# Patient Record
Sex: MALE | Race: OTHER | HISPANIC OR LATINO
[De-identification: names, ages, dates, MRNs, and addresses within clinical notes are randomized per-mention and may not be internally consistent; named-entity substitution may affect disease eponyms.]

---

## 2017-01-17 ENCOUNTER — APPOINTMENT (OUTPATIENT)
Dept: CARDIOLOGY | Facility: HOSPITAL | Age: 72
End: 2017-01-17

## 2017-01-23 ENCOUNTER — APPOINTMENT (OUTPATIENT)
Dept: INTERNAL MEDICINE | Facility: HOSPITAL | Age: 72
End: 2017-01-23

## 2017-01-23 ENCOUNTER — OUTPATIENT (OUTPATIENT)
Dept: OUTPATIENT SERVICES | Facility: HOSPITAL | Age: 72
LOS: 1 days | End: 2017-01-23

## 2017-01-23 DIAGNOSIS — Z79.01 LONG TERM (CURRENT) USE OF ANTICOAGULANTS: ICD-10-CM

## 2017-01-23 DIAGNOSIS — Z98.89 OTHER SPECIFIED POSTPROCEDURAL STATES: Chronic | ICD-10-CM

## 2017-01-23 DIAGNOSIS — Z00.00 ENCOUNTER FOR GENERAL ADULT MEDICAL EXAMINATION WITHOUT ABNORMAL FINDINGS: ICD-10-CM

## 2017-01-23 DIAGNOSIS — Z95.2 PRESENCE OF PROSTHETIC HEART VALVE: ICD-10-CM

## 2017-01-23 DIAGNOSIS — Z86.69 PERSONAL HISTORY OF OTHER DISEASES OF THE NERVOUS SYSTEM AND SENSE ORGANS: Chronic | ICD-10-CM

## 2017-01-30 ENCOUNTER — LABORATORY RESULT (OUTPATIENT)
Age: 72
End: 2017-01-30

## 2017-01-30 ENCOUNTER — OUTPATIENT (OUTPATIENT)
Dept: OUTPATIENT SERVICES | Facility: HOSPITAL | Age: 72
LOS: 1 days | End: 2017-01-30

## 2017-01-30 ENCOUNTER — APPOINTMENT (OUTPATIENT)
Dept: INTERNAL MEDICINE | Facility: HOSPITAL | Age: 72
End: 2017-01-30

## 2017-01-30 VITALS — HEIGHT: 69 IN | BODY MASS INDEX: 27.1 KG/M2 | WEIGHT: 182.98 LBS

## 2017-01-30 VITALS — DIASTOLIC BLOOD PRESSURE: 90 MMHG | SYSTOLIC BLOOD PRESSURE: 140 MMHG

## 2017-01-30 DIAGNOSIS — T38.3X5A ADVERSE EFFECT OF INSULIN AND ORAL HYPOGLYCEMIC [ANTIDIABETIC] DRUGS, INITIAL ENCOUNTER: ICD-10-CM

## 2017-01-30 DIAGNOSIS — D49.2 NEOPLASM OF UNSPECIFIED BEHAVIOR OF BONE, SOFT TISSUE, AND SKIN: ICD-10-CM

## 2017-01-30 DIAGNOSIS — Z86.69 PERSONAL HISTORY OF OTHER DISEASES OF THE NERVOUS SYSTEM AND SENSE ORGANS: Chronic | ICD-10-CM

## 2017-01-30 DIAGNOSIS — Z98.89 OTHER SPECIFIED POSTPROCEDURAL STATES: Chronic | ICD-10-CM

## 2017-01-30 DIAGNOSIS — Z87.438 PERSONAL HISTORY OF OTHER DISEASES OF MALE GENITAL ORGANS: ICD-10-CM

## 2017-01-30 DIAGNOSIS — C44.91 BASAL CELL CARCINOMA OF SKIN, UNSPECIFIED: ICD-10-CM

## 2017-01-30 DIAGNOSIS — I78.1 NEVUS, NON-NEOPLASTIC: ICD-10-CM

## 2017-01-30 DIAGNOSIS — Z86.79 PERSONAL HISTORY OF OTHER DISEASES OF THE CIRCULATORY SYSTEM: ICD-10-CM

## 2017-01-30 DIAGNOSIS — Z87.898 PERSONAL HISTORY OF OTHER SPECIFIED CONDITIONS: ICD-10-CM

## 2017-01-30 LAB — HBA1C BLD-MCNC: 6.6 % — HIGH (ref 4–5.6)

## 2017-01-31 DIAGNOSIS — Z87.19 PERSONAL HISTORY OF OTHER DISEASES OF THE DIGESTIVE SYSTEM: ICD-10-CM

## 2017-01-31 DIAGNOSIS — Z95.2 PRESENCE OF PROSTHETIC HEART VALVE: ICD-10-CM

## 2017-01-31 DIAGNOSIS — G40.909 EPILEPSY, UNSPECIFIED, NOT INTRACTABLE, WITHOUT STATUS EPILEPTICUS: ICD-10-CM

## 2017-01-31 DIAGNOSIS — I48.91 UNSPECIFIED ATRIAL FIBRILLATION: ICD-10-CM

## 2017-01-31 DIAGNOSIS — E11.9 TYPE 2 DIABETES MELLITUS WITHOUT COMPLICATIONS: ICD-10-CM

## 2017-01-31 DIAGNOSIS — Z00.00 ENCOUNTER FOR GENERAL ADULT MEDICAL EXAMINATION WITHOUT ABNORMAL FINDINGS: ICD-10-CM

## 2017-01-31 DIAGNOSIS — K31.89 OTHER DISEASES OF STOMACH AND DUODENUM: ICD-10-CM

## 2017-01-31 DIAGNOSIS — C44.91 BASAL CELL CARCINOMA OF SKIN, UNSPECIFIED: ICD-10-CM

## 2017-01-31 DIAGNOSIS — Z79.01 LONG TERM (CURRENT) USE OF ANTICOAGULANTS: ICD-10-CM

## 2017-01-31 LAB — GLUCOSE BLDC GLUCOMTR-MCNC: 97

## 2017-02-06 ENCOUNTER — APPOINTMENT (OUTPATIENT)
Dept: INTERNAL MEDICINE | Facility: HOSPITAL | Age: 72
End: 2017-02-06

## 2017-02-13 ENCOUNTER — APPOINTMENT (OUTPATIENT)
Dept: INTERNAL MEDICINE | Facility: HOSPITAL | Age: 72
End: 2017-02-13

## 2017-03-06 ENCOUNTER — RX RENEWAL (OUTPATIENT)
Age: 72
End: 2017-03-06

## 2017-03-16 ENCOUNTER — OTHER (OUTPATIENT)
Age: 72
End: 2017-03-16

## 2017-03-16 ENCOUNTER — OUTPATIENT (OUTPATIENT)
Dept: OUTPATIENT SERVICES | Facility: HOSPITAL | Age: 72
LOS: 1 days | End: 2017-03-16

## 2017-03-16 ENCOUNTER — RESULT CHARGE (OUTPATIENT)
Age: 72
End: 2017-03-16

## 2017-03-16 ENCOUNTER — APPOINTMENT (OUTPATIENT)
Dept: GASTROENTEROLOGY | Facility: HOSPITAL | Age: 72
End: 2017-03-16

## 2017-03-16 VITALS
HEART RATE: 83 BPM | BODY MASS INDEX: 27.11 KG/M2 | WEIGHT: 183 LBS | SYSTOLIC BLOOD PRESSURE: 135 MMHG | HEIGHT: 69 IN | DIASTOLIC BLOOD PRESSURE: 89 MMHG

## 2017-03-16 DIAGNOSIS — Z98.89 OTHER SPECIFIED POSTPROCEDURAL STATES: Chronic | ICD-10-CM

## 2017-03-16 DIAGNOSIS — Z86.69 PERSONAL HISTORY OF OTHER DISEASES OF THE NERVOUS SYSTEM AND SENSE ORGANS: Chronic | ICD-10-CM

## 2017-03-16 DIAGNOSIS — K31.89 OTHER DISEASES OF STOMACH AND DUODENUM: ICD-10-CM

## 2017-03-16 DIAGNOSIS — Z79.01 LONG TERM (CURRENT) USE OF ANTICOAGULANTS: ICD-10-CM

## 2017-03-16 DIAGNOSIS — R10.13 EPIGASTRIC PAIN: ICD-10-CM

## 2017-03-16 DIAGNOSIS — K62.89 OTHER SPECIFIED DISEASES OF ANUS AND RECTUM: ICD-10-CM

## 2017-03-16 RX ORDER — HYDROCORTISONE ACETATE 25 MG/1
25 SUPPOSITORY RECTAL
Qty: 50 | Refills: 0 | Status: DISCONTINUED | COMMUNITY
Start: 2017-01-30 | End: 2017-03-16

## 2017-03-16 RX ORDER — HYDROCORTISONE ACETATE AND PRAMOXINE HYDROCHLORIDE 25; 10 MG/G; MG/G
2.5-1 CREAM TOPICAL
Qty: 30 | Refills: 3 | Status: DISCONTINUED | COMMUNITY
Start: 2017-03-16 | End: 2017-03-16

## 2017-03-17 LAB — INR PPP: 2.9 RATIO

## 2017-04-07 ENCOUNTER — OTHER (OUTPATIENT)
Age: 72
End: 2017-04-07

## 2017-04-10 ENCOUNTER — RX RENEWAL (OUTPATIENT)
Age: 72
End: 2017-04-10

## 2017-04-13 ENCOUNTER — APPOINTMENT (OUTPATIENT)
Dept: INTERNAL MEDICINE | Facility: HOSPITAL | Age: 72
End: 2017-04-13

## 2017-04-27 ENCOUNTER — OUTPATIENT (OUTPATIENT)
Dept: OUTPATIENT SERVICES | Facility: HOSPITAL | Age: 72
LOS: 1 days | End: 2017-04-27

## 2017-04-27 ENCOUNTER — APPOINTMENT (OUTPATIENT)
Dept: INTERNAL MEDICINE | Facility: HOSPITAL | Age: 72
End: 2017-04-27

## 2017-04-27 DIAGNOSIS — Z98.89 OTHER SPECIFIED POSTPROCEDURAL STATES: Chronic | ICD-10-CM

## 2017-04-27 DIAGNOSIS — Z95.2 PRESENCE OF PROSTHETIC HEART VALVE: ICD-10-CM

## 2017-04-27 DIAGNOSIS — Z79.01 LONG TERM (CURRENT) USE OF ANTICOAGULANTS: ICD-10-CM

## 2017-04-27 DIAGNOSIS — Z86.69 PERSONAL HISTORY OF OTHER DISEASES OF THE NERVOUS SYSTEM AND SENSE ORGANS: Chronic | ICD-10-CM

## 2017-04-27 DIAGNOSIS — I48.91 UNSPECIFIED ATRIAL FIBRILLATION: ICD-10-CM

## 2017-04-27 LAB — INR PPP: 2.9 RATIO

## 2017-05-25 ENCOUNTER — APPOINTMENT (OUTPATIENT)
Dept: INTERNAL MEDICINE | Facility: HOSPITAL | Age: 72
End: 2017-05-25

## 2017-06-02 ENCOUNTER — OUTPATIENT (OUTPATIENT)
Dept: OUTPATIENT SERVICES | Facility: HOSPITAL | Age: 72
LOS: 1 days | End: 2017-06-02

## 2017-06-02 ENCOUNTER — APPOINTMENT (OUTPATIENT)
Dept: INTERNAL MEDICINE | Facility: HOSPITAL | Age: 72
End: 2017-06-02

## 2017-06-02 ENCOUNTER — OTHER (OUTPATIENT)
Age: 72
End: 2017-06-02

## 2017-06-02 DIAGNOSIS — Z79.01 LONG TERM (CURRENT) USE OF ANTICOAGULANTS: ICD-10-CM

## 2017-06-02 DIAGNOSIS — Z86.69 PERSONAL HISTORY OF OTHER DISEASES OF THE NERVOUS SYSTEM AND SENSE ORGANS: Chronic | ICD-10-CM

## 2017-06-02 DIAGNOSIS — Z98.89 OTHER SPECIFIED POSTPROCEDURAL STATES: Chronic | ICD-10-CM

## 2017-06-02 DIAGNOSIS — Z95.2 PRESENCE OF PROSTHETIC HEART VALVE: ICD-10-CM

## 2017-06-02 LAB
INR PPP: 3.8 RATIO
QUALITY CONTROL: YES

## 2017-06-09 ENCOUNTER — APPOINTMENT (OUTPATIENT)
Dept: INTERNAL MEDICINE | Facility: HOSPITAL | Age: 72
End: 2017-06-09

## 2017-06-09 ENCOUNTER — RESULT REVIEW (OUTPATIENT)
Age: 72
End: 2017-06-09

## 2017-06-09 ENCOUNTER — OUTPATIENT (OUTPATIENT)
Dept: OUTPATIENT SERVICES | Facility: HOSPITAL | Age: 72
LOS: 1 days | End: 2017-06-09

## 2017-06-09 ENCOUNTER — LABORATORY RESULT (OUTPATIENT)
Age: 72
End: 2017-06-09

## 2017-06-09 DIAGNOSIS — Z98.89 OTHER SPECIFIED POSTPROCEDURAL STATES: Chronic | ICD-10-CM

## 2017-06-09 DIAGNOSIS — Z86.69 PERSONAL HISTORY OF OTHER DISEASES OF THE NERVOUS SYSTEM AND SENSE ORGANS: Chronic | ICD-10-CM

## 2017-06-09 LAB
INR BLD: 2.79 — HIGH (ref 0.88–1.17)
PROTHROM AB SERPL-ACNC: 31.9 SEC — HIGH (ref 9.8–13.1)

## 2017-06-12 DIAGNOSIS — Z79.01 LONG TERM (CURRENT) USE OF ANTICOAGULANTS: ICD-10-CM

## 2017-06-13 ENCOUNTER — APPOINTMENT (OUTPATIENT)
Dept: INTERNAL MEDICINE | Facility: HOSPITAL | Age: 72
End: 2017-06-13

## 2017-06-14 ENCOUNTER — RX RENEWAL (OUTPATIENT)
Age: 72
End: 2017-06-14

## 2017-06-15 ENCOUNTER — APPOINTMENT (OUTPATIENT)
Dept: INTERNAL MEDICINE | Facility: HOSPITAL | Age: 72
End: 2017-06-15

## 2017-06-15 ENCOUNTER — APPOINTMENT (OUTPATIENT)
Dept: GASTROENTEROLOGY | Facility: HOSPITAL | Age: 72
End: 2017-06-15

## 2017-06-16 ENCOUNTER — APPOINTMENT (OUTPATIENT)
Dept: INTERNAL MEDICINE | Facility: HOSPITAL | Age: 72
End: 2017-06-16

## 2017-06-20 ENCOUNTER — OUTPATIENT (OUTPATIENT)
Dept: OUTPATIENT SERVICES | Facility: HOSPITAL | Age: 72
LOS: 1 days | End: 2017-06-20

## 2017-06-20 ENCOUNTER — APPOINTMENT (OUTPATIENT)
Dept: INTERNAL MEDICINE | Facility: HOSPITAL | Age: 72
End: 2017-06-20

## 2017-06-20 DIAGNOSIS — Z95.2 PRESENCE OF PROSTHETIC HEART VALVE: ICD-10-CM

## 2017-06-20 DIAGNOSIS — Z98.89 OTHER SPECIFIED POSTPROCEDURAL STATES: Chronic | ICD-10-CM

## 2017-06-20 DIAGNOSIS — Z86.69 PERSONAL HISTORY OF OTHER DISEASES OF THE NERVOUS SYSTEM AND SENSE ORGANS: Chronic | ICD-10-CM

## 2017-06-20 LAB — INR PPP: 2.4 RATIO

## 2017-06-26 ENCOUNTER — OUTPATIENT (OUTPATIENT)
Dept: OUTPATIENT SERVICES | Facility: HOSPITAL | Age: 72
LOS: 1 days | End: 2017-06-26

## 2017-06-26 ENCOUNTER — APPOINTMENT (OUTPATIENT)
Dept: INTERNAL MEDICINE | Facility: HOSPITAL | Age: 72
End: 2017-06-26

## 2017-06-26 DIAGNOSIS — Z86.69 PERSONAL HISTORY OF OTHER DISEASES OF THE NERVOUS SYSTEM AND SENSE ORGANS: Chronic | ICD-10-CM

## 2017-06-26 DIAGNOSIS — Z98.89 OTHER SPECIFIED POSTPROCEDURAL STATES: Chronic | ICD-10-CM

## 2017-06-27 ENCOUNTER — APPOINTMENT (OUTPATIENT)
Dept: INTERNAL MEDICINE | Facility: HOSPITAL | Age: 72
End: 2017-06-27

## 2017-06-27 DIAGNOSIS — Z00.00 ENCOUNTER FOR GENERAL ADULT MEDICAL EXAMINATION WITHOUT ABNORMAL FINDINGS: ICD-10-CM

## 2017-06-27 DIAGNOSIS — Z79.01 LONG TERM (CURRENT) USE OF ANTICOAGULANTS: ICD-10-CM

## 2017-07-03 ENCOUNTER — OUTPATIENT (OUTPATIENT)
Dept: OUTPATIENT SERVICES | Facility: HOSPITAL | Age: 72
LOS: 1 days | End: 2017-07-03

## 2017-07-03 ENCOUNTER — APPOINTMENT (OUTPATIENT)
Dept: INTERNAL MEDICINE | Facility: HOSPITAL | Age: 72
End: 2017-07-03

## 2017-07-03 DIAGNOSIS — Z98.89 OTHER SPECIFIED POSTPROCEDURAL STATES: Chronic | ICD-10-CM

## 2017-07-03 DIAGNOSIS — Z86.69 PERSONAL HISTORY OF OTHER DISEASES OF THE NERVOUS SYSTEM AND SENSE ORGANS: Chronic | ICD-10-CM

## 2017-07-03 LAB — INR PPP: 2.5 RATIO

## 2017-07-05 DIAGNOSIS — Z00.00 ENCOUNTER FOR GENERAL ADULT MEDICAL EXAMINATION WITHOUT ABNORMAL FINDINGS: ICD-10-CM

## 2017-07-05 DIAGNOSIS — Z79.01 LONG TERM (CURRENT) USE OF ANTICOAGULANTS: ICD-10-CM

## 2017-07-05 DIAGNOSIS — Z95.2 PRESENCE OF PROSTHETIC HEART VALVE: ICD-10-CM

## 2017-07-05 DIAGNOSIS — I48.91 UNSPECIFIED ATRIAL FIBRILLATION: ICD-10-CM

## 2017-07-12 ENCOUNTER — LABORATORY RESULT (OUTPATIENT)
Age: 72
End: 2017-07-12

## 2017-07-12 ENCOUNTER — APPOINTMENT (OUTPATIENT)
Dept: INTERNAL MEDICINE | Facility: HOSPITAL | Age: 72
End: 2017-07-12

## 2017-07-12 ENCOUNTER — OUTPATIENT (OUTPATIENT)
Dept: OUTPATIENT SERVICES | Facility: HOSPITAL | Age: 72
LOS: 1 days | End: 2017-07-12

## 2017-07-12 VITALS
RESPIRATION RATE: 14 BRPM | HEART RATE: 74 BPM | OXYGEN SATURATION: 98 % | SYSTOLIC BLOOD PRESSURE: 110 MMHG | DIASTOLIC BLOOD PRESSURE: 70 MMHG

## 2017-07-12 VITALS — HEIGHT: 69 IN | BODY MASS INDEX: 26.96 KG/M2 | WEIGHT: 182 LBS

## 2017-07-12 DIAGNOSIS — Z86.69 PERSONAL HISTORY OF OTHER DISEASES OF THE NERVOUS SYSTEM AND SENSE ORGANS: Chronic | ICD-10-CM

## 2017-07-12 DIAGNOSIS — Z86.03 PERSONAL HISTORY OF NEOPLASM OF UNCERTAIN BEHAVIOR: ICD-10-CM

## 2017-07-12 DIAGNOSIS — Z00.00 ENCOUNTER FOR GENERAL ADULT MEDICAL EXAMINATION WITHOUT ABNORMAL FINDINGS: ICD-10-CM

## 2017-07-12 DIAGNOSIS — Z87.39 PERSONAL HISTORY OF OTHER DISEASES OF THE MUSCULOSKELETAL SYSTEM AND CONNECTIVE TISSUE: ICD-10-CM

## 2017-07-12 DIAGNOSIS — Z87.19 PERSONAL HISTORY OF OTHER DISEASES OF THE DIGESTIVE SYSTEM: ICD-10-CM

## 2017-07-12 DIAGNOSIS — B35.3 TINEA PEDIS: ICD-10-CM

## 2017-07-12 DIAGNOSIS — Z98.890 OTHER SPECIFIED POSTPROCEDURAL STATES: ICD-10-CM

## 2017-07-12 DIAGNOSIS — I48.91 UNSPECIFIED ATRIAL FIBRILLATION: ICD-10-CM

## 2017-07-12 DIAGNOSIS — Z86.718 PERSONAL HISTORY OF OTHER VENOUS THROMBOSIS AND EMBOLISM: ICD-10-CM

## 2017-07-12 DIAGNOSIS — I10 ESSENTIAL (PRIMARY) HYPERTENSION: ICD-10-CM

## 2017-07-12 DIAGNOSIS — Z86.19 PERSONAL HISTORY OF OTHER INFECTIOUS AND PARASITIC DISEASES: ICD-10-CM

## 2017-07-12 DIAGNOSIS — Z95.2 PRESENCE OF PROSTHETIC HEART VALVE: ICD-10-CM

## 2017-07-12 DIAGNOSIS — G40.909 EPILEPSY, UNSPECIFIED, NOT INTRACTABLE, WITHOUT STATUS EPILEPTICUS: ICD-10-CM

## 2017-07-12 DIAGNOSIS — Z85.828 OTHER SPECIFIED POSTPROCEDURAL STATES: ICD-10-CM

## 2017-07-12 DIAGNOSIS — L98.9 DISORDER OF THE SKIN AND SUBCUTANEOUS TISSUE, UNSPECIFIED: ICD-10-CM

## 2017-07-12 DIAGNOSIS — R10.13 EPIGASTRIC PAIN: ICD-10-CM

## 2017-07-12 DIAGNOSIS — K62.89 OTHER SPECIFIED DISEASES OF ANUS AND RECTUM: ICD-10-CM

## 2017-07-12 DIAGNOSIS — Z98.89 OTHER SPECIFIED POSTPROCEDURAL STATES: Chronic | ICD-10-CM

## 2017-07-12 DIAGNOSIS — E78.5 HYPERLIPIDEMIA, UNSPECIFIED: ICD-10-CM

## 2017-07-12 DIAGNOSIS — S80.12XD CONTUSION OF LEFT LOWER LEG, SUBSEQUENT ENCOUNTER: ICD-10-CM

## 2017-07-12 DIAGNOSIS — Z85.828 PERSONAL HISTORY OF OTHER MALIGNANT NEOPLASM OF SKIN: ICD-10-CM

## 2017-07-12 DIAGNOSIS — E11.9 TYPE 2 DIABETES MELLITUS WITHOUT COMPLICATIONS: ICD-10-CM

## 2017-07-12 LAB
BUN SERPL-MCNC: 20 MG/DL — SIGNIFICANT CHANGE UP (ref 7–23)
CALCIUM SERPL-MCNC: 9.7 MG/DL — SIGNIFICANT CHANGE UP (ref 8.4–10.5)
CHLORIDE SERPL-SCNC: 105 MMOL/L — SIGNIFICANT CHANGE UP (ref 98–107)
CO2 SERPL-SCNC: 25 MMOL/L — SIGNIFICANT CHANGE UP (ref 22–31)
CREAT SERPL-MCNC: 1.28 MG/DL — SIGNIFICANT CHANGE UP (ref 0.5–1.3)
GLUCOSE BLDC GLUCOMTR-MCNC: 97
GLUCOSE SERPL-MCNC: 91 MG/DL — SIGNIFICANT CHANGE UP (ref 70–99)
HBA1C BLD-MCNC: 6.9 % — HIGH (ref 4–5.6)
INR PPP: 3.3 RATIO
POTASSIUM SERPL-MCNC: 4.4 MMOL/L — SIGNIFICANT CHANGE UP (ref 3.5–5.3)
POTASSIUM SERPL-SCNC: 4.4 MMOL/L — SIGNIFICANT CHANGE UP (ref 3.5–5.3)
SODIUM SERPL-SCNC: 144 MMOL/L — SIGNIFICANT CHANGE UP (ref 135–145)

## 2017-07-12 RX ORDER — WITCH HAZEL 5 G/1
50 CLOTH TOPICAL
Qty: 30 | Refills: 0 | Status: DISCONTINUED | COMMUNITY
Start: 2017-01-30 | End: 2017-07-12

## 2017-07-19 ENCOUNTER — APPOINTMENT (OUTPATIENT)
Dept: INTERNAL MEDICINE | Facility: HOSPITAL | Age: 72
End: 2017-07-19

## 2017-07-26 ENCOUNTER — OUTPATIENT (OUTPATIENT)
Dept: OUTPATIENT SERVICES | Facility: HOSPITAL | Age: 72
LOS: 1 days | End: 2017-07-26

## 2017-07-26 ENCOUNTER — APPOINTMENT (OUTPATIENT)
Dept: INTERNAL MEDICINE | Facility: HOSPITAL | Age: 72
End: 2017-07-26

## 2017-07-26 ENCOUNTER — APPOINTMENT (OUTPATIENT)
Dept: CARDIOLOGY | Facility: HOSPITAL | Age: 72
End: 2017-07-26

## 2017-07-26 VITALS
RESPIRATION RATE: 15 BRPM | DIASTOLIC BLOOD PRESSURE: 79 MMHG | OXYGEN SATURATION: 96 % | HEART RATE: 70 BPM | BODY MASS INDEX: 27.47 KG/M2 | SYSTOLIC BLOOD PRESSURE: 126 MMHG | WEIGHT: 186 LBS

## 2017-07-26 DIAGNOSIS — Z98.89 OTHER SPECIFIED POSTPROCEDURAL STATES: Chronic | ICD-10-CM

## 2017-07-26 DIAGNOSIS — Z86.69 PERSONAL HISTORY OF OTHER DISEASES OF THE NERVOUS SYSTEM AND SENSE ORGANS: Chronic | ICD-10-CM

## 2017-07-26 LAB — INR PPP: 2.9 RATIO

## 2017-07-27 DIAGNOSIS — Z79.01 LONG TERM (CURRENT) USE OF ANTICOAGULANTS: ICD-10-CM

## 2017-07-28 ENCOUNTER — APPOINTMENT (OUTPATIENT)
Dept: INTERNAL MEDICINE | Facility: HOSPITAL | Age: 72
End: 2017-07-28

## 2017-07-28 DIAGNOSIS — I48.91 UNSPECIFIED ATRIAL FIBRILLATION: ICD-10-CM

## 2017-08-02 ENCOUNTER — MESSAGE (OUTPATIENT)
Age: 72
End: 2017-08-02

## 2017-08-08 ENCOUNTER — OTHER (OUTPATIENT)
Age: 72
End: 2017-08-08

## 2017-08-16 ENCOUNTER — OUTPATIENT (OUTPATIENT)
Dept: OUTPATIENT SERVICES | Facility: HOSPITAL | Age: 72
LOS: 1 days | End: 2017-08-16

## 2017-08-16 ENCOUNTER — APPOINTMENT (OUTPATIENT)
Dept: INTERNAL MEDICINE | Facility: HOSPITAL | Age: 72
End: 2017-08-16

## 2017-08-16 DIAGNOSIS — Z98.89 OTHER SPECIFIED POSTPROCEDURAL STATES: Chronic | ICD-10-CM

## 2017-08-16 DIAGNOSIS — Z86.69 PERSONAL HISTORY OF OTHER DISEASES OF THE NERVOUS SYSTEM AND SENSE ORGANS: Chronic | ICD-10-CM

## 2017-08-17 DIAGNOSIS — Z79.01 LONG TERM (CURRENT) USE OF ANTICOAGULANTS: ICD-10-CM

## 2017-08-17 DIAGNOSIS — Z95.2 PRESENCE OF PROSTHETIC HEART VALVE: ICD-10-CM

## 2017-08-17 LAB
INR PPP: 2 RATIO
QUALITY CONTROL: NO

## 2017-08-24 ENCOUNTER — APPOINTMENT (OUTPATIENT)
Dept: INTERNAL MEDICINE | Facility: HOSPITAL | Age: 72
End: 2017-08-24

## 2017-08-25 ENCOUNTER — OUTPATIENT (OUTPATIENT)
Dept: OUTPATIENT SERVICES | Facility: HOSPITAL | Age: 72
LOS: 1 days | End: 2017-08-25

## 2017-08-25 ENCOUNTER — APPOINTMENT (OUTPATIENT)
Dept: INTERNAL MEDICINE | Facility: HOSPITAL | Age: 72
End: 2017-08-25

## 2017-08-25 DIAGNOSIS — Z98.89 OTHER SPECIFIED POSTPROCEDURAL STATES: Chronic | ICD-10-CM

## 2017-08-25 DIAGNOSIS — Z86.69 PERSONAL HISTORY OF OTHER DISEASES OF THE NERVOUS SYSTEM AND SENSE ORGANS: Chronic | ICD-10-CM

## 2017-08-25 LAB — INR PPP: 2.3 RATIO

## 2017-08-28 DIAGNOSIS — Z79.01 LONG TERM (CURRENT) USE OF ANTICOAGULANTS: ICD-10-CM

## 2017-08-30 ENCOUNTER — OUTPATIENT (OUTPATIENT)
Dept: OUTPATIENT SERVICES | Facility: HOSPITAL | Age: 72
LOS: 1 days | End: 2017-08-30

## 2017-08-30 ENCOUNTER — APPOINTMENT (OUTPATIENT)
Dept: INTERNAL MEDICINE | Facility: HOSPITAL | Age: 72
End: 2017-08-30

## 2017-08-30 DIAGNOSIS — Z86.69 PERSONAL HISTORY OF OTHER DISEASES OF THE NERVOUS SYSTEM AND SENSE ORGANS: Chronic | ICD-10-CM

## 2017-08-30 DIAGNOSIS — Z98.89 OTHER SPECIFIED POSTPROCEDURAL STATES: Chronic | ICD-10-CM

## 2017-08-31 DIAGNOSIS — Z79.01 LONG TERM (CURRENT) USE OF ANTICOAGULANTS: ICD-10-CM

## 2017-09-05 ENCOUNTER — RX RENEWAL (OUTPATIENT)
Age: 72
End: 2017-09-05

## 2017-09-05 LAB — INR PPP: 2.4 RATIO

## 2017-09-06 ENCOUNTER — APPOINTMENT (OUTPATIENT)
Dept: INTERNAL MEDICINE | Facility: HOSPITAL | Age: 72
End: 2017-09-06

## 2017-09-12 ENCOUNTER — APPOINTMENT (OUTPATIENT)
Dept: INTERNAL MEDICINE | Facility: HOSPITAL | Age: 72
End: 2017-09-12

## 2017-09-12 ENCOUNTER — OUTPATIENT (OUTPATIENT)
Dept: OUTPATIENT SERVICES | Facility: HOSPITAL | Age: 72
LOS: 1 days | End: 2017-09-12

## 2017-09-12 DIAGNOSIS — Z98.89 OTHER SPECIFIED POSTPROCEDURAL STATES: Chronic | ICD-10-CM

## 2017-09-12 DIAGNOSIS — Z79.01 LONG TERM (CURRENT) USE OF ANTICOAGULANTS: ICD-10-CM

## 2017-09-12 DIAGNOSIS — Z86.69 PERSONAL HISTORY OF OTHER DISEASES OF THE NERVOUS SYSTEM AND SENSE ORGANS: Chronic | ICD-10-CM

## 2017-09-12 LAB — INR PPP: 3 RATIO

## 2017-09-29 ENCOUNTER — OUTPATIENT (OUTPATIENT)
Dept: OUTPATIENT SERVICES | Facility: HOSPITAL | Age: 72
LOS: 1 days | End: 2017-09-29

## 2017-09-29 ENCOUNTER — APPOINTMENT (OUTPATIENT)
Dept: INTERNAL MEDICINE | Facility: HOSPITAL | Age: 72
End: 2017-09-29

## 2017-09-29 DIAGNOSIS — Z86.69 PERSONAL HISTORY OF OTHER DISEASES OF THE NERVOUS SYSTEM AND SENSE ORGANS: Chronic | ICD-10-CM

## 2017-09-29 DIAGNOSIS — Z79.01 LONG TERM (CURRENT) USE OF ANTICOAGULANTS: ICD-10-CM

## 2017-09-29 DIAGNOSIS — Z98.89 OTHER SPECIFIED POSTPROCEDURAL STATES: Chronic | ICD-10-CM

## 2017-09-29 LAB — INR PPP: 2.6 RATIO

## 2017-10-06 ENCOUNTER — APPOINTMENT (OUTPATIENT)
Dept: INTERNAL MEDICINE | Facility: HOSPITAL | Age: 72
End: 2017-10-06

## 2017-10-06 ENCOUNTER — OUTPATIENT (OUTPATIENT)
Dept: OUTPATIENT SERVICES | Facility: HOSPITAL | Age: 72
LOS: 1 days | End: 2017-10-06

## 2017-10-06 ENCOUNTER — OTHER (OUTPATIENT)
Age: 72
End: 2017-10-06

## 2017-10-06 DIAGNOSIS — Z98.89 OTHER SPECIFIED POSTPROCEDURAL STATES: Chronic | ICD-10-CM

## 2017-10-06 DIAGNOSIS — Z86.69 PERSONAL HISTORY OF OTHER DISEASES OF THE NERVOUS SYSTEM AND SENSE ORGANS: Chronic | ICD-10-CM

## 2017-10-06 DIAGNOSIS — Z79.01 LONG TERM (CURRENT) USE OF ANTICOAGULANTS: ICD-10-CM

## 2017-10-10 LAB
INR PPP: 2 RATIO
QUALITY CONTROL: YES

## 2017-10-18 ENCOUNTER — APPOINTMENT (OUTPATIENT)
Dept: INTERNAL MEDICINE | Facility: HOSPITAL | Age: 72
End: 2017-10-18

## 2017-10-18 ENCOUNTER — OUTPATIENT (OUTPATIENT)
Dept: OUTPATIENT SERVICES | Facility: HOSPITAL | Age: 72
LOS: 1 days | End: 2017-10-18

## 2017-10-18 DIAGNOSIS — Z86.69 PERSONAL HISTORY OF OTHER DISEASES OF THE NERVOUS SYSTEM AND SENSE ORGANS: Chronic | ICD-10-CM

## 2017-10-18 DIAGNOSIS — Z98.89 OTHER SPECIFIED POSTPROCEDURAL STATES: Chronic | ICD-10-CM

## 2017-10-19 ENCOUNTER — OUTPATIENT (OUTPATIENT)
Dept: OUTPATIENT SERVICES | Facility: HOSPITAL | Age: 72
LOS: 1 days | End: 2017-10-19

## 2017-10-19 ENCOUNTER — APPOINTMENT (OUTPATIENT)
Dept: OPHTHALMOLOGY | Facility: CLINIC | Age: 72
End: 2017-10-19

## 2017-10-19 ENCOUNTER — APPOINTMENT (OUTPATIENT)
Dept: INTERNAL MEDICINE | Facility: HOSPITAL | Age: 72
End: 2017-10-19

## 2017-10-19 ENCOUNTER — OTHER (OUTPATIENT)
Age: 72
End: 2017-10-19

## 2017-10-19 ENCOUNTER — RESULT CHARGE (OUTPATIENT)
Age: 72
End: 2017-10-19

## 2017-10-19 DIAGNOSIS — Z79.01 LONG TERM (CURRENT) USE OF ANTICOAGULANTS: ICD-10-CM

## 2017-10-19 DIAGNOSIS — Z00.00 ENCOUNTER FOR GENERAL ADULT MEDICAL EXAMINATION WITHOUT ABNORMAL FINDINGS: ICD-10-CM

## 2017-10-19 DIAGNOSIS — Z98.89 OTHER SPECIFIED POSTPROCEDURAL STATES: Chronic | ICD-10-CM

## 2017-10-19 DIAGNOSIS — Z86.69 PERSONAL HISTORY OF OTHER DISEASES OF THE NERVOUS SYSTEM AND SENSE ORGANS: Chronic | ICD-10-CM

## 2017-10-19 DIAGNOSIS — Z95.2 PRESENCE OF PROSTHETIC HEART VALVE: ICD-10-CM

## 2017-10-20 DIAGNOSIS — Z79.01 LONG TERM (CURRENT) USE OF ANTICOAGULANTS: ICD-10-CM

## 2017-10-23 DIAGNOSIS — Z79.01 LONG TERM (CURRENT) USE OF ANTICOAGULANTS: ICD-10-CM

## 2017-10-23 DIAGNOSIS — Z95.2 PRESENCE OF PROSTHETIC HEART VALVE: ICD-10-CM

## 2017-10-24 ENCOUNTER — APPOINTMENT (OUTPATIENT)
Dept: INTERNAL MEDICINE | Facility: HOSPITAL | Age: 72
End: 2017-10-24
Payer: MEDICARE

## 2017-10-24 ENCOUNTER — LABORATORY RESULT (OUTPATIENT)
Age: 72
End: 2017-10-24

## 2017-10-24 ENCOUNTER — OUTPATIENT (OUTPATIENT)
Dept: OUTPATIENT SERVICES | Facility: HOSPITAL | Age: 72
LOS: 1 days | End: 2017-10-24

## 2017-10-24 VITALS — SYSTOLIC BLOOD PRESSURE: 122 MMHG | DIASTOLIC BLOOD PRESSURE: 72 MMHG

## 2017-10-24 VITALS — HEIGHT: 69 IN | WEIGHT: 183 LBS | BODY MASS INDEX: 27.11 KG/M2

## 2017-10-24 DIAGNOSIS — Z98.89 OTHER SPECIFIED POSTPROCEDURAL STATES: Chronic | ICD-10-CM

## 2017-10-24 DIAGNOSIS — Z86.69 PERSONAL HISTORY OF OTHER DISEASES OF THE NERVOUS SYSTEM AND SENSE ORGANS: Chronic | ICD-10-CM

## 2017-10-24 DIAGNOSIS — Z86.711 PERSONAL HISTORY OF PULMONARY EMBOLISM: ICD-10-CM

## 2017-10-24 LAB — TSH SERPL-MCNC: 0.17 UIU/ML — LOW (ref 0.27–4.2)

## 2017-10-24 PROCEDURE — 99214 OFFICE O/P EST MOD 30 MIN: CPT | Mod: GC

## 2017-10-25 LAB — GLUCOSE BLDC GLUCOMTR-MCNC: 154

## 2017-10-26 DIAGNOSIS — N40.0 BENIGN PROSTATIC HYPERPLASIA WITHOUT LOWER URINARY TRACT SYMPTOMS: ICD-10-CM

## 2017-10-26 DIAGNOSIS — Z79.01 LONG TERM (CURRENT) USE OF ANTICOAGULANTS: ICD-10-CM

## 2017-10-26 DIAGNOSIS — E11.9 TYPE 2 DIABETES MELLITUS WITHOUT COMPLICATIONS: ICD-10-CM

## 2017-10-26 DIAGNOSIS — I48.91 UNSPECIFIED ATRIAL FIBRILLATION: ICD-10-CM

## 2017-10-26 DIAGNOSIS — K62.89 OTHER SPECIFIED DISEASES OF ANUS AND RECTUM: ICD-10-CM

## 2017-10-26 DIAGNOSIS — Z95.2 PRESENCE OF PROSTHETIC HEART VALVE: ICD-10-CM

## 2017-10-26 DIAGNOSIS — I09.9 RHEUMATIC HEART DISEASE, UNSPECIFIED: ICD-10-CM

## 2017-10-26 DIAGNOSIS — I10 ESSENTIAL (PRIMARY) HYPERTENSION: ICD-10-CM

## 2017-10-26 DIAGNOSIS — Z00.00 ENCOUNTER FOR GENERAL ADULT MEDICAL EXAMINATION WITHOUT ABNORMAL FINDINGS: ICD-10-CM

## 2017-10-26 DIAGNOSIS — E78.5 HYPERLIPIDEMIA, UNSPECIFIED: ICD-10-CM

## 2017-10-27 ENCOUNTER — APPOINTMENT (OUTPATIENT)
Dept: INTERNAL MEDICINE | Facility: HOSPITAL | Age: 72
End: 2017-10-27

## 2017-10-27 ENCOUNTER — OUTPATIENT (OUTPATIENT)
Dept: OUTPATIENT SERVICES | Facility: HOSPITAL | Age: 72
LOS: 1 days | End: 2017-10-27

## 2017-10-27 DIAGNOSIS — Z86.69 PERSONAL HISTORY OF OTHER DISEASES OF THE NERVOUS SYSTEM AND SENSE ORGANS: Chronic | ICD-10-CM

## 2017-10-27 DIAGNOSIS — H11.31 CONJUNCTIVAL HEMORRHAGE, RIGHT EYE: ICD-10-CM

## 2017-10-27 DIAGNOSIS — Z79.01 LONG TERM (CURRENT) USE OF ANTICOAGULANTS: ICD-10-CM

## 2017-10-27 DIAGNOSIS — Z98.89 OTHER SPECIFIED POSTPROCEDURAL STATES: Chronic | ICD-10-CM

## 2017-10-27 RX ORDER — ALPROSTADIL 125 UG/1
125 SUPPOSITORY URETHRAL
Qty: 7 | Refills: 0 | Status: DISCONTINUED | COMMUNITY
Start: 2017-07-12 | End: 2017-10-27

## 2017-11-02 ENCOUNTER — APPOINTMENT (OUTPATIENT)
Dept: OPHTHALMOLOGY | Facility: CLINIC | Age: 72
End: 2017-11-02

## 2017-11-03 DIAGNOSIS — H04.123 DRY EYE SYNDROME OF BILATERAL LACRIMAL GLANDS: ICD-10-CM

## 2017-11-03 DIAGNOSIS — H52.11 MYOPIA, RIGHT EYE: ICD-10-CM

## 2017-11-08 ENCOUNTER — APPOINTMENT (OUTPATIENT)
Dept: INTERNAL MEDICINE | Facility: HOSPITAL | Age: 72
End: 2017-11-08

## 2017-11-08 ENCOUNTER — OUTPATIENT (OUTPATIENT)
Dept: OUTPATIENT SERVICES | Facility: HOSPITAL | Age: 72
LOS: 1 days | End: 2017-11-08

## 2017-11-08 DIAGNOSIS — Z86.69 PERSONAL HISTORY OF OTHER DISEASES OF THE NERVOUS SYSTEM AND SENSE ORGANS: Chronic | ICD-10-CM

## 2017-11-08 DIAGNOSIS — Z98.89 OTHER SPECIFIED POSTPROCEDURAL STATES: Chronic | ICD-10-CM

## 2017-11-09 DIAGNOSIS — Z79.01 LONG TERM (CURRENT) USE OF ANTICOAGULANTS: ICD-10-CM

## 2017-11-10 ENCOUNTER — APPOINTMENT (OUTPATIENT)
Dept: INTERNAL MEDICINE | Facility: HOSPITAL | Age: 72
End: 2017-11-10

## 2017-11-22 ENCOUNTER — APPOINTMENT (OUTPATIENT)
Dept: INTERNAL MEDICINE | Facility: HOSPITAL | Age: 72
End: 2017-11-22

## 2017-11-22 ENCOUNTER — OUTPATIENT (OUTPATIENT)
Dept: OUTPATIENT SERVICES | Facility: HOSPITAL | Age: 72
LOS: 1 days | End: 2017-11-22

## 2017-11-22 ENCOUNTER — RESULT CHARGE (OUTPATIENT)
Age: 72
End: 2017-11-22

## 2017-11-22 DIAGNOSIS — Z86.69 PERSONAL HISTORY OF OTHER DISEASES OF THE NERVOUS SYSTEM AND SENSE ORGANS: Chronic | ICD-10-CM

## 2017-11-22 DIAGNOSIS — Z98.89 OTHER SPECIFIED POSTPROCEDURAL STATES: Chronic | ICD-10-CM

## 2017-11-27 DIAGNOSIS — Z79.01 LONG TERM (CURRENT) USE OF ANTICOAGULANTS: ICD-10-CM

## 2017-11-29 ENCOUNTER — APPOINTMENT (OUTPATIENT)
Dept: INTERNAL MEDICINE | Facility: HOSPITAL | Age: 72
End: 2017-11-29

## 2017-12-12 ENCOUNTER — APPOINTMENT (OUTPATIENT)
Dept: INTERNAL MEDICINE | Facility: HOSPITAL | Age: 72
End: 2017-12-12

## 2017-12-12 ENCOUNTER — LABORATORY RESULT (OUTPATIENT)
Age: 72
End: 2017-12-12

## 2017-12-12 ENCOUNTER — OUTPATIENT (OUTPATIENT)
Dept: OUTPATIENT SERVICES | Facility: HOSPITAL | Age: 72
LOS: 1 days | End: 2017-12-12

## 2017-12-12 DIAGNOSIS — Z98.89 OTHER SPECIFIED POSTPROCEDURAL STATES: Chronic | ICD-10-CM

## 2017-12-12 DIAGNOSIS — Z86.69 PERSONAL HISTORY OF OTHER DISEASES OF THE NERVOUS SYSTEM AND SENSE ORGANS: Chronic | ICD-10-CM

## 2017-12-12 LAB
BASOPHILS # BLD AUTO: 0.06 K/UL — SIGNIFICANT CHANGE UP (ref 0–0.2)
BASOPHILS NFR BLD AUTO: 0.6 % — SIGNIFICANT CHANGE UP (ref 0–2)
EOSINOPHIL # BLD AUTO: 0.14 K/UL — SIGNIFICANT CHANGE UP (ref 0–0.5)
EOSINOPHIL NFR BLD AUTO: 1.4 % — SIGNIFICANT CHANGE UP (ref 0–6)
HCT VFR BLD CALC: 45.9 % — SIGNIFICANT CHANGE UP (ref 39–50)
HGB BLD-MCNC: 15.6 G/DL — SIGNIFICANT CHANGE UP (ref 13–17)
IMM GRANULOCYTES # BLD AUTO: 0.07 # — SIGNIFICANT CHANGE UP
IMM GRANULOCYTES NFR BLD AUTO: 0.7 % — SIGNIFICANT CHANGE UP (ref 0–1.5)
LYMPHOCYTES # BLD AUTO: 2.05 K/UL — SIGNIFICANT CHANGE UP (ref 1–3.3)
LYMPHOCYTES # BLD AUTO: 21 % — SIGNIFICANT CHANGE UP (ref 13–44)
MCHC RBC-ENTMCNC: 31.4 PG — SIGNIFICANT CHANGE UP (ref 27–34)
MCHC RBC-ENTMCNC: 34 % — SIGNIFICANT CHANGE UP (ref 32–36)
MCV RBC AUTO: 92.4 FL — SIGNIFICANT CHANGE UP (ref 80–100)
MONOCYTES # BLD AUTO: 0.9 K/UL — SIGNIFICANT CHANGE UP (ref 0–0.9)
MONOCYTES NFR BLD AUTO: 9.2 % — SIGNIFICANT CHANGE UP (ref 2–14)
NEUTROPHILS # BLD AUTO: 6.52 K/UL — SIGNIFICANT CHANGE UP (ref 1.8–7.4)
NEUTROPHILS NFR BLD AUTO: 67.1 % — SIGNIFICANT CHANGE UP (ref 43–77)
NRBC # FLD: 0 — SIGNIFICANT CHANGE UP
PLATELET # BLD AUTO: 226 K/UL — SIGNIFICANT CHANGE UP (ref 150–400)
PMV BLD: 10.7 FL — SIGNIFICANT CHANGE UP (ref 7–13)
RBC # BLD: 4.97 M/UL — SIGNIFICANT CHANGE UP (ref 4.2–5.8)
RBC # FLD: 13.1 % — SIGNIFICANT CHANGE UP (ref 10.3–14.5)
WBC # BLD: 9.74 K/UL — SIGNIFICANT CHANGE UP (ref 3.8–10.5)
WBC # FLD AUTO: 9.74 K/UL — SIGNIFICANT CHANGE UP (ref 3.8–10.5)

## 2017-12-13 ENCOUNTER — APPOINTMENT (OUTPATIENT)
Dept: INTERNAL MEDICINE | Facility: HOSPITAL | Age: 72
End: 2017-12-13
Payer: MEDICARE

## 2017-12-13 ENCOUNTER — OUTPATIENT (OUTPATIENT)
Dept: OUTPATIENT SERVICES | Facility: HOSPITAL | Age: 72
LOS: 1 days | End: 2017-12-13

## 2017-12-13 ENCOUNTER — RESULT CHARGE (OUTPATIENT)
Age: 72
End: 2017-12-13

## 2017-12-13 VITALS — DIASTOLIC BLOOD PRESSURE: 80 MMHG | HEART RATE: 65 BPM | SYSTOLIC BLOOD PRESSURE: 130 MMHG

## 2017-12-13 DIAGNOSIS — Z86.69 PERSONAL HISTORY OF OTHER DISEASES OF THE NERVOUS SYSTEM AND SENSE ORGANS: Chronic | ICD-10-CM

## 2017-12-13 DIAGNOSIS — Z98.89 OTHER SPECIFIED POSTPROCEDURAL STATES: Chronic | ICD-10-CM

## 2017-12-13 LAB — INR PPP: 2.1 RATIO

## 2017-12-13 PROCEDURE — 99213 OFFICE O/P EST LOW 20 MIN: CPT | Mod: GE

## 2017-12-13 RX ORDER — RANITIDINE 150 MG/1
150 TABLET ORAL
Qty: 120 | Refills: 1 | Status: COMPLETED | COMMUNITY
Start: 2017-03-16 | End: 2017-12-13

## 2017-12-15 DIAGNOSIS — I48.91 UNSPECIFIED ATRIAL FIBRILLATION: ICD-10-CM

## 2017-12-15 DIAGNOSIS — K92.1 MELENA: ICD-10-CM

## 2017-12-15 DIAGNOSIS — Z79.01 LONG TERM (CURRENT) USE OF ANTICOAGULANTS: ICD-10-CM

## 2017-12-15 DIAGNOSIS — I09.9 RHEUMATIC HEART DISEASE, UNSPECIFIED: ICD-10-CM

## 2017-12-15 LAB — GLUCOSE BLDC GLUCOMTR-MCNC: 150

## 2017-12-18 ENCOUNTER — RX RENEWAL (OUTPATIENT)
Age: 72
End: 2017-12-18

## 2017-12-18 DIAGNOSIS — E11.9 TYPE 2 DIABETES MELLITUS WITHOUT COMPLICATIONS: ICD-10-CM

## 2017-12-20 ENCOUNTER — OUTPATIENT (OUTPATIENT)
Dept: OUTPATIENT SERVICES | Facility: HOSPITAL | Age: 72
LOS: 1 days | End: 2017-12-20

## 2017-12-20 ENCOUNTER — APPOINTMENT (OUTPATIENT)
Dept: INTERNAL MEDICINE | Facility: HOSPITAL | Age: 72
End: 2017-12-20

## 2017-12-20 ENCOUNTER — RESULT CHARGE (OUTPATIENT)
Age: 72
End: 2017-12-20

## 2017-12-20 ENCOUNTER — RX RENEWAL (OUTPATIENT)
Age: 72
End: 2017-12-20

## 2017-12-20 DIAGNOSIS — Z79.01 LONG TERM (CURRENT) USE OF ANTICOAGULANTS: ICD-10-CM

## 2017-12-20 DIAGNOSIS — Z98.89 OTHER SPECIFIED POSTPROCEDURAL STATES: Chronic | ICD-10-CM

## 2017-12-20 DIAGNOSIS — Z86.69 PERSONAL HISTORY OF OTHER DISEASES OF THE NERVOUS SYSTEM AND SENSE ORGANS: Chronic | ICD-10-CM

## 2017-12-20 DIAGNOSIS — Z00.00 ENCOUNTER FOR GENERAL ADULT MEDICAL EXAMINATION WITHOUT ABNORMAL FINDINGS: ICD-10-CM

## 2017-12-20 DIAGNOSIS — K92.1 MELENA: ICD-10-CM

## 2017-12-20 LAB — INR PPP: 3 RATIO

## 2018-01-02 ENCOUNTER — OUTPATIENT (OUTPATIENT)
Dept: OUTPATIENT SERVICES | Facility: HOSPITAL | Age: 73
LOS: 1 days | End: 2018-01-02

## 2018-01-02 ENCOUNTER — RESULT CHARGE (OUTPATIENT)
Age: 73
End: 2018-01-02

## 2018-01-02 ENCOUNTER — APPOINTMENT (OUTPATIENT)
Dept: INTERNAL MEDICINE | Facility: HOSPITAL | Age: 73
End: 2018-01-02

## 2018-01-02 DIAGNOSIS — Z98.89 OTHER SPECIFIED POSTPROCEDURAL STATES: Chronic | ICD-10-CM

## 2018-01-02 DIAGNOSIS — Z86.69 PERSONAL HISTORY OF OTHER DISEASES OF THE NERVOUS SYSTEM AND SENSE ORGANS: Chronic | ICD-10-CM

## 2018-01-03 DIAGNOSIS — Z79.01 LONG TERM (CURRENT) USE OF ANTICOAGULANTS: ICD-10-CM

## 2018-01-04 ENCOUNTER — APPOINTMENT (OUTPATIENT)
Dept: INTERNAL MEDICINE | Facility: HOSPITAL | Age: 73
End: 2018-01-04

## 2018-01-04 DIAGNOSIS — M70.60 TROCHANTERIC BURSITIS, UNSPECIFIED HIP: ICD-10-CM

## 2018-01-04 DIAGNOSIS — K62.89 OTHER SPECIFIED DISEASES OF ANUS AND RECTUM: ICD-10-CM

## 2018-01-11 ENCOUNTER — APPOINTMENT (OUTPATIENT)
Dept: INTERNAL MEDICINE | Facility: HOSPITAL | Age: 73
End: 2018-01-11
Payer: MEDICARE

## 2018-01-11 ENCOUNTER — OUTPATIENT (OUTPATIENT)
Dept: OUTPATIENT SERVICES | Facility: HOSPITAL | Age: 73
LOS: 1 days | End: 2018-01-11

## 2018-01-11 VITALS
WEIGHT: 180 LBS | SYSTOLIC BLOOD PRESSURE: 124 MMHG | BODY MASS INDEX: 26.66 KG/M2 | HEIGHT: 69 IN | DIASTOLIC BLOOD PRESSURE: 82 MMHG | HEART RATE: 60 BPM

## 2018-01-11 DIAGNOSIS — Z86.69 PERSONAL HISTORY OF OTHER DISEASES OF THE NERVOUS SYSTEM AND SENSE ORGANS: Chronic | ICD-10-CM

## 2018-01-11 DIAGNOSIS — Z98.89 OTHER SPECIFIED POSTPROCEDURAL STATES: Chronic | ICD-10-CM

## 2018-01-11 PROCEDURE — 99214 OFFICE O/P EST MOD 30 MIN: CPT | Mod: GC

## 2018-01-11 RX ORDER — ATENOLOL 100 MG/1
100 TABLET ORAL DAILY
Qty: 45 | Refills: 1 | Status: DISCONTINUED | COMMUNITY
Start: 2017-09-13 | End: 2018-01-11

## 2018-01-11 RX ORDER — HYDROCORTISONE 25 MG/G
2.5 CREAM TOPICAL DAILY
Qty: 1 | Refills: 5 | Status: COMPLETED | COMMUNITY
Start: 2017-03-16 | End: 2018-01-11

## 2018-01-11 RX ORDER — HYDROCORTISONE ACETATE AND PRAMOXINE HYDROCHLORIDE 25; 10 MG/G; MG/G
2.5-1 CREAM TOPICAL
Qty: 4 | Refills: 2 | Status: COMPLETED | COMMUNITY
Start: 2017-10-24 | End: 2018-01-11

## 2018-01-12 DIAGNOSIS — E11.9 TYPE 2 DIABETES MELLITUS WITHOUT COMPLICATIONS: ICD-10-CM

## 2018-01-12 PROBLEM — K62.89 ANAL BURNING: Status: RESOLVED | Noted: 2017-01-30 | Resolved: 2018-01-12

## 2018-01-12 LAB — GLUCOSE BLDC GLUCOMTR-MCNC: 188

## 2018-01-16 ENCOUNTER — OTHER (OUTPATIENT)
Age: 73
End: 2018-01-16

## 2018-01-16 ENCOUNTER — APPOINTMENT (OUTPATIENT)
Dept: INTERNAL MEDICINE | Facility: HOSPITAL | Age: 73
End: 2018-01-16

## 2018-01-16 ENCOUNTER — OUTPATIENT (OUTPATIENT)
Dept: OUTPATIENT SERVICES | Facility: HOSPITAL | Age: 73
LOS: 1 days | End: 2018-01-16

## 2018-01-16 DIAGNOSIS — Z98.89 OTHER SPECIFIED POSTPROCEDURAL STATES: Chronic | ICD-10-CM

## 2018-01-16 DIAGNOSIS — Z79.01 LONG TERM (CURRENT) USE OF ANTICOAGULANTS: ICD-10-CM

## 2018-01-16 DIAGNOSIS — Z86.69 PERSONAL HISTORY OF OTHER DISEASES OF THE NERVOUS SYSTEM AND SENSE ORGANS: Chronic | ICD-10-CM

## 2018-01-24 ENCOUNTER — OUTPATIENT (OUTPATIENT)
Dept: OUTPATIENT SERVICES | Facility: HOSPITAL | Age: 73
LOS: 1 days | End: 2018-01-24

## 2018-01-24 ENCOUNTER — APPOINTMENT (OUTPATIENT)
Dept: CARDIOLOGY | Facility: HOSPITAL | Age: 73
End: 2018-01-24

## 2018-01-24 VITALS
WEIGHT: 183 LBS | DIASTOLIC BLOOD PRESSURE: 83 MMHG | OXYGEN SATURATION: 100 % | RESPIRATION RATE: 14 BRPM | BODY MASS INDEX: 27.02 KG/M2 | HEART RATE: 71 BPM | SYSTOLIC BLOOD PRESSURE: 147 MMHG

## 2018-01-24 VITALS — DIASTOLIC BLOOD PRESSURE: 79 MMHG | SYSTOLIC BLOOD PRESSURE: 117 MMHG

## 2018-01-24 DIAGNOSIS — Z86.69 PERSONAL HISTORY OF OTHER DISEASES OF THE NERVOUS SYSTEM AND SENSE ORGANS: Chronic | ICD-10-CM

## 2018-01-24 DIAGNOSIS — Z98.89 OTHER SPECIFIED POSTPROCEDURAL STATES: Chronic | ICD-10-CM

## 2018-01-25 ENCOUNTER — APPOINTMENT (OUTPATIENT)
Dept: GASTROENTEROLOGY | Facility: HOSPITAL | Age: 73
End: 2018-01-25
Payer: MEDICARE

## 2018-01-25 ENCOUNTER — LABORATORY RESULT (OUTPATIENT)
Age: 73
End: 2018-01-25

## 2018-01-25 ENCOUNTER — OUTPATIENT (OUTPATIENT)
Dept: OUTPATIENT SERVICES | Facility: HOSPITAL | Age: 73
LOS: 1 days | End: 2018-01-25

## 2018-01-25 VITALS
HEIGHT: 69 IN | WEIGHT: 180 LBS | HEART RATE: 92 BPM | BODY MASS INDEX: 26.66 KG/M2 | SYSTOLIC BLOOD PRESSURE: 130 MMHG | DIASTOLIC BLOOD PRESSURE: 81 MMHG

## 2018-01-25 DIAGNOSIS — Z86.69 PERSONAL HISTORY OF OTHER DISEASES OF THE NERVOUS SYSTEM AND SENSE ORGANS: Chronic | ICD-10-CM

## 2018-01-25 DIAGNOSIS — Z98.89 OTHER SPECIFIED POSTPROCEDURAL STATES: Chronic | ICD-10-CM

## 2018-01-25 DIAGNOSIS — K63.5 POLYP OF COLON: ICD-10-CM

## 2018-01-25 DIAGNOSIS — E66.3 OVERWEIGHT: ICD-10-CM

## 2018-01-25 DIAGNOSIS — Z80.0 FAMILY HISTORY OF MALIGNANT NEOPLASM OF DIGESTIVE ORGANS: ICD-10-CM

## 2018-01-25 DIAGNOSIS — K92.1 MELENA: ICD-10-CM

## 2018-01-25 DIAGNOSIS — K31.89 OTHER DISEASES OF STOMACH AND DUODENUM: ICD-10-CM

## 2018-01-25 LAB
BUN SERPL-MCNC: 18 MG/DL — SIGNIFICANT CHANGE UP (ref 7–23)
CALCIUM SERPL-MCNC: 9.4 MG/DL — SIGNIFICANT CHANGE UP (ref 8.4–10.5)
CHLORIDE SERPL-SCNC: 105 MMOL/L — SIGNIFICANT CHANGE UP (ref 98–107)
CO2 SERPL-SCNC: 27 MMOL/L — SIGNIFICANT CHANGE UP (ref 22–31)
CREAT SERPL-MCNC: 1.1 MG/DL — SIGNIFICANT CHANGE UP (ref 0.5–1.3)
GLUCOSE SERPL-MCNC: 147 MG/DL — HIGH (ref 70–99)
POTASSIUM SERPL-MCNC: 4.6 MMOL/L — SIGNIFICANT CHANGE UP (ref 3.5–5.3)
POTASSIUM SERPL-SCNC: 4.6 MMOL/L — SIGNIFICANT CHANGE UP (ref 3.5–5.3)
SODIUM SERPL-SCNC: 143 MMOL/L — SIGNIFICANT CHANGE UP (ref 135–145)

## 2018-01-25 PROCEDURE — 99213 OFFICE O/P EST LOW 20 MIN: CPT | Mod: GC

## 2018-01-26 DIAGNOSIS — I25.10 ATHEROSCLEROTIC HEART DISEASE OF NATIVE CORONARY ARTERY WITHOUT ANGINA PECTORIS: ICD-10-CM

## 2018-02-07 ENCOUNTER — OTHER (OUTPATIENT)
Age: 73
End: 2018-02-07

## 2018-02-08 ENCOUNTER — RX RENEWAL (OUTPATIENT)
Age: 73
End: 2018-02-08

## 2018-02-13 ENCOUNTER — OUTPATIENT (OUTPATIENT)
Dept: OUTPATIENT SERVICES | Facility: HOSPITAL | Age: 73
LOS: 1 days | End: 2018-02-13

## 2018-02-13 ENCOUNTER — OTHER (OUTPATIENT)
Age: 73
End: 2018-02-13

## 2018-02-13 ENCOUNTER — APPOINTMENT (OUTPATIENT)
Dept: INTERNAL MEDICINE | Facility: HOSPITAL | Age: 73
End: 2018-02-13

## 2018-02-13 ENCOUNTER — RESULT CHARGE (OUTPATIENT)
Age: 73
End: 2018-02-13

## 2018-02-13 DIAGNOSIS — Z98.89 OTHER SPECIFIED POSTPROCEDURAL STATES: Chronic | ICD-10-CM

## 2018-02-13 DIAGNOSIS — Z86.69 PERSONAL HISTORY OF OTHER DISEASES OF THE NERVOUS SYSTEM AND SENSE ORGANS: Chronic | ICD-10-CM

## 2018-02-14 DIAGNOSIS — Z00.00 ENCOUNTER FOR GENERAL ADULT MEDICAL EXAMINATION WITHOUT ABNORMAL FINDINGS: ICD-10-CM

## 2018-02-14 LAB — INR PPP: 2.4 RATIO

## 2018-02-21 ENCOUNTER — OUTPATIENT (OUTPATIENT)
Dept: OUTPATIENT SERVICES | Facility: HOSPITAL | Age: 73
LOS: 1 days | End: 2018-02-21

## 2018-02-21 ENCOUNTER — APPOINTMENT (OUTPATIENT)
Dept: INTERNAL MEDICINE | Facility: HOSPITAL | Age: 73
End: 2018-02-21

## 2018-02-21 ENCOUNTER — OTHER (OUTPATIENT)
Age: 73
End: 2018-02-21

## 2018-02-21 DIAGNOSIS — Z86.69 PERSONAL HISTORY OF OTHER DISEASES OF THE NERVOUS SYSTEM AND SENSE ORGANS: Chronic | ICD-10-CM

## 2018-02-21 DIAGNOSIS — Z98.89 OTHER SPECIFIED POSTPROCEDURAL STATES: Chronic | ICD-10-CM

## 2018-02-23 DIAGNOSIS — Z79.01 LONG TERM (CURRENT) USE OF ANTICOAGULANTS: ICD-10-CM

## 2018-02-23 LAB — INR PPP: 2.5 RATIO

## 2018-03-07 ENCOUNTER — APPOINTMENT (OUTPATIENT)
Dept: INTERNAL MEDICINE | Facility: HOSPITAL | Age: 73
End: 2018-03-07

## 2018-03-08 DIAGNOSIS — H04.123 DRY EYE SYNDROME OF BILATERAL LACRIMAL GLANDS: ICD-10-CM

## 2018-03-12 ENCOUNTER — APPOINTMENT (OUTPATIENT)
Dept: INTERNAL MEDICINE | Facility: HOSPITAL | Age: 73
End: 2018-03-12

## 2018-03-12 ENCOUNTER — OUTPATIENT (OUTPATIENT)
Dept: OUTPATIENT SERVICES | Facility: HOSPITAL | Age: 73
LOS: 1 days | End: 2018-03-12

## 2018-03-12 ENCOUNTER — OTHER (OUTPATIENT)
Age: 73
End: 2018-03-12

## 2018-03-12 DIAGNOSIS — Z86.69 PERSONAL HISTORY OF OTHER DISEASES OF THE NERVOUS SYSTEM AND SENSE ORGANS: Chronic | ICD-10-CM

## 2018-03-12 DIAGNOSIS — Z98.89 OTHER SPECIFIED POSTPROCEDURAL STATES: Chronic | ICD-10-CM

## 2018-03-14 ENCOUNTER — OUTPATIENT (OUTPATIENT)
Dept: OUTPATIENT SERVICES | Facility: HOSPITAL | Age: 73
LOS: 1 days | End: 2018-03-14

## 2018-03-14 ENCOUNTER — LABORATORY RESULT (OUTPATIENT)
Age: 73
End: 2018-03-14

## 2018-03-14 ENCOUNTER — APPOINTMENT (OUTPATIENT)
Dept: INTERNAL MEDICINE | Facility: HOSPITAL | Age: 73
End: 2018-03-14
Payer: MEDICARE

## 2018-03-14 VITALS
HEIGHT: 69 IN | DIASTOLIC BLOOD PRESSURE: 65 MMHG | HEART RATE: 82 BPM | SYSTOLIC BLOOD PRESSURE: 120 MMHG | WEIGHT: 180 LBS | BODY MASS INDEX: 26.66 KG/M2

## 2018-03-14 DIAGNOSIS — E11.9 TYPE 2 DIABETES MELLITUS W/OUT COMPLICATIONS: ICD-10-CM

## 2018-03-14 DIAGNOSIS — Z98.89 OTHER SPECIFIED POSTPROCEDURAL STATES: Chronic | ICD-10-CM

## 2018-03-14 DIAGNOSIS — Z86.69 PERSONAL HISTORY OF OTHER DISEASES OF THE NERVOUS SYSTEM AND SENSE ORGANS: Chronic | ICD-10-CM

## 2018-03-14 LAB
APPEARANCE UR: CLEAR — SIGNIFICANT CHANGE UP
BILIRUB UR-MCNC: NEGATIVE — SIGNIFICANT CHANGE UP
BLOOD UR QL VISUAL: NEGATIVE — SIGNIFICANT CHANGE UP
COLOR SPEC: YELLOW — SIGNIFICANT CHANGE UP
GLUCOSE UR-MCNC: 250 — SIGNIFICANT CHANGE UP
KETONES UR-MCNC: NEGATIVE — SIGNIFICANT CHANGE UP
LEUKOCYTE ESTERASE UR-ACNC: NEGATIVE — SIGNIFICANT CHANGE UP
MUCOUS THREADS # UR AUTO: SIGNIFICANT CHANGE UP
NITRITE UR-MCNC: NEGATIVE — SIGNIFICANT CHANGE UP
PH UR: 6 — SIGNIFICANT CHANGE UP (ref 4.6–8)
PROT UR-MCNC: 30 MG/DL — HIGH
RBC CASTS # UR COMP ASSIST: SIGNIFICANT CHANGE UP (ref 0–?)
SP GR SPEC: 1.02 — SIGNIFICANT CHANGE UP (ref 1–1.04)
SQUAMOUS # UR AUTO: SIGNIFICANT CHANGE UP
UROBILINOGEN FLD QL: 1 MG/DL — SIGNIFICANT CHANGE UP
WBC UR QL: SIGNIFICANT CHANGE UP (ref 0–?)

## 2018-03-14 PROCEDURE — 99213 OFFICE O/P EST LOW 20 MIN: CPT | Mod: GE

## 2018-03-15 ENCOUNTER — APPOINTMENT (OUTPATIENT)
Dept: OPHTHALMOLOGY | Facility: CLINIC | Age: 73
End: 2018-03-15

## 2018-03-15 ENCOUNTER — OUTPATIENT (OUTPATIENT)
Dept: OUTPATIENT SERVICES | Facility: HOSPITAL | Age: 73
LOS: 1 days | End: 2018-03-15

## 2018-03-15 DIAGNOSIS — Z86.69 PERSONAL HISTORY OF OTHER DISEASES OF THE NERVOUS SYSTEM AND SENSE ORGANS: Chronic | ICD-10-CM

## 2018-03-15 DIAGNOSIS — Z98.89 OTHER SPECIFIED POSTPROCEDURAL STATES: Chronic | ICD-10-CM

## 2018-03-15 LAB
C TRACH RRNA SPEC QL NAA+PROBE: SIGNIFICANT CHANGE UP
N GONORRHOEA RRNA SPEC QL NAA+PROBE: SIGNIFICANT CHANGE UP
SPECIMEN SOURCE: SIGNIFICANT CHANGE UP

## 2018-03-16 DIAGNOSIS — I09.9 RHEUMATIC HEART DISEASE, UNSPECIFIED: ICD-10-CM

## 2018-03-16 DIAGNOSIS — Z95.2 PRESENCE OF PROSTHETIC HEART VALVE: ICD-10-CM

## 2018-03-16 DIAGNOSIS — I10 ESSENTIAL (PRIMARY) HYPERTENSION: ICD-10-CM

## 2018-03-16 DIAGNOSIS — G40.909 EPILEPSY, UNSPECIFIED, NOT INTRACTABLE, WITHOUT STATUS EPILEPTICUS: ICD-10-CM

## 2018-03-16 DIAGNOSIS — Z79.01 LONG TERM (CURRENT) USE OF ANTICOAGULANTS: ICD-10-CM

## 2018-03-16 DIAGNOSIS — I48.91 UNSPECIFIED ATRIAL FIBRILLATION: ICD-10-CM

## 2018-03-16 DIAGNOSIS — Z00.00 ENCOUNTER FOR GENERAL ADULT MEDICAL EXAMINATION WITHOUT ABNORMAL FINDINGS: ICD-10-CM

## 2018-03-16 DIAGNOSIS — E66.3 OVERWEIGHT: ICD-10-CM

## 2018-03-16 DIAGNOSIS — R10.13 EPIGASTRIC PAIN: ICD-10-CM

## 2018-03-16 DIAGNOSIS — R30.0 DYSURIA: ICD-10-CM

## 2018-03-16 DIAGNOSIS — I25.10 ATHEROSCLEROTIC HEART DISEASE OF NATIVE CORONARY ARTERY WITHOUT ANGINA PECTORIS: ICD-10-CM

## 2018-03-16 DIAGNOSIS — E78.5 HYPERLIPIDEMIA, UNSPECIFIED: ICD-10-CM

## 2018-03-16 LAB — GLUCOSE BLDC GLUCOMTR-MCNC: 148

## 2018-03-19 ENCOUNTER — OTHER (OUTPATIENT)
Age: 73
End: 2018-03-19

## 2018-03-19 ENCOUNTER — APPOINTMENT (OUTPATIENT)
Dept: INTERNAL MEDICINE | Facility: HOSPITAL | Age: 73
End: 2018-03-19

## 2018-03-19 ENCOUNTER — RESULT CHARGE (OUTPATIENT)
Age: 73
End: 2018-03-19

## 2018-03-19 ENCOUNTER — OUTPATIENT (OUTPATIENT)
Dept: OUTPATIENT SERVICES | Facility: HOSPITAL | Age: 73
LOS: 1 days | End: 2018-03-19

## 2018-03-19 DIAGNOSIS — Z98.89 OTHER SPECIFIED POSTPROCEDURAL STATES: Chronic | ICD-10-CM

## 2018-03-19 DIAGNOSIS — Z86.69 PERSONAL HISTORY OF OTHER DISEASES OF THE NERVOUS SYSTEM AND SENSE ORGANS: Chronic | ICD-10-CM

## 2018-03-19 LAB — INR PPP: 3.7 RATIO

## 2018-03-20 DIAGNOSIS — Z79.01 LONG TERM (CURRENT) USE OF ANTICOAGULANTS: ICD-10-CM

## 2018-03-26 ENCOUNTER — OTHER (OUTPATIENT)
Age: 73
End: 2018-03-26

## 2018-03-26 ENCOUNTER — APPOINTMENT (OUTPATIENT)
Dept: INTERNAL MEDICINE | Facility: HOSPITAL | Age: 73
End: 2018-03-26

## 2018-03-26 ENCOUNTER — OUTPATIENT (OUTPATIENT)
Dept: OUTPATIENT SERVICES | Facility: HOSPITAL | Age: 73
LOS: 1 days | End: 2018-03-26

## 2018-03-26 DIAGNOSIS — Z98.89 OTHER SPECIFIED POSTPROCEDURAL STATES: Chronic | ICD-10-CM

## 2018-03-26 DIAGNOSIS — Z86.69 PERSONAL HISTORY OF OTHER DISEASES OF THE NERVOUS SYSTEM AND SENSE ORGANS: Chronic | ICD-10-CM

## 2018-03-28 DIAGNOSIS — Z00.00 ENCOUNTER FOR GENERAL ADULT MEDICAL EXAMINATION WITHOUT ABNORMAL FINDINGS: ICD-10-CM

## 2018-03-29 DIAGNOSIS — Z79.01 LONG TERM (CURRENT) USE OF ANTICOAGULANTS: ICD-10-CM

## 2018-04-02 DIAGNOSIS — I73.9 PERIPHERAL VASCULAR DISEASE, UNSPECIFIED: ICD-10-CM

## 2018-04-09 ENCOUNTER — APPOINTMENT (OUTPATIENT)
Dept: INTERNAL MEDICINE | Facility: HOSPITAL | Age: 73
End: 2018-04-09

## 2018-04-09 ENCOUNTER — OUTPATIENT (OUTPATIENT)
Dept: OUTPATIENT SERVICES | Facility: HOSPITAL | Age: 73
LOS: 1 days | End: 2018-04-09

## 2018-04-09 DIAGNOSIS — Z98.89 OTHER SPECIFIED POSTPROCEDURAL STATES: Chronic | ICD-10-CM

## 2018-04-09 DIAGNOSIS — Z86.69 PERSONAL HISTORY OF OTHER DISEASES OF THE NERVOUS SYSTEM AND SENSE ORGANS: Chronic | ICD-10-CM

## 2018-04-11 ENCOUNTER — APPOINTMENT (OUTPATIENT)
Dept: CT IMAGING | Facility: IMAGING CENTER | Age: 73
End: 2018-04-11
Payer: MEDICARE

## 2018-04-11 ENCOUNTER — OUTPATIENT (OUTPATIENT)
Dept: OUTPATIENT SERVICES | Facility: HOSPITAL | Age: 73
LOS: 1 days | End: 2018-04-11
Payer: MEDICARE

## 2018-04-11 DIAGNOSIS — K31.89 OTHER DISEASES OF STOMACH AND DUODENUM: ICD-10-CM

## 2018-04-11 DIAGNOSIS — Z98.89 OTHER SPECIFIED POSTPROCEDURAL STATES: Chronic | ICD-10-CM

## 2018-04-11 DIAGNOSIS — Z79.01 LONG TERM (CURRENT) USE OF ANTICOAGULANTS: ICD-10-CM

## 2018-04-11 DIAGNOSIS — Z86.69 PERSONAL HISTORY OF OTHER DISEASES OF THE NERVOUS SYSTEM AND SENSE ORGANS: Chronic | ICD-10-CM

## 2018-04-11 PROCEDURE — 74177 CT ABD & PELVIS W/CONTRAST: CPT

## 2018-04-11 PROCEDURE — 82565 ASSAY OF CREATININE: CPT

## 2018-04-11 PROCEDURE — 74177 CT ABD & PELVIS W/CONTRAST: CPT | Mod: 26

## 2018-04-13 ENCOUNTER — APPOINTMENT (OUTPATIENT)
Dept: INTERNAL MEDICINE | Facility: HOSPITAL | Age: 73
End: 2018-04-13

## 2018-04-16 ENCOUNTER — OUTPATIENT (OUTPATIENT)
Dept: OUTPATIENT SERVICES | Facility: HOSPITAL | Age: 73
LOS: 1 days | End: 2018-04-16

## 2018-04-16 ENCOUNTER — LABORATORY RESULT (OUTPATIENT)
Age: 73
End: 2018-04-16

## 2018-04-16 ENCOUNTER — APPOINTMENT (OUTPATIENT)
Dept: INTERNAL MEDICINE | Facility: HOSPITAL | Age: 73
End: 2018-04-16

## 2018-04-16 ENCOUNTER — OTHER (OUTPATIENT)
Age: 73
End: 2018-04-16

## 2018-04-16 DIAGNOSIS — Z98.89 OTHER SPECIFIED POSTPROCEDURAL STATES: Chronic | ICD-10-CM

## 2018-04-16 DIAGNOSIS — Z79.01 LONG TERM (CURRENT) USE OF ANTICOAGULANTS: ICD-10-CM

## 2018-04-16 DIAGNOSIS — Z86.69 PERSONAL HISTORY OF OTHER DISEASES OF THE NERVOUS SYSTEM AND SENSE ORGANS: Chronic | ICD-10-CM

## 2018-04-16 LAB
INR BLD: 3.23 — HIGH (ref 0.88–1.17)
PROTHROM AB SERPL-ACNC: 36.7 SEC — HIGH (ref 9.8–13.1)

## 2018-04-18 ENCOUNTER — APPOINTMENT (OUTPATIENT)
Dept: INTERNAL MEDICINE | Facility: HOSPITAL | Age: 73
End: 2018-04-18
Payer: MEDICARE

## 2018-04-18 ENCOUNTER — OUTPATIENT (OUTPATIENT)
Dept: OUTPATIENT SERVICES | Facility: HOSPITAL | Age: 73
LOS: 1 days | End: 2018-04-18

## 2018-04-18 ENCOUNTER — OTHER (OUTPATIENT)
Age: 73
End: 2018-04-18

## 2018-04-18 ENCOUNTER — MEDICATION RENEWAL (OUTPATIENT)
Age: 73
End: 2018-04-18

## 2018-04-18 VITALS — HEIGHT: 69 IN | BODY MASS INDEX: 26.66 KG/M2 | WEIGHT: 180 LBS

## 2018-04-18 VITALS — DIASTOLIC BLOOD PRESSURE: 72 MMHG | SYSTOLIC BLOOD PRESSURE: 120 MMHG

## 2018-04-18 DIAGNOSIS — Z86.69 PERSONAL HISTORY OF OTHER DISEASES OF THE NERVOUS SYSTEM AND SENSE ORGANS: Chronic | ICD-10-CM

## 2018-04-18 DIAGNOSIS — Z98.89 OTHER SPECIFIED POSTPROCEDURAL STATES: Chronic | ICD-10-CM

## 2018-04-18 PROCEDURE — 99213 OFFICE O/P EST LOW 20 MIN: CPT | Mod: GE

## 2018-04-19 DIAGNOSIS — I10 ESSENTIAL (PRIMARY) HYPERTENSION: ICD-10-CM

## 2018-04-19 DIAGNOSIS — Z95.2 PRESENCE OF PROSTHETIC HEART VALVE: ICD-10-CM

## 2018-04-19 DIAGNOSIS — N48.9 DISORDER OF PENIS, UNSPECIFIED: ICD-10-CM

## 2018-04-19 DIAGNOSIS — E11.40 TYPE 2 DIABETES MELLITUS WITH DIABETIC NEUROPATHY, UNSPECIFIED: ICD-10-CM

## 2018-04-20 ENCOUNTER — LABORATORY RESULT (OUTPATIENT)
Age: 73
End: 2018-04-20

## 2018-04-20 LAB
CHOLEST SERPL-MCNC: 179 MG/DL — SIGNIFICANT CHANGE UP (ref 120–199)
HBA1C BLD-MCNC: 7.6 % — HIGH (ref 4–5.6)
HDLC SERPL-MCNC: 33 MG/DL — LOW (ref 35–55)
LIPID PNL WITH DIRECT LDL SERPL: 120 MG/DL — SIGNIFICANT CHANGE UP
T PALLIDUM AB TITR SER: NEGATIVE — SIGNIFICANT CHANGE UP
TRIGL SERPL-MCNC: 222 MG/DL — HIGH (ref 10–149)

## 2018-04-27 ENCOUNTER — OTHER (OUTPATIENT)
Age: 73
End: 2018-04-27

## 2018-04-27 ENCOUNTER — OUTPATIENT (OUTPATIENT)
Dept: OUTPATIENT SERVICES | Facility: HOSPITAL | Age: 73
LOS: 1 days | End: 2018-04-27

## 2018-04-27 ENCOUNTER — APPOINTMENT (OUTPATIENT)
Dept: INTERNAL MEDICINE | Facility: HOSPITAL | Age: 73
End: 2018-04-27

## 2018-04-27 ENCOUNTER — RESULT CHARGE (OUTPATIENT)
Age: 73
End: 2018-04-27

## 2018-04-27 DIAGNOSIS — Z00.00 ENCOUNTER FOR GENERAL ADULT MEDICAL EXAMINATION WITHOUT ABNORMAL FINDINGS: ICD-10-CM

## 2018-04-27 DIAGNOSIS — Z98.89 OTHER SPECIFIED POSTPROCEDURAL STATES: Chronic | ICD-10-CM

## 2018-04-27 DIAGNOSIS — Z86.69 PERSONAL HISTORY OF OTHER DISEASES OF THE NERVOUS SYSTEM AND SENSE ORGANS: Chronic | ICD-10-CM

## 2018-04-27 DIAGNOSIS — Z79.01 LONG TERM (CURRENT) USE OF ANTICOAGULANTS: ICD-10-CM

## 2018-04-27 DIAGNOSIS — Z95.2 PRESENCE OF PROSTHETIC HEART VALVE: ICD-10-CM

## 2018-05-01 ENCOUNTER — APPOINTMENT (OUTPATIENT)
Dept: UROLOGY | Facility: CLINIC | Age: 73
End: 2018-05-01
Payer: MEDICARE

## 2018-05-01 ENCOUNTER — LABORATORY RESULT (OUTPATIENT)
Age: 73
End: 2018-05-01

## 2018-05-01 VITALS
RESPIRATION RATE: 16 BRPM | HEIGHT: 69 IN | DIASTOLIC BLOOD PRESSURE: 76 MMHG | HEART RATE: 83 BPM | BODY MASS INDEX: 26.96 KG/M2 | WEIGHT: 182 LBS | SYSTOLIC BLOOD PRESSURE: 150 MMHG

## 2018-05-01 LAB — INR PPP: 2.9 RATIO

## 2018-05-01 PROCEDURE — 99204 OFFICE O/P NEW MOD 45 MIN: CPT

## 2018-05-03 LAB
25(OH)D3 SERPL-MCNC: 18.5 NG/ML
ALBUMIN SERPL ELPH-MCNC: 4.3 G/DL
ALP BLD-CCNC: 83 U/L
ALT SERPL-CCNC: 21 U/L
ANION GAP SERPL CALC-SCNC: 13 MMOL/L
AST SERPL-CCNC: 24 U/L
BASOPHILS # BLD AUTO: 0.02 K/UL
BASOPHILS NFR BLD AUTO: 0.3 %
BILIRUB SERPL-MCNC: 1.7 MG/DL
BUN SERPL-MCNC: 21 MG/DL
CALCIUM SERPL-MCNC: 10.2 MG/DL
CHLORIDE SERPL-SCNC: 105 MMOL/L
CHOLEST SERPL-MCNC: 180 MG/DL
CHOLEST/HDLC SERPL: 5.3 RATIO
CO2 SERPL-SCNC: 25 MMOL/L
CREAT SERPL-MCNC: 1.16 MG/DL
EOSINOPHIL # BLD AUTO: 0.08 K/UL
EOSINOPHIL NFR BLD AUTO: 1.1 %
GLUCOSE SERPL-MCNC: 143 MG/DL
HBA1C MFR BLD HPLC: 7.7 %
HCT VFR BLD CALC: 47.4 %
HDLC SERPL-MCNC: 34 MG/DL
HGB BLD-MCNC: 16.1 G/DL
IMM GRANULOCYTES NFR BLD AUTO: 1.1 %
LDLC SERPL CALC-MCNC: 105 MG/DL
LH SERPL-ACNC: 5.2 IU/L
LYMPHOCYTES # BLD AUTO: 1.7 K/UL
LYMPHOCYTES NFR BLD AUTO: 23.6 %
MAN DIFF?: NORMAL
MCHC RBC-ENTMCNC: 32.3 PG
MCHC RBC-ENTMCNC: 34 GM/DL
MCV RBC AUTO: 95 FL
MONOCYTES # BLD AUTO: 0.51 K/UL
MONOCYTES NFR BLD AUTO: 7.1 %
NEUTROPHILS # BLD AUTO: 4.8 K/UL
NEUTROPHILS NFR BLD AUTO: 66.8 %
PLATELET # BLD AUTO: 213 K/UL
POTASSIUM SERPL-SCNC: 4.9 MMOL/L
PROLACTIN SERPL-MCNC: 18.7 NG/ML
PROT SERPL-MCNC: 7.7 G/DL
PSA SERPL-MCNC: 2.92 NG/ML
RBC # BLD: 4.99 M/UL
RBC # FLD: 13.4 %
SODIUM SERPL-SCNC: 143 MMOL/L
TRIGL SERPL-MCNC: 203 MG/DL
TSH SERPL-ACNC: 0.07 UIU/ML
WBC # FLD AUTO: 7.19 K/UL

## 2018-05-04 ENCOUNTER — APPOINTMENT (OUTPATIENT)
Dept: OPHTHALMOLOGY | Facility: CLINIC | Age: 73
End: 2018-05-04

## 2018-05-07 LAB
TESTOST BND SERPL-MCNC: 7.2 PG/ML
TESTOST SERPL-MCNC: 399.5 NG/DL

## 2018-05-08 ENCOUNTER — OUTPATIENT (OUTPATIENT)
Dept: OUTPATIENT SERVICES | Facility: HOSPITAL | Age: 73
LOS: 1 days | End: 2018-05-08
Payer: MEDICARE

## 2018-05-08 ENCOUNTER — APPOINTMENT (OUTPATIENT)
Dept: UROLOGY | Facility: CLINIC | Age: 73
End: 2018-05-08
Payer: MEDICARE

## 2018-05-08 DIAGNOSIS — R35.0 FREQUENCY OF MICTURITION: ICD-10-CM

## 2018-05-08 DIAGNOSIS — Z86.69 PERSONAL HISTORY OF OTHER DISEASES OF THE NERVOUS SYSTEM AND SENSE ORGANS: Chronic | ICD-10-CM

## 2018-05-08 DIAGNOSIS — Z98.89 OTHER SPECIFIED POSTPROCEDURAL STATES: Chronic | ICD-10-CM

## 2018-05-08 PROCEDURE — 54235 NJX CORPORA CAVERNOSA RX AGT: CPT

## 2018-05-08 PROCEDURE — 93980 PENILE VASCULAR STUDY: CPT | Mod: 26

## 2018-05-08 PROCEDURE — 99214 OFFICE O/P EST MOD 30 MIN: CPT | Mod: 25

## 2018-05-08 PROCEDURE — 93980 PENILE VASCULAR STUDY: CPT

## 2018-05-10 DIAGNOSIS — N52.9 MALE ERECTILE DYSFUNCTION, UNSPECIFIED: ICD-10-CM

## 2018-05-11 ENCOUNTER — OUTPATIENT (OUTPATIENT)
Dept: OUTPATIENT SERVICES | Facility: HOSPITAL | Age: 73
LOS: 1 days | End: 2018-05-11

## 2018-05-11 ENCOUNTER — APPOINTMENT (OUTPATIENT)
Dept: INTERNAL MEDICINE | Facility: HOSPITAL | Age: 73
End: 2018-05-11

## 2018-05-11 DIAGNOSIS — Z86.69 PERSONAL HISTORY OF OTHER DISEASES OF THE NERVOUS SYSTEM AND SENSE ORGANS: Chronic | ICD-10-CM

## 2018-05-11 DIAGNOSIS — Z00.00 ENCOUNTER FOR GENERAL ADULT MEDICAL EXAMINATION WITHOUT ABNORMAL FINDINGS: ICD-10-CM

## 2018-05-11 DIAGNOSIS — Z98.89 OTHER SPECIFIED POSTPROCEDURAL STATES: Chronic | ICD-10-CM

## 2018-05-11 DIAGNOSIS — Z79.01 LONG TERM (CURRENT) USE OF ANTICOAGULANTS: ICD-10-CM

## 2018-05-15 ENCOUNTER — APPOINTMENT (OUTPATIENT)
Dept: UROLOGY | Facility: CLINIC | Age: 73
End: 2018-05-15
Payer: MEDICARE

## 2018-05-15 VITALS — TEMPERATURE: 98.6 F | DIASTOLIC BLOOD PRESSURE: 70 MMHG | HEART RATE: 81 BPM | SYSTOLIC BLOOD PRESSURE: 104 MMHG

## 2018-05-15 PROCEDURE — 99214 OFFICE O/P EST MOD 30 MIN: CPT

## 2018-05-24 ENCOUNTER — APPOINTMENT (OUTPATIENT)
Dept: INTERNAL MEDICINE | Facility: HOSPITAL | Age: 73
End: 2018-05-24
Payer: MEDICARE

## 2018-05-24 ENCOUNTER — OUTPATIENT (OUTPATIENT)
Dept: OUTPATIENT SERVICES | Facility: HOSPITAL | Age: 73
LOS: 1 days | End: 2018-05-24

## 2018-05-24 VITALS
WEIGHT: 181 LBS | DIASTOLIC BLOOD PRESSURE: 78 MMHG | HEIGHT: 69 IN | BODY MASS INDEX: 26.81 KG/M2 | SYSTOLIC BLOOD PRESSURE: 122 MMHG

## 2018-05-24 DIAGNOSIS — Z98.89 OTHER SPECIFIED POSTPROCEDURAL STATES: Chronic | ICD-10-CM

## 2018-05-24 DIAGNOSIS — Z86.69 PERSONAL HISTORY OF OTHER DISEASES OF THE NERVOUS SYSTEM AND SENSE ORGANS: Chronic | ICD-10-CM

## 2018-05-24 PROCEDURE — 99214 OFFICE O/P EST MOD 30 MIN: CPT | Mod: GC

## 2018-05-24 RX ORDER — TADALAFIL 10 MG/1
10 TABLET, FILM COATED ORAL
Qty: 6 | Refills: 0 | Status: DISCONTINUED | COMMUNITY
Start: 2018-01-24 | End: 2018-05-24

## 2018-05-24 NOTE — COUNSELING
[Needs reinforcement, provided] : Patient needs reinforcement on understanding lifestyle changes and  the steps needed to achieve self management goals and reinforcement was provided

## 2018-05-25 LAB — GLUCOSE BLDC GLUCOMTR-MCNC: 119

## 2018-05-25 NOTE — HISTORY OF PRESENT ILLNESS
[FreeTextEntry1] : "My left elbow hurts" [de-identified] : Patient is a 72M with HTN, CAD (s/p CABG), RHD (s/p mitral valve replacement on Coumadin), a-fib, h/o seizures, DMII (A1C 6.9% 7/2017) preseting for HCM with complaint of L elbow pain.\par \par SInce last visit patient has seen urology for his ED, from which his current injection therapy provides moderate improvement. \par \par Patient today complains of L elbow pain. States that pain started about one week ago. He notices it while lifting heavy boxes. Pain does not awaken his from rest. Is described as throbbing. States that it is localized to medial right elbow. Has not tried anything for relief. No erythema, finger numbness, edema. No other joint pain. \par \par Otherwise patient reports feeling well, states that he continues to run out of his diltiazem early because he has been taking the 5mg pill BID (has purchased ?privately? when the prescribed pills run up. Does not specify where he purchases other pill from). Denies episodes of hypoglycemia. \par

## 2018-05-25 NOTE — PHYSICAL EXAM
[No Acute Distress] : no acute distress [Well Nourished] : well nourished [Well Developed] : well developed [PERRL] : pupils equal round and reactive to light [EOMI] : extraocular movements intact [Normal Outer Ear/Nose] : the outer ears and nose were normal in appearance [Normal Oropharynx] : the oropharynx was normal [Supple] : supple [No Lymphadenopathy] : no lymphadenopathy [Clear to Auscultation] : lungs were clear to auscultation bilaterally [No Accessory Muscle Use] : no accessory muscle use [Normal Rate] : normal rate  [Pedal Pulses Present] : the pedal pulses are present [No Edema] : there was no peripheral edema [Soft] : abdomen soft [Non Tender] : non-tender [No HSM] : no HSM [Normal Posterior Cervical Nodes] : no posterior cervical lymphadenopathy [Normal Anterior Cervical Nodes] : no anterior cervical lymphadenopathy [No CVA Tenderness] : no CVA  tenderness [No Spinal Tenderness] : no spinal tenderness [No Joint Swelling] : no joint swelling [Grossly Normal Strength/Tone] : grossly normal strength/tone [No Rash] : no rash [Normal Gait] : normal gait [Coordination Grossly Intact] : coordination grossly intact [Normal Affect] : the affect was normal [Normal Insight/Judgement] : insight and judgment were intact [Comprehensive Foot Exam Normal] : Right and left foot were examined and both feet are normal. No ulcers in either foot. Toes are normal and with full ROM.  Normal tactile sensation with monofilament testing throughout both feet [de-identified] : irregular rhythm, systolic murmur

## 2018-05-25 NOTE — ASSESSMENT
[FreeTextEntry1] : Patient is a 72M with HTN, CAD (s/p CABG), RHD (s/p mitral valve replacement on Coumadin), a-fib, h/o seizures, DMII (A1C 6.9% 7/2017) preseting for HCM with complaint of L elbow pain.\par \par \par #L elbow pain\par - No findings on exam, patient asymptomatic today\par - Recommended ice packs if symptoms return\par - ALso stated that can use Tylenol\par - If symptoms do not improve with these interventions, rec to call clinic to make acute visit appointment.\par \par # History of melena\par  - resolved since 12/14\par - history of esophageal ulcer on EGD 11/2012, recurrence possible\par - appreciate GI eval, as patient is high risk for EGD/EUS, plan is to perform CTAP w/ IV contrast however patient has not had, will continue to monitor for recurrence of symptoms\par - cont omeprazole 20 BID\par \par #CAD s/p CABG\par - not on DAPT due to full dose AC with Coumadin per Cardiology\par - appreciate cardiac eval, to continue to hold ASA\par - cont atorvastatin 40 \par \par # RHD s/p mechanical MVR (goal INR 2.5-3/5)\par -  INR in range on 5/11, due for repeat in 06/2018\par \par # Atrial fibrillation\par - rate controlled; cont atenolol 50 mg, digoxin 125 ug\par - INR as above\par \par # DM2: A1c 6.9% 06/2017\par - currently on glipizide 5 mg however has been taking this BID\par - As A1C borderline high for patient given goal of <8 in setting of multiple cormorbidities, will adjust order to reflect 10 mg daily, discussed initiation of metformin today, but patient opposed due to prior side effects, will observe at this time on glipizide monotherapy and re-address next visit when A1C is due\par - performed diabetes foot exam today, decreased sensation over base of feet, no ulcers present\par - Provided education on self-evaluation for foot ulcers at home\par - Patient to see opthamology tomorrow\par \par # HTN\par - BP controlled; cont atenolol 50 \par \par # Seizure DO\par - currently taking Keppra 500 mg QD, no recent history of seizure\par - patient may no longer require anti-epileptic therapy\par - Will consider referral to neurology for assessment and possible discontinuation of this therapy\par \par # Dyspepsia\par - cont omeprazole 20 BID\par \par #HCM\par - Colonoscopy done 2014; next due 2024\par - Pneumovax given 03/2014, Prevnar given 07/2017\par - Influenza given 2017\par - TdAP given 03/2015\par - Zostervax given 07/2017\par \par RTC in 3 months\par Case d/w Dr. Seay

## 2018-05-25 NOTE — REVIEW OF SYSTEMS
[Fever] : no fever [Chills] : no chills [Fatigue] : no fatigue [Pain] : no pain [Vision Problems] : no vision problems [Hearing Loss] : no hearing loss [Nosebleeds] : no nosebleeds [Chest Pain] : no chest pain [Palpitations] : no palpitations [Lower Ext Edema] : no lower extremity edema [Orthopena] : no orthopnea [Shortness Of Breath] : no shortness of breath [Wheezing] : no wheezing [Cough] : no cough [Abdominal Pain] : no abdominal pain [Vomiting] : no vomiting [Heartburn] : no heartburn [Melena] : no melena [Incontinence] : no incontinence [Hesitancy] : no hesitancy [Itching] : no itching [Skin Rash] : no skin rash [Headache] : no headache [Dizziness] : no dizziness [Anxiety] : no anxiety [Depression] : no depression [Easy Bleeding] : no easy bleeding [Easy Bruising] : no easy bruising

## 2018-05-29 DIAGNOSIS — N40.0 BENIGN PROSTATIC HYPERPLASIA WITHOUT LOWER URINARY TRACT SYMPTOMS: ICD-10-CM

## 2018-05-29 DIAGNOSIS — E66.3 OVERWEIGHT: ICD-10-CM

## 2018-05-29 DIAGNOSIS — Z79.01 LONG TERM (CURRENT) USE OF ANTICOAGULANTS: ICD-10-CM

## 2018-05-29 DIAGNOSIS — E11.40 TYPE 2 DIABETES MELLITUS WITH DIABETIC NEUROPATHY, UNSPECIFIED: ICD-10-CM

## 2018-05-29 DIAGNOSIS — E78.5 HYPERLIPIDEMIA, UNSPECIFIED: ICD-10-CM

## 2018-05-29 DIAGNOSIS — Z95.2 PRESENCE OF PROSTHETIC HEART VALVE: ICD-10-CM

## 2018-05-29 DIAGNOSIS — I09.9 RHEUMATIC HEART DISEASE, UNSPECIFIED: ICD-10-CM

## 2018-05-29 DIAGNOSIS — I10 ESSENTIAL (PRIMARY) HYPERTENSION: ICD-10-CM

## 2018-05-29 DIAGNOSIS — N52.9 MALE ERECTILE DYSFUNCTION, UNSPECIFIED: ICD-10-CM

## 2018-05-29 DIAGNOSIS — Z00.00 ENCOUNTER FOR GENERAL ADULT MEDICAL EXAMINATION WITHOUT ABNORMAL FINDINGS: ICD-10-CM

## 2018-05-29 DIAGNOSIS — I48.91 UNSPECIFIED ATRIAL FIBRILLATION: ICD-10-CM

## 2018-06-01 ENCOUNTER — OUTPATIENT (OUTPATIENT)
Dept: OUTPATIENT SERVICES | Facility: HOSPITAL | Age: 73
LOS: 1 days | End: 2018-06-01

## 2018-06-01 ENCOUNTER — APPOINTMENT (OUTPATIENT)
Dept: INTERNAL MEDICINE | Facility: HOSPITAL | Age: 73
End: 2018-06-01

## 2018-06-01 DIAGNOSIS — Z86.69 PERSONAL HISTORY OF OTHER DISEASES OF THE NERVOUS SYSTEM AND SENSE ORGANS: Chronic | ICD-10-CM

## 2018-06-01 DIAGNOSIS — Z98.89 OTHER SPECIFIED POSTPROCEDURAL STATES: Chronic | ICD-10-CM

## 2018-06-01 LAB — INR PPP: 3.1 RATIO

## 2018-06-04 DIAGNOSIS — Z79.01 LONG TERM (CURRENT) USE OF ANTICOAGULANTS: ICD-10-CM

## 2018-06-13 ENCOUNTER — OUTPATIENT (OUTPATIENT)
Dept: OUTPATIENT SERVICES | Facility: HOSPITAL | Age: 73
LOS: 1 days | End: 2018-06-13
Payer: MEDICARE

## 2018-06-13 ENCOUNTER — APPOINTMENT (OUTPATIENT)
Dept: CARDIOLOGY | Facility: HOSPITAL | Age: 73
End: 2018-06-13

## 2018-06-13 VITALS
RESPIRATION RATE: 16 BRPM | HEART RATE: 73 BPM | OXYGEN SATURATION: 100 % | DIASTOLIC BLOOD PRESSURE: 75 MMHG | SYSTOLIC BLOOD PRESSURE: 125 MMHG | BODY MASS INDEX: 26.66 KG/M2 | WEIGHT: 180 LBS | HEIGHT: 69 IN

## 2018-06-13 DIAGNOSIS — Z98.89 OTHER SPECIFIED POSTPROCEDURAL STATES: Chronic | ICD-10-CM

## 2018-06-13 DIAGNOSIS — Z86.69 PERSONAL HISTORY OF OTHER DISEASES OF THE NERVOUS SYSTEM AND SENSE ORGANS: Chronic | ICD-10-CM

## 2018-06-14 DIAGNOSIS — I48.91 UNSPECIFIED ATRIAL FIBRILLATION: ICD-10-CM

## 2018-07-16 ENCOUNTER — LABORATORY RESULT (OUTPATIENT)
Age: 73
End: 2018-07-16

## 2018-07-16 ENCOUNTER — OUTPATIENT (OUTPATIENT)
Dept: OUTPATIENT SERVICES | Facility: HOSPITAL | Age: 73
LOS: 1 days | End: 2018-07-16

## 2018-07-16 ENCOUNTER — APPOINTMENT (OUTPATIENT)
Dept: INTERNAL MEDICINE | Facility: HOSPITAL | Age: 73
End: 2018-07-16

## 2018-07-16 DIAGNOSIS — Z98.89 OTHER SPECIFIED POSTPROCEDURAL STATES: Chronic | ICD-10-CM

## 2018-07-16 DIAGNOSIS — Z86.69 PERSONAL HISTORY OF OTHER DISEASES OF THE NERVOUS SYSTEM AND SENSE ORGANS: Chronic | ICD-10-CM

## 2018-07-16 LAB
BASOPHILS # BLD AUTO: 0.07 K/UL — SIGNIFICANT CHANGE UP (ref 0–0.2)
BASOPHILS NFR BLD AUTO: 0.7 % — SIGNIFICANT CHANGE UP (ref 0–2)
EOSINOPHIL # BLD AUTO: 0.2 K/UL — SIGNIFICANT CHANGE UP (ref 0–0.5)
EOSINOPHIL NFR BLD AUTO: 2 % — SIGNIFICANT CHANGE UP (ref 0–6)
HCT VFR BLD CALC: 47.1 % — SIGNIFICANT CHANGE UP (ref 39–50)
HGB BLD-MCNC: 16.1 G/DL — SIGNIFICANT CHANGE UP (ref 13–17)
IMM GRANULOCYTES # BLD AUTO: 0.07 # — SIGNIFICANT CHANGE UP
IMM GRANULOCYTES NFR BLD AUTO: 0.7 % — SIGNIFICANT CHANGE UP (ref 0–1.5)
LYMPHOCYTES # BLD AUTO: 3.95 K/UL — HIGH (ref 1–3.3)
LYMPHOCYTES # BLD AUTO: 39.1 % — SIGNIFICANT CHANGE UP (ref 13–44)
MCHC RBC-ENTMCNC: 31.6 PG — SIGNIFICANT CHANGE UP (ref 27–34)
MCHC RBC-ENTMCNC: 34.2 % — SIGNIFICANT CHANGE UP (ref 32–36)
MCV RBC AUTO: 92.4 FL — SIGNIFICANT CHANGE UP (ref 80–100)
MONOCYTES # BLD AUTO: 0.94 K/UL — HIGH (ref 0–0.9)
MONOCYTES NFR BLD AUTO: 9.3 % — SIGNIFICANT CHANGE UP (ref 2–14)
NEUTROPHILS # BLD AUTO: 4.86 K/UL — SIGNIFICANT CHANGE UP (ref 1.8–7.4)
NEUTROPHILS NFR BLD AUTO: 48.2 % — SIGNIFICANT CHANGE UP (ref 43–77)
NRBC # FLD: 0 — SIGNIFICANT CHANGE UP
PLATELET # BLD AUTO: 242 K/UL — SIGNIFICANT CHANGE UP (ref 150–400)
PMV BLD: 10.8 FL — SIGNIFICANT CHANGE UP (ref 7–13)
RBC # BLD: 5.1 M/UL — SIGNIFICANT CHANGE UP (ref 4.2–5.8)
RBC # FLD: 12.6 % — SIGNIFICANT CHANGE UP (ref 10.3–14.5)
WBC # BLD: 10.09 K/UL — SIGNIFICANT CHANGE UP (ref 3.8–10.5)
WBC # FLD AUTO: 10.09 K/UL — SIGNIFICANT CHANGE UP (ref 3.8–10.5)

## 2018-07-17 ENCOUNTER — APPOINTMENT (OUTPATIENT)
Dept: UROLOGY | Facility: CLINIC | Age: 73
End: 2018-07-17

## 2018-07-17 DIAGNOSIS — Z79.01 LONG TERM (CURRENT) USE OF ANTICOAGULANTS: ICD-10-CM

## 2018-07-17 LAB — INR PPP: 1.4 RATIO

## 2018-07-22 ENCOUNTER — RESULT CHARGE (OUTPATIENT)
Age: 73
End: 2018-07-22

## 2018-07-22 PROBLEM — Z80.0 FAMILY HISTORY OF COLON CANCER: Status: INACTIVE | Noted: 2018-01-25

## 2018-07-23 ENCOUNTER — OTHER (OUTPATIENT)
Age: 73
End: 2018-07-23

## 2018-07-23 ENCOUNTER — APPOINTMENT (OUTPATIENT)
Dept: INTERNAL MEDICINE | Facility: HOSPITAL | Age: 73
End: 2018-07-23

## 2018-07-23 ENCOUNTER — LABORATORY RESULT (OUTPATIENT)
Age: 73
End: 2018-07-23

## 2018-07-23 ENCOUNTER — OUTPATIENT (OUTPATIENT)
Dept: OUTPATIENT SERVICES | Facility: HOSPITAL | Age: 73
LOS: 1 days | End: 2018-07-23

## 2018-07-23 DIAGNOSIS — Z86.69 PERSONAL HISTORY OF OTHER DISEASES OF THE NERVOUS SYSTEM AND SENSE ORGANS: Chronic | ICD-10-CM

## 2018-07-23 DIAGNOSIS — Z98.89 OTHER SPECIFIED POSTPROCEDURAL STATES: Chronic | ICD-10-CM

## 2018-07-23 LAB
APTT BLD: 48.7 SEC — HIGH (ref 27.5–37.4)
INR BLD: 2.08 — HIGH (ref 0.88–1.17)
PROTHROM AB SERPL-ACNC: 24.3 SEC — HIGH (ref 9.8–13.1)

## 2018-07-24 DIAGNOSIS — Z79.01 LONG TERM (CURRENT) USE OF ANTICOAGULANTS: ICD-10-CM

## 2018-07-31 ENCOUNTER — APPOINTMENT (OUTPATIENT)
Dept: INTERNAL MEDICINE | Facility: HOSPITAL | Age: 73
End: 2018-07-31

## 2018-07-31 ENCOUNTER — OUTPATIENT (OUTPATIENT)
Dept: OUTPATIENT SERVICES | Facility: HOSPITAL | Age: 73
LOS: 1 days | End: 2018-07-31

## 2018-07-31 DIAGNOSIS — Z98.89 OTHER SPECIFIED POSTPROCEDURAL STATES: Chronic | ICD-10-CM

## 2018-07-31 DIAGNOSIS — Z86.69 PERSONAL HISTORY OF OTHER DISEASES OF THE NERVOUS SYSTEM AND SENSE ORGANS: Chronic | ICD-10-CM

## 2018-07-31 LAB — INR PPP: 2.9 RATIO

## 2018-08-01 DIAGNOSIS — Z00.00 ENCOUNTER FOR GENERAL ADULT MEDICAL EXAMINATION WITHOUT ABNORMAL FINDINGS: ICD-10-CM

## 2018-08-08 ENCOUNTER — APPOINTMENT (OUTPATIENT)
Dept: INTERNAL MEDICINE | Facility: HOSPITAL | Age: 73
End: 2018-08-08

## 2018-08-08 ENCOUNTER — OUTPATIENT (OUTPATIENT)
Dept: OUTPATIENT SERVICES | Facility: HOSPITAL | Age: 73
LOS: 1 days | End: 2018-08-08

## 2018-08-08 DIAGNOSIS — Z98.89 OTHER SPECIFIED POSTPROCEDURAL STATES: Chronic | ICD-10-CM

## 2018-08-08 DIAGNOSIS — Z86.69 PERSONAL HISTORY OF OTHER DISEASES OF THE NERVOUS SYSTEM AND SENSE ORGANS: Chronic | ICD-10-CM

## 2018-08-14 ENCOUNTER — APPOINTMENT (OUTPATIENT)
Dept: OPHTHALMOLOGY | Facility: CLINIC | Age: 73
End: 2018-08-14

## 2018-08-15 ENCOUNTER — OUTPATIENT (OUTPATIENT)
Dept: OUTPATIENT SERVICES | Facility: HOSPITAL | Age: 73
LOS: 1 days | End: 2018-08-15

## 2018-08-15 ENCOUNTER — APPOINTMENT (OUTPATIENT)
Dept: INTERNAL MEDICINE | Facility: HOSPITAL | Age: 73
End: 2018-08-15

## 2018-08-15 DIAGNOSIS — Z98.89 OTHER SPECIFIED POSTPROCEDURAL STATES: Chronic | ICD-10-CM

## 2018-08-15 DIAGNOSIS — Z86.69 PERSONAL HISTORY OF OTHER DISEASES OF THE NERVOUS SYSTEM AND SENSE ORGANS: Chronic | ICD-10-CM

## 2018-08-16 DIAGNOSIS — Z00.00 ENCOUNTER FOR GENERAL ADULT MEDICAL EXAMINATION WITHOUT ABNORMAL FINDINGS: ICD-10-CM

## 2018-08-16 DIAGNOSIS — I25.10 ATHEROSCLEROTIC HEART DISEASE OF NATIVE CORONARY ARTERY WITHOUT ANGINA PECTORIS: ICD-10-CM

## 2018-08-16 LAB — INR PPP: 3.6 RATIO

## 2018-08-22 ENCOUNTER — OUTPATIENT (OUTPATIENT)
Dept: OUTPATIENT SERVICES | Facility: HOSPITAL | Age: 73
LOS: 1 days | End: 2018-08-22

## 2018-08-22 ENCOUNTER — OTHER (OUTPATIENT)
Age: 73
End: 2018-08-22

## 2018-08-22 ENCOUNTER — APPOINTMENT (OUTPATIENT)
Dept: INTERNAL MEDICINE | Facility: HOSPITAL | Age: 73
End: 2018-08-22

## 2018-08-22 DIAGNOSIS — Z86.69 PERSONAL HISTORY OF OTHER DISEASES OF THE NERVOUS SYSTEM AND SENSE ORGANS: Chronic | ICD-10-CM

## 2018-08-22 DIAGNOSIS — Z98.89 OTHER SPECIFIED POSTPROCEDURAL STATES: Chronic | ICD-10-CM

## 2018-08-23 ENCOUNTER — OUTPATIENT (OUTPATIENT)
Dept: OUTPATIENT SERVICES | Facility: HOSPITAL | Age: 73
LOS: 1 days | End: 2018-08-23

## 2018-08-23 ENCOUNTER — APPOINTMENT (OUTPATIENT)
Dept: GASTROENTEROLOGY | Facility: HOSPITAL | Age: 73
End: 2018-08-23

## 2018-08-23 VITALS
HEIGHT: 69 IN | BODY MASS INDEX: 26.36 KG/M2 | WEIGHT: 178 LBS | DIASTOLIC BLOOD PRESSURE: 53 MMHG | SYSTOLIC BLOOD PRESSURE: 112 MMHG | HEART RATE: 53 BPM

## 2018-08-23 DIAGNOSIS — E66.3 OVERWEIGHT: ICD-10-CM

## 2018-08-23 DIAGNOSIS — K31.89 OTHER DISEASES OF STOMACH AND DUODENUM: ICD-10-CM

## 2018-08-23 DIAGNOSIS — Z98.89 OTHER SPECIFIED POSTPROCEDURAL STATES: Chronic | ICD-10-CM

## 2018-08-23 DIAGNOSIS — Z79.01 LONG TERM (CURRENT) USE OF ANTICOAGULANTS: ICD-10-CM

## 2018-08-23 DIAGNOSIS — Z86.69 PERSONAL HISTORY OF OTHER DISEASES OF THE NERVOUS SYSTEM AND SENSE ORGANS: Chronic | ICD-10-CM

## 2018-08-23 DIAGNOSIS — K92.1 MELENA: ICD-10-CM

## 2018-09-06 ENCOUNTER — APPOINTMENT (OUTPATIENT)
Dept: OPHTHALMOLOGY | Facility: CLINIC | Age: 73
End: 2018-09-06

## 2018-09-06 ENCOUNTER — OUTPATIENT (OUTPATIENT)
Dept: OUTPATIENT SERVICES | Facility: HOSPITAL | Age: 73
LOS: 1 days | End: 2018-09-06

## 2018-09-06 ENCOUNTER — APPOINTMENT (OUTPATIENT)
Dept: INTERNAL MEDICINE | Facility: HOSPITAL | Age: 73
End: 2018-09-06

## 2018-09-06 DIAGNOSIS — Z86.69 PERSONAL HISTORY OF OTHER DISEASES OF THE NERVOUS SYSTEM AND SENSE ORGANS: Chronic | ICD-10-CM

## 2018-09-06 DIAGNOSIS — Z98.89 OTHER SPECIFIED POSTPROCEDURAL STATES: Chronic | ICD-10-CM

## 2018-09-07 ENCOUNTER — OTHER (OUTPATIENT)
Age: 73
End: 2018-09-07

## 2018-09-07 ENCOUNTER — RESULT REVIEW (OUTPATIENT)
Age: 73
End: 2018-09-07

## 2018-09-07 ENCOUNTER — LABORATORY RESULT (OUTPATIENT)
Age: 73
End: 2018-09-07

## 2018-09-07 ENCOUNTER — APPOINTMENT (OUTPATIENT)
Dept: CV DIAGNOSITCS | Facility: HOSPITAL | Age: 73
End: 2018-09-07

## 2018-09-07 DIAGNOSIS — Z79.01 LONG TERM (CURRENT) USE OF ANTICOAGULANTS: ICD-10-CM

## 2018-09-07 LAB
BASOPHILS # BLD AUTO: 0.05 K/UL — SIGNIFICANT CHANGE UP (ref 0–0.2)
BASOPHILS NFR BLD AUTO: 0.4 % — SIGNIFICANT CHANGE UP (ref 0–2)
CHOLEST SERPL-MCNC: 161 MG/DL — SIGNIFICANT CHANGE UP (ref 120–199)
CRP SERPL-MCNC: 8.6 MG/L — HIGH
EOSINOPHIL # BLD AUTO: 0.22 K/UL — SIGNIFICANT CHANGE UP (ref 0–0.5)
EOSINOPHIL NFR BLD AUTO: 1.9 % — SIGNIFICANT CHANGE UP (ref 0–6)
ERYTHROCYTE [SEDIMENTATION RATE] IN BLOOD: 3 MM/HR — SIGNIFICANT CHANGE UP (ref 1–15)
HCT VFR BLD CALC: 46.7 % — SIGNIFICANT CHANGE UP (ref 39–50)
HDLC SERPL-MCNC: 33 MG/DL — LOW (ref 35–55)
HGB BLD-MCNC: 15.9 G/DL — SIGNIFICANT CHANGE UP (ref 13–17)
IMM GRANULOCYTES # BLD AUTO: 0.06 # — SIGNIFICANT CHANGE UP
IMM GRANULOCYTES NFR BLD AUTO: 0.5 % — SIGNIFICANT CHANGE UP (ref 0–1.5)
INR PPP: 3.2 RATIO
LIPID PNL WITH DIRECT LDL SERPL: 110 MG/DL — SIGNIFICANT CHANGE UP
LYMPHOCYTES # BLD AUTO: 1.6 K/UL — SIGNIFICANT CHANGE UP (ref 1–3.3)
LYMPHOCYTES # BLD AUTO: 14.1 % — SIGNIFICANT CHANGE UP (ref 13–44)
MCHC RBC-ENTMCNC: 31.9 PG — SIGNIFICANT CHANGE UP (ref 27–34)
MCHC RBC-ENTMCNC: 34 % — SIGNIFICANT CHANGE UP (ref 32–36)
MCV RBC AUTO: 93.8 FL — SIGNIFICANT CHANGE UP (ref 80–100)
MONOCYTES # BLD AUTO: 0.93 K/UL — HIGH (ref 0–0.9)
MONOCYTES NFR BLD AUTO: 8.2 % — SIGNIFICANT CHANGE UP (ref 2–14)
NEUTROPHILS # BLD AUTO: 8.47 K/UL — HIGH (ref 1.8–7.4)
NEUTROPHILS NFR BLD AUTO: 74.9 % — SIGNIFICANT CHANGE UP (ref 43–77)
NRBC # FLD: 0 — SIGNIFICANT CHANGE UP
PLATELET # BLD AUTO: 193 K/UL — SIGNIFICANT CHANGE UP (ref 150–400)
PMV BLD: 10.7 FL — SIGNIFICANT CHANGE UP (ref 7–13)
RBC # BLD: 4.98 M/UL — SIGNIFICANT CHANGE UP (ref 4.2–5.8)
RBC # FLD: 12.9 % — SIGNIFICANT CHANGE UP (ref 10.3–14.5)
TRIGL SERPL-MCNC: 217 MG/DL — HIGH (ref 10–149)
WBC # BLD: 11.33 K/UL — HIGH (ref 3.8–10.5)
WBC # FLD AUTO: 11.33 K/UL — HIGH (ref 3.8–10.5)

## 2018-09-07 PROCEDURE — 93880 EXTRACRANIAL BILAT STUDY: CPT | Mod: 26

## 2018-09-07 PROCEDURE — 93306 TTE W/DOPPLER COMPLETE: CPT | Mod: 26

## 2018-09-10 ENCOUNTER — APPOINTMENT (OUTPATIENT)
Dept: OPHTHALMOLOGY | Facility: CLINIC | Age: 73
End: 2018-09-10

## 2018-09-20 ENCOUNTER — OUTPATIENT (OUTPATIENT)
Dept: OUTPATIENT SERVICES | Facility: HOSPITAL | Age: 73
LOS: 1 days | End: 2018-09-20

## 2018-09-20 ENCOUNTER — APPOINTMENT (OUTPATIENT)
Dept: INTERNAL MEDICINE | Facility: HOSPITAL | Age: 73
End: 2018-09-20

## 2018-09-20 DIAGNOSIS — Z98.89 OTHER SPECIFIED POSTPROCEDURAL STATES: Chronic | ICD-10-CM

## 2018-09-20 DIAGNOSIS — Z86.69 PERSONAL HISTORY OF OTHER DISEASES OF THE NERVOUS SYSTEM AND SENSE ORGANS: Chronic | ICD-10-CM

## 2018-09-21 DIAGNOSIS — Z79.01 LONG TERM (CURRENT) USE OF ANTICOAGULANTS: ICD-10-CM

## 2018-09-21 LAB — INR PPP: 4.1 RATIO

## 2018-09-27 ENCOUNTER — RESULT CHARGE (OUTPATIENT)
Age: 73
End: 2018-09-27

## 2018-09-27 ENCOUNTER — OUTPATIENT (OUTPATIENT)
Dept: OUTPATIENT SERVICES | Facility: HOSPITAL | Age: 73
LOS: 1 days | End: 2018-09-27

## 2018-09-27 ENCOUNTER — APPOINTMENT (OUTPATIENT)
Dept: INTERNAL MEDICINE | Facility: HOSPITAL | Age: 73
End: 2018-09-27
Payer: MEDICARE

## 2018-09-27 VITALS — BODY MASS INDEX: 26.66 KG/M2 | WEIGHT: 180 LBS | HEIGHT: 69 IN

## 2018-09-27 VITALS — HEART RATE: 62 BPM | DIASTOLIC BLOOD PRESSURE: 84 MMHG | SYSTOLIC BLOOD PRESSURE: 120 MMHG

## 2018-09-27 DIAGNOSIS — K63.5 POLYP OF COLON: ICD-10-CM

## 2018-09-27 DIAGNOSIS — H35.9 UNSPECIFIED RETINAL DISORDER: ICD-10-CM

## 2018-09-27 DIAGNOSIS — Z86.69 PERSONAL HISTORY OF OTHER DISEASES OF THE NERVOUS SYSTEM AND SENSE ORGANS: Chronic | ICD-10-CM

## 2018-09-27 DIAGNOSIS — Z87.19 PERSONAL HISTORY OF OTHER DISEASES OF THE DIGESTIVE SYSTEM: ICD-10-CM

## 2018-09-27 DIAGNOSIS — H57.8 OTHER SPECIFIED DISORDERS OF EYE AND ADNEXA: ICD-10-CM

## 2018-09-27 DIAGNOSIS — Z98.89 OTHER SPECIFIED POSTPROCEDURAL STATES: Chronic | ICD-10-CM

## 2018-09-27 PROCEDURE — 99214 OFFICE O/P EST MOD 30 MIN: CPT | Mod: GC

## 2018-09-27 RX ORDER — PANTOPRAZOLE 40 MG/1
40 TABLET, DELAYED RELEASE ORAL TWICE DAILY
Qty: 60 | Refills: 0 | Status: DISCONTINUED | COMMUNITY
Start: 2017-12-13 | End: 2018-09-27

## 2018-09-27 RX ORDER — PAPAVERINE HYDROCHLORIDE 30 MG/ML
30 INJECTION, SOLUTION INTRAVENOUS
Qty: 10 | Refills: 2 | Status: DISCONTINUED | COMMUNITY
Start: 2018-05-15 | End: 2018-09-27

## 2018-09-28 DIAGNOSIS — E11.40 TYPE 2 DIABETES MELLITUS WITH DIABETIC NEUROPATHY, UNSPECIFIED: ICD-10-CM

## 2018-09-28 DIAGNOSIS — I25.10 ATHEROSCLEROTIC HEART DISEASE OF NATIVE CORONARY ARTERY WITHOUT ANGINA PECTORIS: ICD-10-CM

## 2018-09-28 DIAGNOSIS — I48.91 UNSPECIFIED ATRIAL FIBRILLATION: ICD-10-CM

## 2018-09-28 LAB — GLUCOSE BLDC GLUCOMTR-MCNC: 203

## 2018-10-02 LAB — INR PPP: 2.7 RATIO

## 2018-10-03 ENCOUNTER — RESULT CHARGE (OUTPATIENT)
Age: 73
End: 2018-10-03

## 2018-10-04 ENCOUNTER — APPOINTMENT (OUTPATIENT)
Dept: INTERNAL MEDICINE | Facility: HOSPITAL | Age: 73
End: 2018-10-04

## 2018-10-04 ENCOUNTER — OUTPATIENT (OUTPATIENT)
Dept: OUTPATIENT SERVICES | Facility: HOSPITAL | Age: 73
LOS: 1 days | End: 2018-10-04

## 2018-10-04 ENCOUNTER — LABORATORY RESULT (OUTPATIENT)
Age: 73
End: 2018-10-04

## 2018-10-04 DIAGNOSIS — Z86.69 PERSONAL HISTORY OF OTHER DISEASES OF THE NERVOUS SYSTEM AND SENSE ORGANS: Chronic | ICD-10-CM

## 2018-10-04 DIAGNOSIS — Z79.01 LONG TERM (CURRENT) USE OF ANTICOAGULANTS: ICD-10-CM

## 2018-10-04 DIAGNOSIS — Z98.89 OTHER SPECIFIED POSTPROCEDURAL STATES: Chronic | ICD-10-CM

## 2018-10-04 LAB
BASOPHILS # BLD AUTO: 0.08 K/UL — SIGNIFICANT CHANGE UP (ref 0–0.2)
BASOPHILS NFR BLD AUTO: 0.8 % — SIGNIFICANT CHANGE UP (ref 0–2)
CHOLEST SERPL-MCNC: 140 MG/DL — SIGNIFICANT CHANGE UP (ref 120–199)
EOSINOPHIL # BLD AUTO: 0.13 K/UL — SIGNIFICANT CHANGE UP (ref 0–0.5)
EOSINOPHIL NFR BLD AUTO: 1.3 % — SIGNIFICANT CHANGE UP (ref 0–6)
HBA1C BLD-MCNC: 6.5 % — HIGH (ref 4–5.6)
HCT VFR BLD CALC: 44.6 % — SIGNIFICANT CHANGE UP (ref 39–50)
HDLC SERPL-MCNC: 31 MG/DL — LOW (ref 35–55)
HGB BLD-MCNC: 15.6 G/DL — SIGNIFICANT CHANGE UP (ref 13–17)
IMM GRANULOCYTES # BLD AUTO: 0.1 # — SIGNIFICANT CHANGE UP
IMM GRANULOCYTES NFR BLD AUTO: 1 % — SIGNIFICANT CHANGE UP (ref 0–1.5)
LIPID PNL WITH DIRECT LDL SERPL: 97 MG/DL — SIGNIFICANT CHANGE UP
LYMPHOCYTES # BLD AUTO: 2.36 K/UL — SIGNIFICANT CHANGE UP (ref 1–3.3)
LYMPHOCYTES # BLD AUTO: 24 % — SIGNIFICANT CHANGE UP (ref 13–44)
MCHC RBC-ENTMCNC: 31.8 PG — SIGNIFICANT CHANGE UP (ref 27–34)
MCHC RBC-ENTMCNC: 35 % — SIGNIFICANT CHANGE UP (ref 32–36)
MCV RBC AUTO: 90.8 FL — SIGNIFICANT CHANGE UP (ref 80–100)
MONOCYTES # BLD AUTO: 0.77 K/UL — SIGNIFICANT CHANGE UP (ref 0–0.9)
MONOCYTES NFR BLD AUTO: 7.8 % — SIGNIFICANT CHANGE UP (ref 2–14)
NEUTROPHILS # BLD AUTO: 6.39 K/UL — SIGNIFICANT CHANGE UP (ref 1.8–7.4)
NEUTROPHILS NFR BLD AUTO: 65.1 % — SIGNIFICANT CHANGE UP (ref 43–77)
NRBC # FLD: 0 — SIGNIFICANT CHANGE UP
PLATELET # BLD AUTO: 235 K/UL — SIGNIFICANT CHANGE UP (ref 150–400)
PMV BLD: 10.8 FL — SIGNIFICANT CHANGE UP (ref 7–13)
RBC # BLD: 4.91 M/UL — SIGNIFICANT CHANGE UP (ref 4.2–5.8)
RBC # FLD: 12.8 % — SIGNIFICANT CHANGE UP (ref 10.3–14.5)
TRIGL SERPL-MCNC: 135 MG/DL — SIGNIFICANT CHANGE UP (ref 10–149)
WBC # BLD: 9.83 K/UL — SIGNIFICANT CHANGE UP (ref 3.8–10.5)
WBC # FLD AUTO: 9.83 K/UL — SIGNIFICANT CHANGE UP (ref 3.8–10.5)

## 2018-10-05 LAB — INR PPP: 2.8 RATIO

## 2018-10-08 DIAGNOSIS — G40.909 EPILEPSY, UNSPECIFIED, NOT INTRACTABLE, WITHOUT STATUS EPILEPTICUS: ICD-10-CM

## 2018-10-08 DIAGNOSIS — E11.40 TYPE 2 DIABETES MELLITUS WITH DIABETIC NEUROPATHY, UNSPECIFIED: ICD-10-CM

## 2018-10-08 DIAGNOSIS — Z00.00 ENCOUNTER FOR GENERAL ADULT MEDICAL EXAMINATION WITHOUT ABNORMAL FINDINGS: ICD-10-CM

## 2018-10-08 DIAGNOSIS — E78.5 HYPERLIPIDEMIA, UNSPECIFIED: ICD-10-CM

## 2018-10-08 DIAGNOSIS — Z87.19 PERSONAL HISTORY OF OTHER DISEASES OF THE DIGESTIVE SYSTEM: ICD-10-CM

## 2018-10-08 DIAGNOSIS — I48.91 UNSPECIFIED ATRIAL FIBRILLATION: ICD-10-CM

## 2018-10-08 DIAGNOSIS — I10 ESSENTIAL (PRIMARY) HYPERTENSION: ICD-10-CM

## 2018-10-08 DIAGNOSIS — I25.10 ATHEROSCLEROTIC HEART DISEASE OF NATIVE CORONARY ARTERY WITHOUT ANGINA PECTORIS: ICD-10-CM

## 2018-10-08 DIAGNOSIS — Z95.2 PRESENCE OF PROSTHETIC HEART VALVE: ICD-10-CM

## 2018-10-08 DIAGNOSIS — H53.9 UNSPECIFIED VISUAL DISTURBANCE: ICD-10-CM

## 2018-10-08 NOTE — HISTORY OF PRESENT ILLNESS
[FreeTextEntry1] : blurry vision  [de-identified] : 72y man with HTN, HLD, CAD (s/p CABG), RHD (s/p mechanical mitral valve replacement on Coumadin), Afib, h/o seizures, DMII (A1C 7.7% 05/2018) presenting for HCM. Complains that he has recent vision changes for 1-2 months - blurry vision, difficult to see the letters, but denies seeing black spots or lightening in visual fields, denies seeing color changes. Pt saw [facility] ophthal[facility]  2 times  2 weeks ago. Per pt, there was negative opthalmology finding (was not able to see note on EMR). Per chart, echo and carotid doppler are all wnl.  Pt is also worried that INR is supratheraputic (INR 9/20/18 was 4.1, target range 2.5-3.5). \par \par DM2: FSG in office 203, most recent HgbA1c is 7.7%, not well controlled, on Glipizide ER 10mg. \par \par HTN: well controlled, SBP at home is 120s, denies headache \par \par CAD s/p CABG: hold aspirin per cardiology \par \par Afib: on digoxin 125mg qD and  atenolol 50mg qD \par \par RHD with mechanical mitral valve: on Coumadin , INR is supratheraputic (INR 9/20/18 was 4.1, target range 2.5-3.5).  \par \par Hx of seizure: last seizure was > 15 years ago, on Levetiracetam 500mg qD \par \par ROS is otherwise negative \par

## 2018-10-08 NOTE — REVIEW OF SYSTEMS
[Vision Problems] : vision problems [Negative] : Heme/Lymph [Discharge] : no discharge [Pain] : no pain [Redness] : no redness

## 2018-10-08 NOTE — END OF VISIT
[] : Resident [FreeTextEntry3] : Pt describes blurred vision -- has been to ophthalmology and relates negative findings though these are not apparent on chart.  will check digoxin level and he may well require ophtho follow-up.  Working on keeping metabolic issues optimized.  ?potential to wean keppra now without seizures for long period ot time.

## 2018-10-08 NOTE — ASSESSMENT
[FreeTextEntry1] : 72y man with HTN, HLD, CAD s/p CABG, RHD s/p mechanical mitral valve replacement on Coumadin, Afib, h/o seizures, DMII (A1C 7.7% 05/2018) complains for blurry vision. 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 cataract surgery from many years ago \par -  pt endorses blurry vision, but denies floaters or color changes\par - Echo and carotid doppler are wnl, less likely for TIA/Stroke \par - concern for DM retinopathy vs  digoxin toxicity. However, per pt, negative opthal tests \par - will order digoxin serum level \par \par #CAD s/p CABG\par - not on DAPT due to full dose AC with Coumadin per Cardiology\par - appreciate cardiac eval, to continue to hold ASA\par - cont atorvastatin 40 \par \par # RHD s/p mechanical MVR (goal INR 2.5-3/5)\par - INR 9/27 is 2.7, pt is therapeutic, will come back for INR check in 1 week \par \par # Atrial fibrillation\par - rate controlled; cont atenolol 50 mg, digoxin 125 mg \par - INR as above\par \par # DM2: A1c 7.7% on 5/2018 \par - currently on glipizide 10mg qD, discussed w/pt about starting on metformin 500mg qD, however pt declined, preferring lifestyle changes, will consider adding metformin on next visit \par - counseled pt on healthy diet and lifestyle changs - reduce carb intake and proportion size, add more lean proteins (bean, chicken and fish) into diet, walking 30min at least 2-3x per week \par - Provided education on self-evaluation for foot ulcers at home\par \par # HTN\par - BP controlled; cont atenolol 50 \par \par # Seizure \par - currently taking Keppra 500 mg QD, last seizure > 15 year ago \par - pt is referred to Neurology -  may not need seizure meds anymore \par \par # History of melena\par  - resolved since 12/14/2017\par - history of esophageal ulcer on EGD 11/2012, but has been asymptomatic, so pt stopped taking omeprazole on his own \par \par #HCM\par - Colonoscopy done 2014; next due 2024\par - Pneumovax given 03/2014, Prevnar given 07/2017\par - Influenza given 9/27/2018 \par - TdAP given 03/2015\par - Zostervax given 07/2017\par \par RTC in 2 month\par

## 2018-10-08 NOTE — PHYSICAL EXAM
[No Acute Distress] : no acute distress [Well Nourished] : well nourished [Well Developed] : well developed [No JVD] : no jugular venous distention [Supple] : supple [No Respiratory Distress] : no respiratory distress  [Clear to Auscultation] : lungs were clear to auscultation bilaterally [No Accessory Muscle Use] : no accessory muscle use [Soft] : abdomen soft [Non Tender] : non-tender [Non-distended] : non-distended [Normal Bowel Sounds] : normal bowel sounds [de-identified] : Pt is blind in L eye, pupils are not reactive to light in L eye  [de-identified] : irregular rate  [de-identified] : no peripheral edema

## 2018-10-09 DIAGNOSIS — H04.129 DRY EYE SYNDROME OF UNSPECIFIED LACRIMAL GLAND: ICD-10-CM

## 2018-10-09 DIAGNOSIS — G45.3 AMAUROSIS FUGAX: ICD-10-CM

## 2018-10-17 DIAGNOSIS — H43.819 VITREOUS DEGENERATION, UNSPECIFIED EYE: ICD-10-CM

## 2018-10-17 DIAGNOSIS — H33.002 UNSPECIFIED RETINAL DETACHMENT WITH RETINAL BREAK, LEFT EYE: ICD-10-CM

## 2018-10-18 ENCOUNTER — APPOINTMENT (OUTPATIENT)
Dept: INTERNAL MEDICINE | Facility: HOSPITAL | Age: 73
End: 2018-10-18

## 2018-10-18 ENCOUNTER — LABORATORY RESULT (OUTPATIENT)
Age: 73
End: 2018-10-18

## 2018-10-18 ENCOUNTER — OUTPATIENT (OUTPATIENT)
Dept: OUTPATIENT SERVICES | Facility: HOSPITAL | Age: 73
LOS: 1 days | End: 2018-10-18

## 2018-10-18 DIAGNOSIS — Z86.69 PERSONAL HISTORY OF OTHER DISEASES OF THE NERVOUS SYSTEM AND SENSE ORGANS: Chronic | ICD-10-CM

## 2018-10-18 DIAGNOSIS — Z98.89 OTHER SPECIFIED POSTPROCEDURAL STATES: Chronic | ICD-10-CM

## 2018-10-18 LAB
INR BLD: 2.22 — HIGH (ref 0.88–1.17)
PROTHROM AB SERPL-ACNC: 25 SEC — HIGH (ref 9.8–13.1)

## 2018-10-19 DIAGNOSIS — Z79.01 LONG TERM (CURRENT) USE OF ANTICOAGULANTS: ICD-10-CM

## 2018-10-25 ENCOUNTER — OUTPATIENT (OUTPATIENT)
Dept: OUTPATIENT SERVICES | Facility: HOSPITAL | Age: 73
LOS: 1 days | End: 2018-10-25

## 2018-10-25 ENCOUNTER — APPOINTMENT (OUTPATIENT)
Dept: INTERNAL MEDICINE | Facility: HOSPITAL | Age: 73
End: 2018-10-25

## 2018-10-25 ENCOUNTER — LABORATORY RESULT (OUTPATIENT)
Age: 73
End: 2018-10-25

## 2018-10-25 DIAGNOSIS — Z98.89 OTHER SPECIFIED POSTPROCEDURAL STATES: Chronic | ICD-10-CM

## 2018-10-25 DIAGNOSIS — Z86.69 PERSONAL HISTORY OF OTHER DISEASES OF THE NERVOUS SYSTEM AND SENSE ORGANS: Chronic | ICD-10-CM

## 2018-10-25 LAB
INR BLD: 2.57 — HIGH (ref 0.88–1.17)
PROTHROM AB SERPL-ACNC: 30.1 SEC — HIGH (ref 9.8–13.1)

## 2018-10-26 DIAGNOSIS — Z79.01 LONG TERM (CURRENT) USE OF ANTICOAGULANTS: ICD-10-CM

## 2018-11-01 ENCOUNTER — LABORATORY RESULT (OUTPATIENT)
Age: 73
End: 2018-11-01

## 2018-11-01 ENCOUNTER — APPOINTMENT (OUTPATIENT)
Dept: INTERNAL MEDICINE | Facility: HOSPITAL | Age: 73
End: 2018-11-01

## 2018-11-01 ENCOUNTER — OUTPATIENT (OUTPATIENT)
Dept: OUTPATIENT SERVICES | Facility: HOSPITAL | Age: 73
LOS: 1 days | End: 2018-11-01

## 2018-11-01 ENCOUNTER — OTHER (OUTPATIENT)
Age: 73
End: 2018-11-01

## 2018-11-01 DIAGNOSIS — Z86.69 PERSONAL HISTORY OF OTHER DISEASES OF THE NERVOUS SYSTEM AND SENSE ORGANS: Chronic | ICD-10-CM

## 2018-11-01 DIAGNOSIS — Z98.89 OTHER SPECIFIED POSTPROCEDURAL STATES: Chronic | ICD-10-CM

## 2018-11-01 LAB
INR BLD: 2.52 — HIGH (ref 0.88–1.17)
PROTHROM AB SERPL-ACNC: 28.8 SEC — HIGH (ref 9.8–13.1)

## 2018-11-01 NOTE — HISTORY OF PRESENT ILLNESS
[FreeTextEntry1] : follow up visit  [de-identified] : 72y man with HTN, HLD, CAD (s/p CABG), RHD (s/p mechanical mitral valve replacement on Coumadin), Afib, h/o seizures, DMII (A1C 7.7% 05/2018) presenting for HCM. Complains that he has recent vision changes for 1-2 months - blurry vision, difficult to see the letters, but denies seeing black spots or lightening in visual fields, denies seeing color changes. Pt saw [facility] 2 times 2 weeks ago. Per pt, there was negative opthalmology finding (was not able to see note on EMR). Per chart, echo and carotid doppler are all wnl. Pt is also worried that INR is supratheraputic (INR 9/20/18 was 4.1, target range 2.5-3.5). \par \par DM2: FSG in office 203, most recent HgbA1c is 7.7%, not well controlled, on Glipizide ER 10mg. \par \par HTN: well controlled, SBP at home is 120s, denies headache \par \par CAD s/p CABG: hold aspirin per cardiology \par \par Afib: on digoxin 125mg qD and atenolol 50mg qD \par \par RHD with mechanical mitral valve: on Coumadin , INR is supratheraputic (INR 9/20/18 was 4.1, target range 2.5-3.5). \par \par Hx of seizure: last seizure was > 15 years ago, on Levetiracetam 500mg qD \par \par ROS is otherwise negative

## 2018-11-01 NOTE — ASSESSMENT
[FreeTextEntry1] : 72y man with HTN, HLD, CAD s/p CABG, RHD s/p mechanical mitral valve replacement on Coumadin, Afib, h/o seizures, DMII (A1C 7.7% 05/2018) complains for blurry vision. 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 However, per pt, negative opthal tests \par - will order digoxin serum level \par \par #CAD s/p CABG\par - not on DAPT due to full dose AC with Coumadin per Cardiology\par - appreciate cardiac eval, to continue to hold ASA\par - cont atorvastatin 40 \par \par # RHD s/p mechanical MVR (goal INR 2.5-3/5)\par - INR 9/27 is 2.7, pt is therapeutic, will come back for INR check in 1 week \par \par # Atrial fibrillation\par - rate controlled; cont atenolol 50 mg, digoxin 125 mg \par - INR as above\par \par # DM2: A1c 7.7% on 5/2018 \par - currently on glipizide 10mg qD, discussed w/pt about starting on metformin 500mg qD, however pt declined, preferring lifestyle changes, will consider adding metformin on next visit \par - counseled pt on healthy diet and lifestyle changs - reduce carb intake and proportion size, add more lean proteins (bean, chicken and fish) into diet, walking 30min at least 2-3x per week \par - Provided education on self-evaluation for foot ulcers at home\par \par # HTN\par - BP controlled; cont atenolol 50 \par \par # Seizure \par - currently taking Keppra 500 mg QD, last seizure > 15 year ago \par - pt is referred to Neurology - may not need seizure meds anymore \par \par # History of melena\par  - resolved since 12/14/2017\par - history of esophageal ulcer on EGD 11/2012, but has been asymptomatic, so pt stopped taking omeprazole on his own \par \par #HCM\par - Colonoscopy done 2014; next due 2024\par - Pneumovax given 03/2014, Prevnar given 07/2017\par - Influenza given 9/27/2018 \par - TdAP given 03/2015\par - Zostervax given 07/2017\par \par

## 2018-11-02 DIAGNOSIS — Z79.01 LONG TERM (CURRENT) USE OF ANTICOAGULANTS: ICD-10-CM

## 2018-11-02 NOTE — HISTORY OF PRESENT ILLNESS
[FreeTextEntry1] : follow up  [de-identified] : 72y man with HTN, HLD, CAD (s/p CABG), RHD (s/p mechanical mitral valve replacement on Coumadin), Afib, h/o seizures, DMII (A1C 7.7% 05/2018) presenting for HCM. Complains that he has recent vision changes for 1-2 months - blurry vision, difficult to see the letters, but denies seeing black spots or lightening in visual fields, denies seeing color changes. Pt saw [facility] 2 times 2 weeks ago. Per pt, there was negative opthalmology finding (was not able to see note on EMR). Per chart, echo and carotid doppler are all wnl. Pt is also worried that INR is supratheraputic (INR 9/20/18 was 4.1, target range 2.5-3.5). \par \par DM2: FSG in office 203, most recent HgbA1c is 7.7%, not well controlled, on Glipizide ER 10mg. \par \par HTN: well controlled, SBP at home is 120s, denies headache \par \par CAD s/p CABG: hold aspirin per cardiology \par \par Afib: on digoxin 125mg qD and atenolol 50mg qD \par \par RHD with mechanical mitral valve: on Coumadin\par \par Hx of seizure: last seizure was > 15 years ago, on Levetiracetam 500mg qD \par \par ROS is otherwise negative \par

## 2018-11-08 ENCOUNTER — OUTPATIENT (OUTPATIENT)
Dept: OUTPATIENT SERVICES | Facility: HOSPITAL | Age: 73
LOS: 1 days | End: 2018-11-08

## 2018-11-08 ENCOUNTER — LABORATORY RESULT (OUTPATIENT)
Age: 73
End: 2018-11-08

## 2018-11-08 ENCOUNTER — APPOINTMENT (OUTPATIENT)
Dept: INTERNAL MEDICINE | Facility: HOSPITAL | Age: 73
End: 2018-11-08

## 2018-11-08 DIAGNOSIS — Z98.89 OTHER SPECIFIED POSTPROCEDURAL STATES: Chronic | ICD-10-CM

## 2018-11-08 DIAGNOSIS — Z86.69 PERSONAL HISTORY OF OTHER DISEASES OF THE NERVOUS SYSTEM AND SENSE ORGANS: Chronic | ICD-10-CM

## 2018-11-08 LAB
INR BLD: 3.1 — HIGH (ref 0.88–1.17)
PROTHROM AB SERPL-ACNC: 36.6 SEC — HIGH (ref 9.8–13.1)

## 2018-11-09 DIAGNOSIS — Z79.01 LONG TERM (CURRENT) USE OF ANTICOAGULANTS: ICD-10-CM

## 2018-11-16 ENCOUNTER — LABORATORY RESULT (OUTPATIENT)
Age: 73
End: 2018-11-16

## 2018-11-16 ENCOUNTER — OUTPATIENT (OUTPATIENT)
Dept: OUTPATIENT SERVICES | Facility: HOSPITAL | Age: 73
LOS: 1 days | End: 2018-11-16

## 2018-11-16 ENCOUNTER — APPOINTMENT (OUTPATIENT)
Dept: INTERNAL MEDICINE | Facility: HOSPITAL | Age: 73
End: 2018-11-16

## 2018-11-16 DIAGNOSIS — Z98.89 OTHER SPECIFIED POSTPROCEDURAL STATES: Chronic | ICD-10-CM

## 2018-11-16 DIAGNOSIS — Z79.01 LONG TERM (CURRENT) USE OF ANTICOAGULANTS: ICD-10-CM

## 2018-11-16 DIAGNOSIS — Z86.69 PERSONAL HISTORY OF OTHER DISEASES OF THE NERVOUS SYSTEM AND SENSE ORGANS: Chronic | ICD-10-CM

## 2018-11-16 LAB
INR BLD: 2.38 — HIGH (ref 0.88–1.17)
PROTHROM AB SERPL-ACNC: 27.9 SEC — HIGH (ref 9.8–13.1)

## 2018-11-19 ENCOUNTER — LABORATORY RESULT (OUTPATIENT)
Age: 73
End: 2018-11-19

## 2018-11-19 ENCOUNTER — APPOINTMENT (OUTPATIENT)
Dept: INTERNAL MEDICINE | Facility: HOSPITAL | Age: 73
End: 2018-11-19
Payer: MEDICARE

## 2018-11-19 ENCOUNTER — OUTPATIENT (OUTPATIENT)
Dept: OUTPATIENT SERVICES | Facility: HOSPITAL | Age: 73
LOS: 1 days | End: 2018-11-19

## 2018-11-19 VITALS — SYSTOLIC BLOOD PRESSURE: 118 MMHG | HEART RATE: 62 BPM | DIASTOLIC BLOOD PRESSURE: 84 MMHG

## 2018-11-19 VITALS — BODY MASS INDEX: 26.51 KG/M2 | WEIGHT: 179 LBS | HEIGHT: 69 IN

## 2018-11-19 DIAGNOSIS — Z98.89 OTHER SPECIFIED POSTPROCEDURAL STATES: Chronic | ICD-10-CM

## 2018-11-19 DIAGNOSIS — Z86.69 PERSONAL HISTORY OF OTHER DISEASES OF THE NERVOUS SYSTEM AND SENSE ORGANS: Chronic | ICD-10-CM

## 2018-11-19 LAB
BASOPHILS # BLD AUTO: 0.08 K/UL — SIGNIFICANT CHANGE UP (ref 0–0.2)
BASOPHILS NFR BLD AUTO: 0.9 % — SIGNIFICANT CHANGE UP (ref 0–2)
BUN SERPL-MCNC: 22 MG/DL — SIGNIFICANT CHANGE UP (ref 7–23)
CALCIUM SERPL-MCNC: 9.9 MG/DL — SIGNIFICANT CHANGE UP (ref 8.4–10.5)
CHLORIDE SERPL-SCNC: 105 MMOL/L — SIGNIFICANT CHANGE UP (ref 98–107)
CO2 SERPL-SCNC: 24 MMOL/L — SIGNIFICANT CHANGE UP (ref 22–31)
CREAT SERPL-MCNC: 1.11 MG/DL — SIGNIFICANT CHANGE UP (ref 0.5–1.3)
EOSINOPHIL # BLD AUTO: 0.16 K/UL — SIGNIFICANT CHANGE UP (ref 0–0.5)
EOSINOPHIL NFR BLD AUTO: 1.8 % — SIGNIFICANT CHANGE UP (ref 0–6)
GLUCOSE SERPL-MCNC: 106 MG/DL — HIGH (ref 70–99)
HCT VFR BLD CALC: 46.3 % — SIGNIFICANT CHANGE UP (ref 39–50)
HGB BLD-MCNC: 16 G/DL — SIGNIFICANT CHANGE UP (ref 13–17)
IMM GRANULOCYTES # BLD AUTO: 0.13 # — SIGNIFICANT CHANGE UP
IMM GRANULOCYTES NFR BLD AUTO: 1.5 % — SIGNIFICANT CHANGE UP (ref 0–1.5)
INR BLD: 2.5 — HIGH (ref 0.88–1.17)
LYMPHOCYTES # BLD AUTO: 2.52 K/UL — SIGNIFICANT CHANGE UP (ref 1–3.3)
LYMPHOCYTES # BLD AUTO: 28.2 % — SIGNIFICANT CHANGE UP (ref 13–44)
MCHC RBC-ENTMCNC: 31.8 PG — SIGNIFICANT CHANGE UP (ref 27–34)
MCHC RBC-ENTMCNC: 34.6 % — SIGNIFICANT CHANGE UP (ref 32–36)
MCV RBC AUTO: 92 FL — SIGNIFICANT CHANGE UP (ref 80–100)
MONOCYTES # BLD AUTO: 0.73 K/UL — SIGNIFICANT CHANGE UP (ref 0–0.9)
MONOCYTES NFR BLD AUTO: 8.2 % — SIGNIFICANT CHANGE UP (ref 2–14)
NEUTROPHILS # BLD AUTO: 5.31 K/UL — SIGNIFICANT CHANGE UP (ref 1.8–7.4)
NEUTROPHILS NFR BLD AUTO: 59.4 % — SIGNIFICANT CHANGE UP (ref 43–77)
NRBC # FLD: 0 — SIGNIFICANT CHANGE UP
PLATELET # BLD AUTO: 234 K/UL — SIGNIFICANT CHANGE UP (ref 150–400)
PMV BLD: 10.6 FL — SIGNIFICANT CHANGE UP (ref 7–13)
POTASSIUM SERPL-MCNC: 4.1 MMOL/L — SIGNIFICANT CHANGE UP (ref 3.5–5.3)
POTASSIUM SERPL-SCNC: 4.1 MMOL/L — SIGNIFICANT CHANGE UP (ref 3.5–5.3)
PROTHROM AB SERPL-ACNC: 28.5 SEC — HIGH (ref 9.8–13.1)
RBC # BLD: 5.03 M/UL — SIGNIFICANT CHANGE UP (ref 4.2–5.8)
RBC # FLD: 12.9 % — SIGNIFICANT CHANGE UP (ref 10.3–14.5)
SODIUM SERPL-SCNC: 144 MMOL/L — SIGNIFICANT CHANGE UP (ref 135–145)
WBC # BLD: 8.93 K/UL — SIGNIFICANT CHANGE UP (ref 3.8–10.5)
WBC # FLD AUTO: 8.93 K/UL — SIGNIFICANT CHANGE UP (ref 3.8–10.5)

## 2018-11-19 PROCEDURE — 99214 OFFICE O/P EST MOD 30 MIN: CPT | Mod: GC

## 2018-11-20 DIAGNOSIS — I25.10 ATHEROSCLEROTIC HEART DISEASE OF NATIVE CORONARY ARTERY WITHOUT ANGINA PECTORIS: ICD-10-CM

## 2018-11-20 DIAGNOSIS — I48.91 UNSPECIFIED ATRIAL FIBRILLATION: ICD-10-CM

## 2018-11-20 DIAGNOSIS — G40.909 EPILEPSY, UNSPECIFIED, NOT INTRACTABLE, WITHOUT STATUS EPILEPTICUS: ICD-10-CM

## 2018-11-20 DIAGNOSIS — K92.1 MELENA: ICD-10-CM

## 2018-11-20 DIAGNOSIS — E78.5 HYPERLIPIDEMIA, UNSPECIFIED: ICD-10-CM

## 2018-11-20 DIAGNOSIS — Z79.01 LONG TERM (CURRENT) USE OF ANTICOAGULANTS: ICD-10-CM

## 2018-11-20 DIAGNOSIS — E11.40 TYPE 2 DIABETES MELLITUS WITH DIABETIC NEUROPATHY, UNSPECIFIED: ICD-10-CM

## 2018-11-20 DIAGNOSIS — I10 ESSENTIAL (PRIMARY) HYPERTENSION: ICD-10-CM

## 2018-11-20 DIAGNOSIS — Z95.2 PRESENCE OF PROSTHETIC HEART VALVE: ICD-10-CM

## 2018-11-20 LAB
GLUCOSE BLDC GLUCOMTR-MCNC: 127
HAV IGM SER-ACNC: NONREACTIVE — SIGNIFICANT CHANGE UP
HBV CORE AB SER-ACNC: NONREACTIVE — SIGNIFICANT CHANGE UP
HBV CORE IGM SER-ACNC: NONREACTIVE — SIGNIFICANT CHANGE UP
HBV SURFACE AB SER-ACNC: NONREACTIVE — SIGNIFICANT CHANGE UP
HBV SURFACE AG SER-ACNC: NONREACTIVE — SIGNIFICANT CHANGE UP
HCV AB S/CO SERPL IA: 0.1 S/CO — SIGNIFICANT CHANGE UP
HCV AB SERPL-IMP: SIGNIFICANT CHANGE UP

## 2018-11-21 NOTE — PHYSICAL EXAM
[No Acute Distress] : no acute distress [Well Developed] : well developed [Well-Appearing] : well-appearing [Normal Sclera/Conjunctiva] : normal sclera/conjunctiva [PERRL] : pupils equal round and reactive to light [EOMI] : extraocular movements intact [Normal Outer Ear/Nose] : the outer ears and nose were normal in appearance [Normal Oropharynx] : the oropharynx was normal [No JVD] : no jugular venous distention [No Lymphadenopathy] : no lymphadenopathy [No Respiratory Distress] : no respiratory distress  [Clear to Auscultation] : lungs were clear to auscultation bilaterally [No Accessory Muscle Use] : no accessory muscle use [Pedal Pulses Present] : the pedal pulses are present [No Edema] : there was no peripheral edema [No Extremity Clubbing/Cyanosis] : no extremity clubbing/cyanosis [Soft] : abdomen soft [Non Tender] : non-tender [Non-distended] : non-distended [No Masses] : no abdominal mass palpated [No HSM] : no HSM [Normal Bowel Sounds] : normal bowel sounds [Normal Posterior Cervical Nodes] : no posterior cervical lymphadenopathy [Normal Anterior Cervical Nodes] : no anterior cervical lymphadenopathy [No Joint Swelling] : no joint swelling [Grossly Normal Strength/Tone] : grossly normal strength/tone [No Rash] : no rash [No Focal Deficits] : no focal deficits [Normal Affect] : the affect was normal [Normal Insight/Judgement] : insight and judgment were intact [de-identified] : Irregular rhyhtm, systolic click head LLSB

## 2018-11-21 NOTE — HISTORY OF PRESENT ILLNESS
[Abdominal Pain] : abdominal pain [de-identified] : black stool [FreeTextEntry8] : Pt is a 70 yo M with HTN, CAD s/p CABG, RHD s/p MVR on coumadin, T2DM (A1c 6.5 10/18), afib on AC, hx of [place] tear, and gastric submucosal lesion found on EGD (2013; however not biopsied given AC) presenting to clinic for black stools for about two weeks. Pt reported that he began having dark stools about two days prior to his Nov. 8th INR check. Pt originally taking Coumadin 4mg daily/ 6 mg on Thursday/Sunday. Pt continued to have complaints of melena and coumadin dosage was adjusted to 4mg daily/6mg Thursday only. On Nov. 16 the pt reported continued symptoms and new bilateral frontal/ occipital HA along with 5/10 nonradiating periumbilical abdominal pain described as a sensation of "soreness." Pt noted that he typically has dark stools when his "INR goes over 3.0." Pt reported no fevers, chills, N/V/D, SOB, CP, diarrhea, or constipation. \par \par Pt reported that today his headache as resolved and noted improvement in his abdominal symptoms. however stated that his belly is "noisy." Pt last BM was this AM and noted improvement in the "darkness of the stool." \par \par Additionally, Pt reported not taking his Lipitor because his last few lipid profiles "were normal." Pt was counseled about the importance of taking medication daily to reduce the possibility of stroke and MI. Pt stated he understood the benefits of taking the medication and the risk of stopping this medication. Pt reported that he will continue to take his medication.

## 2018-11-22 LAB — HBV E AG SER-ACNC: NEGATIVE — SIGNIFICANT CHANGE UP

## 2018-11-23 ENCOUNTER — APPOINTMENT (OUTPATIENT)
Dept: INTERNAL MEDICINE | Facility: HOSPITAL | Age: 73
End: 2018-11-23

## 2018-11-26 ENCOUNTER — OUTPATIENT (OUTPATIENT)
Dept: OUTPATIENT SERVICES | Facility: HOSPITAL | Age: 73
LOS: 1 days | End: 2018-11-26

## 2018-11-26 ENCOUNTER — APPOINTMENT (OUTPATIENT)
Dept: INTERNAL MEDICINE | Facility: HOSPITAL | Age: 73
End: 2018-11-26

## 2018-11-26 ENCOUNTER — LABORATORY RESULT (OUTPATIENT)
Age: 73
End: 2018-11-26

## 2018-11-26 DIAGNOSIS — Z86.69 PERSONAL HISTORY OF OTHER DISEASES OF THE NERVOUS SYSTEM AND SENSE ORGANS: Chronic | ICD-10-CM

## 2018-11-26 DIAGNOSIS — Z98.89 OTHER SPECIFIED POSTPROCEDURAL STATES: Chronic | ICD-10-CM

## 2018-11-26 LAB
INR BLD: 3.89 — HIGH (ref 0.88–1.17)
PROTHROM AB SERPL-ACNC: 46.2 SEC — HIGH (ref 9.8–13.1)

## 2018-11-27 DIAGNOSIS — Z79.01 LONG TERM (CURRENT) USE OF ANTICOAGULANTS: ICD-10-CM

## 2018-11-28 LAB — HBV E AB SER-ACNC: NEGATIVE — SIGNIFICANT CHANGE UP

## 2018-11-29 ENCOUNTER — OTHER (OUTPATIENT)
Age: 73
End: 2018-11-29

## 2018-12-03 ENCOUNTER — OUTPATIENT (OUTPATIENT)
Dept: OUTPATIENT SERVICES | Facility: HOSPITAL | Age: 73
LOS: 1 days | End: 2018-12-03

## 2018-12-03 ENCOUNTER — LABORATORY RESULT (OUTPATIENT)
Age: 73
End: 2018-12-03

## 2018-12-03 ENCOUNTER — APPOINTMENT (OUTPATIENT)
Dept: INTERNAL MEDICINE | Facility: HOSPITAL | Age: 73
End: 2018-12-03

## 2018-12-03 ENCOUNTER — RESULT REVIEW (OUTPATIENT)
Age: 73
End: 2018-12-03

## 2018-12-03 DIAGNOSIS — Z98.89 OTHER SPECIFIED POSTPROCEDURAL STATES: Chronic | ICD-10-CM

## 2018-12-03 DIAGNOSIS — Z86.69 PERSONAL HISTORY OF OTHER DISEASES OF THE NERVOUS SYSTEM AND SENSE ORGANS: Chronic | ICD-10-CM

## 2018-12-03 LAB
INR BLD: 1.82 — HIGH (ref 0.88–1.17)
PROTHROM AB SERPL-ACNC: 21.1 SEC — HIGH (ref 9.8–13.1)

## 2018-12-05 DIAGNOSIS — Z79.01 LONG TERM (CURRENT) USE OF ANTICOAGULANTS: ICD-10-CM

## 2018-12-07 ENCOUNTER — APPOINTMENT (OUTPATIENT)
Dept: INTERNAL MEDICINE | Facility: HOSPITAL | Age: 73
End: 2018-12-07

## 2018-12-07 ENCOUNTER — OUTPATIENT (OUTPATIENT)
Dept: OUTPATIENT SERVICES | Facility: HOSPITAL | Age: 73
LOS: 1 days | End: 2018-12-07

## 2018-12-07 ENCOUNTER — LABORATORY RESULT (OUTPATIENT)
Age: 73
End: 2018-12-07

## 2018-12-07 DIAGNOSIS — Z86.69 PERSONAL HISTORY OF OTHER DISEASES OF THE NERVOUS SYSTEM AND SENSE ORGANS: Chronic | ICD-10-CM

## 2018-12-07 DIAGNOSIS — Z98.89 OTHER SPECIFIED POSTPROCEDURAL STATES: Chronic | ICD-10-CM

## 2018-12-07 LAB
INR BLD: 2.4 — HIGH (ref 0.88–1.17)
PROTHROM AB SERPL-ACNC: 27.4 SEC — HIGH (ref 9.8–13.1)

## 2018-12-07 RX ORDER — ENOXAPARIN SODIUM 100 MG/ML
80 INJECTION SUBCUTANEOUS
Qty: 16 | Refills: 0 | Status: DISCONTINUED | COMMUNITY
End: 2018-12-07

## 2018-12-10 DIAGNOSIS — Z79.01 LONG TERM (CURRENT) USE OF ANTICOAGULANTS: ICD-10-CM

## 2018-12-13 ENCOUNTER — APPOINTMENT (OUTPATIENT)
Dept: INTERNAL MEDICINE | Facility: HOSPITAL | Age: 73
End: 2018-12-13

## 2018-12-13 ENCOUNTER — OTHER (OUTPATIENT)
Age: 73
End: 2018-12-13

## 2018-12-13 ENCOUNTER — LABORATORY RESULT (OUTPATIENT)
Age: 73
End: 2018-12-13

## 2018-12-13 ENCOUNTER — OUTPATIENT (OUTPATIENT)
Dept: OUTPATIENT SERVICES | Facility: HOSPITAL | Age: 73
LOS: 1 days | End: 2018-12-13

## 2018-12-13 DIAGNOSIS — Z98.89 OTHER SPECIFIED POSTPROCEDURAL STATES: Chronic | ICD-10-CM

## 2018-12-13 DIAGNOSIS — Z86.69 PERSONAL HISTORY OF OTHER DISEASES OF THE NERVOUS SYSTEM AND SENSE ORGANS: Chronic | ICD-10-CM

## 2018-12-13 LAB
INR BLD: 2.67 — HIGH (ref 0.88–1.17)
PROTHROM AB SERPL-ACNC: 30.5 SEC — HIGH (ref 9.8–13.1)

## 2018-12-14 DIAGNOSIS — Z79.01 LONG TERM (CURRENT) USE OF ANTICOAGULANTS: ICD-10-CM

## 2018-12-18 ENCOUNTER — LABORATORY RESULT (OUTPATIENT)
Age: 73
End: 2018-12-18

## 2018-12-18 ENCOUNTER — RX CHANGE (OUTPATIENT)
Age: 73
End: 2018-12-18

## 2018-12-18 ENCOUNTER — OUTPATIENT (OUTPATIENT)
Dept: OUTPATIENT SERVICES | Facility: HOSPITAL | Age: 73
LOS: 1 days | End: 2018-12-18

## 2018-12-18 ENCOUNTER — APPOINTMENT (OUTPATIENT)
Dept: INTERNAL MEDICINE | Facility: HOSPITAL | Age: 73
End: 2018-12-18
Payer: MEDICARE

## 2018-12-18 VITALS — DIASTOLIC BLOOD PRESSURE: 70 MMHG | SYSTOLIC BLOOD PRESSURE: 126 MMHG | HEART RATE: 75 BPM

## 2018-12-18 VITALS — WEIGHT: 183 LBS | BODY MASS INDEX: 27.11 KG/M2 | HEIGHT: 69 IN

## 2018-12-18 DIAGNOSIS — Z98.89 OTHER SPECIFIED POSTPROCEDURAL STATES: Chronic | ICD-10-CM

## 2018-12-18 DIAGNOSIS — Z86.69 PERSONAL HISTORY OF OTHER DISEASES OF THE NERVOUS SYSTEM AND SENSE ORGANS: Chronic | ICD-10-CM

## 2018-12-18 LAB
APPEARANCE UR: CLEAR — SIGNIFICANT CHANGE UP
BACTERIA # UR AUTO: NEGATIVE — SIGNIFICANT CHANGE UP
BILIRUB UR-MCNC: NEGATIVE — SIGNIFICANT CHANGE UP
BLOOD UR QL VISUAL: SIGNIFICANT CHANGE UP
COLOR SPEC: YELLOW — SIGNIFICANT CHANGE UP
GLUCOSE BLDC GLUCOMTR-MCNC: 157
GLUCOSE UR-MCNC: 50 — SIGNIFICANT CHANGE UP
HYALINE CASTS # UR AUTO: NEGATIVE — SIGNIFICANT CHANGE UP
KETONES UR-MCNC: NEGATIVE — SIGNIFICANT CHANGE UP
LEUKOCYTE ESTERASE UR-ACNC: NEGATIVE — SIGNIFICANT CHANGE UP
NITRITE UR-MCNC: NEGATIVE — SIGNIFICANT CHANGE UP
PH UR: 6.5 — SIGNIFICANT CHANGE UP (ref 5–8)
PROT UR-MCNC: 70 — SIGNIFICANT CHANGE UP
RBC CASTS # UR COMP ASSIST: HIGH (ref 0–?)
SP GR SPEC: 1.03 — SIGNIFICANT CHANGE UP (ref 1–1.04)
SQUAMOUS # UR AUTO: SIGNIFICANT CHANGE UP
UROBILINOGEN FLD QL: SIGNIFICANT CHANGE UP
WBC UR QL: SIGNIFICANT CHANGE UP (ref 0–?)

## 2018-12-18 PROCEDURE — 99214 OFFICE O/P EST MOD 30 MIN: CPT | Mod: GC

## 2018-12-18 RX ORDER — WARFARIN 1 MG/1
1 TABLET ORAL DAILY
Qty: 90 | Refills: 3 | Status: DISCONTINUED | COMMUNITY
Start: 2017-12-13 | End: 2018-12-18

## 2018-12-19 DIAGNOSIS — R31.0 GROSS HEMATURIA: ICD-10-CM

## 2018-12-19 DIAGNOSIS — Z79.01 LONG TERM (CURRENT) USE OF ANTICOAGULANTS: ICD-10-CM

## 2018-12-19 DIAGNOSIS — Z95.2 PRESENCE OF PROSTHETIC HEART VALVE: ICD-10-CM

## 2018-12-19 DIAGNOSIS — E11.40 TYPE 2 DIABETES MELLITUS WITH DIABETIC NEUROPATHY, UNSPECIFIED: ICD-10-CM

## 2018-12-19 DIAGNOSIS — R10.13 EPIGASTRIC PAIN: ICD-10-CM

## 2018-12-19 DIAGNOSIS — I48.91 UNSPECIFIED ATRIAL FIBRILLATION: ICD-10-CM

## 2018-12-19 DIAGNOSIS — G40.909 EPILEPSY, UNSPECIFIED, NOT INTRACTABLE, WITHOUT STATUS EPILEPTICUS: ICD-10-CM

## 2018-12-19 DIAGNOSIS — K31.89 OTHER DISEASES OF STOMACH AND DUODENUM: ICD-10-CM

## 2018-12-19 DIAGNOSIS — I10 ESSENTIAL (PRIMARY) HYPERTENSION: ICD-10-CM

## 2018-12-19 LAB
CREAT UR-MCNC: 121 MG/DL — SIGNIFICANT CHANGE UP
MICROALBUMIN UR-MCNC: 21.2 MG/DL — SIGNIFICANT CHANGE UP
MICROALBUMIN/CREAT UR-RTO: 175 MG/G — HIGH (ref 0–30)

## 2018-12-19 RX ORDER — OMEPRAZOLE 40 MG/1
40 CAPSULE, DELAYED RELEASE ORAL DAILY
Qty: 1 | Refills: 5 | Status: DISCONTINUED | COMMUNITY
Start: 2018-11-19 | End: 2018-12-19

## 2018-12-19 NOTE — ASSESSMENT
[FreeTextEntry1] : 72y man with HTN, HLD, CAD s/p CABG, RHD s/p mechanical mitral valve replacement on Coumadin, Afib, h/o seizures, DMII (A1C 6.5% 10/2018) who presents for recurrent epigastric pain.\par \par #Epigastric pain\par  · Pt w/ hx esophageal ulcer and gastric submucosal lesion p/w recurrent epigastric pain that improves with food. Possibly 2/2 new duodenal ulcer vs  PUD. No recent NSAID use. GI following, no intervention on last visit given improved sx's, inability to perform biospy 2/2 AC use.\par     -repeat GI referral for consideration of repeat EGD. alarm sx's include older age >55, hx submucosal lesion w/o previous biopsy\par     -stool antigen testing for H. pylori sent today\par     - tums Rx'd in meantime for sx control. will consider PPI once stool antigen testing performed\par \par #Hypertension (401.9) (I10)\par  - Well controlled <130/80\par   - C/w atenolol 50mg\par \par # Atrial fibrillation\par  · Currently on coumadin. Rate controlled.\par     - C/w atenolol 50mg, digoxin 125 mcg\par \par #CAD s/p CABG\par - Not on ASA due to AC w/ coumadin \par  - Will c/w lipitor 40mg qd\par - last TTE 2018, EF 66%. grossly nl LV fxn\par \par RHD s/p mech MVR\par - last INR 12/13 2.67 , at goal 2.5-3.5. next INR check on 12/20\par - melena resolved since dose change. will continue to monitor hematuria, repeat urology referral given\par -c/w coumadin 4mg every day\par #Type 2 diabetes mellitus \par  - Last A1c: 6.5% on 10/18, repeat at next visit. not on metformin 2/2 persistent diarrhea\par  - c/w glipizide for now. will consider d/c if pt's HbA1c remains <7.5-8 at next visit given inc risk for hypoglycemia in elderly\par - pt's vision abnl's raise concern for retinal disease; however, sx's have been stable over the past year. will consider referral to our optho clinic at next visit as pt wishes to address abd pain first\par -urine ma/cr ratio, UA today\par \par #Seizure disorder \par  -Currently asx. x >20yrs\par   - C/w Keppra 500mg qd\par   - referral previously given for neuro to determine need for continued AEDs. pt wishes to defer to next visit\par \par #HCM: \par - Hepatitis, HIV Screen - neg \par - Colonoscopy done 2014 (hemorrhoids); next due 2024\par - Pneumovax given 03/2014, Prevnar given 07/2017\par - Influenza given 9/27/2018 \par - TdAP given 03/2015\par - Zostervax given 07/2017\par \par RTC in 2d for INR check, fecal antigen testing, then in 2wks for f/u of abd pain\par \par Mauri Fields, PGY1\par [employer] [facility]\par case d/w Dr Seay

## 2018-12-19 NOTE — PHYSICAL EXAM
[No Acute Distress] : no acute distress [Well Nourished] : well nourished [Well Developed] : well developed [Well-Appearing] : well-appearing [Normal Sclera/Conjunctiva] : normal sclera/conjunctiva [Normal Outer Ear/Nose] : the outer ears and nose were normal in appearance [Supple] : supple [No Respiratory Distress] : no respiratory distress  [Clear to Auscultation] : lungs were clear to auscultation bilaterally [Normal Rate] : normal rate  [Normal S1, S2] : normal S1 and S2 [No Edema] : there was no peripheral edema [Soft] : abdomen soft [Non Tender] : non-tender [Non-distended] : non-distended [No Masses] : no abdominal mass palpated [No HSM] : no HSM [Normal Bowel Sounds] : normal bowel sounds [Normal Gait] : normal gait [Normal Insight/Judgement] : insight and judgment were intact [de-identified] : irregular rhythm

## 2018-12-19 NOTE — REVIEW OF SYSTEMS
[Vision Problems] : vision problems [Abdominal Pain] : abdominal pain [Hematuria] : hematuria [Frequency] : frequency [Fever] : no fever [Fatigue] : no fatigue [Night Sweats] : no night sweats [Recent Change In Weight] : ~T no recent weight change [Discharge] : no discharge [Chest Pain] : no chest pain [Palpitations] : no palpitations [Shortness Of Breath] : no shortness of breath [Wheezing] : no wheezing [Nausea] : no nausea [Constipation] : no constipation [Diarrhea] : no diarrhea [Vomiting] : no vomiting [Heartburn] : no heartburn [Melena] : no melena [Dysuria] : no dysuria [Joint Pain] : no joint pain [Headache] : no headache [Dizziness] : no dizziness [Easy Bleeding] : no easy bleeding

## 2018-12-19 NOTE — HISTORY OF PRESENT ILLNESS
[FreeTextEntry1] : epigastric pain [de-identified] : Pt is a 72 yo M with HTN, CAD s/p CABG, RHD s/p MVR  (around '07) on coumadin, T2DM (A1c 6.5% 10/18), Afib on AC (last INR 2.67 on 12/13), hx of sierra chadwick tear and esophageal ulcer (2012), and gastric submucosal lesion found on EGD (2013; however not biopsied given AC) presenting to clinic for recurrence of epigastric pain. Pt reports that yesterday he was unable to sleep 2/2 burning midepigastric pain w/ bloating and fullness. The pain is exacerbated by hunger and eating food helps relieve the pain. Pt has not tried any medications for the pain, including omeprazole which he was prescribed last mth for melena but has not been using. Given his risk factors (older age, hx submucosal lesion) pt understands possible need for EGD; however, would like to avoid 2/2 discomfort following previous procedures. Also understands he may have to be off coumadin temporarily if biopsy is necessary. \par Otherwise, pt denies recent weight loss, early satiety, fevers, chills, N/V/D, SOB, CP, diarrhea, or constipation, hematuria, BRBPR, or melena. \par Of note, since previous visit in 11/18, pt reports that his melena has resolved since lowering his coumadin dose to 4mg every day (from 4mg Fri-Wed/6mg Thurs). Does note 1 episode of hematuria 4 days ago which has not recurred since. Denies dysuria, dribbling, hesitance.\par \par HLD - pt reports that he is now taking lipitor as prescribed\par  \par HTN - on atenolol. BP today 126/70\par \par DM - HbA1c 10/18 6.5%, on glipezide. Last urine MA/Cr ratio 30 in 2016. Last optho visit was several months prior; pt endorses occasional vision loss, similar to "a curtain coming down," however, no abnormalities have been idenitfied by opthalmologist per pt. \par \par Hx seizures - pt takes keppra daily. would like to consider whether he can d/c given last seizure (becomes unconcious, unable to give more detail) was >20yrs prior. would like to discuss neuro referall next visit

## 2018-12-20 ENCOUNTER — LABORATORY RESULT (OUTPATIENT)
Age: 73
End: 2018-12-20

## 2018-12-20 ENCOUNTER — RESULT REVIEW (OUTPATIENT)
Age: 73
End: 2018-12-20

## 2018-12-21 ENCOUNTER — OUTPATIENT (OUTPATIENT)
Dept: OUTPATIENT SERVICES | Facility: HOSPITAL | Age: 73
LOS: 1 days | End: 2018-12-21

## 2018-12-21 ENCOUNTER — LABORATORY RESULT (OUTPATIENT)
Age: 73
End: 2018-12-21

## 2018-12-21 ENCOUNTER — APPOINTMENT (OUTPATIENT)
Dept: INTERNAL MEDICINE | Facility: HOSPITAL | Age: 73
End: 2018-12-21

## 2018-12-21 DIAGNOSIS — Z86.69 PERSONAL HISTORY OF OTHER DISEASES OF THE NERVOUS SYSTEM AND SENSE ORGANS: Chronic | ICD-10-CM

## 2018-12-21 DIAGNOSIS — Z98.89 OTHER SPECIFIED POSTPROCEDURAL STATES: Chronic | ICD-10-CM

## 2018-12-21 LAB
INR BLD: 2.72 — HIGH (ref 0.88–1.17)
PROTHROM AB SERPL-ACNC: 31.1 SEC — HIGH (ref 9.8–13.1)

## 2018-12-23 LAB — H PYLORI AG STL QL: SIGNIFICANT CHANGE UP

## 2018-12-26 ENCOUNTER — OTHER (OUTPATIENT)
Age: 73
End: 2018-12-26

## 2018-12-27 ENCOUNTER — APPOINTMENT (OUTPATIENT)
Dept: GASTROENTEROLOGY | Facility: HOSPITAL | Age: 73
End: 2018-12-27

## 2018-12-31 DIAGNOSIS — Z79.01 LONG TERM (CURRENT) USE OF ANTICOAGULANTS: ICD-10-CM

## 2019-01-04 ENCOUNTER — LABORATORY RESULT (OUTPATIENT)
Age: 74
End: 2019-01-04

## 2019-01-04 ENCOUNTER — OUTPATIENT (OUTPATIENT)
Dept: OUTPATIENT SERVICES | Facility: HOSPITAL | Age: 74
LOS: 1 days | End: 2019-01-04

## 2019-01-04 ENCOUNTER — APPOINTMENT (OUTPATIENT)
Dept: INTERNAL MEDICINE | Facility: HOSPITAL | Age: 74
End: 2019-01-04
Payer: MEDICARE

## 2019-01-04 VITALS — SYSTOLIC BLOOD PRESSURE: 133 MMHG | DIASTOLIC BLOOD PRESSURE: 91 MMHG | HEART RATE: 69 BPM

## 2019-01-04 VITALS — WEIGHT: 184.19 LBS | HEIGHT: 69 IN | BODY MASS INDEX: 27.28 KG/M2

## 2019-01-04 DIAGNOSIS — Z98.89 OTHER SPECIFIED POSTPROCEDURAL STATES: Chronic | ICD-10-CM

## 2019-01-04 DIAGNOSIS — Z86.69 PERSONAL HISTORY OF OTHER DISEASES OF THE NERVOUS SYSTEM AND SENSE ORGANS: Chronic | ICD-10-CM

## 2019-01-04 LAB
HBA1C BLD-MCNC: 7.5 % — HIGH (ref 4–5.6)
INR BLD: 2.97 — HIGH (ref 0.88–1.17)
PROTHROM AB SERPL-ACNC: 35 SEC — HIGH (ref 9.8–13.1)
TSH SERPL-MCNC: 0.39 UIU/ML — SIGNIFICANT CHANGE UP (ref 0.27–4.2)

## 2019-01-04 PROCEDURE — 99214 OFFICE O/P EST MOD 30 MIN: CPT | Mod: GC

## 2019-01-07 ENCOUNTER — RESULT REVIEW (OUTPATIENT)
Age: 74
End: 2019-01-07

## 2019-01-07 ENCOUNTER — APPOINTMENT (OUTPATIENT)
Dept: UROLOGY | Facility: CLINIC | Age: 74
End: 2019-01-07

## 2019-01-07 DIAGNOSIS — Z79.01 LONG TERM (CURRENT) USE OF ANTICOAGULANTS: ICD-10-CM

## 2019-01-08 ENCOUNTER — APPOINTMENT (OUTPATIENT)
Dept: CV DIAGNOSITCS | Facility: HOSPITAL | Age: 74
End: 2019-01-08

## 2019-01-08 LAB — GLUCOSE BLDC GLUCOMTR-MCNC: 146

## 2019-01-08 NOTE — PLAN
[FreeTextEntry1] : The patient is a 73 year old male with a PMH of HTN, CAD s/p CABG, RHD s/p MVR on Coumadin, T2DM, Afib, Rosie Thompson tears and esophageal ulcers.\par \par 1) Epigastric pain - the epigastric pain is improving. H. pylori stool antigen was negative. \par - the patient will eat dinner at 7pm instead of 6pm to help with hunger at bedtime\par - the patient will take Tums when he experiences epigastric pain at bedtime\par - the patient will follow up with GI on 1/10/18 for an EGD evaluation.\par \par 2) Poor vision - the patient's vision has not worsened, but he feels that there is something wrong with his eyes.\par - the patient will see another ophthalmologist for a second opinion.\par \par 3) Proteinuria - the patient's urine microalbumin is 175 mg/g. \par - he was counseled on the use of lisinopril due to its protective effects on the kidney, but he declined for now. He will think about it.\par \par 4) Right Thyroid Lobe - On exam the right thyroid lobe site was enlarged. The enlargement was freely moveable.\par - will check TSH\par - referred patient for thyroid ultrasound\par \par 5) Type II Diabetes Mellitus \par - continue to monitor\par - continue with glipizide\par - will check HbA1C\par \par Patient discussed with Dr. Seay.\par All risks and benefits were discussed with patient. \par Patient and attending agreed with the above assessment and plan.\par \par Fatuma Aiken M.D. PGY1\par Internal Medicine\par [facility]\par [phone]

## 2019-01-08 NOTE — PHYSICAL EXAM
[No Acute Distress] : no acute distress [Well Nourished] : well nourished [Well Developed] : well developed [Well-Appearing] : well-appearing [Normal Sclera/Conjunctiva] : normal sclera/conjunctiva [PERRL] : pupils equal round and reactive to light [EOMI] : extraocular movements intact [Normal Outer Ear/Nose] : the outer ears and nose were normal in appearance [Normal Oropharynx] : the oropharynx was normal [No JVD] : no jugular venous distention [Supple] : supple [No Lymphadenopathy] : no lymphadenopathy [No Respiratory Distress] : no respiratory distress  [Clear to Auscultation] : lungs were clear to auscultation bilaterally [No Accessory Muscle Use] : no accessory muscle use [Normal Rate] : normal rate  [Regular Rhythm] : with a regular rhythm [Normal S1, S2] : normal S1 and S2 [No Murmur] : no murmur heard [Pedal Pulses Present] : the pedal pulses are present [No Edema] : there was no peripheral edema [Soft] : abdomen soft [Non Tender] : non-tender [Non-distended] : non-distended [No Masses] : no abdominal mass palpated [No HSM] : no HSM [Normal Bowel Sounds] : normal bowel sounds [Normal Posterior Cervical Nodes] : no posterior cervical lymphadenopathy [Normal Anterior Cervical Nodes] : no anterior cervical lymphadenopathy [No Joint Swelling] : no joint swelling [Grossly Normal Strength/Tone] : grossly normal strength/tone [No Rash] : no rash [Normal Gait] : normal gait [Coordination Grossly Intact] : coordination grossly intact [No Focal Deficits] : no focal deficits [Normal Affect] : the affect was normal [Normal Insight/Judgement] : insight and judgment were intact [de-identified] : Enlargement in region of right thyroid lobe, freely moveable

## 2019-01-10 ENCOUNTER — OUTPATIENT (OUTPATIENT)
Dept: OUTPATIENT SERVICES | Facility: HOSPITAL | Age: 74
LOS: 1 days | End: 2019-01-10

## 2019-01-10 ENCOUNTER — OTHER (OUTPATIENT)
Age: 74
End: 2019-01-10

## 2019-01-10 ENCOUNTER — APPOINTMENT (OUTPATIENT)
Dept: INTERNAL MEDICINE | Facility: HOSPITAL | Age: 74
End: 2019-01-10

## 2019-01-10 ENCOUNTER — LABORATORY RESULT (OUTPATIENT)
Age: 74
End: 2019-01-10

## 2019-01-10 ENCOUNTER — APPOINTMENT (OUTPATIENT)
Dept: GASTROENTEROLOGY | Facility: HOSPITAL | Age: 74
End: 2019-01-10

## 2019-01-10 VITALS
HEIGHT: 69 IN | BODY MASS INDEX: 27.19 KG/M2 | HEART RATE: 86 BPM | SYSTOLIC BLOOD PRESSURE: 143 MMHG | DIASTOLIC BLOOD PRESSURE: 88 MMHG | WEIGHT: 183.56 LBS

## 2019-01-10 DIAGNOSIS — Z98.89 OTHER SPECIFIED POSTPROCEDURAL STATES: Chronic | ICD-10-CM

## 2019-01-10 DIAGNOSIS — R10.9 UNSPECIFIED ABDOMINAL PAIN: ICD-10-CM

## 2019-01-10 DIAGNOSIS — Z86.69 PERSONAL HISTORY OF OTHER DISEASES OF THE NERVOUS SYSTEM AND SENSE ORGANS: Chronic | ICD-10-CM

## 2019-01-10 LAB
INR BLD: 2.38 — HIGH (ref 0.88–1.17)
PROTHROM AB SERPL-ACNC: 27.9 SEC — HIGH (ref 9.8–13.1)

## 2019-01-10 NOTE — HISTORY OF PRESENT ILLNESS
[Heartburn] : denies heartburn [Nausea] : denies nausea [Vomiting] : denies vomiting [Diarrhea] : denies diarrhea [Constipation] : denies constipation [Yellow Skin Or Eyes (Jaundice)] : denies jaundice [Abdominal Pain] : denies abdominal pain [Abdominal Swelling] : denies abdominal swelling [de-identified] : 72 year old male with past medical history atrial fibrillation and mechanical mitral valve (on Coumadin), CAD s/p CABG (not on aspirin), diverticulosis presenting to GI clinic for follow-up. \par \par Patient was last seen in GI clinic on 8/2018.\par \par Prior history: He was found to have a 2 x 1.1cm subepithelial gastric lesion along the greater curvature in 2013 (not biopsied/FNA as pt was on coumadin). It appeared to arise from the 4th layer. He had a surveillance EUS in 2014 that showed the lesion was 2 x 1.5cm in size, originating in the 3rd layer. Again it was not FNA/biopsied due to patient being on coumadin. He had a colonoscopy in 1/2014 for screening that showed diverticulosis and few small hyperplastic rectal polyps.  Pt was seen in GI clinic in 8/2018. He had declined a repeat EGD/EUS at that time. \par \par  He reports a few days of intermittent black specks in his stool since 12?2017. He denies current melena, hematochezia, abdominal pain, weight loss, dysphagia or nausea/vomiting. Hb has been stable.\par \par Today he returns because he reports new onset abdominal pain and burning x 1 month. He says about 4 hours after he eats he experiences abdominal burning and sour taste in the mouth. He is currently only taking TUMS. He denies any weight loss, early satiety. He feels his appetitie is good. Denies any BRBpR, N/V.\par \par He presents today for follow-up. He reports a bowel movement about once per day which is formed brown stool. He denies melena, hematochezia, nausea, vomiting, change in bowel habits, or unintentional weight loss. \par \par Recent blood work: July 2018 Hgb 16.1\par Recent imaging:\par CT (4/11/18): There is a 2 cm lipoma of the anterior wall of the distal gastric \par body (3:29). While there is mild thickening of the posterior gastric \par fundal wall maximally measuring 1.4 cm (3:23) no discrete gastric mass is \par discernible at the level of the gastroesophageal junction. No bowel \par obstruction. Appendix normal.\par \par

## 2019-01-10 NOTE — ASSESSMENT
[FreeTextEntry1] : 72 year old male with past medical history atrial fibrillation and mechanical mitral valve (on Coumadin), CAD s/p CABG (not on aspirin), diverticulosis presenting to GI clinic for follow-up for subepithelial gastric lesions and new epigastric burning x 1 month.\par \par # Gastric subepithelial lesion - differential includes pancreatic rest, GIST, carcinoid, lipoma, sarcoma. No previous FNA given need to be on anticoagulation (with high INR) and therefore high bleeding risk. No significant increase in size between 2013 and 2014 on EUS. Recent (4/2018) CT showing 2cm "lipoma". \par # Abdominal Pain\par Patient presenting with new epigastric burning sensation and sour taste in the mouth x 1 month. Symptoms are worse after fasting. DDx: PUD vs. GERD vs. functional dyspepsia vs. IBS. H pylori Ag done 12/13/2018 negative. Patient denies any NSAID use.\par # External hemorrhoids - s/p hemorrhoidectomy by outside colorectal surgeon\par # Overweight - BMI 25-30 \par \par \par Plan:\par - Given new abdominal pain and history of subepithelial mass, would recommend repeat EGD and EUS. However, given his cardiac disease and need for AC for MVR, will need to speak to advanced attending prior scheduling procedure.\par - will sent patient for presurgical testing for now\par - will start PPI PO daily\par - will have patient return in 2-4 weeks for followup appointment\par

## 2019-01-10 NOTE — PHYSICAL EXAM
[General Appearance - Alert] : alert [General Appearance - In No Acute Distress] : in no acute distress [Sclera] : the sclera and conjunctiva were normal [PERRL With Normal Accommodation] : pupils were equal in size, round, and reactive to light [Extraocular Movements] : extraocular movements were intact [Auscultation Breath Sounds / Voice Sounds] : lungs were clear to auscultation bilaterally [Heart Rate And Rhythm] : heart rate was normal and rhythm regular [Heart Sounds] : normal S1 and S2 [Heart Sounds Gallop] : no gallops [Murmurs] : no murmurs [Heart Sounds Pericardial Friction Rub] : no pericardial rub [Bowel Sounds] : normal bowel sounds [Abdomen Soft] : soft [Abdomen Tenderness] : non-tender [Abdomen Mass (___ Cm)] : no abdominal mass palpated [Cervical Lymph Nodes Enlarged Posterior Bilaterally] : posterior cervical [Cervical Lymph Nodes Enlarged Anterior Bilaterally] : anterior cervical [Supraclavicular Lymph Nodes Enlarged Bilaterally] : supraclavicular [Axillary Lymph Nodes Enlarged Bilaterally] : axillary [Femoral Lymph Nodes Enlarged Bilaterally] : femoral [Inguinal Lymph Nodes Enlarged Bilaterally] : inguinal [Skin Color & Pigmentation] : normal skin color and pigmentation [Skin Turgor] : normal skin turgor [] : no rash [Deep Tendon Reflexes (DTR)] : deep tendon reflexes were 2+ and symmetric [Sensation] : the sensory exam was normal to light touch and pinprick [No Focal Deficits] : no focal deficits

## 2019-01-11 DIAGNOSIS — R10.9 UNSPECIFIED ABDOMINAL PAIN: ICD-10-CM

## 2019-01-17 ENCOUNTER — OUTPATIENT (OUTPATIENT)
Dept: OUTPATIENT SERVICES | Facility: HOSPITAL | Age: 74
LOS: 1 days | End: 2019-01-17

## 2019-01-17 ENCOUNTER — APPOINTMENT (OUTPATIENT)
Dept: INTERNAL MEDICINE | Facility: HOSPITAL | Age: 74
End: 2019-01-17

## 2019-01-17 ENCOUNTER — LABORATORY RESULT (OUTPATIENT)
Age: 74
End: 2019-01-17

## 2019-01-17 DIAGNOSIS — Z86.69 PERSONAL HISTORY OF OTHER DISEASES OF THE NERVOUS SYSTEM AND SENSE ORGANS: Chronic | ICD-10-CM

## 2019-01-17 DIAGNOSIS — Z98.89 OTHER SPECIFIED POSTPROCEDURAL STATES: Chronic | ICD-10-CM

## 2019-01-17 LAB
INR BLD: 2.64 — HIGH (ref 0.88–1.17)
PROTHROM AB SERPL-ACNC: 30.2 SEC — HIGH (ref 9.8–13.1)

## 2019-01-18 DIAGNOSIS — Z79.01 LONG TERM (CURRENT) USE OF ANTICOAGULANTS: ICD-10-CM

## 2019-01-24 ENCOUNTER — APPOINTMENT (OUTPATIENT)
Dept: INTERNAL MEDICINE | Facility: HOSPITAL | Age: 74
End: 2019-01-24

## 2019-01-24 ENCOUNTER — OUTPATIENT (OUTPATIENT)
Dept: OUTPATIENT SERVICES | Facility: HOSPITAL | Age: 74
LOS: 1 days | End: 2019-01-24

## 2019-01-24 ENCOUNTER — LABORATORY RESULT (OUTPATIENT)
Age: 74
End: 2019-01-24

## 2019-01-24 DIAGNOSIS — Z98.89 OTHER SPECIFIED POSTPROCEDURAL STATES: Chronic | ICD-10-CM

## 2019-01-24 DIAGNOSIS — Z86.69 PERSONAL HISTORY OF OTHER DISEASES OF THE NERVOUS SYSTEM AND SENSE ORGANS: Chronic | ICD-10-CM

## 2019-01-24 LAB
INR BLD: 2.65 — HIGH (ref 0.88–1.17)
PROTHROM AB SERPL-ACNC: 31.1 SEC — HIGH (ref 9.8–13.1)

## 2019-01-25 DIAGNOSIS — Z79.01 LONG TERM (CURRENT) USE OF ANTICOAGULANTS: ICD-10-CM

## 2019-01-28 ENCOUNTER — APPOINTMENT (OUTPATIENT)
Dept: INTERNAL MEDICINE | Facility: HOSPITAL | Age: 74
End: 2019-01-28

## 2019-01-28 ENCOUNTER — OUTPATIENT (OUTPATIENT)
Dept: OUTPATIENT SERVICES | Facility: HOSPITAL | Age: 74
LOS: 1 days | End: 2019-01-28

## 2019-01-28 VITALS — BODY MASS INDEX: 27.46 KG/M2 | HEIGHT: 69 IN | WEIGHT: 185.44 LBS

## 2019-01-28 VITALS — DIASTOLIC BLOOD PRESSURE: 80 MMHG | HEART RATE: 62 BPM | SYSTOLIC BLOOD PRESSURE: 122 MMHG

## 2019-01-28 DIAGNOSIS — Z98.89 OTHER SPECIFIED POSTPROCEDURAL STATES: Chronic | ICD-10-CM

## 2019-01-28 DIAGNOSIS — Z86.69 PERSONAL HISTORY OF OTHER DISEASES OF THE NERVOUS SYSTEM AND SENSE ORGANS: Chronic | ICD-10-CM

## 2019-01-29 DIAGNOSIS — E11.9 TYPE 2 DIABETES MELLITUS WITHOUT COMPLICATIONS: ICD-10-CM

## 2019-01-29 DIAGNOSIS — E78.5 HYPERLIPIDEMIA, UNSPECIFIED: ICD-10-CM

## 2019-01-29 DIAGNOSIS — R80.9 PROTEINURIA, UNSPECIFIED: ICD-10-CM

## 2019-01-29 DIAGNOSIS — I10 ESSENTIAL (PRIMARY) HYPERTENSION: ICD-10-CM

## 2019-01-29 DIAGNOSIS — K92.1 MELENA: ICD-10-CM

## 2019-01-29 DIAGNOSIS — E11.40 TYPE 2 DIABETES MELLITUS WITH DIABETIC NEUROPATHY, UNSPECIFIED: ICD-10-CM

## 2019-01-29 DIAGNOSIS — G40.909 EPILEPSY, UNSPECIFIED, NOT INTRACTABLE, WITHOUT STATUS EPILEPTICUS: ICD-10-CM

## 2019-01-29 DIAGNOSIS — Z00.00 ENCOUNTER FOR GENERAL ADULT MEDICAL EXAMINATION WITHOUT ABNORMAL FINDINGS: ICD-10-CM

## 2019-01-29 DIAGNOSIS — Z95.2 PRESENCE OF PROSTHETIC HEART VALVE: ICD-10-CM

## 2019-01-29 DIAGNOSIS — I48.91 UNSPECIFIED ATRIAL FIBRILLATION: ICD-10-CM

## 2019-01-30 LAB — GLUCOSE BLDC GLUCOMTR-MCNC: 138

## 2019-02-01 NOTE — COUNSELING
[Healthy eating counseling provided] : healthy eating [de-identified] : Pt instructed to modify his diet to once of consistent carbohydrates. Pt instructed to avoid sugary beverages and snacks.

## 2019-02-01 NOTE — HISTORY OF PRESENT ILLNESS
[Atrial Fibrillation] : atrial fibrillation [Coronary Artery Disease] : coronary artery disease [(Patient denies any chest pain, claudication, dyspnea on exertion, orthopnea, palpitations or syncope)] : Patient denies any chest pain, claudication, dyspnea on exertion, orthopnea, palpitations or syncope [Moderate (4-6 METs)] : Moderate (4-6 METs) [Recent Myocardial Infarction] : no recent myocardial infarction [Implantable Device/Pacemaker] : no implantable device/pacemaker [Asthma] : no asthma [COPD] : no COPD [Sleep Apnea] : no sleep apnea [Smoker] : not a smoker [Family Member] : no family member with adverse anesthesia reaction/sudden death [Self] : no previous adverse anesthesia reaction [FreeTextEntry4] : Pt is a 70 yo M with HTN, CAD s/p CABG, RHD s/p MVR on coumadin, T2DM (A1c 6.5 10/18), afib on AC, hx of sierra chadwick tear, and gastric submucosal lesion found on EGD (2013; however not biopsied given AC) present for pre-op evaluation prior to EGD/EUS. \par \par Dyspepsia: Pt reported that he experiencing continued epigastric pain with associated dark stools. He reported that the pain comes on right before eating and improved with meals. Pt reported that he believes his stools are dark and has not seen any bright red blood in the toilet after BM or after wiping. \par \par Back Pain: Pt additionally reported lower back pain that has been on going for 2 weeks. Pt reported no recent falls, trauma, LE/ saddle paresthesias. \par \par T2DM: Pt reporting that his fasting blood sugar has been elevated (170s -180s) however his diet has not been well controlled. Pt reported that he typically eats anything that he wants when his wife is not home. Pt instructed to modify his diet to once of consistent carbohydrates. Pt instructed to avoid sugary beverages and snacks. \par

## 2019-02-01 NOTE — PHYSICAL EXAM
[No Acute Distress] : no acute distress [Well Nourished] : well nourished [Well Developed] : well developed [Normal Sclera/Conjunctiva] : normal sclera/conjunctiva [PERRL] : pupils equal round and reactive to light [EOMI] : extraocular movements intact [Normal Outer Ear/Nose] : the outer ears and nose were normal in appearance [Normal Oropharynx] : the oropharynx was normal [No Lymphadenopathy] : no lymphadenopathy [No Respiratory Distress] : no respiratory distress  [Clear to Auscultation] : lungs were clear to auscultation bilaterally [No Accessory Muscle Use] : no accessory muscle use [Normal Rate] : normal rate  [Normal S1, S2] : normal S1 and S2 [No Edema] : there was no peripheral edema [No Extremity Clubbing/Cyanosis] : no extremity clubbing/cyanosis [Soft] : abdomen soft [Non Tender] : non-tender [Non-distended] : non-distended [No HSM] : no HSM [Normal Bowel Sounds] : normal bowel sounds [Normal Posterior Cervical Nodes] : no posterior cervical lymphadenopathy [Normal Anterior Cervical Nodes] : no anterior cervical lymphadenopathy [No CVA Tenderness] : no CVA  tenderness [No Spinal Tenderness] : no spinal tenderness [No Joint Swelling] : no joint swelling [Grossly Normal Strength/Tone] : grossly normal strength/tone [No Rash] : no rash [Normal Gait] : normal gait [Coordination Grossly Intact] : coordination grossly intact [No Focal Deficits] : no focal deficits [Normal Affect] : the affect was normal [Normal Insight/Judgement] : insight and judgment were intact [de-identified] : L sided enlargement of thyroid gland.  [de-identified] : Irregular rhythm

## 2019-02-01 NOTE — ASSESSMENT
[Patient Optimized for Surgery] : Patient optimized for surgery [FreeTextEntry4] : Pt is a 72 yo M with HTN, CAD s/p CABG, RHD s/p MVR on coumadin, T2DM (A1c 6.5 10/18), afib on AC, hx of sierra chadwick tear, and gastric submucosal lesion found on EGD (2013; however not biopsied given AC) present for pre-op evaluation prior to EGD/EUS. \par \par Pt is medically optimized for surgical procedure involving local and/or general anesthesia. \par \par Case discussed w/ Dr. Luis.\par \par Adriano Vasquez MD PGY1\par \par [facility]

## 2019-02-07 ENCOUNTER — APPOINTMENT (OUTPATIENT)
Dept: GASTROENTEROLOGY | Facility: HOSPITAL | Age: 74
End: 2019-02-07
Payer: MEDICARE

## 2019-02-07 ENCOUNTER — OUTPATIENT (OUTPATIENT)
Dept: OUTPATIENT SERVICES | Facility: HOSPITAL | Age: 74
LOS: 1 days | End: 2019-02-07

## 2019-02-07 ENCOUNTER — OTHER (OUTPATIENT)
Age: 74
End: 2019-02-07

## 2019-02-07 ENCOUNTER — APPOINTMENT (OUTPATIENT)
Dept: INTERNAL MEDICINE | Facility: HOSPITAL | Age: 74
End: 2019-02-07

## 2019-02-07 ENCOUNTER — LABORATORY RESULT (OUTPATIENT)
Age: 74
End: 2019-02-07

## 2019-02-07 VITALS
BODY MASS INDEX: 26.96 KG/M2 | DIASTOLIC BLOOD PRESSURE: 84 MMHG | HEART RATE: 86 BPM | HEIGHT: 69 IN | SYSTOLIC BLOOD PRESSURE: 125 MMHG | WEIGHT: 182 LBS

## 2019-02-07 DIAGNOSIS — Z86.69 PERSONAL HISTORY OF OTHER DISEASES OF THE NERVOUS SYSTEM AND SENSE ORGANS: Chronic | ICD-10-CM

## 2019-02-07 DIAGNOSIS — Z98.89 OTHER SPECIFIED POSTPROCEDURAL STATES: Chronic | ICD-10-CM

## 2019-02-07 LAB
INR BLD: 3.54 — HIGH (ref 0.88–1.17)
PROTHROM AB SERPL-ACNC: 40.9 SEC — HIGH (ref 9.8–13.1)

## 2019-02-07 PROCEDURE — 99213 OFFICE O/P EST LOW 20 MIN: CPT | Mod: GC

## 2019-02-08 ENCOUNTER — RX RENEWAL (OUTPATIENT)
Age: 74
End: 2019-02-08

## 2019-02-08 DIAGNOSIS — Z79.01 LONG TERM (CURRENT) USE OF ANTICOAGULANTS: ICD-10-CM

## 2019-02-08 DIAGNOSIS — K31.89 OTHER DISEASES OF STOMACH AND DUODENUM: ICD-10-CM

## 2019-02-08 DIAGNOSIS — R10.13 EPIGASTRIC PAIN: ICD-10-CM

## 2019-02-11 NOTE — HISTORY OF PRESENT ILLNESS
[Heartburn] : denies heartburn [Nausea] : denies nausea [Vomiting] : denies vomiting [Diarrhea] : denies diarrhea [Constipation] : denies constipation [Yellow Skin Or Eyes (Jaundice)] : denies jaundice [Abdominal Pain] : denies abdominal pain [Abdominal Swelling] : denies abdominal swelling [de-identified] : 72 year old male with past medical history atrial fibrillation and mechanical mitral valve (on Coumadin), CAD s/p CABG (not on aspirin), diverticulosis presenting to GI clinic for follow-up. \par \par Patient was sen 1/2019 for dyspepsia and followup of gastric nodule.\par \par Prior history: He was found to have a 2 x 1.1 cm subepithelial gastric lesion along the greater curvature in 2013 (not biopsied/FNA as pt was on Coumadin). It appeared to arise from the 4th layer. He had a surveillance EUS in 2014 that showed the lesion was 2 x 1.5 cm in size, originating in the 3rd layer. Again it was not FNA/biopsied due to patient being on coumadin. He had a colonoscopy in 1/2014 for screening that showed diverticulosis and few small hyperplastic rectal polyps.  Pt was seen in GI clinic in 8/2018. He had declined a repeat EGD/EUS at that time. \par \par  He reports a few days of intermittent black specks in his stool since 12?2017. He denies current melena, hematochezia, abdominal pain, weight loss, dysphagia or nausea/vomiting. Hb has been stable.\par \par During last visit, he reports new onset abdominal pain and burning x 1 month. He says about 4 hours after he eats he experiences abdominal burning and sour taste in the mouth. He is currently only taking TUMS. He denies any weight loss, early satiety. He feels his appetite is good. Denies any BRBpR, N/V. During last visit, I prescribed him PPI. Today patient reports his abdominal pain is now resolved with PPI.\par \par \par Recent blood work: July 2018 Hgb 16.1\par Recent imaging:\par CT (4/11/18): There is a 2 cm lipoma of the anterior wall of the distal gastric \par body (3:29). While there is mild thickening of the posterior gastric \par fundal wall maximally measuring 1.4 cm (3:23) no discrete gastric mass is \par discernible at the level of the gastroesophageal junction. No bowel \par obstruction. Appendix normal.\par \par

## 2019-02-11 NOTE — END OF VISIT
[] : Fellow [FreeTextEntry3] : As modified and discussed with patient\par MD IVÁN De Paz [facility]\par Associate Professor of Medicine\par [employer]\par

## 2019-02-11 NOTE — ASSESSMENT
[FreeTextEntry1] : 72 year old male with past medical history atrial fibrillation and mechanical mitral valve (on Coumadin), CAD s/p CABG (not on aspirin), diverticulosis presenting to GI clinic for follow-up for subepithelial gastric lesions and new epigastric burning x 1 month.\par \par # Gastric subepithelial lesion - differential includes pancreatic rest, GIST, carcinoid, lipoma, sarcoma. No previous FNA given need to be on anticoagulation (with high INR) and therefore high bleeding risk. No significant increase in size between 2013 and 2014 on EUS. Recent (4/2018) CT showing 2cm "lipoma". \par # Abdominal Pain\par Patient presenting with new epigastric burning sensation and sour taste in the mouth x 1 month. Symptoms are worse after fasting. Patient now improved with PPI. Most likely PUD vs functional dyspepsia. H pylori Ag done 12/13/2018 negative. Patient denies any NSAID use. \par # External hemorrhoids - s/p hemorrhoidectomy by outside colorectal surgeon\par # Overweight - BMI 25-30 \par \par \par Plan:\par - Plan for EUS/FNA. Seen by presurgical testing and optimized for procedure. Discussed risk and benefits of procedure\par - will start lovenox 80mg BID to start 5 days prior to schedule procedure. Will discontinue warfarin 5 days prior to procedure. Will have INR checked day prior to procedure. Advised patient to hold his lovenox dose 12 hours prior to his scheduled procedure.\par - continue PPI PO daily\par - RTC following procedure\par

## 2019-02-12 DIAGNOSIS — E66.3 OVERWEIGHT: ICD-10-CM

## 2019-02-15 ENCOUNTER — APPOINTMENT (OUTPATIENT)
Dept: INTERNAL MEDICINE | Facility: HOSPITAL | Age: 74
End: 2019-02-15

## 2019-02-15 ENCOUNTER — LABORATORY RESULT (OUTPATIENT)
Age: 74
End: 2019-02-15

## 2019-02-15 ENCOUNTER — OUTPATIENT (OUTPATIENT)
Dept: OUTPATIENT SERVICES | Facility: HOSPITAL | Age: 74
LOS: 1 days | End: 2019-02-15

## 2019-02-15 ENCOUNTER — RESULT REVIEW (OUTPATIENT)
Age: 74
End: 2019-02-15

## 2019-02-15 DIAGNOSIS — Z86.69 PERSONAL HISTORY OF OTHER DISEASES OF THE NERVOUS SYSTEM AND SENSE ORGANS: Chronic | ICD-10-CM

## 2019-02-15 DIAGNOSIS — Z98.89 OTHER SPECIFIED POSTPROCEDURAL STATES: Chronic | ICD-10-CM

## 2019-02-15 LAB
INR BLD: 3.57 — HIGH (ref 0.88–1.17)
PROTHROM AB SERPL-ACNC: 42.3 SEC — HIGH (ref 9.8–13.1)

## 2019-02-18 DIAGNOSIS — Z79.01 LONG TERM (CURRENT) USE OF ANTICOAGULANTS: ICD-10-CM

## 2019-02-19 ENCOUNTER — FORM ENCOUNTER (OUTPATIENT)
Age: 74
End: 2019-02-19

## 2019-02-20 ENCOUNTER — APPOINTMENT (OUTPATIENT)
Dept: ULTRASOUND IMAGING | Facility: IMAGING CENTER | Age: 74
End: 2019-02-20
Payer: MEDICARE

## 2019-02-20 ENCOUNTER — OUTPATIENT (OUTPATIENT)
Dept: OUTPATIENT SERVICES | Facility: HOSPITAL | Age: 74
LOS: 1 days | End: 2019-02-20
Payer: COMMERCIAL

## 2019-02-20 DIAGNOSIS — Z98.89 OTHER SPECIFIED POSTPROCEDURAL STATES: Chronic | ICD-10-CM

## 2019-02-20 DIAGNOSIS — Z00.00 ENCOUNTER FOR GENERAL ADULT MEDICAL EXAMINATION WITHOUT ABNORMAL FINDINGS: ICD-10-CM

## 2019-02-20 DIAGNOSIS — Z86.69 PERSONAL HISTORY OF OTHER DISEASES OF THE NERVOUS SYSTEM AND SENSE ORGANS: Chronic | ICD-10-CM

## 2019-02-20 PROCEDURE — 76536 US EXAM OF HEAD AND NECK: CPT | Mod: 26

## 2019-02-20 PROCEDURE — 76536 US EXAM OF HEAD AND NECK: CPT

## 2019-02-22 ENCOUNTER — LABORATORY RESULT (OUTPATIENT)
Age: 74
End: 2019-02-22

## 2019-02-22 ENCOUNTER — OUTPATIENT (OUTPATIENT)
Dept: OUTPATIENT SERVICES | Facility: HOSPITAL | Age: 74
LOS: 1 days | End: 2019-02-22

## 2019-02-22 ENCOUNTER — APPOINTMENT (OUTPATIENT)
Dept: INTERNAL MEDICINE | Facility: HOSPITAL | Age: 74
End: 2019-02-22

## 2019-02-22 DIAGNOSIS — Z98.89 OTHER SPECIFIED POSTPROCEDURAL STATES: Chronic | ICD-10-CM

## 2019-02-22 DIAGNOSIS — Z86.69 PERSONAL HISTORY OF OTHER DISEASES OF THE NERVOUS SYSTEM AND SENSE ORGANS: Chronic | ICD-10-CM

## 2019-02-22 LAB
INR BLD: 2.57 — HIGH (ref 0.88–1.17)
PROTHROM AB SERPL-ACNC: 29.4 SEC — HIGH (ref 9.8–13.1)

## 2019-02-27 ENCOUNTER — OUTPATIENT (OUTPATIENT)
Dept: OUTPATIENT SERVICES | Facility: HOSPITAL | Age: 74
LOS: 1 days | End: 2019-02-27
Payer: COMMERCIAL

## 2019-02-27 ENCOUNTER — APPOINTMENT (OUTPATIENT)
Dept: CV DIAGNOSITCS | Facility: HOSPITAL | Age: 74
End: 2019-02-27

## 2019-02-27 DIAGNOSIS — Z86.69 PERSONAL HISTORY OF OTHER DISEASES OF THE NERVOUS SYSTEM AND SENSE ORGANS: Chronic | ICD-10-CM

## 2019-02-27 DIAGNOSIS — R31.0 GROSS HEMATURIA: ICD-10-CM

## 2019-02-27 DIAGNOSIS — Z98.89 OTHER SPECIFIED POSTPROCEDURAL STATES: Chronic | ICD-10-CM

## 2019-02-27 PROCEDURE — 93306 TTE W/DOPPLER COMPLETE: CPT | Mod: 26

## 2019-02-27 PROCEDURE — 93306 TTE W/DOPPLER COMPLETE: CPT

## 2019-03-01 ENCOUNTER — OUTPATIENT (OUTPATIENT)
Dept: OUTPATIENT SERVICES | Facility: HOSPITAL | Age: 74
LOS: 1 days | End: 2019-03-01

## 2019-03-01 ENCOUNTER — LABORATORY RESULT (OUTPATIENT)
Age: 74
End: 2019-03-01

## 2019-03-01 ENCOUNTER — OTHER (OUTPATIENT)
Age: 74
End: 2019-03-01

## 2019-03-01 ENCOUNTER — APPOINTMENT (OUTPATIENT)
Dept: INTERNAL MEDICINE | Facility: HOSPITAL | Age: 74
End: 2019-03-01

## 2019-03-01 DIAGNOSIS — Z86.69 PERSONAL HISTORY OF OTHER DISEASES OF THE NERVOUS SYSTEM AND SENSE ORGANS: Chronic | ICD-10-CM

## 2019-03-01 DIAGNOSIS — Z79.01 LONG TERM (CURRENT) USE OF ANTICOAGULANTS: ICD-10-CM

## 2019-03-01 DIAGNOSIS — Z98.89 OTHER SPECIFIED POSTPROCEDURAL STATES: Chronic | ICD-10-CM

## 2019-03-01 LAB
INR BLD: 3.16 — HIGH (ref 0.88–1.17)
PROTHROM AB SERPL-ACNC: 37.3 SEC — HIGH (ref 9.8–13.1)

## 2019-03-08 ENCOUNTER — APPOINTMENT (OUTPATIENT)
Dept: INTERNAL MEDICINE | Facility: HOSPITAL | Age: 74
End: 2019-03-08

## 2019-03-08 ENCOUNTER — OTHER (OUTPATIENT)
Age: 74
End: 2019-03-08

## 2019-03-08 ENCOUNTER — LABORATORY RESULT (OUTPATIENT)
Age: 74
End: 2019-03-08

## 2019-03-08 ENCOUNTER — OUTPATIENT (OUTPATIENT)
Dept: OUTPATIENT SERVICES | Facility: HOSPITAL | Age: 74
LOS: 1 days | End: 2019-03-08

## 2019-03-08 DIAGNOSIS — Z86.69 PERSONAL HISTORY OF OTHER DISEASES OF THE NERVOUS SYSTEM AND SENSE ORGANS: Chronic | ICD-10-CM

## 2019-03-08 DIAGNOSIS — Z98.89 OTHER SPECIFIED POSTPROCEDURAL STATES: Chronic | ICD-10-CM

## 2019-03-08 LAB
INR BLD: 2.96 — HIGH (ref 0.88–1.17)
PROTHROM AB SERPL-ACNC: 34 SEC — HIGH (ref 9.8–13.1)

## 2019-03-11 ENCOUNTER — APPOINTMENT (OUTPATIENT)
Dept: OPHTHALMOLOGY | Facility: CLINIC | Age: 74
End: 2019-03-11
Payer: MEDICAID

## 2019-03-11 DIAGNOSIS — Z79.01 LONG TERM (CURRENT) USE OF ANTICOAGULANTS: ICD-10-CM

## 2019-03-11 PROCEDURE — 92134 CPTRZ OPH DX IMG PST SGM RTA: CPT

## 2019-03-11 PROCEDURE — 92004 COMPRE OPH EXAM NEW PT 1/>: CPT

## 2019-03-13 ENCOUNTER — OUTPATIENT (OUTPATIENT)
Dept: OUTPATIENT SERVICES | Facility: HOSPITAL | Age: 74
LOS: 1 days | End: 2019-03-13

## 2019-03-13 ENCOUNTER — APPOINTMENT (OUTPATIENT)
Dept: INTERNAL MEDICINE | Facility: HOSPITAL | Age: 74
End: 2019-03-13

## 2019-03-13 VITALS — SYSTOLIC BLOOD PRESSURE: 120 MMHG | HEART RATE: 72 BPM | DIASTOLIC BLOOD PRESSURE: 77 MMHG

## 2019-03-13 VITALS — WEIGHT: 183.44 LBS | HEIGHT: 69 IN | BODY MASS INDEX: 27.17 KG/M2

## 2019-03-13 DIAGNOSIS — Z86.69 PERSONAL HISTORY OF OTHER DISEASES OF THE NERVOUS SYSTEM AND SENSE ORGANS: Chronic | ICD-10-CM

## 2019-03-13 DIAGNOSIS — Z98.89 OTHER SPECIFIED POSTPROCEDURAL STATES: Chronic | ICD-10-CM

## 2019-03-13 RX ORDER — LISINOPRIL 10 MG/1
10 TABLET ORAL
Qty: 1 | Refills: 1 | Status: DISCONTINUED | COMMUNITY
Start: 2019-01-28 | End: 2019-03-13

## 2019-03-13 NOTE — REVIEW OF SYSTEMS
[Vision Problems] : vision problems [Heartburn] : heartburn [Melena] : melsteve [Negative] : Heme/Lymph

## 2019-03-14 LAB — GLUCOSE BLDC GLUCOMTR-MCNC: 216

## 2019-03-14 NOTE — HISTORY OF PRESENT ILLNESS
[FreeTextEntry1] : The patient is here for follow-up. [de-identified] : The patient is a 73 year old male with HTN, CAD s/p CABG, RHD s/p MVR on Coumadin, T2DM, Afib, Rosie Thompson tears and esophageal ulcers here for follow up of the following issues:\par \par 1) Epigastric pain - The epigastric pain is still not relieved with Protonix use. He continues to have melena. He is scheduled for an EGD and EUS/FNA on 3/21/19. \par \par 2) Type II Diabetes Mellitus - The patient continues to check his fingerstick glucose daily. His readings range at 147 mm Hg. He denies numbness, tingling, or nocturia. He continues to eat a low-sugar diet and is compliant with his medication. The patient stopped taking his Lipitor and he does not remember when it was.\par \par 3) Seizure Disorder - the patient stopped taking his Keppra two years ago without the permission of Neurology. He has not had a seizure in twelve years. 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 He will require a laser surgery operation. He wants to go through with the surgery.

## 2019-03-14 NOTE — PLAN
[FreeTextEntry1] : The patient is a 73 year old male with HTN, CAD s/p CABG, RHD s/p MVR on Coumadin, T2DM, Afib, Rosie Thompson tears and esophageal ulcers here for follow up of the following issues:\par \par 1) Epigastric pain - The patient continues to be symptomatic despite PPI use. He is scheduled for an EGD and EUS/FNA on 3/21/19. \par - will continue to monitor.\par \par 2) Type II Diabetes Mellitus - Last HbA1C in January 2019 was 7.5%.\par - will continue with glipizide and assess for improvement at next visit.\par - if no HbA1C improvement, will consider starting Januvia.\par - counseled patient on the need for Lipitor to prevent coronary artery disease; will re-prescribe.\par - referred to Podiatry in light of positive monofilament test; educated patient on keeping feet covered at all times\par \par 3) Seizure Disorder - the patient stopped taking his Keppra two years ago without the permission of Neurology.\par - although he claims to be seizure free, will refer to Neurology for evaluation.\par \par 4) Proteinuria - the patient experienced cough with an ACEI.\par - will discontinue lisinopril and prescribe losartan 50 mg daily.\par - will assess for any new symptoms at next visit.\par \par 6) Blurry Vision - The patient will undergo a laser surgery to remedy his vision.\par - will continue to monitor\par \par 7) HCM - The patient is up-to-date on all vaccinations.\par \par RTC in 5 weeks.\par \par Patient discussed with Dr. Seay.\par All risks and benefits were discussed with patient. \par Patient and attending agreed with the above assessment and plan.\par \par Fatuma Aiken M.D. PGY1\par Internal Medicine\par [facility]\par [phone]

## 2019-03-14 NOTE — PHYSICAL EXAM
[No Acute Distress] : no acute distress [Well Nourished] : well nourished [Well Developed] : well developed [Well-Appearing] : well-appearing [Normal Sclera/Conjunctiva] : normal sclera/conjunctiva [PERRL] : pupils equal round and reactive to light [EOMI] : extraocular movements intact [Normal Outer Ear/Nose] : the outer ears and nose were normal in appearance [Normal Oropharynx] : the oropharynx was normal [No JVD] : no jugular venous distention [Supple] : supple [No Lymphadenopathy] : no lymphadenopathy [No Respiratory Distress] : no respiratory distress  [Clear to Auscultation] : lungs were clear to auscultation bilaterally [No Accessory Muscle Use] : no accessory muscle use [Normal Rate] : normal rate  [Regular Rhythm] : with a regular rhythm [Normal S1, S2] : normal S1 and S2 [No Murmur] : no murmur heard [Pedal Pulses Present] : the pedal pulses are present [No Edema] : there was no peripheral edema [Soft] : abdomen soft [Non Tender] : non-tender [Non-distended] : non-distended [No Masses] : no abdominal mass palpated [No HSM] : no HSM [Normal Bowel Sounds] : normal bowel sounds [Normal Posterior Cervical Nodes] : no posterior cervical lymphadenopathy [Normal Anterior Cervical Nodes] : no anterior cervical lymphadenopathy [Normal Gait] : normal gait [Coordination Grossly Intact] : coordination grossly intact [No Focal Deficits] : no focal deficits [Normal Affect] : the affect was normal [Normal Insight/Judgement] : insight and judgment were intact [de-identified] : Right goiter [de-identified] : Positive Monofilament Test with absent light touch sensation

## 2019-03-20 ENCOUNTER — LABORATORY RESULT (OUTPATIENT)
Age: 74
End: 2019-03-20

## 2019-03-20 ENCOUNTER — OUTPATIENT (OUTPATIENT)
Dept: OUTPATIENT SERVICES | Facility: HOSPITAL | Age: 74
LOS: 1 days | End: 2019-03-20

## 2019-03-20 ENCOUNTER — APPOINTMENT (OUTPATIENT)
Dept: INTERNAL MEDICINE | Facility: HOSPITAL | Age: 74
End: 2019-03-20

## 2019-03-20 DIAGNOSIS — Z86.69 PERSONAL HISTORY OF OTHER DISEASES OF THE NERVOUS SYSTEM AND SENSE ORGANS: Chronic | ICD-10-CM

## 2019-03-20 DIAGNOSIS — Z98.89 OTHER SPECIFIED POSTPROCEDURAL STATES: Chronic | ICD-10-CM

## 2019-03-20 LAB
INR BLD: 1.13 — SIGNIFICANT CHANGE UP (ref 0.88–1.17)
PROTHROM AB SERPL-ACNC: 12.9 SEC — SIGNIFICANT CHANGE UP (ref 9.8–13.1)

## 2019-03-21 ENCOUNTER — OUTPATIENT (OUTPATIENT)
Dept: OUTPATIENT SERVICES | Facility: HOSPITAL | Age: 74
LOS: 1 days | Discharge: HOME | End: 2019-03-21
Payer: MEDICARE

## 2019-03-21 ENCOUNTER — RESULT REVIEW (OUTPATIENT)
Age: 74
End: 2019-03-21

## 2019-03-21 DIAGNOSIS — Z98.89 OTHER SPECIFIED POSTPROCEDURAL STATES: Chronic | ICD-10-CM

## 2019-03-21 DIAGNOSIS — Z79.01 LONG TERM (CURRENT) USE OF ANTICOAGULANTS: ICD-10-CM

## 2019-03-21 DIAGNOSIS — Z86.69 PERSONAL HISTORY OF OTHER DISEASES OF THE NERVOUS SYSTEM AND SENSE ORGANS: Chronic | ICD-10-CM

## 2019-03-21 DIAGNOSIS — K31.89 OTHER DISEASES OF STOMACH AND DUODENUM: ICD-10-CM

## 2019-03-21 PROCEDURE — 43242 EGD US FINE NEEDLE BX/ASPIR: CPT | Mod: GC

## 2019-03-21 PROCEDURE — 88305 TISSUE EXAM BY PATHOLOGIST: CPT | Mod: 26

## 2019-03-22 DIAGNOSIS — R10.13 EPIGASTRIC PAIN: ICD-10-CM

## 2019-03-22 DIAGNOSIS — E11.40 TYPE 2 DIABETES MELLITUS WITH DIABETIC NEUROPATHY, UNSPECIFIED: ICD-10-CM

## 2019-03-22 DIAGNOSIS — G40.909 EPILEPSY, UNSPECIFIED, NOT INTRACTABLE, WITHOUT STATUS EPILEPTICUS: ICD-10-CM

## 2019-03-22 DIAGNOSIS — R80.9 PROTEINURIA, UNSPECIFIED: ICD-10-CM

## 2019-03-22 DIAGNOSIS — H54.7 UNSPECIFIED VISUAL LOSS: ICD-10-CM

## 2019-03-25 ENCOUNTER — APPOINTMENT (OUTPATIENT)
Dept: INTERNAL MEDICINE | Facility: HOSPITAL | Age: 74
End: 2019-03-25

## 2019-03-25 ENCOUNTER — LABORATORY RESULT (OUTPATIENT)
Age: 74
End: 2019-03-25

## 2019-03-25 ENCOUNTER — OUTPATIENT (OUTPATIENT)
Dept: OUTPATIENT SERVICES | Facility: HOSPITAL | Age: 74
LOS: 1 days | End: 2019-03-25

## 2019-03-25 DIAGNOSIS — Z98.89 OTHER SPECIFIED POSTPROCEDURAL STATES: Chronic | ICD-10-CM

## 2019-03-25 DIAGNOSIS — Z86.69 PERSONAL HISTORY OF OTHER DISEASES OF THE NERVOUS SYSTEM AND SENSE ORGANS: Chronic | ICD-10-CM

## 2019-03-25 DIAGNOSIS — Z79.01 LONG TERM (CURRENT) USE OF ANTICOAGULANTS: ICD-10-CM

## 2019-03-25 LAB
INR BLD: 1.37 — HIGH (ref 0.88–1.17)
PROTHROM AB SERPL-ACNC: 15.8 SEC — HIGH (ref 9.8–13.1)

## 2019-03-29 ENCOUNTER — CHART COPY (OUTPATIENT)
Age: 74
End: 2019-03-29

## 2019-04-01 ENCOUNTER — OUTPATIENT (OUTPATIENT)
Dept: OUTPATIENT SERVICES | Facility: HOSPITAL | Age: 74
LOS: 1 days | End: 2019-04-01

## 2019-04-01 ENCOUNTER — LABORATORY RESULT (OUTPATIENT)
Age: 74
End: 2019-04-01

## 2019-04-01 ENCOUNTER — APPOINTMENT (OUTPATIENT)
Dept: INTERNAL MEDICINE | Facility: HOSPITAL | Age: 74
End: 2019-04-01

## 2019-04-01 DIAGNOSIS — Z98.89 OTHER SPECIFIED POSTPROCEDURAL STATES: Chronic | ICD-10-CM

## 2019-04-01 DIAGNOSIS — Z86.69 PERSONAL HISTORY OF OTHER DISEASES OF THE NERVOUS SYSTEM AND SENSE ORGANS: Chronic | ICD-10-CM

## 2019-04-01 LAB
INR BLD: 1.65 — HIGH (ref 0.88–1.17)
PROTHROM AB SERPL-ACNC: 19.1 SEC — HIGH (ref 9.8–13.1)

## 2019-04-02 DIAGNOSIS — Z79.01 LONG TERM (CURRENT) USE OF ANTICOAGULANTS: ICD-10-CM

## 2019-04-08 ENCOUNTER — OTHER (OUTPATIENT)
Age: 74
End: 2019-04-08

## 2019-04-08 ENCOUNTER — APPOINTMENT (OUTPATIENT)
Dept: INTERNAL MEDICINE | Facility: HOSPITAL | Age: 74
End: 2019-04-08

## 2019-04-08 ENCOUNTER — OUTPATIENT (OUTPATIENT)
Dept: OUTPATIENT SERVICES | Facility: HOSPITAL | Age: 74
LOS: 1 days | End: 2019-04-08

## 2019-04-08 ENCOUNTER — LABORATORY RESULT (OUTPATIENT)
Age: 74
End: 2019-04-08

## 2019-04-08 DIAGNOSIS — Z98.89 OTHER SPECIFIED POSTPROCEDURAL STATES: Chronic | ICD-10-CM

## 2019-04-08 DIAGNOSIS — Z86.69 PERSONAL HISTORY OF OTHER DISEASES OF THE NERVOUS SYSTEM AND SENSE ORGANS: Chronic | ICD-10-CM

## 2019-04-08 LAB
INR BLD: 2.16 — HIGH (ref 0.88–1.17)
PROTHROM AB SERPL-ACNC: 25.2 SEC — HIGH (ref 9.8–13.1)

## 2019-04-09 DIAGNOSIS — Z79.01 LONG TERM (CURRENT) USE OF ANTICOAGULANTS: ICD-10-CM

## 2019-04-11 ENCOUNTER — EMERGENCY (EMERGENCY)
Facility: HOSPITAL | Age: 74
LOS: 1 days | Discharge: HOME | End: 2019-04-11
Attending: EMERGENCY MEDICINE | Admitting: EMERGENCY MEDICINE
Payer: MEDICARE

## 2019-04-11 VITALS
OXYGEN SATURATION: 100 % | DIASTOLIC BLOOD PRESSURE: 99 MMHG | TEMPERATURE: 98 F | SYSTOLIC BLOOD PRESSURE: 169 MMHG | HEART RATE: 62 BPM | RESPIRATION RATE: 17 BRPM

## 2019-04-11 DIAGNOSIS — Z86.69 PERSONAL HISTORY OF OTHER DISEASES OF THE NERVOUS SYSTEM AND SENSE ORGANS: Chronic | ICD-10-CM

## 2019-04-11 DIAGNOSIS — Z98.89 OTHER SPECIFIED POSTPROCEDURAL STATES: Chronic | ICD-10-CM

## 2019-04-11 PROCEDURE — 99284 EMERGENCY DEPT VISIT MOD MDM: CPT | Mod: GC,25

## 2019-04-11 NOTE — ED ADULT TRIAGE NOTE - CHIEF COMPLAINT QUOTE
Patient c/o headache "like my head is about to explode" and numbness to lips when he woke up around 1 hour ago. Patient denies any dizziness. Hx. "mechanical mitral valve" on coumadin, CVA, blind in left eye.  in triage. GEORGES called from stroke evaluation. No code stroke called.

## 2019-04-12 VITALS
DIASTOLIC BLOOD PRESSURE: 89 MMHG | HEART RATE: 63 BPM | RESPIRATION RATE: 18 BRPM | OXYGEN SATURATION: 100 % | SYSTOLIC BLOOD PRESSURE: 147 MMHG

## 2019-04-12 LAB
ALBUMIN SERPL ELPH-MCNC: 3.9 G/DL — SIGNIFICANT CHANGE UP (ref 3.3–5)
ALP SERPL-CCNC: 78 U/L — SIGNIFICANT CHANGE UP (ref 40–120)
ALT FLD-CCNC: 35 U/L — SIGNIFICANT CHANGE UP (ref 4–41)
ANION GAP SERPL CALC-SCNC: 10 MMO/L — SIGNIFICANT CHANGE UP (ref 7–14)
APTT BLD: 35.5 SEC — SIGNIFICANT CHANGE UP (ref 27.5–36.3)
AST SERPL-CCNC: 22 U/L — SIGNIFICANT CHANGE UP (ref 4–40)
BASOPHILS # BLD AUTO: 0.06 K/UL — SIGNIFICANT CHANGE UP (ref 0–0.2)
BASOPHILS NFR BLD AUTO: 0.7 % — SIGNIFICANT CHANGE UP (ref 0–2)
BILIRUB SERPL-MCNC: 1.7 MG/DL — HIGH (ref 0.2–1.2)
BUN SERPL-MCNC: 24 MG/DL — HIGH (ref 7–23)
CALCIUM SERPL-MCNC: 8.8 MG/DL — SIGNIFICANT CHANGE UP (ref 8.4–10.5)
CHLORIDE SERPL-SCNC: 109 MMOL/L — HIGH (ref 98–107)
CO2 SERPL-SCNC: 22 MMOL/L — SIGNIFICANT CHANGE UP (ref 22–31)
CREAT SERPL-MCNC: 1.09 MG/DL — SIGNIFICANT CHANGE UP (ref 0.5–1.3)
EOSINOPHIL # BLD AUTO: 0.2 K/UL — SIGNIFICANT CHANGE UP (ref 0–0.5)
EOSINOPHIL NFR BLD AUTO: 2.3 % — SIGNIFICANT CHANGE UP (ref 0–6)
GLUCOSE SERPL-MCNC: 233 MG/DL — HIGH (ref 70–99)
HCT VFR BLD CALC: 41.4 % — SIGNIFICANT CHANGE UP (ref 39–50)
HGB BLD-MCNC: 14.4 G/DL — SIGNIFICANT CHANGE UP (ref 13–17)
IMM GRANULOCYTES NFR BLD AUTO: 1 % — SIGNIFICANT CHANGE UP (ref 0–1.5)
INR BLD: 2.84 — HIGH (ref 0.88–1.17)
LYMPHOCYTES # BLD AUTO: 2.25 K/UL — SIGNIFICANT CHANGE UP (ref 1–3.3)
LYMPHOCYTES # BLD AUTO: 26.1 % — SIGNIFICANT CHANGE UP (ref 13–44)
MCHC RBC-ENTMCNC: 31.3 PG — SIGNIFICANT CHANGE UP (ref 27–34)
MCHC RBC-ENTMCNC: 34.8 % — SIGNIFICANT CHANGE UP (ref 32–36)
MCV RBC AUTO: 90 FL — SIGNIFICANT CHANGE UP (ref 80–100)
MONOCYTES # BLD AUTO: 0.79 K/UL — SIGNIFICANT CHANGE UP (ref 0–0.9)
MONOCYTES NFR BLD AUTO: 9.2 % — SIGNIFICANT CHANGE UP (ref 2–14)
NEUTROPHILS # BLD AUTO: 5.23 K/UL — SIGNIFICANT CHANGE UP (ref 1.8–7.4)
NEUTROPHILS NFR BLD AUTO: 60.7 % — SIGNIFICANT CHANGE UP (ref 43–77)
NRBC # FLD: 0 K/UL — SIGNIFICANT CHANGE UP (ref 0–0)
PLATELET # BLD AUTO: 197 K/UL — SIGNIFICANT CHANGE UP (ref 150–400)
PMV BLD: 10.3 FL — SIGNIFICANT CHANGE UP (ref 7–13)
POTASSIUM SERPL-MCNC: 3.8 MMOL/L — SIGNIFICANT CHANGE UP (ref 3.5–5.3)
POTASSIUM SERPL-SCNC: 3.8 MMOL/L — SIGNIFICANT CHANGE UP (ref 3.5–5.3)
PROT SERPL-MCNC: 6.6 G/DL — SIGNIFICANT CHANGE UP (ref 6–8.3)
PROTHROM AB SERPL-ACNC: 33.4 SEC — HIGH (ref 9.8–13.1)
RBC # BLD: 4.6 M/UL — SIGNIFICANT CHANGE UP (ref 4.2–5.8)
RBC # FLD: 12.6 % — SIGNIFICANT CHANGE UP (ref 10.3–14.5)
SODIUM SERPL-SCNC: 141 MMOL/L — SIGNIFICANT CHANGE UP (ref 135–145)
WBC # BLD: 8.62 K/UL — SIGNIFICANT CHANGE UP (ref 3.8–10.5)
WBC # FLD AUTO: 8.62 K/UL — SIGNIFICANT CHANGE UP (ref 3.8–10.5)

## 2019-04-12 PROCEDURE — 70450 CT HEAD/BRAIN W/O DYE: CPT | Mod: 26

## 2019-04-12 RX ORDER — METOCLOPRAMIDE HCL 10 MG
10 TABLET ORAL ONCE
Qty: 0 | Refills: 0 | Status: DISCONTINUED | OUTPATIENT
Start: 2019-04-12 | End: 2019-04-12

## 2019-04-12 RX ORDER — ACETAMINOPHEN 500 MG
1000 TABLET ORAL ONCE
Qty: 0 | Refills: 0 | Status: DISCONTINUED | OUTPATIENT
Start: 2019-04-12 | End: 2019-04-12

## 2019-04-12 RX ORDER — ACETAMINOPHEN 500 MG
975 TABLET ORAL ONCE
Qty: 0 | Refills: 0 | Status: COMPLETED | OUTPATIENT
Start: 2019-04-12 | End: 2019-04-12

## 2019-04-12 NOTE — ED PROVIDER NOTE - PROGRESS NOTE DETAILS
Joao ([facility]):  Called to triage for stroke eval.  73 y.o. male on warfarin for mechanical valve went to sleep around 9:30 pm and awoke around 22:30 with a headache and upper lip central numbness. Joao ([facility]):  Called to triage for stroke eval.  73 y.o. male on warfarin for mechanical valve went to sleep around 9:30 pm and awoke around 22:30 with a headache and upper lip central numbness.  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 Joao ([facility]):  Called to triage for stroke marcella.  73 y.o. male on warfarin for mechanical valve went to sleep around 9:30 pm and awoke around 22:30 with a headache and upper lip central numbness.  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  No difficulty walking, no change in speech, no other numbness and no weakness.  Visual fields intact on right eye.  EOMI and PRRL on right eye.  , shoulder abduction strength 5/5, leg strength grossly normal with normal ambulation.  Finger to nose intact.  Light touch sensation intact.  No code stroke called, however CT head urgently ordered. patient declining IV. states symptoms resolved.

## 2019-04-12 NOTE — ED PROVIDER NOTE - ATTENDING CONTRIBUTION TO CARE
72 y/o M with h/o MVR on coumadin, HTN, GERD, left retinal detachment here with headache.  Pt reports he went to bed around 9:30pm 72 y/o M with h/o MVR on coumadin, HTN, GERD, left retinal detachment here with headache.  Pt reports he went to bed around 9:30pm in his usual state of health.  Pt awoke around 10:30pm with a headache, which he describes as a sensation of "hot and cold flowing around the left side of my head."  At the time pt also described a numbness to his upper lip, which has since resolved.  No associated neck stiffness, fever, vision changes, weakness, numbness, tingling, confusion, difficulty walking.  Well appearing, sitting comfortably in stretcher, awake and alert, nontoxic.  AF/VSS.  NCAT neck supple.  Lungs cta bl.  Cards nl S1/S2, RRR, no MRG.  Abd soft ntnd.  No pedal edema or calf tenderness.  No focal neuro deficits.  CN II-XII intact aside from L eye blindness, no droop, tongue is midline.  No pronator drift.  Strength 5/5 in bilateral upper and lower extremities, sensation grossly intact, gait normal.  Plan for CT head given coumadin use and sudden onset of sxs - pt is within 6 hours of symptom onset.  Will check inr, pain control, reassess.

## 2019-04-12 NOTE — ED PROVIDER NOTE - NSFOLLOWUPINSTRUCTIONS_ED_ALL_ED_FT
Follow up with your primary care doctor  Repeat labwork with your doctor for elevated bilirubin  Return to ED for any new or worsening symptoms

## 2019-04-12 NOTE — ED PROVIDER NOTE - CLINICAL SUMMARY MEDICAL DECISION MAKING FREE TEXT BOX
73M presenting acute headache with upper lip numbness. Acute onset within CT window to r/o SAH however, low suspicion given exam. Possible complex migraine. Willl treat symptomatically, obtain CT and reassess

## 2019-04-12 NOTE — ED ADULT NURSE REASSESSMENT NOTE - NS ED NURSE REASSESS COMMENT FT1
pt A&Ox3, ambulatory, breathing even & unlabored, states "shooting coldness" in head & lip numbness has resolved. pt refused all meds & IV, pt denies n/v/d, cp, abd pain, headache, dizziness, blurry vision, numbness, tingling. pt discharged, wife at bedside, awaiting discharge papers.

## 2019-04-12 NOTE — DISCUSSION/SUMMARY
[FreeTextEntry1] : Pt presented to ED with headache, improved with tylenol and reglan. CTH with no abnormalities. Has medicine appointment scheduled on 4/19.\par \par WW\par firm 2

## 2019-04-12 NOTE — ED PROVIDER NOTE - PMH
Atrial fibrillation    BPH (benign prostatic hypertrophy)    CAD (coronary artery disease)    Cataract  left  DVT (deep venous thrombosis)  2005  GERD (gastroesophageal reflux disease)    Hypertension    Mitral valve disease    Pulmonary embolism  2005  Retinal detachment  left  Rheumatic heart disease    Seizures  Last seizure 15 yrs ago  Stomach ulcer

## 2019-04-12 NOTE — ED ADULT NURSE NOTE - OBJECTIVE STATEMENT
pt received in room 21, A&Ox3, states he feels "a shooting coldness, not a headache" up the left side of the back of his head that woke him up from his sleep & felt his top lip was numb, top lip is normal in size as per pt, pt breathing even & unlabored, denies cp, abd pain, n/v/d, dizziness, headache, blurry vision, lightheadedness. pt has equal strength in all four extremeties, + sensation in all four extremeties, negative slurred speech, negative facial droop, PERRLA. pt states he took a "chemical supplement" at 9pm he ordered online (doesn't recall name) for sleep,  blood work drawn, VSS, will continue to monitor. pt received in room 21, A&Ox3, states he feels "a shooting coldness, not a headache" that is intermittent up the left side of the back of his head that woke him up from his sleep & felt his top lip was numb, last known normal was at 2130 prior to bed, top lip is normal in size as per pt, denies any falls/trauma/LOC, no obtusions or abnormalities to head, +sensation to L&R side of head, no abnormal fluids leaking from eyes, ears, or nose. pt breathing even & unlabored, denies cp, abd pain, n/v/d, dizziness, headache, blurry vision, lightheadedness. pt has equal strength in all four extremities, + sensation in all four extremities, negative slurred speech, negative facial droop, PERRLA, cap refill <3 seconds, pt ambulatory with steady gait, skin intact. pt states he took a "chemical supplement" at 9pm he ordered online (doesn't recall name) for sleep,  blood work drawn, VSS, awaiting CT results, wife at bedside, will continue to monitor. pt received in room 21, A&Ox3, states he feels "a shooting coldness, not a headache" that is intermittent up the left side of the back of his head that woke him up from his sleep & felt his top lip was numb, last known normal was at 2130 prior to bed, top lip is normal in size as per pt, denies any falls/trauma/LOC, no obtusions or abnormalities to head, +sensation to L&R side of head, no abnormal fluids leaking from eyes, ears, or nose. pt breathing even & unlabored, denies cp, abd pain, n/v/d, dizziness, headache, blurry vision, lightheadedness. pt has equal strength in all four extremities, + sensation in all four extremities, negative slurred speech, negative facial droop, PERRLA, cap refill <3 seconds, pt ambulatory with steady gait, skin intact. pt has hx of mitral valve replacement, HTN, on coumadin. pt states he took a "chemical supplement" at 9pm he ordered online (doesn't recall name) for sleep,  blood work drawn, VSS, awaiting CT results, wife at bedside, will continue to monitor.

## 2019-04-12 NOTE — ED PROVIDER NOTE - OBJECTIVE STATEMENT
73M with pmh mitral valve replacement, HTN, gerd, left eye blindness 2/2 retinal detachment presenting with headache that woke patient up from sleep at 2230. Patient states he went to sleep at 2130 with no symptoms. States pain is a cold like sensation on left side of head with associated numbness/tingling to upper lip. No fever, chills, cp, sob, n/v/d, trauma, dizziness, dysuria

## 2019-04-15 ENCOUNTER — APPOINTMENT (OUTPATIENT)
Dept: INTERNAL MEDICINE | Facility: HOSPITAL | Age: 74
End: 2019-04-15

## 2019-04-15 ENCOUNTER — LABORATORY RESULT (OUTPATIENT)
Age: 74
End: 2019-04-15

## 2019-04-15 ENCOUNTER — OTHER (OUTPATIENT)
Age: 74
End: 2019-04-15

## 2019-04-15 ENCOUNTER — OUTPATIENT (OUTPATIENT)
Dept: OUTPATIENT SERVICES | Facility: HOSPITAL | Age: 74
LOS: 1 days | End: 2019-04-15

## 2019-04-15 DIAGNOSIS — Z86.69 PERSONAL HISTORY OF OTHER DISEASES OF THE NERVOUS SYSTEM AND SENSE ORGANS: Chronic | ICD-10-CM

## 2019-04-15 DIAGNOSIS — Z98.89 OTHER SPECIFIED POSTPROCEDURAL STATES: Chronic | ICD-10-CM

## 2019-04-15 LAB
INR BLD: 3.73 — HIGH (ref 0.88–1.17)
PROTHROM AB SERPL-ACNC: 43.1 SEC — HIGH (ref 9.8–13.1)

## 2019-04-16 DIAGNOSIS — Z79.01 LONG TERM (CURRENT) USE OF ANTICOAGULANTS: ICD-10-CM

## 2019-04-19 ENCOUNTER — APPOINTMENT (OUTPATIENT)
Dept: INTERNAL MEDICINE | Facility: HOSPITAL | Age: 74
End: 2019-04-19
Payer: MEDICARE

## 2019-04-19 ENCOUNTER — OUTPATIENT (OUTPATIENT)
Dept: OUTPATIENT SERVICES | Facility: HOSPITAL | Age: 74
LOS: 1 days | End: 2019-04-19

## 2019-04-19 VITALS — HEIGHT: 69 IN | WEIGHT: 184.31 LBS | BODY MASS INDEX: 27.3 KG/M2

## 2019-04-19 VITALS — DIASTOLIC BLOOD PRESSURE: 84 MMHG | SYSTOLIC BLOOD PRESSURE: 141 MMHG | HEART RATE: 67 BPM

## 2019-04-19 DIAGNOSIS — I10 ESSENTIAL (PRIMARY) HYPERTENSION: ICD-10-CM

## 2019-04-19 DIAGNOSIS — Z86.69 PERSONAL HISTORY OF OTHER DISEASES OF THE NERVOUS SYSTEM AND SENSE ORGANS: Chronic | ICD-10-CM

## 2019-04-19 DIAGNOSIS — R10.13 EPIGASTRIC PAIN: ICD-10-CM

## 2019-04-19 DIAGNOSIS — E11.40 TYPE 2 DIABETES MELLITUS WITH DIABETIC NEUROPATHY, UNSPECIFIED: ICD-10-CM

## 2019-04-19 DIAGNOSIS — G40.909 EPILEPSY, UNSPECIFIED, NOT INTRACTABLE, WITHOUT STATUS EPILEPTICUS: ICD-10-CM

## 2019-04-19 DIAGNOSIS — H53.8 OTHER VISUAL DISTURBANCES: ICD-10-CM

## 2019-04-19 DIAGNOSIS — R80.9 PROTEINURIA, UNSPECIFIED: ICD-10-CM

## 2019-04-19 DIAGNOSIS — Z98.89 OTHER SPECIFIED POSTPROCEDURAL STATES: Chronic | ICD-10-CM

## 2019-04-19 PROCEDURE — 99214 OFFICE O/P EST MOD 30 MIN: CPT | Mod: GC

## 2019-04-19 NOTE — REVIEW OF SYSTEMS
[Abdominal Pain] : abdominal pain [Vision Problems] : vision problems [Heartburn] : heartburn [Negative] : Heme/Lymph

## 2019-04-22 ENCOUNTER — LABORATORY RESULT (OUTPATIENT)
Age: 74
End: 2019-04-22

## 2019-04-22 ENCOUNTER — RESULT REVIEW (OUTPATIENT)
Age: 74
End: 2019-04-22

## 2019-04-22 ENCOUNTER — OTHER (OUTPATIENT)
Age: 74
End: 2019-04-22

## 2019-04-22 ENCOUNTER — APPOINTMENT (OUTPATIENT)
Dept: INTERNAL MEDICINE | Facility: HOSPITAL | Age: 74
End: 2019-04-22

## 2019-04-22 ENCOUNTER — OUTPATIENT (OUTPATIENT)
Dept: OUTPATIENT SERVICES | Facility: HOSPITAL | Age: 74
LOS: 1 days | End: 2019-04-22

## 2019-04-22 DIAGNOSIS — Z98.89 OTHER SPECIFIED POSTPROCEDURAL STATES: Chronic | ICD-10-CM

## 2019-04-22 DIAGNOSIS — Z86.69 PERSONAL HISTORY OF OTHER DISEASES OF THE NERVOUS SYSTEM AND SENSE ORGANS: Chronic | ICD-10-CM

## 2019-04-22 DIAGNOSIS — Z79.01 LONG TERM (CURRENT) USE OF ANTICOAGULANTS: ICD-10-CM

## 2019-04-22 LAB
APTT BLD: 46.7 SEC — HIGH (ref 27.5–36.3)
HBA1C BLD-MCNC: 8.6 % — HIGH (ref 4–5.6)
INR BLD: 1.93 — HIGH (ref 0.88–1.17)
PROTHROM AB SERPL-ACNC: 22.4 SEC — HIGH (ref 9.8–13.1)
PSA SERPL-MCNC: 5.53 NG/ML — HIGH (ref 0–4)

## 2019-04-23 NOTE — HISTORY OF PRESENT ILLNESS
[FreeTextEntry1] : The patient is here for follow up. [de-identified] : The patient is a 73 year old male with HTN, CAD s/p CABG, RHD s/p MVR on Coumadin, T2DM, Afib, Rosie Thompson tears and esophageal ulcers here for follow up of the following issues:\par \par 1) Epigastric pain - He is s/p EGD and EUS/FNA. The epigastric pain is still not relieved with Protonix use. He continues to experience burning epigastric pain in the evenings only. The pain wakes him up from sleep and is only resolved after eating. He has not seen GI since the procedure. \par \par 2) Type II Diabetes Mellitus - The patient continues to check his fingerstick glucose daily. He takes glipizide regularly. He denies numbness, tingling, or nocturia. He continues to eat a low-sugar diet. \par \par 3) Seizure Disorder - the patient stopped taking his Keppra two years ago without the permission of Neurology. He still has not seen Neurology despite the referral from his last visit.\par \par 4) Proteinuria - the ACEI was discontinued due to cough and he started Lipitor. He is tolerating it well without any new symptoms. The cough has resolved. \par \par 6) Blurry Vision - The patient plans to see an Ophthalmologist to move forward with the laser surgery.

## 2019-04-23 NOTE — PHYSICAL EXAM
[No Acute Distress] : no acute distress [Well Nourished] : well nourished [Well Developed] : well developed [Well-Appearing] : well-appearing [Normal Sclera/Conjunctiva] : normal sclera/conjunctiva [PERRL] : pupils equal round and reactive to light [EOMI] : extraocular movements intact [Normal Outer Ear/Nose] : the outer ears and nose were normal in appearance [Normal Oropharynx] : the oropharynx was normal [No JVD] : no jugular venous distention [Supple] : supple [No Respiratory Distress] : no respiratory distress  [Clear to Auscultation] : lungs were clear to auscultation bilaterally [No Accessory Muscle Use] : no accessory muscle use [Normal Rate] : normal rate  [Normal S1, S2] : normal S1 and S2 [Regular Rhythm] : with a regular rhythm [No Murmur] : no murmur heard [Pedal Pulses Present] : the pedal pulses are present [No Edema] : there was no peripheral edema [Soft] : abdomen soft [Non-distended] : non-distended [No Masses] : no abdominal mass palpated [No HSM] : no HSM [Normal Bowel Sounds] : normal bowel sounds [Normal Posterior Cervical Nodes] : no posterior cervical lymphadenopathy [Normal Anterior Cervical Nodes] : no anterior cervical lymphadenopathy [No Rash] : no rash [Normal Gait] : normal gait [Coordination Grossly Intact] : coordination grossly intact [No Focal Deficits] : no focal deficits [Normal Affect] : the affect was normal [Normal Insight/Judgement] : insight and judgment were intact [de-identified] : epigastric tenderness to palpation

## 2019-04-23 NOTE — PLAN
[FreeTextEntry1] : The patient is a 73 year old male with HTN, CAD s/p CABG, RHD s/p MVR on Coumadin, T2DM, Afib, Rosie Thompson tears and esophageal ulcers here for follow up of the following issues:\par \par 1) Epigastric pain - Instructed the patient to see GI for evaluation and discussion of next steps in management.\par \par 2) Type II Diabetes Mellitus - FSG today was 231 mg/dL. Last HbA1C was 7.5% in January 2019. Will start metformin 500 mg BID. Continue with glipizide 10 mg daily. \par - will check HbA1C.\par \par 3) Seizure Disorder -The patient will see Neurology.\par \par 4) Hypertension - Will start HCTZ 12.5 mg daily. Continue with losartan 50 mg daily. Goal is <130/90 mm Hg.\par \par 6) Blurry Vision - the patient will see Ophthalmologist to move forward with the laser surgery. Continue to monitor.\par \par Patient discussed with Dr. Seay.\par All risks and benefits were discussed with patient. \par Patient and attending agreed with the above assessment and plan.\par \par Fatuma Aiken M.D. PGY1\par Internal Medicine\par [facility]\par [phone]\par \par \par

## 2019-04-24 ENCOUNTER — LABORATORY RESULT (OUTPATIENT)
Age: 74
End: 2019-04-24

## 2019-04-24 ENCOUNTER — OTHER (OUTPATIENT)
Age: 74
End: 2019-04-24

## 2019-04-24 ENCOUNTER — APPOINTMENT (OUTPATIENT)
Dept: INTERNAL MEDICINE | Facility: HOSPITAL | Age: 74
End: 2019-04-24

## 2019-04-24 ENCOUNTER — OUTPATIENT (OUTPATIENT)
Dept: OUTPATIENT SERVICES | Facility: HOSPITAL | Age: 74
LOS: 1 days | End: 2019-04-24

## 2019-04-24 DIAGNOSIS — Z98.89 OTHER SPECIFIED POSTPROCEDURAL STATES: Chronic | ICD-10-CM

## 2019-04-24 DIAGNOSIS — Z86.69 PERSONAL HISTORY OF OTHER DISEASES OF THE NERVOUS SYSTEM AND SENSE ORGANS: Chronic | ICD-10-CM

## 2019-04-24 LAB
BASOPHILS # BLD AUTO: 0.05 K/UL — SIGNIFICANT CHANGE UP (ref 0–0.2)
BASOPHILS NFR BLD AUTO: 0.6 % — SIGNIFICANT CHANGE UP (ref 0–2)
EOSINOPHIL # BLD AUTO: 0.14 K/UL — SIGNIFICANT CHANGE UP (ref 0–0.5)
EOSINOPHIL NFR BLD AUTO: 1.8 % — SIGNIFICANT CHANGE UP (ref 0–6)
HCT VFR BLD CALC: 47.4 % — SIGNIFICANT CHANGE UP (ref 39–50)
HGB BLD-MCNC: 15.9 G/DL — SIGNIFICANT CHANGE UP (ref 13–17)
IMM GRANULOCYTES NFR BLD AUTO: 1.2 % — SIGNIFICANT CHANGE UP (ref 0–1.5)
INR BLD: 2.4 — HIGH (ref 0.88–1.17)
LYMPHOCYTES # BLD AUTO: 2.35 K/UL — SIGNIFICANT CHANGE UP (ref 1–3.3)
LYMPHOCYTES # BLD AUTO: 30.3 % — SIGNIFICANT CHANGE UP (ref 13–44)
MCHC RBC-ENTMCNC: 31.1 PG — SIGNIFICANT CHANGE UP (ref 27–34)
MCHC RBC-ENTMCNC: 33.5 % — SIGNIFICANT CHANGE UP (ref 32–36)
MCV RBC AUTO: 92.8 FL — SIGNIFICANT CHANGE UP (ref 80–100)
MONOCYTES # BLD AUTO: 0.67 K/UL — SIGNIFICANT CHANGE UP (ref 0–0.9)
MONOCYTES NFR BLD AUTO: 8.6 % — SIGNIFICANT CHANGE UP (ref 2–14)
NEUTROPHILS # BLD AUTO: 4.45 K/UL — SIGNIFICANT CHANGE UP (ref 1.8–7.4)
NEUTROPHILS NFR BLD AUTO: 57.5 % — SIGNIFICANT CHANGE UP (ref 43–77)
NRBC # FLD: 0 K/UL — SIGNIFICANT CHANGE UP (ref 0–0)
PLATELET # BLD AUTO: 206 K/UL — SIGNIFICANT CHANGE UP (ref 150–400)
PMV BLD: 10.5 FL — SIGNIFICANT CHANGE UP (ref 7–13)
PROTHROM AB SERPL-ACNC: 28.1 SEC — HIGH (ref 9.8–13.1)
RBC # BLD: 5.11 M/UL — SIGNIFICANT CHANGE UP (ref 4.2–5.8)
RBC # FLD: 12.3 % — SIGNIFICANT CHANGE UP (ref 10.3–14.5)
WBC # BLD: 7.75 K/UL — SIGNIFICANT CHANGE UP (ref 3.8–10.5)
WBC # FLD AUTO: 7.75 K/UL — SIGNIFICANT CHANGE UP (ref 3.8–10.5)

## 2019-04-25 DIAGNOSIS — Z79.01 LONG TERM (CURRENT) USE OF ANTICOAGULANTS: ICD-10-CM

## 2019-04-25 LAB — GLUCOSE BLDC GLUCOMTR-MCNC: 231

## 2019-04-29 ENCOUNTER — APPOINTMENT (OUTPATIENT)
Dept: INTERNAL MEDICINE | Facility: CLINIC | Age: 74
End: 2019-04-29

## 2019-04-29 ENCOUNTER — OUTPATIENT (OUTPATIENT)
Dept: OUTPATIENT SERVICES | Facility: HOSPITAL | Age: 74
LOS: 1 days | End: 2019-04-29

## 2019-04-29 ENCOUNTER — APPOINTMENT (OUTPATIENT)
Dept: NEUROLOGY | Facility: CLINIC | Age: 74
End: 2019-04-29
Payer: MEDICAID

## 2019-04-29 ENCOUNTER — RESULT CHARGE (OUTPATIENT)
Age: 74
End: 2019-04-29

## 2019-04-29 VITALS
DIASTOLIC BLOOD PRESSURE: 83 MMHG | WEIGHT: 184 LBS | SYSTOLIC BLOOD PRESSURE: 132 MMHG | HEART RATE: 81 BPM | HEIGHT: 69 IN | BODY MASS INDEX: 27.25 KG/M2

## 2019-04-29 DIAGNOSIS — Z98.89 OTHER SPECIFIED POSTPROCEDURAL STATES: Chronic | ICD-10-CM

## 2019-04-29 DIAGNOSIS — Z86.69 PERSONAL HISTORY OF OTHER DISEASES OF THE NERVOUS SYSTEM AND SENSE ORGANS: Chronic | ICD-10-CM

## 2019-04-29 DIAGNOSIS — Z95.2 PRESENCE OF PROSTHETIC HEART VALVE: ICD-10-CM

## 2019-04-29 DIAGNOSIS — I48.91 UNSPECIFIED ATRIAL FIBRILLATION: ICD-10-CM

## 2019-04-29 LAB — INR PPP: 3 RATIO

## 2019-04-29 PROCEDURE — 99204 OFFICE O/P NEW MOD 45 MIN: CPT

## 2019-04-29 RX ORDER — CALCIUM CARBONATE 500 MG/1
500 TABLET, CHEWABLE ORAL
Qty: 30 | Refills: 2 | Status: DISCONTINUED | COMMUNITY
Start: 2018-12-18 | End: 2019-04-29

## 2019-04-29 RX ORDER — ENOXAPARIN SODIUM 100 MG/ML
80 INJECTION SUBCUTANEOUS
Qty: 24 | Refills: 0 | Status: DISCONTINUED | COMMUNITY
Start: 2019-02-07 | End: 2019-04-29

## 2019-04-29 RX ORDER — PANTOPRAZOLE 40 MG/1
40 TABLET, DELAYED RELEASE ORAL TWICE DAILY
Qty: 28 | Refills: 3 | Status: DISCONTINUED | COMMUNITY
Start: 2019-01-10 | End: 2019-04-29

## 2019-04-29 NOTE — REVIEW OF SYSTEMS
[Seizures] : convulsions [Eyesight Problems] : eyesight problems [As Noted in HPI] : as noted in HPI [Loss Of Hearing] : hearing loss [As noted in HPI] : as noted in HPI [Negative] : Heme/Lymph [Diarrhea] : no diarrhea [FreeTextEntry5] : Afib
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
## 2019-04-29 NOTE — PHYSICAL EXAM
[FreeTextEntry1] : MS: alert & oriented to person, place time, good fund of knowledge and recall for recent and remote events. \par CN: VFF to confrontation, EOM full without nystagmus, PERRL, V1-V3 intact to light touch, face symmetrical, hears finger rub bilaterally, palate elevates symmetrically, shoulders elevate symmetrically, tongue midline\par MOTOR: normal tone x 4 extremities, Power 5/5 proximally and distally bilaterally, no drift, normal rapid alternating movements. \par SENSORY: intact LT x 4 extremities\par REFLEXES: biceps 2+ bilaterally, triceps 2+ bilaterally, brachioradialis 2+ bilaterally, patella 2+ bilaterally, ankle 2+ bilaterally, plantar flexor bilaterally\par COORD: normal FNF, no tremor or dysmetria\par GAIT: normal base, Romberg negative, normal gait

## 2019-04-30 DIAGNOSIS — Z79.01 LONG TERM (CURRENT) USE OF ANTICOAGULANTS: ICD-10-CM

## 2019-05-02 ENCOUNTER — APPOINTMENT (OUTPATIENT)
Dept: GASTROENTEROLOGY | Facility: CLINIC | Age: 74
End: 2019-05-02

## 2019-05-02 ENCOUNTER — OUTPATIENT (OUTPATIENT)
Dept: OUTPATIENT SERVICES | Facility: HOSPITAL | Age: 74
LOS: 1 days | End: 2019-05-02

## 2019-05-02 VITALS
HEIGHT: 69 IN | BODY MASS INDEX: 27.25 KG/M2 | DIASTOLIC BLOOD PRESSURE: 70 MMHG | WEIGHT: 184 LBS | OXYGEN SATURATION: 98 % | SYSTOLIC BLOOD PRESSURE: 122 MMHG | HEART RATE: 91 BPM

## 2019-05-02 DIAGNOSIS — Z98.89 OTHER SPECIFIED POSTPROCEDURAL STATES: Chronic | ICD-10-CM

## 2019-05-02 DIAGNOSIS — Z86.69 PERSONAL HISTORY OF OTHER DISEASES OF THE NERVOUS SYSTEM AND SENSE ORGANS: Chronic | ICD-10-CM

## 2019-05-02 DIAGNOSIS — E66.3 OVERWEIGHT: ICD-10-CM

## 2019-05-02 DIAGNOSIS — K31.9 DISEASE OF STOMACH AND DUODENUM, UNSPECIFIED: ICD-10-CM

## 2019-05-02 DIAGNOSIS — R10.13 EPIGASTRIC PAIN: ICD-10-CM

## 2019-05-02 NOTE — ASSESSMENT
[FreeTextEntry1] : Impression:\par \par # Gastric subepithelial lesion - Pt status post EUS with FNB revealing mature adipose tissue, likely lipoma.\par # Dyspepsia- Recent EGD with EUS was negative for peptic ulcer disease, esophagitis, however with small hiatal hernia. Other differential diagnosis includes IBS, functional dyspepsia, gastroparesis, atypical chest pain\par # External hemorrhoids - s/p hemorrhoidectomy by outside colorectal surgeon\par # Overweight - BMI 25-30 \par \par \par Plan:\par -Famotidine 20mg BID 30 minutes before meals\par -Pt advised to avoid food triggers\par -Recommend cardiology follow up to work up atypical chest pain (pt endorses dyspepsia intermittently not associated with food consumption)\par -Dietary strategies discussed with the patient including use of psyllium husk before breakfast and supper and Glucerna in place of lunch; mild exercise also discussed; weight once weekly; think of calory spending\par -Return to clinic in 1 month.\par \par Discussed with Dr Jauregui/Tequila. \par

## 2019-05-02 NOTE — PHYSICAL EXAM
[General Appearance - Well Nourished] : well nourished [General Appearance - Well Developed] : well developed [Sclera] : the sclera and conjunctiva were normal [Extraocular Movements] : extraocular movements were intact [Outer Ear] : the ears and nose were normal in appearance [Hearing Threshold Finger Rub Not [place]] : hearing was normal [Both Tympanic Membranes Were Examined] : both tympanic membranes were normal [Neck Appearance] : the appearance of the neck was normal [Neck Cervical Mass (___cm)] : no neck mass was observed [] : no respiratory distress [Respiration, Rhythm And Depth] : normal respiratory rhythm and effort [Exaggerated Use Of Accessory Muscles For Inspiration] : no accessory muscle use [Murmurs] : no murmurs [Heart Sounds] : normal S1 and S2 [Heart Sounds Pericardial Friction Rub] : no pericardial rub [Bowel Sounds] : normal bowel sounds [Abdomen Soft] : soft [Abdomen Tenderness] : non-tender [Cervical Lymph Nodes Enlarged Posterior Bilaterally] : posterior cervical [Cervical Lymph Nodes Enlarged Anterior Bilaterally] : anterior cervical [Abnormal Walk] : normal gait [Musculoskeletal - Swelling] : no joint swelling seen [Nail Clubbing] : no clubbing  or cyanosis of the fingernails [Skin Color & Pigmentation] : normal skin color and pigmentation [Sensation] : the sensory exam was normal to light touch and pinprick [Skin Turgor] : normal skin turgor [Motor Exam] : the motor exam was normal [Affect] : the affect was normal [Mood] : the mood was normal

## 2019-05-02 NOTE — HISTORY OF PRESENT ILLNESS
[de-identified] : 73 year old male with past medical history atrial fibrillation and mechanical mitral valve (on Coumadin) 9 years ago, CAD s/p CABG (not on aspirin), diverticulosis presenting to GI clinic for follow-up of his recent EUS for subepithelial gastric lesion seen on prior EUS. \par \par Pt underwent an EUS 03/2019 for subepithelial gastric lesion along the greater curvature seen initially on prior EUS. This was first seen in 2013 (not biopsied/FNA as pt was on Coumadin). It appeared to arise from the 4th layer. He had a surveillance EUS in 2014 that showed the lesion was 2 x 1.5 cm in size, originating in the 3rd layer. Again it was not FNA/biopsied due to patient being on Coumadin. He had a colonoscopy in 1/2014 for screening that showed diverticulosis and few small hyperplastic rectal polyps.\par \par EUS 03/2019 as below:\par Findings:\par      EGD:\par      -The esophagus was normal.\par      -The previously seen 2 cm subepithelial lesion was seen again.\par      -The duodenal bulb and D2 were normal.\par      EUS: A linear exam was performed.\par      -The subepithelial gastric lesion was 2.1 cm. It was hypoechoic and \par      originated from the 4th vs 3rd layer.\par      -FNB was performed using a 22-gauge Sharkcore needle. Three passes were \par      taken.\par                                                                                \par Impression:          EGD:\par                      -The esophagus was normal.\par                      -The previously seen 2 cm subepithelial lesion was seen \par                      again.\par                      -The duodenal bulb and D2 were normal.\par                      EUS: A linear exam was performed.\par                      -The subepithelial gastric lesion was 2.1 cm. It was \par                      hypoechoic and originated from the 4th vs 3rd layer.\par                      -FNB was performed using a 22-gauge Sharkcore needle. \par                      Three passes were taken.\par Recommendation:      - Discharge patient to home (ambulatory).\par                      - Follow up pathology.\par \par \par Pathology:\par Final Diagnosis\par 1-Gastric mass biopsy:\par - Mainly blood.\par - Mature adipose tissue, see note.\par - Benign gastric epithelium.\par \par Note:: Endoscopic correlation is suggested to rule out lipoma.\par \par Pt also endorses dyspepsia after eating, and sometimes before eating. Symptoms would last for a few hours, and would occur 30 mins after eating. This all started after his valve surgery 9-10 years ago.  Pt denies use of PPI or H2 blocker. The patient denies dysphagia, change in bowel habit, melena, weight loss, anemia or hematochezia, hematemesis. Pt denies family history of gallstones, colorectal cancer, polyps, breast, ovarian cancer. \par \par Recent imaging:\par CT (4/11/18): There is a 2 cm lipoma of the anterior wall of the distal gastric \par body (3:29). While there is mild thickening of the posterior gastric \par fundal wall maximally measuring 1.4 cm (3:23) no discrete gastric mass is \par discernible at the level of the gastroesophageal junction. No bowel \par obstruction. Appendix normal.\par

## 2019-05-06 ENCOUNTER — APPOINTMENT (OUTPATIENT)
Dept: INTERNAL MEDICINE | Facility: CLINIC | Age: 74
End: 2019-05-06

## 2019-05-06 ENCOUNTER — OUTPATIENT (OUTPATIENT)
Dept: OUTPATIENT SERVICES | Facility: HOSPITAL | Age: 74
LOS: 1 days | End: 2019-05-06

## 2019-05-06 ENCOUNTER — RESULT REVIEW (OUTPATIENT)
Age: 74
End: 2019-05-06

## 2019-05-06 ENCOUNTER — RESULT CHARGE (OUTPATIENT)
Age: 74
End: 2019-05-06

## 2019-05-06 DIAGNOSIS — Z86.69 PERSONAL HISTORY OF OTHER DISEASES OF THE NERVOUS SYSTEM AND SENSE ORGANS: Chronic | ICD-10-CM

## 2019-05-06 DIAGNOSIS — Z98.89 OTHER SPECIFIED POSTPROCEDURAL STATES: Chronic | ICD-10-CM

## 2019-05-06 LAB
INR PPP: 2.6 RATIO
POCT-PROTHROMBIN TIME: 31.7 SECS

## 2019-05-07 ENCOUNTER — APPOINTMENT (OUTPATIENT)
Dept: OPHTHALMOLOGY | Facility: CLINIC | Age: 74
End: 2019-05-07
Payer: MEDICARE

## 2019-05-07 DIAGNOSIS — Z79.01 LONG TERM (CURRENT) USE OF ANTICOAGULANTS: ICD-10-CM

## 2019-05-07 DIAGNOSIS — Z96.1 PRESENCE OF INTRAOCULAR LENS: ICD-10-CM

## 2019-05-07 PROCEDURE — 92014 COMPRE OPH EXAM EST PT 1/>: CPT

## 2019-05-13 ENCOUNTER — OTHER (OUTPATIENT)
Age: 74
End: 2019-05-13

## 2019-05-13 ENCOUNTER — OUTPATIENT (OUTPATIENT)
Dept: OUTPATIENT SERVICES | Facility: HOSPITAL | Age: 74
LOS: 1 days | End: 2019-05-13

## 2019-05-13 ENCOUNTER — APPOINTMENT (OUTPATIENT)
Dept: INTERNAL MEDICINE | Facility: CLINIC | Age: 74
End: 2019-05-13

## 2019-05-13 DIAGNOSIS — Z86.69 PERSONAL HISTORY OF OTHER DISEASES OF THE NERVOUS SYSTEM AND SENSE ORGANS: Chronic | ICD-10-CM

## 2019-05-13 DIAGNOSIS — Z98.89 OTHER SPECIFIED POSTPROCEDURAL STATES: Chronic | ICD-10-CM

## 2019-05-13 LAB
INR PPP: 2.4 RATIO
POCT-PROTHROMBIN TIME: 28.4 SECS

## 2019-05-15 DIAGNOSIS — Z79.01 LONG TERM (CURRENT) USE OF ANTICOAGULANTS: ICD-10-CM

## 2019-05-20 ENCOUNTER — APPOINTMENT (OUTPATIENT)
Dept: INTERNAL MEDICINE | Facility: CLINIC | Age: 74
End: 2019-05-20

## 2019-05-22 ENCOUNTER — RESULT CHARGE (OUTPATIENT)
Age: 74
End: 2019-05-22

## 2019-05-22 ENCOUNTER — OUTPATIENT (OUTPATIENT)
Dept: OUTPATIENT SERVICES | Facility: HOSPITAL | Age: 74
LOS: 1 days | End: 2019-05-22

## 2019-05-22 ENCOUNTER — LABORATORY RESULT (OUTPATIENT)
Age: 74
End: 2019-05-22

## 2019-05-22 ENCOUNTER — APPOINTMENT (OUTPATIENT)
Dept: INTERNAL MEDICINE | Facility: CLINIC | Age: 74
End: 2019-05-22

## 2019-05-22 ENCOUNTER — APPOINTMENT (OUTPATIENT)
Dept: INTERNAL MEDICINE | Facility: CLINIC | Age: 74
End: 2019-05-22
Payer: MEDICAID

## 2019-05-22 VITALS — DIASTOLIC BLOOD PRESSURE: 72 MMHG | HEART RATE: 68 BPM | SYSTOLIC BLOOD PRESSURE: 128 MMHG

## 2019-05-22 VITALS — WEIGHT: 170 LBS | BODY MASS INDEX: 25.18 KG/M2 | HEART RATE: 74 BPM | HEIGHT: 69 IN | OXYGEN SATURATION: 97 %

## 2019-05-22 DIAGNOSIS — Z98.89 OTHER SPECIFIED POSTPROCEDURAL STATES: Chronic | ICD-10-CM

## 2019-05-22 DIAGNOSIS — Z86.69 PERSONAL HISTORY OF OTHER DISEASES OF THE NERVOUS SYSTEM AND SENSE ORGANS: Chronic | ICD-10-CM

## 2019-05-22 LAB
ANION GAP SERPL CALC-SCNC: 13 MMO/L — SIGNIFICANT CHANGE UP (ref 7–14)
BUN SERPL-MCNC: 26 MG/DL — HIGH (ref 7–23)
CALCIUM SERPL-MCNC: 10.4 MG/DL — SIGNIFICANT CHANGE UP (ref 8.4–10.5)
CHLORIDE SERPL-SCNC: 101 MMOL/L — SIGNIFICANT CHANGE UP (ref 98–107)
CO2 SERPL-SCNC: 26 MMOL/L — SIGNIFICANT CHANGE UP (ref 22–31)
CREAT SERPL-MCNC: 1.27 MG/DL — SIGNIFICANT CHANGE UP (ref 0.5–1.3)
GLUCOSE SERPL-MCNC: 244 MG/DL — HIGH (ref 70–99)
POTASSIUM SERPL-MCNC: 4 MMOL/L — SIGNIFICANT CHANGE UP (ref 3.5–5.3)
POTASSIUM SERPL-SCNC: 4 MMOL/L — SIGNIFICANT CHANGE UP (ref 3.5–5.3)
SODIUM SERPL-SCNC: 140 MMOL/L — SIGNIFICANT CHANGE UP (ref 135–145)

## 2019-05-22 PROCEDURE — 99214 OFFICE O/P EST MOD 30 MIN: CPT | Mod: GC

## 2019-05-22 NOTE — PHYSICAL EXAM
[No Acute Distress] : no acute distress [Well Nourished] : well nourished [Well Developed] : well developed [Well-Appearing] : well-appearing [Normal Sclera/Conjunctiva] : normal sclera/conjunctiva [PERRL] : pupils equal round and reactive to light [EOMI] : extraocular movements intact [Normal Outer Ear/Nose] : the outer ears and nose were normal in appearance [Normal Oropharynx] : the oropharynx was normal [No JVD] : no jugular venous distention [Supple] : supple [No Lymphadenopathy] : no lymphadenopathy [No Respiratory Distress] : no respiratory distress  [Clear to Auscultation] : lungs were clear to auscultation bilaterally [No Accessory Muscle Use] : no accessory muscle use [Normal Rate] : normal rate  [Regular Rhythm] : with a regular rhythm [Normal S1, S2] : normal S1 and S2 [No Murmur] : no murmur heard [Pedal Pulses Present] : the pedal pulses are present [No Edema] : there was no peripheral edema [Soft] : abdomen soft [Non Tender] : non-tender [Non-distended] : non-distended [No Masses] : no abdominal mass palpated [No HSM] : no HSM [Normal Bowel Sounds] : normal bowel sounds [Normal Posterior Cervical Nodes] : no posterior cervical lymphadenopathy [Normal Anterior Cervical Nodes] : no anterior cervical lymphadenopathy [No Rash] : no rash [Normal Gait] : normal gait [Coordination Grossly Intact] : coordination grossly intact [No Focal Deficits] : no focal deficits [Normal Affect] : the affect was normal [Normal Insight/Judgement] : insight and judgment were intact

## 2019-05-23 ENCOUNTER — RX RENEWAL (OUTPATIENT)
Age: 74
End: 2019-05-23

## 2019-05-24 ENCOUNTER — RX RENEWAL (OUTPATIENT)
Age: 74
End: 2019-05-24

## 2019-05-24 DIAGNOSIS — Z79.01 LONG TERM (CURRENT) USE OF ANTICOAGULANTS: ICD-10-CM

## 2019-05-24 LAB — GLUCOSE BLDC GLUCOMTR-MCNC: 252

## 2019-05-29 ENCOUNTER — RX CHANGE (OUTPATIENT)
Age: 74
End: 2019-05-29

## 2019-05-29 DIAGNOSIS — G40.909 EPILEPSY, UNSPECIFIED, NOT INTRACTABLE, WITHOUT STATUS EPILEPTICUS: ICD-10-CM

## 2019-05-29 DIAGNOSIS — E11.40 TYPE 2 DIABETES MELLITUS WITH DIABETIC NEUROPATHY, UNSPECIFIED: ICD-10-CM

## 2019-05-29 DIAGNOSIS — R42 DIZZINESS AND GIDDINESS: ICD-10-CM

## 2019-05-29 DIAGNOSIS — R97.20 ELEVATED PROSTATE SPECIFIC ANTIGEN [PSA]: ICD-10-CM

## 2019-05-29 DIAGNOSIS — R10.13 EPIGASTRIC PAIN: ICD-10-CM

## 2019-05-29 DIAGNOSIS — H53.8 OTHER VISUAL DISTURBANCES: ICD-10-CM

## 2019-05-30 ENCOUNTER — APPOINTMENT (OUTPATIENT)
Dept: CARDIOLOGY | Facility: HOSPITAL | Age: 74
End: 2019-05-30

## 2019-05-30 ENCOUNTER — APPOINTMENT (OUTPATIENT)
Dept: NEUROLOGY | Facility: CLINIC | Age: 74
End: 2019-05-30
Payer: MEDICAID

## 2019-05-30 VITALS
BODY MASS INDEX: 26.07 KG/M2 | SYSTOLIC BLOOD PRESSURE: 114 MMHG | HEART RATE: 62 BPM | HEIGHT: 69 IN | DIASTOLIC BLOOD PRESSURE: 78 MMHG | RESPIRATION RATE: 16 BRPM | WEIGHT: 176 LBS | TEMPERATURE: 97.8 F | OXYGEN SATURATION: 97 %

## 2019-05-30 PROCEDURE — 95816 EEG AWAKE AND DROWSY: CPT

## 2019-05-31 ENCOUNTER — RX RENEWAL (OUTPATIENT)
Age: 74
End: 2019-05-31

## 2019-06-03 ENCOUNTER — APPOINTMENT (OUTPATIENT)
Dept: UROLOGY | Facility: CLINIC | Age: 74
End: 2019-06-03
Payer: MEDICARE

## 2019-06-03 ENCOUNTER — OTHER (OUTPATIENT)
Age: 74
End: 2019-06-03

## 2019-06-03 VITALS
WEIGHT: 173 LBS | BODY MASS INDEX: 25.62 KG/M2 | TEMPERATURE: 98.5 F | DIASTOLIC BLOOD PRESSURE: 70 MMHG | HEIGHT: 69 IN | SYSTOLIC BLOOD PRESSURE: 105 MMHG | RESPIRATION RATE: 16 BRPM | HEART RATE: 59 BPM

## 2019-06-03 PROCEDURE — 99214 OFFICE O/P EST MOD 30 MIN: CPT

## 2019-06-03 NOTE — HISTORY OF PRESENT ILLNESS
[FreeTextEntry1] : 72 yo gentleman referred for elevated PSA of 5.53 in April 2019 (2.92 in May 2018). He has mild nocturia x 2, he does drink before bedtime. He does also note issues with poor erection. He denies any other irr/obs voiding symptoms. \par \par

## 2019-06-03 NOTE — ASSESSMENT
[FreeTextEntry1] : \par \par Impression/plan: 74 yo gentleman with elevated PSA, ED. \par \par 1. Referral to Dr. Barajas for discussion of bx vs MRI. \par 2. Referral to Dr. Fagan for ED. \par

## 2019-06-03 NOTE — ASSESSMENT
[FreeTextEntry1] : \par \par Impression/plan: 72 yo gentleman with elevated PSA, ED. \par \par 1. Referral to Dr. Barajas for discussion of bx vs MRI. \par 2. Referral to Dr. Fagan for ED. \par

## 2019-06-03 NOTE — PHYSICAL EXAM
[General Appearance - Well Developed] : well developed [General Appearance - Well Nourished] : well nourished [Normal Appearance] : normal appearance [Well Groomed] : well groomed [General Appearance - In No Acute Distress] : no acute distress [Edema] : no peripheral edema [Respiration, Rhythm And Depth] : normal respiratory rhythm and effort [Exaggerated Use Of Accessory Muscles For Inspiration] : no accessory muscle use [Abdomen Soft] : soft [Abdomen Tenderness] : non-tender [Costovertebral Angle Tenderness] : no ~M costovertebral angle tenderness [Normal Station and Gait] : the gait and station were normal for the patient's age [Skin Color & Pigmentation] : normal skin color and pigmentation [] : no rash [No Focal Deficits] : no focal deficits [Oriented To Time, Place, And Person] : oriented to person, place, and time [Prostate Tenderness] : the prostate was not tender [No Prostate Nodules] : no prostate nodules [Prostate Size ___ gm] : prostate size [unfilled] gm

## 2019-06-04 ENCOUNTER — RX RENEWAL (OUTPATIENT)
Age: 74
End: 2019-06-04

## 2019-06-05 ENCOUNTER — OTHER (OUTPATIENT)
Age: 74
End: 2019-06-05

## 2019-06-05 ENCOUNTER — APPOINTMENT (OUTPATIENT)
Dept: UROLOGY | Facility: CLINIC | Age: 74
End: 2019-06-05
Payer: MEDICARE

## 2019-06-05 ENCOUNTER — OUTPATIENT (OUTPATIENT)
Dept: OUTPATIENT SERVICES | Facility: HOSPITAL | Age: 74
LOS: 1 days | End: 2019-06-05

## 2019-06-05 ENCOUNTER — APPOINTMENT (OUTPATIENT)
Dept: INTERNAL MEDICINE | Facility: CLINIC | Age: 74
End: 2019-06-05

## 2019-06-05 DIAGNOSIS — Z86.69 PERSONAL HISTORY OF OTHER DISEASES OF THE NERVOUS SYSTEM AND SENSE ORGANS: Chronic | ICD-10-CM

## 2019-06-05 DIAGNOSIS — Z98.89 OTHER SPECIFIED POSTPROCEDURAL STATES: Chronic | ICD-10-CM

## 2019-06-05 PROCEDURE — 99214 OFFICE O/P EST MOD 30 MIN: CPT

## 2019-06-05 NOTE — ASSESSMENT
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 infection, 2. Mechanical failure, 3. Erosion, 4. Urethral injury, 5. Inability to implant prosthesis.\par Patient wants to pursue IC before proceeding

## 2019-06-05 NOTE — HISTORY OF PRESENT ILLNESS
[FreeTextEntry1] : 73 year old retired \par \par has not been able to have sexual intercourse in one year\par Prior to that had decreasing erectile function\par Treated by [facility] with IC and that was successful\par Then changed pharmacy and then stopped functioning\par Seen by DINO Christian arteriogenic dysfunction

## 2019-06-05 NOTE — PHYSICAL EXAM
[Abdomen Soft] : soft [Abdomen Tenderness] : non-tender [] : no hepato-splenomegaly [Abdomen Mass (___ Cm)] : no abdominal mass palpated [Abdomen Hernia] : no hernia was discovered [Urethral Meatus] : meatus normal [Epididymis] : the epididymides were normal [Testes Tenderness] : no tenderness of the testes [FreeTextEntry1] : 11 cm no induration

## 2019-06-06 DIAGNOSIS — Z79.01 LONG TERM (CURRENT) USE OF ANTICOAGULANTS: ICD-10-CM

## 2019-06-06 LAB
INR PPP: 3.5 RATIO
POCT-PROTHROMBIN TIME: 41.9 SECS
QUALITY CONTROL: YES

## 2019-06-10 ENCOUNTER — APPOINTMENT (OUTPATIENT)
Dept: MRI IMAGING | Facility: CLINIC | Age: 74
End: 2019-06-10
Payer: MEDICARE

## 2019-06-10 ENCOUNTER — OUTPATIENT (OUTPATIENT)
Dept: OUTPATIENT SERVICES | Facility: HOSPITAL | Age: 74
LOS: 1 days | End: 2019-06-10
Payer: COMMERCIAL

## 2019-06-10 ENCOUNTER — APPOINTMENT (OUTPATIENT)
Dept: MRI IMAGING | Facility: CLINIC | Age: 74
End: 2019-06-10

## 2019-06-10 DIAGNOSIS — Z86.69 PERSONAL HISTORY OF OTHER DISEASES OF THE NERVOUS SYSTEM AND SENSE ORGANS: Chronic | ICD-10-CM

## 2019-06-10 DIAGNOSIS — Z98.89 OTHER SPECIFIED POSTPROCEDURAL STATES: Chronic | ICD-10-CM

## 2019-06-10 DIAGNOSIS — G40.909 EPILEPSY, UNSPECIFIED, NOT INTRACTABLE, WITHOUT STATUS EPILEPTICUS: ICD-10-CM

## 2019-06-10 PROCEDURE — 70551 MRI BRAIN STEM W/O DYE: CPT

## 2019-06-10 PROCEDURE — 70551 MRI BRAIN STEM W/O DYE: CPT | Mod: 26

## 2019-06-13 NOTE — DISCUSSION/SUMMARY
[FreeTextEntry1] : 73M with CAD s/p CABG, RHD s/p mechanical MVR on A/C with Coumadin, chronic persistent atrial fibrillation who comes in for routine follow-up:\par \par 1. Chronic atrial fibrillation, valvular, in the setting of mechanical MVR\par - continue Lipitor, Atenolol, Digoxin\par - continue Coumadin to an INR goal 2.5-3.5\par - no evidence of CHF on examination\par - presently asymptomatic\par - last TTE in 2/2019 as above\par \par 2. CAD, s/p 1V CABG\par - ASA on hold due to bleeding risk in the setting of full dose A/C with Coumadin\par - continue Atenolol and Lipitor\par \par 3. 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

## 2019-06-13 NOTE — REVIEW OF SYSTEMS
[Fever] : no fever [Chills] : no chills [Blurry Vision] : no blurred vision [Earache] : no earache [Dyspnea on exertion] : not dyspnea during exertion [Chest Pain] : no chest pain [Shortness Of Breath] : no shortness of breath [Abdominal Pain] : no abdominal pain [Cough] : no cough [Vomiting] : no vomiting [Skin: A Rash] : no rash: [Urinary Frequency] : no change in urinary frequency [Dizziness] : no dizziness [Confusion] : no confusion was observed [Easy Bleeding] : no tendency for easy bleeding

## 2019-06-13 NOTE — HISTORY OF PRESENT ILLNESS
[FreeTextEntry1] : 73M with CAD s/p CABG, s/p mechanical MVR on A/C with Coumadin, chronic persistent atrial fibrillation who comes in for routine follow-up.\par \par Denies CP, syncope, palpitations. No leg edema. Denies any SOB, PND or orthopnea. Compliant with medications and INR checks. \par \par Patient is scheduled for brain MRI per neuro. Patient has not been taking Keppra and MRI needed to reassess if patient should be on antiepileptic. Unsure if it is safe in setting of mechanical valve.

## 2019-06-13 NOTE — PHYSICAL EXAM
[General Appearance - Well Developed] : well developed [General Appearance - Well Nourished] : well nourished [Normal Conjunctiva] : the conjunctiva exhibited no abnormalities [Normal Oral Mucosa] : normal oral mucosa [Normal Oropharynx] : normal oropharynx [] : no respiratory distress [Respiration, Rhythm And Depth] : normal respiratory rhythm and effort [Auscultation Breath Sounds / Voice Sounds] : lungs were clear to auscultation bilaterally [Heart Sounds] : normal S1 and S2 [Murmurs] : no murmurs present [Edema] : no peripheral edema present [Bowel Sounds] : normal bowel sounds [Abdomen Soft] : soft [Abnormal Walk] : normal gait [Nail Clubbing] : no clubbing of the fingernails [Skin Color & Pigmentation] : normal skin color and pigmentation [Skin Turgor] : normal skin turgor [Oriented To Time, Place, And Person] : oriented to person, place, and time [Impaired Insight] : insight and judgment were intact [FreeTextEntry1] : irregular pulse, mechanical S1 consistent with MVR

## 2019-06-19 ENCOUNTER — RX CHANGE (OUTPATIENT)
Age: 74
End: 2019-06-19

## 2019-06-21 ENCOUNTER — OUTPATIENT (OUTPATIENT)
Dept: OUTPATIENT SERVICES | Facility: HOSPITAL | Age: 74
LOS: 1 days | End: 2019-06-21

## 2019-06-21 ENCOUNTER — OTHER (OUTPATIENT)
Age: 74
End: 2019-06-21

## 2019-06-21 ENCOUNTER — APPOINTMENT (OUTPATIENT)
Dept: INTERNAL MEDICINE | Facility: CLINIC | Age: 74
End: 2019-06-21

## 2019-06-21 DIAGNOSIS — Z86.69 PERSONAL HISTORY OF OTHER DISEASES OF THE NERVOUS SYSTEM AND SENSE ORGANS: Chronic | ICD-10-CM

## 2019-06-21 DIAGNOSIS — Z79.01 LONG TERM (CURRENT) USE OF ANTICOAGULANTS: ICD-10-CM

## 2019-06-21 DIAGNOSIS — Z98.89 OTHER SPECIFIED POSTPROCEDURAL STATES: Chronic | ICD-10-CM

## 2019-06-21 LAB
INR PPP: 3 RATIO
QUALITY CONTROL: YES

## 2019-06-25 ENCOUNTER — APPOINTMENT (OUTPATIENT)
Dept: NEUROLOGY | Facility: CLINIC | Age: 74
End: 2019-06-25
Payer: MEDICAID

## 2019-06-25 VITALS
HEIGHT: 69 IN | SYSTOLIC BLOOD PRESSURE: 127 MMHG | HEART RATE: 87 BPM | DIASTOLIC BLOOD PRESSURE: 80 MMHG | BODY MASS INDEX: 25.62 KG/M2 | WEIGHT: 173 LBS

## 2019-06-25 PROCEDURE — 99214 OFFICE O/P EST MOD 30 MIN: CPT

## 2019-06-25 NOTE — PLAN
[FreeTextEntry1] : The patient is a 73 year old male with HTN, CAD s/p CABG, RHD s/p MVR on Coumadin, T2DM, Afib, Rosie Thompson tears and esophageal ulcers here for follow up of the following issues:\par \par 1) Epigastric pain - Asymptomatic. Will monitor off famotidine 20 mg BID for now. \par - will assess symptoms at next visit.\par \par 2) Type II Diabetes Mellitus - Poorly controlled. Fingerstick glucose measurements range from 252 mg/dL to 300 mg/dL. Patient was unaware he was to take metformin in combination with glipizide.\par - Continue with metformin 500 mg BID and glipizide 10 mg daily.\par - will assess at next visit. \par - if remains poorly controlled, will need to start insulin.\par \par 3) Dizziness - patient reports one episode.\par - patient will measure fingerstick the next time this occurs.\par - continue to monitor.\par \par 4) Seizure Disorder - the patient will follow up with Neurology after his MRI. \par \par 5) Blurry Vision - No intervention at this time. Continue to monitor.\par \par 6) Elevated PSA - PSA was elevated at 5.53 ng/mL. Referred patient for formal evaluation with Neurology.\par \par 7) HCM - Up-to-date.\par \par Patient discussed with Dr. Bailey.\par All risks and benefits were discussed with patient. \par Patient and attending agreed with the above assessment and plan.\par \par Fatuma Aiken M.D. PGY1\par Internal Medicine\par [facility]\par [phone]\par \par \par

## 2019-06-25 NOTE — REVIEW OF SYSTEMS
[Seizures] : convulsions [Eyesight Problems] : eyesight problems [Loss Of Hearing] : hearing loss [As Noted in HPI] : as noted in HPI [As noted in HPI] : as noted in HPI [Negative] : Endocrine [Diarrhea] : no diarrhea [FreeTextEntry5] : Afib

## 2019-06-25 NOTE — HISTORY OF PRESENT ILLNESS
[FreeTextEntry1] : PCP - Arianna Aiken MD, Edis Seay MD - Phone: [phone] -- [facility]-Dept of Medicine [address] [place] [address] \par \par *** 06/25/2019 ***\par \par Mr. NOLAN had no seizures for more then 12 years. Medication free for 3 years. routine EEG normal. Brain MRI w/ cerebellar infarct.\par \par \par *** 04/29/2019  ***\par Mr. NOLAN has long hisotry of seizure disorder. Has not had a convulsion in last 16 years, and has been off AED for last three years. First had seizure after [facility] 30[facility] - and had first seizure that day.  Lifetime seizures are 2-4 seizures. Seizures have occurred with interval of 10years. Last seizure at approximately age 41.  After mitral valve replacement has been taking coumadin for about 10 years. 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 s/p mitral valve replacement at age 64. \par \par PMH - HTN, DM\par \par All - NKDA\par \par Social - no tobacco, rare ETOH, no drugs, retired - ex . \par FH - m-DM, no h/o seizures.\par ROS - tinnitus bilaterally, hearing loss, no dizziness, OD vision obscurred by debris, occ palpitations from afib, no bruises, foot cramps, no other problems.

## 2019-06-25 NOTE — REASON FOR VISIT
[Follow-Up: _____] : a [unfilled] follow-up visit [Initial Eval - Existing Diagnosis] : an initial evaluation of an existing diagnosis

## 2019-06-25 NOTE — HISTORY OF PRESENT ILLNESS
[FreeTextEntry1] : The patient is here for follow-up.  [de-identified] : The patient is a 73 year old male with HTN, CAD s/p CABG, RHD s/p MVR on Coumadin, T2DM, Afib, Rosie Thompson tears and esophageal ulcers here for follow up of the following issues:\par \par 1) Epigastric pain - His symptoms have resolved. He saw GI who recommended famotidine 20 mg BID, but he has not been using it since he has no symptoms.\par \par 2) Type II Diabetes Mellitus - His fingerstick glucose has ranged from 252 mg/dL to 300 mg/dL. Since his last visit he has been taking the metformin 500 mg BID without the glipizide 10 mg daily. \par \par 3) Dizziness - the patient had one episode of dizziness two weeks ago. The dizziness occurred when he was pushing a shopping cart at the [employer]. He felt like he was being pulled backwards. He sat down and his stomach became upset. The episode lasted twenty minutes. It did not happen again. \par \par 4) Seizure Disorder - the patient stopped taking his Keppra two years ago without the permission of Neurology. He saw Neurology who ordered an MRI for him to complete. He will follow up with them. 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 The ophthalmologist decided not to perform a laser surgery operation because if there is a complication; he will only have one eye to see through.

## 2019-06-27 ENCOUNTER — NON-APPOINTMENT (OUTPATIENT)
Age: 74
End: 2019-06-27

## 2019-06-27 ENCOUNTER — APPOINTMENT (OUTPATIENT)
Dept: OPHTHALMOLOGY | Facility: CLINIC | Age: 74
End: 2019-06-27
Payer: MEDICARE

## 2019-06-27 PROCEDURE — 92014 COMPRE OPH EXAM EST PT 1/>: CPT

## 2019-06-28 ENCOUNTER — APPOINTMENT (OUTPATIENT)
Dept: INTERNAL MEDICINE | Facility: CLINIC | Age: 74
End: 2019-06-28

## 2019-06-28 ENCOUNTER — OUTPATIENT (OUTPATIENT)
Dept: OUTPATIENT SERVICES | Facility: HOSPITAL | Age: 74
LOS: 1 days | End: 2019-06-28

## 2019-06-28 VITALS — DIASTOLIC BLOOD PRESSURE: 80 MMHG | SYSTOLIC BLOOD PRESSURE: 130 MMHG

## 2019-06-28 VITALS — HEART RATE: 66 BPM | WEIGHT: 174 LBS | OXYGEN SATURATION: 99 % | HEIGHT: 69 IN | BODY MASS INDEX: 25.77 KG/M2

## 2019-06-28 DIAGNOSIS — Z98.89 OTHER SPECIFIED POSTPROCEDURAL STATES: Chronic | ICD-10-CM

## 2019-06-28 DIAGNOSIS — Z86.69 PERSONAL HISTORY OF OTHER DISEASES OF THE NERVOUS SYSTEM AND SENSE ORGANS: Chronic | ICD-10-CM

## 2019-06-28 LAB — GLUCOSE BLDC GLUCOMTR-MCNC: 215

## 2019-06-28 RX ORDER — METFORMIN HYDROCHLORIDE 500 MG/1
500 TABLET, COATED ORAL TWICE DAILY
Qty: 60 | Refills: 2 | Status: DISCONTINUED | COMMUNITY
Start: 2019-04-19 | End: 2019-06-28

## 2019-06-28 NOTE — PHYSICAL EXAM
[No Acute Distress] : no acute distress [Well Nourished] : well nourished [Well Developed] : well developed [Well-Appearing] : well-appearing [No Lymphadenopathy] : no lymphadenopathy [No Respiratory Distress] : no respiratory distress  [Clear to Auscultation] : lungs were clear to auscultation bilaterally [No Accessory Muscle Use] : no accessory muscle use [Normal Percussion] : the chest was normal to percussion [Normal Rate] : normal rate  [Regular Rhythm] : with a regular rhythm [No Murmur] : no murmur heard [Normal S1, S2] : normal S1 and S2

## 2019-06-28 NOTE — REVIEW OF SYSTEMS
[Negative] : Cardiovascular [Chest Pain] : no chest pain [Palpitations] : no palpitations [Lower Ext Edema] : no lower extremity edema [Claudication] : no  leg claudication [Orthopena] : no orthopnea [Paroysmal Nocturnal Dyspnea] : no paroysmal nocturnal dyspnea [Shortness Of Breath] : no shortness of breath [Wheezing] : no wheezing [Cough] : no cough [Dyspnea on Exertion] : not dyspnea on exertion [Abdominal Pain] : no abdominal pain [Constipation] : no constipation [Nausea] : no nausea [Diarrhea] : no diarrhea [Vomiting] : no vomiting [Heartburn] : no heartburn [Melena] : no melena

## 2019-06-28 NOTE — HISTORY OF PRESENT ILLNESS
[FreeTextEntry1] : Blood glucose control [de-identified] : Pt states his blood glucose has been elevated. He checks it at home in the morning before breakfast. It is usually >210-220. He states he stopped the metformin that was prescribed due to diarrhea and a 10 lb weight loss. He only took it for 3 weeks. He still takes the glipizide.

## 2019-07-01 ENCOUNTER — APPOINTMENT (OUTPATIENT)
Dept: UROLOGY | Facility: CLINIC | Age: 74
End: 2019-07-01
Payer: MEDICARE

## 2019-07-01 VITALS
HEART RATE: 85 BPM | SYSTOLIC BLOOD PRESSURE: 115 MMHG | HEIGHT: 69 IN | TEMPERATURE: 98.3 F | BODY MASS INDEX: 25.77 KG/M2 | WEIGHT: 174 LBS | DIASTOLIC BLOOD PRESSURE: 72 MMHG

## 2019-07-01 PROCEDURE — 99213 OFFICE O/P EST LOW 20 MIN: CPT

## 2019-07-03 LAB
PSA FREE FLD-MCNC: 20 %
PSA FREE SERPL-MCNC: 0.55 NG/ML
PSA SERPL-MCNC: 2.77 NG/ML

## 2019-07-08 ENCOUNTER — APPOINTMENT (OUTPATIENT)
Dept: INTERNAL MEDICINE | Facility: CLINIC | Age: 74
End: 2019-07-08

## 2019-07-08 ENCOUNTER — OUTPATIENT (OUTPATIENT)
Dept: OUTPATIENT SERVICES | Facility: HOSPITAL | Age: 74
LOS: 1 days | End: 2019-07-08

## 2019-07-08 DIAGNOSIS — Z86.69 PERSONAL HISTORY OF OTHER DISEASES OF THE NERVOUS SYSTEM AND SENSE ORGANS: Chronic | ICD-10-CM

## 2019-07-08 DIAGNOSIS — Z98.89 OTHER SPECIFIED POSTPROCEDURAL STATES: Chronic | ICD-10-CM

## 2019-07-08 DIAGNOSIS — Z79.01 LONG TERM (CURRENT) USE OF ANTICOAGULANTS: ICD-10-CM

## 2019-07-08 LAB
INR PPP: 2.6 RATIO
POCT-PROTHROMBIN TIME: 31.4 SECS
QUALITY CONTROL: YES

## 2019-07-16 ENCOUNTER — APPOINTMENT (OUTPATIENT)
Dept: NEUROLOGY | Facility: HOSPITAL | Age: 74
End: 2019-07-16

## 2019-07-17 NOTE — END OF VISIT
[] : Fellow [FreeTextEntry3] : As modified and discussed with patient\par MD [facility]\par Associate Professor of Medicine\par [facility] [employer]\par   Epidermal Closure Graft Donor Site (Optional): simple interrupted

## 2019-07-22 ENCOUNTER — RESULT CHARGE (OUTPATIENT)
Age: 74
End: 2019-07-22

## 2019-07-22 ENCOUNTER — OTHER (OUTPATIENT)
Age: 74
End: 2019-07-22

## 2019-07-22 ENCOUNTER — APPOINTMENT (OUTPATIENT)
Dept: INTERNAL MEDICINE | Facility: CLINIC | Age: 74
End: 2019-07-22

## 2019-07-22 ENCOUNTER — OUTPATIENT (OUTPATIENT)
Dept: OUTPATIENT SERVICES | Facility: HOSPITAL | Age: 74
LOS: 1 days | End: 2019-07-22

## 2019-07-22 DIAGNOSIS — Z98.89 OTHER SPECIFIED POSTPROCEDURAL STATES: Chronic | ICD-10-CM

## 2019-07-22 DIAGNOSIS — Z86.69 PERSONAL HISTORY OF OTHER DISEASES OF THE NERVOUS SYSTEM AND SENSE ORGANS: Chronic | ICD-10-CM

## 2019-07-29 ENCOUNTER — OUTPATIENT (OUTPATIENT)
Dept: OUTPATIENT SERVICES | Facility: HOSPITAL | Age: 74
LOS: 1 days | End: 2019-07-29

## 2019-07-29 ENCOUNTER — APPOINTMENT (OUTPATIENT)
Dept: INTERNAL MEDICINE | Facility: CLINIC | Age: 74
End: 2019-07-29

## 2019-07-29 ENCOUNTER — RX RENEWAL (OUTPATIENT)
Age: 74
End: 2019-07-29

## 2019-07-29 DIAGNOSIS — Z86.69 PERSONAL HISTORY OF OTHER DISEASES OF THE NERVOUS SYSTEM AND SENSE ORGANS: Chronic | ICD-10-CM

## 2019-07-29 DIAGNOSIS — Z98.89 OTHER SPECIFIED POSTPROCEDURAL STATES: Chronic | ICD-10-CM

## 2019-07-29 LAB
INR PPP: 3.4 RATIO
POCT-PROTHROMBIN TIME: 40.2 SECS
QUALITY CONTROL: YES

## 2019-08-01 ENCOUNTER — LABORATORY RESULT (OUTPATIENT)
Age: 74
End: 2019-08-01

## 2019-08-01 ENCOUNTER — OUTPATIENT (OUTPATIENT)
Dept: OUTPATIENT SERVICES | Facility: HOSPITAL | Age: 74
LOS: 1 days | End: 2019-08-01

## 2019-08-01 ENCOUNTER — APPOINTMENT (OUTPATIENT)
Dept: INTERNAL MEDICINE | Facility: CLINIC | Age: 74
End: 2019-08-01
Payer: MEDICARE

## 2019-08-01 VITALS — OXYGEN SATURATION: 64 % | HEART RATE: 96 BPM | HEIGHT: 69 IN | BODY MASS INDEX: 25.77 KG/M2 | WEIGHT: 174 LBS

## 2019-08-01 DIAGNOSIS — Z98.89 OTHER SPECIFIED POSTPROCEDURAL STATES: Chronic | ICD-10-CM

## 2019-08-01 DIAGNOSIS — Z86.69 PERSONAL HISTORY OF OTHER DISEASES OF THE NERVOUS SYSTEM AND SENSE ORGANS: Chronic | ICD-10-CM

## 2019-08-01 DIAGNOSIS — H91.90 UNSPECIFIED HEARING LOSS, UNSPECIFIED EAR: ICD-10-CM

## 2019-08-01 LAB — GLUCOSE BLDC GLUCOMTR-MCNC: 103

## 2019-08-01 PROCEDURE — 99214 OFFICE O/P EST MOD 30 MIN: CPT | Mod: GC

## 2019-08-02 DIAGNOSIS — R27.0 ATAXIA, UNSPECIFIED: ICD-10-CM

## 2019-08-02 DIAGNOSIS — G40.909 EPILEPSY, UNSPECIFIED, NOT INTRACTABLE, WITHOUT STATUS EPILEPTICUS: ICD-10-CM

## 2019-08-02 DIAGNOSIS — H91.90 UNSPECIFIED HEARING LOSS, UNSPECIFIED EAR: ICD-10-CM

## 2019-08-02 DIAGNOSIS — E11.40 TYPE 2 DIABETES MELLITUS WITH DIABETIC NEUROPATHY, UNSPECIFIED: ICD-10-CM

## 2019-08-02 LAB
CREAT UR-MCNC: 178 MG/DL — SIGNIFICANT CHANGE UP
MICROALBUMIN UR-MCNC: 6.1 MG/DL — SIGNIFICANT CHANGE UP
MICROALBUMIN/CREAT UR-RTO: 34 MG/G — HIGH (ref 0–30)

## 2019-08-02 NOTE — END OF VISIT
[FreeTextEntry3] : 73 year old man here for follow-up of diabetes-has been on glipizide ER 10mg daily for a long time; in April 2019 A1c found to be 8.6% up from 7.5% in January 2019 and metformin 500mg was added to the regimen. Patient reported that metformin caused significant intolerable bloating, abdominal pain, and diarrhea so he self-discontinued it prior to his follow-up visit in June, at which time januvia 100mg po daily was added. He has been taking the januvia without issues for the past month and reports that his glucose at home is not as high as previously but still in 190-200 range. Also reports ongoing chronic back pain without changes, but also with some imbalance when he walks/stands up. Denies vertigo, dizziness, lightheadedness, or nausea. Reports some hearing loss and tinnitus; has vision only in right eye (h/o left eye surgery 20y ago and since then no vision in that eye); no changes to vision since he last saw his ophthalmologist (in [place], a few months ago). Denies falls. On exam, his blood pressure today is 110/80; no nystagmus; normal hearing to finger rub but some difficulty with hearing speech in room during interview. Mild dysmetria with FTN testing, normal diadochokinesia. Romberg test negative. Monofilament examination with mild sensory loss on plantar surfaces; also noted to have onychomycosis of first toes. Significant difficulty walking toe to heel. MRI Brain from 6/2019 reviewed and with chronic cerebellar infarcts. \par -he is likely experiencing mild ataxia from his chronic cerebellar infarcts and also possible role of diabetic neuropathy with decreased sensation in soles of feet; he is on secondary prevention for CVD (significant h/o CAD/CABG). We discussed referral to PMR for vestibular conditioning and evaluation for an assistive device to prevent falls. \par -no signs of vertigo/nystagmus but he does report some hearing loss. Will send for audiogram. \par -given he has only been on januvia for 1 month, will hold off on rechecking A1c at this time. If remains above goal (A1c ~7.5%), would consider adding SGLT-2i for CV benefit.

## 2019-08-02 NOTE — PLAN
[FreeTextEntry1] : 73 year old male with HTN, CAD s/p CABG, RHD s/p MVR on Coumadin, T2DM, Afib, Rosie Thompson tears and esophageal ulcers here for follow up.\par \par 1. DM2\par -poc glucose 103\par -c/w glipizide and januvia\par -check a1c next visit\par -encourages to see optho\par \par 2. Seizure Disorder\par -sees neuro\par -not currently on AEDs\par -MRI 6/10/2019:  chronic microvascular disease multiple chronic cerebellar lacunar type infarcts are noted. A \par larger infarct is noted along the posterior right cerebellar hemisphere\par \par 3. Imbalance\par -MRI findings likely explanation of issues\par -referral sent to PM&R\par \par 4. Hearing Loss\par -referral sent for audiology testing\par \par D/w Dr. Melendez\par RTC 10 weeks

## 2019-08-02 NOTE — REVIEW OF SYSTEMS
[Back Pain] : back pain [Unsteady Walk] : ataxia [Negative] : Neurological [Joint Pain] : no joint pain [Joint Stiffness] : no joint stiffness [Muscle Pain] : no muscle pain [Muscle Weakness] : no muscle weakness [Joint Swelling] : no joint swelling [Dizziness] : no dizziness [Headache] : no headache [Fainting] : no fainting [Confusion] : no confusion [Memory Loss] : no memory loss [FreeTextEntry4] : tinnitus

## 2019-08-02 NOTE — PHYSICAL EXAM
[No Acute Distress] : no acute distress [Well Developed] : well developed [Well Nourished] : well nourished [Well-Appearing] : well-appearing [Normal Sclera/Conjunctiva] : normal sclera/conjunctiva [PERRL] : pupils equal round and reactive to light [EOMI] : extraocular movements intact [Normal Outer Ear/Nose] : the outer ears and nose were normal in appearance [Normal Oropharynx] : the oropharynx was normal [No JVD] : no jugular venous distention [No Lymphadenopathy] : no lymphadenopathy [Supple] : supple [Thyroid Normal, No Nodules] : the thyroid was normal and there were no nodules present [No Respiratory Distress] : no respiratory distress  [Clear to Auscultation] : lungs were clear to auscultation bilaterally [No Accessory Muscle Use] : no accessory muscle use [Regular Rhythm] : with a regular rhythm [Normal Rate] : normal rate  [Normal S1, S2] : normal S1 and S2 [No Murmur] : no murmur heard [No Carotid Bruits] : no carotid bruits [No Varicosities] : no varicosities [No Abdominal Bruit] : a ~M bruit was not heard ~T in the abdomen [Pedal Pulses Present] : the pedal pulses are present [No Edema] : there was no peripheral edema [No Palpable Aorta] : no palpable aorta [No Extremity Clubbing/Cyanosis] : no extremity clubbing/cyanosis [Non Tender] : non-tender [Soft] : abdomen soft [Non-distended] : non-distended [No HSM] : no HSM [No Masses] : no abdominal mass palpated [Normal Bowel Sounds] : normal bowel sounds [Normal Posterior Cervical Nodes] : no posterior cervical lymphadenopathy [Normal Anterior Cervical Nodes] : no anterior cervical lymphadenopathy [No CVA Tenderness] : no CVA  tenderness [No Spinal Tenderness] : no spinal tenderness [Grossly Normal Strength/Tone] : grossly normal strength/tone [No Joint Swelling] : no joint swelling [No Rash] : no rash [No Focal Deficits] : no focal deficits [Coordination Grossly Intact] : coordination grossly intact [Deep Tendon Reflexes (DTR)] : deep tendon reflexes were 2+ and symmetric [Normal] : the sensory exam was normal [Cranial Nerves Oculomotor (III)] : the extraocular motions were intact [Normal Affect] : the affect was normal [Normal Insight/Judgement] : insight and judgment were intact [Right Foot Was Examined] : Right foot ~C was examined [Left Foot Was Examined] : left foot ~C was examined [] : right foot [Romberg's Sign] : Romberg's sign was negtive [Dysdiadochokinesia Bilaterally] : not present [Dysdiadochokinesia On The Right] : not present on the ride side [Dysdiadochokinesia On The Left] : not present on the left side [Coordination - Dysmetria Impaired Finger-to-Nose Bilateral] : not present [Coordination - Dysmetria Impaired Finger-to-Nose Right Only] : not present on the ride side [Coordination - Dysmetria Impaired Finger-to-Nose Left Only] : not present on the left side [Coordination - Dysmetria Impaired Heel-to-Shin Bilateral] : not present [Coordination Dysmetria Impaired Heel-Shin Using Right Heel] : not present on the ride side [Coordination Dysmetria Impaired Heel-to-Shin Using Left Heel] : not present on the left side [de-identified] : Blind L eye [de-identified] : Heel to toe walking abnormal

## 2019-08-02 NOTE — HISTORY OF PRESENT ILLNESS
[FreeTextEntry1] : Here for f/u [de-identified] : 73 year old male with HTN, CAD s/p CABG, RHD s/p MVR on Coumadin, T2DM, Afib, Rosie Thompson tears and esophageal ulcers here for follow up for diabetes. \par \par He takes his glucose measurements at home, usually 190-200s in mornings before breakfast. He is also complaining of back pain that radiates to his stomach on right. He also complains of issues with balance. Does not endorse dizziness, lightheadedness, or weakness in LEs. He had an episode this morning where he stepped out of bed and felt like he was going to fall, but steadied himself before doing so.

## 2019-08-05 ENCOUNTER — OUTPATIENT (OUTPATIENT)
Dept: OUTPATIENT SERVICES | Facility: HOSPITAL | Age: 74
LOS: 1 days | End: 2019-08-05

## 2019-08-05 ENCOUNTER — APPOINTMENT (OUTPATIENT)
Dept: INTERNAL MEDICINE | Facility: CLINIC | Age: 74
End: 2019-08-05

## 2019-08-05 DIAGNOSIS — Z98.89 OTHER SPECIFIED POSTPROCEDURAL STATES: Chronic | ICD-10-CM

## 2019-08-05 DIAGNOSIS — Z86.69 PERSONAL HISTORY OF OTHER DISEASES OF THE NERVOUS SYSTEM AND SENSE ORGANS: Chronic | ICD-10-CM

## 2019-08-07 DIAGNOSIS — Z79.01 LONG TERM (CURRENT) USE OF ANTICOAGULANTS: ICD-10-CM

## 2019-08-07 LAB
INR PPP: 3.3 RATIO
POCT-PROTHROMBIN TIME: 39.9 SECS
QUALITY CONTROL: YES

## 2019-08-19 ENCOUNTER — RX RENEWAL (OUTPATIENT)
Age: 74
End: 2019-08-19

## 2019-08-19 ENCOUNTER — APPOINTMENT (OUTPATIENT)
Dept: INTERNAL MEDICINE | Facility: CLINIC | Age: 74
End: 2019-08-19

## 2019-08-19 ENCOUNTER — OUTPATIENT (OUTPATIENT)
Dept: OUTPATIENT SERVICES | Facility: HOSPITAL | Age: 74
LOS: 1 days | End: 2019-08-19

## 2019-08-19 DIAGNOSIS — Z79.01 LONG TERM (CURRENT) USE OF ANTICOAGULANTS: ICD-10-CM

## 2019-08-19 DIAGNOSIS — Z98.89 OTHER SPECIFIED POSTPROCEDURAL STATES: Chronic | ICD-10-CM

## 2019-08-19 DIAGNOSIS — Z86.69 PERSONAL HISTORY OF OTHER DISEASES OF THE NERVOUS SYSTEM AND SENSE ORGANS: Chronic | ICD-10-CM

## 2019-08-19 LAB
INR PPP: 3.3 RATIO
POCT-PROTHROMBIN TIME: 39.7 SECS
QUALITY CONTROL: YES

## 2019-09-03 ENCOUNTER — OUTPATIENT (OUTPATIENT)
Dept: OUTPATIENT SERVICES | Facility: HOSPITAL | Age: 74
LOS: 1 days | End: 2019-09-03

## 2019-09-03 ENCOUNTER — APPOINTMENT (OUTPATIENT)
Dept: INTERNAL MEDICINE | Facility: CLINIC | Age: 74
End: 2019-09-03

## 2019-09-03 DIAGNOSIS — Z98.89 OTHER SPECIFIED POSTPROCEDURAL STATES: Chronic | ICD-10-CM

## 2019-09-03 DIAGNOSIS — Z79.01 LONG TERM (CURRENT) USE OF ANTICOAGULANTS: ICD-10-CM

## 2019-09-03 DIAGNOSIS — Z86.69 PERSONAL HISTORY OF OTHER DISEASES OF THE NERVOUS SYSTEM AND SENSE ORGANS: Chronic | ICD-10-CM

## 2019-09-06 LAB
INR PPP: 1.6 RATIO
POCT-PROTHROMBIN TIME: 19.2 SECS

## 2019-09-09 ENCOUNTER — NON-APPOINTMENT (OUTPATIENT)
Age: 74
End: 2019-09-09

## 2019-09-09 ENCOUNTER — APPOINTMENT (OUTPATIENT)
Dept: OPHTHALMOLOGY | Facility: CLINIC | Age: 74
End: 2019-09-09
Payer: MEDICARE

## 2019-09-09 PROCEDURE — 92012 INTRM OPH EXAM EST PATIENT: CPT

## 2019-09-10 ENCOUNTER — OUTPATIENT (OUTPATIENT)
Dept: OUTPATIENT SERVICES | Facility: HOSPITAL | Age: 74
LOS: 1 days | End: 2019-09-10

## 2019-09-10 ENCOUNTER — APPOINTMENT (OUTPATIENT)
Dept: INTERNAL MEDICINE | Facility: CLINIC | Age: 74
End: 2019-09-10

## 2019-09-10 DIAGNOSIS — Z86.69 PERSONAL HISTORY OF OTHER DISEASES OF THE NERVOUS SYSTEM AND SENSE ORGANS: Chronic | ICD-10-CM

## 2019-09-10 DIAGNOSIS — Z79.01 LONG TERM (CURRENT) USE OF ANTICOAGULANTS: ICD-10-CM

## 2019-09-10 DIAGNOSIS — Z95.2 PRESENCE OF PROSTHETIC HEART VALVE: ICD-10-CM

## 2019-09-10 DIAGNOSIS — Z98.89 OTHER SPECIFIED POSTPROCEDURAL STATES: Chronic | ICD-10-CM

## 2019-09-11 ENCOUNTER — OUTPATIENT (OUTPATIENT)
Dept: OUTPATIENT SERVICES | Facility: HOSPITAL | Age: 74
LOS: 1 days | End: 2019-09-11

## 2019-09-11 ENCOUNTER — APPOINTMENT (OUTPATIENT)
Dept: INTERNAL MEDICINE | Facility: CLINIC | Age: 74
End: 2019-09-11
Payer: MEDICARE

## 2019-09-11 ENCOUNTER — LABORATORY RESULT (OUTPATIENT)
Age: 74
End: 2019-09-11

## 2019-09-11 ENCOUNTER — RESULT CHARGE (OUTPATIENT)
Age: 74
End: 2019-09-11

## 2019-09-11 ENCOUNTER — OTHER (OUTPATIENT)
Age: 74
End: 2019-09-11

## 2019-09-11 VITALS — HEART RATE: 64 BPM | SYSTOLIC BLOOD PRESSURE: 120 MMHG | DIASTOLIC BLOOD PRESSURE: 64 MMHG

## 2019-09-11 VITALS — WEIGHT: 170.13 LBS | BODY MASS INDEX: 25.2 KG/M2 | OXYGEN SATURATION: 98 % | HEART RATE: 74 BPM | HEIGHT: 69 IN

## 2019-09-11 VITALS — SYSTOLIC BLOOD PRESSURE: 112 MMHG | DIASTOLIC BLOOD PRESSURE: 70 MMHG

## 2019-09-11 DIAGNOSIS — Z86.69 PERSONAL HISTORY OF OTHER DISEASES OF THE NERVOUS SYSTEM AND SENSE ORGANS: Chronic | ICD-10-CM

## 2019-09-11 DIAGNOSIS — Z98.89 OTHER SPECIFIED POSTPROCEDURAL STATES: Chronic | ICD-10-CM

## 2019-09-11 LAB
ANION GAP SERPL CALC-SCNC: 11 MMO/L — SIGNIFICANT CHANGE UP (ref 7–14)
BASOPHILS # BLD AUTO: 0.06 K/UL — SIGNIFICANT CHANGE UP (ref 0–0.2)
BASOPHILS NFR BLD AUTO: 0.7 % — SIGNIFICANT CHANGE UP (ref 0–2)
BUN SERPL-MCNC: 14 MG/DL — SIGNIFICANT CHANGE UP (ref 7–23)
CALCIUM SERPL-MCNC: 9.6 MG/DL — SIGNIFICANT CHANGE UP (ref 8.4–10.5)
CHLORIDE SERPL-SCNC: 105 MMOL/L — SIGNIFICANT CHANGE UP (ref 98–107)
CO2 SERPL-SCNC: 26 MMOL/L — SIGNIFICANT CHANGE UP (ref 22–31)
CREAT SERPL-MCNC: 0.95 MG/DL — SIGNIFICANT CHANGE UP (ref 0.5–1.3)
EOSINOPHIL # BLD AUTO: 0.15 K/UL — SIGNIFICANT CHANGE UP (ref 0–0.5)
EOSINOPHIL NFR BLD AUTO: 1.8 % — SIGNIFICANT CHANGE UP (ref 0–6)
GLUCOSE BLDC GLUCOMTR-MCNC: 157
GLUCOSE SERPL-MCNC: 100 MG/DL — HIGH (ref 70–99)
HCT VFR BLD CALC: 47.9 % — SIGNIFICANT CHANGE UP (ref 39–50)
HGB BLD-MCNC: 15.4 G/DL — SIGNIFICANT CHANGE UP (ref 13–17)
IMM GRANULOCYTES NFR BLD AUTO: 1 % — SIGNIFICANT CHANGE UP (ref 0–1.5)
INR PPP: 2.9 RATIO
LYMPHOCYTES # BLD AUTO: 1.97 K/UL — SIGNIFICANT CHANGE UP (ref 1–3.3)
LYMPHOCYTES # BLD AUTO: 23.8 % — SIGNIFICANT CHANGE UP (ref 13–44)
MCHC RBC-ENTMCNC: 30.4 PG — SIGNIFICANT CHANGE UP (ref 27–34)
MCHC RBC-ENTMCNC: 32.2 % — SIGNIFICANT CHANGE UP (ref 32–36)
MCV RBC AUTO: 94.7 FL — SIGNIFICANT CHANGE UP (ref 80–100)
MONOCYTES # BLD AUTO: 0.79 K/UL — SIGNIFICANT CHANGE UP (ref 0–0.9)
MONOCYTES NFR BLD AUTO: 9.6 % — SIGNIFICANT CHANGE UP (ref 2–14)
NEUTROPHILS # BLD AUTO: 5.22 K/UL — SIGNIFICANT CHANGE UP (ref 1.8–7.4)
NEUTROPHILS NFR BLD AUTO: 63.1 % — SIGNIFICANT CHANGE UP (ref 43–77)
NRBC # FLD: 0 K/UL — SIGNIFICANT CHANGE UP (ref 0–0)
PLATELET # BLD AUTO: 207 K/UL — SIGNIFICANT CHANGE UP (ref 150–400)
PMV BLD: 10.8 FL — SIGNIFICANT CHANGE UP (ref 7–13)
POCT-PROTHROMBIN TIME: 34.6 SECS
POTASSIUM SERPL-MCNC: 4.5 MMOL/L — SIGNIFICANT CHANGE UP (ref 3.5–5.3)
POTASSIUM SERPL-SCNC: 4.5 MMOL/L — SIGNIFICANT CHANGE UP (ref 3.5–5.3)
RBC # BLD: 5.06 M/UL — SIGNIFICANT CHANGE UP (ref 4.2–5.8)
RBC # FLD: 13 % — SIGNIFICANT CHANGE UP (ref 10.3–14.5)
SODIUM SERPL-SCNC: 142 MMOL/L — SIGNIFICANT CHANGE UP (ref 135–145)
WBC # BLD: 8.27 K/UL — SIGNIFICANT CHANGE UP (ref 3.8–10.5)
WBC # FLD AUTO: 8.27 K/UL — SIGNIFICANT CHANGE UP (ref 3.8–10.5)

## 2019-09-11 PROCEDURE — 99214 OFFICE O/P EST MOD 30 MIN: CPT | Mod: GC

## 2019-09-12 DIAGNOSIS — R31.0 GROSS HEMATURIA: ICD-10-CM

## 2019-09-12 DIAGNOSIS — I10 ESSENTIAL (PRIMARY) HYPERTENSION: ICD-10-CM

## 2019-09-12 DIAGNOSIS — R10.13 EPIGASTRIC PAIN: ICD-10-CM

## 2019-09-12 DIAGNOSIS — R42 DIZZINESS AND GIDDINESS: ICD-10-CM

## 2019-09-12 DIAGNOSIS — K92.1 MELENA: ICD-10-CM

## 2019-09-12 DIAGNOSIS — E11.40 TYPE 2 DIABETES MELLITUS WITH DIABETIC NEUROPATHY, UNSPECIFIED: ICD-10-CM

## 2019-09-12 DIAGNOSIS — I25.10 ATHEROSCLEROTIC HEART DISEASE OF NATIVE CORONARY ARTERY WITHOUT ANGINA PECTORIS: ICD-10-CM

## 2019-09-12 DIAGNOSIS — I48.91 UNSPECIFIED ATRIAL FIBRILLATION: ICD-10-CM

## 2019-09-12 DIAGNOSIS — R80.9 PROTEINURIA, UNSPECIFIED: ICD-10-CM

## 2019-09-12 DIAGNOSIS — Z79.01 LONG TERM (CURRENT) USE OF ANTICOAGULANTS: ICD-10-CM

## 2019-09-12 DIAGNOSIS — R10.9 UNSPECIFIED ABDOMINAL PAIN: ICD-10-CM

## 2019-09-12 DIAGNOSIS — Z95.2 PRESENCE OF PROSTHETIC HEART VALVE: ICD-10-CM

## 2019-09-12 NOTE — HISTORY OF PRESENT ILLNESS
[Mild] : mild [___ Weeks ago] : [unfilled] weeks ago [Episodic] : episodic [Abdominal Pain] : abdominal pain [Stress] : with stress [Improving] : improving [Nausea] : no nausea [Vomiting] : no vomiting [Diarrhea] : no diarrhea [Constipation] : no constipation [Anorexia] : no anorexia [Sore Throat] : no sore throat [de-identified] : melena x 1 week  [FreeTextEntry5] : eating  [FreeTextEntry8] : 73 yo male with a PMHx of HTN, CAD s/p CABG, RHD with mechanical MVR, afib on coumadin, T2DM on oral agents, Rosie-Thompson tear, external hemorrhoids s/p hemorrhoidectomy, diverticulosis (last colonoscopy 2014) and seizure disorder presenting for melena x 1 week. Patient presented yesterday for INR check and left without being seen by provider. Patient was phoned and c/o light headedness, melena x 1 week, one episode of slight hematuria, and light headedness. Patient has been taking coumadin daily 5 mg on Sunday and Monday and 4 mg every other day. Asked to go to ED and refused. Presenting today for urgent f/u. INR 2.9 yesterday. INR today 2.8 today. \par \par Episodes of melena coincide with diffuse stomach discomfort. Denies n/v/diarrhea. Denies syncope, blurred vision or falls. Denies recent travel, sick contacts, or eating unusual foods. Patient has persistent black stools and light headedness today. Of note patient has been prescribed pantoprazole 40 mg BID which he has not been taking x 6-8 mos. Also of note, patient has a prior hx of GIB requiring transfusion. \par \par Of note, patient with hx of microalbuminuria. Has been holding losartan x 6 mos as he thinks it will exacerbate his bleeding risk on coumadin. \par

## 2019-09-12 NOTE — PLAN
[FreeTextEntry1] : 75 yo male with a PMHx of HTN, CAD s/p CABG, RHD with mechanical MVR, afib on coumadin, T2DM on oral agents, Rosie-Thompson tear, external hemorrhoids s/p hemorrhoidectomy, diverticulosis (last colonoscopy 2014) and seizure disorder presenting for melena x1 week with associated light headedness and hematuria in the setting of coumadin use suggestive of UGIB\par \par #melena:\par - melenic stools and light headedness x 1 week suggestive of UGIB\par - hx of prior GIB requiring transfusion; non-compliant on pantoprazole for dyspepsia \par - patient not on iron supplementation that may explain melena\par - orthostatics negative from supine to sitting, though HR not good indicator as patient currently beta blocked \par Plan:\par - patient counseled on risk of continuing coumadin in setting of GIB vs risk of holding coumadin in setting of mechanical mitral valve; patient verbalized understanding and is agreeable with plan to hold coumadin for one day \par - restart coumadin in AM if CBC stable \par - restart pantoprazole 40 mg BID  \par - referred patient to GI for GI w/u\par - patient advised on need for hospitalization for GI w/u if CBC unstable; patient amenable to plan\par \par #microalbuminuria in setting of T2DM:\par - patient not taking losartan \par - will continue to hold in setting of GIB\par \par Patient seen and discussed with Dr. Melendez.\par \par RTC in 1 week for INR check if CBC stable; otherwise refer to ED for GI w/u.\par \par \par \par \par \par

## 2019-09-12 NOTE — END OF VISIT
[] : Resident [FreeTextEntry3] : 74M h/o HTN, CAD s/p CABG, mechanical MVR and Afib on coumadin, type 2 diabetes, and h/o hemorrhoids, diverticulosis, MW tear who presents for acute visit with 1 week of melena with intermittent abd pain and hematuria; INR yesterday was therapeutic at 2.9, no black stools for the past couple of days; noted postural dizziness today. On exam he is well appearing and hemodynamically stable without orthostatic hypotension. He would prefer to not go to ED; we will check INR to see if rising, check CBC and call to send to ED only if significant drop/requiring transfusion or if ongoing bleeding. Will need to see GI to evaluate source of bleed. \par 9/12 addendum: Hb stable/normal, INR 2.8-will refer for outpatient GI evaluation.

## 2019-09-12 NOTE — PHYSICAL EXAM
[Normal S1, S2] : normal S1 and S2 [Soft] : abdomen soft [Non-distended] : non-distended [No HSM] : no HSM [Normal Bowel Sounds] : normal bowel sounds [No Hernias] : no hernias [Normal] : no CVA or spinal tenderness [de-identified] : Irregularly irregular rhythm, holocystolic murmur with murmur of mechanical valve  [de-identified] : TTP epigastric region, no guarding, rebound or rigidity

## 2019-09-19 ENCOUNTER — APPOINTMENT (OUTPATIENT)
Dept: INTERNAL MEDICINE | Facility: CLINIC | Age: 74
End: 2019-09-19

## 2019-09-19 ENCOUNTER — OUTPATIENT (OUTPATIENT)
Dept: OUTPATIENT SERVICES | Facility: HOSPITAL | Age: 74
LOS: 1 days | End: 2019-09-19

## 2019-09-19 DIAGNOSIS — Z86.69 PERSONAL HISTORY OF OTHER DISEASES OF THE NERVOUS SYSTEM AND SENSE ORGANS: Chronic | ICD-10-CM

## 2019-09-19 DIAGNOSIS — Z79.01 LONG TERM (CURRENT) USE OF ANTICOAGULANTS: ICD-10-CM

## 2019-09-19 DIAGNOSIS — Z98.89 OTHER SPECIFIED POSTPROCEDURAL STATES: Chronic | ICD-10-CM

## 2019-09-19 LAB
INR PPP: 2.8 RATIO
POCT-PROTHROMBIN TIME: 33.3 SECS

## 2019-09-20 ENCOUNTER — APPOINTMENT (OUTPATIENT)
Dept: INTERNAL MEDICINE | Facility: CLINIC | Age: 74
End: 2019-09-20

## 2019-09-27 ENCOUNTER — APPOINTMENT (OUTPATIENT)
Dept: INTERNAL MEDICINE | Facility: CLINIC | Age: 74
End: 2019-09-27

## 2019-09-27 ENCOUNTER — OUTPATIENT (OUTPATIENT)
Dept: OUTPATIENT SERVICES | Facility: HOSPITAL | Age: 74
LOS: 1 days | End: 2019-09-27

## 2019-09-27 DIAGNOSIS — Z98.89 OTHER SPECIFIED POSTPROCEDURAL STATES: Chronic | ICD-10-CM

## 2019-09-27 DIAGNOSIS — Z86.69 PERSONAL HISTORY OF OTHER DISEASES OF THE NERVOUS SYSTEM AND SENSE ORGANS: Chronic | ICD-10-CM

## 2019-09-27 DIAGNOSIS — I48.91 UNSPECIFIED ATRIAL FIBRILLATION: ICD-10-CM

## 2019-09-27 DIAGNOSIS — Z79.01 LONG TERM (CURRENT) USE OF ANTICOAGULANTS: ICD-10-CM

## 2019-09-27 DIAGNOSIS — Z95.2 PRESENCE OF PROSTHETIC HEART VALVE: ICD-10-CM

## 2019-09-27 LAB
INR PPP: 2.7 RATIO
POCT-PROTHROMBIN TIME: 32 SECS
QUALITY CONTROL: YES

## 2019-10-11 ENCOUNTER — APPOINTMENT (OUTPATIENT)
Dept: INTERNAL MEDICINE | Facility: CLINIC | Age: 74
End: 2019-10-11
Payer: MEDICARE

## 2019-10-11 ENCOUNTER — OUTPATIENT (OUTPATIENT)
Dept: OUTPATIENT SERVICES | Facility: HOSPITAL | Age: 74
LOS: 1 days | End: 2019-10-11

## 2019-10-11 VITALS — OXYGEN SATURATION: 99 % | HEART RATE: 100 BPM | HEIGHT: 69 IN | BODY MASS INDEX: 25.05 KG/M2 | WEIGHT: 169.13 LBS

## 2019-10-11 DIAGNOSIS — Z86.69 PERSONAL HISTORY OF OTHER DISEASES OF THE NERVOUS SYSTEM AND SENSE ORGANS: Chronic | ICD-10-CM

## 2019-10-11 DIAGNOSIS — Z98.89 OTHER SPECIFIED POSTPROCEDURAL STATES: Chronic | ICD-10-CM

## 2019-10-11 LAB
GLUCOSE BLDC GLUCOMTR-MCNC: 175
HBA1C MFR BLD HPLC: 6.7

## 2019-10-11 PROCEDURE — 99214 OFFICE O/P EST MOD 30 MIN: CPT | Mod: GC

## 2019-10-14 NOTE — ASSESSMENT
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 If Hb drops, will call and ask to move up GI appt\par -orthostatics done, BP stable at 120/80 -> 118/80 from supine to standing. Not symptomatic, no complaints of lightheadedness \par -pt counseled to go to ER if maria victoria blood in stool\par \par #DM2\par -poct glucose 175\par -c/w glipizide and januvia\par -POCT A1C\par -follows with optho at [facility]\par \par #Seizure Disorder\par -sees neuro\par -not currently on AEDs\par -MRI 6/10/2019: chronic microvascular disease multiple chronic cerebellar lacunar type infarcts are noted. A \par larger infarct is noted along the posterior right cerebellar hemisphere\par \par #Hearing Loss\par -2nd referral sent for audiology testing, ENT\par \par Bradford Brooks MD\par D/w Dr. Lubin\par RTC 10 weeks

## 2019-10-14 NOTE — HISTORY OF PRESENT ILLNESS
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 \par \par Of note, he presented for acute visit 9/11 with 1 week of melena with intermittent abd pain and hematuria. INR therapeutic. Hb stable, referred to GI for f/u. \par \par He continues to endorse melena x1 month. No hematuria. BMs 1x/day. No abdominal pain, N/V/D. Also c/o lightheadedness 1-2x/week, b/l ear tinnitus, and R eye pain x 6 months. He follows with [facility].

## 2019-10-14 NOTE — REVIEW OF SYSTEMS
[Recent Change In Weight] : ~T recent weight change [Pain] : pain [Melena] : melsteve [Dizziness] : dizziness [Negative] : Heme/Lymph [FreeTextEntry2] : 186 to 169 lbs over past year

## 2019-10-14 NOTE — END OF VISIT
[] : Resident [FreeTextEntry3] : Unclear etiology of melena, agree with plan for GI appointment in November, check CBC today to establish stability. Toby

## 2019-10-15 DIAGNOSIS — I10 ESSENTIAL (PRIMARY) HYPERTENSION: ICD-10-CM

## 2019-10-15 DIAGNOSIS — Z23 ENCOUNTER FOR IMMUNIZATION: ICD-10-CM

## 2019-10-15 DIAGNOSIS — R42 DIZZINESS AND GIDDINESS: ICD-10-CM

## 2019-10-15 DIAGNOSIS — E11.40 TYPE 2 DIABETES MELLITUS WITH DIABETIC NEUROPATHY, UNSPECIFIED: ICD-10-CM

## 2019-10-15 DIAGNOSIS — K92.1 MELENA: ICD-10-CM

## 2019-10-28 ENCOUNTER — APPOINTMENT (OUTPATIENT)
Dept: INTERNAL MEDICINE | Facility: CLINIC | Age: 74
End: 2019-10-28

## 2019-10-28 ENCOUNTER — LABORATORY RESULT (OUTPATIENT)
Age: 74
End: 2019-10-28

## 2019-10-28 ENCOUNTER — OUTPATIENT (OUTPATIENT)
Dept: OUTPATIENT SERVICES | Facility: HOSPITAL | Age: 74
LOS: 1 days | End: 2019-10-28

## 2019-10-28 DIAGNOSIS — Z98.89 OTHER SPECIFIED POSTPROCEDURAL STATES: Chronic | ICD-10-CM

## 2019-10-28 DIAGNOSIS — Z86.69 PERSONAL HISTORY OF OTHER DISEASES OF THE NERVOUS SYSTEM AND SENSE ORGANS: Chronic | ICD-10-CM

## 2019-10-28 LAB
BASOPHILS # BLD AUTO: 0.05 K/UL — SIGNIFICANT CHANGE UP (ref 0–0.2)
BASOPHILS NFR BLD AUTO: 0.6 % — SIGNIFICANT CHANGE UP (ref 0–2)
EOSINOPHIL # BLD AUTO: 0.12 K/UL — SIGNIFICANT CHANGE UP (ref 0–0.5)
EOSINOPHIL NFR BLD AUTO: 1.4 % — SIGNIFICANT CHANGE UP (ref 0–6)
HCT VFR BLD CALC: 46.7 % — SIGNIFICANT CHANGE UP (ref 39–50)
HGB BLD-MCNC: 16 G/DL — SIGNIFICANT CHANGE UP (ref 13–17)
IMM GRANULOCYTES NFR BLD AUTO: 1.4 % — SIGNIFICANT CHANGE UP (ref 0–1.5)
INR PPP: 3.5 RATIO
LYMPHOCYTES # BLD AUTO: 1.68 K/UL — SIGNIFICANT CHANGE UP (ref 1–3.3)
LYMPHOCYTES # BLD AUTO: 19.7 % — SIGNIFICANT CHANGE UP (ref 13–44)
MCHC RBC-ENTMCNC: 31.7 PG — SIGNIFICANT CHANGE UP (ref 27–34)
MCHC RBC-ENTMCNC: 34.3 % — SIGNIFICANT CHANGE UP (ref 32–36)
MCV RBC AUTO: 92.7 FL — SIGNIFICANT CHANGE UP (ref 80–100)
MONOCYTES # BLD AUTO: 0.61 K/UL — SIGNIFICANT CHANGE UP (ref 0–0.9)
MONOCYTES NFR BLD AUTO: 7.2 % — SIGNIFICANT CHANGE UP (ref 2–14)
NEUTROPHILS # BLD AUTO: 5.94 K/UL — SIGNIFICANT CHANGE UP (ref 1.8–7.4)
NEUTROPHILS NFR BLD AUTO: 69.7 % — SIGNIFICANT CHANGE UP (ref 43–77)
NRBC # FLD: 0 K/UL — SIGNIFICANT CHANGE UP (ref 0–0)
PLATELET # BLD AUTO: 216 K/UL — SIGNIFICANT CHANGE UP (ref 150–400)
PMV BLD: 10.9 FL — SIGNIFICANT CHANGE UP (ref 7–13)
POCT-PROTHROMBIN TIME: 41.5 SECS
QUALITY CONTROL: YES
RBC # BLD: 5.04 M/UL — SIGNIFICANT CHANGE UP (ref 4.2–5.8)
RBC # FLD: 13.1 % — SIGNIFICANT CHANGE UP (ref 10.3–14.5)
WBC # BLD: 8.52 K/UL — SIGNIFICANT CHANGE UP (ref 3.8–10.5)
WBC # FLD AUTO: 8.52 K/UL — SIGNIFICANT CHANGE UP (ref 3.8–10.5)

## 2019-10-29 DIAGNOSIS — I48.91 UNSPECIFIED ATRIAL FIBRILLATION: ICD-10-CM

## 2019-10-29 DIAGNOSIS — Z79.01 LONG TERM (CURRENT) USE OF ANTICOAGULANTS: ICD-10-CM

## 2019-11-07 ENCOUNTER — OUTPATIENT (OUTPATIENT)
Dept: OUTPATIENT SERVICES | Facility: HOSPITAL | Age: 74
LOS: 1 days | End: 2019-11-07

## 2019-11-07 ENCOUNTER — APPOINTMENT (OUTPATIENT)
Dept: INTERNAL MEDICINE | Facility: CLINIC | Age: 74
End: 2019-11-07
Payer: MEDICARE

## 2019-11-07 ENCOUNTER — APPOINTMENT (OUTPATIENT)
Dept: GASTROENTEROLOGY | Facility: CLINIC | Age: 74
End: 2019-11-07
Payer: MEDICARE

## 2019-11-07 ENCOUNTER — RESULT CHARGE (OUTPATIENT)
Age: 74
End: 2019-11-07

## 2019-11-07 ENCOUNTER — APPOINTMENT (OUTPATIENT)
Age: 74
End: 2019-11-07

## 2019-11-07 VITALS
DIASTOLIC BLOOD PRESSURE: 80 MMHG | WEIGHT: 175 LBS | HEIGHT: 69 IN | SYSTOLIC BLOOD PRESSURE: 115 MMHG | OXYGEN SATURATION: 98 % | BODY MASS INDEX: 25.92 KG/M2 | HEART RATE: 98 BPM

## 2019-11-07 DIAGNOSIS — Z86.69 PERSONAL HISTORY OF OTHER DISEASES OF THE NERVOUS SYSTEM AND SENSE ORGANS: Chronic | ICD-10-CM

## 2019-11-07 DIAGNOSIS — K31.89 OTHER DISEASES OF STOMACH AND DUODENUM: ICD-10-CM

## 2019-11-07 DIAGNOSIS — Z98.89 OTHER SPECIFIED POSTPROCEDURAL STATES: Chronic | ICD-10-CM

## 2019-11-07 DIAGNOSIS — R10.13 EPIGASTRIC PAIN: ICD-10-CM

## 2019-11-07 DIAGNOSIS — R10.9 UNSPECIFIED ABDOMINAL PAIN: ICD-10-CM

## 2019-11-07 LAB
INR PPP: 3 RATIO
POCT-PROTHROMBIN TIME: 36.6 SECS
QUALITY CONTROL: YES

## 2019-11-07 PROCEDURE — 99213 OFFICE O/P EST LOW 20 MIN: CPT | Mod: GC

## 2019-11-07 NOTE — HISTORY OF PRESENT ILLNESS
[Nausea] : denies nausea [Vomiting] : denies vomiting [Diarrhea] : denies diarrhea [Heartburn] : denies heartburn [Yellow Skin Or Eyes (Jaundice)] : denies jaundice [Constipation] : denies constipation [Abdominal Swelling] : denies abdominal swelling [Abdominal Pain] : improved abdominal pain [Rectal Pain] : denies rectal pain [Wt Gain ___ Lbs] : no recent weight gain [Wt Loss ___ Lbs] : no recent weight loss [Hiatus Hernia] : no hiatus hernia [GERD] : no gastroesophageal reflux disease [Peptic Ulcer Disease] : no peptic ulcer disease [Pancreatitis] : no pancreatitis [Cholelithiasis] : no cholelithiasis [Inflammatory Bowel Disease] : no inflammatory bowel disease [Kidney Stone] : no kidney stone [Diverticulitis] : no diverticulitis [Irritable Bowel Syndrome] : no irritable bowel syndrome [Alcohol Abuse] : no alcohol abuse [Malignancy] : no malignancy [Appendectomy] : no appendectomy [Abdominal Surgery] : no abdominal surgery [Cholecystectomy] : no cholecystectomy [de-identified] : 73M w/ AFib and mechanical mitral valve on coumadin, CAD s/p CABG (not on ASA), here for f/u for subepithelial gastric lesion seen on prior EUS. \par \par Pt underwent an EUS 03/2019 for subepithelial gastric lesion along the greater curvature seen initially on prior EUS. This was first seen in 2013 (not biopsied/FNA as pt was on Coumadin). It appeared to arise from the 4th layer. He had a surveillance EUS in 2014 that showed the lesion was 2 x 1.5 cm in size, originating in the 3rd layer. Again it was not FNA/biopsied due to patient being on Coumadin. He had a colonoscopy in 1/2014 for screening that showed diverticulosis and few small hyperplastic rectal polyps.\par \par EUS 03/2019 as below:\par Findings:\par  EGD:\par  -The esophagus was normal.\par  -The previously seen 2 cm subepithelial lesion was seen again.\par  -The duodenal bulb and D2 were normal.\par  EUS: A linear exam was performed.\par  -The subepithelial gastric lesion was 2.1 cm. It was hypoechoic and \par  originated from the 4th vs 3rd layer.\par  -FNB was performed using a 22-gauge Sharkcore needle. Three passes were \par  taken.\par  \par Impression: EGD:\par  -The esophagus was normal.\par  -The previously seen 2 cm subepithelial lesion was seen \par  again.\par  -The duodenal bulb and D2 were normal.\par  EUS: A linear exam was performed.\par  -The subepithelial gastric lesion was 2.1 cm. It was \par  hypoechoic and originated from the 4th vs 3rd layer.\par  -FNB was performed using a 22-gauge Sharkcore needle. \par  Three passes were taken.\par Recommendation: - Discharge patient to home (ambulatory).\par  - Follow up pathology.\par \par \par Pathology:\par Final Diagnosis\par 1-Gastric mass biopsy:\par - Mainly blood.\par - Mature adipose tissue, see note.\par - Benign gastric epithelium.\par \par Note:: Endoscopic correlation is suggested to rule out lipoma.\par \par At last visit, pt reported dyspepsia after eating, starting 30m after eating and lasting for a few hours - chronic for 9-10 years. The patient denies dysphagia, change in bowel habit, melena, weight loss, anemia or hematochezia, hematemesis. Pt denies family history of gallstones, colorectal cancer, polyps, breast, ovarian cancer. \par \par He was started on H2RA/psyllium husk and recommended to see cards for atypical chest pain.\par \par He was reporting dark (not black) stools to medicine team. Hb was 16. Started on PO PPI BID, 30 mins before meals. This resolved. Today he is reporting acid reflux sx - bitter taste in his mouth, burning, mostly when he wakes up. He also has gas and bloating after eating. His reflux sx have slightly improved since starting PO PPI BID. He denies rectal bleeding, weight loss, early satiety, dysphagia. Overall feeling well.\par

## 2019-11-07 NOTE — ASSESSMENT
[FreeTextEntry1] : Impression:\par # Gastric subepithelial lesion - Pt status post EUS with FNB revealing mature adipose tissue, likely lipoma.\par # Dyspepsia- Recent EGD with EUS was negative for peptic ulcer disease, esophagitis, however with small hiatal hernia. Other differential diagnosis includes IBS, functional dyspepsia, gastroparesis, atypical chest pain less likely\par # External hemorrhoids - s/p hemorrhoidectomy by outside colorectal surgeon\par # Overweight - BMI 25-30 \par \par Plan:\par -has cards f/u in 1 month to r/o cardiac etiology of pain\par -c/w PO PPI 40mg BID until next visit\par -start carafate 1g q12h \par -Pt advised to avoid food triggers\par -Dietary strategies discussed with the patient including use of psyllium husk before breakfast and supper and Glucerna in place of lunch; mild exercise also discussed; weight once weekly; think of calory spending\par -Return to clinic in 6 weeks - will consider breath testing if sx persist\par

## 2019-11-07 NOTE — PHYSICAL EXAM
[General Appearance - In No Acute Distress] : in no acute distress [General Appearance - Alert] : alert [General Appearance - Well Nourished] : well nourished [General Appearance - Well Developed] : well developed [Sclera] : the sclera and conjunctiva were normal [Neck Appearance] : the appearance of the neck was normal [Outer Ear] : the ears and nose were normal in appearance [Auscultation Breath Sounds / Voice Sounds] : lungs were clear to auscultation bilaterally [Heart Rate And Rhythm] : heart rate was normal and rhythm regular [Apical Impulse] : the apical impulse was normal [Abdomen Soft] : soft [Bowel Sounds] : normal bowel sounds [Abdomen Tenderness] : non-tender [] : no hepato-splenomegaly

## 2019-11-07 NOTE — END OF VISIT
[FreeTextEntry3] : bloating, dyspepsia\par r/o SIBO, start carafate, low FODMAPS\par if no improvement, then consider lactulose breath testing

## 2019-11-08 ENCOUNTER — LABORATORY RESULT (OUTPATIENT)
Age: 74
End: 2019-11-08

## 2019-11-08 ENCOUNTER — APPOINTMENT (OUTPATIENT)
Age: 74
End: 2019-11-08

## 2019-11-08 ENCOUNTER — OUTPATIENT (OUTPATIENT)
Dept: OUTPATIENT SERVICES | Facility: HOSPITAL | Age: 74
LOS: 1 days | End: 2019-11-08

## 2019-11-08 DIAGNOSIS — Z86.69 PERSONAL HISTORY OF OTHER DISEASES OF THE NERVOUS SYSTEM AND SENSE ORGANS: Chronic | ICD-10-CM

## 2019-11-08 DIAGNOSIS — Z95.2 PRESENCE OF PROSTHETIC HEART VALVE: ICD-10-CM

## 2019-11-08 DIAGNOSIS — Z79.01 LONG TERM (CURRENT) USE OF ANTICOAGULANTS: ICD-10-CM

## 2019-11-08 DIAGNOSIS — I48.91 UNSPECIFIED ATRIAL FIBRILLATION: ICD-10-CM

## 2019-11-08 DIAGNOSIS — Z23 ENCOUNTER FOR IMMUNIZATION: ICD-10-CM

## 2019-11-08 DIAGNOSIS — Z98.89 OTHER SPECIFIED POSTPROCEDURAL STATES: Chronic | ICD-10-CM

## 2019-11-08 LAB
INR BLD: 3.16 — HIGH (ref 0.88–1.17)
PROTHROM AB SERPL-ACNC: 36.3 SEC — HIGH (ref 9.8–13.1)

## 2019-11-22 ENCOUNTER — APPOINTMENT (OUTPATIENT)
Dept: INTERNAL MEDICINE | Facility: CLINIC | Age: 74
End: 2019-11-22

## 2019-11-22 ENCOUNTER — OUTPATIENT (OUTPATIENT)
Dept: OUTPATIENT SERVICES | Facility: HOSPITAL | Age: 74
LOS: 1 days | End: 2019-11-22

## 2019-11-22 DIAGNOSIS — Z98.89 OTHER SPECIFIED POSTPROCEDURAL STATES: Chronic | ICD-10-CM

## 2019-11-22 DIAGNOSIS — Z86.69 PERSONAL HISTORY OF OTHER DISEASES OF THE NERVOUS SYSTEM AND SENSE ORGANS: Chronic | ICD-10-CM

## 2019-11-26 DIAGNOSIS — Z95.2 PRESENCE OF PROSTHETIC HEART VALVE: ICD-10-CM

## 2019-11-26 DIAGNOSIS — Z79.01 LONG TERM (CURRENT) USE OF ANTICOAGULANTS: ICD-10-CM

## 2019-12-03 LAB
INR PPP: 2.2 RATIO
POCT-PROTHROMBIN TIME: 27 SECS
QUALITY CONTROL: YES

## 2019-12-04 ENCOUNTER — RESULT CHARGE (OUTPATIENT)
Age: 74
End: 2019-12-04

## 2019-12-04 ENCOUNTER — LABORATORY RESULT (OUTPATIENT)
Age: 74
End: 2019-12-04

## 2019-12-04 ENCOUNTER — OUTPATIENT (OUTPATIENT)
Dept: OUTPATIENT SERVICES | Facility: HOSPITAL | Age: 74
LOS: 1 days | End: 2019-12-04

## 2019-12-04 ENCOUNTER — APPOINTMENT (OUTPATIENT)
Dept: INTERNAL MEDICINE | Facility: CLINIC | Age: 74
End: 2019-12-04

## 2019-12-04 DIAGNOSIS — Z79.01 LONG TERM (CURRENT) USE OF ANTICOAGULANTS: ICD-10-CM

## 2019-12-04 DIAGNOSIS — R31.29 OTHER MICROSCOPIC HEMATURIA: ICD-10-CM

## 2019-12-04 DIAGNOSIS — Z98.89 OTHER SPECIFIED POSTPROCEDURAL STATES: Chronic | ICD-10-CM

## 2019-12-04 DIAGNOSIS — Z86.69 PERSONAL HISTORY OF OTHER DISEASES OF THE NERVOUS SYSTEM AND SENSE ORGANS: Chronic | ICD-10-CM

## 2019-12-04 LAB
ANION GAP SERPL CALC-SCNC: 12 MMO/L — SIGNIFICANT CHANGE UP (ref 7–14)
BASOPHILS # BLD AUTO: 0.06 K/UL — SIGNIFICANT CHANGE UP (ref 0–0.2)
BASOPHILS NFR BLD AUTO: 0.7 % — SIGNIFICANT CHANGE UP (ref 0–2)
BUN SERPL-MCNC: 21 MG/DL — SIGNIFICANT CHANGE UP (ref 7–23)
CALCIUM SERPL-MCNC: 9.5 MG/DL — SIGNIFICANT CHANGE UP (ref 8.4–10.5)
CHLORIDE SERPL-SCNC: 107 MMOL/L — SIGNIFICANT CHANGE UP (ref 98–107)
CO2 SERPL-SCNC: 22 MMOL/L — SIGNIFICANT CHANGE UP (ref 22–31)
CREAT SERPL-MCNC: 0.97 MG/DL — SIGNIFICANT CHANGE UP (ref 0.5–1.3)
EOSINOPHIL # BLD AUTO: 0.16 K/UL — SIGNIFICANT CHANGE UP (ref 0–0.5)
EOSINOPHIL NFR BLD AUTO: 1.8 % — SIGNIFICANT CHANGE UP (ref 0–6)
GLUCOSE SERPL-MCNC: 237 MG/DL — HIGH (ref 70–99)
HCT VFR BLD CALC: 46.9 % — SIGNIFICANT CHANGE UP (ref 39–50)
HGB BLD-MCNC: 15.5 G/DL — SIGNIFICANT CHANGE UP (ref 13–17)
IMM GRANULOCYTES NFR BLD AUTO: 1.1 % — SIGNIFICANT CHANGE UP (ref 0–1.5)
LYMPHOCYTES # BLD AUTO: 2.49 K/UL — SIGNIFICANT CHANGE UP (ref 1–3.3)
LYMPHOCYTES # BLD AUTO: 28.6 % — SIGNIFICANT CHANGE UP (ref 13–44)
MCHC RBC-ENTMCNC: 31.5 PG — SIGNIFICANT CHANGE UP (ref 27–34)
MCHC RBC-ENTMCNC: 33 % — SIGNIFICANT CHANGE UP (ref 32–36)
MCV RBC AUTO: 95.3 FL — SIGNIFICANT CHANGE UP (ref 80–100)
MONOCYTES # BLD AUTO: 0.71 K/UL — SIGNIFICANT CHANGE UP (ref 0–0.9)
MONOCYTES NFR BLD AUTO: 8.2 % — SIGNIFICANT CHANGE UP (ref 2–14)
NEUTROPHILS # BLD AUTO: 5.19 K/UL — SIGNIFICANT CHANGE UP (ref 1.8–7.4)
NEUTROPHILS NFR BLD AUTO: 59.6 % — SIGNIFICANT CHANGE UP (ref 43–77)
NRBC # FLD: 0 K/UL — SIGNIFICANT CHANGE UP (ref 0–0)
PLATELET # BLD AUTO: 204 K/UL — SIGNIFICANT CHANGE UP (ref 150–400)
PMV BLD: 10.8 FL — SIGNIFICANT CHANGE UP (ref 7–13)
POTASSIUM SERPL-MCNC: 4 MMOL/L — SIGNIFICANT CHANGE UP (ref 3.5–5.3)
POTASSIUM SERPL-SCNC: 4 MMOL/L — SIGNIFICANT CHANGE UP (ref 3.5–5.3)
RBC # BLD: 4.92 M/UL — SIGNIFICANT CHANGE UP (ref 4.2–5.8)
RBC # FLD: 13.1 % — SIGNIFICANT CHANGE UP (ref 10.3–14.5)
SODIUM SERPL-SCNC: 141 MMOL/L — SIGNIFICANT CHANGE UP (ref 135–145)
WBC # BLD: 8.71 K/UL — SIGNIFICANT CHANGE UP (ref 3.8–10.5)
WBC # FLD AUTO: 8.71 K/UL — SIGNIFICANT CHANGE UP (ref 3.8–10.5)

## 2019-12-05 ENCOUNTER — APPOINTMENT (OUTPATIENT)
Dept: CARDIOLOGY | Facility: HOSPITAL | Age: 74
End: 2019-12-05

## 2019-12-05 VITALS
DIASTOLIC BLOOD PRESSURE: 80 MMHG | SYSTOLIC BLOOD PRESSURE: 118 MMHG | BODY MASS INDEX: 26.07 KG/M2 | HEART RATE: 72 BPM | WEIGHT: 176 LBS | OXYGEN SATURATION: 99 % | RESPIRATION RATE: 16 BRPM | HEIGHT: 69 IN

## 2019-12-05 LAB
INR PPP: 2.6 RATIO
POCT-PROTHROMBIN TIME: 31 SECS

## 2019-12-05 NOTE — DISCUSSION/SUMMARY
[FreeTextEntry1] : 73M with CAD s/p CABG, RHD s/p mechanical MVR on A/C with Coumadin, chronic persistent atrial fibrillation who comes in for routine follow-up:\par \par 1. Chronic atrial fibrillation, valvular, in the setting of mechanical MVR\par - continue Lipitor, Atenolol, Digoxin\par - continue Coumadin to an INR goal 2.5-3.5, Hgb stable in setting of hematuria, would continue\par - no evidence of CHF on examination\par - last TTE in 2/2019 as above, will repeat given new symptoms\par \par 2. CAD, s/p 1V CABG\par - ASA on hold due to bleeding risk in the setting of full dose A/C with Coumadin\par - continue Atenolol and Lipitor\par -will obtain nuclear stress test to rule out any ischemia as cause of recent symptoms \par \par 3. Mechanical MVR in setting of RHD\par -replaced ~10-11 years ago per patient\par -exact make and model of MVR unknown\par -INR goal as above\par \par \par Follow-up with Cardiology clinic in 3 months.

## 2019-12-05 NOTE — PHYSICAL EXAM
[General Appearance - Well Developed] : well developed [General Appearance - Well Nourished] : well nourished [Normal Oral Mucosa] : normal oral mucosa [Normal Conjunctiva] : the conjunctiva exhibited no abnormalities [Respiration, Rhythm And Depth] : normal respiratory rhythm and effort [] : no respiratory distress [Normal Oropharynx] : normal oropharynx [Auscultation Breath Sounds / Voice Sounds] : lungs were clear to auscultation bilaterally [Heart Sounds] : normal S1 and S2 [Murmurs] : no murmurs present [Bowel Sounds] : normal bowel sounds [Abdomen Soft] : soft [FreeTextEntry1] : irregular pulse, mechanical S1 consistent with MVR [Edema] : no peripheral edema present [Nail Clubbing] : no clubbing of the fingernails [Skin Color & Pigmentation] : normal skin color and pigmentation [Abnormal Walk] : normal gait [Oriented To Time, Place, And Person] : oriented to person, place, and time [Skin Turgor] : normal skin turgor [Impaired Insight] : insight and judgment were intact

## 2019-12-05 NOTE — REVIEW OF SYSTEMS
[Fever] : no fever [Blurry Vision] : no blurred vision [Chills] : no chills [Earache] : no earache [Shortness Of Breath] : no shortness of breath [Chest Pain] : no chest pain [Cough] : no cough [Dyspnea on exertion] : not dyspnea during exertion [Vomiting] : no vomiting [Urinary Frequency] : no change in urinary frequency [Abdominal Pain] : no abdominal pain [Skin: A Rash] : no rash: [Dizziness] : no dizziness [Confusion] : no confusion was observed [Easy Bleeding] : no tendency for easy bleeding

## 2019-12-09 ENCOUNTER — RESULT CHARGE (OUTPATIENT)
Age: 74
End: 2019-12-09

## 2019-12-09 ENCOUNTER — APPOINTMENT (OUTPATIENT)
Dept: INTERNAL MEDICINE | Facility: CLINIC | Age: 74
End: 2019-12-09

## 2019-12-09 ENCOUNTER — OUTPATIENT (OUTPATIENT)
Dept: OUTPATIENT SERVICES | Facility: HOSPITAL | Age: 74
LOS: 1 days | End: 2019-12-09

## 2019-12-09 DIAGNOSIS — I48.91 UNSPECIFIED ATRIAL FIBRILLATION: ICD-10-CM

## 2019-12-09 DIAGNOSIS — Z95.2 PRESENCE OF PROSTHETIC HEART VALVE: ICD-10-CM

## 2019-12-09 DIAGNOSIS — Z98.89 OTHER SPECIFIED POSTPROCEDURAL STATES: Chronic | ICD-10-CM

## 2019-12-09 DIAGNOSIS — Z86.69 PERSONAL HISTORY OF OTHER DISEASES OF THE NERVOUS SYSTEM AND SENSE ORGANS: Chronic | ICD-10-CM

## 2019-12-09 LAB
INR PPP: 2.9 RATIO
POCT-PROTHROMBIN TIME: 34.4 SECS
QUALITY CONTROL: YES

## 2019-12-16 ENCOUNTER — OTHER (OUTPATIENT)
Age: 74
End: 2019-12-16

## 2019-12-16 ENCOUNTER — LABORATORY RESULT (OUTPATIENT)
Age: 74
End: 2019-12-16

## 2019-12-16 ENCOUNTER — OUTPATIENT (OUTPATIENT)
Dept: OUTPATIENT SERVICES | Facility: HOSPITAL | Age: 74
LOS: 1 days | End: 2019-12-16

## 2019-12-16 ENCOUNTER — APPOINTMENT (OUTPATIENT)
Dept: INTERNAL MEDICINE | Facility: CLINIC | Age: 74
End: 2019-12-16
Payer: MEDICARE

## 2019-12-16 VITALS — SYSTOLIC BLOOD PRESSURE: 120 MMHG | DIASTOLIC BLOOD PRESSURE: 84 MMHG

## 2019-12-16 DIAGNOSIS — Z79.01 LONG TERM (CURRENT) USE OF ANTICOAGULANTS: ICD-10-CM

## 2019-12-16 DIAGNOSIS — Z86.69 PERSONAL HISTORY OF OTHER DISEASES OF THE NERVOUS SYSTEM AND SENSE ORGANS: Chronic | ICD-10-CM

## 2019-12-16 DIAGNOSIS — Z98.89 OTHER SPECIFIED POSTPROCEDURAL STATES: Chronic | ICD-10-CM

## 2019-12-16 LAB
ALBUMIN SERPL ELPH-MCNC: 4.4 G/DL — SIGNIFICANT CHANGE UP (ref 3.3–5)
ALP SERPL-CCNC: 70 U/L — SIGNIFICANT CHANGE UP (ref 40–120)
ALT FLD-CCNC: 28 U/L — SIGNIFICANT CHANGE UP (ref 4–41)
ANION GAP SERPL CALC-SCNC: 14 MMO/L — SIGNIFICANT CHANGE UP (ref 7–14)
AST SERPL-CCNC: 25 U/L — SIGNIFICANT CHANGE UP (ref 4–40)
BASOPHILS # BLD AUTO: 0.07 K/UL — SIGNIFICANT CHANGE UP (ref 0–0.2)
BASOPHILS NFR BLD AUTO: 0.9 % — SIGNIFICANT CHANGE UP (ref 0–2)
BILIRUB SERPL-MCNC: 2.4 MG/DL — HIGH (ref 0.2–1.2)
BUN SERPL-MCNC: 19 MG/DL — SIGNIFICANT CHANGE UP (ref 7–23)
CALCIUM SERPL-MCNC: 9.9 MG/DL — SIGNIFICANT CHANGE UP (ref 8.4–10.5)
CHLORIDE SERPL-SCNC: 103 MMOL/L — SIGNIFICANT CHANGE UP (ref 98–107)
CO2 SERPL-SCNC: 23 MMOL/L — SIGNIFICANT CHANGE UP (ref 22–31)
CREAT SERPL-MCNC: 1.01 MG/DL — SIGNIFICANT CHANGE UP (ref 0.5–1.3)
EOSINOPHIL # BLD AUTO: 0.19 K/UL — SIGNIFICANT CHANGE UP (ref 0–0.5)
EOSINOPHIL NFR BLD AUTO: 2.3 % — SIGNIFICANT CHANGE UP (ref 0–6)
GLUCOSE SERPL-MCNC: 134 MG/DL — HIGH (ref 70–99)
HCT VFR BLD CALC: 47.4 % — SIGNIFICANT CHANGE UP (ref 39–50)
HGB BLD-MCNC: 16.1 G/DL — SIGNIFICANT CHANGE UP (ref 13–17)
IMM GRANULOCYTES NFR BLD AUTO: 2.1 % — HIGH (ref 0–1.5)
LYMPHOCYTES # BLD AUTO: 2.3 K/UL — SIGNIFICANT CHANGE UP (ref 1–3.3)
LYMPHOCYTES # BLD AUTO: 28.2 % — SIGNIFICANT CHANGE UP (ref 13–44)
MCHC RBC-ENTMCNC: 31.9 PG — SIGNIFICANT CHANGE UP (ref 27–34)
MCHC RBC-ENTMCNC: 34 % — SIGNIFICANT CHANGE UP (ref 32–36)
MCV RBC AUTO: 94 FL — SIGNIFICANT CHANGE UP (ref 80–100)
MONOCYTES # BLD AUTO: 0.81 K/UL — SIGNIFICANT CHANGE UP (ref 0–0.9)
MONOCYTES NFR BLD AUTO: 9.9 % — SIGNIFICANT CHANGE UP (ref 2–14)
NEUTROPHILS # BLD AUTO: 4.61 K/UL — SIGNIFICANT CHANGE UP (ref 1.8–7.4)
NEUTROPHILS NFR BLD AUTO: 56.6 % — SIGNIFICANT CHANGE UP (ref 43–77)
NRBC # FLD: 0 K/UL — SIGNIFICANT CHANGE UP (ref 0–0)
PLATELET # BLD AUTO: 201 K/UL — SIGNIFICANT CHANGE UP (ref 150–400)
PMV BLD: 10.3 FL — SIGNIFICANT CHANGE UP (ref 7–13)
POTASSIUM SERPL-MCNC: 4.3 MMOL/L — SIGNIFICANT CHANGE UP (ref 3.5–5.3)
POTASSIUM SERPL-SCNC: 4.3 MMOL/L — SIGNIFICANT CHANGE UP (ref 3.5–5.3)
PROT SERPL-MCNC: 7.6 G/DL — SIGNIFICANT CHANGE UP (ref 6–8.3)
RBC # BLD: 5.04 M/UL — SIGNIFICANT CHANGE UP (ref 4.2–5.8)
RBC # FLD: 12.8 % — SIGNIFICANT CHANGE UP (ref 10.3–14.5)
SODIUM SERPL-SCNC: 140 MMOL/L — SIGNIFICANT CHANGE UP (ref 135–145)
WBC # BLD: 8.15 K/UL — SIGNIFICANT CHANGE UP (ref 3.8–10.5)
WBC # FLD AUTO: 8.15 K/UL — SIGNIFICANT CHANGE UP (ref 3.8–10.5)

## 2019-12-16 PROCEDURE — 99214 OFFICE O/P EST MOD 30 MIN: CPT | Mod: GC

## 2019-12-17 ENCOUNTER — RESULT REVIEW (OUTPATIENT)
Age: 74
End: 2019-12-17

## 2019-12-19 ENCOUNTER — RX RENEWAL (OUTPATIENT)
Age: 74
End: 2019-12-19

## 2019-12-19 DIAGNOSIS — I48.91 UNSPECIFIED ATRIAL FIBRILLATION: ICD-10-CM

## 2019-12-19 DIAGNOSIS — R31.29 OTHER MICROSCOPIC HEMATURIA: ICD-10-CM

## 2019-12-19 DIAGNOSIS — Z95.2 PRESENCE OF PROSTHETIC HEART VALVE: ICD-10-CM

## 2019-12-19 DIAGNOSIS — Z79.01 LONG TERM (CURRENT) USE OF ANTICOAGULANTS: ICD-10-CM

## 2019-12-19 DIAGNOSIS — R10.9 UNSPECIFIED ABDOMINAL PAIN: ICD-10-CM

## 2019-12-19 NOTE — END OF VISIT
[] : Resident [FreeTextEntry3] : Clinically stable at office visit, no concerns for acute pathology, advised patient to go to the ER immediately if symptoms persists or worsens.  Some concern for mesenteric ischemia however, seems unlikely given therapeutic INR and history not consistent with diagnosis.  Hence, possibly secondary to medication side effect, see plan as discussed with Intern where changed sucralfate to once daily and avoiding night time dose..

## 2019-12-19 NOTE — ASSESSMENT
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

## 2019-12-19 NOTE — HISTORY OF PRESENT ILLNESS
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 Started 3-4 days ago. Pain only occurs at night, when pt is supine and sleeping. It forces him to wake up q2h. He describes it as uncomfortable and causing a lot of rumbling noises. It localizes to the b/l lower quadrants and is a 8/10. It is accompanied by bad dreams, an example of which is him being beaten/robbed or about to be murdered. In order to allevaite his pain, he either sits up in bed or gets up and walk around. He continues to have BMs q12h. They are solid and brown. GI ROS is negative, pt denies diarrhea, nausea, emesis, hematochezia, melena, constipation, fevers, chills. Of note, pt saw GI 11/7 for dyspepsia, note reviewed. He was started on carafate 1g q12h. Otherwise, he is scheduled for f/u in January 2020. \par \par Unrelated, pt endorses SOB with physical exertion. No chest pain noted. He follows with cardiology and will schedule a nuclear stress test. He also c/o hematuria, which he states occurs when his INR is elevated.

## 2019-12-19 NOTE — REVIEW OF SYSTEMS
[Nausea] : no nausea [Constipation] : no constipation [Heartburn] : no heartburn [Diarrhea] : no diarrhea [Vomiting] : no vomiting [Melena] : no melena

## 2019-12-23 ENCOUNTER — APPOINTMENT (OUTPATIENT)
Dept: INTERNAL MEDICINE | Facility: CLINIC | Age: 74
End: 2019-12-23

## 2019-12-23 ENCOUNTER — OUTPATIENT (OUTPATIENT)
Dept: OUTPATIENT SERVICES | Facility: HOSPITAL | Age: 74
LOS: 1 days | End: 2019-12-23

## 2019-12-23 ENCOUNTER — RESULT REVIEW (OUTPATIENT)
Age: 74
End: 2019-12-23

## 2019-12-23 DIAGNOSIS — Z79.01 LONG TERM (CURRENT) USE OF ANTICOAGULANTS: ICD-10-CM

## 2019-12-23 DIAGNOSIS — I48.91 UNSPECIFIED ATRIAL FIBRILLATION: ICD-10-CM

## 2019-12-23 DIAGNOSIS — Z86.69 PERSONAL HISTORY OF OTHER DISEASES OF THE NERVOUS SYSTEM AND SENSE ORGANS: Chronic | ICD-10-CM

## 2019-12-23 DIAGNOSIS — Z98.89 OTHER SPECIFIED POSTPROCEDURAL STATES: Chronic | ICD-10-CM

## 2019-12-23 LAB
INR PPP: 3 RATIO
POCT-PROTHROMBIN TIME: 35.9 SECS

## 2019-12-25 ENCOUNTER — FORM ENCOUNTER (OUTPATIENT)
Age: 74
End: 2019-12-25

## 2019-12-26 ENCOUNTER — APPOINTMENT (OUTPATIENT)
Dept: CT IMAGING | Facility: IMAGING CENTER | Age: 74
End: 2019-12-26
Payer: MEDICARE

## 2019-12-26 ENCOUNTER — OUTPATIENT (OUTPATIENT)
Dept: OUTPATIENT SERVICES | Facility: HOSPITAL | Age: 74
LOS: 1 days | End: 2019-12-26
Payer: COMMERCIAL

## 2019-12-26 ENCOUNTER — APPOINTMENT (OUTPATIENT)
Dept: UROLOGY | Facility: CLINIC | Age: 74
End: 2019-12-26
Payer: MEDICARE

## 2019-12-26 ENCOUNTER — OTHER (OUTPATIENT)
Age: 74
End: 2019-12-26

## 2019-12-26 DIAGNOSIS — R31.0 GROSS HEMATURIA: ICD-10-CM

## 2019-12-26 DIAGNOSIS — Z98.89 OTHER SPECIFIED POSTPROCEDURAL STATES: Chronic | ICD-10-CM

## 2019-12-26 DIAGNOSIS — R97.20 ELEVATED PROSTATE SPECIFIC ANTIGEN [PSA]: ICD-10-CM

## 2019-12-26 DIAGNOSIS — Z86.69 PERSONAL HISTORY OF OTHER DISEASES OF THE NERVOUS SYSTEM AND SENSE ORGANS: Chronic | ICD-10-CM

## 2019-12-26 PROCEDURE — 74178 CT ABD&PLV WO CNTR FLWD CNTR: CPT | Mod: 26

## 2019-12-26 PROCEDURE — 82565 ASSAY OF CREATININE: CPT

## 2019-12-26 PROCEDURE — 99214 OFFICE O/P EST MOD 30 MIN: CPT

## 2019-12-26 PROCEDURE — 74178 CT ABD&PLV WO CNTR FLWD CNTR: CPT

## 2019-12-29 LAB
ANION GAP SERPL CALC-SCNC: 16 MMOL/L
BASOPHILS # BLD AUTO: 0.08 K/UL
BASOPHILS NFR BLD AUTO: 0.9 %
BUN SERPL-MCNC: 23 MG/DL
CALCIUM SERPL-MCNC: 9.9 MG/DL
CHLORIDE SERPL-SCNC: 104 MMOL/L
CO2 SERPL-SCNC: 22 MMOL/L
CREAT SERPL-MCNC: 1.18 MG/DL
EOSINOPHIL # BLD AUTO: 0.16 K/UL
EOSINOPHIL NFR BLD AUTO: 1.9 %
GLUCOSE SERPL-MCNC: 146 MG/DL
HCT VFR BLD CALC: 47.4 %
HGB BLD-MCNC: 16.2 G/DL
IMM GRANULOCYTES NFR BLD AUTO: 1.7 %
LYMPHOCYTES # BLD AUTO: 2.26 K/UL
LYMPHOCYTES NFR BLD AUTO: 26.7 %
MAN DIFF?: NORMAL
MCHC RBC-ENTMCNC: 31.7 PG
MCHC RBC-ENTMCNC: 34.2 GM/DL
MCV RBC AUTO: 92.8 FL
MONOCYTES # BLD AUTO: 0.83 K/UL
MONOCYTES NFR BLD AUTO: 9.8 %
NEUTROPHILS # BLD AUTO: 5 K/UL
NEUTROPHILS NFR BLD AUTO: 59 %
PLATELET # BLD AUTO: 225 K/UL
POTASSIUM SERPL-SCNC: 4.8 MMOL/L
PSA SERPL-MCNC: 3 NG/ML
RBC # BLD: 5.11 M/UL
RBC # FLD: 12.9 %
SODIUM SERPL-SCNC: 142 MMOL/L
TESTOST SERPL-MCNC: 109 NG/DL
WBC # FLD AUTO: 8.47 K/UL

## 2019-12-29 NOTE — PHYSICAL EXAM
[General Appearance - Well Developed] : well developed [Abdomen Soft] : soft [General Appearance - Well Nourished] : well nourished [Abdomen Tenderness] : non-tender [Costovertebral Angle Tenderness] : no ~M costovertebral angle tenderness [Skin Color & Pigmentation] : normal skin color and pigmentation [] : no respiratory distress [Oriented To Time, Place, And Person] : oriented to person, place, and time [Affect] : the affect was normal [Normal Station and Gait] : the gait and station were normal for the patient's age [No Focal Deficits] : no focal deficits [Sensation] : the sensory exam was normal to light touch and pinprick [FreeTextEntry1] : no right upper quadrant tenderness

## 2019-12-29 NOTE — PHYSICAL EXAM
[General Appearance - Well Nourished] : well nourished [Abdomen Soft] : soft [General Appearance - Well Developed] : well developed [Abdomen Tenderness] : non-tender [Costovertebral Angle Tenderness] : no ~M costovertebral angle tenderness [Skin Color & Pigmentation] : normal skin color and pigmentation [] : no respiratory distress [Affect] : the affect was normal [Oriented To Time, Place, And Person] : oriented to person, place, and time [Normal Station and Gait] : the gait and station were normal for the patient's age [Sensation] : the sensory exam was normal to light touch and pinprick [No Focal Deficits] : no focal deficits [FreeTextEntry1] : no right upper quadrant tenderness

## 2019-12-29 NOTE — ADDENDUM
[FreeTextEntry1] : This note was authored by Verenice Lemon, working as scribe for Dr. Robson Thompson.\par \par I, Dr. Robson Thompson, have have reviewed the content of this note and confirm it is true and accurate. I personally performed the history and physical examination and made all the decisions. \par \par 12/26/2019

## 2019-12-29 NOTE — HISTORY OF PRESENT ILLNESS
[FreeTextEntry1] : ALINA NOLAN is a 74 year year old male being seen for a follow up visit regarding gross hematuria. He has been experiencing gross hematuria with blood clots found in urine for the past two weeks. He experiences hematuria previously when he takes Coumadin which he was prescribed following his mitral valve replacement. The patient reports having no trouble with urination and wakes up 2 times during the night to urinate. Today he reports having discomfort in his lower back and right lower quadrant but no pain. The patient denies a history of kidney stones. In the past, he has had an endoscopy. 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 No discrete mass is identified at the level of the gastroesophageal junction. His Creatinine as of 12/16/19 was 1.01.

## 2019-12-29 NOTE — ASSESSMENT
[FreeTextEntry1] : ALINA NOLAN is a 74 year year old female being seen for a follow up visit regarding gross hematuria. He has been experiencing gross hematuria with blood clots found in urine for the past two weeks. He experiences hematuria previously when he takes Coumadin which he was prescribed following his mitral valve replacement. The patient reports having no trouble with urination and wakes up 2 times during the night to urinate. Today he reports having discomfort in his lower back and right lower quadrant but no pain. The patient denies a history of kidney stones. In the past, he has had an endoscopy. 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 No discrete mass is identified at the level of the gastroesophageal junction. His Creatinine as of 12/16/19 was 1.01.\par \par Blood work today included comprehensive metabolic panel, CBC, PSA, and testosterone.\par \par Due to his gross hematuria, a CT ABDOMEN PELVIS/ UROGRAM has been ordered today and will be obtained by the office. Findings: the patient has a large right renal cyst in posterior medial aspect of the right kidney. It is stable in appearance compared to 2018 CT scan though it has a thin wall which enhances a slight amount, Dr Weathers says that it is benign in appearance. The patient has a large prostate there are no renal calculi. \par \par Patient may follow up in 1 week for a cystoscopy or sooner if they experience acute symptoms. \par Amoxicillin 500mg was prescribed to take 4 tablets 1hr before the procedure. \par In the interim, he may continue taking Coumadin.

## 2019-12-30 ENCOUNTER — APPOINTMENT (OUTPATIENT)
Dept: INTERNAL MEDICINE | Facility: CLINIC | Age: 74
End: 2019-12-30

## 2020-01-02 ENCOUNTER — APPOINTMENT (OUTPATIENT)
Dept: UROLOGY | Facility: CLINIC | Age: 75
End: 2020-01-02
Payer: MEDICARE

## 2020-01-02 ENCOUNTER — OUTPATIENT (OUTPATIENT)
Dept: OUTPATIENT SERVICES | Facility: HOSPITAL | Age: 75
LOS: 1 days | End: 2020-01-02
Payer: COMMERCIAL

## 2020-01-02 ENCOUNTER — LABORATORY RESULT (OUTPATIENT)
Age: 75
End: 2020-01-02

## 2020-01-02 ENCOUNTER — OUTPATIENT (OUTPATIENT)
Dept: OUTPATIENT SERVICES | Facility: HOSPITAL | Age: 75
LOS: 1 days | End: 2020-01-02

## 2020-01-02 ENCOUNTER — APPOINTMENT (OUTPATIENT)
Dept: INTERNAL MEDICINE | Facility: CLINIC | Age: 75
End: 2020-01-02

## 2020-01-02 VITALS — SYSTOLIC BLOOD PRESSURE: 134 MMHG | TEMPERATURE: 98.1 F | HEART RATE: 87 BPM | DIASTOLIC BLOOD PRESSURE: 83 MMHG

## 2020-01-02 DIAGNOSIS — Z98.89 OTHER SPECIFIED POSTPROCEDURAL STATES: Chronic | ICD-10-CM

## 2020-01-02 DIAGNOSIS — R35.0 FREQUENCY OF MICTURITION: ICD-10-CM

## 2020-01-02 DIAGNOSIS — Z86.69 PERSONAL HISTORY OF OTHER DISEASES OF THE NERVOUS SYSTEM AND SENSE ORGANS: Chronic | ICD-10-CM

## 2020-01-02 LAB
INR PPP: 1.8 RATIO
POCT-PROTHROMBIN TIME: 21.3 SECS

## 2020-01-02 PROCEDURE — 52000 CYSTOURETHROSCOPY: CPT

## 2020-01-02 PROCEDURE — 99214 OFFICE O/P EST MOD 30 MIN: CPT | Mod: 25

## 2020-01-02 NOTE — HISTORY OF PRESENT ILLNESS
[FreeTextEntry1] : ALINA NOLAN is a 74 year year old male being seen for a follow up visit regarding gross hematuria. He has been experiencing gross hematuria with blood clots found in urine for the past two weeks. He experiences hematuria previously when he takes Coumadin which he was prescribed following his mitral valve replacement. The patient reports having no trouble with urination and wakes up 2 times during the night to urinate. Today he reports having discomfort in his lower back and right lower quadrant but no pain. The patient denies a history of kidney stones. In the past, he has had an endoscopy. 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 No discrete mass is identified at the level of the gastroesophageal junction. His Creatinine as of 12/16/19 was 1.01.\par \par \par 01/02/2020: Patient presents today for a cystoscopy. The patient denies hematuria but complains of some back pain. A CT ABDOMEN AND PELVIS from 12/26/2019 revealed: Interval stability without CT evidence of suspicious renal or ureter lesions. Enlarged prostate gland with associated transurethral resection of the prostate defect.

## 2020-01-02 NOTE — ADDENDUM
[FreeTextEntry1] : This note was authored by Verenice Lemon, working as scribe for Dr. Robson Thompson.\par \par I, Dr. Robson Thompson, have have reviewed the content of this note and confirm it is true and accurate. I personally performed the history and physical examination and made all the decisions. \par \par 01/02/2020

## 2020-01-02 NOTE — ASSESSMENT
[FreeTextEntry1] : ALINA NOLAN is a 74 year year old male being seen for a follow up visit regarding gross hematuria. He has been experiencing gross hematuria with blood clots found in urine for the past two weeks. He experiences hematuria previously when he takes Coumadin which he was prescribed following his mitral valve replacement. The patient reports having no trouble with urination and wakes up 2 times during the night to urinate. Today he reports having discomfort in his lower back and right lower quadrant but no pain. The patient denies a history of kidney stones. In the past, he has had an endoscopy. 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 No discrete mass is identified at the level of the gastroesophageal junction. His Creatinine as of 12/16/19 was 1.01.\par \par 01/02/2020: Patient presents today for a cystoscopy. The patient denies hematuria but complains of some back pain. A CT ABDOMEN AND PELVIS from 12/26/2019 revealed: Interval stability without CT evidence of suspicious renal or ureter lesions. Enlarged prostate gland with associated transurethral resection of the prostate defect.\par \par An aspirated urine sample was obtained during cystoscopy today which will be sent for urinalysis, urine cytology, and urine culture. \par \par Cystoscopy finding: Entering bladder, there is some bleeding from the prostate so vision is obscured. The bladder mucosa is a pale pink and normal in appearance. Prostate is inflamed. A large prostate and lateral hypertrophy is observed. The patient took one Bactrim tablet at the time of the procedure and will take one before bed. \par \par Finasteride 5 mg was prescribed today to take, 1 tablet daily, to shrink the prostate and treat bleeding.\par \par I explained the back pain is likely unrelated to prostate issues but we will see if there are any changes following prostate treatment with Finasteride. \par \par Patient may follow up in 2 months in February for a cystoscopy or sooner if they experience acute symptoms.

## 2020-01-02 NOTE — PHYSICAL EXAM
[General Appearance - Well Nourished] : well nourished [General Appearance - Well Developed] : well developed [Abdomen Soft] : soft [Abdomen Tenderness] : non-tender [Costovertebral Angle Tenderness] : no ~M costovertebral angle tenderness [Skin Color & Pigmentation] : normal skin color and pigmentation [] : no respiratory distress [Oriented To Time, Place, And Person] : oriented to person, place, and time [Affect] : the affect was normal [Normal Station and Gait] : the gait and station were normal for the patient's age [No Focal Deficits] : no focal deficits [Sensation] : the sensory exam was normal to light touch and pinprick [Scrotum] : the scrotum was normal [Urethral Meatus] : meatus normal [FreeTextEntry1] : no pallor

## 2020-01-04 LAB
APPEARANCE: CLEAR
BILIRUBIN URINE: NEGATIVE
BLOOD URINE: NEGATIVE
COLOR: YELLOW
GLUCOSE QUALITATIVE U: NEGATIVE
KETONES URINE: NEGATIVE
LEUKOCYTE ESTERASE URINE: NEGATIVE
NITRITE URINE: NEGATIVE
PH URINE: 6
PROTEIN URINE: ABNORMAL
SPECIFIC GRAVITY URINE: 1.03
UROBILINOGEN URINE: NORMAL

## 2020-01-06 ENCOUNTER — OUTPATIENT (OUTPATIENT)
Dept: OUTPATIENT SERVICES | Facility: HOSPITAL | Age: 75
LOS: 1 days | End: 2020-01-06

## 2020-01-06 ENCOUNTER — APPOINTMENT (OUTPATIENT)
Dept: INTERNAL MEDICINE | Facility: CLINIC | Age: 75
End: 2020-01-06

## 2020-01-06 DIAGNOSIS — Z98.89 OTHER SPECIFIED POSTPROCEDURAL STATES: Chronic | ICD-10-CM

## 2020-01-06 DIAGNOSIS — Z86.69 PERSONAL HISTORY OF OTHER DISEASES OF THE NERVOUS SYSTEM AND SENSE ORGANS: Chronic | ICD-10-CM

## 2020-01-06 LAB
INR PPP: 2.4 RATIO
POCT-PROTHROMBIN TIME: 28.4 SECS
QUALITY CONTROL: YES

## 2020-01-07 ENCOUNTER — APPOINTMENT (OUTPATIENT)
Dept: UROLOGY | Facility: CLINIC | Age: 75
End: 2020-01-07

## 2020-01-07 DIAGNOSIS — R30.0 DYSURIA: ICD-10-CM

## 2020-01-07 DIAGNOSIS — R31.0 GROSS HEMATURIA: ICD-10-CM

## 2020-01-07 DIAGNOSIS — N40.1 BENIGN PROSTATIC HYPERPLASIA WITH LOWER URINARY TRACT SYMPTOMS: ICD-10-CM

## 2020-01-07 LAB — URINE CYTOLOGY: NORMAL

## 2020-01-09 DIAGNOSIS — Z79.01 LONG TERM (CURRENT) USE OF ANTICOAGULANTS: ICD-10-CM

## 2020-01-09 DIAGNOSIS — I48.91 UNSPECIFIED ATRIAL FIBRILLATION: ICD-10-CM

## 2020-01-09 DIAGNOSIS — Z95.2 PRESENCE OF PROSTHETIC HEART VALVE: ICD-10-CM

## 2020-01-10 DIAGNOSIS — I25.10 ATHEROSCLEROTIC HEART DISEASE OF NATIVE CORONARY ARTERY WITHOUT ANGINA PECTORIS: ICD-10-CM

## 2020-01-10 DIAGNOSIS — Z79.01 LONG TERM (CURRENT) USE OF ANTICOAGULANTS: ICD-10-CM

## 2020-01-13 ENCOUNTER — OUTPATIENT (OUTPATIENT)
Dept: OUTPATIENT SERVICES | Facility: HOSPITAL | Age: 75
LOS: 1 days | End: 2020-01-13

## 2020-01-13 ENCOUNTER — APPOINTMENT (OUTPATIENT)
Dept: INTERNAL MEDICINE | Facility: CLINIC | Age: 75
End: 2020-01-13

## 2020-01-13 DIAGNOSIS — Z98.89 OTHER SPECIFIED POSTPROCEDURAL STATES: Chronic | ICD-10-CM

## 2020-01-13 DIAGNOSIS — Z86.69 PERSONAL HISTORY OF OTHER DISEASES OF THE NERVOUS SYSTEM AND SENSE ORGANS: Chronic | ICD-10-CM

## 2020-01-14 LAB
INR PPP: 3.2 RATIO
POCT-PROTHROMBIN TIME: 37.9 SECS
QUALITY CONTROL: YES

## 2020-01-16 ENCOUNTER — APPOINTMENT (OUTPATIENT)
Dept: GASTROENTEROLOGY | Facility: CLINIC | Age: 75
End: 2020-01-16

## 2020-01-16 DIAGNOSIS — I48.91 UNSPECIFIED ATRIAL FIBRILLATION: ICD-10-CM

## 2020-01-16 DIAGNOSIS — Z95.2 PRESENCE OF PROSTHETIC HEART VALVE: ICD-10-CM

## 2020-01-16 DIAGNOSIS — Z79.01 LONG TERM (CURRENT) USE OF ANTICOAGULANTS: ICD-10-CM

## 2020-01-22 ENCOUNTER — LABORATORY RESULT (OUTPATIENT)
Age: 75
End: 2020-01-22

## 2020-01-22 ENCOUNTER — APPOINTMENT (OUTPATIENT)
Dept: INTERNAL MEDICINE | Facility: CLINIC | Age: 75
End: 2020-01-22

## 2020-01-22 ENCOUNTER — OUTPATIENT (OUTPATIENT)
Dept: OUTPATIENT SERVICES | Facility: HOSPITAL | Age: 75
LOS: 1 days | End: 2020-01-22

## 2020-01-22 DIAGNOSIS — Z98.89 OTHER SPECIFIED POSTPROCEDURAL STATES: Chronic | ICD-10-CM

## 2020-01-22 DIAGNOSIS — Z86.69 PERSONAL HISTORY OF OTHER DISEASES OF THE NERVOUS SYSTEM AND SENSE ORGANS: Chronic | ICD-10-CM

## 2020-01-22 LAB
APPEARANCE UR: SIGNIFICANT CHANGE UP
BACTERIA # UR AUTO: HIGH
BILIRUB UR-MCNC: NEGATIVE — SIGNIFICANT CHANGE UP
BLOOD UR QL VISUAL: SIGNIFICANT CHANGE UP
COLOR SPEC: SIGNIFICANT CHANGE UP
GLUCOSE UR-MCNC: 500 — HIGH
KETONES UR-MCNC: SIGNIFICANT CHANGE UP
LEUKOCYTE ESTERASE UR-ACNC: SIGNIFICANT CHANGE UP
NITRITE UR-MCNC: NEGATIVE — SIGNIFICANT CHANGE UP
PH UR: 5.5 — SIGNIFICANT CHANGE UP (ref 5–8)
PROT UR-MCNC: 70 — SIGNIFICANT CHANGE UP
RBC CASTS # UR COMP ASSIST: SIGNIFICANT CHANGE UP (ref 0–?)
SP GR SPEC: 1.02 — SIGNIFICANT CHANGE UP (ref 1–1.04)
UROBILINOGEN FLD QL: NORMAL — SIGNIFICANT CHANGE UP
WBC UR QL: SIGNIFICANT CHANGE UP (ref 0–?)

## 2020-01-23 ENCOUNTER — APPOINTMENT (OUTPATIENT)
Dept: UROLOGY | Facility: CLINIC | Age: 75
End: 2020-01-23
Payer: MEDICARE

## 2020-01-23 VITALS
TEMPERATURE: 98 F | RESPIRATION RATE: 16 BRPM | HEART RATE: 83 BPM | SYSTOLIC BLOOD PRESSURE: 126 MMHG | DIASTOLIC BLOOD PRESSURE: 85 MMHG

## 2020-01-23 DIAGNOSIS — Z95.2 PRESENCE OF PROSTHETIC HEART VALVE: ICD-10-CM

## 2020-01-23 DIAGNOSIS — I48.91 UNSPECIFIED ATRIAL FIBRILLATION: ICD-10-CM

## 2020-01-23 DIAGNOSIS — Z79.01 LONG TERM (CURRENT) USE OF ANTICOAGULANTS: ICD-10-CM

## 2020-01-23 PROCEDURE — 99214 OFFICE O/P EST MOD 30 MIN: CPT

## 2020-01-24 ENCOUNTER — APPOINTMENT (OUTPATIENT)
Dept: INTERNAL MEDICINE | Facility: CLINIC | Age: 75
End: 2020-01-24

## 2020-01-24 LAB
BACTERIA UR CULT: SIGNIFICANT CHANGE UP
SPECIMEN SOURCE: SIGNIFICANT CHANGE UP

## 2020-01-26 LAB
APPEARANCE: CLEAR
BACTERIA UR CULT: NORMAL
BACTERIA: NEGATIVE
BILIRUBIN URINE: NEGATIVE
BLOOD URINE: ABNORMAL
COLOR: YELLOW
GLUCOSE QUALITATIVE U: NEGATIVE
HYALINE CASTS: 0 /LPF
KETONES URINE: NEGATIVE
LEUKOCYTE ESTERASE URINE: NEGATIVE
MICROSCOPIC-UA: NORMAL
NITRITE URINE: NEGATIVE
PH URINE: 6
PROTEIN URINE: NORMAL
RED BLOOD CELLS URINE: 4 /HPF
SPECIFIC GRAVITY URINE: 1.02
SQUAMOUS EPITHELIAL CELLS: 0 /HPF
UROBILINOGEN URINE: NORMAL
WHITE BLOOD CELLS URINE: 2 /HPF

## 2020-01-29 ENCOUNTER — APPOINTMENT (OUTPATIENT)
Dept: INTERNAL MEDICINE | Facility: CLINIC | Age: 75
End: 2020-01-29

## 2020-01-29 ENCOUNTER — OUTPATIENT (OUTPATIENT)
Dept: OUTPATIENT SERVICES | Facility: HOSPITAL | Age: 75
LOS: 1 days | End: 2020-01-29

## 2020-01-29 DIAGNOSIS — Z86.69 PERSONAL HISTORY OF OTHER DISEASES OF THE NERVOUS SYSTEM AND SENSE ORGANS: Chronic | ICD-10-CM

## 2020-01-29 DIAGNOSIS — Z98.89 OTHER SPECIFIED POSTPROCEDURAL STATES: Chronic | ICD-10-CM

## 2020-01-29 LAB
INR PPP: 2.4 RATIO
POCT-PROTHROMBIN TIME: 28.9 SECS
QUALITY CONTROL: YES

## 2020-01-31 LAB — URINE CYTOLOGY: NORMAL

## 2020-02-03 DIAGNOSIS — Z79.01 LONG TERM (CURRENT) USE OF ANTICOAGULANTS: ICD-10-CM

## 2020-02-03 DIAGNOSIS — Z95.2 PRESENCE OF PROSTHETIC HEART VALVE: ICD-10-CM

## 2020-02-04 ENCOUNTER — RX RENEWAL (OUTPATIENT)
Age: 75
End: 2020-02-04

## 2020-02-05 ENCOUNTER — APPOINTMENT (OUTPATIENT)
Dept: INTERNAL MEDICINE | Facility: CLINIC | Age: 75
End: 2020-02-05

## 2020-02-05 ENCOUNTER — OUTPATIENT (OUTPATIENT)
Dept: OUTPATIENT SERVICES | Facility: HOSPITAL | Age: 75
LOS: 1 days | End: 2020-02-05

## 2020-02-05 DIAGNOSIS — Z98.89 OTHER SPECIFIED POSTPROCEDURAL STATES: Chronic | ICD-10-CM

## 2020-02-05 DIAGNOSIS — Z86.69 PERSONAL HISTORY OF OTHER DISEASES OF THE NERVOUS SYSTEM AND SENSE ORGANS: Chronic | ICD-10-CM

## 2020-02-05 LAB
INR PPP: 2.8 RATIO
POCT-PROTHROMBIN TIME: 33.1 SECS
QUALITY CONTROL: YES

## 2020-02-06 ENCOUNTER — OUTPATIENT (OUTPATIENT)
Dept: OUTPATIENT SERVICES | Facility: HOSPITAL | Age: 75
LOS: 1 days | End: 2020-02-06
Payer: MEDICARE

## 2020-02-06 ENCOUNTER — APPOINTMENT (OUTPATIENT)
Dept: CV DIAGNOSITCS | Facility: HOSPITAL | Age: 75
End: 2020-02-06

## 2020-02-06 DIAGNOSIS — Z98.89 OTHER SPECIFIED POSTPROCEDURAL STATES: Chronic | ICD-10-CM

## 2020-02-06 DIAGNOSIS — Z86.69 PERSONAL HISTORY OF OTHER DISEASES OF THE NERVOUS SYSTEM AND SENSE ORGANS: Chronic | ICD-10-CM

## 2020-02-06 DIAGNOSIS — Z95.2 PRESENCE OF PROSTHETIC HEART VALVE: ICD-10-CM

## 2020-02-06 DIAGNOSIS — I25.10 ATHEROSCLEROTIC HEART DISEASE OF NATIVE CORONARY ARTERY WITHOUT ANGINA PECTORIS: ICD-10-CM

## 2020-02-06 PROCEDURE — 93306 TTE W/DOPPLER COMPLETE: CPT | Mod: 26

## 2020-02-10 ENCOUNTER — APPOINTMENT (OUTPATIENT)
Dept: CV DIAGNOSTICS | Facility: HOSPITAL | Age: 75
End: 2020-02-10

## 2020-02-10 DIAGNOSIS — Z95.2 PRESENCE OF PROSTHETIC HEART VALVE: ICD-10-CM

## 2020-02-10 DIAGNOSIS — Z79.01 LONG TERM (CURRENT) USE OF ANTICOAGULANTS: ICD-10-CM

## 2020-02-10 DIAGNOSIS — Z51.81 ENCOUNTER FOR THERAPEUTIC DRUG LEVEL MONITORING: ICD-10-CM

## 2020-02-18 ENCOUNTER — RX RENEWAL (OUTPATIENT)
Age: 75
End: 2020-02-18

## 2020-02-19 ENCOUNTER — APPOINTMENT (OUTPATIENT)
Dept: INTERNAL MEDICINE | Facility: CLINIC | Age: 75
End: 2020-02-19

## 2020-02-19 ENCOUNTER — OUTPATIENT (OUTPATIENT)
Dept: OUTPATIENT SERVICES | Facility: HOSPITAL | Age: 75
LOS: 1 days | End: 2020-02-19

## 2020-02-19 ENCOUNTER — LABORATORY RESULT (OUTPATIENT)
Age: 75
End: 2020-02-19

## 2020-02-19 DIAGNOSIS — Z86.69 PERSONAL HISTORY OF OTHER DISEASES OF THE NERVOUS SYSTEM AND SENSE ORGANS: Chronic | ICD-10-CM

## 2020-02-19 DIAGNOSIS — Z98.89 OTHER SPECIFIED POSTPROCEDURAL STATES: Chronic | ICD-10-CM

## 2020-02-19 LAB
BASOPHILS # BLD AUTO: 0.06 K/UL — SIGNIFICANT CHANGE UP (ref 0–0.2)
BASOPHILS NFR BLD AUTO: 0.7 % — SIGNIFICANT CHANGE UP (ref 0–2)
EOSINOPHIL # BLD AUTO: 0.15 K/UL — SIGNIFICANT CHANGE UP (ref 0–0.5)
EOSINOPHIL NFR BLD AUTO: 1.8 % — SIGNIFICANT CHANGE UP (ref 0–6)
HCT VFR BLD CALC: 44.5 % — SIGNIFICANT CHANGE UP (ref 39–50)
HGB BLD-MCNC: 15.5 G/DL — SIGNIFICANT CHANGE UP (ref 13–17)
IMM GRANULOCYTES NFR BLD AUTO: 1.3 % — SIGNIFICANT CHANGE UP (ref 0–1.5)
INR PPP: 2.6 RATIO
LYMPHOCYTES # BLD AUTO: 2.03 K/UL — SIGNIFICANT CHANGE UP (ref 1–3.3)
LYMPHOCYTES # BLD AUTO: 23.7 % — SIGNIFICANT CHANGE UP (ref 13–44)
MCHC RBC-ENTMCNC: 31.8 PG — SIGNIFICANT CHANGE UP (ref 27–34)
MCHC RBC-ENTMCNC: 34.8 % — SIGNIFICANT CHANGE UP (ref 32–36)
MCV RBC AUTO: 91.4 FL — SIGNIFICANT CHANGE UP (ref 80–100)
MONOCYTES # BLD AUTO: 0.8 K/UL — SIGNIFICANT CHANGE UP (ref 0–0.9)
MONOCYTES NFR BLD AUTO: 9.4 % — SIGNIFICANT CHANGE UP (ref 2–14)
NEUTROPHILS # BLD AUTO: 5.4 K/UL — SIGNIFICANT CHANGE UP (ref 1.8–7.4)
NEUTROPHILS NFR BLD AUTO: 63.1 % — SIGNIFICANT CHANGE UP (ref 43–77)
NRBC # FLD: 0 K/UL — SIGNIFICANT CHANGE UP (ref 0–0)
PLATELET # BLD AUTO: 204 K/UL — SIGNIFICANT CHANGE UP (ref 150–400)
PMV BLD: 10.7 FL — SIGNIFICANT CHANGE UP (ref 7–13)
POCT-PROTHROMBIN TIME: 31.3 SECS
RBC # BLD: 4.87 M/UL — SIGNIFICANT CHANGE UP (ref 4.2–5.8)
RBC # FLD: 12.5 % — SIGNIFICANT CHANGE UP (ref 10.3–14.5)
WBC # BLD: 8.55 K/UL — SIGNIFICANT CHANGE UP (ref 3.8–10.5)
WBC # FLD AUTO: 8.55 K/UL — SIGNIFICANT CHANGE UP (ref 3.8–10.5)

## 2020-02-21 DIAGNOSIS — Z95.2 PRESENCE OF PROSTHETIC HEART VALVE: ICD-10-CM

## 2020-02-21 DIAGNOSIS — Z51.81 ENCOUNTER FOR THERAPEUTIC DRUG LEVEL MONITORING: ICD-10-CM

## 2020-02-21 DIAGNOSIS — I48.91 UNSPECIFIED ATRIAL FIBRILLATION: ICD-10-CM

## 2020-02-21 DIAGNOSIS — Z79.01 LONG TERM (CURRENT) USE OF ANTICOAGULANTS: ICD-10-CM

## 2020-02-26 ENCOUNTER — OUTPATIENT (OUTPATIENT)
Dept: OUTPATIENT SERVICES | Facility: HOSPITAL | Age: 75
LOS: 1 days | End: 2020-02-26

## 2020-02-26 ENCOUNTER — LABORATORY RESULT (OUTPATIENT)
Age: 75
End: 2020-02-26

## 2020-02-26 ENCOUNTER — APPOINTMENT (OUTPATIENT)
Dept: INTERNAL MEDICINE | Facility: CLINIC | Age: 75
End: 2020-02-26
Payer: MEDICARE

## 2020-02-26 VITALS
HEIGHT: 69 IN | SYSTOLIC BLOOD PRESSURE: 140 MMHG | DIASTOLIC BLOOD PRESSURE: 80 MMHG | HEART RATE: 80 BPM | BODY MASS INDEX: 26.22 KG/M2 | WEIGHT: 177 LBS

## 2020-02-26 DIAGNOSIS — Z98.89 OTHER SPECIFIED POSTPROCEDURAL STATES: Chronic | ICD-10-CM

## 2020-02-26 DIAGNOSIS — Z86.69 PERSONAL HISTORY OF OTHER DISEASES OF THE NERVOUS SYSTEM AND SENSE ORGANS: Chronic | ICD-10-CM

## 2020-02-26 LAB
CHOLEST SERPL-MCNC: 151 MG/DL — SIGNIFICANT CHANGE UP (ref 120–199)
HBA1C MFR BLD HPLC: 7
HDLC SERPL-MCNC: 34 MG/DL — LOW (ref 35–55)
INR PPP: 3 RATIO
LIPID PNL WITH DIRECT LDL SERPL: 96 MG/DL — SIGNIFICANT CHANGE UP
POCT-PROTHROMBIN TIME: 36.2 SECS
TRIGL SERPL-MCNC: 154 MG/DL — HIGH (ref 10–149)

## 2020-02-26 PROCEDURE — 99214 OFFICE O/P EST MOD 30 MIN: CPT | Mod: GC

## 2020-02-26 RX ORDER — WARFARIN 5 MG/1
5 TABLET ORAL
Qty: 30 | Refills: 2 | Status: DISCONTINUED | COMMUNITY
Start: 2019-09-03 | End: 2020-02-26

## 2020-03-04 NOTE — ASSESSMENT
[FreeTextEntry1] : #Hematuria\par -recent c/o hematuria, discomfort in his lower back and right lower quadrant\par -CT Abdomen/Pelvis from 12/26/2019 revealed: Interval stability without CT evidence of suspicious renal or ureter lesions. Enlarged prostate gland with associated transurethral resection of the prostate defect.\par -Cystoscopy 1/2/2020 revealed: Entering bladder, there is some bleeding from the prostate so vision is obscured. The bladder mucosa is a pale pink and normal in appearance. Prostate is inflamed. A large prostate and lateral hypertrophy is observed.\par -Urology following closely: Finasteride 5 mg was prescribed, 1 tablet daily, to shrink the prostate and treat bleeding\par \par #BPH\par -c/w tamsulosin\par \par #ED\par -c/w tadalafil\par \par #Afib\par - continue Lipitor, Atenolol, Digoxin\par - continue Coumadin to an INR goal 2.5-3.5\par - TTE 2/6/2019 EF 57%\par \par #CAD, s/p 1V CABG\par - continue Atenolol and Lipitor\par - NST 2/10/20 normal\par \par #Mechanical MVR\par -replaced ~10-11 years ago per patient\par -make/model unknown\par -INR goal 2.5-3.5\par \par #DM2 \par -c/w glipizide and januvia (pt refused metformin in past due to GI side effects)\par -POCT A1C 7.0 today\par -follows with optho at [facility]\par \par #HTN\par -c/w hctz, losartan \par -bp 1408/80 today\par \par #Seizure Disorder\par -MRI 6/10/2019: chronic microvascular disease multiple chronic cerebellar lacunar type infarcts are noted. A larger infarct is noted along the posterior right cerebellar hemisphere\par -per neuro, low risk for seizure recurrence due low frequency and negative testing\par -monitor off of AEDs for now\par \par Erik Brooks MD \par Plan discussed with Dr. Landeros\par RTC in 3 months\par

## 2020-03-04 NOTE — HISTORY OF PRESENT ILLNESS
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 \par \par Pt reports episodes of hematuria, most recent last week. Closely being followed by urology. Most recent CBC normal. Denies any other bleeding. No others symptoms or concerns. Requesting warfarin refill. \par \par States compliant with diabetic diet and tries to walk for physical activity.

## 2020-03-05 DIAGNOSIS — Z95.2 PRESENCE OF PROSTHETIC HEART VALVE: ICD-10-CM

## 2020-03-05 DIAGNOSIS — E11.40 TYPE 2 DIABETES MELLITUS WITH DIABETIC NEUROPATHY, UNSPECIFIED: ICD-10-CM

## 2020-03-05 DIAGNOSIS — Z86.69 PERSONAL HISTORY OF OTHER DISEASES OF THE NERVOUS SYSTEM AND SENSE ORGANS: ICD-10-CM

## 2020-03-05 DIAGNOSIS — N40.0 BENIGN PROSTATIC HYPERPLASIA WITHOUT LOWER URINARY TRACT SYMPTOMS: ICD-10-CM

## 2020-03-05 DIAGNOSIS — Z79.01 LONG TERM (CURRENT) USE OF ANTICOAGULANTS: ICD-10-CM

## 2020-03-05 DIAGNOSIS — E78.5 HYPERLIPIDEMIA, UNSPECIFIED: ICD-10-CM

## 2020-03-05 DIAGNOSIS — Z91.89 OTHER SPECIFIED PERSONAL RISK FACTORS, NOT ELSEWHERE CLASSIFIED: ICD-10-CM

## 2020-03-05 DIAGNOSIS — I48.91 UNSPECIFIED ATRIAL FIBRILLATION: ICD-10-CM

## 2020-03-05 DIAGNOSIS — N40.1 BENIGN PROSTATIC HYPERPLASIA WITH LOWER URINARY TRACT SYMPTOMS: ICD-10-CM

## 2020-03-05 DIAGNOSIS — I10 ESSENTIAL (PRIMARY) HYPERTENSION: ICD-10-CM

## 2020-03-05 DIAGNOSIS — N52.9 MALE ERECTILE DYSFUNCTION, UNSPECIFIED: ICD-10-CM

## 2020-03-12 ENCOUNTER — OUTPATIENT (OUTPATIENT)
Dept: OUTPATIENT SERVICES | Facility: HOSPITAL | Age: 75
LOS: 1 days | End: 2020-03-12

## 2020-03-12 ENCOUNTER — APPOINTMENT (OUTPATIENT)
Dept: INTERNAL MEDICINE | Facility: CLINIC | Age: 75
End: 2020-03-12

## 2020-03-12 ENCOUNTER — APPOINTMENT (OUTPATIENT)
Dept: CARDIOLOGY | Facility: HOSPITAL | Age: 75
End: 2020-03-12

## 2020-03-12 VITALS
DIASTOLIC BLOOD PRESSURE: 72 MMHG | RESPIRATION RATE: 16 BRPM | SYSTOLIC BLOOD PRESSURE: 131 MMHG | OXYGEN SATURATION: 98 % | WEIGHT: 178 LBS | HEART RATE: 75 BPM | HEIGHT: 69 IN | BODY MASS INDEX: 26.36 KG/M2

## 2020-03-12 DIAGNOSIS — Z98.89 OTHER SPECIFIED POSTPROCEDURAL STATES: Chronic | ICD-10-CM

## 2020-03-12 DIAGNOSIS — Z86.69 PERSONAL HISTORY OF OTHER DISEASES OF THE NERVOUS SYSTEM AND SENSE ORGANS: Chronic | ICD-10-CM

## 2020-03-12 LAB
INR PPP: 3.1 RATIO
POCT-PROTHROMBIN TIME: 37.2 SECS
QUALITY CONTROL: YES

## 2020-03-12 NOTE — DISCUSSION/SUMMARY
[FreeTextEntry1] : 73M with CAD s/p CABG, RHD s/p mechanical MVR on A/C with Coumadin, chronic persistent atrial fibrillation who comes in for routine follow-up:\par \par 1. Chronic atrial fibrillation, valvular, in the setting of mechanical MVR\par - continue Lipitor, Atenolol, Digoxin\par - continue Coumadin to an INR goal 2.5-3.5, no longer having hematuria\par - no evidence of CHF on examination\par - last TTE in 2/2020 with normal LVEF, no signficant mitral valve abnormality \par \par 2. CAD, s/p 1V CABG\par - will restart ASA as pt without any more signs of bleeding for last 2 months\par - continue Atenolol and Lipitor\par - pharm nuclear stress test was normal in 2/2020\par \par 3. Mechanical MVR in setting of RHD\par -replaced ~10-11 years ago per patient\par -exact make and model of MVR unknown\par -INR goal as above\par \par \par Follow-up with Cardiology clinic in 3-6 months.

## 2020-03-12 NOTE — HISTORY OF PRESENT ILLNESS
[FreeTextEntry1] : 74M with CAD s/p CABG, s/p mechanical MVR on A/C with Coumadin, chronic persistent atrial fibrillation who comes in for routine follow-up.\par Patient had stress test and TTE in 2/2020 that were normal. Denies any CP or SOB. Denies abd pain. No longer having hematuria after was prescribed finasteride by urology. No complaints today.\par

## 2020-03-12 NOTE — REVIEW OF SYSTEMS
[Fever] : no fever [Chills] : no chills [Blurry Vision] : no blurred vision [Earache] : no earache [Shortness Of Breath] : no shortness of breath [Dyspnea on exertion] : not dyspnea during exertion [Chest Pain] : no chest pain [Cough] : no cough [Abdominal Pain] : no abdominal pain [Vomiting] : no vomiting [Urinary Frequency] : no change in urinary frequency [Skin: A Rash] : no rash: [Dizziness] : no dizziness [Confusion] : no confusion was observed [Easy Bleeding] : no tendency for easy bleeding

## 2020-03-17 ENCOUNTER — APPOINTMENT (OUTPATIENT)
Dept: UROLOGY | Facility: CLINIC | Age: 75
End: 2020-03-17

## 2020-03-18 DIAGNOSIS — Z79.01 LONG TERM (CURRENT) USE OF ANTICOAGULANTS: ICD-10-CM

## 2020-03-18 DIAGNOSIS — Z51.81 ENCOUNTER FOR THERAPEUTIC DRUG LEVEL MONITORING: ICD-10-CM

## 2020-03-18 DIAGNOSIS — I48.91 UNSPECIFIED ATRIAL FIBRILLATION: ICD-10-CM

## 2020-03-18 DIAGNOSIS — Z95.2 PRESENCE OF PROSTHETIC HEART VALVE: ICD-10-CM

## 2020-03-26 ENCOUNTER — NON-APPOINTMENT (OUTPATIENT)
Age: 75
End: 2020-03-26

## 2020-03-26 ENCOUNTER — APPOINTMENT (OUTPATIENT)
Dept: OPHTHALMOLOGY | Facility: CLINIC | Age: 75
End: 2020-03-26
Payer: MEDICARE

## 2020-03-26 ENCOUNTER — APPOINTMENT (OUTPATIENT)
Dept: INTERNAL MEDICINE | Facility: CLINIC | Age: 75
End: 2020-03-26

## 2020-03-26 PROCEDURE — 92012 INTRM OPH EXAM EST PATIENT: CPT

## 2020-04-13 ENCOUNTER — RX RENEWAL (OUTPATIENT)
Age: 75
End: 2020-04-13

## 2020-04-29 ENCOUNTER — RX RENEWAL (OUTPATIENT)
Age: 75
End: 2020-04-29

## 2020-05-01 ENCOUNTER — OUTPATIENT (OUTPATIENT)
Dept: OUTPATIENT SERVICES | Facility: HOSPITAL | Age: 75
LOS: 1 days | End: 2020-05-01

## 2020-05-01 ENCOUNTER — APPOINTMENT (OUTPATIENT)
Dept: INTERNAL MEDICINE | Facility: CLINIC | Age: 75
End: 2020-05-01

## 2020-05-01 DIAGNOSIS — Z98.89 OTHER SPECIFIED POSTPROCEDURAL STATES: Chronic | ICD-10-CM

## 2020-05-01 DIAGNOSIS — Z86.69 PERSONAL HISTORY OF OTHER DISEASES OF THE NERVOUS SYSTEM AND SENSE ORGANS: Chronic | ICD-10-CM

## 2020-05-01 LAB
INR PPP: 3 RATIO
POCT-PROTHROMBIN TIME: 35.6 SECS

## 2020-05-04 DIAGNOSIS — Z79.01 LONG TERM (CURRENT) USE OF ANTICOAGULANTS: ICD-10-CM

## 2020-05-04 DIAGNOSIS — Z95.2 PRESENCE OF PROSTHETIC HEART VALVE: ICD-10-CM

## 2020-05-04 DIAGNOSIS — I48.91 UNSPECIFIED ATRIAL FIBRILLATION: ICD-10-CM

## 2020-05-04 DIAGNOSIS — Z51.81 ENCOUNTER FOR THERAPEUTIC DRUG LEVEL MONITORING: ICD-10-CM

## 2020-05-08 ENCOUNTER — APPOINTMENT (OUTPATIENT)
Dept: INTERNAL MEDICINE | Facility: CLINIC | Age: 75
End: 2020-05-08

## 2020-06-08 ENCOUNTER — RX RENEWAL (OUTPATIENT)
Age: 75
End: 2020-06-08

## 2020-06-12 ENCOUNTER — APPOINTMENT (OUTPATIENT)
Dept: INTERNAL MEDICINE | Facility: CLINIC | Age: 75
End: 2020-06-12

## 2020-06-12 ENCOUNTER — OUTPATIENT (OUTPATIENT)
Dept: OUTPATIENT SERVICES | Facility: HOSPITAL | Age: 75
LOS: 1 days | End: 2020-06-12

## 2020-06-12 VITALS — OXYGEN SATURATION: 96 % | RESPIRATION RATE: 15 BRPM | HEART RATE: 54 BPM

## 2020-06-12 VITALS — TEMPERATURE: 97.7 F

## 2020-06-12 DIAGNOSIS — Z95.2 PRESENCE OF PROSTHETIC HEART VALVE: ICD-10-CM

## 2020-06-12 DIAGNOSIS — Z98.89 OTHER SPECIFIED POSTPROCEDURAL STATES: Chronic | ICD-10-CM

## 2020-06-12 DIAGNOSIS — I48.91 UNSPECIFIED ATRIAL FIBRILLATION: ICD-10-CM

## 2020-06-12 DIAGNOSIS — Z86.69 PERSONAL HISTORY OF OTHER DISEASES OF THE NERVOUS SYSTEM AND SENSE ORGANS: Chronic | ICD-10-CM

## 2020-06-12 DIAGNOSIS — Z51.81 ENCOUNTER FOR THERAPEUTIC DRUG LEVEL MONITORING: ICD-10-CM

## 2020-06-12 DIAGNOSIS — Z79.01 LONG TERM (CURRENT) USE OF ANTICOAGULANTS: ICD-10-CM

## 2020-06-12 LAB
INR PPP: 2.6 RATIO
POCT-PROTHROMBIN TIME: 31 SECS
QUALITY CONTROL: YES

## 2020-06-24 ENCOUNTER — APPOINTMENT (OUTPATIENT)
Dept: INTERNAL MEDICINE | Facility: CLINIC | Age: 75
End: 2020-06-24

## 2020-07-09 ENCOUNTER — APPOINTMENT (OUTPATIENT)
Dept: INTERNAL MEDICINE | Facility: CLINIC | Age: 75
End: 2020-07-09

## 2020-07-17 ENCOUNTER — APPOINTMENT (OUTPATIENT)
Dept: INTERNAL MEDICINE | Facility: CLINIC | Age: 75
End: 2020-07-17

## 2020-07-17 ENCOUNTER — OUTPATIENT (OUTPATIENT)
Dept: OUTPATIENT SERVICES | Facility: HOSPITAL | Age: 75
LOS: 1 days | End: 2020-07-17

## 2020-07-17 VITALS — TEMPERATURE: 97.7 F

## 2020-07-17 VITALS — HEART RATE: 62 BPM | RESPIRATION RATE: 16 BRPM | OXYGEN SATURATION: 97 %

## 2020-07-17 DIAGNOSIS — Z86.69 PERSONAL HISTORY OF OTHER DISEASES OF THE NERVOUS SYSTEM AND SENSE ORGANS: Chronic | ICD-10-CM

## 2020-07-17 DIAGNOSIS — Z79.01 LONG TERM (CURRENT) USE OF ANTICOAGULANTS: ICD-10-CM

## 2020-07-17 DIAGNOSIS — Z51.81 ENCOUNTER FOR THERAPEUTIC DRUG LEVEL MONITORING: ICD-10-CM

## 2020-07-17 DIAGNOSIS — Z98.89 OTHER SPECIFIED POSTPROCEDURAL STATES: Chronic | ICD-10-CM

## 2020-07-17 DIAGNOSIS — Z95.2 PRESENCE OF PROSTHETIC HEART VALVE: ICD-10-CM

## 2020-07-17 DIAGNOSIS — I48.91 UNSPECIFIED ATRIAL FIBRILLATION: ICD-10-CM

## 2020-07-17 LAB
INR PPP: 3.3 RATIO
POCT-PROTHROMBIN TIME: 40.1 SECS
QUALITY CONTROL: YES

## 2020-07-21 NOTE — CURRENT MEDS
[Lack of understanding] : lack of understanding [Takes medication as prescribed] : does not take Single

## 2020-08-06 ENCOUNTER — APPOINTMENT (OUTPATIENT)
Dept: UROLOGY | Facility: CLINIC | Age: 75
End: 2020-08-06
Payer: MEDICARE

## 2020-08-06 ENCOUNTER — LABORATORY RESULT (OUTPATIENT)
Age: 75
End: 2020-08-06

## 2020-08-06 VITALS — HEART RATE: 83 BPM | DIASTOLIC BLOOD PRESSURE: 76 MMHG | SYSTOLIC BLOOD PRESSURE: 147 MMHG | RESPIRATION RATE: 16 BRPM

## 2020-08-06 VITALS — TEMPERATURE: 97.3 F

## 2020-08-06 PROCEDURE — 99214 OFFICE O/P EST MOD 30 MIN: CPT

## 2020-08-06 PROCEDURE — 88112 CYTOPATH CELL ENHANCE TECH: CPT | Mod: 26

## 2020-08-06 NOTE — HISTORY OF PRESENT ILLNESS
[FreeTextEntry1] : ALINA NOLAN is a 74 year year old male being seen for a follow up visit regarding gross hematuria. He has been experiencing gross hematuria with blood clots found in urine for the past two weeks. He experiences hematuria previously when he takes Coumadin which he was prescribed following his mitral valve replacement. The patient reports having no trouble with urination and wakes up 2 times during the night to urinate. Today he reports having discomfort in his lower back and right lower quadrant but no pain. The patient denies a history of kidney stones. In the past, he has had an endoscopy. 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 No discrete mass is identified at the level of the gastroesophageal junction. His Creatinine as of 12/16/19 was 1.01.\par 01/02/2020: Patient presents today for a cystoscopy. The patient denies hematuria but complains of some back pain. A CT ABDOMEN AND PELVIS from 12/26/2019 revealed: Interval stability without CT evidence of suspicious renal or ureter lesions. Enlarged prostate gland with associated transurethral resection of the prostate defect.\par \par 01/23/2020: Patient presents today for a follow up. At the conclusion of the last visit, Finasteride 5 mg was prescribed. Yesterday, he reports gross and opaque hematuria. Prior to yesterdays episode and following his cystoscopy, he did not experience hematuria. He notes that his Coumadin level was high at 2.6 recently. He has atrial fibrillation and a metal mitral valve. He was recently prescribed  Flomax, to take 1 at bedtime, which helped decrease nocturia from 2-3x/night to 1x/night. Today, he also complains of erectile dysfunction.

## 2020-08-06 NOTE — ASSESSMENT
[FreeTextEntry1] : ALINA NOLAN is a 74 year year old male being seen for a follow up visit regarding gross hematuria. He has been experiencing gross hematuria with blood clots found in urine for the past two weeks. He experiences hematuria previously when he takes Coumadin which he was prescribed following his mitral valve replacement. The patient reports having no trouble with urination and wakes up 2 times during the night to urinate. Today he reports having discomfort in his lower back and right lower quadrant but no pain. The patient denies a history of kidney stones. In the past, he has had an endoscopy. 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 No discrete mass is identified at the level of the gastroesophageal junction. His Creatinine as of 12/16/19 was 1.01.\par 01/02/2020: Patient presents today for a cystoscopy. The patient denies hematuria but complains of some back pain. A CT ABDOMEN AND PELVIS from 12/26/2019 revealed: Interval stability without CT evidence of suspicious renal or ureter lesions. Enlarged prostate gland with associated transurethral resection of the prostate defect.\par \par 01/23/2020: Patient presents today for a follow up. At the conclusion of the last visit, Finasteride 5 mg was prescribed. Yesterday, he reports gross and opaque hematuria. Prior to yesterdays episode and following his cystoscopy, he did not experience hematuria. He notes that his Coumadin level was high at 2.6 recently. He has atrial fibrillation and a metal mitral valve. He was recently prescribed  Flomax, to take 1 at bedtime, which helped decrease nocturia from 2-3x/night to 1x/night. Today, he also complains of erectile dysfunction. \par \par The patient produced a urine sample which will be sent for urinalysis, urine cytology, and urine culture. \par \par Finasteride 5 mg was renewed and is to be continued as previously prescribed.\par Tamsulosin 0.4 mg to be continued as previously prescribed by internist. \par Tadalafil 20mg, 1 tablet a day,  2-3hrs before sexual activity. I explained that there may be side effects including: warm flush feeling, nasal congestion, back pain and tiny chance of vision or hearing impairment.\par \par Patient should follow up in 2 months or sooner if he experiences urinary difficulties.

## 2020-08-06 NOTE — PHYSICAL EXAM
[General Appearance - Well Developed] : well developed [General Appearance - Well Nourished] : well nourished [Abdomen Soft] : soft [Abdomen Tenderness] : non-tender [Costovertebral Angle Tenderness] : no ~M costovertebral angle tenderness [Urethral Meatus] : meatus normal [Scrotum] : the scrotum was normal [Skin Color & Pigmentation] : normal skin color and pigmentation [] : no respiratory distress [Oriented To Time, Place, And Person] : oriented to person, place, and time [No Focal Deficits] : no focal deficits [Affect] : the affect was normal [Normal Station and Gait] : the gait and station were normal for the patient's age [Sensation] : the sensory exam was normal to light touch and pinprick [FreeTextEntry1] : no right upper quadrant tenderness

## 2020-08-06 NOTE — ADDENDUM
[FreeTextEntry1] : This note was authored by Verenice Lemon, working as scribe for Dr. Robson Thompson.\par \par I, Dr. Robson Thompson, have have reviewed the content of this note and confirm it is true and accurate. I personally performed the history and physical examination and made all the decisions. \par \par 01/23/2020

## 2020-08-08 LAB
APPEARANCE: CLEAR
BILIRUBIN URINE: NEGATIVE
BLOOD URINE: ABNORMAL
COLOR: YELLOW
ESTIMATED AVERAGE GLUCOSE: 177 MG/DL
GLUCOSE QUALITATIVE U: ABNORMAL
HBA1C MFR BLD HPLC: 7.8 %
KETONES URINE: NEGATIVE
LEUKOCYTE ESTERASE URINE: NEGATIVE
NITRITE URINE: NEGATIVE
PH URINE: 6
PROTEIN URINE: NORMAL
PSA FREE FLD-MCNC: 24 %
PSA FREE SERPL-MCNC: 0.69 NG/ML
PSA SERPL-MCNC: 2.9 NG/ML
SPECIFIC GRAVITY URINE: 1.02
T PALLIDUM AB SER QL IA: NEGATIVE
UROBILINOGEN URINE: NORMAL

## 2020-08-14 ENCOUNTER — APPOINTMENT (OUTPATIENT)
Dept: INTERNAL MEDICINE | Facility: CLINIC | Age: 75
End: 2020-08-14

## 2020-08-14 ENCOUNTER — OUTPATIENT (OUTPATIENT)
Dept: OUTPATIENT SERVICES | Facility: HOSPITAL | Age: 75
LOS: 1 days | End: 2020-08-14

## 2020-08-14 VITALS — RESPIRATION RATE: 16 BRPM | HEART RATE: 85 BPM | OXYGEN SATURATION: 97 % | TEMPERATURE: 98 F

## 2020-08-14 DIAGNOSIS — Z86.69 PERSONAL HISTORY OF OTHER DISEASES OF THE NERVOUS SYSTEM AND SENSE ORGANS: Chronic | ICD-10-CM

## 2020-08-14 DIAGNOSIS — Z98.89 OTHER SPECIFIED POSTPROCEDURAL STATES: Chronic | ICD-10-CM

## 2020-08-14 LAB
INR PPP: 2.9 RATIO
POCT-PROTHROMBIN TIME: 34.5 SECS

## 2020-08-18 DIAGNOSIS — Z79.01 LONG TERM (CURRENT) USE OF ANTICOAGULANTS: ICD-10-CM

## 2020-08-18 DIAGNOSIS — Z51.81 ENCOUNTER FOR THERAPEUTIC DRUG LEVEL MONITORING: ICD-10-CM

## 2020-08-18 DIAGNOSIS — Z95.2 PRESENCE OF PROSTHETIC HEART VALVE: ICD-10-CM

## 2020-08-18 DIAGNOSIS — I48.91 UNSPECIFIED ATRIAL FIBRILLATION: ICD-10-CM

## 2020-08-30 LAB
TESTOST BND SERPL-MCNC: 3.9 PG/ML
TESTOST SERPL-MCNC: 234.7 NG/DL
URINE CYTOLOGY: NORMAL

## 2020-09-01 ENCOUNTER — APPOINTMENT (OUTPATIENT)
Dept: UROLOGY | Facility: CLINIC | Age: 75
End: 2020-09-01
Payer: MEDICARE

## 2020-09-01 VITALS — TEMPERATURE: 98.2 F

## 2020-09-01 PROCEDURE — 99214 OFFICE O/P EST MOD 30 MIN: CPT

## 2020-09-01 NOTE — ASSESSMENT
[FreeTextEntry1] : ALINA NOLAN is a 75 year year old male being seen for a follow up visit regarding gross hematuria. He has been experiencing gross hematuria with blood clots found in urine for the past two weeks. He experiences hematuria previously when he takes Coumadin which he was prescribed following his mitral valve replacement. The patient reports having no trouble with urination and wakes up 2 times during the night to urinate. Today he reports having discomfort in his lower back and right lower quadrant but no pain. The patient denies a history of kidney stones. In the past, he has had an endoscopy. 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 No discrete mass is identified at the level of the gastroesophageal junction. His Creatinine as of 12/16/19 was 1.01.\par 01/02/2020: Patient presents today for a cystoscopy. The patient denies hematuria but complains of some back pain. A CT ABDOMEN AND PELVIS from 12/26/2019 revealed: Interval stability without CT evidence of suspicious renal or ureter lesions. Enlarged prostate gland with associated transurethral resection of the prostate defect.\par \par 01/23/2020: Patient presents today for a follow up. At the conclusion of the last visit, Finasteride 5 mg was prescribed. Yesterday, he reports gross and opaque hematuria. Prior to yesterdays episode and following his cystoscopy, he did not experience hematuria. He notes that his Coumadin level was high at 2.6 recently. He has atrial fibrillation and a metal mitral valve. He was recently prescribed  Flomax, to take 1 at bedtime, which helped decrease nocturia from 2-3x/night to 1x/night. Today, he also complains of erectile dysfunction. The patient produced a urine sample which will be sent for urinalysis, urine cytology, and urine culture. Finasteride 5 mg was renewed and is to be continued as previously prescribed. Tamsulosin 0.4 mg to be continued as previously prescribed by internist. Tadalafil 20mg, 1 tablet a day,  2-3hrs before sexual activity. I explained that there may be side effects including: warm flush feeling, nasal congestion, back pain and tiny chance of vision or hearing impairment. Patient should follow up in 2 months or sooner if he experiences urinary difficulties. \par \par 09/01/2020: Patient presents today for a follow up. Patient reports using Clotrimazole cream for penile lesion, however has not been helping and is instead causing itching. Also taking Tadalafil, but still unable to penetrate. Denies any side effects. \par \par I instructed the patient to increase Tadalafil to 30mg.\par \par I instructed the patient to discontinue Clotrimazole cream and started the patient on Nystatin-Triamcinolone cream. \par \par I prescribed the patient Cefadroxil 500mg BID and Fluconazole 100mg BID.\par \par Patient will RTO in 2 weeks for reassessment.

## 2020-09-01 NOTE — PHYSICAL EXAM
[General Appearance - Well Developed] : well developed [Normal Appearance] : normal appearance [General Appearance - In No Acute Distress] : no acute distress [Skin Color & Pigmentation] : normal skin color and pigmentation [] : no respiratory distress [Respiration, Rhythm And Depth] : normal respiratory rhythm and effort [Oriented To Time, Place, And Person] : oriented to person, place, and time [Affect] : the affect was normal [Mood] : the mood was normal [No Focal Deficits] : no focal deficits [Abdomen Soft] : soft [Abdomen Tenderness] : non-tender

## 2020-09-01 NOTE — HISTORY OF PRESENT ILLNESS
[FreeTextEntry1] : ALINA NOLAN is a 75 year year old male being seen for a follow up visit regarding gross hematuria. He has been experiencing gross hematuria with blood clots found in urine for the past two weeks. He experiences hematuria previously when he takes Coumadin which he was prescribed following his mitral valve replacement. The patient reports having no trouble with urination and wakes up 2 times during the night to urinate. Today he reports having discomfort in his lower back and right lower quadrant but no pain. The patient denies a history of kidney stones. In the past, he has had an endoscopy. 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 No discrete mass is identified at the level of the gastroesophageal junction. His Creatinine as of 12/16/19 was 1.01.\par 01/02/2020: Patient presents today for a cystoscopy. The patient denies hematuria but complains of some back pain. A CT ABDOMEN AND PELVIS from 12/26/2019 revealed: Interval stability without CT evidence of suspicious renal or ureter lesions. Enlarged prostate gland with associated transurethral resection of the prostate defect.\par \par 01/23/2020: Patient presents today for a follow up. At the conclusion of the last visit, Finasteride 5 mg was prescribed. Yesterday, he reports gross and opaque hematuria. Prior to yesterdays episode and following his cystoscopy, he did not experience hematuria. He notes that his Coumadin level was high at 2.6 recently. He has atrial fibrillation and a metal mitral valve. He was recently prescribed  Flomax, to take 1 at bedtime, which helped decrease nocturia from 2-3x/night to 1x/night. Today, he also complains of erectile dysfunction. The patient produced a urine sample which will be sent for urinalysis, urine cytology, and urine culture. Finasteride 5 mg was renewed and is to be continued as previously prescribed. Tamsulosin 0.4 mg to be continued as previously prescribed by internist. Tadalafil 20mg, 1 tablet a day,  2-3hrs before sexual activity. I explained that there may be side effects including: warm flush feeling, nasal congestion, back pain and tiny chance of vision or hearing impairment. Patient should follow up in 2 months or sooner if he experiences urinary difficulties. \par \par 09/01/2020: Patient presents today for a follow up. Patient reports using Clotrimazole cream for penile lesion, however has not been helping and is instead causing itching. Also taking Tadalafil, but still unable to penetrate. Denies any side effects.

## 2020-09-04 NOTE — ASSESSMENT
[FreeTextEntry1] : ALINA NOLAN is a 75 year year old male being seen for a follow up visit regarding gross hematuria. He has been experiencing gross hematuria with blood clots found in urine for the past two weeks. He experiences hematuria previously when he takes Coumadin which he was prescribed following his mitral valve replacement. The patient reports having no trouble with urination and wakes up 2 times during the night to urinate. Today he reports having discomfort in his lower back and right lower quadrant but no pain. The patient denies a history of kidney stones. In the past, he has had an endoscopy. 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 No discrete mass is identified at the level of the gastroesophageal junction. His Creatinine as of 12/16/19 was 1.01.\par 01/02/2020: Patient presents today for a cystoscopy. The patient denies hematuria but complains of some back pain. A CT ABDOMEN AND PELVIS from 12/26/2019 revealed: Interval stability without CT evidence of suspicious renal or ureter lesions. Enlarged prostate gland with associated transurethral resection of the prostate defect.\par \par 01/23/2020: Patient presents today for a follow up. At the conclusion of the last visit, Finasteride 5 mg was prescribed. Yesterday, he reports gross and opaque hematuria. Prior to yesterdays episode and following his cystoscopy, he did not experience hematuria. He notes that his Coumadin level was high at 2.6 recently. He has atrial fibrillation and a metal mitral valve. He was recently prescribed  Flomax, to take 1 at bedtime, which helped decrease nocturia from 2-3x/night to 1x/night. Today, he also complains of erectile dysfunction. The patient produced a urine sample which will be sent for urinalysis, urine cytology, and urine culture. Finasteride 5 mg was renewed and is to be continued as previously prescribed. Tamsulosin 0.4 mg to be continued as previously prescribed by internist. Tadalafil 20mg, 1 tablet a day,  2-3hrs before sexual activity. I explained that there may be side effects including: warm flush feeling, nasal congestion, back pain and tiny chance of vision or hearing impairment. Patient should follow up in 2 months or sooner if he experiences urinary difficulties. \par \par \par 8/6/2020: Pt presents c/o of penile itching, and erythema, and denies any recent gross hematuria, and dysuria. \par \par Will prescribe clotrimazole - betamethasone external clean 2x daily on penile skin. \par Pt to return 9/1/2020 for follow up.

## 2020-09-04 NOTE — PHYSICAL EXAM
[General Appearance - Well Developed] : well developed [General Appearance - Well Nourished] : well nourished [Normal Appearance] : normal appearance [General Appearance - In No Acute Distress] : no acute distress [Well Groomed] : well groomed [FreeTextEntry1] : Penile skin is slightly erythematous, but non tender.

## 2020-09-04 NOTE — HISTORY OF PRESENT ILLNESS
[FreeTextEntry1] : ALINA NOLAN is a 75 year year old male being seen for a follow up visit regarding gross hematuria. He has been experiencing gross hematuria with blood clots found in urine for the past two weeks. He experiences hematuria previously when he takes Coumadin which he was prescribed following his mitral valve replacement. The patient reports having no trouble with urination and wakes up 2 times during the night to urinate. Today he reports having discomfort in his lower back and right lower quadrant but no pain. The patient denies a history of kidney stones. In the past, he has had an endoscopy. 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 No discrete mass is identified at the level of the gastroesophageal junction. His Creatinine as of 12/16/19 was 1.01.\par 01/02/2020: Patient presents today for a cystoscopy. The patient denies hematuria but complains of some back pain. A CT ABDOMEN AND PELVIS from 12/26/2019 revealed: Interval stability without CT evidence of suspicious renal or ureter lesions. Enlarged prostate gland with associated transurethral resection of the prostate defect.\par \par 01/23/2020: Patient presents today for a follow up. At the conclusion of the last visit, Finasteride 5 mg was prescribed. Yesterday, he reports gross and opaque hematuria. Prior to yesterdays episode and following his cystoscopy, he did not experience hematuria. He notes that his Coumadin level was high at 2.6 recently. He has atrial fibrillation and a metal mitral valve. He was recently prescribed  Flomax, to take 1 at bedtime, which helped decrease nocturia from 2-3x/night to 1x/night. Today, he also complains of erectile dysfunction. The patient produced a urine sample which will be sent for urinalysis, urine cytology, and urine culture. Finasteride 5 mg was renewed and is to be continued as previously prescribed. Tamsulosin 0.4 mg to be continued as previously prescribed by internist. Tadalafil 20mg, 1 tablet a day,  2-3hrs before sexual activity. I explained that there may be side effects including: warm flush feeling, nasal congestion, back pain and tiny chance of vision or hearing impairment. Patient should follow up in 2 months or sooner if he experiences urinary difficulties. \par \par \par 8/6/2020: Pt presents c/o of penile itching, and erythema, and denies any recent gross hematuria, and dysuria. \par

## 2020-09-14 ENCOUNTER — LABORATORY RESULT (OUTPATIENT)
Age: 75
End: 2020-09-14

## 2020-09-14 ENCOUNTER — OUTPATIENT (OUTPATIENT)
Dept: OUTPATIENT SERVICES | Facility: HOSPITAL | Age: 75
LOS: 1 days | End: 2020-09-14

## 2020-09-14 ENCOUNTER — APPOINTMENT (OUTPATIENT)
Dept: INTERNAL MEDICINE | Facility: CLINIC | Age: 75
End: 2020-09-14

## 2020-09-14 VITALS
HEART RATE: 83 BPM | RESPIRATION RATE: 16 BRPM | SYSTOLIC BLOOD PRESSURE: 120 MMHG | DIASTOLIC BLOOD PRESSURE: 70 MMHG | OXYGEN SATURATION: 99 %

## 2020-09-14 DIAGNOSIS — Z98.89 OTHER SPECIFIED POSTPROCEDURAL STATES: Chronic | ICD-10-CM

## 2020-09-14 DIAGNOSIS — Z86.69 PERSONAL HISTORY OF OTHER DISEASES OF THE NERVOUS SYSTEM AND SENSE ORGANS: Chronic | ICD-10-CM

## 2020-09-14 LAB
BASOPHILS # BLD AUTO: 0.04 K/UL — SIGNIFICANT CHANGE UP (ref 0–0.2)
BASOPHILS NFR BLD AUTO: 0.6 % — SIGNIFICANT CHANGE UP (ref 0–2)
EOSINOPHIL # BLD AUTO: 0.15 K/UL — SIGNIFICANT CHANGE UP (ref 0–0.5)
EOSINOPHIL NFR BLD AUTO: 2.1 % — SIGNIFICANT CHANGE UP (ref 0–6)
HCT VFR BLD CALC: 43.9 % — SIGNIFICANT CHANGE UP (ref 39–50)
HGB BLD-MCNC: 14.5 G/DL — SIGNIFICANT CHANGE UP (ref 13–17)
IMM GRANULOCYTES NFR BLD AUTO: 1 % — SIGNIFICANT CHANGE UP (ref 0–1.5)
INR PPP: 2.5 RATIO
LYMPHOCYTES # BLD AUTO: 1.85 K/UL — SIGNIFICANT CHANGE UP (ref 1–3.3)
LYMPHOCYTES # BLD AUTO: 26 % — SIGNIFICANT CHANGE UP (ref 13–44)
MCHC RBC-ENTMCNC: 30.8 PG — SIGNIFICANT CHANGE UP (ref 27–34)
MCHC RBC-ENTMCNC: 33 % — SIGNIFICANT CHANGE UP (ref 32–36)
MCV RBC AUTO: 93.2 FL — SIGNIFICANT CHANGE UP (ref 80–100)
MONOCYTES # BLD AUTO: 0.62 K/UL — SIGNIFICANT CHANGE UP (ref 0–0.9)
MONOCYTES NFR BLD AUTO: 8.7 % — SIGNIFICANT CHANGE UP (ref 2–14)
NEUTROPHILS # BLD AUTO: 4.39 K/UL — SIGNIFICANT CHANGE UP (ref 1.8–7.4)
NEUTROPHILS NFR BLD AUTO: 61.6 % — SIGNIFICANT CHANGE UP (ref 43–77)
NRBC # FLD: 0 K/UL — SIGNIFICANT CHANGE UP (ref 0–0)
PLATELET # BLD AUTO: 191 K/UL — SIGNIFICANT CHANGE UP (ref 150–400)
PMV BLD: 10.6 FL — SIGNIFICANT CHANGE UP (ref 7–13)
POCT-PROTHROMBIN TIME: 29.5 SECS
RBC # BLD: 4.71 M/UL — SIGNIFICANT CHANGE UP (ref 4.2–5.8)
RBC # FLD: 13.1 % — SIGNIFICANT CHANGE UP (ref 10.3–14.5)
WBC # BLD: 7.12 K/UL — SIGNIFICANT CHANGE UP (ref 3.8–10.5)
WBC # FLD AUTO: 7.12 K/UL — SIGNIFICANT CHANGE UP (ref 3.8–10.5)

## 2020-09-17 ENCOUNTER — APPOINTMENT (OUTPATIENT)
Dept: CARDIOLOGY | Facility: HOSPITAL | Age: 75
End: 2020-09-17

## 2020-09-17 ENCOUNTER — NON-APPOINTMENT (OUTPATIENT)
Age: 75
End: 2020-09-17

## 2020-09-17 VITALS
SYSTOLIC BLOOD PRESSURE: 145 MMHG | TEMPERATURE: 98.5 F | DIASTOLIC BLOOD PRESSURE: 77 MMHG | RESPIRATION RATE: 16 BRPM | HEART RATE: 66 BPM | OXYGEN SATURATION: 97 % | WEIGHT: 176 LBS | BODY MASS INDEX: 25.99 KG/M2

## 2020-09-17 NOTE — PHYSICAL EXAM
[General Appearance - Well Developed] : well developed [General Appearance - Well Nourished] : well nourished [Normal Conjunctiva] : the conjunctiva exhibited no abnormalities [Normal Oral Mucosa] : normal oral mucosa [Normal Oropharynx] : normal oropharynx [] : no respiratory distress [Respiration, Rhythm And Depth] : normal respiratory rhythm and effort [Auscultation Breath Sounds / Voice Sounds] : lungs were clear to auscultation bilaterally [Heart Sounds] : normal S1 and S2 [Murmurs] : no murmurs present [Edema] : no peripheral edema present [FreeTextEntry1] : irregular pulse, mechanical S1 consistent with MVR [Bowel Sounds] : normal bowel sounds [Abdomen Soft] : soft [Abnormal Walk] : normal gait [Nail Clubbing] : no clubbing of the fingernails [Skin Color & Pigmentation] : normal skin color and pigmentation [Skin Turgor] : normal skin turgor [Oriented To Time, Place, And Person] : oriented to person, place, and time [Impaired Insight] : insight and judgment were intact

## 2020-09-17 NOTE — DISCUSSION/SUMMARY
[FreeTextEntry1] : 74 yo male PMHx of CAD s/p single vessel CABG 12y ago, mechanical MVR and permanent Afib in setting of rheumatic heart disease on coumadin, NIDDM type 2 A1c 8.6 who presents as f/u.\par \par #Permanent Afib, valvular, in setting of mechanical MVR\par -- INR goal 2.5-3.5, last INR 2.05, repeat INR pending for next Monday, dose to be adjusted by Dr. Seay PCP, no bleeding episodes\par -- C/w atenolol and dig\par -- EKG reviewed c/w Afib, no significant changes\par \par #CAD s/p 1v CABG\par -- C/w BB, ARB, high intensity lipitor, and ASA \par -- Last pharm NST Feb/2020 normal, last TTE Feb/2020 normal EF\par -- No chest pain, dyspnea, or other cardiac ROS findings\par \par F/u in 6 mo\par \par Discussed with Dr. Fisher\par \par Signed by\par Byloren Gustafson MD\par PGY4 Cardiology

## 2020-09-21 ENCOUNTER — APPOINTMENT (OUTPATIENT)
Dept: INTERNAL MEDICINE | Facility: CLINIC | Age: 75
End: 2020-09-21

## 2020-09-21 ENCOUNTER — OUTPATIENT (OUTPATIENT)
Dept: OUTPATIENT SERVICES | Facility: HOSPITAL | Age: 75
LOS: 1 days | End: 2020-09-21

## 2020-09-21 VITALS — HEART RATE: 69 BPM | OXYGEN SATURATION: 98 %

## 2020-09-21 VITALS — TEMPERATURE: 98 F

## 2020-09-21 DIAGNOSIS — Z98.89 OTHER SPECIFIED POSTPROCEDURAL STATES: Chronic | ICD-10-CM

## 2020-09-21 DIAGNOSIS — Z86.69 PERSONAL HISTORY OF OTHER DISEASES OF THE NERVOUS SYSTEM AND SENSE ORGANS: Chronic | ICD-10-CM

## 2020-09-22 ENCOUNTER — APPOINTMENT (OUTPATIENT)
Dept: UROLOGY | Facility: CLINIC | Age: 75
End: 2020-09-22
Payer: MEDICARE

## 2020-09-22 VITALS — TEMPERATURE: 97.7 F

## 2020-09-22 LAB
INR PPP: 2.5 RATIO
POCT-PROTHROMBIN TIME: 30.2 SECS
QUALITY CONTROL: YES

## 2020-09-22 PROCEDURE — 99214 OFFICE O/P EST MOD 30 MIN: CPT

## 2020-09-22 NOTE — ADDENDUM
[FreeTextEntry1] : I, Ainsley Tamezin, acted solely as a scribe for Dr. Robson Thompson on this date 09/22/2020.\par \par All medical record entries made by the Scribe were at my, Dr. Robson Thompson, direction and personally dictated by me on 09/22/2020. I have reviewed the chart and agree that the record accurately reflects my personal performance of the history, physical exam, assessment and plan.  I have also personally directed, reviewed and agreed with the chart.

## 2020-09-22 NOTE — HISTORY OF PRESENT ILLNESS
[FreeTextEntry1] : ALINA NOLAN is a 75 year year old male being seen for a follow up visit regarding gross hematuria. He has been experiencing gross hematuria with blood clots found in urine for the past two weeks. He experiences hematuria previously when he takes Coumadin which he was prescribed following his mitral valve replacement. The patient reports having no trouble with urination and wakes up 2 times during the night to urinate. Today he reports having discomfort in his lower back and right lower quadrant but no pain. The patient denies a history of kidney stones. In the past, he has had an endoscopy. 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 No discrete mass is identified at the level of the gastroesophageal junction. His Creatinine as of 12/16/19 was 1.01.\par 01/02/2020: Patient presents today for a cystoscopy. The patient denies hematuria but complains of some back pain. A CT ABDOMEN AND PELVIS from 12/26/2019 revealed: Interval stability without CT evidence of suspicious renal or ureter lesions. Enlarged prostate gland with associated transurethral resection of the prostate defect.\par \par 01/23/2020: Patient presents today for a follow up. At the conclusion of the last visit, Finasteride 5 mg was prescribed. Yesterday, he reports gross and opaque hematuria. Prior to yesterdays episode and following his cystoscopy, he did not experience hematuria. He notes that his Coumadin level was high at 2.6 recently. He has atrial fibrillation and a metal mitral valve. He was recently prescribed  Flomax, to take 1 at bedtime, which helped decrease nocturia from 2-3x/night to 1x/night. Today, he also complains of erectile dysfunction. The patient produced a urine sample which will be sent for urinalysis, urine cytology, and urine culture. Finasteride 5 mg was renewed and is to be continued as previously prescribed. Tamsulosin 0.4 mg to be continued as previously prescribed by internist. Tadalafil 20mg, 1 tablet a day,  2-3hrs before sexual activity. I explained that there may be side effects including: warm flush feeling, nasal congestion, back pain and tiny chance of vision or hearing impairment. Patient should follow up in 2 months or sooner if he experiences urinary difficulties. \par \par 09/01/2020: Patient presents today for a follow up. Patient reports using Clotrimazole cream for penile lesion, however has not been helping and is instead causing itching. Also taking Tadalafil, but still unable to penetrate. Denies any side effects. I instructed the patient to increase Tadalafil to 30mg. I instructed the patient to discontinue Clotrimazole cream and started the patient on Nystatin-Triamcinolone cream. I prescribed the patient Cefadroxil 500mg BID and Fluconazole 100mg BID.\par \par 09/22/2020: Patient presents today for follow up for penile lesion. Used Nystatin-Triamcinolone cream which helped. No pain or itching. Today complains of urinary urgency and urge incontinence. Takes Tamsulosin 0.4mg once daily half an hour after dinner. Notes he does get heartburn on it. Last PSA on 8/6/20 was 2.90. No constipation.

## 2020-09-22 NOTE — REVIEW OF SYSTEMS
[Incontinence] : incontinence [Poor quality erections] : Poor quality erections [Negative] : Heme/Lymph

## 2020-09-22 NOTE — PHYSICAL EXAM
[General Appearance - Well Developed] : well developed [Normal Appearance] : normal appearance [General Appearance - In No Acute Distress] : no acute distress [Abdomen Soft] : soft [Abdomen Tenderness] : non-tender [Skin Color & Pigmentation] : normal skin color and pigmentation [Edema] : no peripheral edema [] : no respiratory distress [Respiration, Rhythm And Depth] : normal respiratory rhythm and effort [Exaggerated Use Of Accessory Muscles For Inspiration] : no accessory muscle use [Oriented To Time, Place, And Person] : oriented to person, place, and time [Affect] : the affect was normal [Mood] : the mood was normal [Normal Station and Gait] : the gait and station were normal for the patient's age [No Focal Deficits] : no focal deficits

## 2020-09-22 NOTE — ASSESSMENT
[FreeTextEntry1] : ALINA NOLAN is a 75 year year old male being seen for a follow up visit regarding gross hematuria. He has been experiencing gross hematuria with blood clots found in urine for the past two weeks. He experiences hematuria previously when he takes Coumadin which he was prescribed following his mitral valve replacement. The patient reports having no trouble with urination and wakes up 2 times during the night to urinate. Today he reports having discomfort in his lower back and right lower quadrant but no pain. The patient denies a history of kidney stones. In the past, he has had an endoscopy. 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 No discrete mass is identified at the level of the gastroesophageal junction. His Creatinine as of 12/16/19 was 1.01.\par 01/02/2020: Patient presents today for a cystoscopy. The patient denies hematuria but complains of some back pain. A CT ABDOMEN AND PELVIS from 12/26/2019 revealed: Interval stability without CT evidence of suspicious renal or ureter lesions. Enlarged prostate gland with associated transurethral resection of the prostate defect.\par \par 01/23/2020: Patient presents today for a follow up. At the conclusion of the last visit, Finasteride 5 mg was prescribed. Yesterday, he reports gross and opaque hematuria. Prior to yesterdays episode and following his cystoscopy, he did not experience hematuria. He notes that his Coumadin level was high at 2.6 recently. He has atrial fibrillation and a metal mitral valve. He was recently prescribed  Flomax, to take 1 at bedtime, which helped decrease nocturia from 2-3x/night to 1x/night. Today, he also complains of erectile dysfunction. The patient produced a urine sample which will be sent for urinalysis, urine cytology, and urine culture. Finasteride 5 mg was renewed and is to be continued as previously prescribed. Tamsulosin 0.4 mg to be continued as previously prescribed by internist. Tadalafil 20mg, 1 tablet a day,  2-3hrs before sexual activity. I explained that there may be side effects including: warm flush feeling, nasal congestion, back pain and tiny chance of vision or hearing impairment. Patient should follow up in 2 months or sooner if he experiences urinary difficulties. \par \par 09/01/2020: Patient presents today for a follow up. Patient reports using Clotrimazole cream for penile lesion, however has not been helping and is instead causing itching. Also taking Tadalafil, but still unable to penetrate. Denies any side effects. I instructed the patient to increase Tadalafil to 30mg. I instructed the patient to discontinue Clotrimazole cream and started the patient on Nystatin-Triamcinolone cream. I prescribed the patient Cefadroxil 500mg BID and Fluconazole 100mg BID.\par \par 09/22/2020: Patient presents today for follow up for penile lesion. Used Nystatin-Triamcinolone cream which helped. No pain or itching. Today complains of urinary urgency and urge incontinence. Takes Tamsulosin 0.4mg once daily half an hour after dinner. Notes he does get heartburn on it. Last PSA on 8/6/20 was 2.90. No constipation. \par \par Patient will discontinue Tamsulosin as it causes heartburn, and he will start Alfuzosin, once daily with food. I informed the patient of lightheadedness as a side effect. He will also start Trospium 20 mg, one tablet once daily in the morning. I informed the patient there may be constipation and dry mouth as a side effect. \par \par The patient produced a urine sample which will be sent for urinalysis, urine cytology, and urine culture. \par \par Patient will RTO in 2 weeks for reassessment. \par \par

## 2020-09-23 DIAGNOSIS — Z51.81 ENCOUNTER FOR THERAPEUTIC DRUG LEVEL MONITORING: ICD-10-CM

## 2020-09-23 DIAGNOSIS — Z95.2 PRESENCE OF PROSTHETIC HEART VALVE: ICD-10-CM

## 2020-09-23 DIAGNOSIS — Z79.01 LONG TERM (CURRENT) USE OF ANTICOAGULANTS: ICD-10-CM

## 2020-09-23 DIAGNOSIS — I48.91 UNSPECIFIED ATRIAL FIBRILLATION: ICD-10-CM

## 2020-10-08 ENCOUNTER — APPOINTMENT (OUTPATIENT)
Dept: GASTROENTEROLOGY | Facility: CLINIC | Age: 75
End: 2020-10-08
Payer: MEDICARE

## 2020-10-08 ENCOUNTER — OUTPATIENT (OUTPATIENT)
Dept: OUTPATIENT SERVICES | Facility: HOSPITAL | Age: 75
LOS: 1 days | End: 2020-10-08

## 2020-10-08 ENCOUNTER — RESULT CHARGE (OUTPATIENT)
Age: 75
End: 2020-10-08

## 2020-10-08 VITALS
WEIGHT: 176 LBS | HEIGHT: 69 IN | HEART RATE: 83 BPM | OXYGEN SATURATION: 98 % | BODY MASS INDEX: 26.07 KG/M2 | DIASTOLIC BLOOD PRESSURE: 80 MMHG | SYSTOLIC BLOOD PRESSURE: 119 MMHG

## 2020-10-08 VITALS — TEMPERATURE: 97 F

## 2020-10-08 DIAGNOSIS — Z86.69 PERSONAL HISTORY OF OTHER DISEASES OF THE NERVOUS SYSTEM AND SENSE ORGANS: Chronic | ICD-10-CM

## 2020-10-08 DIAGNOSIS — Z98.89 OTHER SPECIFIED POSTPROCEDURAL STATES: Chronic | ICD-10-CM

## 2020-10-08 LAB
INR PPP: 2.8 RATIO
POCT-PROTHROMBIN TIME: 33.5 SECS
QUALITY CONTROL: YES

## 2020-10-08 PROCEDURE — 99214 OFFICE O/P EST MOD 30 MIN: CPT | Mod: GC

## 2020-10-08 RX ORDER — PANTOPRAZOLE 40 MG/1
40 TABLET, DELAYED RELEASE ORAL DAILY
Qty: 90 | Refills: 1 | Status: DISCONTINUED | COMMUNITY
Start: 2018-12-26 | End: 2020-10-08

## 2020-10-08 RX ORDER — PANTOPRAZOLE 40 MG/1
40 TABLET, DELAYED RELEASE ORAL DAILY
Qty: 30 | Refills: 3 | Status: DISCONTINUED | COMMUNITY
Start: 2019-09-11 | End: 2020-10-08

## 2020-10-12 DIAGNOSIS — Z79.01 LONG TERM (CURRENT) USE OF ANTICOAGULANTS: ICD-10-CM

## 2020-10-12 DIAGNOSIS — Z95.2 PRESENCE OF PROSTHETIC HEART VALVE: ICD-10-CM

## 2020-10-12 DIAGNOSIS — I48.91 UNSPECIFIED ATRIAL FIBRILLATION: ICD-10-CM

## 2020-10-12 DIAGNOSIS — Z51.81 ENCOUNTER FOR THERAPEUTIC DRUG LEVEL MONITORING: ICD-10-CM

## 2020-10-14 ENCOUNTER — APPOINTMENT (OUTPATIENT)
Dept: UROLOGY | Facility: CLINIC | Age: 75
End: 2020-10-14
Payer: MEDICARE

## 2020-10-14 VITALS — RESPIRATION RATE: 16 BRPM | HEART RATE: 86 BPM | SYSTOLIC BLOOD PRESSURE: 100 MMHG | DIASTOLIC BLOOD PRESSURE: 62 MMHG

## 2020-10-14 VITALS — TEMPERATURE: 97.6 F

## 2020-10-14 PROCEDURE — 99214 OFFICE O/P EST MOD 30 MIN: CPT

## 2020-10-14 NOTE — HISTORY OF PRESENT ILLNESS
[Urinary Incontinence] : urinary incontinence [Urinary Retention] : urinary retention [Urinary Urgency] : urinary urgency [Urinary Frequency] : urinary frequency [Nocturia] : nocturia [Straining] : straining [Weak Stream] : weak stream [Intermittency] : intermittency [Erectile Dysfunction] : Erectile Dysfunction [Dysuria] : dysuria [FreeTextEntry1] : 75 male BPH severe LUTS in form of frequency up to 10X nocte with supra pubic pressure, urge , intermittency. No hematuria. \par Recent switch of medication from flomax to Alfuzosin ,patient said to no avail. No improvement in symptoms. \par Taking coumadin for mechanical heart valve. Recent episode of hematuria is resolved. \par ED little improvement with Tadalafil. Said not strong enough to penetrate\par PSA 2.9 august 2020. \par Testosterone free L 3.9, Total L 234.7 [Hematuria - Gross] : no gross hematuria

## 2020-10-14 NOTE — END OF VISIT
[] : Fellow [FreeTextEntry3] : 76 yo M with mechanical valve on a/c, CAD s/p CABG, presenting for follow up of Dyspepsia.  Last seen 11/2019 -> no improvement with PPI initially including BID dosing.  Now coming back with similar symptoms though he is more describing heartburn today than he is describing dyspepsia.  Given this appears to be functional d/o with gerd sx and patient desiring precise diagnosis - reasonable to pursue egd/bravo at this time; however patient declines and wants to try PPI BID x 1 more month at this time.  If no response - needs official pH testing as next step  UTD CRC Screening - due 2024 for avg risk.

## 2020-10-14 NOTE — PHYSICAL EXAM
[General Appearance - Alert] : alert [General Appearance - Well Developed] : well developed [General Appearance - Well-Appearing] : healthy appearing [Sclera] : the sclera and conjunctiva were normal [Oropharynx] : the oropharynx was normal [Neck Appearance] : the appearance of the neck was normal [Respiration, Rhythm And Depth] : normal respiratory rhythm and effort [Exaggerated Use Of Accessory Muscles For Inspiration] : no accessory muscle use [Heart Rate And Rhythm] : heart rate was normal and rhythm regular [Edema] : there was no peripheral edema [Breast Appearance] : normal in appearance [Abdomen Soft] : soft [Cervical Lymph Nodes Enlarged Posterior Bilaterally] : posterior cervical [Cervical Lymph Nodes Enlarged Anterior Bilaterally] : anterior cervical [Abnormal Walk] : normal gait [Skin Color & Pigmentation] : normal skin color and pigmentation [No Focal Deficits] : no focal deficits [Oriented To Time, Place, And Person] : oriented to person, place, and time [Impaired Insight] : insight and judgment were intact

## 2020-10-14 NOTE — HISTORY OF PRESENT ILLNESS
[Heartburn] : stable heartburn [Nausea] : denies nausea [Vomiting] : denies vomiting [Diarrhea] : denies diarrhea [Constipation] : denies constipation [Yellow Skin Or Eyes (Jaundice)] : denies jaundice [Abdominal Pain] : denies abdominal pain [Abdominal Swelling] : denies abdominal swelling [Rectal Pain] : denies rectal pain [GERD] : gastroesophageal reflux disease [de-identified] : 73M w/ AFib and mechanical mitral valve on Coumadin, CAD s/p CABG (not on ASA) presents for evaluation of heartburn.  \par \par Heartburn as of 1 week ago, worse at night and after meals. He eats dinner at 5:30 pm and sleeps at 10 pm. No associated regurgitation or vomiting. He previously experienced reflux symptoms one year ago, although documented as dyspepsia elsewhere in chart, and was prescribed once daily omeprazole which he reports did not work. Patient took medication appropriately 30 mins prior to first meal. \par \par In interval period, he denies weight loss, dysphagia, odynophagia, black stools or other changes in bowel habits. \par \par EGD in 03/2019 for evaluation of subepithelial gastric lesion demonstrated grossly normal esophagus. No hiatal hernia was seen. EUS with FNB of subepithelial gastric lesion revealed normal adipose tissue.  [Wt Loss ___ Lbs] : no recent weight loss

## 2020-10-14 NOTE — ASSESSMENT
[Urinary Symptom or Sign (788.99\R39.89)] : implantation [FreeTextEntry1] : 75 yr male with BPH , LUTS presenting with worsening urinary symptoms of frequency and nocturia, suprapubic pressure \par \par  .  Significant retention\par \par Problem of compliance.  Not able to afford some of the medications because not covered by insurance\par Not sure exactly of what meds he is taking and not taking \par Attempted to talk to wife and daughter to clear up his med list.  \par Patient advised to take a picture of med containers or come with all med containers at next visit in 2 weeks . \par  \par UA micro\par \par Oxybutinin\par Alfuzosin \par If medications do not help, will consider Desmopressin. \par \par RTO in 2 weeks for reassessment.

## 2020-10-14 NOTE — REVIEW OF SYSTEMS
[Dysuria] : dysuria [Hesitancy] : urinary hesitancy [Fever] : no fever [Chills] : no chills [Eye Pain] : no eye pain [Earache] : no earache [Shortness Of Breath] : no shortness of breath [Abdominal Pain] : no abdominal pain [Chest Pain] : no chest pain [Arthralgias] : no arthralgias [Skin Lesions] : no skin lesions [Confused] : no confusion [Suicidal] : not suicidal [Muscle Weakness] : no muscle weakness [Easy Bleeding] : no tendency for easy bleeding

## 2020-10-14 NOTE — ASSESSMENT
[FreeTextEntry1] : Impression:\par \par # GERD: Likely due to LES insufficiency vs. small hiatal hernia (seen on EGD from 03/2019) vs. esophageal hypersensitivity. No esophagitis or PUD seen at that time. 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 esophageal hypersensitivity however patient is currently declining pH testing\par - will start trial of oral PPI BID for 30 days to which patient is agreeable\par - counselled on antireflux precautions \par - RTC in 1 month\par \par D/w Dr. Hill\par \par P Zeke PGY5\par

## 2020-10-14 NOTE — PHYSICAL EXAM
[Normal Appearance] : normal appearance [Abdomen Tenderness] : non-tender [Well Groomed] : well groomed [Skin Color & Pigmentation] : normal skin color and pigmentation [] : no respiratory distress [Edema] : no peripheral edema [Normal Station and Gait] : the gait and station were normal for the patient's age [Oriented To Time, Place, And Person] : oriented to person, place, and time [No Focal Deficits] : no focal deficits [No Palpable Adenopathy] : no palpable adenopathy

## 2020-10-20 LAB
APPEARANCE: CLEAR
BACTERIA: NEGATIVE
BILIRUBIN URINE: NEGATIVE
BLOOD URINE: NEGATIVE
COLOR: NORMAL
GLUCOSE QUALITATIVE U: ABNORMAL
HYALINE CASTS: 0 /LPF
KETONES URINE: NEGATIVE
LEUKOCYTE ESTERASE URINE: NEGATIVE
MICROSCOPIC-UA: NORMAL
NITRITE URINE: NEGATIVE
PH URINE: 6
PROTEIN URINE: NEGATIVE
RED BLOOD CELLS URINE: 0 /HPF
SPECIFIC GRAVITY URINE: 1.01
SQUAMOUS EPITHELIAL CELLS: 0 /HPF
UROBILINOGEN URINE: NORMAL
WHITE BLOOD CELLS URINE: 0 /HPF

## 2020-10-28 ENCOUNTER — APPOINTMENT (OUTPATIENT)
Dept: INTERNAL MEDICINE | Facility: CLINIC | Age: 75
End: 2020-10-28
Payer: MEDICARE

## 2020-10-28 ENCOUNTER — OUTPATIENT (OUTPATIENT)
Dept: OUTPATIENT SERVICES | Facility: HOSPITAL | Age: 75
LOS: 1 days | End: 2020-10-28

## 2020-10-28 VITALS
OXYGEN SATURATION: 98 % | DIASTOLIC BLOOD PRESSURE: 80 MMHG | BODY MASS INDEX: 26.07 KG/M2 | WEIGHT: 176 LBS | HEIGHT: 69 IN | SYSTOLIC BLOOD PRESSURE: 126 MMHG | HEART RATE: 83 BPM

## 2020-10-28 VITALS — TEMPERATURE: 97.8 F

## 2020-10-28 DIAGNOSIS — Z98.89 OTHER SPECIFIED POSTPROCEDURAL STATES: Chronic | ICD-10-CM

## 2020-10-28 DIAGNOSIS — I48.91 UNSPECIFIED ATRIAL FIBRILLATION: ICD-10-CM

## 2020-10-28 DIAGNOSIS — E11.40 TYPE 2 DIABETES MELLITUS WITH DIABETIC NEUROPATHY, UNSPECIFIED: ICD-10-CM

## 2020-10-28 DIAGNOSIS — E78.5 HYPERLIPIDEMIA, UNSPECIFIED: ICD-10-CM

## 2020-10-28 DIAGNOSIS — Z86.69 PERSONAL HISTORY OF OTHER DISEASES OF THE NERVOUS SYSTEM AND SENSE ORGANS: ICD-10-CM

## 2020-10-28 DIAGNOSIS — Z86.69 PERSONAL HISTORY OF OTHER DISEASES OF THE NERVOUS SYSTEM AND SENSE ORGANS: Chronic | ICD-10-CM

## 2020-10-28 DIAGNOSIS — N52.1 ERECTILE DYSFUNCTION DUE TO DISEASES CLASSIFIED ELSEWHERE: ICD-10-CM

## 2020-10-28 DIAGNOSIS — Z23 ENCOUNTER FOR IMMUNIZATION: ICD-10-CM

## 2020-10-28 DIAGNOSIS — Z51.81 ENCOUNTER FOR THERAPEUTIC DRUG LEVEL MONITORING: ICD-10-CM

## 2020-10-28 DIAGNOSIS — N39.41 URGE INCONTINENCE: ICD-10-CM

## 2020-10-28 DIAGNOSIS — I10 ESSENTIAL (PRIMARY) HYPERTENSION: ICD-10-CM

## 2020-10-28 DIAGNOSIS — Z95.2 PRESENCE OF PROSTHETIC HEART VALVE: ICD-10-CM

## 2020-10-28 DIAGNOSIS — E11.9 TYPE 2 DIABETES MELLITUS WITHOUT COMPLICATIONS: ICD-10-CM

## 2020-10-28 LAB
GLUCOSE BLDC GLUCOMTR-MCNC: 136
HBA1C MFR BLD HPLC: 6.7
INR PPP: 2.7 RATIO
POCT-PROTHROMBIN TIME: 32.5 SECS

## 2020-10-28 PROCEDURE — 99214 OFFICE O/P EST MOD 30 MIN: CPT | Mod: GC

## 2020-10-28 NOTE — REVIEW OF SYSTEMS
[Heartburn] : heartburn [Hesitancy] : hesitancy [Nocturia] : nocturia [Frequency] : frequency [Impotence] : impotence [Negative] : Neurological

## 2020-10-29 ENCOUNTER — LABORATORY RESULT (OUTPATIENT)
Age: 75
End: 2020-10-29

## 2020-10-29 ENCOUNTER — RESULT REVIEW (OUTPATIENT)
Age: 75
End: 2020-10-29

## 2020-10-29 ENCOUNTER — APPOINTMENT (OUTPATIENT)
Dept: UROLOGY | Facility: CLINIC | Age: 75
End: 2020-10-29
Payer: MEDICARE

## 2020-10-29 VITALS — HEART RATE: 63 BPM | RESPIRATION RATE: 15 BRPM | DIASTOLIC BLOOD PRESSURE: 80 MMHG | SYSTOLIC BLOOD PRESSURE: 149 MMHG

## 2020-10-29 VITALS — RESPIRATION RATE: 15 BRPM | HEART RATE: 77 BPM | DIASTOLIC BLOOD PRESSURE: 109 MMHG | SYSTOLIC BLOOD PRESSURE: 160 MMHG

## 2020-10-29 VITALS — TEMPERATURE: 97 F

## 2020-10-29 LAB
APPEARANCE UR: CLEAR — SIGNIFICANT CHANGE UP
BILIRUB UR-MCNC: NEGATIVE — SIGNIFICANT CHANGE UP
BLOOD UR QL VISUAL: NEGATIVE — SIGNIFICANT CHANGE UP
COLOR SPEC: YELLOW — SIGNIFICANT CHANGE UP
GLUCOSE UR-MCNC: 100 — SIGNIFICANT CHANGE UP
KETONES UR-MCNC: NEGATIVE — SIGNIFICANT CHANGE UP
LEUKOCYTE ESTERASE UR-ACNC: NEGATIVE — SIGNIFICANT CHANGE UP
NITRITE UR-MCNC: NEGATIVE — SIGNIFICANT CHANGE UP
PH UR: 5.5 — SIGNIFICANT CHANGE UP (ref 5–8)
PROT UR-MCNC: 10 — SIGNIFICANT CHANGE UP
SP GR SPEC: 1.03 — SIGNIFICANT CHANGE UP (ref 1–1.04)
UROBILINOGEN FLD QL: NORMAL — SIGNIFICANT CHANGE UP

## 2020-10-29 PROCEDURE — 88112 CYTOPATH CELL ENHANCE TECH: CPT | Mod: 26

## 2020-10-29 PROCEDURE — 99072 ADDL SUPL MATRL&STAF TM PHE: CPT

## 2020-10-29 PROCEDURE — 99214 OFFICE O/P EST MOD 30 MIN: CPT

## 2020-10-29 NOTE — PHYSICAL EXAM
[General Appearance - Well Developed] : well developed [Normal Appearance] : normal appearance [General Appearance - In No Acute Distress] : no acute distress [Abdomen Soft] : soft [Abdomen Tenderness] : non-tender [Costovertebral Angle Tenderness] : no ~M costovertebral angle tenderness [Skin Color & Pigmentation] : normal skin color and pigmentation [Edema] : no peripheral edema [] : no respiratory distress [Respiration, Rhythm And Depth] : normal respiratory rhythm and effort [Exaggerated Use Of Accessory Muscles For Inspiration] : no accessory muscle use [Oriented To Time, Place, And Person] : oriented to person, place, and time [Affect] : the affect was normal [Mood] : the mood was normal [Not Anxious] : not anxious [Normal Station and Gait] : the gait and station were normal for the patient's age [No Focal Deficits] : no focal deficits

## 2020-10-30 ENCOUNTER — LABORATORY RESULT (OUTPATIENT)
Age: 75
End: 2020-10-30

## 2020-10-30 NOTE — HISTORY OF PRESENT ILLNESS
[FreeTextEntry1] : ALINA NOLAN is a 75 year year old male being seen for a follow up visit regarding gross hematuria. He has been experiencing gross hematuria with blood clots found in urine for the past two weeks. He experiences hematuria previously when he takes Coumadin which he was prescribed following his mitral valve replacement. The patient reports having no trouble with urination and wakes up 2 times during the night to urinate. Today he reports having discomfort in his lower back and right lower quadrant but no pain. The patient denies a history of kidney stones. In the past, he has had an endoscopy. 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 No discrete mass is identified at the level of the gastroesophageal junction. His Creatinine as of 12/16/19 was 1.01.\par 01/02/2020: Patient presents today for a cystoscopy. The patient denies hematuria but complains of some back pain. A CT ABDOMEN AND PELVIS from 12/26/2019 revealed: Interval stability without CT evidence of suspicious renal or ureter lesions. Enlarged prostate gland with associated transurethral resection of the prostate defect.\par \par 01/23/2020: Patient presents today for a follow up. At the conclusion of the last visit, Finasteride 5 mg was prescribed. Yesterday, he reports gross and opaque hematuria. Prior to yesterdays episode and following his cystoscopy, he did not experience hematuria. He notes that his Coumadin level was high at 2.6 recently. He has atrial fibrillation and a metal mitral valve. He was recently prescribed Flomax, to take 1 at bedtime, which helped decrease nocturia from 2-3x/night to 1x/night. Today, he also complains of erectile dysfunction. The patient produced a urine sample which will be sent for urinalysis, urine cytology, and urine culture. Finasteride 5 mg was renewed and is to be continued as previously prescribed. Tamsulosin 0.4 mg to be continued as previously prescribed by internist. Tadalafil 20mg, 1 tablet a day, 2-3hrs before sexual activity. I explained that there may be side effects including: warm flush feeling, nasal congestion, back pain and tiny chance of vision or hearing impairment. Patient should follow up in 2 months or sooner if he experiences urinary difficulties. \par \par 09/01/2020: Patient presents today for a follow up. Patient reports using Clotrimazole cream for penile lesion, however has not been helping and is instead causing itching. Also taking Tadalafil, but still unable to penetrate. Denies any side effects. I instructed the patient to increase Tadalafil to 30mg. I instructed the patient to discontinue Clotrimazole cream and started the patient on Nystatin-Triamcinolone cream. I prescribed the patient Cefadroxil 500mg BID and Fluconazole 100mg BID.\par \par 09/22/2020: Patient presents today for follow up for penile lesion. Used Nystatin-Triamcinolone cream which helped. No pain or itching. Today complains of urinary urgency and urge incontinence. Takes Tamsulosin 0.4mg once daily half an hour after dinner. Notes he does get heartburn on it. Last PSA on 8/6/20 was 2.90. No constipation. \par \par 10/14/2020: 75 male BPH severe LUTS in form of frequency up to 10X nocte with supra pubic pressure, urge , intermittency. No hematuria. \par Recent switch of medication from flomax to Alfuzosin ,patient said to no avail. No improvement in symptoms. \par Taking coumadin for mechanical heart valve. Recent episode of hematuria is resolved. \par ED little improvement with Tadalafil. Said not strong enough to penetrate\par PSA 2.9 august 2020. \par Testosterone free L 3.9, Total L 234.7\par \par 10/29/2020: Patient presents today for follow up. Last visit, pt was started on Oxybutynin but stopped as it was not working.  Currently takes Alfuzosin ER 10mg once daily. Urinary frequency is mostly during the day. Complains of difficulty starting stream. Took Finasteride for 6 weeks in the past which did help urination. Recently saw GI for heartburn and was given Pantoprazole, but does not help. H/o Afib and mechanical heart valve and takes Coumadin. Patient presents today for follow up. Last visit, pt was started on Oxybutynin but stopped as it was not working.  Currently takes Alfuzosin ER 10mg once daily. Urinary frequency is mostly during the day. Complains of difficulty starting stream. Took Finasteride for 6 weeks in the past which did help urination. Recently saw GI for heartburn and was given Pantoprazole, but does not help. H/o Afib and mechanical heart valve and takes Coumadin. \par

## 2020-10-30 NOTE — REVIEW OF SYSTEMS
[Dysuria] : dysuria [Hesitancy] : urinary hesitancy [Fever] : no fever [Chills] : no chills [Eye Pain] : no eye pain [Earache] : no earache [Chest Pain] : no chest pain [Shortness Of Breath] : no shortness of breath [Abdominal Pain] : no abdominal pain [Arthralgias] : no arthralgias [Skin Lesions] : no skin lesions [Confused] : no confusion [Suicidal] : not suicidal [Muscle Weakness] : no muscle weakness [Easy Bleeding] : no tendency for easy bleeding

## 2020-10-30 NOTE — ASSESSMENT
[FreeTextEntry1] : ALINA NOLAN is a 75 year year old male being seen for a follow up visit regarding gross hematuria. He has been experiencing gross hematuria with blood clots found in urine for the past two weeks. He experiences hematuria previously when he takes Coumadin which he was prescribed following his mitral valve replacement. The patient reports having no trouble with urination and wakes up 2 times during the night to urinate. Today he reports having discomfort in his lower back and right lower quadrant but no pain. The patient denies a history of kidney stones. In the past, he has had an endoscopy. 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 No discrete mass is identified at the level of the gastroesophageal junction. His Creatinine as of 12/16/19 was 1.01.\par 01/02/2020: Patient presents today for a cystoscopy. The patient denies hematuria but complains of some back pain. A CT ABDOMEN AND PELVIS from 12/26/2019 revealed: Interval stability without CT evidence of suspicious renal or ureter lesions. Enlarged prostate gland with associated transurethral resection of the prostate defect.\par \par 01/23/2020: Patient presents today for a follow up. At the conclusion of the last visit, Finasteride 5 mg was prescribed. Yesterday, he reports gross and opaque hematuria. Prior to yesterdays episode and following his cystoscopy, he did not experience hematuria. He notes that his Coumadin level was high at 2.6 recently. He has atrial fibrillation and a metal mitral valve. He was recently prescribed Flomax, to take 1 at bedtime, which helped decrease nocturia from 2-3x/night to 1x/night. Today, he also complains of erectile dysfunction. The patient produced a urine sample which will be sent for urinalysis, urine cytology, and urine culture. Finasteride 5 mg was renewed and is to be continued as previously prescribed. Tamsulosin 0.4 mg to be continued as previously prescribed by internist. Tadalafil 20mg, 1 tablet a day, 2-3hrs before sexual activity. I explained that there may be side effects including: warm flush feeling, nasal congestion, back pain and tiny chance of vision or hearing impairment. Patient should follow up in 2 months or sooner if he experiences urinary difficulties. \par \par 09/01/2020: Patient presents today for a follow up. Patient reports using Clotrimazole cream for penile lesion, however has not been helping and is instead causing itching. Also taking Tadalafil, but still unable to penetrate. Denies any side effects. I instructed the patient to increase Tadalafil to 30mg. I instructed the patient to discontinue Clotrimazole cream and started the patient on Nystatin-Triamcinolone cream. I prescribed the patient Cefadroxil 500mg BID and Fluconazole 100mg BID.\par \par 09/22/2020: Patient presents today for follow up for penile lesion. Used Nystatin-Triamcinolone cream which helped. No pain or itching. Today complains of urinary urgency and urge incontinence. Takes Tamsulosin 0.4mg once daily half an hour after dinner. Notes he does get heartburn on it. Last PSA on 8/6/20 was 2.90. No constipation. \par \par 10/14/2020: 75 yr male with BPH , LUTS presenting with worsening urinary symptoms of frequency and nocturia, suprapubic pressure.  .  Significant retention. Problem of compliance.  Not able to afford some of the medications because not covered by insurance. Not sure exactly of what meds he is taking and not taking. \par Attempted to talk to wife and daughter to clear up his med list.  Patient advised to take a picture of med containers or come with all med containers at next visit in 2 weeks. Oxybutinin. Alfuzosin. If medications do not help, will consider Desmopressin. RTO in 2 weeks for reassessment. \par \par 10/29/2020: Patient presents today for follow up. Last visit, pt was started on Oxybutynin but stopped as it was not working.  Currently takes Alfuzosin ER 10mg once daily. Urinary frequency is mostly during the day. Complains of difficulty starting stream. Took Finasteride for 6 weeks in the past which did help urination. Continues to have ED and requests renewal of Tadalafil. Recently saw GI for heartburn and was given Pantoprazole, but does not help. H/o Afib and mechanical heart valve and takes Coumadin. \par \par I prescribed the patient Myrbetriq 25mg one tablet daily. I informed the patient risk of increasing blood pressure and urinary retention. Pt will restart Finasteride 5mg once daily as it has helped his urination in the past. Will consider UDS if symptoms persist. \par \par Renewed Tadalafil 20mg. Discussed penile prosthesis which pt will agree to do at a later time. \par \par On 10/14/20, UA showed 500 mg/dL glucose in urine, but as HbA1c on 10/28/20 was stable, will monitor for now.\par \par The patient produced a urine sample which will be sent for urinalysis, urine cytology, and urine culture. \par \par RTO in 6 weeks for reassessment.

## 2020-10-30 NOTE — ADDENDUM
[FreeTextEntry1] : I, Ainsley Tamezin, acted solely as a scribe for Dr. Robson Thompson on this date 10/29/2020.\par \par All medical record entries made by the Scribe were at my, Dr. Robson Thompson, direction and personally dictated by me on 10/29/2020. I have reviewed the chart and agree that the record accurately reflects my personal performance of the history, physical exam, assessment and plan.  I have also personally directed, reviewed and agreed with the chart.

## 2020-10-31 LAB
APPEARANCE: CLEAR
BACTERIA UR CULT: NORMAL
BACTERIA: NEGATIVE
BILIRUBIN URINE: NEGATIVE
BLOOD URINE: NEGATIVE
COLOR: YELLOW
CREAT UR-MCNC: 235 MG/DL — SIGNIFICANT CHANGE UP
GLUCOSE QUALITATIVE U: ABNORMAL
HYALINE CASTS: 1 /LPF
KETONES URINE: NEGATIVE
LEUKOCYTE ESTERASE URINE: NEGATIVE
MICROALBUMIN UR-MCNC: 8.8 MG/DL — SIGNIFICANT CHANGE UP
MICROALBUMIN/CREAT UR-RTO: 37 MG/G — HIGH (ref 0–30)
MICROSCOPIC-UA: NORMAL
NITRITE URINE: NEGATIVE
PH URINE: 6
PROTEIN URINE: NORMAL
RED BLOOD CELLS URINE: 1 /HPF
SPECIFIC GRAVITY URINE: 1.02
SQUAMOUS EPITHELIAL CELLS: 0 /HPF
URINE CYTOLOGY: NORMAL
UROBILINOGEN URINE: NORMAL
WHITE BLOOD CELLS URINE: 1 /HPF

## 2020-11-11 ENCOUNTER — APPOINTMENT (OUTPATIENT)
Dept: INTERNAL MEDICINE | Facility: CLINIC | Age: 75
End: 2020-11-11

## 2020-11-11 ENCOUNTER — OUTPATIENT (OUTPATIENT)
Dept: OUTPATIENT SERVICES | Facility: HOSPITAL | Age: 75
LOS: 1 days | End: 2020-11-11

## 2020-11-11 ENCOUNTER — RESULT CHARGE (OUTPATIENT)
Age: 75
End: 2020-11-11

## 2020-11-11 VITALS — TEMPERATURE: 97.3 F

## 2020-11-11 DIAGNOSIS — Z98.89 OTHER SPECIFIED POSTPROCEDURAL STATES: Chronic | ICD-10-CM

## 2020-11-11 DIAGNOSIS — Z86.69 PERSONAL HISTORY OF OTHER DISEASES OF THE NERVOUS SYSTEM AND SENSE ORGANS: Chronic | ICD-10-CM

## 2020-11-11 LAB
INR PPP: 2.9 RATIO
POCT-PROTHROMBIN TIME: 34.9 SECS
QUALITY CONTROL: YES

## 2020-11-13 DIAGNOSIS — Z95.2 PRESENCE OF PROSTHETIC HEART VALVE: ICD-10-CM

## 2020-11-13 DIAGNOSIS — Z51.81 ENCOUNTER FOR THERAPEUTIC DRUG LEVEL MONITORING: ICD-10-CM

## 2020-11-13 DIAGNOSIS — Z79.01 LONG TERM (CURRENT) USE OF ANTICOAGULANTS: ICD-10-CM

## 2020-11-13 DIAGNOSIS — I48.91 UNSPECIFIED ATRIAL FIBRILLATION: ICD-10-CM

## 2020-12-02 ENCOUNTER — APPOINTMENT (OUTPATIENT)
Dept: INTERNAL MEDICINE | Facility: CLINIC | Age: 75
End: 2020-12-02

## 2020-12-02 ENCOUNTER — NON-APPOINTMENT (OUTPATIENT)
Age: 75
End: 2020-12-02

## 2020-12-03 ENCOUNTER — NON-APPOINTMENT (OUTPATIENT)
Age: 75
End: 2020-12-03

## 2020-12-08 ENCOUNTER — NON-APPOINTMENT (OUTPATIENT)
Age: 75
End: 2020-12-08

## 2020-12-09 ENCOUNTER — APPOINTMENT (OUTPATIENT)
Dept: UROLOGY | Facility: CLINIC | Age: 75
End: 2020-12-09

## 2020-12-18 ENCOUNTER — OUTPATIENT (OUTPATIENT)
Dept: OUTPATIENT SERVICES | Facility: HOSPITAL | Age: 75
LOS: 1 days | End: 2020-12-18

## 2020-12-18 ENCOUNTER — APPOINTMENT (OUTPATIENT)
Dept: INTERNAL MEDICINE | Facility: CLINIC | Age: 75
End: 2020-12-18

## 2020-12-18 ENCOUNTER — NON-APPOINTMENT (OUTPATIENT)
Age: 75
End: 2020-12-18

## 2020-12-18 DIAGNOSIS — Z98.89 OTHER SPECIFIED POSTPROCEDURAL STATES: Chronic | ICD-10-CM

## 2020-12-18 DIAGNOSIS — Z86.69 PERSONAL HISTORY OF OTHER DISEASES OF THE NERVOUS SYSTEM AND SENSE ORGANS: Chronic | ICD-10-CM

## 2020-12-20 NOTE — HISTORY OF PRESENT ILLNESS
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 \par \par Pt has 2 main complaints today: LUTS and GERD. He states he's been having urinary urgency w/ decreased frequency. Is being evaluated by urology closely for this. Not happy so far with progress made on medical mgmt alone. Has appt tomorrow. Regarding GERD, c/o heartburn exacerbated after meals and lying down. Was worked up by GI in past, had scope done. Is following with GI for this, currently undergoing a PPI trial.

## 2020-12-20 NOTE — ASSESSMENT
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 \par \par #Hematuria\par -recent c/o LUTS\par -CT Abdomen/Pelvis from 12/26/2019 revealed: Interval stability without CT evidence of suspicious renal or ureter lesions. Enlarged prostate gland with associated transurethral resection of the prostate defect.\par -Cystoscopy 1/2/2020 revealed: Entering bladder, there is some bleeding from the prostate so vision is obscured. The bladder mucosa is a pale pink and normal in appearance. Prostate is inflamed. A large prostate and lateral hypertrophy is observed.\par -Urology following closely: was on tamsulosin, alfuzosin, trospium\par -Now on oxybutynin\par -f/u tomorrow\par \par #GERD\par -small hiatal hernia seen on EGD from 03/2019 No esophagitis or PUD seen at that time\par - GI discussed 48-hour pH Bravo to detect reflux, but patient is currently declining pH testing\par - started trial of oral PPI BID for 30 days to which patient is agreeable\par - f/u GI next mos\par \par #BPH\par -c/w tamsulosin\par \par #ED\par -c/w tadalafil\par \par #Afib\par - continue Lipitor, Atenolol, Digoxin\par - continue Coumadin to an INR goal 2.5-3.5 (2.7 today)\par - TTE 2/6/2019 EF 57%\par \par #CAD, s/p 1V CABG\par - continue Atenolol 50, Lipitor 40, ASA 81, Losartan 50\par - NST 2/10/20 normal\par - TTE 02/20 normal EF\par \par #HTN\par -c/w hctz 12.5, GDMT as above \par \par #Mechanical MVR\par -replaced over decade ago per patient\par -make/model unknown\par -INR goal 2.5-3.5\par \par #DM2 \par -c/w glipizide and januvia (pt refused metformin in past due to GI side effects)\par -POCT A1C 6.9 today\par -follows with optho at [facility]\par -urine microalbumin today\par \par #Seizure Disorder\par -MRI 6/10/2019: chronic microvascular disease multiple chronic cerebellar lacunar type infarcts are noted. A larger infarct is noted along the posterior right cerebellar hemisphere\par -per neuro, low risk for seizure recurrence due low frequency and negative testing\par -monitor off of AEDs for now\par \par #HCM\par -Flu today\par -Colon UTD\par -Pneumo, Tdap, Zoster UTD\par \par Erik Brooks MD \par Case discussed with Dr. Shaffer\par RTC in 3 mos

## 2020-12-22 ENCOUNTER — NON-APPOINTMENT (OUTPATIENT)
Age: 75
End: 2020-12-22

## 2020-12-22 ENCOUNTER — RESULT CHARGE (OUTPATIENT)
Age: 75
End: 2020-12-22

## 2020-12-23 ENCOUNTER — NON-APPOINTMENT (OUTPATIENT)
Age: 75
End: 2020-12-23

## 2020-12-28 ENCOUNTER — APPOINTMENT (OUTPATIENT)
Dept: CARDIOLOGY | Facility: CLINIC | Age: 75
End: 2020-12-28
Payer: MEDICARE

## 2020-12-28 ENCOUNTER — NON-APPOINTMENT (OUTPATIENT)
Age: 75
End: 2020-12-28

## 2020-12-28 VITALS
HEIGHT: 69 IN | SYSTOLIC BLOOD PRESSURE: 146 MMHG | HEART RATE: 64 BPM | OXYGEN SATURATION: 100 % | DIASTOLIC BLOOD PRESSURE: 91 MMHG | TEMPERATURE: 97 F | WEIGHT: 171 LBS | BODY MASS INDEX: 25.33 KG/M2

## 2020-12-28 PROCEDURE — 99215 OFFICE O/P EST HI 40 MIN: CPT

## 2020-12-28 PROCEDURE — 93000 ELECTROCARDIOGRAM COMPLETE: CPT

## 2020-12-28 PROCEDURE — 99072 ADDL SUPL MATRL&STAF TM PHE: CPT

## 2020-12-28 RX ORDER — LOSARTAN POTASSIUM 50 MG/1
50 TABLET, FILM COATED ORAL DAILY
Qty: 1 | Refills: 3 | Status: DISCONTINUED | COMMUNITY
Start: 2019-03-13 | End: 2020-12-28

## 2020-12-28 RX ORDER — HYDROCHLOROTHIAZIDE 12.5 MG/1
12.5 CAPSULE ORAL DAILY
Qty: 60 | Refills: 1 | Status: DISCONTINUED | COMMUNITY
Start: 2019-04-19 | End: 2020-12-28

## 2020-12-28 NOTE — HISTORY OF PRESENT ILLNESS
[FreeTextEntry1] : 76 yo man presents for CV followup, last seen by Dr. Bolanos in Dec 2019. \par \par H/o CAD s/p CABG, s/p mechanical MVR (RHD; approx 12 years ago) on A/C with Coumadin, chronic persistent atrial fibrillation, diabetes (last A1c 7%), HLD (last lipids, 2/20: tchol 151, trig 154, HDL 34, LDL 96 mg/dL). States he is not taking HCTZ/losartan for BP.  \par \par He developed COVID early in Dec 2020. At that time, he was relatively asymptomatic. \par \par Stress test 2/2020: LVEF 70%; normal myocardial perfusion\par \par Echo 2/2020: \par 1. Mechanical mitral valve prosthesis. Mild mitral\par regurgitation.  Mean transmitral valve gradient equals 4 mm\par Hg, which is probably normal in the setting of a prosthetic\par valve.\par 2. Normal left ventricular internal dimensions and wall\par thicknesses.\par 3. Endocardium not well visualized; grossly normal left\par ventricular systolic function.\par 4. Normal right ventricular size and function.\par 5. Normal tricuspid valve.  Mild-moderate tricuspid\par regurgitation.\par \par Overall, he feels well from a CV standpoint. No angina, heart failure, edema, shortness of breath, palpitations. He takes his medications daily (once/day). He typically takes antibiotics prior to the dentist. His Coumadin is followed by PCP, with a typical INR between 2.5 and 3.5. No stroke symptoms. No bleeding. He is due to see his PCP shortly. He exercises a few times per week, and feels well during this.

## 2020-12-28 NOTE — PHYSICAL EXAM
[General Appearance - Well Developed] : well developed [Normal Appearance] : normal appearance [General Appearance - Well Nourished] : well nourished [No Deformities] : no deformities [Arterial Pulses Normal] : the arterial pulses were normal [Edema] : no peripheral edema present [FreeTextEntry1] : Irreg, mechanical S1, normal S2 [Abdomen Soft] : soft [Abdomen Tenderness] : non-tender [Abdomen Mass (___ Cm)] : no abdominal mass palpated [Abnormal Walk] : normal gait [Nail Clubbing] : no clubbing of the fingernails [Cyanosis, Localized] : no localized cyanosis [] : no ischemic changes

## 2020-12-30 LAB
INR PPP: 3.2 RATIO
QUALITY CONTROL: YES

## 2021-01-06 ENCOUNTER — RESULT REVIEW (OUTPATIENT)
Age: 76
End: 2021-01-06

## 2021-01-06 ENCOUNTER — APPOINTMENT (OUTPATIENT)
Dept: INTERNAL MEDICINE | Facility: CLINIC | Age: 76
End: 2021-01-06
Payer: MEDICARE

## 2021-01-06 ENCOUNTER — OUTPATIENT (OUTPATIENT)
Dept: OUTPATIENT SERVICES | Facility: HOSPITAL | Age: 76
LOS: 1 days | End: 2021-01-06

## 2021-01-06 VITALS
SYSTOLIC BLOOD PRESSURE: 110 MMHG | DIASTOLIC BLOOD PRESSURE: 80 MMHG | OXYGEN SATURATION: 99 % | HEART RATE: 85 BPM | BODY MASS INDEX: 24.88 KG/M2 | HEIGHT: 69 IN | WEIGHT: 168 LBS

## 2021-01-06 VITALS — TEMPERATURE: 97.3 F

## 2021-01-06 DIAGNOSIS — Z98.89 OTHER SPECIFIED POSTPROCEDURAL STATES: Chronic | ICD-10-CM

## 2021-01-06 DIAGNOSIS — Z86.69 PERSONAL HISTORY OF OTHER DISEASES OF THE NERVOUS SYSTEM AND SENSE ORGANS: Chronic | ICD-10-CM

## 2021-01-06 LAB
ALBUMIN SERPL ELPH-MCNC: 4.1 G/DL — SIGNIFICANT CHANGE UP (ref 3.3–5)
ALP SERPL-CCNC: 92 U/L — SIGNIFICANT CHANGE UP (ref 40–120)
ALT FLD-CCNC: 24 U/L — SIGNIFICANT CHANGE UP (ref 4–41)
ANION GAP SERPL CALC-SCNC: 8 MMOL/L — SIGNIFICANT CHANGE UP (ref 7–14)
AST SERPL-CCNC: 20 U/L — SIGNIFICANT CHANGE UP (ref 4–40)
BILIRUB SERPL-MCNC: 3.1 MG/DL — HIGH (ref 0.2–1.2)
BUN SERPL-MCNC: 26 MG/DL — HIGH (ref 7–23)
CALCIUM SERPL-MCNC: 9.3 MG/DL — SIGNIFICANT CHANGE UP (ref 8.4–10.5)
CHLORIDE SERPL-SCNC: 106 MMOL/L — SIGNIFICANT CHANGE UP (ref 98–107)
CHOLEST SERPL-MCNC: 102 MG/DL — SIGNIFICANT CHANGE UP
CO2 SERPL-SCNC: 26 MMOL/L — SIGNIFICANT CHANGE UP (ref 22–31)
CREAT SERPL-MCNC: 1.16 MG/DL — SIGNIFICANT CHANGE UP (ref 0.5–1.3)
GLUCOSE SERPL-MCNC: 260 MG/DL — HIGH (ref 70–99)
HBA1C MFR BLD HPLC: 8.2
HDLC SERPL-MCNC: 32 MG/DL — LOW
INR PPP: 2.4 RATIO
LIPID PNL WITH DIRECT LDL SERPL: 40 MG/DL — SIGNIFICANT CHANGE UP
NON HDL CHOLESTEROL: 70 MG/DL — SIGNIFICANT CHANGE UP
POCT-PROTHROMBIN TIME: 28.9 SECS
POTASSIUM SERPL-MCNC: 4.6 MMOL/L — SIGNIFICANT CHANGE UP (ref 3.5–5.3)
POTASSIUM SERPL-SCNC: 4.6 MMOL/L — SIGNIFICANT CHANGE UP (ref 3.5–5.3)
PROT SERPL-MCNC: 7.6 G/DL — SIGNIFICANT CHANGE UP (ref 6–8.3)
SODIUM SERPL-SCNC: 140 MMOL/L — SIGNIFICANT CHANGE UP (ref 135–145)
TRIGL SERPL-MCNC: 150 MG/DL — HIGH

## 2021-01-06 PROCEDURE — 99213 OFFICE O/P EST LOW 20 MIN: CPT | Mod: GE

## 2021-01-13 ENCOUNTER — RESULT CHARGE (OUTPATIENT)
Age: 76
End: 2021-01-13

## 2021-01-13 ENCOUNTER — RESULT REVIEW (OUTPATIENT)
Age: 76
End: 2021-01-13

## 2021-01-13 ENCOUNTER — OUTPATIENT (OUTPATIENT)
Dept: OUTPATIENT SERVICES | Facility: HOSPITAL | Age: 76
LOS: 1 days | End: 2021-01-13

## 2021-01-13 ENCOUNTER — APPOINTMENT (OUTPATIENT)
Dept: INTERNAL MEDICINE | Facility: CLINIC | Age: 76
End: 2021-01-13

## 2021-01-13 VITALS — RESPIRATION RATE: 16 BRPM | OXYGEN SATURATION: 98 % | HEART RATE: 77 BPM

## 2021-01-13 VITALS — OXYGEN SATURATION: 17 % | HEART RATE: 63 BPM | RESPIRATION RATE: 17 BRPM

## 2021-01-13 VITALS — TEMPERATURE: 97 F

## 2021-01-13 DIAGNOSIS — Z86.69 PERSONAL HISTORY OF OTHER DISEASES OF THE NERVOUS SYSTEM AND SENSE ORGANS: Chronic | ICD-10-CM

## 2021-01-13 DIAGNOSIS — I25.10 ATHEROSCLEROTIC HEART DISEASE OF NATIVE CORONARY ARTERY WITHOUT ANGINA PECTORIS: ICD-10-CM

## 2021-01-13 DIAGNOSIS — Z79.01 LONG TERM (CURRENT) USE OF ANTICOAGULANTS: ICD-10-CM

## 2021-01-13 DIAGNOSIS — Z98.89 OTHER SPECIFIED POSTPROCEDURAL STATES: Chronic | ICD-10-CM

## 2021-01-13 DIAGNOSIS — Z95.2 PRESENCE OF PROSTHETIC HEART VALVE: ICD-10-CM

## 2021-01-13 DIAGNOSIS — I48.91 UNSPECIFIED ATRIAL FIBRILLATION: ICD-10-CM

## 2021-01-13 DIAGNOSIS — E11.9 TYPE 2 DIABETES MELLITUS WITHOUT COMPLICATIONS: ICD-10-CM

## 2021-01-13 LAB
BILIRUB DIRECT SERPL-MCNC: 0.4 MG/DL — HIGH (ref 0–0.2)
BILIRUB SERPL-MCNC: 2.6 MG/DL — HIGH (ref 0.2–1.2)
HAPTOGLOB SERPL-MCNC: <20 MG/DL — LOW (ref 34–200)
INR PPP: 2.5 RATIO
LDH SERPL L TO P-CCNC: 284 U/L — HIGH (ref 135–225)
POCT-PROTHROMBIN TIME: 29.7 SECS
RBC # BLD: 4.52 M/UL — SIGNIFICANT CHANGE UP (ref 4.2–5.8)
RETICS #: 97.6 K/UL — SIGNIFICANT CHANGE UP (ref 25–125)
RETICS/RBC NFR: 2.2 % — SIGNIFICANT CHANGE UP (ref 0.5–2.5)

## 2021-01-19 DIAGNOSIS — Z95.2 PRESENCE OF PROSTHETIC HEART VALVE: ICD-10-CM

## 2021-01-19 DIAGNOSIS — E11.9 TYPE 2 DIABETES MELLITUS WITHOUT COMPLICATIONS: ICD-10-CM

## 2021-01-19 DIAGNOSIS — N40.0 BENIGN PROSTATIC HYPERPLASIA WITHOUT LOWER URINARY TRACT SYMPTOMS: ICD-10-CM

## 2021-01-19 DIAGNOSIS — Z79.01 LONG TERM (CURRENT) USE OF ANTICOAGULANTS: ICD-10-CM

## 2021-01-19 DIAGNOSIS — N52.9 MALE ERECTILE DYSFUNCTION, UNSPECIFIED: ICD-10-CM

## 2021-01-19 DIAGNOSIS — Z91.89 OTHER SPECIFIED PERSONAL RISK FACTORS, NOT ELSEWHERE CLASSIFIED: ICD-10-CM

## 2021-01-19 DIAGNOSIS — Z51.81 ENCOUNTER FOR THERAPEUTIC DRUG LEVEL MONITORING: ICD-10-CM

## 2021-01-19 DIAGNOSIS — I48.91 UNSPECIFIED ATRIAL FIBRILLATION: ICD-10-CM

## 2021-01-19 DIAGNOSIS — I25.10 ATHEROSCLEROTIC HEART DISEASE OF NATIVE CORONARY ARTERY WITHOUT ANGINA PECTORIS: ICD-10-CM

## 2021-01-19 DIAGNOSIS — E11.40 TYPE 2 DIABETES MELLITUS WITH DIABETIC NEUROPATHY, UNSPECIFIED: ICD-10-CM

## 2021-01-19 DIAGNOSIS — N39.41 URGE INCONTINENCE: ICD-10-CM

## 2021-01-19 NOTE — PHYSICAL EXAM
[Normal Rate] : normal rate  [No Murmur] : no murmur heard [Normal] : no carotid or abdominal bruits heard, no varicosities, pedal pulses are present, no peripheral edema, no extremity clubbing or cyanosis and no palpable aorta [de-identified] : Irregularly irregular

## 2021-01-22 NOTE — HISTORY OF PRESENT ILLNESS
83.9
[FreeTextEntry1] : 73M with CAD s/p CABG, s/p mechanical MVR on A/C with Coumadin, chronic persistent atrial fibrillation who comes in for routine follow-up.\par \par Patient reports had one episode of SOB while climbing 5 flights of stairs, which he reports he can usually do. No CP. No palpitations during that time, but reports he does get palpitations briefly but does not bother him. No LE edema. Has also been complaining of occasional abd pain that radiates up chest and he has a bad taste in his mouth, usually happens at night. \par \par Reports hematuria over the past few days, saw medicine and Hgb is stable. Had workup from GI including endoscopy which showed multiple lesions, took biopsies.

## 2021-02-03 ENCOUNTER — APPOINTMENT (OUTPATIENT)
Dept: INTERNAL MEDICINE | Facility: CLINIC | Age: 76
End: 2021-02-03

## 2021-02-03 ENCOUNTER — NON-APPOINTMENT (OUTPATIENT)
Age: 76
End: 2021-02-03

## 2021-02-10 ENCOUNTER — RESULT REVIEW (OUTPATIENT)
Age: 76
End: 2021-02-10

## 2021-02-10 ENCOUNTER — OUTPATIENT (OUTPATIENT)
Dept: OUTPATIENT SERVICES | Facility: HOSPITAL | Age: 76
LOS: 1 days | End: 2021-02-10

## 2021-02-10 ENCOUNTER — APPOINTMENT (OUTPATIENT)
Dept: INTERNAL MEDICINE | Facility: CLINIC | Age: 76
End: 2021-02-10

## 2021-02-10 VITALS — RESPIRATION RATE: 15 BRPM | HEART RATE: 74 BPM | OXYGEN SATURATION: 97 %

## 2021-02-10 VITALS — TEMPERATURE: 95.9 F

## 2021-02-10 DIAGNOSIS — Z86.69 PERSONAL HISTORY OF OTHER DISEASES OF THE NERVOUS SYSTEM AND SENSE ORGANS: Chronic | ICD-10-CM

## 2021-02-10 DIAGNOSIS — Z98.89 OTHER SPECIFIED POSTPROCEDURAL STATES: Chronic | ICD-10-CM

## 2021-02-10 LAB
APPEARANCE UR: CLEAR — SIGNIFICANT CHANGE UP
BACTERIA # UR AUTO: NEGATIVE — SIGNIFICANT CHANGE UP
BASOPHILS # BLD AUTO: 0.05 K/UL — SIGNIFICANT CHANGE UP (ref 0–0.2)
BASOPHILS NFR BLD AUTO: 0.7 % — SIGNIFICANT CHANGE UP (ref 0–2)
BILIRUB UR-MCNC: NEGATIVE — SIGNIFICANT CHANGE UP
COLOR SPEC: YELLOW — SIGNIFICANT CHANGE UP
DIFF PNL FLD: NEGATIVE — SIGNIFICANT CHANGE UP
EOSINOPHIL # BLD AUTO: 0.15 K/UL — SIGNIFICANT CHANGE UP (ref 0–0.5)
EOSINOPHIL NFR BLD AUTO: 2 % — SIGNIFICANT CHANGE UP (ref 0–6)
EPI CELLS # UR: 0 /HPF — SIGNIFICANT CHANGE UP (ref 0–5)
GLUCOSE UR QL: ABNORMAL
HCT VFR BLD CALC: 43.8 % — SIGNIFICANT CHANGE UP (ref 39–50)
HGB BLD-MCNC: 14.6 G/DL — SIGNIFICANT CHANGE UP (ref 13–17)
HYALINE CASTS # UR AUTO: 1 /LPF — SIGNIFICANT CHANGE UP (ref 0–7)
IANC: 5.01 K/UL — SIGNIFICANT CHANGE UP (ref 1.5–8.5)
IMM GRANULOCYTES NFR BLD AUTO: 0.9 % — SIGNIFICANT CHANGE UP (ref 0–1.5)
INR PPP: 2.1 RATIO
KETONES UR-MCNC: NEGATIVE — SIGNIFICANT CHANGE UP
LDH SERPL L TO P-CCNC: 269 U/L — HIGH (ref 135–225)
LEUKOCYTE ESTERASE UR-ACNC: NEGATIVE — SIGNIFICANT CHANGE UP
LYMPHOCYTES # BLD AUTO: 1.82 K/UL — SIGNIFICANT CHANGE UP (ref 1–3.3)
LYMPHOCYTES # BLD AUTO: 23.9 % — SIGNIFICANT CHANGE UP (ref 13–44)
MCHC RBC-ENTMCNC: 30.9 PG — SIGNIFICANT CHANGE UP (ref 27–34)
MCHC RBC-ENTMCNC: 33.3 GM/DL — SIGNIFICANT CHANGE UP (ref 32–36)
MCV RBC AUTO: 92.6 FL — SIGNIFICANT CHANGE UP (ref 80–100)
MONOCYTES # BLD AUTO: 0.5 K/UL — SIGNIFICANT CHANGE UP (ref 0–0.9)
MONOCYTES NFR BLD AUTO: 6.6 % — SIGNIFICANT CHANGE UP (ref 2–14)
NEUTROPHILS # BLD AUTO: 5.01 K/UL — SIGNIFICANT CHANGE UP (ref 1.8–7.4)
NEUTROPHILS NFR BLD AUTO: 65.9 % — SIGNIFICANT CHANGE UP (ref 43–77)
NITRITE UR-MCNC: NEGATIVE — SIGNIFICANT CHANGE UP
NRBC # BLD: 0 /100 WBCS — SIGNIFICANT CHANGE UP
NRBC # FLD: 0 K/UL — SIGNIFICANT CHANGE UP
PH UR: 6 — SIGNIFICANT CHANGE UP (ref 5–8)
PLATELET # BLD AUTO: 213 K/UL — SIGNIFICANT CHANGE UP (ref 150–400)
POCT-PROTHROMBIN TIME: 25.3 SECS
PROT UR-MCNC: ABNORMAL
QUALITY CONTROL: YES
RBC # BLD: 4.73 M/UL — SIGNIFICANT CHANGE UP (ref 4.2–5.8)
RBC # FLD: 13.6 % — SIGNIFICANT CHANGE UP (ref 10.3–14.5)
RBC CASTS # UR COMP ASSIST: 1 /HPF — SIGNIFICANT CHANGE UP (ref 0–4)
SP GR SPEC: 1.02 — SIGNIFICANT CHANGE UP (ref 1.01–1.02)
UROBILINOGEN FLD QL: SIGNIFICANT CHANGE UP
WBC # BLD: 7.6 K/UL — SIGNIFICANT CHANGE UP (ref 3.8–10.5)
WBC # FLD AUTO: 7.6 K/UL — SIGNIFICANT CHANGE UP (ref 3.8–10.5)
WBC UR QL: 1 /HPF — SIGNIFICANT CHANGE UP (ref 0–5)

## 2021-02-11 DIAGNOSIS — Z79.01 LONG TERM (CURRENT) USE OF ANTICOAGULANTS: ICD-10-CM

## 2021-02-12 DIAGNOSIS — E11.9 TYPE 2 DIABETES MELLITUS WITHOUT COMPLICATIONS: ICD-10-CM

## 2021-02-16 ENCOUNTER — RX RENEWAL (OUTPATIENT)
Age: 76
End: 2021-02-16

## 2021-02-17 ENCOUNTER — OUTPATIENT (OUTPATIENT)
Dept: OUTPATIENT SERVICES | Facility: HOSPITAL | Age: 76
LOS: 1 days | End: 2021-02-17

## 2021-02-17 ENCOUNTER — APPOINTMENT (OUTPATIENT)
Dept: INTERNAL MEDICINE | Facility: CLINIC | Age: 76
End: 2021-02-17

## 2021-02-17 VITALS — RESPIRATION RATE: 16 BRPM | OXYGEN SATURATION: 99 % | HEART RATE: 86 BPM

## 2021-02-17 VITALS — TEMPERATURE: 98 F

## 2021-02-17 DIAGNOSIS — Z51.81 ENCOUNTER FOR THERAPEUTIC DRUG LEVEL MONITORING: ICD-10-CM

## 2021-02-17 DIAGNOSIS — Z86.69 PERSONAL HISTORY OF OTHER DISEASES OF THE NERVOUS SYSTEM AND SENSE ORGANS: Chronic | ICD-10-CM

## 2021-02-17 DIAGNOSIS — Z79.01 LONG TERM (CURRENT) USE OF ANTICOAGULANTS: ICD-10-CM

## 2021-02-17 DIAGNOSIS — Z95.2 PRESENCE OF PROSTHETIC HEART VALVE: ICD-10-CM

## 2021-02-17 DIAGNOSIS — Z98.89 OTHER SPECIFIED POSTPROCEDURAL STATES: Chronic | ICD-10-CM

## 2021-02-17 DIAGNOSIS — I48.91 UNSPECIFIED ATRIAL FIBRILLATION: ICD-10-CM

## 2021-02-17 LAB
INR PPP: 2.2 RATIO
POCT-PROTHROMBIN TIME: 26.2 SECS

## 2021-02-22 ENCOUNTER — OUTPATIENT (OUTPATIENT)
Dept: OUTPATIENT SERVICES | Facility: HOSPITAL | Age: 76
LOS: 1 days | End: 2021-02-22

## 2021-02-22 ENCOUNTER — APPOINTMENT (OUTPATIENT)
Dept: INTERNAL MEDICINE | Facility: CLINIC | Age: 76
End: 2021-02-22

## 2021-02-22 VITALS — HEART RATE: 68 BPM | OXYGEN SATURATION: 99 % | RESPIRATION RATE: 16 BRPM

## 2021-02-22 VITALS — TEMPERATURE: 97.3 F

## 2021-02-22 DIAGNOSIS — Z98.89 OTHER SPECIFIED POSTPROCEDURAL STATES: Chronic | ICD-10-CM

## 2021-02-22 DIAGNOSIS — Z86.69 PERSONAL HISTORY OF OTHER DISEASES OF THE NERVOUS SYSTEM AND SENSE ORGANS: Chronic | ICD-10-CM

## 2021-02-22 LAB
INR PPP: 2.7 RATIO
POCT-PROTHROMBIN TIME: 32.8 SECS

## 2021-02-24 DIAGNOSIS — Z79.01 LONG TERM (CURRENT) USE OF ANTICOAGULANTS: ICD-10-CM

## 2021-02-24 DIAGNOSIS — Z95.2 PRESENCE OF PROSTHETIC HEART VALVE: ICD-10-CM

## 2021-02-24 DIAGNOSIS — Z51.81 ENCOUNTER FOR THERAPEUTIC DRUG LEVEL MONITORING: ICD-10-CM

## 2021-02-24 DIAGNOSIS — I48.91 UNSPECIFIED ATRIAL FIBRILLATION: ICD-10-CM

## 2021-03-01 ENCOUNTER — APPOINTMENT (OUTPATIENT)
Dept: INTERNAL MEDICINE | Facility: CLINIC | Age: 76
End: 2021-03-01

## 2021-03-01 ENCOUNTER — NON-APPOINTMENT (OUTPATIENT)
Age: 76
End: 2021-03-01

## 2021-03-01 ENCOUNTER — OUTPATIENT (OUTPATIENT)
Dept: OUTPATIENT SERVICES | Facility: HOSPITAL | Age: 76
LOS: 1 days | End: 2021-03-01

## 2021-03-01 VITALS — OXYGEN SATURATION: 99 % | HEART RATE: 83 BPM

## 2021-03-01 VITALS — TEMPERATURE: 96.7 F

## 2021-03-01 DIAGNOSIS — Z51.81 ENCOUNTER FOR THERAPEUTIC DRUG LEVEL MONITORING: ICD-10-CM

## 2021-03-01 DIAGNOSIS — Z98.89 OTHER SPECIFIED POSTPROCEDURAL STATES: Chronic | ICD-10-CM

## 2021-03-01 DIAGNOSIS — Z79.01 LONG TERM (CURRENT) USE OF ANTICOAGULANTS: ICD-10-CM

## 2021-03-01 DIAGNOSIS — Z95.2 PRESENCE OF PROSTHETIC HEART VALVE: ICD-10-CM

## 2021-03-01 DIAGNOSIS — Z86.69 PERSONAL HISTORY OF OTHER DISEASES OF THE NERVOUS SYSTEM AND SENSE ORGANS: Chronic | ICD-10-CM

## 2021-03-01 DIAGNOSIS — I48.91 UNSPECIFIED ATRIAL FIBRILLATION: ICD-10-CM

## 2021-03-01 LAB
INR PPP: 2.5 RATIO
POCT-PROTHROMBIN TIME: 29.8 SECS

## 2021-03-04 ENCOUNTER — OUTPATIENT (OUTPATIENT)
Dept: OUTPATIENT SERVICES | Facility: HOSPITAL | Age: 76
LOS: 1 days | End: 2021-03-04

## 2021-03-04 ENCOUNTER — APPOINTMENT (OUTPATIENT)
Dept: GASTROENTEROLOGY | Facility: CLINIC | Age: 76
End: 2021-03-04
Payer: MEDICARE

## 2021-03-04 VITALS
RESPIRATION RATE: 16 BRPM | HEIGHT: 69 IN | OXYGEN SATURATION: 97 % | BODY MASS INDEX: 25.92 KG/M2 | WEIGHT: 175 LBS | SYSTOLIC BLOOD PRESSURE: 110 MMHG | DIASTOLIC BLOOD PRESSURE: 82 MMHG | HEART RATE: 79 BPM

## 2021-03-04 VITALS — TEMPERATURE: 98 F

## 2021-03-04 DIAGNOSIS — E11.9 TYPE 2 DIABETES MELLITUS WITHOUT COMPLICATIONS: ICD-10-CM

## 2021-03-04 DIAGNOSIS — R14.3 FLATULENCE: ICD-10-CM

## 2021-03-04 DIAGNOSIS — K31.89 OTHER DISEASES OF STOMACH AND DUODENUM: ICD-10-CM

## 2021-03-04 DIAGNOSIS — Z98.89 OTHER SPECIFIED POSTPROCEDURAL STATES: Chronic | ICD-10-CM

## 2021-03-04 DIAGNOSIS — R10.13 EPIGASTRIC PAIN: ICD-10-CM

## 2021-03-04 DIAGNOSIS — Z86.69 PERSONAL HISTORY OF OTHER DISEASES OF THE NERVOUS SYSTEM AND SENSE ORGANS: Chronic | ICD-10-CM

## 2021-03-04 PROCEDURE — 99213 OFFICE O/P EST LOW 20 MIN: CPT | Mod: GC

## 2021-03-04 RX ORDER — SUCRALFATE 1 G/1
1 TABLET ORAL
Qty: 60 | Refills: 0 | Status: DISCONTINUED | COMMUNITY
Start: 2019-11-07 | End: 2021-03-04

## 2021-03-04 RX ORDER — PANTOPRAZOLE 40 MG/1
40 TABLET, DELAYED RELEASE ORAL
Qty: 28 | Refills: 0 | Status: DISCONTINUED | COMMUNITY
Start: 2020-10-08 | End: 2021-03-04

## 2021-03-04 RX ORDER — AMOXICILLIN 500 MG/1
500 TABLET, FILM COATED ORAL
Qty: 4 | Refills: 3 | Status: DISCONTINUED | COMMUNITY
Start: 2020-12-28 | End: 2021-03-04

## 2021-03-04 NOTE — ASSESSMENT
[FreeTextEntry1] : Impression:\par #Diarrhea/dyspepsia - related to Metformin, now resolved\par # GERD: Resolved\par # Gastric subepithelial nodule s/p EUS with FNB in 2019 revealing mature adipose tissue, likely lipoma\par # H/o external hemorrhoids s/p hemorrhoidectomy \par # Overweight - BMI 25-30 \par \par Plan:\par - Simethicone PRN\par - Continue to take metformin with meals\par - RTC as needed if sx do not improve\par \par D/w Dr. Mandel

## 2021-03-04 NOTE — HISTORY OF PRESENT ILLNESS
[Heartburn] : resolved heartburn [Nausea] : denies nausea [Vomiting] : denies vomiting [Diarrhea] : denies diarrhea [Constipation] : denies constipation [Yellow Skin Or Eyes (Jaundice)] : denies jaundice [Abdominal Pain] : denies abdominal pain [Abdominal Swelling] : denies abdominal swelling [Rectal Pain] : denies rectal pain [Wt Gain ___ Lbs] : no recent weight gain [Wt Loss ___ Lbs] : no recent weight loss [GERD] : no gastroesophageal reflux disease [Hiatus Hernia] : no hiatus hernia [Peptic Ulcer Disease] : no peptic ulcer disease [Pancreatitis] : no pancreatitis [Cholelithiasis] : no cholelithiasis [Kidney Stone] : no kidney stone [Inflammatory Bowel Disease] : no inflammatory bowel disease [Irritable Bowel Syndrome] : no irritable bowel syndrome [Diverticulitis] : no diverticulitis [Alcohol Abuse] : no alcohol abuse [Malignancy] : no malignancy [Abdominal Surgery] : no abdominal surgery [Appendectomy] : no appendectomy [Cholecystectomy] : no cholecystectomy [de-identified] : 73M w/ AFib and mechanical mitral valve on Coumadin, CAD s/p CABG (not on ASA) presents for evaluation of heartburn. Pt had EGD 3/2019 showed normal esophagus, no hiatal hernia. He was trailed on BID PPI x 2 months and heartburn has resolved.\par \par Today, he reports that after increasing metformin, he had some diarrhea and dyspepsia - described as gas and upset stomach at night. This has since resolved after 1 week of metformin and taking it with large meals. \par \par ROS neg for weight loss, dysphagia, odynophagia, nausea, vomiting, melena, hematochezia. Last colonoscopy was 5 years ago at [facility] and was normal.

## 2021-03-04 NOTE — END OF VISIT
[] : Fellow [FreeTextEntry3] : As modified and discussed with patient\par MD IVÁN De Paz [facility]\par Associate Professor of Medicine\par [facility]\par

## 2021-03-08 ENCOUNTER — APPOINTMENT (OUTPATIENT)
Dept: INTERNAL MEDICINE | Facility: CLINIC | Age: 76
End: 2021-03-08

## 2021-03-08 ENCOUNTER — OUTPATIENT (OUTPATIENT)
Dept: OUTPATIENT SERVICES | Facility: HOSPITAL | Age: 76
LOS: 1 days | End: 2021-03-08

## 2021-03-08 VITALS — OXYGEN SATURATION: 97 % | RESPIRATION RATE: 16 BRPM | HEART RATE: 83 BPM

## 2021-03-08 VITALS — TEMPERATURE: 97.7 F

## 2021-03-08 DIAGNOSIS — Z98.89 OTHER SPECIFIED POSTPROCEDURAL STATES: Chronic | ICD-10-CM

## 2021-03-08 DIAGNOSIS — Z95.2 PRESENCE OF PROSTHETIC HEART VALVE: ICD-10-CM

## 2021-03-08 DIAGNOSIS — I48.91 UNSPECIFIED ATRIAL FIBRILLATION: ICD-10-CM

## 2021-03-08 DIAGNOSIS — Z51.81 ENCOUNTER FOR THERAPEUTIC DRUG LEVEL MONITORING: ICD-10-CM

## 2021-03-08 DIAGNOSIS — Z86.69 PERSONAL HISTORY OF OTHER DISEASES OF THE NERVOUS SYSTEM AND SENSE ORGANS: Chronic | ICD-10-CM

## 2021-03-08 DIAGNOSIS — Z79.01 LONG TERM (CURRENT) USE OF ANTICOAGULANTS: ICD-10-CM

## 2021-03-08 LAB
INR PPP: 3.1 RATIO
POCT-PROTHROMBIN TIME: 37.1 SECS

## 2021-03-22 ENCOUNTER — OUTPATIENT (OUTPATIENT)
Dept: OUTPATIENT SERVICES | Facility: HOSPITAL | Age: 76
LOS: 1 days | End: 2021-03-22

## 2021-03-22 ENCOUNTER — APPOINTMENT (OUTPATIENT)
Dept: INTERNAL MEDICINE | Facility: CLINIC | Age: 76
End: 2021-03-22

## 2021-03-22 VITALS — TEMPERATURE: 97.2 F

## 2021-03-22 VITALS — HEART RATE: 73 BPM | OXYGEN SATURATION: 99 % | RESPIRATION RATE: 16 BRPM

## 2021-03-22 DIAGNOSIS — I48.91 UNSPECIFIED ATRIAL FIBRILLATION: ICD-10-CM

## 2021-03-22 DIAGNOSIS — Z86.69 PERSONAL HISTORY OF OTHER DISEASES OF THE NERVOUS SYSTEM AND SENSE ORGANS: Chronic | ICD-10-CM

## 2021-03-22 DIAGNOSIS — Z98.89 OTHER SPECIFIED POSTPROCEDURAL STATES: Chronic | ICD-10-CM

## 2021-03-22 DIAGNOSIS — Z51.81 ENCOUNTER FOR THERAPEUTIC DRUG LEVEL MONITORING: ICD-10-CM

## 2021-03-22 DIAGNOSIS — Z95.2 PRESENCE OF PROSTHETIC HEART VALVE: ICD-10-CM

## 2021-03-22 DIAGNOSIS — Z79.01 LONG TERM (CURRENT) USE OF ANTICOAGULANTS: ICD-10-CM

## 2021-03-22 LAB
INR PPP: 3.3 RATIO
POCT-PROTHROMBIN TIME: 39.3 SECS

## 2021-04-05 ENCOUNTER — APPOINTMENT (OUTPATIENT)
Dept: INTERNAL MEDICINE | Facility: CLINIC | Age: 76
End: 2021-04-05

## 2021-04-05 ENCOUNTER — OUTPATIENT (OUTPATIENT)
Dept: OUTPATIENT SERVICES | Facility: HOSPITAL | Age: 76
LOS: 1 days | End: 2021-04-05

## 2021-04-05 VITALS — HEART RATE: 79 BPM | RESPIRATION RATE: 16 BRPM | OXYGEN SATURATION: 98 %

## 2021-04-05 VITALS — TEMPERATURE: 97.3 F

## 2021-04-05 DIAGNOSIS — Z86.69 PERSONAL HISTORY OF OTHER DISEASES OF THE NERVOUS SYSTEM AND SENSE ORGANS: Chronic | ICD-10-CM

## 2021-04-05 DIAGNOSIS — Z98.89 OTHER SPECIFIED POSTPROCEDURAL STATES: Chronic | ICD-10-CM

## 2021-04-05 LAB
INR PPP: 3 RATIO
POCT-PROTHROMBIN TIME: 36.2 SECS

## 2021-04-06 DIAGNOSIS — Z95.2 PRESENCE OF PROSTHETIC HEART VALVE: ICD-10-CM

## 2021-04-06 DIAGNOSIS — I48.91 UNSPECIFIED ATRIAL FIBRILLATION: ICD-10-CM

## 2021-04-06 DIAGNOSIS — Z79.01 LONG TERM (CURRENT) USE OF ANTICOAGULANTS: ICD-10-CM

## 2021-04-06 DIAGNOSIS — Z51.81 ENCOUNTER FOR THERAPEUTIC DRUG LEVEL MONITORING: ICD-10-CM

## 2021-04-19 ENCOUNTER — OUTPATIENT (OUTPATIENT)
Dept: OUTPATIENT SERVICES | Facility: HOSPITAL | Age: 76
LOS: 1 days | End: 2021-04-19

## 2021-04-19 ENCOUNTER — APPOINTMENT (OUTPATIENT)
Dept: INTERNAL MEDICINE | Facility: CLINIC | Age: 76
End: 2021-04-19
Payer: MEDICARE

## 2021-04-19 VITALS
HEART RATE: 83 BPM | OXYGEN SATURATION: 98 % | WEIGHT: 176 LBS | BODY MASS INDEX: 26.07 KG/M2 | SYSTOLIC BLOOD PRESSURE: 135 MMHG | HEIGHT: 69 IN | DIASTOLIC BLOOD PRESSURE: 70 MMHG

## 2021-04-19 VITALS — TEMPERATURE: 97.1 F

## 2021-04-19 DIAGNOSIS — Z98.89 OTHER SPECIFIED POSTPROCEDURAL STATES: Chronic | ICD-10-CM

## 2021-04-19 DIAGNOSIS — Z86.69 PERSONAL HISTORY OF OTHER DISEASES OF THE NERVOUS SYSTEM AND SENSE ORGANS: Chronic | ICD-10-CM

## 2021-04-19 LAB
GLUCOSE BLDC GLUCOMTR-MCNC: 260
HBA1C MFR BLD HPLC: 8.3

## 2021-04-19 PROCEDURE — 99214 OFFICE O/P EST MOD 30 MIN: CPT | Mod: GC

## 2021-04-19 RX ORDER — PAPAVERINE HYDROCHLORIDE 30 MG/ML
30 INJECTION, SOLUTION INTRAVENOUS
Qty: 5 | Refills: 0 | Status: DISCONTINUED | COMMUNITY
Start: 2019-06-19 | End: 2021-04-19

## 2021-04-19 RX ORDER — CEFADROXIL 500 MG/1
500 CAPSULE ORAL TWICE DAILY
Qty: 20 | Refills: 0 | Status: DISCONTINUED | COMMUNITY
Start: 2020-09-01 | End: 2021-04-19

## 2021-04-19 RX ORDER — FLUCONAZOLE 100 MG/1
100 TABLET ORAL
Qty: 11 | Refills: 0 | Status: DISCONTINUED | COMMUNITY
Start: 2020-09-01 | End: 2021-04-19

## 2021-04-19 RX ORDER — CLOTRIMAZOLE AND BETAMETHASONE DIPROPIONATE 10; .5 MG/G; MG/G
1-0.05 CREAM TOPICAL TWICE DAILY
Qty: 1 | Refills: 2 | Status: DISCONTINUED | COMMUNITY
Start: 2020-08-06 | End: 2021-04-19

## 2021-04-19 RX ORDER — NYSTATIN AND TRIAMCINOLONE ACETONIDE 100000; 1 MG/G; MG/G
100000-0.1 CREAM TOPICAL
Qty: 1 | Refills: 1 | Status: DISCONTINUED | COMMUNITY
Start: 2020-09-01 | End: 2021-04-19

## 2021-04-23 NOTE — REVIEW OF SYSTEMS
[Negative] : Integumentary [Abdominal Pain] : no abdominal pain [Nausea] : no nausea [Constipation] : no constipation [Diarrhea] : no diarrhea [Vomiting] : no vomiting [Heartburn] : no heartburn [Melena] : no melena

## 2021-04-23 NOTE — ASSESSMENT
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 \par \par #DM2 \par -POCT A1c 8.3\par -c/w glipizide and Januvia\par -c/w metformin 500 qd\par -follows with optho at [facility]\par -encouraged to follow diabetic diet and exercise\par \par #Afib\par - c/w Atenolol, Digoxin\par - continue Coumadin to an INR goal 2.5-3.5\par - TTE 2/6/2019 EF 57%\par \par #CAD, s/p 1V CABG\par - continue Atenolol 50, Lipitor 80, Losartan 50\par - off ASA due to bleeding (hx hematuria)\par - NST 2/10/20 normal\par - TTE 02/20 normal EF\par \par #HTN\par -c/w losartan 25 \par \par #Mechanical MVR\par -replaced over decade ago per patient\par -make/model unknown\par -INR goal 2.5-3.5\par -follows with warfarin clinic, next check due next month\par -Amoxicillin for IE ppx for upcoming dental work\par \par #Hematuria\par -resolved\par \par #GERD\par #Dyspepsia\par -resolved, not on PPI\par -simethicone PRN, seltzer water\par \par #BPH\par -c/w tamsulosin\par -off finasteride, trospium\par \par #ED\par -c/w tadalafil\par \par #Seizure Disorder\par -MRI 6/10/2019: chronic microvascular disease multiple chronic cerebellar lacunar type infarcts are noted. A larger infarct is noted along the posterior right cerebellar hemisphere\par -per neuro, low risk for seizure recurrence due low frequency and negative testing\par -monitor off of AEDs for now\par \par #HCM\par -Flu [id number] UTD\par -Colonoscopy: ages out Aug 2021\par -Pneumo, Tdap, Zoster UTD \par -COVID-19 UTD\par \par Erik Brooks MD \par Case discussed with Dr. Sterling\par RTC in 3 months

## 2021-04-23 NOTE — HISTORY OF PRESENT ILLNESS
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 \par \par Regarding his diabetes, he takes his glu levels in the am. They are usually 180-210s. No hypoglycemic episodes. He watches his diet, eats rice or bread 2-3x/week. Also c/o stomach growling at night, not due to hunger. States it is very loud and causes him to wake up. No nausea, emesis, diarrhea, abd pain. Requesting abx prior to dental procedure. Received 2 doses of COVID vaccine.

## 2021-04-23 NOTE — PHYSICAL EXAM
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

## 2021-04-26 DIAGNOSIS — N40.1 BENIGN PROSTATIC HYPERPLASIA WITH LOWER URINARY TRACT SYMPTOMS: ICD-10-CM

## 2021-04-26 DIAGNOSIS — E11.40 TYPE 2 DIABETES MELLITUS WITH DIABETIC NEUROPATHY, UNSPECIFIED: ICD-10-CM

## 2021-04-26 DIAGNOSIS — I48.91 UNSPECIFIED ATRIAL FIBRILLATION: ICD-10-CM

## 2021-04-26 DIAGNOSIS — Z95.2 PRESENCE OF PROSTHETIC HEART VALVE: ICD-10-CM

## 2021-04-26 DIAGNOSIS — Z79.01 LONG TERM (CURRENT) USE OF ANTICOAGULANTS: ICD-10-CM

## 2021-04-26 DIAGNOSIS — E11.9 TYPE 2 DIABETES MELLITUS WITHOUT COMPLICATIONS: ICD-10-CM

## 2021-05-03 ENCOUNTER — APPOINTMENT (OUTPATIENT)
Dept: INTERNAL MEDICINE | Facility: CLINIC | Age: 76
End: 2021-05-03

## 2021-05-06 ENCOUNTER — NON-APPOINTMENT (OUTPATIENT)
Age: 76
End: 2021-05-06

## 2021-05-06 ENCOUNTER — APPOINTMENT (OUTPATIENT)
Dept: INTERNAL MEDICINE | Facility: CLINIC | Age: 76
End: 2021-05-06

## 2021-05-06 ENCOUNTER — OUTPATIENT (OUTPATIENT)
Dept: OUTPATIENT SERVICES | Facility: HOSPITAL | Age: 76
LOS: 1 days | End: 2021-05-06

## 2021-05-06 VITALS — HEART RATE: 106 BPM | OXYGEN SATURATION: 99 % | RESPIRATION RATE: 16 BRPM

## 2021-05-06 VITALS — TEMPERATURE: 97.7 F

## 2021-05-06 DIAGNOSIS — Z98.89 OTHER SPECIFIED POSTPROCEDURAL STATES: Chronic | ICD-10-CM

## 2021-05-06 DIAGNOSIS — Z86.69 PERSONAL HISTORY OF OTHER DISEASES OF THE NERVOUS SYSTEM AND SENSE ORGANS: Chronic | ICD-10-CM

## 2021-05-06 DIAGNOSIS — Z79.01 LONG TERM (CURRENT) USE OF ANTICOAGULANTS: ICD-10-CM

## 2021-05-06 DIAGNOSIS — I48.91 UNSPECIFIED ATRIAL FIBRILLATION: ICD-10-CM

## 2021-05-06 DIAGNOSIS — Z95.2 PRESENCE OF PROSTHETIC HEART VALVE: ICD-10-CM

## 2021-05-06 LAB
INR PPP: 2.1 RATIO
POCT-PROTHROMBIN TIME: 25.3 SECS

## 2021-05-12 ENCOUNTER — NON-APPOINTMENT (OUTPATIENT)
Age: 76
End: 2021-05-12

## 2021-05-12 ENCOUNTER — APPOINTMENT (OUTPATIENT)
Dept: INTERNAL MEDICINE | Facility: CLINIC | Age: 76
End: 2021-05-12

## 2021-05-12 ENCOUNTER — OUTPATIENT (OUTPATIENT)
Dept: OUTPATIENT SERVICES | Facility: HOSPITAL | Age: 76
LOS: 1 days | End: 2021-05-12

## 2021-05-12 VITALS — RESPIRATION RATE: 16 BRPM | HEART RATE: 86 BPM | OXYGEN SATURATION: 98 %

## 2021-05-12 VITALS — TEMPERATURE: 97.9 F

## 2021-05-12 DIAGNOSIS — Z98.89 OTHER SPECIFIED POSTPROCEDURAL STATES: Chronic | ICD-10-CM

## 2021-05-12 DIAGNOSIS — Z86.69 PERSONAL HISTORY OF OTHER DISEASES OF THE NERVOUS SYSTEM AND SENSE ORGANS: Chronic | ICD-10-CM

## 2021-05-12 LAB
INR PPP: 2.3 RATIO
POCT-PROTHROMBIN TIME: 27.9 SECS
QUALITY CONTROL: YES

## 2021-05-13 DIAGNOSIS — I48.91 UNSPECIFIED ATRIAL FIBRILLATION: ICD-10-CM

## 2021-05-13 DIAGNOSIS — Z95.2 PRESENCE OF PROSTHETIC HEART VALVE: ICD-10-CM

## 2021-05-13 DIAGNOSIS — Z79.01 LONG TERM (CURRENT) USE OF ANTICOAGULANTS: ICD-10-CM

## 2021-05-13 DIAGNOSIS — Z51.81 ENCOUNTER FOR THERAPEUTIC DRUG LEVEL MONITORING: ICD-10-CM

## 2021-05-14 ENCOUNTER — RX RENEWAL (OUTPATIENT)
Age: 76
End: 2021-05-14

## 2021-05-19 ENCOUNTER — RESULT CHARGE (OUTPATIENT)
Age: 76
End: 2021-05-19

## 2021-05-19 ENCOUNTER — OUTPATIENT (OUTPATIENT)
Dept: OUTPATIENT SERVICES | Facility: HOSPITAL | Age: 76
LOS: 1 days | End: 2021-05-19

## 2021-05-19 ENCOUNTER — APPOINTMENT (OUTPATIENT)
Dept: INTERNAL MEDICINE | Facility: CLINIC | Age: 76
End: 2021-05-19

## 2021-05-19 VITALS — RESPIRATION RATE: 16 BRPM | HEART RATE: 78 BPM | OXYGEN SATURATION: 98 %

## 2021-05-19 VITALS — TEMPERATURE: 98.3 F

## 2021-05-19 DIAGNOSIS — Z95.2 PRESENCE OF PROSTHETIC HEART VALVE: ICD-10-CM

## 2021-05-19 DIAGNOSIS — Z51.81 ENCOUNTER FOR THERAPEUTIC DRUG LEVEL MONITORING: ICD-10-CM

## 2021-05-19 DIAGNOSIS — Z98.89 OTHER SPECIFIED POSTPROCEDURAL STATES: Chronic | ICD-10-CM

## 2021-05-19 DIAGNOSIS — Z79.01 LONG TERM (CURRENT) USE OF ANTICOAGULANTS: ICD-10-CM

## 2021-05-19 DIAGNOSIS — Z86.69 PERSONAL HISTORY OF OTHER DISEASES OF THE NERVOUS SYSTEM AND SENSE ORGANS: Chronic | ICD-10-CM

## 2021-05-19 DIAGNOSIS — I48.91 UNSPECIFIED ATRIAL FIBRILLATION: ICD-10-CM

## 2021-05-20 LAB
INR PPP: 2.7 RATIO
POCT-PROTHROMBIN TIME: 32.8 SECS
QUALITY CONTROL: YES

## 2021-06-09 ENCOUNTER — APPOINTMENT (OUTPATIENT)
Dept: INTERNAL MEDICINE | Facility: CLINIC | Age: 76
End: 2021-06-09

## 2021-06-09 ENCOUNTER — OUTPATIENT (OUTPATIENT)
Dept: OUTPATIENT SERVICES | Facility: HOSPITAL | Age: 76
LOS: 1 days | End: 2021-06-09

## 2021-06-09 VITALS — TEMPERATURE: 97.2 F

## 2021-06-09 VITALS — RESPIRATION RATE: 16 BRPM | OXYGEN SATURATION: 99 % | HEART RATE: 77 BPM

## 2021-06-09 DIAGNOSIS — Z98.89 OTHER SPECIFIED POSTPROCEDURAL STATES: Chronic | ICD-10-CM

## 2021-06-09 DIAGNOSIS — I48.91 UNSPECIFIED ATRIAL FIBRILLATION: ICD-10-CM

## 2021-06-09 DIAGNOSIS — Z86.69 PERSONAL HISTORY OF OTHER DISEASES OF THE NERVOUS SYSTEM AND SENSE ORGANS: Chronic | ICD-10-CM

## 2021-06-09 DIAGNOSIS — Z95.2 PRESENCE OF PROSTHETIC HEART VALVE: ICD-10-CM

## 2021-06-09 DIAGNOSIS — Z79.01 LONG TERM (CURRENT) USE OF ANTICOAGULANTS: ICD-10-CM

## 2021-06-09 LAB
INR PPP: 3.3 RATIO
POCT-PROTHROMBIN TIME: 39.6 SECS
QUALITY CONTROL: YES

## 2021-06-28 ENCOUNTER — APPOINTMENT (OUTPATIENT)
Dept: CARDIOLOGY | Facility: CLINIC | Age: 76
End: 2021-06-28
Payer: MEDICARE

## 2021-06-28 ENCOUNTER — NON-APPOINTMENT (OUTPATIENT)
Age: 76
End: 2021-06-28

## 2021-06-28 VITALS
WEIGHT: 177 LBS | HEIGHT: 69 IN | SYSTOLIC BLOOD PRESSURE: 123 MMHG | OXYGEN SATURATION: 98 % | DIASTOLIC BLOOD PRESSURE: 79 MMHG | BODY MASS INDEX: 26.22 KG/M2 | HEART RATE: 68 BPM

## 2021-06-28 PROCEDURE — 99214 OFFICE O/P EST MOD 30 MIN: CPT

## 2021-06-28 PROCEDURE — 93000 ELECTROCARDIOGRAM COMPLETE: CPT

## 2021-06-28 NOTE — PHYSICAL EXAM
[Well Developed] : well developed [Well Nourished] : well nourished [No Acute Distress] : no acute distress [Normal Conjunctiva] : normal conjunctiva [Normal Venous Pressure] : normal venous pressure [No Carotid Bruit] : no carotid bruit [No Rub] : no rub [No Gallop] : no gallop [Clear Lung Fields] : clear lung fields [Good Air Entry] : good air entry [No Respiratory Distress] : no respiratory distress  [Soft] : abdomen soft [Non Tender] : non-tender [No Masses/organomegaly] : no masses/organomegaly [Normal Gait] : normal gait [Normal Bowel Sounds] : normal bowel sounds [No Edema] : no edema [No Cyanosis] : no cyanosis [No Clubbing] : no clubbing [No Varicosities] : no varicosities [No Rash] : no rash [No Skin Lesions] : no skin lesions [Moves all extremities] : moves all extremities [No Focal Deficits] : no focal deficits [Normal Speech] : normal speech [Alert and Oriented] : alert and oriented [Normal memory] : normal memory [de-identified] : normal carotid upstroke [de-identified] : mechanical S1; normal S2; soft systolic murmur LSB; midline chest scar, well-healed

## 2021-06-28 NOTE — HISTORY OF PRESENT ILLNESS
[FreeTextEntry1] : 74 yo man presents for CV followup.\par \par H/o CAD s/p CABG, s/p mechanical MVR (RHD; approx 12 years ago) on A/C with Coumadin, chronic persistent atrial fibrillation, diabetes (last A1c 7%), HLD, esophageal ulcers. He developed COVID early in Sept 2020. At that time, he was relatively asymptomatic. \par \par Stress test 2/2020: LVEF 70%; normal myocardial perfusion\par \par Echo 2/2020: \par 1. Mechanical mitral valve prosthesis. Mild mitral\par regurgitation.  Mean transmitral valve gradient equals 4 mm\par Hg, which is probably normal in the setting of a prosthetic\par valve.\par 2. Normal left ventricular internal dimensions and wall\par thicknesses.\par 3. Endocardium not well visualized; grossly normal left\par ventricular systolic function.\par 4. Normal right ventricular size and function.\par 5. Normal tricuspid valve.  Mild-moderate tricuspid\par regurgitation.\par \par At his last visit with me in Dec 2020, the following issues were discussed:\par Atherosclerotic heart disease of native coronary artery without angina pectoris (414.01)\par (I25.10)\par  · Doing well, no angina. Continue with secondary prevention with DM control, medications,\par     BP control, diet, exercise. No indication for provocative testing at this time. He\par     will see his PCP w/in next month or so, and will have labs drawn there.\par Atrial fibrillation (427.31) (I48.91)\par  · Rate controlled AFib, on Coumadin, in setting of MVR. Continue with digoxin and\par     atenolol. No indication for cardioversion.\par Diabetes mellitus (250.00) (E11.9)\par  · He checks his BS frequently. DM medications followed by PCP.\par Hyperlipidemia (272.4) (E78.5)\par  · Known CAD. Last LDL was 96 mg/dL. Will increase dose to 80 mg atorvastatin. Goal\par     LDL <70 mg/dL. Diet is relatively heart healthy.\par Hypertension (401.9) (I10)\par  · BP a bit elevated today. Given CAD, DM, suggest taking ARB. Will rx losartan 25 mg daily.\par     F/u with PCP shortly for repeat BP and labs.\par Mechanical heart valve present (V43.3) (Z95.2)\par  · Long-standing MVR, on Coumadin. Echo this past winter appeared fine. He understands\par     the need for close INR monitoring and antibiotics prior to the dentist (will give\par     rx for upcoming dental work). No bleeding or onging infections.\par \par last labs: A1c 8.3%; lipids - trig 150, tchol 102, HDL 32, LDL 40 mg/dL; BUN 26, creat 1.16, K 4.6, LFT's normal\par \par EKG today: AFib\par \par Overall, today, reports that he is feeling fairly well overall, but notes some generalized weakness. Has some orthostatic dizziness. He notes some chronic VELAZQUEZ. No edema. No chest pain. No orthopnea, PND. No bleeding. No CVA, Compliant with all medications. INR checked q 3wks for vavle/AF. Goes to dentist regularly; takes antibiotics prophylactically. Notes some leg fatigue with exertion (since increasing the statin dose). \par

## 2021-07-01 ENCOUNTER — RESULT CHARGE (OUTPATIENT)
Age: 76
End: 2021-07-01

## 2021-07-02 ENCOUNTER — APPOINTMENT (OUTPATIENT)
Dept: INTERNAL MEDICINE | Facility: CLINIC | Age: 76
End: 2021-07-02

## 2021-07-02 ENCOUNTER — RESULT REVIEW (OUTPATIENT)
Age: 76
End: 2021-07-02

## 2021-07-02 ENCOUNTER — APPOINTMENT (OUTPATIENT)
Dept: INTERNAL MEDICINE | Facility: CLINIC | Age: 76
End: 2021-07-02
Payer: MEDICARE

## 2021-07-02 ENCOUNTER — OUTPATIENT (OUTPATIENT)
Dept: OUTPATIENT SERVICES | Facility: HOSPITAL | Age: 76
LOS: 1 days | End: 2021-07-02

## 2021-07-02 VITALS
HEIGHT: 69 IN | DIASTOLIC BLOOD PRESSURE: 60 MMHG | BODY MASS INDEX: 25.53 KG/M2 | OXYGEN SATURATION: 99 % | SYSTOLIC BLOOD PRESSURE: 110 MMHG | HEART RATE: 65 BPM | WEIGHT: 172.38 LBS

## 2021-07-02 VITALS — RESPIRATION RATE: 16 BRPM | HEART RATE: 82 BPM | OXYGEN SATURATION: 99 %

## 2021-07-02 VITALS — TEMPERATURE: 96.1 F

## 2021-07-02 DIAGNOSIS — Z98.89 OTHER SPECIFIED POSTPROCEDURAL STATES: Chronic | ICD-10-CM

## 2021-07-02 DIAGNOSIS — Z86.69 PERSONAL HISTORY OF OTHER DISEASES OF THE NERVOUS SYSTEM AND SENSE ORGANS: Chronic | ICD-10-CM

## 2021-07-02 LAB
BASOPHILS # BLD AUTO: 0.05 K/UL — SIGNIFICANT CHANGE UP (ref 0–0.2)
BASOPHILS NFR BLD AUTO: 0.6 % — SIGNIFICANT CHANGE UP (ref 0–2)
EOSINOPHIL # BLD AUTO: 0.15 K/UL — SIGNIFICANT CHANGE UP (ref 0–0.5)
EOSINOPHIL NFR BLD AUTO: 1.9 % — SIGNIFICANT CHANGE UP (ref 0–6)
HCT VFR BLD CALC: 42.5 % — SIGNIFICANT CHANGE UP (ref 39–50)
HGB BLD-MCNC: 14.3 G/DL — SIGNIFICANT CHANGE UP (ref 13–17)
IANC: 4.67 K/UL — SIGNIFICANT CHANGE UP (ref 1.5–8.5)
IMM GRANULOCYTES NFR BLD AUTO: 1.6 % — HIGH (ref 0–1.5)
LYMPHOCYTES # BLD AUTO: 2.33 K/UL — SIGNIFICANT CHANGE UP (ref 1–3.3)
LYMPHOCYTES # BLD AUTO: 29.1 % — SIGNIFICANT CHANGE UP (ref 13–44)
MCHC RBC-ENTMCNC: 31.4 PG — SIGNIFICANT CHANGE UP (ref 27–34)
MCHC RBC-ENTMCNC: 33.6 GM/DL — SIGNIFICANT CHANGE UP (ref 32–36)
MCV RBC AUTO: 93.2 FL — SIGNIFICANT CHANGE UP (ref 80–100)
MONOCYTES # BLD AUTO: 0.68 K/UL — SIGNIFICANT CHANGE UP (ref 0–0.9)
MONOCYTES NFR BLD AUTO: 8.5 % — SIGNIFICANT CHANGE UP (ref 2–14)
NEUTROPHILS # BLD AUTO: 4.67 K/UL — SIGNIFICANT CHANGE UP (ref 1.8–7.4)
NEUTROPHILS NFR BLD AUTO: 58.3 % — SIGNIFICANT CHANGE UP (ref 43–77)
NRBC # BLD: 0 /100 WBCS — SIGNIFICANT CHANGE UP
NRBC # FLD: 0 K/UL — SIGNIFICANT CHANGE UP
PLATELET # BLD AUTO: 207 K/UL — SIGNIFICANT CHANGE UP (ref 150–400)
RBC # BLD: 4.56 M/UL — SIGNIFICANT CHANGE UP (ref 4.2–5.8)
RBC # FLD: 13.1 % — SIGNIFICANT CHANGE UP (ref 10.3–14.5)
WBC # BLD: 8.01 K/UL — SIGNIFICANT CHANGE UP (ref 3.8–10.5)
WBC # FLD AUTO: 8.01 K/UL — SIGNIFICANT CHANGE UP (ref 3.8–10.5)

## 2021-07-02 PROCEDURE — 99214 OFFICE O/P EST MOD 30 MIN: CPT | Mod: GC

## 2021-07-02 RX ORDER — ALFUZOSIN HYDROCHLORIDE 10 MG/1
10 TABLET, EXTENDED RELEASE ORAL
Qty: 30 | Refills: 11 | Status: DISCONTINUED | COMMUNITY
Start: 2020-09-22 | End: 2021-07-02

## 2021-07-02 RX ORDER — FINASTERIDE 5 MG/1
5 TABLET, FILM COATED ORAL
Qty: 90 | Refills: 3 | Status: DISCONTINUED | COMMUNITY
Start: 2020-01-02 | End: 2021-07-02

## 2021-07-02 NOTE — ASSESSMENT
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, T2DM, Afib, Rosie Thompson tears and esophageal ulcers presenting for Coumadin visit, which was converted to an acute visit due to gross hematuria. \par \par #Gross Hematuria - x 1d associated with dysuria, weakness, lightheadedness/dizziness when he stands up, VELAZQUEZ, and flank pain. On exam patient does not have signs of severe anemia, no conjunctival pallor and palms with good coloration. Orthostatic vitals negative. \par -INR 3.4 (goal 2.5-3.5)\par -Current Coumadin dosin.5mg Sun, Wed, Fri + 5mg M, T, Thurs, Sat\par -Advised patient to go to the ED (would call ambulance) due to risks of bleeding while on AC. However, patient refusing to go. \par -Will obtain CBC\par -Adjust coumadin dosing to 5mg qdaily (~10-15% reduction)\par -Urology f/u ASAP \par \par Case discussed with Dr. Seay. \par \par RTC for repeat INR check on . \par \par \par Virginia Moy\par [employer] \par \par \par

## 2021-07-02 NOTE — REVIEW OF SYSTEMS
[Fatigue] : fatigue [Dyspnea on Exertion] : dyspnea on exertion [Dysuria] : dysuria [Hematuria] : hematuria [Back Pain] : back pain [Dizziness] : dizziness [Fever] : no fever [Chills] : no chills [Night Sweats] : no night sweats [Recent Change In Weight] : ~T no recent weight change [Discharge] : no discharge [Earache] : no earache [Sore Throat] : no sore throat [Chest Pain] : no chest pain [Palpitations] : no palpitations [Lower Ext Edema] : no lower extremity edema [Shortness Of Breath] : no shortness of breath [Wheezing] : no wheezing [Cough] : no cough [Abdominal Pain] : no abdominal pain [Nausea] : no nausea [Constipation] : no constipation [Diarrhea] : no diarrhea [Vomiting] : no vomiting [Heartburn] : no heartburn [Melena] : no melena [Incontinence] : no incontinence [Hesitancy] : no hesitancy [Joint Pain] : no joint pain [Muscle Pain] : no muscle pain [Joint Stiffness] : no joint stiffness [Itching] : no itching [Skin Rash] : no skin rash [Headache] : no headache [Fainting] : no fainting

## 2021-07-02 NOTE — PHYSICAL EXAM
[No Acute Distress] : no acute distress [Well-Appearing] : well-appearing [Normal Sclera/Conjunctiva] : normal sclera/conjunctiva [EOMI] : extraocular movements intact [No Respiratory Distress] : no respiratory distress  [Clear to Auscultation] : lungs were clear to auscultation bilaterally [Normal Rate] : normal rate  [Regular Rhythm] : with a regular rhythm [Normal S1, S2] : normal S1 and S2 [Pedal Pulses Present] : the pedal pulses are present [No Edema] : there was no peripheral edema [Soft] : abdomen soft [Non Tender] : non-tender [Non-distended] : non-distended [Normal Bowel Sounds] : normal bowel sounds [No CVA Tenderness] : no CVA  tenderness [No Rash] : no rash [No Focal Deficits] : no focal deficits [Normal Affect] : the affect was normal [Normal Insight/Judgement] : insight and judgment were intact [de-identified] : No conjunctival pallor  [de-identified] : No suprapubic ttp.  [de-identified] : No palmar pallor

## 2021-07-02 NOTE — HISTORY OF PRESENT ILLNESS
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 \par \par Patient's INR today is 3.4 (goal 2.5-3.5). Patient states that he's been compliant with his coumadin all week (7.5mg Sun, Wed, Fri + 5mg M, T, Thurs, Sat). However, yesterday he woke up at 5AM and started having gross hematuria, passing large clots. He states he had multiple episodes all day yesterday. Today, he's only urinated once and it had less blood than yesterday. Associated symptoms include dysuria, weakness, lightheadedness/dizziness when he stands up, VELAZQUEZ, and flank pain. Denies fever/chills, syncope, CP, palpitations, SOB, n/v/d, melena/hematochezia, abdominal pain.

## 2021-07-06 ENCOUNTER — OUTPATIENT (OUTPATIENT)
Dept: OUTPATIENT SERVICES | Facility: HOSPITAL | Age: 76
LOS: 1 days | End: 2021-07-06

## 2021-07-06 ENCOUNTER — APPOINTMENT (OUTPATIENT)
Dept: INTERNAL MEDICINE | Facility: CLINIC | Age: 76
End: 2021-07-06

## 2021-07-06 VITALS — TEMPERATURE: 98.4 F

## 2021-07-06 DIAGNOSIS — R31.0 GROSS HEMATURIA: ICD-10-CM

## 2021-07-06 DIAGNOSIS — R30.0 DYSURIA: ICD-10-CM

## 2021-07-06 DIAGNOSIS — Z86.69 PERSONAL HISTORY OF OTHER DISEASES OF THE NERVOUS SYSTEM AND SENSE ORGANS: Chronic | ICD-10-CM

## 2021-07-06 DIAGNOSIS — Z79.01 LONG TERM (CURRENT) USE OF ANTICOAGULANTS: ICD-10-CM

## 2021-07-06 DIAGNOSIS — I25.10 ATHEROSCLEROTIC HEART DISEASE OF NATIVE CORONARY ARTERY WITHOUT ANGINA PECTORIS: ICD-10-CM

## 2021-07-06 DIAGNOSIS — I48.91 UNSPECIFIED ATRIAL FIBRILLATION: ICD-10-CM

## 2021-07-06 DIAGNOSIS — R42 DIZZINESS AND GIDDINESS: ICD-10-CM

## 2021-07-06 DIAGNOSIS — Z98.89 OTHER SPECIFIED POSTPROCEDURAL STATES: Chronic | ICD-10-CM

## 2021-07-06 LAB
INR PPP: 3.4 RATIO
POCT-PROTHROMBIN TIME: 41.1 SECS

## 2021-07-07 ENCOUNTER — EMERGENCY (EMERGENCY)
Facility: HOSPITAL | Age: 76
LOS: 1 days | Discharge: HOME | End: 2021-07-07
Attending: STUDENT IN AN ORGANIZED HEALTH CARE EDUCATION/TRAINING PROGRAM
Payer: MEDICARE

## 2021-07-07 VITALS
HEART RATE: 66 BPM | RESPIRATION RATE: 16 BRPM | DIASTOLIC BLOOD PRESSURE: 86 MMHG | SYSTOLIC BLOOD PRESSURE: 128 MMHG | OXYGEN SATURATION: 100 %

## 2021-07-07 VITALS
OXYGEN SATURATION: 100 % | HEART RATE: 66 BPM | HEIGHT: 69 IN | SYSTOLIC BLOOD PRESSURE: 122 MMHG | RESPIRATION RATE: 16 BRPM | DIASTOLIC BLOOD PRESSURE: 69 MMHG | TEMPERATURE: 98 F

## 2021-07-07 DIAGNOSIS — Z86.69 PERSONAL HISTORY OF OTHER DISEASES OF THE NERVOUS SYSTEM AND SENSE ORGANS: Chronic | ICD-10-CM

## 2021-07-07 DIAGNOSIS — Z98.89 OTHER SPECIFIED POSTPROCEDURAL STATES: Chronic | ICD-10-CM

## 2021-07-07 LAB
ALBUMIN SERPL ELPH-MCNC: 4.2 G/DL — SIGNIFICANT CHANGE UP (ref 3.3–5)
ALP SERPL-CCNC: 65 U/L — SIGNIFICANT CHANGE UP (ref 40–120)
ALT FLD-CCNC: 17 U/L — SIGNIFICANT CHANGE UP (ref 4–41)
ANION GAP SERPL CALC-SCNC: 12 MMOL/L — SIGNIFICANT CHANGE UP (ref 7–14)
APPEARANCE UR: CLEAR — SIGNIFICANT CHANGE UP
APTT BLD: 47.4 SEC — HIGH (ref 27–36.3)
AST SERPL-CCNC: 16 U/L — SIGNIFICANT CHANGE UP (ref 4–40)
BACTERIA # UR AUTO: ABNORMAL
BASOPHILS # BLD AUTO: 0.03 K/UL — SIGNIFICANT CHANGE UP (ref 0–0.2)
BASOPHILS NFR BLD AUTO: 0.5 % — SIGNIFICANT CHANGE UP (ref 0–2)
BILIRUB SERPL-MCNC: 2.2 MG/DL — HIGH (ref 0.2–1.2)
BILIRUB UR-MCNC: NEGATIVE — SIGNIFICANT CHANGE UP
BUN SERPL-MCNC: 21 MG/DL — SIGNIFICANT CHANGE UP (ref 7–23)
CALCIUM SERPL-MCNC: 9.2 MG/DL — SIGNIFICANT CHANGE UP (ref 8.4–10.5)
CHLORIDE SERPL-SCNC: 106 MMOL/L — SIGNIFICANT CHANGE UP (ref 98–107)
CO2 SERPL-SCNC: 21 MMOL/L — LOW (ref 22–31)
COLOR SPEC: YELLOW — SIGNIFICANT CHANGE UP
CREAT SERPL-MCNC: 1.13 MG/DL — SIGNIFICANT CHANGE UP (ref 0.5–1.3)
DIFF PNL FLD: ABNORMAL
EOSINOPHIL # BLD AUTO: 0.12 K/UL — SIGNIFICANT CHANGE UP (ref 0–0.5)
EOSINOPHIL NFR BLD AUTO: 1.8 % — SIGNIFICANT CHANGE UP (ref 0–6)
EPI CELLS # UR: 0 /HPF — SIGNIFICANT CHANGE UP (ref 0–5)
GLUCOSE SERPL-MCNC: 259 MG/DL — HIGH (ref 70–99)
GLUCOSE UR QL: ABNORMAL
HCT VFR BLD CALC: 42.2 % — SIGNIFICANT CHANGE UP (ref 39–50)
HGB BLD-MCNC: 14 G/DL — SIGNIFICANT CHANGE UP (ref 13–17)
HYALINE CASTS # UR AUTO: 0 /LPF — SIGNIFICANT CHANGE UP (ref 0–7)
IANC: 4.17 K/UL — SIGNIFICANT CHANGE UP (ref 1.5–8.5)
IMM GRANULOCYTES NFR BLD AUTO: 1.5 % — SIGNIFICANT CHANGE UP (ref 0–1.5)
INR BLD: 3.08 RATIO — HIGH (ref 0.88–1.16)
KETONES UR-MCNC: NEGATIVE — SIGNIFICANT CHANGE UP
LEUKOCYTE ESTERASE UR-ACNC: NEGATIVE — SIGNIFICANT CHANGE UP
LYMPHOCYTES # BLD AUTO: 1.7 K/UL — SIGNIFICANT CHANGE UP (ref 1–3.3)
LYMPHOCYTES # BLD AUTO: 25.6 % — SIGNIFICANT CHANGE UP (ref 13–44)
MCHC RBC-ENTMCNC: 31.3 PG — SIGNIFICANT CHANGE UP (ref 27–34)
MCHC RBC-ENTMCNC: 33.2 GM/DL — SIGNIFICANT CHANGE UP (ref 32–36)
MCV RBC AUTO: 94.4 FL — SIGNIFICANT CHANGE UP (ref 80–100)
MONOCYTES # BLD AUTO: 0.51 K/UL — SIGNIFICANT CHANGE UP (ref 0–0.9)
MONOCYTES NFR BLD AUTO: 7.7 % — SIGNIFICANT CHANGE UP (ref 2–14)
NEUTROPHILS # BLD AUTO: 4.17 K/UL — SIGNIFICANT CHANGE UP (ref 1.8–7.4)
NEUTROPHILS NFR BLD AUTO: 62.9 % — SIGNIFICANT CHANGE UP (ref 43–77)
NITRITE UR-MCNC: NEGATIVE — SIGNIFICANT CHANGE UP
NRBC # BLD: 0 /100 WBCS — SIGNIFICANT CHANGE UP
NRBC # FLD: 0 K/UL — SIGNIFICANT CHANGE UP
PH UR: 6 — SIGNIFICANT CHANGE UP (ref 5–8)
PLATELET # BLD AUTO: 185 K/UL — SIGNIFICANT CHANGE UP (ref 150–400)
POTASSIUM SERPL-MCNC: 4.5 MMOL/L — SIGNIFICANT CHANGE UP (ref 3.5–5.3)
POTASSIUM SERPL-SCNC: 4.5 MMOL/L — SIGNIFICANT CHANGE UP (ref 3.5–5.3)
PROT SERPL-MCNC: 7.2 G/DL — SIGNIFICANT CHANGE UP (ref 6–8.3)
PROT UR-MCNC: ABNORMAL
PROTHROM AB SERPL-ACNC: 33.6 SEC — HIGH (ref 10.6–13.6)
RBC # BLD: 4.47 M/UL — SIGNIFICANT CHANGE UP (ref 4.2–5.8)
RBC # FLD: 12.9 % — SIGNIFICANT CHANGE UP (ref 10.3–14.5)
RBC CASTS # UR COMP ASSIST: SIGNIFICANT CHANGE UP /HPF (ref 0–4)
SODIUM SERPL-SCNC: 139 MMOL/L — SIGNIFICANT CHANGE UP (ref 135–145)
SP GR SPEC: 1.03 — HIGH (ref 1.01–1.02)
UROBILINOGEN FLD QL: ABNORMAL
WBC # BLD: 6.63 K/UL — SIGNIFICANT CHANGE UP (ref 3.8–10.5)
WBC # FLD AUTO: 6.63 K/UL — SIGNIFICANT CHANGE UP (ref 3.8–10.5)
WBC UR QL: 1 /HPF — SIGNIFICANT CHANGE UP (ref 0–5)

## 2021-07-07 PROCEDURE — 99284 EMERGENCY DEPT VISIT MOD MDM: CPT

## 2021-07-07 RX ORDER — LIDOCAINE HCL 20 MG/ML
10 VIAL (ML) INJECTION ONCE
Refills: 0 | Status: DISCONTINUED | OUTPATIENT
Start: 2021-07-07 | End: 2021-07-07

## 2021-07-07 RX ORDER — HYDROMORPHONE HYDROCHLORIDE 2 MG/ML
0.5 INJECTION INTRAMUSCULAR; INTRAVENOUS; SUBCUTANEOUS ONCE
Refills: 0 | Status: DISCONTINUED | OUTPATIENT
Start: 2021-07-07 | End: 2021-07-07

## 2021-07-07 NOTE — ED PROVIDER NOTE - PROGRESS NOTE DETAILS
FRANCISCA Aldana: Labs reviewed. H/H stable. INR therapeutic. UA shows no UTI. Urinating without any difficulties and yellow non-bloody urine. Pt is clinically stable for discharge. PMD follow up. Urology follow up as scheduled. Strict return precautions.

## 2021-07-07 NOTE — ED ADULT NURSE NOTE - INTERVENTIONS DEFINITIONS
Call bell, personal items and telephone within reach/Non-slip footwear when patient is off stretcher/Physically safe environment: no spills, clutter or unnecessary equipment/Provide visual cue, wrist band, yellow gown, etc.

## 2021-07-07 NOTE — ED ADULT TRIAGE NOTE - CHIEF COMPLAINT QUOTE
pt c/o several days of hematuria with clots, lower back pain. PMH Afib- on coumadin, mitral valve replacement, DM. Denies fever, cough, CP.

## 2021-07-07 NOTE — ED PROVIDER NOTE - ATTENDING CONTRIBUTION TO CARE
Agree with above, no current evidence of clots being passed, therefore can follow up urology as an outpatient. Patient advised to return for dysuria, signs of infection, and recurrence of clots. Patient in satisfactory condition at time of discharge.

## 2021-07-07 NOTE — ED PROVIDER NOTE - NSFOLLOWUPINSTRUCTIONS_ED_ALL_ED_FT
Follow up with your PMD within 48-72 hrs. Show copies of your reports given to you. Take all of your medications as previously prescribed.    Follow up with Urology as scheduled.    If you have any new, worsened or concerning symptoms, please return to the emergency department immediately.

## 2021-07-07 NOTE — ED PROVIDER NOTE - PSH
H/O retinal detachment  left  Hx of CABG    Mitral valve replaced  2006 at [facility]  S/P TURP  2014

## 2021-07-07 NOTE — ED PROVIDER NOTE - OBJECTIVE STATEMENT
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 Pt states last Thursday he noticed gross hematuria with blood clots which lasted 2 days, saw his PMD on Friday, had INR checked and advised to come to the ED. Hematuria stopped then until this morning he again noted gross blood in the urine this morning. Denies any pain, nausea, vomiting, dizziness, chest pain, dyspnea, fevers and chills. Denies any other complaints. Last dose of coumadin last night.

## 2021-07-07 NOTE — ED PROVIDER NOTE - PATIENT PORTAL LINK FT
You can access the FollowMyHealth Patient Portal offered by [facility] by registering at the following website: [email or web address]. By joining [employer]’s FollowMyHealth portal, you will also be able to view your health information using other applications (apps) compatible with our system.

## 2021-07-07 NOTE — ED PROVIDER NOTE - CLINICAL SUMMARY MEDICAL DECISION MAKING FREE TEXT BOX
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 HD stable. Abdomen soft nontender. Urine sample noted at bedside, clear yellow without any blood. Imp: Hematuria, resolving. Plan to check INR, H/H and UA. Reassess.

## 2021-07-07 NOTE — ED ADULT NURSE NOTE - OBJECTIVE STATEMENT
Lab Maday Bishop Pt presents to rm 21, A&Ox4, ambulatory w/o assistance at baseline, pmhx of Afib on coumadin, DM, here for evaluation of hematuria x4-5days ago, stopped after two days and restarted this morning. Pt states 4-5days ago he observed more hematuria and this morning "it was only very little." Pt also endorsing lower back pain, denies dysuria. Denies chest pain, shortness of breath, palpitations, diaphoresis, headaches, fevers, dizziness, nausea, vomiting, diarrhea at this time. Pt presents to rm 21, A&Ox4, ambulatory w/o assistance at baseline, pmhx of Afib on coumadin, DM, here for evaluation of hematuria x4-5days ago, stopped after two days and restarted this morning. Pt states 4-5days ago he observed more hematuria and this morning "it was only very little and no clots." Pt also endorsing lower back pain, denies dysuria. Denies chest pain, shortness of breath, palpitations, diaphoresis, headaches, fevers, dizziness, nausea, vomiting, diarrhea at this time. IV established in left arm with a 18G, labs drawn and sent, call bell in reach, warm blanket provided, bed in lowest position, side rails up x2, MD evaluation in progress. Will continue to monitor.

## 2021-07-07 NOTE — ED PROVIDER NOTE - FAMILY HISTORY
Mother  Still living? Unknown  Family history of diabetes mellitus, Age at diagnosis: Age Unknown   Statement Selected

## 2021-07-08 LAB
CULTURE RESULTS: NO GROWTH — SIGNIFICANT CHANGE UP
SPECIMEN SOURCE: SIGNIFICANT CHANGE UP

## 2021-07-09 ENCOUNTER — NON-APPOINTMENT (OUTPATIENT)
Age: 76
End: 2021-07-09

## 2021-07-16 ENCOUNTER — OUTPATIENT (OUTPATIENT)
Dept: OUTPATIENT SERVICES | Facility: HOSPITAL | Age: 76
LOS: 1 days | End: 2021-07-16

## 2021-07-16 ENCOUNTER — APPOINTMENT (OUTPATIENT)
Dept: INTERNAL MEDICINE | Facility: CLINIC | Age: 76
End: 2021-07-16

## 2021-07-16 DIAGNOSIS — Z86.69 PERSONAL HISTORY OF OTHER DISEASES OF THE NERVOUS SYSTEM AND SENSE ORGANS: Chronic | ICD-10-CM

## 2021-07-16 DIAGNOSIS — I48.91 UNSPECIFIED ATRIAL FIBRILLATION: ICD-10-CM

## 2021-07-16 DIAGNOSIS — Z98.89 OTHER SPECIFIED POSTPROCEDURAL STATES: Chronic | ICD-10-CM

## 2021-07-16 DIAGNOSIS — Z79.01 LONG TERM (CURRENT) USE OF ANTICOAGULANTS: ICD-10-CM

## 2021-07-16 DIAGNOSIS — I25.10 ATHEROSCLEROTIC HEART DISEASE OF NATIVE CORONARY ARTERY WITHOUT ANGINA PECTORIS: ICD-10-CM

## 2021-07-26 ENCOUNTER — APPOINTMENT (OUTPATIENT)
Dept: INTERNAL MEDICINE | Facility: CLINIC | Age: 76
End: 2021-07-26

## 2021-07-26 ENCOUNTER — OUTPATIENT (OUTPATIENT)
Dept: OUTPATIENT SERVICES | Facility: HOSPITAL | Age: 76
LOS: 1 days | End: 2021-07-26

## 2021-07-26 VITALS — HEART RATE: 92 BPM | OXYGEN SATURATION: 96 %

## 2021-07-26 VITALS — TEMPERATURE: 97.3 F

## 2021-07-26 DIAGNOSIS — R31.0 GROSS HEMATURIA: ICD-10-CM

## 2021-07-26 DIAGNOSIS — Z86.69 PERSONAL HISTORY OF OTHER DISEASES OF THE NERVOUS SYSTEM AND SENSE ORGANS: Chronic | ICD-10-CM

## 2021-07-26 DIAGNOSIS — I48.91 UNSPECIFIED ATRIAL FIBRILLATION: ICD-10-CM

## 2021-07-26 DIAGNOSIS — Z79.01 LONG TERM (CURRENT) USE OF ANTICOAGULANTS: ICD-10-CM

## 2021-07-26 DIAGNOSIS — Z98.89 OTHER SPECIFIED POSTPROCEDURAL STATES: Chronic | ICD-10-CM

## 2021-07-26 DIAGNOSIS — Z51.81 ENCOUNTER FOR THERAPEUTIC DRUG LEVEL MONITORING: ICD-10-CM

## 2021-08-01 LAB
INR PPP: 3 RATIO
POCT-PROTHROMBIN TIME: 36.6 SECS

## 2021-08-02 ENCOUNTER — APPOINTMENT (OUTPATIENT)
Dept: INTERNAL MEDICINE | Facility: CLINIC | Age: 76
End: 2021-08-02
Payer: MEDICARE

## 2021-08-02 ENCOUNTER — OUTPATIENT (OUTPATIENT)
Dept: OUTPATIENT SERVICES | Facility: HOSPITAL | Age: 76
LOS: 1 days | End: 2021-08-02

## 2021-08-02 VITALS
HEART RATE: 70 BPM | OXYGEN SATURATION: 97 % | DIASTOLIC BLOOD PRESSURE: 70 MMHG | HEIGHT: 69 IN | BODY MASS INDEX: 25.62 KG/M2 | WEIGHT: 173 LBS | SYSTOLIC BLOOD PRESSURE: 114 MMHG

## 2021-08-02 VITALS — TEMPERATURE: 97.1 F

## 2021-08-02 DIAGNOSIS — Z86.69 PERSONAL HISTORY OF OTHER DISEASES OF THE NERVOUS SYSTEM AND SENSE ORGANS: Chronic | ICD-10-CM

## 2021-08-02 DIAGNOSIS — Z98.89 OTHER SPECIFIED POSTPROCEDURAL STATES: Chronic | ICD-10-CM

## 2021-08-02 LAB
GLUCOSE BLDC GLUCOMTR-MCNC: 237
HBA1C MFR BLD HPLC: 7.6

## 2021-08-02 PROCEDURE — 99214 OFFICE O/P EST MOD 30 MIN: CPT | Mod: GC

## 2021-08-02 RX ORDER — LOSARTAN POTASSIUM 25 MG/1
25 TABLET, FILM COATED ORAL DAILY
Qty: 90 | Refills: 3 | Status: DISCONTINUED | COMMUNITY
Start: 2020-12-28 | End: 2021-08-02

## 2021-08-02 RX ORDER — METFORMIN HYDROCHLORIDE 500 MG/1
500 TABLET, COATED ORAL DAILY
Qty: 90 | Refills: 2 | Status: DISCONTINUED | COMMUNITY
Start: 2021-01-28 | End: 2021-08-02

## 2021-08-09 DIAGNOSIS — N40.0 BENIGN PROSTATIC HYPERPLASIA WITHOUT LOWER URINARY TRACT SYMPTOMS: ICD-10-CM

## 2021-08-09 DIAGNOSIS — I48.91 UNSPECIFIED ATRIAL FIBRILLATION: ICD-10-CM

## 2021-08-09 DIAGNOSIS — R31.29 OTHER MICROSCOPIC HEMATURIA: ICD-10-CM

## 2021-08-09 DIAGNOSIS — I10 ESSENTIAL (PRIMARY) HYPERTENSION: ICD-10-CM

## 2021-08-09 DIAGNOSIS — N52.9 MALE ERECTILE DYSFUNCTION, UNSPECIFIED: ICD-10-CM

## 2021-08-09 DIAGNOSIS — E11.40 TYPE 2 DIABETES MELLITUS WITH DIABETIC NEUROPATHY, UNSPECIFIED: ICD-10-CM

## 2021-08-09 DIAGNOSIS — R26.9 UNSPECIFIED ABNORMALITIES OF GAIT AND MOBILITY: ICD-10-CM

## 2021-08-09 DIAGNOSIS — R26.89 OTHER ABNORMALITIES OF GAIT AND MOBILITY: ICD-10-CM

## 2021-08-09 DIAGNOSIS — E11.9 TYPE 2 DIABETES MELLITUS WITHOUT COMPLICATIONS: ICD-10-CM

## 2021-08-09 DIAGNOSIS — I25.10 ATHEROSCLEROTIC HEART DISEASE OF NATIVE CORONARY ARTERY WITHOUT ANGINA PECTORIS: ICD-10-CM

## 2021-08-09 NOTE — HISTORY OF PRESENT ILLNESS
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 \par \par Pt had recent episode of hematuria, was told to go to ED. Labs were stable and he was discharged. No transfusions were necessary. INR stable in range. Hematuria has since resolved, pt has f/u with urology in 2 weeks. Pt has concerns about his sugars, constantly being above 200. He monitors his diet. He also c/o episodes where he feels unsteady on his feet, especially after getting up from a seated position. He also has concerns about his ability to get an erection and not being able to satisfy his wife. He does not wake up with erections. Stated he took Viagra in the past, which hasn’t helped. Has stopped taking metformin due to abd pain and diarrhea it was causign him. Otherwise, no current chest pain, sob, n/v/d, abd pain, fevers, chills.

## 2021-08-09 NOTE — REVIEW OF SYSTEMS
[Fatigue] : fatigue [Hematuria] : hematuria [Dizziness] : dizziness [Unsteady Walk] : ataxia [Negative] : Integumentary [Dysuria] : no dysuria [Incontinence] : no incontinence [Hesitancy] : no hesitancy [Nocturia] : no nocturia [Frequency] : no frequency [Headache] : no headache [Fainting] : no fainting [Confusion] : no confusion [Memory Loss] : no memory loss

## 2021-08-09 NOTE — ASSESSMENT
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 \par \par #DM2 \par -POCT A1c 7.6\par -c/w glipizide and Januvia\par -pt self-d/c metformin 500 qd due to abd sx\par -will consult w/ pharamcy about addition of \par -follows with optho at [facility]\par -encouraged to follow diabetic diet and exercise\par \par #Afib\par - c/w Atenolol, Digoxin\par - continue Coumadin to an INR goal 2.5-3.5\par - next INR check next week\par - TTE 2/6/2019 EF 57%\par \par #CAD, s/p 1V CABG\par - continue Atenolol 50, Lipitor 40, Lisinopril 10\par - off ASA due to bleeding (hx hematuria)\par - NST 2/10/20 normal\par - TTE 02/20 normal EF\par \par #HTN\par - pt self-d/c losartan 50, will start lisinopril 10 for renal protection\par \par #Orthostatic Hypotension\par - Monitor, reduced dosage of BP med\par - Encouraged fluid intake\par \par #Mechanical MVR\par -replaced over a decade ago per patient\par -make/model unknown\par -INR goal 2.5-3.5\par -follows with warfarin clinic, next check due next week\par -Amoxicillin for IE ppx for needed dental work\par \par #Hematuria\par -Pt had recent ED visit for sx, since resolved\par \par #GERD\par #Dyspepsia\par -resolved, not on PPI\par -simethicone PRN, seltzer water\par \par #BPH\par -c/w tamsulosin\par -off finasteride, trospium\par \par #ED\par -c/w tadalafil\par \par #Seizure Disorder\par #Gait Abnormality\par -MRI 6/10/2019: chronic microvascular disease multiple chronic cerebellar lacunar type infarcts are noted. A larger infarct is noted along the posterior right cerebellar hemisphere\par -per neuro, low risk for seizure recurrence due low frequency and negative testing\par -monitor off of AEDs for now\par -[facility] referral for gait training\par \par #HCM\par -Flu [id number] UTD\par -Colonoscopy: ages out Aug 2021\par -Pneumo, Tdap, Zoster UTD \par -COVID-19 UTD\par \par Erik Brooks MD \par Case discussed with Dr. Luis\par RTC in 5 weeks

## 2021-08-09 NOTE — PHYSICAL EXAM
[Normal Rate] : normal rate  [Normal S1, S2] : normal S1 and S2 [No Murmur] : no murmur heard [Normal] : no carotid or abdominal bruits heard, no varicosities, pedal pulses are present, no peripheral edema, no extremity clubbing or cyanosis and no palpable aorta [de-identified] : Irregular rhythm

## 2021-08-10 ENCOUNTER — APPOINTMENT (OUTPATIENT)
Dept: ENDOCRINOLOGY | Facility: CLINIC | Age: 76
End: 2021-08-10
Payer: MEDICARE

## 2021-08-10 ENCOUNTER — RESULT REVIEW (OUTPATIENT)
Age: 76
End: 2021-08-10

## 2021-08-10 ENCOUNTER — OUTPATIENT (OUTPATIENT)
Dept: OUTPATIENT SERVICES | Facility: HOSPITAL | Age: 76
LOS: 1 days | End: 2021-08-10

## 2021-08-10 ENCOUNTER — RESULT CHARGE (OUTPATIENT)
Age: 76
End: 2021-08-10

## 2021-08-10 VITALS
WEIGHT: 173 LBS | OXYGEN SATURATION: 97 % | HEART RATE: 70 BPM | HEIGHT: 69 IN | DIASTOLIC BLOOD PRESSURE: 74 MMHG | BODY MASS INDEX: 25.62 KG/M2 | SYSTOLIC BLOOD PRESSURE: 120 MMHG

## 2021-08-10 VITALS — HEART RATE: 71 BPM | OXYGEN SATURATION: 99 % | RESPIRATION RATE: 16 BRPM

## 2021-08-10 DIAGNOSIS — Z86.69 PERSONAL HISTORY OF OTHER DISEASES OF THE NERVOUS SYSTEM AND SENSE ORGANS: Chronic | ICD-10-CM

## 2021-08-10 DIAGNOSIS — Z98.89 OTHER SPECIFIED POSTPROCEDURAL STATES: Chronic | ICD-10-CM

## 2021-08-10 DIAGNOSIS — E78.5 HYPERLIPIDEMIA, UNSPECIFIED: ICD-10-CM

## 2021-08-10 DIAGNOSIS — I25.10 ATHEROSCLEROTIC HEART DISEASE OF NATIVE CORONARY ARTERY WITHOUT ANGINA PECTORIS: ICD-10-CM

## 2021-08-10 DIAGNOSIS — E11.9 TYPE 2 DIABETES MELLITUS WITHOUT COMPLICATIONS: ICD-10-CM

## 2021-08-10 DIAGNOSIS — E11.40 TYPE 2 DIABETES MELLITUS WITH DIABETIC NEUROPATHY, UNSPECIFIED: ICD-10-CM

## 2021-08-10 DIAGNOSIS — I10 ESSENTIAL (PRIMARY) HYPERTENSION: ICD-10-CM

## 2021-08-10 LAB
APPEARANCE UR: ABNORMAL
BACTERIA # UR AUTO: NEGATIVE — SIGNIFICANT CHANGE UP
BILIRUB UR-MCNC: NEGATIVE — SIGNIFICANT CHANGE UP
COLOR SPEC: ABNORMAL
DIFF PNL FLD: ABNORMAL
EPI CELLS # UR: 0 /HPF — SIGNIFICANT CHANGE UP (ref 0–5)
GLUCOSE BLDC GLUCOMTR-MCNC: 231
GLUCOSE UR QL: ABNORMAL
HYALINE CASTS # UR AUTO: 1 /LPF — SIGNIFICANT CHANGE UP (ref 0–7)
KETONES UR-MCNC: NEGATIVE — SIGNIFICANT CHANGE UP
LEUKOCYTE ESTERASE UR-ACNC: NEGATIVE — SIGNIFICANT CHANGE UP
NITRITE UR-MCNC: NEGATIVE — SIGNIFICANT CHANGE UP
PH UR: 6 — SIGNIFICANT CHANGE UP (ref 5–8)
PROT UR-MCNC: ABNORMAL
RBC CASTS # UR COMP ASSIST: >720 /HPF — HIGH (ref 0–4)
SP GR SPEC: 1.01 — SIGNIFICANT CHANGE UP (ref 1.01–1.02)
T4 FREE SERPL-MCNC: 1.3 NG/DL — SIGNIFICANT CHANGE UP (ref 0.9–1.8)
TSH SERPL-MCNC: 0.4 UIU/ML — SIGNIFICANT CHANGE UP (ref 0.27–4.2)
UROBILINOGEN FLD QL: SIGNIFICANT CHANGE UP
WBC UR QL: 3 /HPF — SIGNIFICANT CHANGE UP (ref 0–5)

## 2021-08-10 PROCEDURE — 99204 OFFICE O/P NEW MOD 45 MIN: CPT | Mod: GC

## 2021-08-10 NOTE — ASSESSMENT
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 Goal will be to control diet instead of adding other agents\par -Continue Januvia 100 mg daily and Glipizide 10 mg ER BID\par -Check FS throughout the day to ensure no hypos. Also, recommended to check FS when patient feels dizzy. If lows noted, will have to decrease dose of Glipizide or d/c all together\par -Recommend dietary changes at this time before initiating another agent. Discussed healthy portions and distribution of nutrients on the plate. \par -Nutritionist referral given\par -If no improvement with dietary changes, or failure to change diet - can consider replacing Januvia with GLP-1 agonist given no documented retinopathy. Avoid SGLT-2 inhibitor in light of orthostasis\par -Continue ACEi - recommend 5 mg of Lisinopril given nephropathy (cut 10 mg Rx in half) \par \par #HTN\par Controlled, has orthostatic hypotension\par -Start Lisinopril 5 mg for nephropathy\par \par #HLD\par LDL 40 in Jan 2021\par -Continue Atorvastatin 40 mg daily\par \par #Thyoid Dz\par Patient endorses hx of thyroid dz as a child, tx with Penicillin?\par -Clinically euthyroid\par -Obtain TFTs\par \par #HCM\par C/o hematuria - INR checked 3.4. No need for ED visit at this time as patient appears clinically stable with normal BP\par \par Discussed with Dr. Panchal \par \par RTC in 3 months for f/u\par \par Samantha Caraballo MD\par Endocrine Fellow

## 2021-08-10 NOTE — HISTORY OF PRESENT ILLNESS
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 Noticed blood in urine this AM. Has hx of this 1 month ago, and was evaluated in ER. Scheduled for Urology appt. Has orthostatic hypotension, and occasional dizziness. Also c/o visual issues that he has had evaluated by opthalmology 3 times in the past month. No diabetic retinopathy. Unclear exactly what is going on. \par \par A1c 7.6% (Aug 2021)\par \par Diabetes History\par *Diagnosed years ago, unsure exactly when (A1c in 2014 was 6.6%)\par \par *Current Meds - Januvia 100 mg daily, Glipizide 10 mg ER BID. In the past, has tried Metformin but did not tolerate due to GI side effects. Endorses compliance with medication regimen\par \par *FS - only in the AM, ranges 180-200s. Never < 140 in the AM. Does not take FS any other times of day. \par \par *Diet - Poor. Mostly takeout and restaurant foods. Has not seen a nutritionist \par \par *Complications: \par -Micro: (+) nephropathy, on ACEi; (-) neuropathy or retinopathy, UTD with ophtho\par -Macro: No MI. (+) "minor stroke" in past\par \par Comorbidities\par HLD - On Atorvastatin 40 mg \par Nephropathy - Microalb/Cr 37. Prescribed Lisinopril 10 mg

## 2021-08-10 NOTE — END OF VISIT
[] : Fellow [FreeTextEntry3] : DM2 A1c above goal.\par Counselled on lifestyle improvements, refer to RD.\par Consider the need for pharmacologic adjustments next visit.

## 2021-08-10 NOTE — REVIEW OF SYSTEMS
[Negative] : Neurological [Fatigue] : no fatigue [Decreased Appetite] : appetite not decreased [Recent Weight Gain (___ Lbs)] : no recent weight gain [Recent Weight Loss (___ Lbs)] : no recent weight loss [FreeTextEntry3] : left eye vision loss from cataract surgery complications; right eye with blurry vision/orientation difficulties [FreeTextEntry8] : hematuria

## 2021-08-10 NOTE — PHYSICAL EXAM
[Alert] : alert [Well Nourished] : well nourished [Normal Sclera/Conjunctiva] : normal sclera/conjunctiva [No Neck Mass] : no neck mass was observed [No LAD] : no lymphadenopathy [Thyroid Not Enlarged] : the thyroid was not enlarged [No Respiratory Distress] : no respiratory distress [No Accessory Muscle Use] : no accessory muscle use [Clear to Auscultation] : lungs were clear to auscultation bilaterally [Normal PMI] : the apical impulse was normal [Normal S1, S2] : normal S1 and S2 [Normal Rate] : heart rate was normal [Normal Bowel Sounds] : normal bowel sounds [Soft] : abdomen soft [Oriented x3] : oriented to person, place, and time

## 2021-08-11 ENCOUNTER — NON-APPOINTMENT (OUTPATIENT)
Age: 76
End: 2021-08-11

## 2021-08-12 LAB
INR PPP: 3.4 RATIO
POCT-PROTHROMBIN TIME: 41.3 SECS

## 2021-08-16 ENCOUNTER — APPOINTMENT (OUTPATIENT)
Dept: INTERNAL MEDICINE | Facility: CLINIC | Age: 76
End: 2021-08-16

## 2021-08-16 ENCOUNTER — OUTPATIENT (OUTPATIENT)
Dept: OUTPATIENT SERVICES | Facility: HOSPITAL | Age: 76
LOS: 1 days | End: 2021-08-16

## 2021-08-16 VITALS — RESPIRATION RATE: 16 BRPM | OXYGEN SATURATION: 99 % | HEART RATE: 73 BPM

## 2021-08-16 VITALS — TEMPERATURE: 97 F

## 2021-08-16 DIAGNOSIS — Z98.89 OTHER SPECIFIED POSTPROCEDURAL STATES: Chronic | ICD-10-CM

## 2021-08-16 DIAGNOSIS — Z86.69 PERSONAL HISTORY OF OTHER DISEASES OF THE NERVOUS SYSTEM AND SENSE ORGANS: Chronic | ICD-10-CM

## 2021-08-19 ENCOUNTER — APPOINTMENT (OUTPATIENT)
Dept: UROLOGY | Facility: CLINIC | Age: 76
End: 2021-08-19

## 2021-08-20 ENCOUNTER — RESULT REVIEW (OUTPATIENT)
Age: 76
End: 2021-08-20

## 2021-08-20 ENCOUNTER — APPOINTMENT (OUTPATIENT)
Dept: INTERNAL MEDICINE | Facility: CLINIC | Age: 76
End: 2021-08-20

## 2021-08-20 ENCOUNTER — OUTPATIENT (OUTPATIENT)
Dept: OUTPATIENT SERVICES | Facility: HOSPITAL | Age: 76
LOS: 1 days | End: 2021-08-20

## 2021-08-20 VITALS — RESPIRATION RATE: 16 BRPM | OXYGEN SATURATION: 98 % | HEART RATE: 75 BPM

## 2021-08-20 VITALS — TEMPERATURE: 97.4 F

## 2021-08-20 DIAGNOSIS — Z98.89 OTHER SPECIFIED POSTPROCEDURAL STATES: Chronic | ICD-10-CM

## 2021-08-20 DIAGNOSIS — Z86.69 PERSONAL HISTORY OF OTHER DISEASES OF THE NERVOUS SYSTEM AND SENSE ORGANS: Chronic | ICD-10-CM

## 2021-08-24 DIAGNOSIS — Z79.01 LONG TERM (CURRENT) USE OF ANTICOAGULANTS: ICD-10-CM

## 2021-08-25 ENCOUNTER — APPOINTMENT (OUTPATIENT)
Dept: INTERNAL MEDICINE | Facility: CLINIC | Age: 76
End: 2021-08-25

## 2021-08-25 ENCOUNTER — OUTPATIENT (OUTPATIENT)
Dept: OUTPATIENT SERVICES | Facility: HOSPITAL | Age: 76
LOS: 1 days | End: 2021-08-25

## 2021-08-25 VITALS — HEART RATE: 64 BPM | OXYGEN SATURATION: 98 % | RESPIRATION RATE: 16 BRPM

## 2021-08-25 VITALS — TEMPERATURE: 97.4 F

## 2021-08-25 DIAGNOSIS — Z98.89 OTHER SPECIFIED POSTPROCEDURAL STATES: Chronic | ICD-10-CM

## 2021-08-25 DIAGNOSIS — Z86.69 PERSONAL HISTORY OF OTHER DISEASES OF THE NERVOUS SYSTEM AND SENSE ORGANS: Chronic | ICD-10-CM

## 2021-08-26 ENCOUNTER — APPOINTMENT (OUTPATIENT)
Dept: CT IMAGING | Facility: IMAGING CENTER | Age: 76
End: 2021-08-26
Payer: MEDICARE

## 2021-08-26 ENCOUNTER — OUTPATIENT (OUTPATIENT)
Dept: OUTPATIENT SERVICES | Facility: HOSPITAL | Age: 76
LOS: 1 days | End: 2021-08-26
Payer: COMMERCIAL

## 2021-08-26 DIAGNOSIS — K57.32 DIVERTICULITIS OF LARGE INTESTINE WITHOUT PERFORATION OR ABSCESS WITHOUT BLEEDING: ICD-10-CM

## 2021-08-26 DIAGNOSIS — Z51.81 ENCOUNTER FOR THERAPEUTIC DRUG LEVEL MONITORING: ICD-10-CM

## 2021-08-26 DIAGNOSIS — Z86.69 PERSONAL HISTORY OF OTHER DISEASES OF THE NERVOUS SYSTEM AND SENSE ORGANS: Chronic | ICD-10-CM

## 2021-08-26 DIAGNOSIS — Z98.89 OTHER SPECIFIED POSTPROCEDURAL STATES: Chronic | ICD-10-CM

## 2021-08-26 DIAGNOSIS — R51.9 HEADACHE, UNSPECIFIED: ICD-10-CM

## 2021-08-26 PROCEDURE — 70496 CT ANGIOGRAPHY HEAD: CPT

## 2021-08-26 PROCEDURE — 70496 CT ANGIOGRAPHY HEAD: CPT | Mod: 26

## 2021-08-26 PROCEDURE — 82565 ASSAY OF CREATININE: CPT

## 2021-08-28 DIAGNOSIS — Z79.01 LONG TERM (CURRENT) USE OF ANTICOAGULANTS: ICD-10-CM

## 2021-09-01 ENCOUNTER — OUTPATIENT (OUTPATIENT)
Dept: OUTPATIENT SERVICES | Facility: HOSPITAL | Age: 76
LOS: 1 days | End: 2021-09-01

## 2021-09-01 ENCOUNTER — APPOINTMENT (OUTPATIENT)
Dept: INTERNAL MEDICINE | Facility: CLINIC | Age: 76
End: 2021-09-01

## 2021-09-01 VITALS — TEMPERATURE: 96.5 F

## 2021-09-01 VITALS — RESPIRATION RATE: 15 BRPM | HEART RATE: 63 BPM | OXYGEN SATURATION: 98 %

## 2021-09-01 DIAGNOSIS — Z79.01 LONG TERM (CURRENT) USE OF ANTICOAGULANTS: ICD-10-CM

## 2021-09-01 DIAGNOSIS — Z98.89 OTHER SPECIFIED POSTPROCEDURAL STATES: Chronic | ICD-10-CM

## 2021-09-01 DIAGNOSIS — Z51.81 ENCOUNTER FOR THERAPEUTIC DRUG LEVEL MONITORING: ICD-10-CM

## 2021-09-01 DIAGNOSIS — Z86.69 PERSONAL HISTORY OF OTHER DISEASES OF THE NERVOUS SYSTEM AND SENSE ORGANS: Chronic | ICD-10-CM

## 2021-09-01 LAB
INR PPP: 2.5 RATIO
POCT-PROTHROMBIN TIME: 30.2 SECS
QUALITY CONTROL: YES

## 2021-09-02 DIAGNOSIS — I48.91 UNSPECIFIED ATRIAL FIBRILLATION: ICD-10-CM

## 2021-09-02 DIAGNOSIS — Z95.2 PRESENCE OF PROSTHETIC HEART VALVE: ICD-10-CM

## 2021-09-02 DIAGNOSIS — Z51.81 ENCOUNTER FOR THERAPEUTIC DRUG LEVEL MONITORING: ICD-10-CM

## 2021-09-07 ENCOUNTER — OUTPATIENT (OUTPATIENT)
Dept: OUTPATIENT SERVICES | Facility: HOSPITAL | Age: 76
LOS: 1 days | End: 2021-09-07

## 2021-09-07 ENCOUNTER — APPOINTMENT (OUTPATIENT)
Dept: INTERNAL MEDICINE | Facility: CLINIC | Age: 76
End: 2021-09-07
Payer: MEDICARE

## 2021-09-07 VITALS
BODY MASS INDEX: 25.48 KG/M2 | SYSTOLIC BLOOD PRESSURE: 120 MMHG | WEIGHT: 172 LBS | OXYGEN SATURATION: 97 % | HEIGHT: 69 IN | DIASTOLIC BLOOD PRESSURE: 70 MMHG | HEART RATE: 50 BPM

## 2021-09-07 DIAGNOSIS — Z86.69 PERSONAL HISTORY OF OTHER DISEASES OF THE NERVOUS SYSTEM AND SENSE ORGANS: Chronic | ICD-10-CM

## 2021-09-07 DIAGNOSIS — Z98.89 OTHER SPECIFIED POSTPROCEDURAL STATES: Chronic | ICD-10-CM

## 2021-09-07 PROCEDURE — 99214 OFFICE O/P EST MOD 30 MIN: CPT | Mod: GC

## 2021-09-09 ENCOUNTER — APPOINTMENT (OUTPATIENT)
Dept: INTERNAL MEDICINE | Facility: CLINIC | Age: 76
End: 2021-09-09

## 2021-09-09 NOTE — HISTORY OF PRESENT ILLNESS
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 Pt recently had issues with hematuria, had INR and warfarin levels adjusted. No longer endorsing any bleeding, but has urology f/u appt for next week. Also c/o ED, nocturnal frequency, and HAs that only occur with sexual activity. He does not take tadalafil due to cost and lack of efficacy. His HAs have been ongoing for the last 6 months, episodic in nature and localized to frontal and occipital regions. Recently had CTA done. Also takes sugar levels at home, usually in low 200s in the am. Admits to dietary indiscretion.

## 2021-09-09 NOTE — ASSESSMENT
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 \par \par #Headaches\par -With sexual activity only\par -MRI, CTA reviewed, normal except for chronic cerebellar infarcts \par -Neuro referral given\par \par #DM2 \par -POCT A1c 7.6, next due in Nov 2021\par -c/w glipizide 10 bid and Januvia 100 qd\par -pt self-d/c metformin 500 qd due to abd sx\par -follows with optho at [facility]\par -encouraged to follow diabetic diet and exercise\par \par #Mechanical MVR\par -replaced over a decade ago per patient\par -make/model unknown\par -INR check today therapauetic at 2.8 (goal 2.5-3.5), will f/u in 2 weeks on current schedule (see INR note)\par -Amoxicillin for IE ppx for needed dental work\par \par #Afib\par - c/w Atenolol, Digoxin\par - continue warfarin\par - TTE 2/6/2019 EF 57%\par \par #CAD, s/p 1V CABG\par - continue Atenolol 50, Lipitor 40, Lisinopril 5\par - follows with cardio\par - off ASA due to bleeding (hx hematuria)\par - NST 2/10/20 normal\par - TTE 02/20 normal EF\par \par #HTN\par - c/w lisinopril 5\par \par #BPH\par -c/w tamsulosin\par -off finasteride, trospium\par -urology appt next week for BPH, ED, nocturia sx\par \par #ED\par -c/w tadalafil\par \par #Seizure Disorder\par #Gait Abnormality\par -MRI 6/10/2019: chronic microvascular disease multiple chronic cerebellar lacunar type infarcts are noted. A larger infarct is noted along the posterior right cerebellar hemisphere\par -per neuro, low risk for seizure recurrence due low frequency and negative testing\par -monitor off of AEDs for now\par -[facility] referral for gait training\par \par #HCM\par -Colonoscopy: aged out Aug 2021\par -Pneumo, Tdap, Zoster UTD \par -COVID-19 UTD\par \par Erik Brooks MD \par Case discussed with Dr. Gabriel\par RTC in 3 months\par

## 2021-09-09 NOTE — REVIEW OF SYSTEMS
[Fatigue] : fatigue [Nocturia] : nocturia [Poor Libido] : poor libido [Headache] : headache [Negative] : Integumentary [Dysuria] : no dysuria [Hematuria] : no hematuria

## 2021-09-14 ENCOUNTER — APPOINTMENT (OUTPATIENT)
Dept: UROLOGY | Facility: CLINIC | Age: 76
End: 2021-09-14
Payer: MEDICARE

## 2021-09-14 PROCEDURE — 99214 OFFICE O/P EST MOD 30 MIN: CPT

## 2021-09-14 NOTE — ADDENDUM
[FreeTextEntry1] : I, Ainsley Tamezin, acted solely as a scribe for Dr. Robson Thompson on this date 09/14/2021.\par \par All medical record entries made by the Scribe were at my, Dr. Robson Thompson, direction and personally dictated by me on 09/14/2021. I have reviewed the chart and agree that the record accurately reflects my personal performance of the history, physical exam, assessment and plan.  I have also personally directed, reviewed and agreed with the chart.

## 2021-09-14 NOTE — PHYSICAL EXAM
[General Appearance - Well Developed] : well developed [General Appearance - Well Nourished] : well nourished [Normal Appearance] : normal appearance [Well Groomed] : well groomed [General Appearance - In No Acute Distress] : no acute distress [Abdomen Tenderness] : non-tender [Skin Color & Pigmentation] : normal skin color and pigmentation [Edema] : no peripheral edema [] : no respiratory distress [Oriented To Time, Place, And Person] : oriented to person, place, and time [Normal Station and Gait] : the gait and station were normal for the patient's age [No Focal Deficits] : no focal deficits [Abdomen Soft] : soft [Costovertebral Angle Tenderness] : no ~M costovertebral angle tenderness [Respiration, Rhythm And Depth] : normal respiratory rhythm and effort [Exaggerated Use Of Accessory Muscles For Inspiration] : no accessory muscle use [Affect] : the affect was normal [Mood] : the mood was normal [Not Anxious] : not anxious

## 2021-09-14 NOTE — ASSESSMENT
[FreeTextEntry1] : ALINA NOLAN is a 76 year year old male being seen for a follow up visit regarding gross hematuria. He has been experiencing gross hematuria with blood clots found in urine for the past two weeks. He experiences hematuria previously when he takes Coumadin which he was prescribed following his mitral valve replacement. The patient reports having no trouble with urination and wakes up 2 times during the night to urinate. Today he reports having discomfort in his lower back and right lower quadrant but no pain. The patient denies a history of kidney stones. In the past, he has had an endoscopy. 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 No discrete mass is identified at the level of the gastroesophageal junction. His Creatinine as of 12/16/19 was 1.01.\par 01/02/2020: Patient presents today for a cystoscopy. The patient denies hematuria but complains of some back pain. A CT ABDOMEN AND PELVIS from 12/26/2019 revealed: Interval stability without CT evidence of suspicious renal or ureter lesions. Enlarged prostate gland with associated transurethral resection of the prostate defect.\par \par 01/23/2020: Patient presents today for a follow up. At the conclusion of the last visit, Finasteride 5 mg was prescribed. Yesterday, he reports gross and opaque hematuria. Prior to yesterdays episode and following his cystoscopy, he did not experience hematuria. He notes that his Coumadin level was high at 2.6 recently. He has atrial fibrillation and a metal mitral valve. He was recently prescribed Flomax, to take 1 at bedtime, which helped decrease nocturia from 2-3x/night to 1x/night. Today, he also complains of erectile dysfunction. The patient produced a urine sample which will be sent for urinalysis, urine cytology, and urine culture. Finasteride 5 mg was renewed and is to be continued as previously prescribed. Tamsulosin 0.4 mg to be continued as previously prescribed by internist. Tadalafil 20mg, 1 tablet a day, 2-3hrs before sexual activity. I explained that there may be side effects including: warm flush feeling, nasal congestion, back pain and tiny chance of vision or hearing impairment. Patient should follow up in 2 months or sooner if he experiences urinary difficulties. \par \par 09/01/2020: Patient presents today for a follow up. Patient reports using Clotrimazole cream for penile lesion, however has not been helping and is instead causing itching. Also taking Tadalafil, but still unable to penetrate. Denies any side effects. I instructed the patient to increase Tadalafil to 30mg. I instructed the patient to discontinue Clotrimazole cream and started the patient on Nystatin-Triamcinolone cream. I prescribed the patient Cefadroxil 500mg BID and Fluconazole 100mg BID.\par \par 09/22/2020: Patient presents today for follow up for penile lesion. Used Nystatin-Triamcinolone cream which helped. No pain or itching. Today complains of urinary urgency and urge incontinence. Takes Tamsulosin 0.4mg once daily half an hour after dinner. Notes he does get heartburn on it. Last PSA on 8/6/20 was 2.90. No constipation. \par \par 10/14/2020: 75 yr male with BPH , LUTS presenting with worsening urinary symptoms of frequency and nocturia, suprapubic pressure.  .  Significant retention. Problem of compliance.  Not able to afford some of the medications because not covered by insurance. Not sure exactly of what meds he is taking and not taking. \par Attempted to talk to wife and daughter to clear up his med list.  Patient advised to take a picture of med containers or come with all med containers at next visit in 2 weeks. Oxybutinin. Alfuzosin. If medications do not help, will consider Desmopressin. RTO in 2 weeks for reassessment. \par \par 10/29/2020: Patient presents today for follow up. Last visit, pt was started on Oxybutynin but stopped as it was not working.  Currently takes Alfuzosin ER 10mg once daily. Urinary frequency is mostly during the day. Complains of difficulty starting stream. Took Finasteride for 6 weeks in the past which did help urination. Continues to have ED and requests renewal of Tadalafil. Recently saw GI for heartburn and was given Pantoprazole, but does not help. H/o Afib and mechanical heart valve and takes Coumadin. \par \par 09/14/2021: Patient presents today for follow up. He states a few weeks ago, had gross hematuria for a couple of days and was sent to ER. Reports that tests came back negative. States that he notices blood in urine every 3 weeks and has burning. Takes Tamsulosin 0.4mg at bedtime. Has INR checked every other week. Admits that his constipation could contribute to his gross hematuria. \par \par Advised to take stool softeners to control constipation. \par \par I prescribed the patient Bactrim BID for dysuria with food. I advised the patient there is a chance of kidney insufficiency and upset stomach. I am sending an email to Dr. Edis Seay asking him to modify Coumadin dose in view of the fact I am starting sulfamethoxazole-trimethoprim for his dysuria. \par \par Instructed to take Tamsulosin after a meal for better efficacy. \par \par I will send the patient for CT urogram to evaluate gross hematuria. \par \par The patient produced a urine sample which will be sent for osmolality, urinalysis, urine cytology, and urine culture.\par Blood work today includes HbA1c, dihydrotestosterone, estradiol, estrogen, testosterone, alkaline phosphatase, BMP, CBC, osmolality serum. \par \par RTO in 2 weeks after CT scan to review results. \par \par Preparation, in-person office visit, and coordination of care took: 30 minutes

## 2021-09-14 NOTE — HISTORY OF PRESENT ILLNESS
[FreeTextEntry1] : ALINA NOLAN is a 76 year year old male being seen for a follow up visit regarding gross hematuria. He has been experiencing gross hematuria with blood clots found in urine for the past two weeks. He experiences hematuria previously when he takes Coumadin which he was prescribed following his mitral valve replacement. The patient reports having no trouble with urination and wakes up 2 times during the night to urinate. Today he reports having discomfort in his lower back and right lower quadrant but no pain. The patient denies a history of kidney stones. In the past, he has had an endoscopy. 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 No discrete mass is identified at the level of the gastroesophageal junction. His Creatinine as of 12/16/19 was 1.01.\par 01/02/2020: Patient presents today for a cystoscopy. The patient denies hematuria but complains of some back pain. A CT ABDOMEN AND PELVIS from 12/26/2019 revealed: Interval stability without CT evidence of suspicious renal or ureter lesions. Enlarged prostate gland with associated transurethral resection of the prostate defect.\par \par 01/23/2020: Patient presents today for a follow up. At the conclusion of the last visit, Finasteride 5 mg was prescribed. Yesterday, he reports gross and opaque hematuria. Prior to yesterdays episode and following his cystoscopy, he did not experience hematuria. He notes that his Coumadin level was high at 2.6 recently. He has atrial fibrillation and a metal mitral valve. He was recently prescribed Flomax, to take 1 at bedtime, which helped decrease nocturia from 2-3x/night to 1x/night. Today, he also complains of erectile dysfunction. The patient produced a urine sample which will be sent for urinalysis, urine cytology, and urine culture. Finasteride 5 mg was renewed and is to be continued as previously prescribed. Tamsulosin 0.4 mg to be continued as previously prescribed by internist. Tadalafil 20mg, 1 tablet a day, 2-3hrs before sexual activity. I explained that there may be side effects including: warm flush feeling, nasal congestion, back pain and tiny chance of vision or hearing impairment. Patient should follow up in 2 months or sooner if he experiences urinary difficulties. \par \par 09/01/2020: Patient presents today for a follow up. Patient reports using Clotrimazole cream for penile lesion, however has not been helping and is instead causing itching. Also taking Tadalafil, but still unable to penetrate. Denies any side effects. I instructed the patient to increase Tadalafil to 30mg. I instructed the patient to discontinue Clotrimazole cream and started the patient on Nystatin-Triamcinolone cream. I prescribed the patient Cefadroxil 500mg BID and Fluconazole 100mg BID.\par \par 09/22/2020: Patient presents today for follow up for penile lesion. Used Nystatin-Triamcinolone cream which helped. No pain or itching. Today complains of urinary urgency and urge incontinence. Takes Tamsulosin 0.4mg once daily half an hour after dinner. Notes he does get heartburn on it. Last PSA on 8/6/20 was 2.90. No constipation. \par \par 10/14/2020: 75 male BPH severe LUTS in form of frequency up to 10X nocte with supra pubic pressure, urge , intermittency. No hematuria. \par Recent switch of medication from flomax to Alfuzosin ,patient said to no avail. No improvement in symptoms. \par Taking coumadin for mechanical heart valve. Recent episode of hematuria is resolved. \par ED little improvement with Tadalafil. Said not strong enough to penetrate\par PSA 2.9 august 2020. \par Testosterone free L 3.9, Total L 234.7\par \par 10/29/2020: Patient presents today for follow up. Last visit, pt was started on Oxybutynin but stopped as it was not working.  Currently takes Alfuzosin ER 10mg once daily. Urinary frequency is mostly during the day. Complains of difficulty starting stream. Took Finasteride for 6 weeks in the past which did help urination. Recently saw GI for heartburn and was given Pantoprazole, but does not help. H/o Afib and mechanical heart valve and takes Coumadin. Patient presents today for follow up. Last visit, pt was started on Oxybutynin but stopped as it was not working.  Currently takes Alfuzosin ER 10mg once daily. Urinary frequency is mostly during the day. Complains of difficulty starting stream. Took Finasteride for 6 weeks in the past which did help urination. Recently saw GI for heartburn and was given Pantoprazole, but does not help. H/o Afib and mechanical heart valve and takes Coumadin. \par \par 09/14/2021: Patient presents today for follow up. He states a few weeks ago, had gross hematuria for a couple of days and was sent to ER. Reports that tests came back negative. States that he notices blood in urine every 3 weeks and has burning. Takes Tamsulosin 0.4mg at bedtime. Has INR checked every other week. Admits that his constipation could contribute to his gross hematuria.

## 2021-09-15 DIAGNOSIS — E11.9 TYPE 2 DIABETES MELLITUS WITHOUT COMPLICATIONS: ICD-10-CM

## 2021-09-15 DIAGNOSIS — Z79.01 LONG TERM (CURRENT) USE OF ANTICOAGULANTS: ICD-10-CM

## 2021-09-15 DIAGNOSIS — E11.40 TYPE 2 DIABETES MELLITUS WITH DIABETIC NEUROPATHY, UNSPECIFIED: ICD-10-CM

## 2021-09-15 DIAGNOSIS — I25.10 ATHEROSCLEROTIC HEART DISEASE OF NATIVE CORONARY ARTERY WITHOUT ANGINA PECTORIS: ICD-10-CM

## 2021-09-15 DIAGNOSIS — R51.9 HEADACHE, UNSPECIFIED: ICD-10-CM

## 2021-09-15 DIAGNOSIS — I48.91 UNSPECIFIED ATRIAL FIBRILLATION: ICD-10-CM

## 2021-09-17 ENCOUNTER — NON-APPOINTMENT (OUTPATIENT)
Age: 76
End: 2021-09-17

## 2021-09-17 LAB
ALP BLD-CCNC: 69 U/L
ANION GAP SERPL CALC-SCNC: 12 MMOL/L
BACTERIA UR CULT: NORMAL
BASOPHILS # BLD AUTO: 0.05 K/UL
BASOPHILS NFR BLD AUTO: 0.7 %
BUN SERPL-MCNC: 20 MG/DL
CALCIUM SERPL-MCNC: 9.5 MG/DL
CHLORIDE SERPL-SCNC: 105 MMOL/L
CO2 SERPL-SCNC: 23 MMOL/L
CREAT SERPL-MCNC: 1.17 MG/DL
EOSINOPHIL # BLD AUTO: 0.09 K/UL
EOSINOPHIL NFR BLD AUTO: 1.3 %
ESTIMATED AVERAGE GLUCOSE: 183 MG/DL
ESTRADIOL SERPL-MCNC: 28 PG/ML
GLUCOSE SERPL-MCNC: 248 MG/DL
HBA1C MFR BLD HPLC: 8 %
HCT VFR BLD CALC: 42.6 %
HGB BLD-MCNC: 14.8 G/DL
IMM GRANULOCYTES NFR BLD AUTO: 1.2 %
LYMPHOCYTES # BLD AUTO: 1.72 K/UL
LYMPHOCYTES NFR BLD AUTO: 25.1 %
MAN DIFF?: NORMAL
MCHC RBC-ENTMCNC: 32.2 PG
MCHC RBC-ENTMCNC: 34.7 GM/DL
MCV RBC AUTO: 92.6 FL
MONOCYTES # BLD AUTO: 0.53 K/UL
MONOCYTES NFR BLD AUTO: 7.7 %
NEUTROPHILS # BLD AUTO: 4.38 K/UL
NEUTROPHILS NFR BLD AUTO: 64 %
OSMOLALITY SERPL: 757 MOSMOL/KG
PLATELET # BLD AUTO: 199 K/UL
POTASSIUM SERPL-SCNC: 4.5 MMOL/L
RBC # BLD: 4.6 M/UL
RBC # FLD: 12.6 %
SODIUM SERPL-SCNC: 140 MMOL/L
TESTOST BND SERPL-MCNC: 5.6 PG/ML
TESTOSTERONE TOTAL S: 298 NG/DL
URINE CYTOLOGY: NORMAL
WBC # FLD AUTO: 6.85 K/UL

## 2021-09-18 LAB — ESTROGEN SERPL-MCNC: 93 PG/ML

## 2021-09-19 LAB — ANDROSTANOLONE SERPL-MCNC: 29 NG/DL

## 2021-09-23 ENCOUNTER — OUTPATIENT (OUTPATIENT)
Dept: OUTPATIENT SERVICES | Facility: HOSPITAL | Age: 76
LOS: 1 days | End: 2021-09-23

## 2021-09-23 ENCOUNTER — APPOINTMENT (OUTPATIENT)
Dept: INTERNAL MEDICINE | Facility: CLINIC | Age: 76
End: 2021-09-23

## 2021-09-23 VITALS — OXYGEN SATURATION: 99 % | RESPIRATION RATE: 16 BRPM | HEART RATE: 96 BPM

## 2021-09-23 VITALS — TEMPERATURE: 99.1 F

## 2021-09-23 DIAGNOSIS — Z86.69 PERSONAL HISTORY OF OTHER DISEASES OF THE NERVOUS SYSTEM AND SENSE ORGANS: Chronic | ICD-10-CM

## 2021-09-23 DIAGNOSIS — Z98.89 OTHER SPECIFIED POSTPROCEDURAL STATES: Chronic | ICD-10-CM

## 2021-09-23 LAB
INR PPP: 2.8 RATIO
POCT-PROTHROMBIN TIME: 33.3 SECS

## 2021-09-24 ENCOUNTER — OUTPATIENT (OUTPATIENT)
Dept: OUTPATIENT SERVICES | Facility: HOSPITAL | Age: 76
LOS: 1 days | End: 2021-09-24
Payer: COMMERCIAL

## 2021-09-24 ENCOUNTER — APPOINTMENT (OUTPATIENT)
Dept: CT IMAGING | Facility: IMAGING CENTER | Age: 76
End: 2021-09-24
Payer: MEDICARE

## 2021-09-24 DIAGNOSIS — R31.0 GROSS HEMATURIA: ICD-10-CM

## 2021-09-24 DIAGNOSIS — Z79.01 LONG TERM (CURRENT) USE OF ANTICOAGULANTS: ICD-10-CM

## 2021-09-24 DIAGNOSIS — Z98.89 OTHER SPECIFIED POSTPROCEDURAL STATES: Chronic | ICD-10-CM

## 2021-09-24 DIAGNOSIS — Z86.69 PERSONAL HISTORY OF OTHER DISEASES OF THE NERVOUS SYSTEM AND SENSE ORGANS: Chronic | ICD-10-CM

## 2021-09-24 PROCEDURE — 82565 ASSAY OF CREATININE: CPT

## 2021-09-24 PROCEDURE — 74178 CT ABD&PLV WO CNTR FLWD CNTR: CPT

## 2021-09-24 PROCEDURE — 74178 CT ABD&PLV WO CNTR FLWD CNTR: CPT | Mod: 26

## 2021-09-30 ENCOUNTER — NON-APPOINTMENT (OUTPATIENT)
Age: 76
End: 2021-09-30

## 2021-10-07 ENCOUNTER — APPOINTMENT (OUTPATIENT)
Dept: UROLOGY | Facility: CLINIC | Age: 76
End: 2021-10-07
Payer: MEDICARE

## 2021-10-07 PROCEDURE — 99214 OFFICE O/P EST MOD 30 MIN: CPT

## 2021-10-08 ENCOUNTER — APPOINTMENT (OUTPATIENT)
Dept: UROLOGY | Facility: CLINIC | Age: 76
End: 2021-10-08

## 2021-10-10 LAB
APPEARANCE: CLEAR
BACTERIA UR CULT: NORMAL
BACTERIA: NEGATIVE
BILIRUBIN URINE: NEGATIVE
BLOOD URINE: NEGATIVE
COLOR: YELLOW
GLUCOSE QUALITATIVE U: ABNORMAL
HYALINE CASTS: 0 /LPF
KETONES URINE: NEGATIVE
LEUKOCYTE ESTERASE URINE: NEGATIVE
MICROSCOPIC-UA: NORMAL
NITRITE URINE: NEGATIVE
PH URINE: 6
PROTEIN URINE: NORMAL
RED BLOOD CELLS URINE: 1 /HPF
SPECIFIC GRAVITY URINE: 1.02
SQUAMOUS EPITHELIAL CELLS: 0 /HPF
URIC ACID CRYSTALS: ABNORMAL
URINE CYTOLOGY: NORMAL
UROBILINOGEN URINE: NORMAL
WHITE BLOOD CELLS URINE: 1 /HPF

## 2021-10-10 NOTE — ADDENDUM
[FreeTextEntry1] : I, Kalpana Machuca, acted as the sole scribe for Dr. Robson Thompson on 10/08/2021.

## 2021-10-10 NOTE — ASSESSMENT
[FreeTextEntry1] : ALINA FORTE is a 76 year year old male being seen for a follow up visit regarding gross hematuria. He has been experiencing gross hematuria with blood clots found in urine for the past two weeks. He experiences hematuria previously when he takes Coumadin which he was prescribed following his mitral valve replacement. The patient reports having no trouble with urination and wakes up 2 times during the night to urinate. Today he reports having discomfort in his lower back and right lower quadrant but no pain. The patient denies a history of kidney stones. In the past, he has had an endoscopy. 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 No discrete mass is identified at the level of the gastroesophageal junction. His Creatinine as of 12/16/19 was 1.01.\par 01/02/2020: Patient presents today for a cystoscopy. The patient denies hematuria but complains of some back pain. A CT ABDOMEN AND PELVIS from 12/26/2019 revealed: Interval stability without CT evidence of suspicious renal or ureter lesions. Enlarged prostate gland with associated transurethral resection of the prostate defect.\par \par 01/23/2020: Patient presents today for a follow up. At the conclusion of the last visit, Finasteride 5 mg was prescribed. Yesterday, he reports gross and opaque hematuria. Prior to yesterdays episode and following his cystoscopy, he did not experience hematuria. He notes that his Coumadin level was high at 2.6 recently. He has atrial fibrillation and a metal mitral valve. He was recently prescribed Flomax, to take 1 at bedtime, which helped decrease nocturia from 2-3x/night to 1x/night. Today, he also complains of erectile dysfunction. The patient produced a urine sample which will be sent for urinalysis, urine cytology, and urine culture. Finasteride 5 mg was renewed and is to be continued as previously prescribed. Tamsulosin 0.4 mg to be continued as previously prescribed by internist. Tadalafil 20mg, 1 tablet a day, 2-3hrs before sexual activity. I explained that there may be side effects including: warm flush feeling, nasal congestion, back pain and tiny chance of vision or hearing impairment. Patient should follow up in 2 months or sooner if he experiences urinary difficulties. \par \par 09/01/2020: Patient presents today for a follow up. Patient reports using Clotrimazole cream for penile lesion, however has not been helping and is instead causing itching. Also taking Tadalafil, but still unable to penetrate. Denies any side effects. I instructed the patient to increase Tadalafil to 30mg. I instructed the patient to discontinue Clotrimazole cream and started the patient on Nystatin-Triamcinolone cream. I prescribed the patient Cefadroxil 500mg BID and Fluconazole 100mg BID.\par \par 09/22/2020: Patient presents today for follow up for penile lesion. Used Nystatin-Triamcinolone cream which helped. No pain or itching. Today complains of urinary urgency and urge incontinence. Takes Tamsulosin 0.4mg once daily half an hour after dinner. Notes he does get heartburn on it. Last PSA on 8/6/20 was 2.90. No constipation. \par \par 10/14/2020: 75 yr male with BPH , LUTS presenting with worsening urinary symptoms of frequency and nocturia, suprapubic pressure.  .  Significant retention. Problem of compliance.  Not able to afford some of the medications because not covered by insurance. Not sure exactly of what meds he is taking and not taking. \par Attempted to talk to wife and daughter to clear up his med list.  Patient advised to take a picture of med containers or come with all med containers at next visit in 2 weeks. Oxybutinin. Alfuzosin. If medications do not help, will consider Desmopressin. RTO in 2 weeks for reassessment. \par \par 10/29/2020: Patient presents today for follow up. Last visit, pt was started on Oxybutynin but stopped as it was not working.  Currently takes Alfuzosin ER 10mg once daily. Urinary frequency is mostly during the day. Complains of difficulty starting stream. Took Finasteride for 6 weeks in the past which did help urination. Continues to have ED and requests renewal of Tadalafil. Recently saw GI for heartburn and was given Pantoprazole, but does not help. H/o Afib and mechanical heart valve and takes Coumadin. \par \par 09/14/2021: Patient presents today for follow up. He states a few weeks ago, had gross hematuria for a couple of days and was sent to ER. Reports that tests came back negative. States that he notices blood in urine every 3 weeks and has burning. Takes Tamsulosin 0.4mg at bedtime. Has INR checked every other week. Admits that his constipation could contribute to his gross hematuria. \par \par Advised to take stool softeners to control constipation. \par \par I prescribed the patient Bactrim BID for dysuria with food. I advised the patient there is a chance of kidney insufficiency and upset stomach. I am sending an email to Dr. Edis Seay asking him to modify Coumadin dose in view of the fact I am starting sulfamethoxazole-trimethoprim for his dysuria. \par \par Instructed to take Tamsulosin after a meal for better efficacy. \par I will send the patient for CT urogram to evaluate gross hematuria. \par The patient produced a urine sample which will be sent for osmolality, urinalysis, urine cytology, and urine culture.\par Blood work today includes HbA1c, dihydrotestosterone, estradiol, estrogen, testosterone, alkaline phosphatase, BMP, CBC, osmolality serum. \par RTO in 2 weeks after CT scan to review results. \par \par 10/07/2021: Patient presents today for a follow up. Patient had a CT Urogram due to gross hematuria on 9/24/2021 which demonstrated no renal stones, suspicious renal masses, or evidence of urothelial lesion. Enlarged prostate with TURP defect. The patient reports nocturia x7 last night and he continues to have significant gross hematuria every other day. He has been struggling to sleep at night. His PCP instructed him to go to the ED, however nothing was found. He also complains of a burning feeling under his testicles and a weak, split stream. The burning and pain under the testicles is exacerbated upon laying or sitting down. Last A1C was 8. PSA last year was 2.9. Currently takes coumadin. No longer takes tadalafil. Takes tamsulosin once daily and sulfamethoxazole trimethoprim BID. Had covid about 6 months ago and since then, he has struggled to have erections and complains of vision problems since then. \par Clinical findings and CT results were reviewed at length with the patient. \par The patient produced a urine sample which will be sent for urinalysis, urine cytology, and urine culture. \par Strongly advised the patient to manage his diabetes better. I suggested exercise, low carb diet, and less sugar. \par I increased the dosage of Tamsulosin 0.4 mg from once daily to twice daily. He will continue with Bactrim until finished. \par  I prescribed the patient Finasteride 5 mg to take 1 tablet daily. I informed the patient there may be erectile dysfunction, hot flashes, breast tenderness, and increased hair growth as a side effect. I informed him it could take a while for it to start working. I also informed him that the pill is hormonally active if crushed and to not let any woman that is child bearing or will be to clean it up as it could hurt the fetus if absorbed in the skin. \par At the end of the visit, the patient wanted me to speak to his daughter, Coni Forte, who is a registered nurse at [employer]. She did not answer, however I took down her phone number and will try calling later in the day. \par \par 10/08/2021: This visit was provided via the telephone using real-time audio technology. Dr Thompson was located at his office on [facility]. The patient was located at home.  The patient gave permission for a telephone visit. Attempted phone call to speak with the patient and his daughter. I left a message and recommend they make a phone call appointment for early next week. \par \par Preparation, in person office visit, and coordination of care: 22 minutes.

## 2021-10-10 NOTE — ASSESSMENT
[FreeTextEntry1] : ALINA FORTE is a 76 year year old male being seen for a follow up visit regarding gross hematuria. He has been experiencing gross hematuria with blood clots found in urine for the past two weeks. He experiences hematuria previously when he takes Coumadin which he was prescribed following his mitral valve replacement. The patient reports having no trouble with urination and wakes up 2 times during the night to urinate. Today he reports having discomfort in his lower back and right lower quadrant but no pain. The patient denies a history of kidney stones. In the past, he has had an endoscopy. 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 No discrete mass is identified at the level of the gastroesophageal junction. His Creatinine as of 12/16/19 was 1.01.\par 01/02/2020: Patient presents today for a cystoscopy. The patient denies hematuria but complains of some back pain. A CT ABDOMEN AND PELVIS from 12/26/2019 revealed: Interval stability without CT evidence of suspicious renal or ureter lesions. Enlarged prostate gland with associated transurethral resection of the prostate defect.\par \par 01/23/2020: Patient presents today for a follow up. At the conclusion of the last visit, Finasteride 5 mg was prescribed. Yesterday, he reports gross and opaque hematuria. Prior to yesterdays episode and following his cystoscopy, he did not experience hematuria. He notes that his Coumadin level was high at 2.6 recently. He has atrial fibrillation and a metal mitral valve. He was recently prescribed Flomax, to take 1 at bedtime, which helped decrease nocturia from 2-3x/night to 1x/night. Today, he also complains of erectile dysfunction. The patient produced a urine sample which will be sent for urinalysis, urine cytology, and urine culture. Finasteride 5 mg was renewed and is to be continued as previously prescribed. Tamsulosin 0.4 mg to be continued as previously prescribed by internist. Tadalafil 20mg, 1 tablet a day, 2-3hrs before sexual activity. I explained that there may be side effects including: warm flush feeling, nasal congestion, back pain and tiny chance of vision or hearing impairment. Patient should follow up in 2 months or sooner if he experiences urinary difficulties. \par \par 09/01/2020: Patient presents today for a follow up. Patient reports using Clotrimazole cream for penile lesion, however has not been helping and is instead causing itching. Also taking Tadalafil, but still unable to penetrate. Denies any side effects. I instructed the patient to increase Tadalafil to 30mg. I instructed the patient to discontinue Clotrimazole cream and started the patient on Nystatin-Triamcinolone cream. I prescribed the patient Cefadroxil 500mg BID and Fluconazole 100mg BID.\par \par 09/22/2020: Patient presents today for follow up for penile lesion. Used Nystatin-Triamcinolone cream which helped. No pain or itching. Today complains of urinary urgency and urge incontinence. Takes Tamsulosin 0.4mg once daily half an hour after dinner. Notes he does get heartburn on it. Last PSA on 8/6/20 was 2.90. No constipation. \par \par 10/14/2020: 75 yr male with BPH , LUTS presenting with worsening urinary symptoms of frequency and nocturia, suprapubic pressure.  .  Significant retention. Problem of compliance.  Not able to afford some of the medications because not covered by insurance. Not sure exactly of what meds he is taking and not taking. \par Attempted to talk to wife and daughter to clear up his med list.  Patient advised to take a picture of med containers or come with all med containers at next visit in 2 weeks. Oxybutinin. Alfuzosin. If medications do not help, will consider Desmopressin. RTO in 2 weeks for reassessment. \par \par 10/29/2020: Patient presents today for follow up. Last visit, pt was started on Oxybutynin but stopped as it was not working.  Currently takes Alfuzosin ER 10mg once daily. Urinary frequency is mostly during the day. Complains of difficulty starting stream. Took Finasteride for 6 weeks in the past which did help urination. Continues to have ED and requests renewal of Tadalafil. Recently saw GI for heartburn and was given Pantoprazole, but does not help. H/o Afib and mechanical heart valve and takes Coumadin. \par \par 09/14/2021: Patient presents today for follow up. He states a few weeks ago, had gross hematuria for a couple of days and was sent to ER. Reports that tests came back negative. States that he notices blood in urine every 3 weeks and has burning. Takes Tamsulosin 0.4mg at bedtime. Has INR checked every other week. Admits that his constipation could contribute to his gross hematuria. \par \par Advised to take stool softeners to control constipation. \par \par I prescribed the patient Bactrim BID for dysuria with food. I advised the patient there is a chance of kidney insufficiency and upset stomach. I am sending an email to Dr. Edis Seay asking him to modify Coumadin dose in view of the fact I am starting sulfamethoxazole-trimethoprim for his dysuria. \par \par Instructed to take Tamsulosin after a meal for better efficacy. \par I will send the patient for CT urogram to evaluate gross hematuria. \par The patient produced a urine sample which will be sent for osmolality, urinalysis, urine cytology, and urine culture.\par Blood work today includes HbA1c, dihydrotestosterone, estradiol, estrogen, testosterone, alkaline phosphatase, BMP, CBC, osmolality serum. \par RTO in 2 weeks after CT scan to review results. \par \par 10/07/2021: Patient presents today for a follow up. Patient had a CT Urogram due to gross hematuria on 9/24/2021 which demonstrated no renal stones, suspicious renal masses, or evidence of urothelial lesion. Enlarged prostate with TURP defect. The patient reports nocturia x7 last night and he continues to have significant gross hematuria every other day. He has been struggling to sleep at night. His PCP instructed him to go to the ED, however nothing was found. He also complains of a burning feeling under his testicles and a weak, split stream. The burning and pain under the testicles is exacerbated upon laying or sitting down. Last A1C was 8. PSA last year was 2.9. Currently takes coumadin. No longer takes tadalafil. Takes tamsulosin once daily and sulfamethoxazole trimethoprim BID. Had covid about 6 months ago and since then, he has struggled to have erections and complains of vision problems since then. \par \par Clinical findings and CT results were reviewed at length with the patient. \par \par The patient produced a urine sample which will be sent for urinalysis, urine cytology, and urine culture. \par \par Strongly advised the patient to manage his diabetes better. I suggested exercise, low carb diet, and less sugar. \par \par I increased the dosage of Tamsulosin 0.4 mg from once daily to twice daily. He will continue with Bactrim until finished. \par \par  I prescribed the patient Finasteride 5 mg to take 1 tablet daily. I informed the patient there may be erectile dysfunction, hot flashes, breast tenderness, and increased hair growth as a side effect. I informed him it could take a while for it to start working. I also informed him that the pill is hormonally active if crushed and to not let any woman that is child bearing or will be to clean it up as it could hurt the fetus if absorbed in the skin. \par \par At the end of the visit, the patient wanted me to speak to his daughter, Coni Forte, who is a registered nurse at [employer]. She did not answer, however I took down her phone number and will try calling later in the day. \par \par Patient will RTO in one week for reassessment. \par \par Preparation, in person office visit, and coordination of care: 30 minutes.

## 2021-10-10 NOTE — HISTORY OF PRESENT ILLNESS
[FreeTextEntry1] : ALINA NOLAN is a 76 year year old male being seen for a follow up visit regarding gross hematuria. He has been experiencing gross hematuria with blood clots found in urine for the past two weeks. He experiences hematuria previously when he takes Coumadin which he was prescribed following his mitral valve replacement. The patient reports having no trouble with urination and wakes up 2 times during the night to urinate. Today he reports having discomfort in his lower back and right lower quadrant but no pain. The patient denies a history of kidney stones. In the past, he has had an endoscopy. 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 No discrete mass is identified at the level of the gastroesophageal junction. His Creatinine as of 12/16/19 was 1.01.\par 01/02/2020: Patient presents today for a cystoscopy. The patient denies hematuria but complains of some back pain. A CT ABDOMEN AND PELVIS from 12/26/2019 revealed: Interval stability without CT evidence of suspicious renal or ureter lesions. Enlarged prostate gland with associated transurethral resection of the prostate defect.\par \par 01/23/2020: Patient presents today for a follow up. At the conclusion of the last visit, Finasteride 5 mg was prescribed. Yesterday, he reports gross and opaque hematuria. Prior to yesterdays episode and following his cystoscopy, he did not experience hematuria. He notes that his Coumadin level was high at 2.6 recently. He has atrial fibrillation and a metal mitral valve. He was recently prescribed Flomax, to take 1 at bedtime, which helped decrease nocturia from 2-3x/night to 1x/night. Today, he also complains of erectile dysfunction. The patient produced a urine sample which will be sent for urinalysis, urine cytology, and urine culture. Finasteride 5 mg was renewed and is to be continued as previously prescribed. Tamsulosin 0.4 mg to be continued as previously prescribed by internist. Tadalafil 20mg, 1 tablet a day, 2-3hrs before sexual activity. I explained that there may be side effects including: warm flush feeling, nasal congestion, back pain and tiny chance of vision or hearing impairment. Patient should follow up in 2 months or sooner if he experiences urinary difficulties. \par \par 09/01/2020: Patient presents today for a follow up. Patient reports using Clotrimazole cream for penile lesion, however has not been helping and is instead causing itching. Also taking Tadalafil, but still unable to penetrate. Denies any side effects. I instructed the patient to increase Tadalafil to 30mg. I instructed the patient to discontinue Clotrimazole cream and started the patient on Nystatin-Triamcinolone cream. I prescribed the patient Cefadroxil 500mg BID and Fluconazole 100mg BID.\par \par 09/22/2020: Patient presents today for follow up for penile lesion. Used Nystatin-Triamcinolone cream which helped. No pain or itching. Today complains of urinary urgency and urge incontinence. Takes Tamsulosin 0.4mg once daily half an hour after dinner. Notes he does get heartburn on it. Last PSA on 8/6/20 was 2.90. No constipation. \par \par 10/14/2020: 75 male BPH severe LUTS in form of frequency up to 10X nocte with supra pubic pressure, urge , intermittency. No hematuria. \par Recent switch of medication from flomax to Alfuzosin ,patient said to no avail. No improvement in symptoms. \par Taking coumadin for mechanical heart valve. Recent episode of hematuria is resolved. \par ED little improvement with Tadalafil. Said not strong enough to penetrate\par PSA 2.9 august 2020. \par Testosterone free L 3.9, Total L 234.7\par \par 10/29/2020: Patient presents today for follow up. Last visit, pt was started on Oxybutynin but stopped as it was not working.  Currently takes Alfuzosin ER 10mg once daily. Urinary frequency is mostly during the day. Complains of difficulty starting stream. Took Finasteride for 6 weeks in the past which did help urination. Recently saw GI for heartburn and was given Pantoprazole, but does not help. H/o Afib and mechanical heart valve and takes Coumadin. Patient presents today for follow up. Last visit, pt was started on Oxybutynin but stopped as it was not working.  Currently takes Alfuzosin ER 10mg once daily. Urinary frequency is mostly during the day. Complains of difficulty starting stream. Took Finasteride for 6 weeks in the past which did help urination. Recently saw GI for heartburn and was given Pantoprazole, but does not help. H/o Afib and mechanical heart valve and takes Coumadin. \par \par 09/14/2021: Patient presents today for follow up. He states a few weeks ago, had gross hematuria for a couple of days and was sent to ER. Reports that tests came back negative. States that he notices blood in urine every 3 weeks and has burning. Takes Tamsulosin 0.4mg at bedtime. Has INR checked every other week. Admits that his constipation could contribute to his gross hematuria. \par \par 10/07/2021: Patient presents today for a follow up. Patient had a CT Urogram due to gross hematuria on 9/24/2021 which demonstrated no renal stones, suspicious renal masses, or evidence of urothelial lesion. Enlarged prostate with TURP defect. The patient reports nocturia x7 last night and he continues to have significant gross hematuria every other day. He has been struggling to sleep at night. His PCP instructed him to go to the ED, however nothing was found. He also complains of a burning feeling under his testicles and a weak, split stream. The burning and pain under the testicles is exacerbated upon laying or sitting down. Last A1C was 8. PSA last year was 2.9. Currently takes coumadin. No longer takes tadalafil. Takes tamsulosin once daily and sulfamethoxazole trimethoprim BID. Had covid about 6 months ago and since then, he has struggled to have erections and complains of vision problems since then. \par \par 10/08/2021: This visit was provided via the telephone using real-time audio technology. Dr Thompson was located at his office on [facility]. The patient was located at home.  The patient gave permission for a telephone visit. Attempted phone call to speak with the patient and his daughter. I left a message and recommend they make a phone call appointment for early next week. \par

## 2021-10-10 NOTE — REVIEW OF SYSTEMS
[Dysuria] : dysuria [Hesitancy] : urinary hesitancy [Feeling Poorly] : feeling poorly [Feeling Tired] : feeling tired [Eyesight Problems] : eyesight problems [Nocturia] : nocturia [Erectile Dysfunction] : erectile dysfunction [Fever] : no fever [Chills] : no chills [Eye Pain] : no eye pain [Earache] : no earache [Chest Pain] : no chest pain [Shortness Of Breath] : no shortness of breath [Abdominal Pain] : no abdominal pain [Arthralgias] : no arthralgias [Skin Lesions] : no skin lesions [Confused] : no confusion [Suicidal] : not suicidal [Muscle Weakness] : no muscle weakness [Easy Bleeding] : no tendency for easy bleeding

## 2021-10-10 NOTE — ADDENDUM
[FreeTextEntry1] : This note was authored by Jo Ann Paulino working as a scribe for Dr.Gary Thompson. I, Dr. Robson Thompson have reviewed the content of this note and confirm it is true and accurate. I personally performed the history and physical examination and made all the decisions 10/07/2021.

## 2021-10-10 NOTE — HISTORY OF PRESENT ILLNESS
[FreeTextEntry1] : ALINA NOLAN is a 76 year year old male being seen for a follow up visit regarding gross hematuria. He has been experiencing gross hematuria with blood clots found in urine for the past two weeks. He experiences hematuria previously when he takes Coumadin which he was prescribed following his mitral valve replacement. The patient reports having no trouble with urination and wakes up 2 times during the night to urinate. Today he reports having discomfort in his lower back and right lower quadrant but no pain. The patient denies a history of kidney stones. In the past, he has had an endoscopy. 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 No discrete mass is identified at the level of the gastroesophageal junction. His Creatinine as of 12/16/19 was 1.01.\par 01/02/2020: Patient presents today for a cystoscopy. The patient denies hematuria but complains of some back pain. A CT ABDOMEN AND PELVIS from 12/26/2019 revealed: Interval stability without CT evidence of suspicious renal or ureter lesions. Enlarged prostate gland with associated transurethral resection of the prostate defect.\par \par 01/23/2020: Patient presents today for a follow up. At the conclusion of the last visit, Finasteride 5 mg was prescribed. Yesterday, he reports gross and opaque hematuria. Prior to yesterdays episode and following his cystoscopy, he did not experience hematuria. He notes that his Coumadin level was high at 2.6 recently. He has atrial fibrillation and a metal mitral valve. He was recently prescribed Flomax, to take 1 at bedtime, which helped decrease nocturia from 2-3x/night to 1x/night. Today, he also complains of erectile dysfunction. The patient produced a urine sample which will be sent for urinalysis, urine cytology, and urine culture. Finasteride 5 mg was renewed and is to be continued as previously prescribed. Tamsulosin 0.4 mg to be continued as previously prescribed by internist. Tadalafil 20mg, 1 tablet a day, 2-3hrs before sexual activity. I explained that there may be side effects including: warm flush feeling, nasal congestion, back pain and tiny chance of vision or hearing impairment. Patient should follow up in 2 months or sooner if he experiences urinary difficulties. \par \par 09/01/2020: Patient presents today for a follow up. Patient reports using Clotrimazole cream for penile lesion, however has not been helping and is instead causing itching. Also taking Tadalafil, but still unable to penetrate. Denies any side effects. I instructed the patient to increase Tadalafil to 30mg. I instructed the patient to discontinue Clotrimazole cream and started the patient on Nystatin-Triamcinolone cream. I prescribed the patient Cefadroxil 500mg BID and Fluconazole 100mg BID.\par \par 09/22/2020: Patient presents today for follow up for penile lesion. Used Nystatin-Triamcinolone cream which helped. No pain or itching. Today complains of urinary urgency and urge incontinence. Takes Tamsulosin 0.4mg once daily half an hour after dinner. Notes he does get heartburn on it. Last PSA on 8/6/20 was 2.90. No constipation. \par \par 10/14/2020: 75 male BPH severe LUTS in form of frequency up to 10X nocte with supra pubic pressure, urge , intermittency. No hematuria. \par Recent switch of medication from flomax to Alfuzosin ,patient said to no avail. No improvement in symptoms. \par Taking coumadin for mechanical heart valve. Recent episode of hematuria is resolved. \par ED little improvement with Tadalafil. Said not strong enough to penetrate\par PSA 2.9 august 2020. \par Testosterone free L 3.9, Total L 234.7\par \par 10/29/2020: Patient presents today for follow up. Last visit, pt was started on Oxybutynin but stopped as it was not working.  Currently takes Alfuzosin ER 10mg once daily. Urinary frequency is mostly during the day. Complains of difficulty starting stream. Took Finasteride for 6 weeks in the past which did help urination. Recently saw GI for heartburn and was given Pantoprazole, but does not help. H/o Afib and mechanical heart valve and takes Coumadin. Patient presents today for follow up. Last visit, pt was started on Oxybutynin but stopped as it was not working.  Currently takes Alfuzosin ER 10mg once daily. Urinary frequency is mostly during the day. Complains of difficulty starting stream. Took Finasteride for 6 weeks in the past which did help urination. Recently saw GI for heartburn and was given Pantoprazole, but does not help. H/o Afib and mechanical heart valve and takes Coumadin. \par \par 09/14/2021: Patient presents today for follow up. He states a few weeks ago, had gross hematuria for a couple of days and was sent to ER. Reports that tests came back negative. States that he notices blood in urine every 3 weeks and has burning. Takes Tamsulosin 0.4mg at bedtime. Has INR checked every other week. Admits that his constipation could contribute to his gross hematuria. \par \par 10/07/2021: Patient presents today for a follow up. Patient had a CT Urogram due to gross hematuria on 9/24/2021 which demonstrated no renal stones, suspicious renal masses, or evidence of urothelial lesion. Enlarged prostate with TURP defect. The patient reports nocturia x7 last night and he continues to have significant gross hematuria every other day. He has been struggling to sleep at night. His PCP instructed him to go to the ED, however nothing was found. He also complains of a burning feeling under his testicles and a weak, split stream. The burning and pain under the testicles is exacerbated upon laying or sitting down. Last A1C was 8. PSA last year was 2.9. Currently takes coumadin. No longer takes tadalafil. Takes tamsulosin once daily and sulfamethoxazole trimethoprim BID. Had covid about 6 months ago and since then, he has struggled to have erections and complains of vision problems since then.

## 2021-10-12 ENCOUNTER — NON-APPOINTMENT (OUTPATIENT)
Age: 76
End: 2021-10-12

## 2021-10-13 LAB
INR PPP: 1.7 RATIO
INR PPP: 2.1 RATIO
POCT-PROTHROMBIN TIME: 19.8 SECS
POCT-PROTHROMBIN TIME: 25.2 SECS
QUALITY CONTROL: YES

## 2021-10-14 ENCOUNTER — APPOINTMENT (OUTPATIENT)
Dept: INTERNAL MEDICINE | Facility: CLINIC | Age: 76
End: 2021-10-14

## 2021-10-15 ENCOUNTER — APPOINTMENT (OUTPATIENT)
Dept: UROLOGY | Facility: CLINIC | Age: 76
End: 2021-10-15
Payer: MEDICARE

## 2021-10-15 ENCOUNTER — NON-APPOINTMENT (OUTPATIENT)
Age: 76
End: 2021-10-15

## 2021-10-15 PROCEDURE — 99214 OFFICE O/P EST MOD 30 MIN: CPT

## 2021-10-16 LAB
INR PPP: 2.9 RATIO
POCT-PROTHROMBIN TIME: 34.8 SECS
QUALITY CONTROL: YES

## 2021-10-17 NOTE — HISTORY OF PRESENT ILLNESS
[FreeTextEntry1] : ALINA NOLAN is a 76 year year old male being seen for a follow up visit regarding gross hematuria. He has been experiencing gross hematuria with blood clots found in urine for the past two weeks. He experiences hematuria previously when he takes Coumadin which he was prescribed following his mitral valve replacement. The patient reports having no trouble with urination and wakes up 2 times during the night to urinate. Today he reports having discomfort in his lower back and right lower quadrant but no pain. The patient denies a history of kidney stones. In the past, he has had an endoscopy. 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 No discrete mass is identified at the level of the gastroesophageal junction. His Creatinine as of 12/16/19 was 1.01.\par 01/02/2020: Patient presents today for a cystoscopy. The patient denies hematuria but complains of some back pain. A CT ABDOMEN AND PELVIS from 12/26/2019 revealed: Interval stability without CT evidence of suspicious renal or ureter lesions. Enlarged prostate gland with associated transurethral resection of the prostate defect.\par \par 01/23/2020: Patient presents today for a follow up. At the conclusion of the last visit, Finasteride 5 mg was prescribed. Yesterday, he reports gross and opaque hematuria. Prior to yesterdays episode and following his cystoscopy, he did not experience hematuria. He notes that his Coumadin level was high at 2.6 recently. He has atrial fibrillation and a metal mitral valve. He was recently prescribed Flomax, to take 1 at bedtime, which helped decrease nocturia from 2-3x/night to 1x/night. Today, he also complains of erectile dysfunction. The patient produced a urine sample which will be sent for urinalysis, urine cytology, and urine culture. Finasteride 5 mg was renewed and is to be continued as previously prescribed. Tamsulosin 0.4 mg to be continued as previously prescribed by internist. Tadalafil 20mg, 1 tablet a day, 2-3hrs before sexual activity. I explained that there may be side effects including: warm flush feeling, nasal congestion, back pain and tiny chance of vision or hearing impairment. Patient should follow up in 2 months or sooner if he experiences urinary difficulties. \par \par 09/01/2020: Patient presents today for a follow up. Patient reports using Clotrimazole cream for penile lesion, however has not been helping and is instead causing itching. Also taking Tadalafil, but still unable to penetrate. Denies any side effects. I instructed the patient to increase Tadalafil to 30mg. I instructed the patient to discontinue Clotrimazole cream and started the patient on Nystatin-Triamcinolone cream. I prescribed the patient Cefadroxil 500mg BID and Fluconazole 100mg BID.\par \par 09/22/2020: Patient presents today for follow up for penile lesion. Used Nystatin-Triamcinolone cream which helped. No pain or itching. Today complains of urinary urgency and urge incontinence. Takes Tamsulosin 0.4mg once daily half an hour after dinner. Notes he does get heartburn on it. Last PSA on 8/6/20 was 2.90. No constipation. \par \par 10/14/2020: 75 male BPH severe LUTS in form of frequency up to 10X nocte with supra pubic pressure, urge , intermittency. No hematuria. \par Recent switch of medication from flomax to Alfuzosin ,patient said to no avail. No improvement in symptoms. \par Taking coumadin for mechanical heart valve. Recent episode of hematuria is resolved. \par ED little improvement with Tadalafil. Said not strong enough to penetrate\par PSA 2.9 august 2020. \par Testosterone free L 3.9, Total L 234.7\par \par 10/29/2020: Patient presents today for follow up. Last visit, pt was started on Oxybutynin but stopped as it was not working.  Currently takes Alfuzosin ER 10mg once daily. Urinary frequency is mostly during the day. Complains of difficulty starting stream. Took Finasteride for 6 weeks in the past which did help urination. Recently saw GI for heartburn and was given Pantoprazole, but does not help. H/o Afib and mechanical heart valve and takes Coumadin. Patient presents today for follow up. Last visit, pt was started on Oxybutynin but stopped as it was not working.  Currently takes Alfuzosin ER 10mg once daily. Urinary frequency is mostly during the day. Complains of difficulty starting stream. Took Finasteride for 6 weeks in the past which did help urination. Recently saw GI for heartburn and was given Pantoprazole, but does not help. H/o Afib and mechanical heart valve and takes Coumadin. \par \par 09/14/2021: Patient presents today for follow up. He states a few weeks ago, had gross hematuria for a couple of days and was sent to ER. Reports that tests came back negative. States that he notices blood in urine every 3 weeks and has burning. Takes Tamsulosin 0.4mg at bedtime. Has INR checked every other week. Admits that his constipation could contribute to his gross hematuria. \par \par 10/07/2021: Patient presents today for a follow up. Patient had a CT Urogram due to gross hematuria on 9/24/2021 which demonstrated no renal stones, suspicious renal masses, or evidence of urothelial lesion. Enlarged prostate with TURP defect. The patient reports nocturia x7 last night and he continues to have significant gross hematuria every other day. He has been struggling to sleep at night. His PCP instructed him to go to the ED, however nothing was found. He also complains of a burning feeling under his testicles and a weak, split stream. The burning and pain under the testicles is exacerbated upon laying or sitting down. Last A1C was 8. PSA last year was 2.9. Currently takes coumadin. No longer takes tadalafil. Takes tamsulosin once daily and sulfamethoxazole trimethoprim BID. Had covid about 6 months ago and since then, he has struggled to have erections and complains of vision problems since then. \par \par 10/08/2021: This visit was provided via the telephone using real-time audio technology. Dr Thompson was located at his office on [facility]. The patient was located at home.  The patient gave permission for a telephone visit. Attempted phone call to speak with the patient and his daughter. I left a message and recommend they make a phone call appointment for early next week. \par \par 10/15/2021: Patient presents today for follow up and we also spoke with his daughter on the phone. He is feeling terribly, he has nocturia 9 times per night. He increased the Tamsulosin 0.4 mg, to twice daily, but felt that it exacerbated his symptoms. When he has the urge to urinate, he experiences a pressure in the tip of his penis that wakes him overnight. He also has not had intercourse for a period of time and is upset about it. Currently, he is experiencing gross hematuria as well and his Coumadin dosage has been decreased. His urinalysis, urine cytology, and urine culture from 10/7/21 was negative. His CT Urogram from 9/24/21 showed no renal stones, suspicious renal mass, or evidence of urothelial lesion. Enlarged prostate with TURP defect. His primary recommended Tamsulosin as well. He currently takes Tamsulosin 0.4 mg, one capsule twice daily and Finasteride 5 mg, one tablet daily. His daughter was insistent that they are interested in the possibility of a redo TURP.

## 2021-10-17 NOTE — ADDENDUM
[FreeTextEntry1] : I, Kalpana Machuca, acted as the sole scribe for Dr. Robson Thompson on 10/15/2021.

## 2021-10-17 NOTE — ASSESSMENT
[FreeTextEntry1] : ALINA FORTE is a 76 year year old male being seen for a follow up visit regarding gross hematuria. He has been experiencing gross hematuria with blood clots found in urine for the past two weeks. He experiences hematuria previously when he takes Coumadin which he was prescribed following his mitral valve replacement. The patient reports having no trouble with urination and wakes up 2 times during the night to urinate. Today he reports having discomfort in his lower back and right lower quadrant but no pain. The patient denies a history of kidney stones. In the past, he has had an endoscopy. 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 No discrete mass is identified at the level of the gastroesophageal junction. His Creatinine as of 12/16/19 was 1.01.\par 01/02/2020: Patient presents today for a cystoscopy. The patient denies hematuria but complains of some back pain. A CT ABDOMEN AND PELVIS from 12/26/2019 revealed: Interval stability without CT evidence of suspicious renal or ureter lesions. Enlarged prostate gland with associated transurethral resection of the prostate defect.\par \par 01/23/2020: Patient presents today for a follow up. At the conclusion of the last visit, Finasteride 5 mg was prescribed. Yesterday, he reports gross and opaque hematuria. Prior to yesterdays episode and following his cystoscopy, he did not experience hematuria. He notes that his Coumadin level was high at 2.6 recently. He has atrial fibrillation and a metal mitral valve. He was recently prescribed Flomax, to take 1 at bedtime, which helped decrease nocturia from 2-3x/night to 1x/night. Today, he also complains of erectile dysfunction. The patient produced a urine sample which will be sent for urinalysis, urine cytology, and urine culture. Finasteride 5 mg was renewed and is to be continued as previously prescribed. Tamsulosin 0.4 mg to be continued as previously prescribed by internist. Tadalafil 20mg, 1 tablet a day, 2-3hrs before sexual activity. I explained that there may be side effects including: warm flush feeling, nasal congestion, back pain and tiny chance of vision or hearing impairment. Patient should follow up in 2 months or sooner if he experiences urinary difficulties. \par \par 09/01/2020: Patient presents today for a follow up. Patient reports using Clotrimazole cream for penile lesion, however has not been helping and is instead causing itching. Also taking Tadalafil, but still unable to penetrate. Denies any side effects. I instructed the patient to increase Tadalafil to 30mg. I instructed the patient to discontinue Clotrimazole cream and started the patient on Nystatin-Triamcinolone cream. I prescribed the patient Cefadroxil 500mg BID and Fluconazole 100mg BID.\par \par 09/22/2020: Patient presents today for follow up for penile lesion. Used Nystatin-Triamcinolone cream which helped. No pain or itching. Today complains of urinary urgency and urge incontinence. Takes Tamsulosin 0.4mg once daily half an hour after dinner. Notes he does get heartburn on it. Last PSA on 8/6/20 was 2.90. No constipation. \par \par 10/14/2020: 75 yr male with BPH , LUTS presenting with worsening urinary symptoms of frequency and nocturia, suprapubic pressure.  .  Significant retention. Problem of compliance.  Not able to afford some of the medications because not covered by insurance. Not sure exactly of what meds he is taking and not taking. \par Attempted to talk to wife and daughter to clear up his med list.  Patient advised to take a picture of med containers or come with all med containers at next visit in 2 weeks. Oxybutinin. Alfuzosin. If medications do not help, will consider Desmopressin. RTO in 2 weeks for reassessment. \par \par 10/29/2020: Patient presents today for follow up. Last visit, pt was started on Oxybutynin but stopped as it was not working.  Currently takes Alfuzosin ER 10mg once daily. Urinary frequency is mostly during the day. Complains of difficulty starting stream. Took Finasteride for 6 weeks in the past which did help urination. Continues to have ED and requests renewal of Tadalafil. Recently saw GI for heartburn and was given Pantoprazole, but does not help. H/o Afib and mechanical heart valve and takes Coumadin. \par \par 09/14/2021: Patient presents today for follow up. He states a few weeks ago, had gross hematuria for a couple of days and was sent to ER. Reports that tests came back negative. States that he notices blood in urine every 3 weeks and has burning. Takes Tamsulosin 0.4mg at bedtime. Has INR checked every other week. Admits that his constipation could contribute to his gross hematuria. \par \par Advised to take stool softeners to control constipation. \par \par I prescribed the patient Bactrim BID for dysuria with food. I advised the patient there is a chance of kidney insufficiency and upset stomach. I am sending an email to Dr. Edis Seay asking him to modify Coumadin dose in view of the fact I am starting sulfamethoxazole-trimethoprim for his dysuria. \par \par Instructed to take Tamsulosin after a meal for better efficacy. \par I will send the patient for CT urogram to evaluate gross hematuria. \par The patient produced a urine sample which will be sent for osmolality, urinalysis, urine cytology, and urine culture.\par Blood work today includes HbA1c, dihydrotestosterone, estradiol, estrogen, testosterone, alkaline phosphatase, BMP, CBC, osmolality serum. \par RTO in 2 weeks after CT scan to review results. \par \par 10/07/2021: Patient presents today for a follow up. Patient had a CT Urogram due to gross hematuria on 9/24/2021 which demonstrated no renal stones, suspicious renal masses, or evidence of urothelial lesion. Enlarged prostate with TURP defect. The patient reports nocturia x7 last night and he continues to have significant gross hematuria every other day. He has been struggling to sleep at night. His PCP instructed him to go to the ED, however nothing was found. He also complains of a burning feeling under his testicles and a weak, split stream. The burning and pain under the testicles is exacerbated upon laying or sitting down. Last A1C was 8. PSA last year was 2.9. Currently takes coumadin. No longer takes tadalafil. Takes tamsulosin once daily and sulfamethoxazole trimethoprim BID. Had covid about 6 months ago and since then, he has struggled to have erections and complains of vision problems since then. \par Clinical findings and CT results were reviewed at length with the patient. \par The patient produced a urine sample which will be sent for urinalysis, urine cytology, and urine culture. \par Strongly advised the patient to manage his diabetes better. I suggested exercise, low carb diet, and less sugar. \par I increased the dosage of Tamsulosin 0.4 mg from once daily to twice daily. He will continue with Bactrim until finished. \par  I prescribed the patient Finasteride 5 mg to take 1 tablet daily. I informed the patient there may be erectile dysfunction, hot flashes, breast tenderness, and increased hair growth as a side effect. I informed him it could take a while for it to start working. I also informed him that the pill is hormonally active if crushed and to not let any woman that is child bearing or will be to clean it up as it could hurt the fetus if absorbed in the skin. \par At the end of the visit, the patient wanted me to speak to his daughter, Coni Forte, who is a registered nurse at [employer]. She did not answer, however I took down her phone number and will try calling later in the day. \par \par 10/08/2021: This visit was provided via the telephone using real-time audio technology. Dr Thompson was located at his office on [facility]. The patient was located at home.  The patient gave permission for a telephone visit. Attempted phone call to speak with the patient and his daughter. I left a message and recommend they make a phone call appointment for early next week. \par \par 10/15/2021: Patient presents today for follow up and we also spoke with his daughter on the phone. He is feeling terribly, he has nocturia 9 times per night. He increased the Tamsulosin 0.4 mg, to twice daily, but felt that it exacerbated his symptoms. When he has the urge to urinate, he experiences a pressure in the tip of his penis that wakes him overnight. He also has not had intercourse for a period of time and is upset about it. Currently, he is experiencing gross hematuria as well and his Coumadin dosage has been decreased. His urinalysis, urine cytology, and urine culture from 10/7/21 was negative. His CT Urogram from 9/24/21 showed no renal stones, suspicious renal mass, or evidence of urothelial lesion. Enlarged prostate with TURP defect. His primary recommended Tamsulosin as well. He currently takes Tamsulosin 0.4 mg, one capsule twice daily and Finasteride 5 mg, one tablet daily. His daughter was insistent that they are interested in the possibility of a redo TURP. \par \par The patient produced a urine sample which will be sent for urinalysis, urine cytology, and urine culture. \par \par I advised his daughter that the hematuria could be caused by the blood vessels near the large prostate. I am prescribing bicalutamide 50 mg, one tablet daily for 2 weeks. I advised them of the potential side effects of breast growth if on the medication for an extended period of time. If the medication is not effective, we will schedule the patient for UDS for the possibility of a TURP. \par \par Preparation, in person office visit, and coordination of care: 32 minutes.

## 2021-10-18 ENCOUNTER — NON-APPOINTMENT (OUTPATIENT)
Age: 76
End: 2021-10-18

## 2021-10-20 LAB
INR PPP: 2.8 RATIO
POCT-PROTHROMBIN TIME: 34.1 SECS

## 2021-10-21 ENCOUNTER — APPOINTMENT (OUTPATIENT)
Dept: INTERNAL MEDICINE | Facility: CLINIC | Age: 76
End: 2021-10-21

## 2021-10-21 ENCOUNTER — NON-APPOINTMENT (OUTPATIENT)
Age: 76
End: 2021-10-21

## 2021-10-21 ENCOUNTER — OUTPATIENT (OUTPATIENT)
Dept: OUTPATIENT SERVICES | Facility: HOSPITAL | Age: 76
LOS: 1 days | End: 2021-10-21

## 2021-10-21 VITALS — HEART RATE: 87 BPM | OXYGEN SATURATION: 98 % | TEMPERATURE: 97.1 F | RESPIRATION RATE: 15 BRPM

## 2021-10-21 VITALS — RESPIRATION RATE: 16 BRPM | OXYGEN SATURATION: 98 % | HEART RATE: 83 BPM

## 2021-10-21 DIAGNOSIS — Z86.69 PERSONAL HISTORY OF OTHER DISEASES OF THE NERVOUS SYSTEM AND SENSE ORGANS: Chronic | ICD-10-CM

## 2021-10-21 DIAGNOSIS — Z98.89 OTHER SPECIFIED POSTPROCEDURAL STATES: Chronic | ICD-10-CM

## 2021-10-23 LAB
INR PPP: 2.9 RATIO
POCT-PROTHROMBIN TIME: 34.7 SECS

## 2021-10-27 ENCOUNTER — OUTPATIENT (OUTPATIENT)
Dept: OUTPATIENT SERVICES | Facility: HOSPITAL | Age: 76
LOS: 1 days | End: 2021-10-27

## 2021-10-27 ENCOUNTER — APPOINTMENT (OUTPATIENT)
Dept: INTERNAL MEDICINE | Facility: CLINIC | Age: 76
End: 2021-10-27
Payer: MEDICARE

## 2021-10-27 VITALS
OXYGEN SATURATION: 98 % | HEIGHT: 69 IN | SYSTOLIC BLOOD PRESSURE: 130 MMHG | DIASTOLIC BLOOD PRESSURE: 82 MMHG | HEART RATE: 88 BPM | TEMPERATURE: 97.1 F

## 2021-10-27 DIAGNOSIS — E11.9 TYPE 2 DIABETES MELLITUS WITHOUT COMPLICATIONS: ICD-10-CM

## 2021-10-27 DIAGNOSIS — R35.1 NOCTURIA: ICD-10-CM

## 2021-10-27 DIAGNOSIS — Z98.89 OTHER SPECIFIED POSTPROCEDURAL STATES: Chronic | ICD-10-CM

## 2021-10-27 DIAGNOSIS — Z86.69 PERSONAL HISTORY OF OTHER DISEASES OF THE NERVOUS SYSTEM AND SENSE ORGANS: Chronic | ICD-10-CM

## 2021-10-27 PROCEDURE — 99214 OFFICE O/P EST MOD 30 MIN: CPT | Mod: GC

## 2021-10-28 NOTE — HISTORY OF PRESENT ILLNESS
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 Pt c/o frequent urination, particularly at night where he can go 7-8x per night. He has been trialed on multiple medications, currently on bicaluatamide 50mg QD x 2 weeks (since 10/17 as per urology note). Pt last seen by urologist Dr. Thompson on 10/17, as per urology note pt and family discussed redo of TURP procedure. Pt currently stated he would like a consultation for a second opinion. \par \par Pt complaining of pain originating at bottom of testicles that wakes him up at night. He also describes pressure at the tip of his penis that resolves when he urinates. Recently was d/c'd from Tamsulosin by urologist, no difference in urination since stopping medication. Denies hematuria, that has since resolved since last visit. Pt had "scraping procedure" done at [facility] 2-3 years ago and felt that his symptoms were adequately controlled until a year ago. \par \par Pt c/o lack of sleep due to nocturia. FS measurements have been in 240s in AM, which he attributes to poor sleep. Feels vision has gotten worse over the last year, seen by ophthalmologist; does not remember diagnosis but does not want procedure done because he is worried about affecting his good eye. \par \par Pt received COVID vax x 2. Has not received influenza vaccine, would like to wait until next appointment.

## 2021-10-28 NOTE — ASSESSMENT
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 Pt c/o frequent urination, particularly at night where he can go 7-8x per night. He has been trialed on multiple medications with minimal relief and would like a second opinion re: TURP procedure. \par \par #HCM\par - Would like to receive influenza vaccine at next clinic visit\par - RTC for flu vaccine

## 2021-10-28 NOTE — END OF VISIT
[] : Resident [FreeTextEntry3] : Patient has chronic nocturia, had recent CT urogram with possible TURP defect? Patient unhappy with his care and unsure if medications are helping. He as told if the bicalutamide doesn't help with his nocturia then he will need surgery, which patient feels anxious about. Looking for second opinion. Had a long discussion with patient that he should not cancel his appointment with Dr. Thompson as he knows your case very well and in the meantime we gave him another Urology referal in case he still wants a second opinion. UA benign, no concern for active infection. Had INR check on 10/21 2.9 for his hx of Afib. A1C is around goal range given his age, denies hypoglycemic symptoms. Deferred vaccinations for next time.

## 2021-10-28 NOTE — PHYSICAL EXAM
[No Acute Distress] : no acute distress [Well Nourished] : well nourished [Well Developed] : well developed [Well-Appearing] : well-appearing [Normal Sclera/Conjunctiva] : normal sclera/conjunctiva [PERRL] : pupils equal round and reactive to light [EOMI] : extraocular movements intact [Normal Outer Ear/Nose] : the outer ears and nose were normal in appearance [Normal Oropharynx] : the oropharynx was normal [No JVD] : no jugular venous distention [No Lymphadenopathy] : no lymphadenopathy [No Respiratory Distress] : no respiratory distress  [No Accessory Muscle Use] : no accessory muscle use [Clear to Auscultation] : lungs were clear to auscultation bilaterally [Normal Rate] : normal rate  [Regular Rhythm] : with a regular rhythm [Normal S1, S2] : normal S1 and S2 [No Murmur] : no murmur heard [Soft] : abdomen soft [Non Tender] : non-tender [Non-distended] : non-distended [No Masses] : no abdominal mass palpated [No HSM] : no HSM [Normal Bowel Sounds] : normal bowel sounds [Normal] : no posterior cervical lymphadenopathy and no anterior cervical lymphadenopathy [No CVA Tenderness] : no CVA  tenderness [Grossly Normal Strength/Tone] : grossly normal strength/tone [No Rash] : no rash [Coordination Grossly Intact] : coordination grossly intact [No Focal Deficits] : no focal deficits [Normal Affect] : the affect was normal [Alert and Oriented x3] : oriented to person, place, and time [Normal Insight/Judgement] : insight and judgment were intact [de-identified] : No suprapubic TTP

## 2021-10-29 ENCOUNTER — EMERGENCY (EMERGENCY)
Facility: HOSPITAL | Age: 76
LOS: 1 days | Discharge: HOME | End: 2021-10-29
Attending: HOSPITALIST | Admitting: HOSPITALIST
Payer: MEDICARE

## 2021-10-29 ENCOUNTER — APPOINTMENT (OUTPATIENT)
Dept: UROLOGY | Facility: CLINIC | Age: 76
End: 2021-10-29
Payer: MEDICARE

## 2021-10-29 VITALS
HEART RATE: 75 BPM | SYSTOLIC BLOOD PRESSURE: 172 MMHG | DIASTOLIC BLOOD PRESSURE: 99 MMHG | OXYGEN SATURATION: 100 % | RESPIRATION RATE: 19 BRPM | TEMPERATURE: 98 F

## 2021-10-29 VITALS
OXYGEN SATURATION: 100 % | HEIGHT: 69 IN | DIASTOLIC BLOOD PRESSURE: 79 MMHG | HEART RATE: 79 BPM | SYSTOLIC BLOOD PRESSURE: 154 MMHG | RESPIRATION RATE: 16 BRPM | TEMPERATURE: 98 F

## 2021-10-29 DIAGNOSIS — R97.20 ELEVATED PROSTATE, SPECIFIC ANTIGEN [PSA]: ICD-10-CM

## 2021-10-29 DIAGNOSIS — Z86.69 PERSONAL HISTORY OF OTHER DISEASES OF THE NERVOUS SYSTEM AND SENSE ORGANS: Chronic | ICD-10-CM

## 2021-10-29 DIAGNOSIS — N40.1 BENIGN PROSTATIC HYPERPLASIA WITH LOWER URINARY TRACT SYMPMS: ICD-10-CM

## 2021-10-29 DIAGNOSIS — Z98.89 OTHER SPECIFIED POSTPROCEDURAL STATES: Chronic | ICD-10-CM

## 2021-10-29 LAB
ALBUMIN SERPL ELPH-MCNC: 4.3 G/DL — SIGNIFICANT CHANGE UP (ref 3.3–5)
ALP SERPL-CCNC: 59 U/L — SIGNIFICANT CHANGE UP (ref 40–120)
ALT FLD-CCNC: 22 U/L — SIGNIFICANT CHANGE UP (ref 4–41)
ANION GAP SERPL CALC-SCNC: 13 MMOL/L — SIGNIFICANT CHANGE UP (ref 7–14)
APTT BLD: 45 SEC — HIGH (ref 27–36.3)
AST SERPL-CCNC: 24 U/L — SIGNIFICANT CHANGE UP (ref 4–40)
BILIRUB SERPL-MCNC: 2.5 MG/DL — HIGH (ref 0.2–1.2)
BLD GP AB SCN SERPL QL: NEGATIVE — SIGNIFICANT CHANGE UP
BUN SERPL-MCNC: 24 MG/DL — HIGH (ref 7–23)
CALCIUM SERPL-MCNC: 9.4 MG/DL — SIGNIFICANT CHANGE UP (ref 8.4–10.5)
CHLORIDE SERPL-SCNC: 102 MMOL/L — SIGNIFICANT CHANGE UP (ref 98–107)
CO2 SERPL-SCNC: 23 MMOL/L — SIGNIFICANT CHANGE UP (ref 22–31)
CREAT SERPL-MCNC: 0.94 MG/DL — SIGNIFICANT CHANGE UP (ref 0.5–1.3)
GLUCOSE SERPL-MCNC: 296 MG/DL — HIGH (ref 70–99)
HCT VFR BLD CALC: 43.3 % — SIGNIFICANT CHANGE UP (ref 39–50)
HGB BLD-MCNC: 14.7 G/DL — SIGNIFICANT CHANGE UP (ref 13–17)
INR BLD: 2.4 RATIO — HIGH (ref 0.88–1.16)
MCHC RBC-ENTMCNC: 31.6 PG — SIGNIFICANT CHANGE UP (ref 27–34)
MCHC RBC-ENTMCNC: 33.9 GM/DL — SIGNIFICANT CHANGE UP (ref 32–36)
MCV RBC AUTO: 93.1 FL — SIGNIFICANT CHANGE UP (ref 80–100)
NRBC # BLD: 0 /100 WBCS — SIGNIFICANT CHANGE UP
NRBC # FLD: 0 K/UL — SIGNIFICANT CHANGE UP
PLATELET # BLD AUTO: 204 K/UL — SIGNIFICANT CHANGE UP (ref 150–400)
POTASSIUM SERPL-MCNC: 4.8 MMOL/L — SIGNIFICANT CHANGE UP (ref 3.5–5.3)
POTASSIUM SERPL-SCNC: 4.8 MMOL/L — SIGNIFICANT CHANGE UP (ref 3.5–5.3)
PROT SERPL-MCNC: 7.5 G/DL — SIGNIFICANT CHANGE UP (ref 6–8.3)
PROTHROM AB SERPL-ACNC: 26.2 SEC — HIGH (ref 10.6–13.6)
RBC # BLD: 4.65 M/UL — SIGNIFICANT CHANGE UP (ref 4.2–5.8)
RBC # FLD: 12.5 % — SIGNIFICANT CHANGE UP (ref 10.3–14.5)
RH IG SCN BLD-IMP: POSITIVE — SIGNIFICANT CHANGE UP
SODIUM SERPL-SCNC: 138 MMOL/L — SIGNIFICANT CHANGE UP (ref 135–145)
WBC # BLD: 7.7 K/UL — SIGNIFICANT CHANGE UP (ref 3.8–10.5)
WBC # FLD AUTO: 7.7 K/UL — SIGNIFICANT CHANGE UP (ref 3.8–10.5)

## 2021-10-29 PROCEDURE — 99284 EMERGENCY DEPT VISIT MOD MDM: CPT

## 2021-10-29 PROCEDURE — 99214 OFFICE O/P EST MOD 30 MIN: CPT

## 2021-10-29 NOTE — ED ADULT NURSE REASSESSMENT NOTE - NS ED NURSE REASSESS COMMENT FT1
Received report from day RN. Pt Aox4, ambulatory. Appears comfortable, offers no complaints. VS as charted, IV removed. Pending discharge papers at this time.

## 2021-10-29 NOTE — ED PROVIDER NOTE - NSFOLLOWUPCLINICS_GEN_ALL_ED_FT
[facility]  Gastroenterology  [address] [place] [address] [address]  [place] [address]  Phone: [phone]  Fax:

## 2021-10-29 NOTE — ED PROVIDER NOTE - NSFOLLOWUPINSTRUCTIONS_ED_ALL_ED_FT
You were seen for black stools.    No signs of emergency medical condition on today's workup.  Your results are attached with your discharge instructions, please review them with your primary care physician. If there is a result pending, you will receive a call if test is positive.    A presumptive diagnosis is made today, but further evaluation may be required by your primary care doctor and/or specialist for a definitive diagnosis. Therefore, follow up as directed and if symptoms change/worsen or any emergency conditions, please return to the ER - including chest pain, shortness of breath, rectal bleeding, fever, chills.    For pain or fever you can tylenol as needed, as directed on packaging.    If needed, call patient access services at [phone] to find a primary care doctor, or call at [phone] to make an appointment at the clinic.

## 2021-10-29 NOTE — ED PROVIDER NOTE - PROGRESS NOTE DETAILS
Agata Edwards DO PGY-2: Patient received at resident/PA sign out. Pending tests/tentative plan: fecal occult test - which resulted negative. Pt is asxs at this time. No new complains. Had endoscopy and colonscopy many years ago, was told he has ulcers. Will give GI follow up for continue care. Results are discussed. return precautions are discussed. All ques are answered.

## 2021-10-29 NOTE — ED ADULT TRIAGE NOTE - CHIEF COMPLAINT QUOTE
Pt presents to ED ambulatory from home with c/o dark stools since this AM. Pt reports he was seen at urologist today for follow up appointment and was advised to come to ED for further eval. Pt reports hematuria x 3 weeks which has resolved. Pt denies abdominal pain. Pt has a metal valve in his heart and is currently on coumadin.

## 2021-10-29 NOTE — ED PROVIDER NOTE - NSICDXPASTMEDICALHX_GEN_ALL_CORE_FT
PAST MEDICAL HISTORY:  Atrial fibrillation     BPH (benign prostatic hypertrophy)     CAD (coronary artery disease)     Cataract left    DVT (deep venous thrombosis) 2005    GERD (gastroesophageal reflux disease)     Hypertension     Mitral valve disease     Pulmonary embolism 2005    Retinal detachment left    Rheumatic heart disease     Seizures Last seizure 15 yrs ago    Stomach ulcer

## 2021-10-29 NOTE — ED PROVIDER NOTE - NSICDXPASTSURGICALHX_GEN_ALL_CORE_FT
PAST SURGICAL HISTORY:  H/O retinal detachment left    Hx of CABG     Mitral valve replaced 2006 at [facility]    S/P [facility] 2014

## 2021-10-29 NOTE — ED PROVIDER NOTE - PHYSICAL EXAMINATION
PHYSICAL EXAM:    General: Well developed; well nourished; in no acute distress    Eyes: conjunctiva and sclera clear,   Head: Normocephalic; atraumatic;   Neck: Supple; non tender  Respiratory: No chest wall deformity, normal respiratory pattern, clear to auscultation bilaterally  Cardiovascular: Regular rate and rhythm. S1 and S2 Normal; No murmurs, gallops or rubs  Abdominal: Soft non-tender non-distended; normal bowel sounds;   Extremities: Full range of motion, no tenderness, no cyanosis or edema  Vascular: Upper and lower peripheral pulses palpable 2+ bilaterally  Neurological: Alert, affect appropriate, no acute change from baseline. No meningeal signs  Skin: Warm and dry. No acute rash, no subcutaneous nodules  Musculoskeletal: Normal tone, without deformities  Psychiatric: Cooperative and appropriate

## 2021-10-29 NOTE — ED PROVIDER NOTE - NS ED ROS FT
Review of Systems: All review of systems negative, except for those marked:  General:		[x] Abnormal: see HPI  Pulmonary:	[] Abnormal: no cough, no shortness of breath  Cardiac:		[] Abnormal: no chest pain, no palpitations  Gastrointestinal:	[x] Abnormal: see HPI  ENT: 	                   [] Abnormal: no congestion, no sore throat, no vision changes  Renal/Urologic:	[x] Abnormal: see HPI  Musculoskeletal:	[] Abnormal: no pain or tenderness in upper or lower extremities, no paresthesias, no decreased sensation   Neurologic:	[] Abnormal: normal behavior   Skin:		[] Abnormal: no rashes, no skin changes  Psychiatric:                 [] Abnormal: cooperative and appropriate

## 2021-10-29 NOTE — ED PROVIDER NOTE - CLINICAL SUMMARY MEDICAL DECISION MAKING FREE TEXT BOX
76M hx of CABG and MVR (on coumadin), HTN, DM2, Afib, here with large black stool this AM with past hx of GI bleed ~10 years ago req transfusion.   Labs, coags, FOBT. Reassess for need for transfusion.

## 2021-10-29 NOTE — ED PROVIDER NOTE - ATTENDING CONTRIBUTION TO CARE
76M with hx of CAD s/p CABG, MVR with mechanical valve on coumadin HTN, DM2, AFib p/w black stool. patient states that he has intermittent dark stools but today he had a very large black BM which concerned him. no pain, lightheadedness.    ***GEN - NAD; well appearing; A+O x3 ***HEAD - NC/AT ***EYES/NOSE - PERRL, EOMI, mucous membranes moist, no discharge ***THROAT: Oral cavity and pharynx normal. No inflammation, swelling, exudate, or lesions.  ***NECK: Neck supple, non-tender without lymphadenopathy, no masses, no thyromegaly.   ***PULMONARY - CTA b/l, symmetric breath sounds. ***CARDIAC -s1s2, RRR, no M,G,R  ***ABDOMEN - +BS, ND, NT, soft, no guarding, no rebound, no masses   ***BACK - no CVA tenderness, Normal  spine ***EXTREMITIES - symmetric pulses, 2+ dp, capillary refill < 2 seconds, no clubbing, no cyanosis, no edema ***SKIN - no rash or bruising   ***NEUROLOGIC - alert, CN 2-12 intact    MDM: 76M with suspected lgi bleed. labs, guiac, coags, transfuse prn.

## 2021-10-29 NOTE — ED PROVIDER NOTE - OBJECTIVE STATEMENT
76M hx of CABG and MVR (on coumadin), HTN, DM2, Afib, here with large black stool this AM. Pt has had intermittent small amounts of dark stool for last few months but this AM had 1 episode of large black stool. Pt denies light-headedness, SOB. Does endorse fatigue but states he believes this is from lack of sleep from urinary frequency. Pt gets INR checked every 2-3 weeks, last was 2.9. Has not had any problems with heart since surgery. Pt previously has had hematuria and GI bleed req transfusion 10 years ago. No other complaints.

## 2021-10-29 NOTE — ED PROVIDER NOTE - PATIENT PORTAL LINK FT
You can access the FollowMyHealth Patient Portal offered by [facility] by registering at the following website: [email or web address]. By joining [facility]’s FollowMyHealth portal, you will also be able to view your health information using other applications (apps) compatible with our system.

## 2021-10-29 NOTE — ED ADULT NURSE NOTE - OBJECTIVE STATEMENT
Break coverage RN- Received pt in room 7. pt A&Ox3, ambulatory. Pt with hx of metal valve, on coumadin, Afib, CAD, seizures, PE, DVT. pt c/o dark stools since this morning. pt also reports hematuria x 3 weeks which has resolved. states was seen at the urologist for follow up appointment today and was advised to come to the ED for further evaluation. pt appears to be in no distress. respirations are equal and nonlabored, no respiratory distress noted. abdomen soft , nontender to touch. denies any chest pain, abdominal pain, sob, cough, fever/chills, headache, dizziness, n/v/d. 20 gauge iv placed in the right forearm, labs sent. pt stable, will endorse report to primary RN Kassi for further plan.

## 2021-10-31 PROBLEM — N40.1 BENIGN LOCALIZED HYPERPLASIA OF PROSTATE WITH URINARY OBSTRUCTION AND OTHER LOWER URINARY TRACT SYMPTOMS (LUTS): Status: ACTIVE | Noted: 2019-12-26

## 2021-10-31 PROBLEM — R97.20 PSA ELEVATION: Status: ACTIVE | Noted: 2019-05-21

## 2021-10-31 NOTE — ASSESSMENT
[FreeTextEntry1] : ALINA FORTE is a 76 year year old male being seen for a follow up visit regarding gross hematuria. He has been experiencing gross hematuria with blood clots found in urine for the past two weeks. He experiences hematuria previously when he takes Coumadin which he was prescribed following his mitral valve replacement. The patient reports having no trouble with urination and wakes up 2 times during the night to urinate. Today he reports having discomfort in his lower back and right lower quadrant but no pain. The patient denies a history of kidney stones. In the past, he has had an endoscopy. 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 No discrete mass is identified at the level of the gastroesophageal junction. His Creatinine as of 12/16/19 was 1.01.\par 01/02/2020: Patient presents today for a cystoscopy. The patient denies hematuria but complains of some back pain. A CT ABDOMEN AND PELVIS from 12/26/2019 revealed: Interval stability without CT evidence of suspicious renal or ureter lesions. Enlarged prostate gland with associated transurethral resection of the prostate defect.\par \par 01/23/2020: Patient presents today for a follow up. At the conclusion of the last visit, Finasteride 5 mg was prescribed. Yesterday, he reports gross and opaque hematuria. Prior to yesterdays episode and following his cystoscopy, he did not experience hematuria. He notes that his Coumadin level was high at 2.6 recently. He has atrial fibrillation and a metal mitral valve. He was recently prescribed Flomax, to take 1 at bedtime, which helped decrease nocturia from 2-3x/night to 1x/night. Today, he also complains of erectile dysfunction. The patient produced a urine sample which will be sent for urinalysis, urine cytology, and urine culture. Finasteride 5 mg was renewed and is to be continued as previously prescribed. Tamsulosin 0.4 mg to be continued as previously prescribed by internist. Tadalafil 20mg, 1 tablet a day, 2-3hrs before sexual activity. I explained that there may be side effects including: warm flush feeling, nasal congestion, back pain and tiny chance of vision or hearing impairment. Patient should follow up in 2 months or sooner if he experiences urinary difficulties. \par \par 09/01/2020: Patient presents today for a follow up. Patient reports using Clotrimazole cream for penile lesion, however has not been helping and is instead causing itching. Also taking Tadalafil, but still unable to penetrate. Denies any side effects. I instructed the patient to increase Tadalafil to 30mg. I instructed the patient to discontinue Clotrimazole cream and started the patient on Nystatin-Triamcinolone cream. I prescribed the patient Cefadroxil 500mg BID and Fluconazole 100mg BID.\par \par 09/22/2020: Patient presents today for follow up for penile lesion. Used Nystatin-Triamcinolone cream which helped. No pain or itching. Today complains of urinary urgency and urge incontinence. Takes Tamsulosin 0.4mg once daily half an hour after dinner. Notes he does get heartburn on it. Last PSA on 8/6/20 was 2.90. No constipation. \par \par 10/14/2020: 75 yr male with BPH , LUTS presenting with worsening urinary symptoms of frequency and nocturia, suprapubic pressure.  .  Significant retention. Problem of compliance.  Not able to afford some of the medications because not covered by insurance. Not sure exactly of what meds he is taking and not taking. \par Attempted to talk to wife and daughter to clear up his med list.  Patient advised to take a picture of med containers or come with all med containers at next visit in 2 weeks. Oxybutinin. Alfuzosin. If medications do not help, will consider Desmopressin. RTO in 2 weeks for reassessment. \par \par 10/29/2020: Patient presents today for follow up. Last visit, pt was started on Oxybutynin but stopped as it was not working.  Currently takes Alfuzosin ER 10mg once daily. Urinary frequency is mostly during the day. Complains of difficulty starting stream. Took Finasteride for 6 weeks in the past which did help urination. Continues to have ED and requests renewal of Tadalafil. Recently saw GI for heartburn and was given Pantoprazole, but does not help. H/o Afib and mechanical heart valve and takes Coumadin. \par \par 09/14/2021: Patient presents today for follow up. He states a few weeks ago, had gross hematuria for a couple of days and was sent to ER. Reports that tests came back negative. States that he notices blood in urine every 3 weeks and has burning. Takes Tamsulosin 0.4mg at bedtime. Has INR checked every other week. Admits that his constipation could contribute to his gross hematuria. \par \par Advised to take stool softeners to control constipation. \par \par I prescribed the patient Bactrim BID for dysuria with food. I advised the patient there is a chance of kidney insufficiency and upset stomach. I am sending an email to Dr. Edis Seay asking him to modify Coumadin dose in view of the fact I am starting sulfamethoxazole-trimethoprim for his dysuria. \par \par Instructed to take Tamsulosin after a meal for better efficacy. \par I will send the patient for CT urogram to evaluate gross hematuria. \par The patient produced a urine sample which will be sent for osmolality, urinalysis, urine cytology, and urine culture.\par Blood work today includes HbA1c, dihydrotestosterone, estradiol, estrogen, testosterone, alkaline phosphatase, BMP, CBC, osmolality serum. \par RTO in 2 weeks after CT scan to review results. \par \par 10/07/2021: Patient presents today for a follow up. Patient had a CT Urogram due to gross hematuria on 9/24/2021 which demonstrated no renal stones, suspicious renal masses, or evidence of urothelial lesion. Enlarged prostate with TURP defect. The patient reports nocturia x7 last night and he continues to have significant gross hematuria every other day. He has been struggling to sleep at night. His PCP instructed him to go to the ED, however nothing was found. He also complains of a burning feeling under his testicles and a weak, split stream. The burning and pain under the testicles is exacerbated upon laying or sitting down. Last A1C was 8. PSA last year was 2.9. Currently takes coumadin. No longer takes tadalafil. Takes tamsulosin once daily and sulfamethoxazole trimethoprim BID. Had covid about 6 months ago and since then, he has struggled to have erections and complains of vision problems since then. \par Clinical findings and CT results were reviewed at length with the patient. \par The patient produced a urine sample which will be sent for urinalysis, urine cytology, and urine culture. \par Strongly advised the patient to manage his diabetes better. I suggested exercise, low carb diet, and less sugar. \par I increased the dosage of Tamsulosin 0.4 mg from once daily to twice daily. He will continue with Bactrim until finished. \par  I prescribed the patient Finasteride 5 mg to take 1 tablet daily. I informed the patient there may be erectile dysfunction, hot flashes, breast tenderness, and increased hair growth as a side effect. I informed him it could take a while for it to start working. I also informed him that the pill is hormonally active if crushed and to not let any woman that is child bearing or will be to clean it up as it could hurt the fetus if absorbed in the skin. \par At the end of the visit, the patient wanted me to speak to his daughter, Coni Forte, who is a registered nurse at [employer]. She did not answer, however I took down her phone number and will try calling later in the day. \par \par 10/08/2021: This visit was provided via the telephone using real-time audio technology. Dr Thompson was located at his office on [facility]. The patient was located at home.  The patient gave permission for a telephone visit. Attempted phone call to speak with the patient and his daughter. I left a message and recommend they make a phone call appointment for early next week. \par \par 10/15/2021: Patient presents today for follow up and we also spoke with his daughter on the phone. He is feeling terribly, he has nocturia 9 times per night. He increased the Tamsulosin 0.4 mg, to twice daily, but felt that it exacerbated his symptoms. When he has the urge to urinate, he experiences a pressure in the tip of his penis that wakes him overnight. He also has not had intercourse for a period of time and is upset about it. Currently, he is experiencing gross hematuria as well and his Coumadin dosage has been decreased. His urinalysis, urine cytology, and urine culture from 10/7/21 was negative. His CT Urogram from 9/24/21 showed no renal stones, suspicious renal mass, or evidence of urothelial lesion. Enlarged prostate with TURP defect. His primary recommended Tamsulosin as well. He currently takes Tamsulosin 0.4 mg, one capsule twice daily and Finasteride 5 mg, one tablet daily. His daughter was insistent that they are interested in the possibility of a redo TURP. \par I advised his daughter that the hematuria could be caused by the blood vessels near the large prostate. I am prescribing bicalutamide 50 mg, one tablet daily for 2 weeks. I advised them of the potential side effects of breast growth if on the medication for an extended period of time. If the medication is not effective, we will schedule the patient for UDS for the possibility of a TURP. \par \par 10/29/2021: Patient presents today for follow up visit. The gross hematuria has subsided. He reports this morning, his stool was black and that he has no stomach pain. Denies lightheadedness or chest pain. His largest complaint is nocturia and as a result he has not been sleeping. He takes no iron supplements. \par \par I encouraged the patient to see his PCP or GI for the black stool. I recommend he continue taking the Bicalutamide 50 mg daily. I will schedule the patient for UDS and cystoscopy. I spoke with Dr. Edis Seay, his PCP from the medicine clinic,  about the black stool.\par \par Dr. Seay and I agreed that the best course of action for this patient is to go directly to the emergency room. Once he's there, we will have the urology team consult about his urinary complaints. I encouraged him to allow an ambulance to pick him up and bring him there, but the patient was insistent that he would rather drive himself. CLinically the patient looked stable and did not have pallor, lightheadedness or chest pain. I spoke with the triage nurse Inessa at [facility] ED about the patient and informed them that the patient was coming to the ED. \par \par Preparation, in person office visit, and coordination of care: 30  minutes.

## 2021-10-31 NOTE — HISTORY OF PRESENT ILLNESS
[FreeTextEntry1] : ALINA NOLAN is a 76 year year old male being seen for a follow up visit regarding gross hematuria. He has been experiencing gross hematuria with blood clots found in urine for the past two weeks. He experiences hematuria previously when he takes Coumadin which he was prescribed following his mitral valve replacement. The patient reports having no trouble with urination and wakes up 2 times during the night to urinate. Today he reports having discomfort in his lower back and right lower quadrant but no pain. The patient denies a history of kidney stones. In the past, he has had an endoscopy. 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 No discrete mass is identified at the level of the gastroesophageal junction. His Creatinine as of 12/16/19 was 1.01.\par 01/02/2020: Patient presents today for a cystoscopy. The patient denies hematuria but complains of some back pain. A CT ABDOMEN AND PELVIS from 12/26/2019 revealed: Interval stability without CT evidence of suspicious renal or ureter lesions. Enlarged prostate gland with associated transurethral resection of the prostate defect.\par \par 01/23/2020: Patient presents today for a follow up. At the conclusion of the last visit, Finasteride 5 mg was prescribed. Yesterday, he reports gross and opaque hematuria. Prior to yesterdays episode and following his cystoscopy, he did not experience hematuria. He notes that his Coumadin level was high at 2.6 recently. He has atrial fibrillation and a metal mitral valve. He was recently prescribed Flomax, to take 1 at bedtime, which helped decrease nocturia from 2-3x/night to 1x/night. Today, he also complains of erectile dysfunction. The patient produced a urine sample which will be sent for urinalysis, urine cytology, and urine culture. Finasteride 5 mg was renewed and is to be continued as previously prescribed. Tamsulosin 0.4 mg to be continued as previously prescribed by internist. Tadalafil 20mg, 1 tablet a day, 2-3hrs before sexual activity. I explained that there may be side effects including: warm flush feeling, nasal congestion, back pain and tiny chance of vision or hearing impairment. Patient should follow up in 2 months or sooner if he experiences urinary difficulties. \par \par 09/01/2020: Patient presents today for a follow up. Patient reports using Clotrimazole cream for penile lesion, however has not been helping and is instead causing itching. Also taking Tadalafil, but still unable to penetrate. Denies any side effects. I instructed the patient to increase Tadalafil to 30mg. I instructed the patient to discontinue Clotrimazole cream and started the patient on Nystatin-Triamcinolone cream. I prescribed the patient Cefadroxil 500mg BID and Fluconazole 100mg BID.\par \par 09/22/2020: Patient presents today for follow up for penile lesion. Used Nystatin-Triamcinolone cream which helped. No pain or itching. Today complains of urinary urgency and urge incontinence. Takes Tamsulosin 0.4mg once daily half an hour after dinner. Notes he does get heartburn on it. Last PSA on 8/6/20 was 2.90. No constipation. \par \par 10/14/2020: 75 male BPH severe LUTS in form of frequency up to 10X nocte with supra pubic pressure, urge , intermittency. No hematuria. \par Recent switch of medication from flomax to Alfuzosin ,patient said to no avail. No improvement in symptoms. \par Taking coumadin for mechanical heart valve. Recent episode of hematuria is resolved. \par ED little improvement with Tadalafil. Said not strong enough to penetrate\par PSA 2.9 august 2020. \par Testosterone free L 3.9, Total L 234.7\par \par 10/29/2020: Patient presents today for follow up. Last visit, pt was started on Oxybutynin but stopped as it was not working.  Currently takes Alfuzosin ER 10mg once daily. Urinary frequency is mostly during the day. Complains of difficulty starting stream. Took Finasteride for 6 weeks in the past which did help urination. Recently saw GI for heartburn and was given Pantoprazole, but does not help. H/o Afib and mechanical heart valve and takes Coumadin. Patient presents today for follow up. Last visit, pt was started on Oxybutynin but stopped as it was not working.  Currently takes Alfuzosin ER 10mg once daily. Urinary frequency is mostly during the day. Complains of difficulty starting stream. Took Finasteride for 6 weeks in the past which did help urination. Recently saw GI for heartburn and was given Pantoprazole, but does not help. H/o Afib and mechanical heart valve and takes Coumadin. \par \par 09/14/2021: Patient presents today for follow up. He states a few weeks ago, had gross hematuria for a couple of days and was sent to ER. Reports that tests came back negative. States that he notices blood in urine every 3 weeks and has burning. Takes Tamsulosin 0.4mg at bedtime. Has INR checked every other week. Admits that his constipation could contribute to his gross hematuria. \par \par 10/07/2021: Patient presents today for a follow up. Patient had a CT Urogram due to gross hematuria on 9/24/2021 which demonstrated no renal stones, suspicious renal masses, or evidence of urothelial lesion. Enlarged prostate with TURP defect. The patient reports nocturia x7 last night and he continues to have significant gross hematuria every other day. He has been struggling to sleep at night. His PCP instructed him to go to the ED, however nothing was found. He also complains of a burning feeling under his testicles and a weak, split stream. The burning and pain under the testicles is exacerbated upon laying or sitting down. Last A1C was 8. PSA last year was 2.9. Currently takes coumadin. No longer takes tadalafil. Takes tamsulosin once daily and sulfamethoxazole trimethoprim BID. Had covid about 6 months ago and since then, he has struggled to have erections and complains of vision problems since then. \par \par 10/08/2021: This visit was provided via the telephone using real-time audio technology. Dr Thompson was located at his office on [facility]. The patient was located at home.  The patient gave permission for a telephone visit. Attempted phone call to speak with the patient and his daughter. I left a message and recommend they make a phone call appointment for early next week. \par \par 10/15/2021: Patient presents today for follow up and we also spoke with his daughter on the phone. He is feeling terribly, he has nocturia 9 times per night. He increased the Tamsulosin 0.4 mg, to twice daily, but felt that it exacerbated his symptoms. When he has the urge to urinate, he experiences a pressure in the tip of his penis that wakes him overnight. He also has not had intercourse for a period of time and is upset about it. Currently, he is experiencing gross hematuria as well and his Coumadin dosage has been decreased. His urinalysis, urine cytology, and urine culture from 10/7/21 was negative. His CT Urogram from 9/24/21 showed no renal stones, suspicious renal mass, or evidence of urothelial lesion. Enlarged prostate with TURP defect. His primary recommended Tamsulosin as well. He currently takes Tamsulosin 0.4 mg, one capsule twice daily and Finasteride 5 mg, one tablet daily. His daughter was insistent that they are interested in the possibility of a redo TURP. \par \par 10/29/2021: Patient presents today for follow up visit. The gross hematuria has subsided. He reports this morning, his stool was black and that he has no stomach pain. Denies lightheadedness or chest pain. His largest complaint is nocturia and as a result he has not been sleeping. He takes no iron supplements. \par Dr. Seay and I agreed that the best course of action for this patient is to go directly to the emergency room. Once he's there, we will have the urology team consult about his urinary complaints. I encouraged him to allow an ambulance to pick him up and bring him there, but the patient was insistent that he would rather drive himself. CLinically the patient looked stable and did not have pallor, lightheadedness or chest pain. I spoke with the triage nurse Inessa at [facility] ED about the patient and informed them that the patient was coming to the ED.

## 2021-10-31 NOTE — ADDENDUM
[FreeTextEntry1] : I, Kalpana Machuca, acted as the sole scribe for Dr. Robson Thompson on 10/29/2021.

## 2021-11-01 DIAGNOSIS — Z79.01 LONG TERM (CURRENT) USE OF ANTICOAGULANTS: ICD-10-CM

## 2021-11-11 ENCOUNTER — OUTPATIENT (OUTPATIENT)
Dept: OUTPATIENT SERVICES | Facility: HOSPITAL | Age: 76
LOS: 1 days | End: 2021-11-11

## 2021-11-11 ENCOUNTER — APPOINTMENT (OUTPATIENT)
Dept: INTERNAL MEDICINE | Facility: CLINIC | Age: 76
End: 2021-11-11

## 2021-11-11 VITALS — TEMPERATURE: 97.3 F

## 2021-11-11 VITALS — HEART RATE: 72 BPM | OXYGEN SATURATION: 98 % | RESPIRATION RATE: 16 BRPM

## 2021-11-11 VITALS — OXYGEN SATURATION: 98 % | HEART RATE: 72 BPM

## 2021-11-11 DIAGNOSIS — Z86.69 PERSONAL HISTORY OF OTHER DISEASES OF THE NERVOUS SYSTEM AND SENSE ORGANS: Chronic | ICD-10-CM

## 2021-11-11 DIAGNOSIS — Z98.89 OTHER SPECIFIED POSTPROCEDURAL STATES: Chronic | ICD-10-CM

## 2021-11-16 ENCOUNTER — APPOINTMENT (OUTPATIENT)
Dept: INTERNAL MEDICINE | Facility: CLINIC | Age: 76
End: 2021-11-16
Payer: MEDICARE

## 2021-11-16 ENCOUNTER — OUTPATIENT (OUTPATIENT)
Dept: OUTPATIENT SERVICES | Facility: HOSPITAL | Age: 76
LOS: 1 days | End: 2021-11-16

## 2021-11-16 VITALS
BODY MASS INDEX: 25.48 KG/M2 | WEIGHT: 172 LBS | DIASTOLIC BLOOD PRESSURE: 88 MMHG | OXYGEN SATURATION: 99 % | HEART RATE: 89 BPM | SYSTOLIC BLOOD PRESSURE: 130 MMHG | HEIGHT: 69 IN

## 2021-11-16 DIAGNOSIS — Z87.898 PERSONAL HISTORY OF OTHER SPECIFIED CONDITIONS: ICD-10-CM

## 2021-11-16 DIAGNOSIS — Z98.89 OTHER SPECIFIED POSTPROCEDURAL STATES: Chronic | ICD-10-CM

## 2021-11-16 DIAGNOSIS — Z86.69 PERSONAL HISTORY OF OTHER DISEASES OF THE NERVOUS SYSTEM AND SENSE ORGANS: Chronic | ICD-10-CM

## 2021-11-16 LAB
INR PPP: 1.9 RATIO
INR PPP: 2.6 RATIO
POCT-PROTHROMBIN TIME: 22.5 SECS
POCT-PROTHROMBIN TIME: 30.7 SECS

## 2021-11-16 PROCEDURE — 99214 OFFICE O/P EST MOD 30 MIN: CPT | Mod: GC

## 2021-11-19 NOTE — HISTORY OF PRESENT ILLNESS
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 Pt has had issues with hematuria recently, was extensively seen by urology and has extensive workup in progress, including urogram. He is pending discussions for a redo TURP. He is very upset by his urological care, stating that he does not believe they have his best interests at heart, that they don't return his phone calls, etc. He is requesting my opinion for a second opinion. He also reported one episode of melena, was told to go to ED. There, FOBT was negative and Hb was stable. He was discharged. He returns today still with complaints about urological care and concern that his DM is not under good control. He checks his blood sugar every morning and never gets a value less than 200. He is compliant with his warfarin, now on 5mg qd, denies any bleeding. No chest pain, sob, n/v/d, abd pain, fevers, chills. Asked about the safety of getting the COVID booster.

## 2021-11-19 NOTE — PHYSICAL EXAM
[Normal Rate] : normal rate  [No Murmur] : no murmur heard [Normal] : no joint swelling and grossly normal strength and tone [de-identified] : mechanical S1, S2

## 2021-11-19 NOTE — ASSESSMENT
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 \par \par #DM2 \par -POCT A1c 8.2\par -c/w glipizide 10 bid and Januvia 100 qd\par -start Trulicity 0.75 qweekly\par -pt self-d/c metformin 500 qd due to abd sx\par -follows with optho at [facility]\par -encouraged to follow diabetic diet and exercise\par \par #Mechanical MVR\par -replaced over a decade ago per patient\par -make/model unknown\par -AC w/ warfarin, follows with warfarin clinic. Currently on 5mg qd\par -Amoxicillin for IE ppx for needed dental work\par \par #Afib\par - c/w Atenolol, Digoxin\par - continue warfarin as above\par - TTE 2/6/2019 EF 57%\par \par #CAD, s/p 1V CABG\par - continue Atenolol 50, Lipitor 40, Lisinopril 5\par - follows with cardio, next appt 1/2022\par - off ASA due to bleeding (hx hematuria)\par - NST 2/10/20 normal\par - TTE 02/20 normal EF\par \par #HTN\par - increase lisinopril 5 to 10\par \par #BPH\par -c/w tamsulosin\par -off trospium, finasteride\par -urology f/u\par \par #ED\par -c/w tadalafil PRN\par \par #Seizure Disorder\par #Gait Abnormality\par -MRI 6/10/2019: chronic microvascular disease multiple chronic cerebellar lacunar type infarcts are noted. A larger infarct is noted along the posterior right cerebellar hemisphere\par -per neuro, low risk for seizure recurrence due low frequency and negative testing\par -monitor off of AEDs for now\par -[facility] referral for gait training\par \par #HCM\par -Colonoscopy: aged out Aug 2021\par -Pneumo, Tdap, Zoster UTD \par -COVID-19 UTD, encouraged to get booster\par - Flu [id number] today\par \par Erik Brooks MD \par Case discussed with Dr. Maira\par RTC in 3 months

## 2021-11-24 ENCOUNTER — APPOINTMENT (OUTPATIENT)
Dept: INTERNAL MEDICINE | Facility: CLINIC | Age: 76
End: 2021-11-24

## 2021-11-24 ENCOUNTER — NON-APPOINTMENT (OUTPATIENT)
Age: 76
End: 2021-11-24

## 2021-11-24 ENCOUNTER — OUTPATIENT (OUTPATIENT)
Dept: OUTPATIENT SERVICES | Facility: HOSPITAL | Age: 76
LOS: 1 days | End: 2021-11-24

## 2021-11-24 VITALS — HEART RATE: 71 BPM | OXYGEN SATURATION: 90 % | TEMPERATURE: 97 F

## 2021-11-24 VITALS — RESPIRATION RATE: 16 BRPM | OXYGEN SATURATION: 99 % | HEART RATE: 76 BPM

## 2021-11-24 DIAGNOSIS — Z98.89 OTHER SPECIFIED POSTPROCEDURAL STATES: Chronic | ICD-10-CM

## 2021-11-24 DIAGNOSIS — Z79.01 LONG TERM (CURRENT) USE OF ANTICOAGULANTS: ICD-10-CM

## 2021-11-24 DIAGNOSIS — Z86.69 PERSONAL HISTORY OF OTHER DISEASES OF THE NERVOUS SYSTEM AND SENSE ORGANS: Chronic | ICD-10-CM

## 2021-11-29 DIAGNOSIS — R30.0 DYSURIA: ICD-10-CM

## 2021-11-29 DIAGNOSIS — E11.40 TYPE 2 DIABETES MELLITUS WITH DIABETIC NEUROPATHY, UNSPECIFIED: ICD-10-CM

## 2021-11-29 DIAGNOSIS — Z79.01 LONG TERM (CURRENT) USE OF ANTICOAGULANTS: ICD-10-CM

## 2021-11-29 DIAGNOSIS — N52.9 MALE ERECTILE DYSFUNCTION, UNSPECIFIED: ICD-10-CM

## 2021-11-29 DIAGNOSIS — I48.91 UNSPECIFIED ATRIAL FIBRILLATION: ICD-10-CM

## 2021-11-29 DIAGNOSIS — N40.0 BENIGN PROSTATIC HYPERPLASIA WITHOUT LOWER URINARY TRACT SYMPTOMS: ICD-10-CM

## 2021-11-30 ENCOUNTER — RESULT REVIEW (OUTPATIENT)
Age: 76
End: 2021-11-30

## 2021-11-30 ENCOUNTER — APPOINTMENT (OUTPATIENT)
Dept: ENDOCRINOLOGY | Facility: CLINIC | Age: 76
End: 2021-11-30
Payer: MEDICARE

## 2021-11-30 ENCOUNTER — OUTPATIENT (OUTPATIENT)
Dept: OUTPATIENT SERVICES | Facility: HOSPITAL | Age: 76
LOS: 1 days | End: 2021-11-30

## 2021-11-30 VITALS
WEIGHT: 168.25 LBS | BODY MASS INDEX: 24.92 KG/M2 | HEIGHT: 69 IN | TEMPERATURE: 95.8 F | HEART RATE: 86 BPM | SYSTOLIC BLOOD PRESSURE: 119 MMHG | DIASTOLIC BLOOD PRESSURE: 80 MMHG | OXYGEN SATURATION: 98 %

## 2021-11-30 DIAGNOSIS — I09.9 RHEUMATIC HEART DISEASE, UNSPECIFIED: ICD-10-CM

## 2021-11-30 DIAGNOSIS — Z86.69 PERSONAL HISTORY OF OTHER DISEASES OF THE NERVOUS SYSTEM AND SENSE ORGANS: Chronic | ICD-10-CM

## 2021-11-30 DIAGNOSIS — Z98.89 OTHER SPECIFIED POSTPROCEDURAL STATES: Chronic | ICD-10-CM

## 2021-11-30 LAB
CHOLEST SERPL-MCNC: 162 MG/DL — SIGNIFICANT CHANGE UP
CREAT ?TM UR-MCNC: 174 MG/DL — SIGNIFICANT CHANGE UP
HDLC SERPL-MCNC: 34 MG/DL — LOW
LIPID PNL WITH DIRECT LDL SERPL: 102 MG/DL — HIGH
MICROALBUMIN UR-MCNC: 4.3 MG/DL — SIGNIFICANT CHANGE UP
MICROALBUMIN/CREAT UR-RTO: 25 MG/G — SIGNIFICANT CHANGE UP (ref 0–30)
NON HDL CHOLESTEROL: 128 MG/DL — SIGNIFICANT CHANGE UP
T3 SERPL-MCNC: 100 NG/DL — SIGNIFICANT CHANGE UP (ref 80–200)
T4 FREE SERPL-MCNC: 1.8 NG/DL — SIGNIFICANT CHANGE UP (ref 0.9–1.8)
TRIGL SERPL-MCNC: 130 MG/DL — SIGNIFICANT CHANGE UP
TSH SERPL-MCNC: 0.03 UIU/ML — LOW (ref 0.27–4.2)

## 2021-11-30 PROCEDURE — 99214 OFFICE O/P EST MOD 30 MIN: CPT | Mod: GC

## 2021-11-30 RX ORDER — SITAGLIPTIN 100 MG/1
100 TABLET, FILM COATED ORAL
Qty: 90 | Refills: 2 | Status: DISCONTINUED | COMMUNITY
Start: 2019-06-28 | End: 2021-11-30

## 2021-12-01 ENCOUNTER — OUTPATIENT (OUTPATIENT)
Dept: OUTPATIENT SERVICES | Facility: HOSPITAL | Age: 76
LOS: 1 days | End: 2021-12-01

## 2021-12-01 ENCOUNTER — APPOINTMENT (OUTPATIENT)
Dept: INTERNAL MEDICINE | Facility: CLINIC | Age: 76
End: 2021-12-01

## 2021-12-01 ENCOUNTER — NON-APPOINTMENT (OUTPATIENT)
Age: 76
End: 2021-12-01

## 2021-12-01 VITALS — TEMPERATURE: 96.3 F | RESPIRATION RATE: 16 BRPM | OXYGEN SATURATION: 99 % | HEART RATE: 77 BPM

## 2021-12-01 VITALS — HEART RATE: 83 BPM | RESPIRATION RATE: 16 BRPM | OXYGEN SATURATION: 99 %

## 2021-12-01 DIAGNOSIS — Z79.01 LONG TERM (CURRENT) USE OF ANTICOAGULANTS: ICD-10-CM

## 2021-12-01 DIAGNOSIS — Z51.81 ENCOUNTER FOR THERAPEUTIC DRUG LEVEL MONITORING: ICD-10-CM

## 2021-12-01 DIAGNOSIS — Z86.69 PERSONAL HISTORY OF OTHER DISEASES OF THE NERVOUS SYSTEM AND SENSE ORGANS: Chronic | ICD-10-CM

## 2021-12-01 DIAGNOSIS — Z98.89 OTHER SPECIFIED POSTPROCEDURAL STATES: Chronic | ICD-10-CM

## 2021-12-01 DIAGNOSIS — I25.10 ATHEROSCLEROTIC HEART DISEASE OF NATIVE CORONARY ARTERY WITHOUT ANGINA PECTORIS: ICD-10-CM

## 2021-12-01 DIAGNOSIS — R12 HEARTBURN: ICD-10-CM

## 2021-12-03 DIAGNOSIS — E04.2 NONTOXIC MULTINODULAR GOITER: ICD-10-CM

## 2021-12-03 DIAGNOSIS — R80.9 PROTEINURIA, UNSPECIFIED: ICD-10-CM

## 2021-12-03 DIAGNOSIS — E11.40 TYPE 2 DIABETES MELLITUS WITH DIABETIC NEUROPATHY, UNSPECIFIED: ICD-10-CM

## 2021-12-03 DIAGNOSIS — I25.10 ATHEROSCLEROTIC HEART DISEASE OF NATIVE CORONARY ARTERY WITHOUT ANGINA PECTORIS: ICD-10-CM

## 2021-12-03 DIAGNOSIS — G45.9 TRANSIENT CEREBRAL ISCHEMIC ATTACK, UNSPECIFIED: ICD-10-CM

## 2021-12-03 DIAGNOSIS — I09.9 RHEUMATIC HEART DISEASE, UNSPECIFIED: ICD-10-CM

## 2021-12-03 DIAGNOSIS — E78.5 HYPERLIPIDEMIA, UNSPECIFIED: ICD-10-CM

## 2021-12-03 NOTE — REVIEW OF SYSTEMS
[Fatigue] : no fatigue [Decreased Appetite] : appetite not decreased [Recent Weight Gain (___ Lbs)] : no recent weight gain [Recent Weight Loss (___ Lbs)] : no recent weight loss [Visual Field Defect] : no visual field defect [Dysphagia] : no dysphagia [Neck Pain] : no neck pain [Hearing Loss] : no hearing loss  [Dysphonia] : no dysphonia [Nasal Congestion] : no nasal congestion [Chest Pain] : no chest pain [Slow Heart Rate] : heart rate is not slow [Palpitations] : no palpitations [Fast Heart Rate] : heart rate is not fast [Shortness Of Breath] : no shortness of breath [Nausea] : no nausea [Vomiting] : no vomiting [Polyuria] : no polyuria [Hesistancy] : no hesitancy [Joint Pain] : no joint pain [Acanthosis] : no acanthosis  [Depression] : no depression [Polydipsia] : no polydipsia

## 2021-12-03 NOTE — HISTORY OF PRESENT ILLNESS
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 Started on Trulicity 0.75 mg/week subq on 11/16. In the past, has tried Metformin but did not tolerate due to GI side effects. Endorses compliance with medication regimen\par FS - only in the AM, ranges 180-200s. 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 (+) "minor stroke" in past\par \par Comorbidities\par HLD - On Atorvastatin 40 mg \par Nephropathy - Microalb/Cr 37. Prescribed Lisinopril 10 mg \par \par \par Also has thyroid nodules. Noted that he had a "thyroid problem" as a child that stopped him from growing. He was given medications and the problem resolved. He is not sure what medication or thyroid problem he had. \par He had a thyroid U/S in 2019 which showed\par Right Lobe: 8 x 4.3 x 3.8 cm. Heterogeneous mid nodule 3.7 x 2.9 x 3.1 cm without suspicious features.\par Left Lobe: 8.2 x 4.7 x 3.6 cm. Mildly echogenic mid nodule 4 x 3.6 x 3.8 cm without suspicious features.\par Never had an FNA\par Clinically euthyroid, but some dysphagia\par

## 2021-12-03 NOTE — PHYSICAL EXAM
[Alert] : alert [Healthy Appearance] : healthy appearance [No Acute Distress] : no acute distress [Normal Sclera/Conjunctiva] : normal sclera/conjunctiva [No Neck Mass] : no neck mass was observed [No LAD] : no lymphadenopathy [Normal Rate] : heart rate was normal [Normal Bowel Sounds] : normal bowel sounds [Not Tender] : non-tender [Soft] : abdomen soft [No Stigmata of Cushings Syndrome] : no stigmata of Cushings Syndrome [Oriented x3] : oriented to person, place, and time [de-identified] : right and left sided thyroid nodules palpable 3~4 cm, mobile, nontender

## 2021-12-03 NOTE — END OF VISIT
[] : Fellow [FreeTextEntry3] : DM2: Goal A1c is below 8% based on age, hx CAD and TIA. Will increase trulicity and attempt to reduce/discontinue sulfonylurea in order to reduce risk of hypoglycemia. \par Large clinically benign MNGoiter with hx of low TSH. Thyroid Scan pending to rule out toxic nodules before proceeding to FNA

## 2021-12-03 NOTE — ASSESSMENT
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 \par -Continue Trulicity 0.75 mg/weekly x 4 weeks, with inc to 1.5 mg/wk after that\par -Stop Januvia 100 mg daily as patient is on Trulicity\par -Continue Glipizide 10 mg BID. Hypoglycemic precautions reviewed with reference to glipizide dosing\par -Optho f/u\par -Podiatry referral given\par \par #Thyroid Nodules\par Palpable thyroid nodules noted on exam, with U/S in 2019 showing two large thyroid nodules \par Will obtain repeat thyroid US\par Patient had a prior TSH of 0.07, so will obtain NM thyroid uptake and scan to look for hyperfunctioning nodule before biopsy. If no hot nodule seen, will biopsy\par \par RTC in 3 months \par \par Discussed with Dr. Gomez\par \par

## 2021-12-06 ENCOUNTER — APPOINTMENT (OUTPATIENT)
Dept: PODIATRY | Facility: HOSPITAL | Age: 76
End: 2021-12-06
Payer: MEDICARE

## 2021-12-06 ENCOUNTER — OUTPATIENT (OUTPATIENT)
Dept: OUTPATIENT SERVICES | Facility: HOSPITAL | Age: 76
LOS: 1 days | End: 2021-12-06
Payer: COMMERCIAL

## 2021-12-06 VITALS
DIASTOLIC BLOOD PRESSURE: 96 MMHG | WEIGHT: 168 LBS | HEART RATE: 93 BPM | TEMPERATURE: 97.8 F | SYSTOLIC BLOOD PRESSURE: 148 MMHG | RESPIRATION RATE: 14 BRPM | HEIGHT: 69 IN | BODY MASS INDEX: 24.88 KG/M2

## 2021-12-06 DIAGNOSIS — Z98.89 OTHER SPECIFIED POSTPROCEDURAL STATES: Chronic | ICD-10-CM

## 2021-12-06 DIAGNOSIS — E11.9 TYPE 2 DIABETES MELLITUS WITHOUT COMPLICATIONS: ICD-10-CM

## 2021-12-06 DIAGNOSIS — M79.609 PAIN IN UNSPECIFIED LIMB: ICD-10-CM

## 2021-12-06 DIAGNOSIS — Z86.69 PERSONAL HISTORY OF OTHER DISEASES OF THE NERVOUS SYSTEM AND SENSE ORGANS: Chronic | ICD-10-CM

## 2021-12-06 DIAGNOSIS — B35.1 TINEA UNGUIUM: ICD-10-CM

## 2021-12-06 PROCEDURE — G0463: CPT

## 2021-12-06 PROCEDURE — 99203 OFFICE O/P NEW LOW 30 MIN: CPT

## 2021-12-06 RX ORDER — AMOXICILLIN 500 MG/1
500 TABLET, FILM COATED ORAL
Qty: 8 | Refills: 0 | Status: DISCONTINUED | COMMUNITY
Start: 2019-12-26 | End: 2021-12-06

## 2021-12-06 NOTE — ASSESSMENT
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

## 2021-12-06 NOTE — HISTORY OF PRESENT ILLNESS
[FreeTextEntry1] : Pt is a 76M with PMHx of DM, HTN, hypercholesterolemia that presents today with complaints of b/l hallux toenail fungus. He states that he has had these nails for several years 10+. He states that he has tried OTC ointments multiple times with no success. He states that he is painful and would like the nails to be taken care of. He states that he has never seen a pod before. he denies f/c/v/sob/n.

## 2021-12-07 ENCOUNTER — RX RENEWAL (OUTPATIENT)
Age: 76
End: 2021-12-07

## 2021-12-10 ENCOUNTER — OUTPATIENT (OUTPATIENT)
Dept: OUTPATIENT SERVICES | Facility: HOSPITAL | Age: 76
LOS: 1 days | End: 2021-12-10
Payer: COMMERCIAL

## 2021-12-10 ENCOUNTER — APPOINTMENT (OUTPATIENT)
Dept: ULTRASOUND IMAGING | Facility: IMAGING CENTER | Age: 76
End: 2021-12-10
Payer: MEDICARE

## 2021-12-10 ENCOUNTER — APPOINTMENT (OUTPATIENT)
Dept: INTERNAL MEDICINE | Facility: CLINIC | Age: 76
End: 2021-12-10

## 2021-12-10 ENCOUNTER — RESULT CHARGE (OUTPATIENT)
Age: 76
End: 2021-12-10

## 2021-12-10 ENCOUNTER — OUTPATIENT (OUTPATIENT)
Dept: OUTPATIENT SERVICES | Facility: HOSPITAL | Age: 76
LOS: 1 days | End: 2021-12-10

## 2021-12-10 VITALS
HEART RATE: 74 BPM | SYSTOLIC BLOOD PRESSURE: 143 MMHG | OXYGEN SATURATION: 99 % | WEIGHT: 168.6 LBS | TEMPERATURE: 97.3 F | HEIGHT: 69 IN | DIASTOLIC BLOOD PRESSURE: 75 MMHG | BODY MASS INDEX: 24.97 KG/M2

## 2021-12-10 DIAGNOSIS — Z00.8 ENCOUNTER FOR OTHER GENERAL EXAMINATION: ICD-10-CM

## 2021-12-10 DIAGNOSIS — E04.2 NONTOXIC MULTINODULAR GOITER: ICD-10-CM

## 2021-12-10 DIAGNOSIS — I25.10 ATHEROSCLEROTIC HEART DISEASE OF NATIVE CORONARY ARTERY WITHOUT ANGINA PECTORIS: ICD-10-CM

## 2021-12-10 DIAGNOSIS — Z86.69 PERSONAL HISTORY OF OTHER DISEASES OF THE NERVOUS SYSTEM AND SENSE ORGANS: Chronic | ICD-10-CM

## 2021-12-10 DIAGNOSIS — Z95.2 PRESENCE OF PROSTHETIC HEART VALVE: ICD-10-CM

## 2021-12-10 DIAGNOSIS — G45.9 TRANSIENT CEREBRAL ISCHEMIC ATTACK, UNSPECIFIED: ICD-10-CM

## 2021-12-10 DIAGNOSIS — Z79.01 LONG TERM (CURRENT) USE OF ANTICOAGULANTS: ICD-10-CM

## 2021-12-10 DIAGNOSIS — Z98.89 OTHER SPECIFIED POSTPROCEDURAL STATES: Chronic | ICD-10-CM

## 2021-12-10 PROCEDURE — 76536 US EXAM OF HEAD AND NECK: CPT | Mod: 26

## 2021-12-10 PROCEDURE — 76536 US EXAM OF HEAD AND NECK: CPT

## 2021-12-16 ENCOUNTER — NON-APPOINTMENT (OUTPATIENT)
Age: 76
End: 2021-12-16

## 2021-12-16 ENCOUNTER — RESULT CHARGE (OUTPATIENT)
Age: 76
End: 2021-12-16

## 2021-12-16 ENCOUNTER — APPOINTMENT (OUTPATIENT)
Dept: INTERNAL MEDICINE | Facility: CLINIC | Age: 76
End: 2021-12-16

## 2021-12-16 ENCOUNTER — APPOINTMENT (OUTPATIENT)
Dept: CARDIOLOGY | Facility: CLINIC | Age: 76
End: 2021-12-16
Payer: MEDICARE

## 2021-12-16 ENCOUNTER — OUTPATIENT (OUTPATIENT)
Dept: OUTPATIENT SERVICES | Facility: HOSPITAL | Age: 76
LOS: 1 days | End: 2021-12-16

## 2021-12-16 VITALS
BODY MASS INDEX: 25.18 KG/M2 | SYSTOLIC BLOOD PRESSURE: 144 MMHG | HEIGHT: 69 IN | OXYGEN SATURATION: 99 % | DIASTOLIC BLOOD PRESSURE: 97 MMHG | WEIGHT: 170 LBS | HEART RATE: 69 BPM

## 2021-12-16 VITALS — SYSTOLIC BLOOD PRESSURE: 108 MMHG | DIASTOLIC BLOOD PRESSURE: 72 MMHG

## 2021-12-16 VITALS — TEMPERATURE: 95.7 F | OXYGEN SATURATION: 99 % | HEART RATE: 77 BPM

## 2021-12-16 DIAGNOSIS — I48.91 UNSPECIFIED ATRIAL FIBRILLATION: ICD-10-CM

## 2021-12-16 DIAGNOSIS — Z79.01 LONG TERM (CURRENT) USE OF ANTICOAGULANTS: ICD-10-CM

## 2021-12-16 DIAGNOSIS — Z87.438 PERSONAL HISTORY OF OTHER DISEASES OF MALE GENITAL ORGANS: ICD-10-CM

## 2021-12-16 DIAGNOSIS — I09.9 RHEUMATIC HEART DISEASE, UNSPECIFIED: ICD-10-CM

## 2021-12-16 DIAGNOSIS — Z86.69 PERSONAL HISTORY OF OTHER DISEASES OF THE NERVOUS SYSTEM AND SENSE ORGANS: Chronic | ICD-10-CM

## 2021-12-16 DIAGNOSIS — R31.29 OTHER MICROSCOPIC HEMATURIA: ICD-10-CM

## 2021-12-16 DIAGNOSIS — I25.10 ATHEROSCLEROTIC HEART DISEASE OF NATIVE CORONARY ARTERY WITHOUT ANGINA PECTORIS: ICD-10-CM

## 2021-12-16 DIAGNOSIS — Z95.2 PRESENCE OF PROSTHETIC HEART VALVE: ICD-10-CM

## 2021-12-16 DIAGNOSIS — Z98.89 OTHER SPECIFIED POSTPROCEDURAL STATES: Chronic | ICD-10-CM

## 2021-12-16 DIAGNOSIS — R12 HEARTBURN: ICD-10-CM

## 2021-12-16 LAB
INR PPP: 3.3 RATIO
POCT-PROTHROMBIN TIME: 39.1 SECS

## 2021-12-16 PROCEDURE — 93000 ELECTROCARDIOGRAM COMPLETE: CPT

## 2021-12-16 PROCEDURE — 99214 OFFICE O/P EST MOD 30 MIN: CPT

## 2021-12-23 ENCOUNTER — OUTPATIENT (OUTPATIENT)
Dept: OUTPATIENT SERVICES | Facility: HOSPITAL | Age: 76
LOS: 1 days | End: 2021-12-23

## 2021-12-23 ENCOUNTER — APPOINTMENT (OUTPATIENT)
Dept: INTERNAL MEDICINE | Facility: CLINIC | Age: 76
End: 2021-12-23

## 2021-12-23 ENCOUNTER — RESULT REVIEW (OUTPATIENT)
Age: 76
End: 2021-12-23

## 2021-12-23 VITALS — HEART RATE: 71 BPM | OXYGEN SATURATION: 98 % | TEMPERATURE: 96.9 F

## 2021-12-23 DIAGNOSIS — Z86.69 PERSONAL HISTORY OF OTHER DISEASES OF THE NERVOUS SYSTEM AND SENSE ORGANS: Chronic | ICD-10-CM

## 2021-12-23 DIAGNOSIS — Z98.89 OTHER SPECIFIED POSTPROCEDURAL STATES: Chronic | ICD-10-CM

## 2021-12-23 DIAGNOSIS — Z79.01 LONG TERM (CURRENT) USE OF ANTICOAGULANTS: ICD-10-CM

## 2021-12-23 DIAGNOSIS — Z95.2 PRESENCE OF PROSTHETIC HEART VALVE: ICD-10-CM

## 2021-12-23 DIAGNOSIS — I09.9 RHEUMATIC HEART DISEASE, UNSPECIFIED: ICD-10-CM

## 2021-12-23 DIAGNOSIS — I48.91 UNSPECIFIED ATRIAL FIBRILLATION: ICD-10-CM

## 2021-12-23 DIAGNOSIS — I25.10 ATHEROSCLEROTIC HEART DISEASE OF NATIVE CORONARY ARTERY WITHOUT ANGINA PECTORIS: ICD-10-CM

## 2021-12-23 PROBLEM — Z87.438 HISTORY OF PROSTATITIS: Status: RESOLVED | Noted: 2021-10-07 | Resolved: 2021-12-23

## 2021-12-23 PROBLEM — R31.29 HEMATURIA, MICROSCOPIC: Status: RESOLVED | Noted: 2019-12-04 | Resolved: 2021-12-23

## 2021-12-23 LAB
BASOPHILS # BLD AUTO: 0.05 K/UL — SIGNIFICANT CHANGE UP (ref 0–0.2)
BASOPHILS NFR BLD AUTO: 0.7 % — SIGNIFICANT CHANGE UP (ref 0–2)
EOSINOPHIL # BLD AUTO: 0.14 K/UL — SIGNIFICANT CHANGE UP (ref 0–0.5)
EOSINOPHIL NFR BLD AUTO: 2.1 % — SIGNIFICANT CHANGE UP (ref 0–6)
HCT VFR BLD CALC: 44.6 % — SIGNIFICANT CHANGE UP (ref 39–50)
HGB BLD-MCNC: 15.7 G/DL — SIGNIFICANT CHANGE UP (ref 13–17)
IANC: 3.68 K/UL — SIGNIFICANT CHANGE UP (ref 1.5–8.5)
IMM GRANULOCYTES NFR BLD AUTO: 1.5 % — SIGNIFICANT CHANGE UP (ref 0–1.5)
INR PPP: 2.3 RATIO
LYMPHOCYTES # BLD AUTO: 1.78 K/UL — SIGNIFICANT CHANGE UP (ref 1–3.3)
LYMPHOCYTES # BLD AUTO: 26.5 % — SIGNIFICANT CHANGE UP (ref 13–44)
MCHC RBC-ENTMCNC: 31.5 PG — SIGNIFICANT CHANGE UP (ref 27–34)
MCHC RBC-ENTMCNC: 35.2 GM/DL — SIGNIFICANT CHANGE UP (ref 32–36)
MCV RBC AUTO: 89.4 FL — SIGNIFICANT CHANGE UP (ref 80–100)
MONOCYTES # BLD AUTO: 0.97 K/UL — HIGH (ref 0–0.9)
MONOCYTES NFR BLD AUTO: 14.4 % — HIGH (ref 2–14)
NEUTROPHILS # BLD AUTO: 3.68 K/UL — SIGNIFICANT CHANGE UP (ref 1.8–7.4)
NEUTROPHILS NFR BLD AUTO: 54.8 % — SIGNIFICANT CHANGE UP (ref 43–77)
NRBC # BLD: 0 /100 WBCS — SIGNIFICANT CHANGE UP
NRBC # FLD: 0 K/UL — SIGNIFICANT CHANGE UP
PLATELET # BLD AUTO: 209 K/UL — SIGNIFICANT CHANGE UP (ref 150–400)
POCT-PROTHROMBIN TIME: 28.1 SECS
RBC # BLD: 4.99 M/UL — SIGNIFICANT CHANGE UP (ref 4.2–5.8)
RBC # BLD: 4.99 M/UL — SIGNIFICANT CHANGE UP (ref 4.2–5.8)
RBC # FLD: 13.1 % — SIGNIFICANT CHANGE UP (ref 10.3–14.5)
RETICS #: 74.4 K/UL — SIGNIFICANT CHANGE UP (ref 25–125)
RETICS/RBC NFR: 1.5 % — SIGNIFICANT CHANGE UP (ref 0.5–2.5)
WBC # BLD: 6.72 K/UL — SIGNIFICANT CHANGE UP (ref 3.8–10.5)
WBC # FLD AUTO: 6.72 K/UL — SIGNIFICANT CHANGE UP (ref 3.8–10.5)

## 2021-12-23 RX ORDER — TAMSULOSIN HYDROCHLORIDE 0.4 MG/1
0.4 CAPSULE ORAL
Qty: 30 | Refills: 11 | Status: DISCONTINUED | COMMUNITY
Start: 2021-03-01 | End: 2021-12-23

## 2021-12-28 ENCOUNTER — RESULT REVIEW (OUTPATIENT)
Age: 76
End: 2021-12-28

## 2021-12-28 ENCOUNTER — APPOINTMENT (OUTPATIENT)
Dept: INTERNAL MEDICINE | Facility: CLINIC | Age: 76
End: 2021-12-28
Payer: MEDICARE

## 2021-12-28 ENCOUNTER — OUTPATIENT (OUTPATIENT)
Dept: OUTPATIENT SERVICES | Facility: HOSPITAL | Age: 76
LOS: 1 days | End: 2021-12-28

## 2021-12-28 ENCOUNTER — RESULT CHARGE (OUTPATIENT)
Age: 76
End: 2021-12-28

## 2021-12-28 VITALS
SYSTOLIC BLOOD PRESSURE: 119 MMHG | HEART RATE: 65 BPM | DIASTOLIC BLOOD PRESSURE: 80 MMHG | WEIGHT: 171.38 LBS | HEIGHT: 69 IN | TEMPERATURE: 96.6 F | OXYGEN SATURATION: 99 % | BODY MASS INDEX: 25.38 KG/M2

## 2021-12-28 DIAGNOSIS — Z98.89 OTHER SPECIFIED POSTPROCEDURAL STATES: Chronic | ICD-10-CM

## 2021-12-28 DIAGNOSIS — Z86.69 PERSONAL HISTORY OF OTHER DISEASES OF THE NERVOUS SYSTEM AND SENSE ORGANS: Chronic | ICD-10-CM

## 2021-12-28 LAB
A1C WITH ESTIMATED AVERAGE GLUCOSE RESULT: 7.5 % — HIGH (ref 4–5.6)
APPEARANCE UR: CLEAR — SIGNIFICANT CHANGE UP
BACTERIA # UR AUTO: NEGATIVE — SIGNIFICANT CHANGE UP
BILIRUB UR-MCNC: NEGATIVE — SIGNIFICANT CHANGE UP
COLOR SPEC: YELLOW — SIGNIFICANT CHANGE UP
DIFF PNL FLD: NEGATIVE — SIGNIFICANT CHANGE UP
EPI CELLS # UR: 1 /HPF — SIGNIFICANT CHANGE UP (ref 0–5)
ESTIMATED AVERAGE GLUCOSE: 169 — SIGNIFICANT CHANGE UP
GLUCOSE UR QL: ABNORMAL
HYALINE CASTS # UR AUTO: 0 /LPF — SIGNIFICANT CHANGE UP (ref 0–7)
KETONES UR-MCNC: NEGATIVE — SIGNIFICANT CHANGE UP
LEUKOCYTE ESTERASE UR-ACNC: NEGATIVE — SIGNIFICANT CHANGE UP
NITRITE UR-MCNC: NEGATIVE — SIGNIFICANT CHANGE UP
PH UR: 6 — SIGNIFICANT CHANGE UP (ref 5–8)
PROT UR-MCNC: ABNORMAL
RBC CASTS # UR COMP ASSIST: 2 /HPF — SIGNIFICANT CHANGE UP (ref 0–4)
SP GR SPEC: 1.03 — SIGNIFICANT CHANGE UP (ref 1–1.05)
UROBILINOGEN FLD QL: ABNORMAL
WBC UR QL: 1 /HPF — SIGNIFICANT CHANGE UP (ref 0–5)

## 2021-12-28 PROCEDURE — 99214 OFFICE O/P EST MOD 30 MIN: CPT | Mod: GC

## 2021-12-29 ENCOUNTER — APPOINTMENT (OUTPATIENT)
Dept: UROLOGY | Facility: CLINIC | Age: 76
End: 2021-12-29
Payer: MEDICARE

## 2021-12-29 ENCOUNTER — OUTPATIENT (OUTPATIENT)
Dept: OUTPATIENT SERVICES | Facility: HOSPITAL | Age: 76
LOS: 1 days | End: 2021-12-29
Payer: COMMERCIAL

## 2021-12-29 ENCOUNTER — APPOINTMENT (OUTPATIENT)
Dept: RADIOLOGY | Facility: IMAGING CENTER | Age: 76
End: 2021-12-29
Payer: MEDICARE

## 2021-12-29 VITALS
TEMPERATURE: 98.2 F | HEART RATE: 94 BPM | BODY MASS INDEX: 25.33 KG/M2 | DIASTOLIC BLOOD PRESSURE: 72 MMHG | SYSTOLIC BLOOD PRESSURE: 129 MMHG | RESPIRATION RATE: 16 BRPM | HEIGHT: 69 IN | WEIGHT: 171 LBS

## 2021-12-29 DIAGNOSIS — Z98.89 OTHER SPECIFIED POSTPROCEDURAL STATES: Chronic | ICD-10-CM

## 2021-12-29 DIAGNOSIS — Z86.69 PERSONAL HISTORY OF OTHER DISEASES OF THE NERVOUS SYSTEM AND SENSE ORGANS: Chronic | ICD-10-CM

## 2021-12-29 DIAGNOSIS — R05.9 COUGH, UNSPECIFIED: ICD-10-CM

## 2021-12-29 PROCEDURE — 51798 US URINE CAPACITY MEASURE: CPT

## 2021-12-29 PROCEDURE — 71046 X-RAY EXAM CHEST 2 VIEWS: CPT | Mod: 26

## 2021-12-29 PROCEDURE — 71046 X-RAY EXAM CHEST 2 VIEWS: CPT

## 2021-12-29 PROCEDURE — 99214 OFFICE O/P EST MOD 30 MIN: CPT

## 2021-12-30 ENCOUNTER — NON-APPOINTMENT (OUTPATIENT)
Age: 76
End: 2021-12-30

## 2021-12-30 ENCOUNTER — APPOINTMENT (OUTPATIENT)
Dept: GASTROENTEROLOGY | Facility: CLINIC | Age: 76
End: 2021-12-30

## 2021-12-30 ENCOUNTER — APPOINTMENT (OUTPATIENT)
Dept: INTERNAL MEDICINE | Facility: CLINIC | Age: 76
End: 2021-12-30

## 2021-12-30 DIAGNOSIS — R10.9 UNSPECIFIED ABDOMINAL PAIN: ICD-10-CM

## 2021-12-30 DIAGNOSIS — Z79.01 LONG TERM (CURRENT) USE OF ANTICOAGULANTS: ICD-10-CM

## 2021-12-30 DIAGNOSIS — R05.9 COUGH, UNSPECIFIED: ICD-10-CM

## 2021-12-30 DIAGNOSIS — I48.91 UNSPECIFIED ATRIAL FIBRILLATION: ICD-10-CM

## 2021-12-30 LAB
GAMMA INTERFERON BACKGROUND BLD IA-ACNC: 0.03 IU/ML — SIGNIFICANT CHANGE UP
M TB IFN-G BLD-IMP: NEGATIVE — SIGNIFICANT CHANGE UP
M TB IFN-G CD4+ BCKGRND COR BLD-ACNC: 0.29 IU/ML — SIGNIFICANT CHANGE UP
M TB IFN-G CD4+CD8+ BCKGRND COR BLD-ACNC: 0.24 IU/ML — SIGNIFICANT CHANGE UP
PSA SERPL-MCNC: 3.15 NG/ML
QUANT TB PLUS MITOGEN MINUS NIL: 4.97 IU/ML — SIGNIFICANT CHANGE UP

## 2021-12-30 NOTE — PHYSICAL EXAM
[Well Developed] : well developed [Well Nourished] : well nourished [No Acute Distress] : no acute distress [Normal Venous Pressure] : normal venous pressure [No Carotid Bruit] : no carotid bruit [No Murmur] : no murmur [No Rub] : no rub [No Gallop] : no gallop [Clear Lung Fields] : clear lung fields [Good Air Entry] : good air entry [No Respiratory Distress] : no respiratory distress  [Soft] : abdomen soft [Non Tender] : non-tender [Normal Gait] : normal gait [No Edema] : no edema [No Cyanosis] : no cyanosis [No Rash] : no rash [No Skin Lesions] : no skin lesions [Moves all extremities] : moves all extremities [No Focal Deficits] : no focal deficits [Normal Speech] : normal speech [Alert and Oriented] : alert and oriented [de-identified] : left orbital deformity noted [de-identified] : right sided neck mass noted (thyroid) [de-identified] : irregularly irregular

## 2021-12-30 NOTE — CARDIOLOGY SUMMARY
[de-identified] : \par 12/16/21 - atrial fibrillation, 69 bpm, nonspecific ST abnormality\par  [de-identified] : \par 02/10/20 (regadenoson tetrofosmin) - no evidence of infarction or inducible ischemia, LVEF >70%\par  [de-identified] : \par 02/06/20 - mechanical MV, mild MR, mean MV gradient 4 mmHg, severe LAE, grossly normal LV systolic function, normal RV size and function, mild-mod TR, RVSP 35 mmHg, LVEF 57%\par  [de-identified] : \par 05/17/05 (MVR) - Master Series 27mm mitral mechanical valve by Tristan Dunn MD

## 2021-12-30 NOTE — ADDENDUM
[FreeTextEntry1] : I, Vinicius Andrew, acted as the sole scribe for Dr. Ashish Manrique on 12/29/2021

## 2021-12-30 NOTE — HISTORY OF PRESENT ILLNESS
[FreeTextEntry1] : Reason for Visit: Gross Hematuria, 2nd Opinion\par \par This is a 76 year-old gentleman accompanied by his wife. The patient is a retired  who used to work in [employer]. He is a patient of Dr. Thompson. He reports gross hematuria which has been an intermittent problem of his since at least January of 2020. He is taking Coumadin - he has mechanical mitral valve in place. Dr. Thompson did a cystoscopy on 1/02/2020. Patient is referred for evaluation of his condition, after bouncing around to and from Dr. Thompson and the emergency room. Patient denies dysuria or urinary incontinence. The patient denies any aggravating or relieving factors. The patient denies any interference of function. Family history and social history were inquired and were noncontributory to current condition. Patient denies tobacco use. Medications and allergies were reviewed.\par \par He has a history of TURP approximately 8 years ago. \par \par He had a CT urogram performed in September showing no evidence of upper tract disease.\par \par Post-void residual on bladder scan today was 9 cc\par HbA1C 7.5% on 12/28/2021\par UA shows Trace Protein in Urine, Urobilinogen of 3 mg/dL on 12/28/2021\par \par Assessment: gross hematuria, on anti- antiplatelet therapy.  We plan finasteride PSA today and a cystourethroscopy to complete his gross hematuria work-up.\par \par Plan: Cystoscopy. I am prescribing Finasteride. Blood work today includes PSA.\par \par I counseled the patient. I discussed the various etiologies of his symptoms. Risks and alternatives were discussed. I answered the patient's questions. I described the potential side effects and the appropriate usage of the medications listed in the Plan. I described the tests and procedures listed above. The patient will follow up as directed and will contact me with any questions or concerns. Thank you for the opportunity to participate in this patient's care. I'll keep you updated on his progress.

## 2021-12-30 NOTE — REVIEW OF SYSTEMS
[Cough] : cough [Abdominal Pain] : abdominal pain [Fever] : no fever [Chills] : no chills [Fatigue] : no fatigue [Night Sweats] : no night sweats [Recent Change In Weight] : ~T no recent weight change [Discharge] : no discharge [Pain] : no pain [Redness] : no redness [Dryness] : no dryness [Vision Problems] : no vision problems [Itching] : no itching [Earache] : no earache [Hearing Loss] : no hearing loss [Postnasal Drip] : no postnasal drip [Nasal Discharge] : no nasal discharge [Sore Throat] : no sore throat [Hoarseness] : no hoarseness [Chest Pain] : no chest pain [Palpitations] : no palpitations [Orthopena] : no orthopnea [Shortness Of Breath] : no shortness of breath [Wheezing] : no wheezing [Nausea] : no nausea [Constipation] : no constipation [Diarrhea] : no diarrhea [Vomiting] : no vomiting [Melena] : no melena [Dysuria] : no dysuria [Incontinence] : no incontinence [Hesitancy] : no hesitancy [Hematuria] : no hematuria [Frequency] : no frequency [Itching] : no itching [Skin Rash] : no skin rash [Headache] : no headache [Dizziness] : no dizziness [Memory Loss] : no memory loss [Suicidal] : not suicidal [Insomnia] : no insomnia [Anxiety] : no anxiety [Depression] : no depression [Easy Bleeding] : no easy bleeding [FreeTextEntry7] : + Dyspepsia

## 2021-12-30 NOTE — DISCUSSION/SUMMARY
[Atrial Fibrillation] : atrial fibrillation [Controlled Ventricular Response] : controlled ventricular response [Compensated] : compensated [Coronary Artery Disease] : coronary artery disease [Hypertension] : hypertension [Stable] : stable [FreeTextEntry1] : \par Currently stable from a cardiovascular standpoint. Normotensive (low normal). Appears euvolemic. Stable CAD (s/p CABG?). Rate-controlled atrial fibrillation. History of mechanical MVR. Unclear etiology of cough but consider bronchitis. Continue current medications. ECG completed today and reviewed. Will obtain an echocardiogram to reassess his cardiac structures and function (last echo Feb 2020). Advised patient to follow up with PCP for symptoms of productive cough. Regular follow up with Dr. rCook (primary cardiologist) is advised.

## 2021-12-30 NOTE — PHYSICAL EXAM
[Normal] : affect was normal and insight and judgment were intact [de-identified] : Prolonged expiration at bases

## 2021-12-30 NOTE — ASSESSMENT
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 However, unusual to have phlegm production with GERD cough. Differential diagnosis to consider TB vs post-viral vs bactrim use (finished 2 weeks course in september).\par - Pantoprazole 20mg to take 30min prior to dinner\par - Albuterol inhaler as needed as patient with prolonged expiration likely some component of bronchospasm\par - Quantiferon Gold\par - CXR to evaluate for signs of active TB\par \par #Abdominal pain/back pain\par Began after his cough started to worsen. Occur during coughing episodes. Likely soreness 2/2 coughing\par - Continue to monitor. If worsening/ unimproved after coughing is addressed, patient will need further work-up\par - Tylenol as needed\par \par #Mechanical MVR\par - replaced over a decade ago per patient\par - make/model unknown\par - AC w/ warfarin\par - INR today 2.8 (therapuetic). C/w current dosage. f/u in 1 week\par \par #Hematuria\par Now improved. CBC with stable hgb\par - Urology appointment on 12/29\par - Obtain a UA\par \par #Diabetes\par -Last POCT A1c 8.0. Will obtain A1c today\par -c/w glipizide 10 bid\par -c/w Trulicity 0.75 qweekly\par -follows with optho at [facility]\par -encouraged to follow diabetic diet and exercise\par \par RTC for INR check-up in 1 week\par RTC for follow-up on cough in 3 months\par \par CDW Dr. Landeros\par \par Rufino Prasad MD\par Internal Medicine PGY1\par [employer] [facility]\par

## 2021-12-30 NOTE — HISTORY OF PRESENT ILLNESS
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 Patient reports of worsening unrelenting cough for past 2 months. He states he is having bouts of cough that can last minutes. Notes that the cough worsens at night laying down, but also occurs when he is feeling cold or hot. Reports minimal clear phlegm production. Patient was seen for INR check-up 2 weeks ago. During that time, he was given guaifenesin without any improvement. Patient reports that recently he has started experiencing umbilical abdominal pain and back pain during coughing episodes. Patient also reporting gas pains. Patient denies any recent fevers, chills, nightsweats, n/v/d, SOB, CP, sore throat or any other sx\par \par Denied any history of tobacco use. Patient is COVID vaccinated. Family history notable for death of father due to TB. Patient was born in [facility]. Unsure when last TB work-up was. No recent TB work-up in [employer]\par \par #Hematuria\par - During INR check-up on 12/23, patient reported hematuria on with clots. Now resolved He has previously been seen in the ED for similar symptoms and follows w/ urology. He had a CT Urogram in 9/24/21 that showed no renal stones, suspicious renal mass, or evidence of urothelial lesion, however, enlarged prostate with TURP defect. Has an appointment with urology 12/29. No complaints at this time\par

## 2021-12-30 NOTE — HISTORY OF PRESENT ILLNESS
[FreeTextEntry1] : Has been coughing a lot for past 2 weeks. He has some phlegm which he says may be green color but he doesn't remember. Denies fever or chills. Denies chest pain or palpitations. Occasional shortness of breath when he coughs.

## 2021-12-30 NOTE — REVIEW OF SYSTEMS
[Cough] : cough [Negative] : Heme/Lymph [Wheezing] : no wheezing [Coughing Up Blood] : no hemoptysis

## 2022-01-05 ENCOUNTER — OUTPATIENT (OUTPATIENT)
Dept: OUTPATIENT SERVICES | Facility: HOSPITAL | Age: 77
LOS: 1 days | End: 2022-01-05

## 2022-01-05 ENCOUNTER — NON-APPOINTMENT (OUTPATIENT)
Age: 77
End: 2022-01-05

## 2022-01-05 ENCOUNTER — APPOINTMENT (OUTPATIENT)
Dept: INTERNAL MEDICINE | Facility: CLINIC | Age: 77
End: 2022-01-05

## 2022-01-05 DIAGNOSIS — Z98.89 OTHER SPECIFIED POSTPROCEDURAL STATES: Chronic | ICD-10-CM

## 2022-01-05 DIAGNOSIS — Z86.69 PERSONAL HISTORY OF OTHER DISEASES OF THE NERVOUS SYSTEM AND SENSE ORGANS: Chronic | ICD-10-CM

## 2022-01-05 DIAGNOSIS — I09.9 RHEUMATIC HEART DISEASE, UNSPECIFIED: ICD-10-CM

## 2022-01-05 DIAGNOSIS — I48.91 UNSPECIFIED ATRIAL FIBRILLATION: ICD-10-CM

## 2022-01-05 DIAGNOSIS — Z79.01 LONG TERM (CURRENT) USE OF ANTICOAGULANTS: ICD-10-CM

## 2022-01-05 DIAGNOSIS — I25.10 ATHEROSCLEROTIC HEART DISEASE OF NATIVE CORONARY ARTERY WITHOUT ANGINA PECTORIS: ICD-10-CM

## 2022-01-05 LAB
INR PPP: 4 RATIO
POCT-PROTHROMBIN TIME: 48.6 SECS
QUALITY CONTROL: YES

## 2022-01-10 ENCOUNTER — EMERGENCY (EMERGENCY)
Facility: HOSPITAL | Age: 77
LOS: 1 days | Discharge: HOME | End: 2022-01-10
Attending: EMERGENCY MEDICINE | Admitting: EMERGENCY MEDICINE
Payer: MEDICARE

## 2022-01-10 VITALS
HEART RATE: 89 BPM | HEIGHT: 69 IN | DIASTOLIC BLOOD PRESSURE: 95 MMHG | OXYGEN SATURATION: 98 % | TEMPERATURE: 97 F | RESPIRATION RATE: 18 BRPM | SYSTOLIC BLOOD PRESSURE: 149 MMHG

## 2022-01-10 VITALS
OXYGEN SATURATION: 100 % | HEART RATE: 67 BPM | DIASTOLIC BLOOD PRESSURE: 91 MMHG | RESPIRATION RATE: 20 BRPM | SYSTOLIC BLOOD PRESSURE: 137 MMHG

## 2022-01-10 DIAGNOSIS — Z86.69 PERSONAL HISTORY OF OTHER DISEASES OF THE NERVOUS SYSTEM AND SENSE ORGANS: Chronic | ICD-10-CM

## 2022-01-10 DIAGNOSIS — Z98.89 OTHER SPECIFIED POSTPROCEDURAL STATES: Chronic | ICD-10-CM

## 2022-01-10 LAB
ALBUMIN SERPL ELPH-MCNC: 4.4 G/DL — SIGNIFICANT CHANGE UP (ref 3.3–5)
ALP SERPL-CCNC: 77 U/L — SIGNIFICANT CHANGE UP (ref 40–120)
ALT FLD-CCNC: 16 U/L — SIGNIFICANT CHANGE UP (ref 4–41)
ANION GAP SERPL CALC-SCNC: 12 MMOL/L — SIGNIFICANT CHANGE UP (ref 7–14)
APPEARANCE UR: CLEAR — SIGNIFICANT CHANGE UP
AST SERPL-CCNC: 17 U/L — SIGNIFICANT CHANGE UP (ref 4–40)
BACTERIA # UR AUTO: NEGATIVE — SIGNIFICANT CHANGE UP
BASE EXCESS BLDV CALC-SCNC: -1.7 MMOL/L — SIGNIFICANT CHANGE UP (ref -2–3)
BASOPHILS # BLD AUTO: 0.05 K/UL — SIGNIFICANT CHANGE UP (ref 0–0.2)
BASOPHILS NFR BLD AUTO: 0.5 % — SIGNIFICANT CHANGE UP (ref 0–2)
BILIRUB SERPL-MCNC: 2.9 MG/DL — HIGH (ref 0.2–1.2)
BILIRUB UR-MCNC: NEGATIVE — SIGNIFICANT CHANGE UP
BUN SERPL-MCNC: 22 MG/DL — SIGNIFICANT CHANGE UP (ref 7–23)
CA-I SERPL-SCNC: 1.25 MMOL/L — SIGNIFICANT CHANGE UP (ref 1.15–1.33)
CALCIUM SERPL-MCNC: 9.4 MG/DL — SIGNIFICANT CHANGE UP (ref 8.4–10.5)
CHLORIDE BLDV-SCNC: 106 MMOL/L — SIGNIFICANT CHANGE UP (ref 96–108)
CHLORIDE SERPL-SCNC: 107 MMOL/L — SIGNIFICANT CHANGE UP (ref 98–107)
CO2 BLDV-SCNC: 27.2 MMOL/L — HIGH (ref 22–26)
CO2 SERPL-SCNC: 22 MMOL/L — SIGNIFICANT CHANGE UP (ref 22–31)
COLOR SPEC: YELLOW — SIGNIFICANT CHANGE UP
CREAT SERPL-MCNC: 1.23 MG/DL — SIGNIFICANT CHANGE UP (ref 0.5–1.3)
DIFF PNL FLD: NEGATIVE — SIGNIFICANT CHANGE UP
EOSINOPHIL # BLD AUTO: 0.22 K/UL — SIGNIFICANT CHANGE UP (ref 0–0.5)
EOSINOPHIL NFR BLD AUTO: 2.2 % — SIGNIFICANT CHANGE UP (ref 0–6)
EPI CELLS # UR: 0 /HPF — SIGNIFICANT CHANGE UP (ref 0–5)
GAS PNL BLDV: 138 MMOL/L — SIGNIFICANT CHANGE UP (ref 136–145)
GAS PNL BLDV: SIGNIFICANT CHANGE UP
GLUCOSE BLDV-MCNC: 154 MG/DL — HIGH (ref 70–99)
GLUCOSE SERPL-MCNC: 162 MG/DL — HIGH (ref 70–99)
GLUCOSE UR QL: ABNORMAL
HCO3 BLDV-SCNC: 26 MMOL/L — SIGNIFICANT CHANGE UP (ref 22–29)
HCT VFR BLD CALC: 44.5 % — SIGNIFICANT CHANGE UP (ref 39–50)
HCT VFR BLDA CALC: 47 % — SIGNIFICANT CHANGE UP (ref 39–51)
HGB BLD CALC-MCNC: 15.5 G/DL — SIGNIFICANT CHANGE UP (ref 13–17)
HGB BLD-MCNC: 15.2 G/DL — SIGNIFICANT CHANGE UP (ref 13–17)
HYALINE CASTS # UR AUTO: 0 /LPF — SIGNIFICANT CHANGE UP (ref 0–7)
IANC: 6.11 K/UL — SIGNIFICANT CHANGE UP (ref 1.5–8.5)
IMM GRANULOCYTES NFR BLD AUTO: 0.8 % — SIGNIFICANT CHANGE UP (ref 0–1.5)
KETONES UR-MCNC: NEGATIVE — SIGNIFICANT CHANGE UP
LACTATE BLDV-MCNC: 1.7 MMOL/L — SIGNIFICANT CHANGE UP (ref 0.5–2)
LEUKOCYTE ESTERASE UR-ACNC: NEGATIVE — SIGNIFICANT CHANGE UP
LIDOCAIN IGE QN: 50 U/L — SIGNIFICANT CHANGE UP (ref 7–60)
LYMPHOCYTES # BLD AUTO: 2.41 K/UL — SIGNIFICANT CHANGE UP (ref 1–3.3)
LYMPHOCYTES # BLD AUTO: 24.4 % — SIGNIFICANT CHANGE UP (ref 13–44)
MCHC RBC-ENTMCNC: 31.1 PG — SIGNIFICANT CHANGE UP (ref 27–34)
MCHC RBC-ENTMCNC: 34.2 GM/DL — SIGNIFICANT CHANGE UP (ref 32–36)
MCV RBC AUTO: 91 FL — SIGNIFICANT CHANGE UP (ref 80–100)
MONOCYTES # BLD AUTO: 0.99 K/UL — HIGH (ref 0–0.9)
MONOCYTES NFR BLD AUTO: 10 % — SIGNIFICANT CHANGE UP (ref 2–14)
NEUTROPHILS # BLD AUTO: 6.11 K/UL — SIGNIFICANT CHANGE UP (ref 1.8–7.4)
NEUTROPHILS NFR BLD AUTO: 62.1 % — SIGNIFICANT CHANGE UP (ref 43–77)
NITRITE UR-MCNC: NEGATIVE — SIGNIFICANT CHANGE UP
NRBC # BLD: 0 /100 WBCS — SIGNIFICANT CHANGE UP
NRBC # FLD: 0 K/UL — SIGNIFICANT CHANGE UP
PCO2 BLDV: 52 MMHG — SIGNIFICANT CHANGE UP (ref 42–55)
PH BLDV: 7.3 — LOW (ref 7.32–7.43)
PH UR: 6 — SIGNIFICANT CHANGE UP (ref 5–8)
PLATELET # BLD AUTO: 232 K/UL — SIGNIFICANT CHANGE UP (ref 150–400)
PO2 BLDV: 30 MMHG — SIGNIFICANT CHANGE UP
POTASSIUM BLDV-SCNC: 3.9 MMOL/L — SIGNIFICANT CHANGE UP (ref 3.5–5.1)
POTASSIUM SERPL-MCNC: 4 MMOL/L — SIGNIFICANT CHANGE UP (ref 3.5–5.3)
POTASSIUM SERPL-SCNC: 4 MMOL/L — SIGNIFICANT CHANGE UP (ref 3.5–5.3)
PROT SERPL-MCNC: 7.7 G/DL — SIGNIFICANT CHANGE UP (ref 6–8.3)
PROT UR-MCNC: ABNORMAL
RBC # BLD: 4.89 M/UL — SIGNIFICANT CHANGE UP (ref 4.2–5.8)
RBC # FLD: 13 % — SIGNIFICANT CHANGE UP (ref 10.3–14.5)
RBC CASTS # UR COMP ASSIST: 1 /HPF — SIGNIFICANT CHANGE UP (ref 0–4)
SAO2 % BLDV: 50.8 % — SIGNIFICANT CHANGE UP
SARS-COV-2 RNA SPEC QL NAA+PROBE: SIGNIFICANT CHANGE UP
SODIUM SERPL-SCNC: 141 MMOL/L — SIGNIFICANT CHANGE UP (ref 135–145)
SP GR SPEC: 1.03 — SIGNIFICANT CHANGE UP (ref 1–1.05)
UROBILINOGEN FLD QL: SIGNIFICANT CHANGE UP
WBC # BLD: 9.86 K/UL — SIGNIFICANT CHANGE UP (ref 3.8–10.5)
WBC # FLD AUTO: 9.86 K/UL — SIGNIFICANT CHANGE UP (ref 3.8–10.5)
WBC UR QL: 1 /HPF — SIGNIFICANT CHANGE UP (ref 0–5)

## 2022-01-10 PROCEDURE — 74177 CT ABD & PELVIS W/CONTRAST: CPT | Mod: 26,MD

## 2022-01-10 PROCEDURE — 99285 EMERGENCY DEPT VISIT HI MDM: CPT

## 2022-01-10 PROCEDURE — 71045 X-RAY EXAM CHEST 1 VIEW: CPT | Mod: 26

## 2022-01-10 RX ORDER — SODIUM CHLORIDE 9 MG/ML
1000 INJECTION INTRAMUSCULAR; INTRAVENOUS; SUBCUTANEOUS ONCE
Refills: 0 | Status: COMPLETED | OUTPATIENT
Start: 2022-01-10 | End: 2022-01-10

## 2022-01-10 RX ORDER — MORPHINE SULFATE 50 MG/1
4 CAPSULE, EXTENDED RELEASE ORAL ONCE
Refills: 0 | Status: DISCONTINUED | OUTPATIENT
Start: 2022-01-10 | End: 2022-01-10

## 2022-01-10 RX ORDER — CIPROFLOXACIN LACTATE 400MG/40ML
1 VIAL (ML) INTRAVENOUS
Qty: 10 | Refills: 0
Start: 2022-01-10 | End: 2022-01-14

## 2022-01-10 RX ORDER — METRONIDAZOLE 500 MG
1 TABLET ORAL
Qty: 15 | Refills: 0
Start: 2022-01-10 | End: 2022-01-14

## 2022-01-10 RX ADMIN — SODIUM CHLORIDE 1000 MILLILITER(S): 9 INJECTION INTRAMUSCULAR; INTRAVENOUS; SUBCUTANEOUS at 17:13

## 2022-01-10 RX ADMIN — MORPHINE SULFATE 4 MILLIGRAM(S): 50 CAPSULE, EXTENDED RELEASE ORAL at 22:54

## 2022-01-10 RX ADMIN — MORPHINE SULFATE 4 MILLIGRAM(S): 50 CAPSULE, EXTENDED RELEASE ORAL at 17:13

## 2022-01-10 NOTE — ED PROVIDER NOTE - PROGRESS NOTE DETAILS
ginna pgy1: took sign out for pt from day team - pt's CT showed mild colitis. Pt in room with fully eaten tray of food, feels improved. No nausea/vomiting. Pt feels okay to go home, discussed care with him and daughter. Will send with PRN abbx in case of worsening sxs. Pt and family agreeable.

## 2022-01-10 NOTE — ED PROVIDER NOTE - OBJECTIVE STATEMENT
75YO M hx DM, CABG, AF, p/w abdominal pain. onset x weeks, periumbilical, nonradiating. a/w diarrhea and decreased PO intake x 3d. endorses urgency to defecate and yellow stools, + generalized weakness. denies nausea, fevers, dysuria, testicular pain/swelling.

## 2022-01-10 NOTE — ED PROVIDER NOTE - CLINICAL SUMMARY MEDICAL DECISION MAKING FREE TEXT BOX
Jo Ann Carrillo MD, PGY-2: 75YO M hx DM, CABG, AF, p/w periumbilical abdominal pain. VSS, PE periumbilical TTP. suspect early appendicitis, diverticulitis, low suspicion mesenteric ischemia given duration of symptoms and pain not out of proportion to exam. plan for basic labs, ua, ct a/p.

## 2022-01-10 NOTE — ED PROVIDER NOTE - NS ED ROS FT
Gen: Denies fevers  CV: Denies chest pain  Resp: Denies SOB, cough  GI: + abd pain, diarrhea, Denies nausea, vomiting  : Denies dysuria, testicular pain/swelling  all other ROS negative unless indicated in HPI

## 2022-01-10 NOTE — ED PROVIDER NOTE - NSFOLLOWUPINSTRUCTIONS_ED_ALL_ED_FT
Mild Colitis    You can take TYLENOL for pain as needed as prescribed on box/bottle    Hydrate well, eat mild diet.    Follow up with your primary care provider in 1-3 days - call to discuss your care.    If your symptoms have resolved, continue care as usual.  If your symptoms persist - you can take CIPROFLOXACIN and FLAGYL for colitis if not improving in next 48 hours. Otherwise there is NO need for you to take these meds at this time.    If you symptoms worsen or become severe - high fever, worsening pain, unable to tolerate food/liquids, blood in stool. Seek medical evaluation immediately.

## 2022-01-10 NOTE — ED PROVIDER NOTE - PATIENT PORTAL LINK FT
You can access the FollowMyHealth Patient Portal offered by [facility] by registering at the following website: [email or web address]. By joining [facility]’s FollowMyHealth portal, you will also be able to view your health information using other applications (apps) compatible with our system.

## 2022-01-10 NOTE — ED ADULT NURSE NOTE - OBJECTIVE STATEMENT
Received pt in Rm 21 ambulatory from home with c/o abdominal pain, diarrhea x 3 days and a cough x 3 weeks. Pt fully vaccinated against COVID uncertain of sick contacts. Pt has hx of mitral valve replacement and DM. Pt changed into gown and placed on cardiac monitor, showing AFib, pt reports hx of afib and is compliant with medications. Pt denies chest pain, difficulty breathing, fever, chills, or shortness of breath. #20g IV placed to LAC, lab work collected as ordered. Will continue to monitor.

## 2022-01-10 NOTE — ED ADULT TRIAGE NOTE - CHIEF COMPLAINT QUOTE
Pt c/o diarrhea, abdominal discomfort and poor appetite x 3 days with cough x few weeks. PMH DM2, heart valve

## 2022-01-10 NOTE — ED PROVIDER NOTE - ATTENDING CONTRIBUTION TO CARE
DR. MARLOW, ATTENDING MD-  I performed a face to face bedside interview with the patient regarding history of present illness, review of symptoms and past medical history. I completed an independent physical exam.  I have discussed the patient's plan of care with the resident.   Documentation as above in the note.    77 y/o male h/o afib cabg htn dm2 with abd pain for past few weeks and loose stool over past two days.  Mild ttp at periumbilical region, pain is not out of proportion to exam.  Eval for diverticulitis, colitis, viral enteritis, lyte abn.  Obtain cbc cmp ct a/p ua ucx give ivf bolus pain med reassess.

## 2022-01-10 NOTE — ED ADULT NURSE REASSESSMENT NOTE - NS ED NURSE REASSESS COMMENT FT1
Pt in room 21. Pt calm and resting in bed. Safety maintained and environment free of clutter. Pt denies headaches, nausea, vomiting, diarrhea, dizziness, lethargy. Pt endorsing feelings of increase appetite at this time and asking for food. Pt vitally stable, resp even unlabored, breath sounds clear ant and posterior, abd soft nontender, bowel sounds heard in all four quadrants, pedal pulses 2+ bilaterally.

## 2022-01-10 NOTE — ED PROVIDER NOTE - PHYSICAL EXAMINATION
Gen: WDWN, NAD  HEENT: EOMI, no nasal discharge, mucous membranes moist  CV: 2+ radial pulses b/l  Resp: no accessory muscle use, no increased work of breathing  GI: Abdomen soft non-distended, + periumbilical ttp  MSK: No open wounds, no bruising, no LE edema  Neuro: A&Ox4, following commands, moving all four extremities spontaneously  Psych: appropriate mood

## 2022-01-11 LAB
CULTURE RESULTS: SIGNIFICANT CHANGE UP
SPECIMEN SOURCE: SIGNIFICANT CHANGE UP

## 2022-01-12 ENCOUNTER — OUTPATIENT (OUTPATIENT)
Dept: OUTPATIENT SERVICES | Facility: HOSPITAL | Age: 77
LOS: 1 days | End: 2022-01-12

## 2022-01-12 ENCOUNTER — APPOINTMENT (OUTPATIENT)
Dept: INTERNAL MEDICINE | Facility: CLINIC | Age: 77
End: 2022-01-12

## 2022-01-12 VITALS — HEART RATE: 60 BPM | TEMPERATURE: 96.5 F | OXYGEN SATURATION: 99 %

## 2022-01-12 DIAGNOSIS — I25.10 ATHEROSCLEROTIC HEART DISEASE OF NATIVE CORONARY ARTERY WITHOUT ANGINA PECTORIS: ICD-10-CM

## 2022-01-12 DIAGNOSIS — I48.91 UNSPECIFIED ATRIAL FIBRILLATION: ICD-10-CM

## 2022-01-12 DIAGNOSIS — Z79.01 LONG TERM (CURRENT) USE OF ANTICOAGULANTS: ICD-10-CM

## 2022-01-12 DIAGNOSIS — Z98.89 OTHER SPECIFIED POSTPROCEDURAL STATES: Chronic | ICD-10-CM

## 2022-01-12 DIAGNOSIS — I09.9 RHEUMATIC HEART DISEASE, UNSPECIFIED: ICD-10-CM

## 2022-01-12 DIAGNOSIS — Z86.69 PERSONAL HISTORY OF OTHER DISEASES OF THE NERVOUS SYSTEM AND SENSE ORGANS: Chronic | ICD-10-CM

## 2022-01-12 LAB
INR PPP: 2.9 RATIO
POCT-PROTHROMBIN TIME: 34.4 SECS

## 2022-01-19 ENCOUNTER — OUTPATIENT (OUTPATIENT)
Dept: OUTPATIENT SERVICES | Facility: HOSPITAL | Age: 77
LOS: 1 days | End: 2022-01-19

## 2022-01-19 ENCOUNTER — APPOINTMENT (OUTPATIENT)
Dept: UROLOGY | Facility: CLINIC | Age: 77
End: 2022-01-19
Payer: MEDICARE

## 2022-01-19 ENCOUNTER — OUTPATIENT (OUTPATIENT)
Dept: OUTPATIENT SERVICES | Facility: HOSPITAL | Age: 77
LOS: 1 days | End: 2022-01-19
Payer: COMMERCIAL

## 2022-01-19 ENCOUNTER — RESULT CHARGE (OUTPATIENT)
Age: 77
End: 2022-01-19

## 2022-01-19 ENCOUNTER — APPOINTMENT (OUTPATIENT)
Dept: INTERNAL MEDICINE | Facility: CLINIC | Age: 77
End: 2022-01-19
Payer: MEDICARE

## 2022-01-19 VITALS
TEMPERATURE: 97.1 F | BODY MASS INDEX: 25.03 KG/M2 | WEIGHT: 169 LBS | DIASTOLIC BLOOD PRESSURE: 84 MMHG | HEART RATE: 70 BPM | HEIGHT: 69 IN | OXYGEN SATURATION: 99 % | SYSTOLIC BLOOD PRESSURE: 110 MMHG

## 2022-01-19 VITALS — SYSTOLIC BLOOD PRESSURE: 141 MMHG | HEART RATE: 70 BPM | DIASTOLIC BLOOD PRESSURE: 87 MMHG

## 2022-01-19 DIAGNOSIS — N40.1 BENIGN PROSTATIC HYPERPLASIA WITH LOWER URINARY TRACT SYMPTOMS: ICD-10-CM

## 2022-01-19 DIAGNOSIS — Z86.69 PERSONAL HISTORY OF OTHER DISEASES OF THE NERVOUS SYSTEM AND SENSE ORGANS: Chronic | ICD-10-CM

## 2022-01-19 DIAGNOSIS — Z98.89 OTHER SPECIFIED POSTPROCEDURAL STATES: Chronic | ICD-10-CM

## 2022-01-19 DIAGNOSIS — R35.0 FREQUENCY OF MICTURITION: ICD-10-CM

## 2022-01-19 DIAGNOSIS — R31.0 GROSS HEMATURIA: ICD-10-CM

## 2022-01-19 LAB
INR PPP: 3.8 RATIO
POCT-PROTHROMBIN TIME: 45.6 SECS

## 2022-01-19 PROCEDURE — 99214 OFFICE O/P EST MOD 30 MIN: CPT | Mod: GC

## 2022-01-19 PROCEDURE — 52000 CYSTOURETHROSCOPY: CPT

## 2022-01-19 RX ORDER — SULFAMETHOXAZOLE AND TRIMETHOPRIM 800; 160 MG/1; MG/1
800-160 TABLET ORAL
Qty: 28 | Refills: 0 | Status: DISCONTINUED | COMMUNITY
Start: 2021-09-14 | End: 2022-01-19

## 2022-01-20 NOTE — HISTORY OF PRESENT ILLNESS
[FreeTextEntry1] : 74 yo man presents for CV followup.\par \par H/o CAD s/p CABG, s/p mechanical MVR (RHD; on Coumadin), chronic persistent atrial fibrillation, diabetes, HLD, Rosie Thompson tears / esophageal ulcers. He developed COVID early in Sept 2020. \par \par Stress test 2/2020: LVEF 70%; normal myocardial perfusion\par \par Echo 2/2020: \par 1. Mechanical mitral valve prosthesis. Mild mitral\par regurgitation.  Mean transmitral valve gradient equals 4 mm\par Hg, which is probably normal in the setting of a prosthetic\par valve.\par 2. Normal left ventricular internal dimensions and wall\par thicknesses.\par 3. Endocardium not well visualized; grossly normal left\par ventricular systolic function.\par 4. Normal right ventricular size and function.\par 5. Normal tricuspid valve.  Mild-moderate tricuspid\par regurgitation.\par \par Mr. Forte is s/p recent hospitalization for diarrhea / possible colitis.

## 2022-01-20 NOTE — PHYSICAL EXAM
[No Acute Distress] : no acute distress [Well Nourished] : well nourished [Well Developed] : well developed [Normal Sclera/Conjunctiva] : normal sclera/conjunctiva [PERRL] : pupils equal round and reactive to light [EOMI] : extraocular movements intact [Normal Outer Ear/Nose] : the outer ears and nose were normal in appearance [Normal Oropharynx] : the oropharynx was normal [No JVD] : no jugular venous distention [No Lymphadenopathy] : no lymphadenopathy [Supple] : supple [Thyroid Normal, No Nodules] : the thyroid was normal and there were no nodules present [No Respiratory Distress] : no respiratory distress  [No Accessory Muscle Use] : no accessory muscle use [Clear to Auscultation] : lungs were clear to auscultation bilaterally [Normal Rate] : normal rate  [Regular Rhythm] : with a regular rhythm na [Normal S1, S2] : normal S1 and S2 [No Murmur] : no murmur heard [No Edema] : there was no peripheral edema [Soft] : abdomen soft [Non Tender] : non-tender [Non-distended] : non-distended [Normal Bowel Sounds] : normal bowel sounds [No CVA Tenderness] : no CVA  tenderness [No Spinal Tenderness] : no spinal tenderness [No Joint Swelling] : no joint swelling [Grossly Normal Strength/Tone] : grossly normal strength/tone [Coordination Grossly Intact] : coordination grossly intact [No Focal Deficits] : no focal deficits [Normal Gait] : normal gait [Normal Mood] : the mood was normal

## 2022-01-24 ENCOUNTER — APPOINTMENT (OUTPATIENT)
Dept: CARDIOLOGY | Facility: CLINIC | Age: 77
End: 2022-01-24

## 2022-01-25 ENCOUNTER — OUTPATIENT (OUTPATIENT)
Dept: OUTPATIENT SERVICES | Facility: HOSPITAL | Age: 77
LOS: 1 days | End: 2022-01-25

## 2022-01-25 ENCOUNTER — APPOINTMENT (OUTPATIENT)
Dept: INTERNAL MEDICINE | Facility: CLINIC | Age: 77
End: 2022-01-25
Payer: MEDICARE

## 2022-01-25 ENCOUNTER — RESULT CHARGE (OUTPATIENT)
Age: 77
End: 2022-01-25

## 2022-01-25 VITALS
BODY MASS INDEX: 24.62 KG/M2 | DIASTOLIC BLOOD PRESSURE: 72 MMHG | HEART RATE: 63 BPM | OXYGEN SATURATION: 99 % | RESPIRATION RATE: 18 BRPM | HEIGHT: 69 IN | TEMPERATURE: 98.2 F | SYSTOLIC BLOOD PRESSURE: 118 MMHG | WEIGHT: 166.25 LBS

## 2022-01-25 DIAGNOSIS — Z86.69 PERSONAL HISTORY OF OTHER DISEASES OF THE NERVOUS SYSTEM AND SENSE ORGANS: Chronic | ICD-10-CM

## 2022-01-25 DIAGNOSIS — Z00.00 ENCOUNTER FOR GENERAL ADULT MEDICAL EXAMINATION W/OUT ABNORMAL FINDINGS: ICD-10-CM

## 2022-01-25 DIAGNOSIS — Z98.89 OTHER SPECIFIED POSTPROCEDURAL STATES: Chronic | ICD-10-CM

## 2022-01-25 LAB
GLUCOSE BLDC GLUCOMTR-MCNC: 210
INR PPP: 1.9 RATIO
POCT-PROTHROMBIN TIME: 22.9 SECS
QUALITY CONTROL: YES

## 2022-01-25 PROCEDURE — 99214 OFFICE O/P EST MOD 30 MIN: CPT | Mod: GC

## 2022-01-26 ENCOUNTER — APPOINTMENT (OUTPATIENT)
Dept: INTERNAL MEDICINE | Facility: CLINIC | Age: 77
End: 2022-01-26

## 2022-01-26 ENCOUNTER — OUTPATIENT (OUTPATIENT)
Dept: OUTPATIENT SERVICES | Facility: HOSPITAL | Age: 77
LOS: 1 days | End: 2022-01-26

## 2022-01-26 VITALS
OXYGEN SATURATION: 99 % | TEMPERATURE: 98 F | WEIGHT: 164.91 LBS | DIASTOLIC BLOOD PRESSURE: 75 MMHG | RESPIRATION RATE: 16 BRPM | HEART RATE: 74 BPM | HEIGHT: 68.5 IN | SYSTOLIC BLOOD PRESSURE: 122 MMHG

## 2022-01-26 DIAGNOSIS — N40.1 BENIGN PROSTATIC HYPERPLASIA WITH LOWER URINARY TRACT SYMPTOMS: ICD-10-CM

## 2022-01-26 DIAGNOSIS — N40.0 BENIGN PROSTATIC HYPERPLASIA WITHOUT LOWER URINARY TRACT SYMPTOMS: ICD-10-CM

## 2022-01-26 DIAGNOSIS — R14.0 ABDOMINAL DISTENSION (GASEOUS): ICD-10-CM

## 2022-01-26 DIAGNOSIS — R31.0 GROSS HEMATURIA: ICD-10-CM

## 2022-01-26 DIAGNOSIS — Z51.81 ENCOUNTER FOR THERAPEUTIC DRUG LEVEL MONITORING: ICD-10-CM

## 2022-01-26 DIAGNOSIS — Z79.01 LONG TERM (CURRENT) USE OF ANTICOAGULANTS: ICD-10-CM

## 2022-01-26 DIAGNOSIS — Z98.890 OTHER SPECIFIED POSTPROCEDURAL STATES: Chronic | ICD-10-CM

## 2022-01-26 DIAGNOSIS — R19.7 DIARRHEA, UNSPECIFIED: ICD-10-CM

## 2022-01-26 DIAGNOSIS — Z86.69 PERSONAL HISTORY OF OTHER DISEASES OF THE NERVOUS SYSTEM AND SENSE ORGANS: Chronic | ICD-10-CM

## 2022-01-26 DIAGNOSIS — E11.9 TYPE 2 DIABETES MELLITUS WITHOUT COMPLICATIONS: ICD-10-CM

## 2022-01-26 DIAGNOSIS — R05.9 COUGH, UNSPECIFIED: ICD-10-CM

## 2022-01-26 DIAGNOSIS — Z98.89 OTHER SPECIFIED POSTPROCEDURAL STATES: Chronic | ICD-10-CM

## 2022-01-26 DIAGNOSIS — Z95.2 PRESENCE OF PROSTHETIC HEART VALVE: ICD-10-CM

## 2022-01-26 LAB
A1C WITH ESTIMATED AVERAGE GLUCOSE RESULT: 6.8 % — HIGH (ref 4–5.6)
ALBUMIN SERPL ELPH-MCNC: 4.3 G/DL — SIGNIFICANT CHANGE UP (ref 3.3–5)
ALP SERPL-CCNC: 77 U/L — SIGNIFICANT CHANGE UP (ref 40–120)
ALT FLD-CCNC: 19 U/L — SIGNIFICANT CHANGE UP (ref 4–41)
ANION GAP SERPL CALC-SCNC: 9 MMOL/L — SIGNIFICANT CHANGE UP (ref 7–14)
APTT BLD: 43.4 SEC — HIGH (ref 27–36.3)
AST SERPL-CCNC: 18 U/L — SIGNIFICANT CHANGE UP (ref 4–40)
BILIRUB SERPL-MCNC: 2.2 MG/DL — HIGH (ref 0.2–1.2)
BUN SERPL-MCNC: 22 MG/DL — SIGNIFICANT CHANGE UP (ref 7–23)
CALCIUM SERPL-MCNC: 9.6 MG/DL — SIGNIFICANT CHANGE UP (ref 8.4–10.5)
CHLORIDE SERPL-SCNC: 102 MMOL/L — SIGNIFICANT CHANGE UP (ref 98–107)
CO2 SERPL-SCNC: 29 MMOL/L — SIGNIFICANT CHANGE UP (ref 22–31)
CREAT SERPL-MCNC: 1.08 MG/DL — SIGNIFICANT CHANGE UP (ref 0.5–1.3)
ESTIMATED AVERAGE GLUCOSE: 148 — SIGNIFICANT CHANGE UP
GLUCOSE SERPL-MCNC: 201 MG/DL — HIGH (ref 70–99)
HCT VFR BLD CALC: 45.4 % — SIGNIFICANT CHANGE UP (ref 39–50)
HGB BLD-MCNC: 15.3 G/DL — SIGNIFICANT CHANGE UP (ref 13–17)
INR BLD: 1.94 RATIO — HIGH (ref 0.88–1.16)
MCHC RBC-ENTMCNC: 31.3 PG — SIGNIFICANT CHANGE UP (ref 27–34)
MCHC RBC-ENTMCNC: 33.7 GM/DL — SIGNIFICANT CHANGE UP (ref 32–36)
MCV RBC AUTO: 92.8 FL — SIGNIFICANT CHANGE UP (ref 80–100)
NRBC # BLD: 0 /100 WBCS — SIGNIFICANT CHANGE UP
NRBC # FLD: 0 K/UL — SIGNIFICANT CHANGE UP
PLATELET # BLD AUTO: 251 K/UL — SIGNIFICANT CHANGE UP (ref 150–400)
POTASSIUM SERPL-MCNC: 4.1 MMOL/L — SIGNIFICANT CHANGE UP (ref 3.5–5.3)
POTASSIUM SERPL-SCNC: 4.1 MMOL/L — SIGNIFICANT CHANGE UP (ref 3.5–5.3)
PROT SERPL-MCNC: 7.8 G/DL — SIGNIFICANT CHANGE UP (ref 6–8.3)
PROTHROM AB SERPL-ACNC: 21.4 SEC — HIGH (ref 10.6–13.6)
RBC # BLD: 4.89 M/UL — SIGNIFICANT CHANGE UP (ref 4.2–5.8)
RBC # FLD: 13.2 % — SIGNIFICANT CHANGE UP (ref 10.3–14.5)
SODIUM SERPL-SCNC: 140 MMOL/L — SIGNIFICANT CHANGE UP (ref 135–145)
WBC # BLD: 7.88 K/UL — SIGNIFICANT CHANGE UP (ref 3.8–10.5)
WBC # FLD AUTO: 7.88 K/UL — SIGNIFICANT CHANGE UP (ref 3.8–10.5)

## 2022-01-26 RX ORDER — SODIUM CHLORIDE 9 MG/ML
1000 INJECTION, SOLUTION INTRAVENOUS
Refills: 0 | Status: DISCONTINUED | OUTPATIENT
Start: 2022-02-03 | End: 2022-02-03

## 2022-01-26 NOTE — H&P PST ADULT - NSICDXPASTSURGICALHX_GEN_ALL_CORE_FT
PAST SURGICAL HISTORY:  H/O melanoma excision scalp-2014    H/O retinal detachment left    Hx of CABG     Mitral valve replaced 2006 at [facility]    S/P ABELINO 2014

## 2022-01-26 NOTE — H&P PST ADULT - OPH GEN HX ROS MEA POS PC
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

## 2022-01-26 NOTE — H&P PST ADULT - NSICDXPASTMEDICALHX_GEN_ALL_CORE_FT
PAST MEDICAL HISTORY:  Atrial fibrillation     BPH (benign prostatic hypertrophy)     CAD (coronary artery disease)     Cataract left    DVT (deep venous thrombosis) 2005    GERD (gastroesophageal reflux disease)     Hypertension     Mitral valve disease     Pulmonary embolism 2005    Retinal detachment left    Rheumatic heart disease     Seizures last episode approximately 2012, pt. was taken off medication 2017    Stomach ulcer      PAST MEDICAL HISTORY:  Atrial fibrillation     BPH (benign prostatic hypertrophy)     CAD (coronary artery disease)     Cataract left    DVT (deep venous thrombosis) 2005    Enlarged prostate     GERD (gastroesophageal reflux disease)     Hypertension     Mitral valve disease     Pulmonary embolism 2005    Retinal detachment left    Rheumatic heart disease     Seizures last episode approximately 2012, pt. was taken off medication 2017    Stomach ulcer

## 2022-01-26 NOTE — H&P PST ADULT - GASTROINTESTINAL
Kasi Lindsay MD                          [phone]      Patient ID:    Name:  Kaykay Cedeno    MRN:  [id number]    3/6/1928   89 y.o.  female            Patient Care Team:  Lars Ambriz MD as PCP - General (Family Medicine)  Zaida Mcdowell APRN as PCP - Claims Attributed    CC/Reason for visit:     Subjective: Pt seen and examined this AM. No acute overnight events noted. Doing the same. Appears comfortable.     ROS:   Cannot obtain     Objective     Vital Signs past 24hrs    BP range: BP: (151-162)/(88-90) 162/88  Pulse range: Heart Rate:  [70-83] 83  Resp rate range: Resp:  [16-18] 16  Temp range: Temp (24hrs), Av.9 °F (36.6 °C), Min:97.7 °F (36.5 °C), Max:98 °F (36.7 °C)    Ventilator/Non-Invasive Ventilation Settings     None        Device (Oxygen Therapy): room air       56.7 kg (125 lb); Body mass index is 21.46 kg/m².      Intake/Output Summary (Last 24 hours) at 18 1414  Last data filed at 18 0600   Gross per 24 hour   Intake                0 ml   Output              600 ml   Net             -600 ml       Exam:    GEN:  Elderly female in no resp distress, lethargic, slow to follow commands  EYES:   EOM-i, anicteric sclera bilat  ENT:    External ears/nose normal, OP clear/ moist mucosa  NECK:  Supple, midline trachea, No JVD or cervical LApathy  LUNGS: Bilateral breath sounds heard, B/l rhonchi and upper airway sounds  CV:  Regular rate and rhythm, No murmur  ABD:  diffusely tender, mildly distended, no palpable liver or masses  EXT:  No cyanosis or clubbing, no peripheral/sacral edema    Scheduled meds:      cefepime 2 g Intravenous Q12H       IV meds:                             Data Review:        Results from last 7 days  Lab Units 18  0534 18  0623 18  1407  18  0414  09/15/18  2319   SODIUM mmol/L 156* 155* 154*  < > 161*  < > 172*   POTASSIUM mmol/L 3.4* 3.3*  --   --  3.4*  < > 4.8   CHLORIDE mmol/L 125* 123*   --   --  131*  < > 129*   CO2 mmol/L 21.1* 22.1  --   --  22.3  < > 28.1   BUN mg/dL 23 35*  --   --  51*  < > 85*   CREATININE mg/dL 1.03* 0.96  --   --  1.37*  < > 2.60*   CALCIUM mg/dL 8.8 6.8*  --   --  6.8*  < > 8.3*   BILIRUBIN mg/dL  --   --   --   --   --   --  0.5   ALK PHOS U/L  --   --   --   --   --   --  74   ALT (SGPT) U/L  --   --   --   --   --   --  22   AST (SGOT) U/L  --   --   --   --   --   --  26   GLUCOSE mg/dL 132* 155*  --   --  237*  < > 84   WBC 10*3/mm3 10.73* 11.83* 18.07*  --   --   < > 19.80*   HEMOGLOBIN g/dL 11.1* 10.7* 10.3*  --   --   < > 12.6   PLATELETS 10*3/mm3 127* 129* 103*  --   --   < > 142   INR   --   --   --   --   --   --  1.86*   PROBNP pg/mL  --   --   --   --   --   --  4,442.0*   PROCALCITONIN ng/mL  --   --   --   --   --   --  0.71*   < > = values in this interval not displayed.    Lab Results   Component Value Date    CALCIUM 8.8 09/19/2018    PHOS 3.1 09/19/2018         Results from last 7 days  Lab Units 09/17/18  1353 09/16/18  0002 09/15/18  2328   BLOODCX   --  No growth at 5 days No growth at 5 days   MRSA SCREEN CX  No Methicillin Resistant Staphylococcus aureus isolated  --   --          Results from last 7 days  Lab Units 09/17/18  0728 09/16/18  1102 09/16/18  0617 09/15/18  2319   TROPONIN T ng/mL 0.097* 0.119* 0.112* 0.106*       Results Review:    I have reviewed the available laboratory results and reviewed the chest imaging personally    Imaging Results (all)     Procedure Component Value Units Date/Time    XR Chest 1 View [[id number]] Collected:  09/15/18 2337     Updated:  09/15/18 2341    Narrative:       PORTABLE CHEST X-RAY     HISTORY: soa     COMPARISON: None.     FINDINGS: Portable AP view of the chest was obtained. Patient is rotated  to the left. Lungs are under aerated but grossly  clear where  visualized.  There are calcified residua of granulomatous disease  present. Heart size and pulmonary vasculature are likely normal  considering  poor inspiration.   No obvious significant pleural fluid  collections. Extensive vascular calculi. Generalized osteopenia. Chronic  deformity of the proximal left humerus noted          Impression:          Poor inspiration without active disease identified,     This report was finalized on 9/15/2018 11:38 PM by Lei Gillis M.D.               ASSESSMENT:   Comfort measure  Sepsis; resolved  Suspected pna   Hypernatremia, severe  Acute renal failure  Severe dehydration  ? NSTEMI type 2  Acute metabolic encephalopathy   Advanced dementia  History of COPD  History of dysphagia    PLAN:  Ongoing palliative care. Pt appears comfortable. No family @ bedside. Will get hospice consult for possible inpatient hospice.     I have discussed my findings and recommendations with patient, family and consulting provider.     Kasi Lindsay MD  9/21/2018   negative detailed exam details…

## 2022-01-26 NOTE — HISTORY OF PRESENT ILLNESS
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 Patient presented to the [facility] ED on 1/10/22 for approximately 1 week of diarrhea as well as reduced PO intake. Patient was having 3-4 yellow, watery stools per day. In the ED, patient was given IV fluids, and had a CT scan performed which suggested questionable colitis. His lab work at the time was mostly unremarkable and UA was negative for infection. Patient improved during his ED course and was able to tolerate PO. He was discharged on cipro/flagyl which he was instructed to take if his symptoms persisted. Patient reported that he took 5 days of cipro with no improvement in symptoms and attempts the flagyl but stated that it made his symptoms worse. Currently, Mr. Forte reports that he continues to have approximately 3 watery BMs per day which seem to be triggered when he lays down. He also endorses vague cough (also worsened when laying flat) as well as a runny nose. He states he has been experiencing abdominal bloating  associated with increased bowel noises as well as increased flatulence and burping. He otherwise denies any BRBPR or melena. Reports that when he is sitting up or walking his symptoms tend to amadeo. Denies any fevers, dizziness, CP or SOB.

## 2022-01-26 NOTE — REVIEW OF SYSTEMS
[Cough] : cough [Abdominal Pain] : abdominal pain [Diarrhea] : diarrhea [Fever] : no fever [Chills] : no chills [Fatigue] : no fatigue [Night Sweats] : no night sweats [Discharge] : no discharge [Pain] : no pain [Vision Problems] : no vision problems [Itching] : no itching [Earache] : no earache [Hearing Loss] : no hearing loss [Nosebleeds] : no nosebleeds [Nasal Discharge] : no nasal discharge [Sore Throat] : no sore throat [Chest Pain] : no chest pain [Palpitations] : no palpitations [Lower Ext Edema] : no lower extremity edema [Orthopena] : no orthopnea [Shortness Of Breath] : no shortness of breath [Wheezing] : no wheezing [Dyspnea on Exertion] : not dyspnea on exertion [Nausea] : no nausea [Constipation] : no constipation [Vomiting] : no vomiting [Heartburn] : no heartburn [Melena] : no melena [Dysuria] : no dysuria [Incontinence] : no incontinence [Hematuria] : no hematuria [Frequency] : no frequency [Joint Pain] : no joint pain [Back Pain] : no back pain [Itching] : no itching [Skin Rash] : no skin rash [Headache] : no headache [Dizziness] : no dizziness [Fainting] : no fainting

## 2022-01-26 NOTE — H&P PST ADULT - PROBLEM SELECTOR PLAN 3
Pt. states he saw his Cardiologist  "a couple of weeks ago."  Instructed pt to call Cardiologist to see if that can be updated as a clearance visit because he needs cardiac clearance due to his cardiac hx..  Pt. instructed to obtain instruction on Warfarin therapy prior to surgery.

## 2022-01-26 NOTE — ASSESSMENT
[FreeTextEntry1] : Case discussed with Dr. Maria\par \par RTC in 1 week for INR check\par RTC in 6 weeks for follow up\par \par MI, [facility]

## 2022-01-27 DIAGNOSIS — Z79.01 LONG TERM (CURRENT) USE OF ANTICOAGULANTS: ICD-10-CM

## 2022-01-27 DIAGNOSIS — R19.7 DIARRHEA, UNSPECIFIED: ICD-10-CM

## 2022-01-27 DIAGNOSIS — E04.2 NONTOXIC MULTINODULAR GOITER: ICD-10-CM

## 2022-01-27 DIAGNOSIS — Z00.00 ENCOUNTER FOR GENERAL ADULT MEDICAL EXAMINATION WITHOUT ABNORMAL FINDINGS: ICD-10-CM

## 2022-01-27 DIAGNOSIS — E11.9 TYPE 2 DIABETES MELLITUS WITHOUT COMPLICATIONS: ICD-10-CM

## 2022-01-27 DIAGNOSIS — R05.9 COUGH, UNSPECIFIED: ICD-10-CM

## 2022-01-27 DIAGNOSIS — R31.9 HEMATURIA, UNSPECIFIED: ICD-10-CM

## 2022-01-27 LAB
CULTURE RESULTS: NO GROWTH — SIGNIFICANT CHANGE UP
CULTURE RESULTS: SIGNIFICANT CHANGE UP
SPECIMEN SOURCE: SIGNIFICANT CHANGE UP

## 2022-01-27 NOTE — PHYSICAL EXAM
[No Acute Distress] : no acute distress [Well Nourished] : well nourished [Well Developed] : well developed [Well-Appearing] : well-appearing [Normal Sclera/Conjunctiva] : normal sclera/conjunctiva [EOMI] : extraocular movements intact [Normal Outer Ear/Nose] : the outer ears and nose were normal in appearance [Normal Oropharynx] : the oropharynx was normal [No JVD] : no jugular venous distention [No Respiratory Distress] : no respiratory distress  [No Accessory Muscle Use] : no accessory muscle use [Clear to Auscultation] : lungs were clear to auscultation bilaterally [Normal Rate] : normal rate  [Regular Rhythm] : with a regular rhythm [Normal S1, S2] : normal S1 and S2 [No Edema] : there was no peripheral edema [Soft] : abdomen soft [Non Tender] : non-tender [Non-distended] : non-distended [No Masses] : no abdominal mass palpated [No Joint Swelling] : no joint swelling [Grossly Normal Strength/Tone] : grossly normal strength/tone [No Rash] : no rash [Coordination Grossly Intact] : coordination grossly intact [No Focal Deficits] : no focal deficits [Normal Gait] : normal gait [de-identified] : large thyroid bilaterally

## 2022-01-27 NOTE — HISTORY OF PRESENT ILLNESS
[Atrial Fibrillation] : atrial fibrillation [Coronary Artery Disease] : coronary artery disease [Chronic Anticoagulation] : chronic anticoagulation [Diabetes] : diabetes [(Patient denies any chest pain, claudication, dyspnea on exertion, orthopnea, palpitations or syncope)] : Patient denies any chest pain, claudication, dyspnea on exertion, orthopnea, palpitations or syncope [Moderate (4-6 METs)] : Moderate (4-6 METs) [Anticoagulants: _____] : Anticoagulants: [unfilled] [Aortic Stenosis] : no aortic stenosis [Recent Myocardial Infarction] : no recent myocardial infarction [Implantable Device/Pacemaker] : no implantable device/pacemaker [Asthma] : no asthma [COPD] : no COPD [Sleep Apnea] : no sleep apnea [Smoker] : not a smoker [Family Member] : no family member with adverse anesthesia reaction/sudden death [Self] : no previous adverse anesthesia reaction [Chronic Kidney Disease] : no chronic kidney disease [FreeTextEntry1] : cystoscopy? 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 Patient has been having on-and-off hematuria with clots for the year. He has previously been seen in the ED for and follows w/ urology. He had a CT Urogram in 9/24/21 that showed no renal stones, suspicious renal mass, or evidence of urothelial lesion, enlarged prostate with TURP defect. He recently saw urology and they are planning a procedure, although the exact nature of the procedure is unclear. Procedure is scheduled for 2/3/2022, and may include cystoscopy and ? TURP repair. Patient is unsure. \par \par Patient denies any current chest pain, SOB, weight loss, fever, chills, night sweats, nausea, vomiting, abdominal pain, changes in bowel habits, urinary symptoms, leg swelling, or weakness. Previous cough is improving (still an issue when laying flat) and his diarrhea has resolved.\par  [FreeTextEntry7] : EKG 12/2021: Afib, no ischemic changes\par \par Stress test 2/2020: LVEF 70%; normal myocardial perfusion\par \par Echo 2/2020: \par 1. Mechanical mitral valve prosthesis. Mild mitral\par regurgitation. Mean transmitral valve gradient equals 4 mm\par Hg, which is probably normal in the setting of a prosthetic\par valve.\par 2. Normal left ventricular internal dimensions and wall\par thicknesses.\par 3. Endocardium not well visualized; grossly normal left\par ventricular systolic function.\par 4. Normal right ventricular size and function.\par 5. Normal tricuspid valve. Mild-moderate tricuspid\par regurgitation.\par

## 2022-01-27 NOTE — ASSESSMENT
[Patient Optimized for Surgery] : Patient optimized for surgery [No Further Testing Recommended] : no further testing recommended [Modify anticoagulant treatment prior to procedure] : Modify anticoagulant treatment prior to procedure [Continue medications as is] : Continue current medications [FreeTextEntry4] : Moderate-risk patient for moderate-risk surgery. [FreeTextEntry5] : pending patient discussion w/ surgeon, will consider lovenox bridge

## 2022-01-31 ENCOUNTER — APPOINTMENT (OUTPATIENT)
Dept: INTERNAL MEDICINE | Facility: CLINIC | Age: 77
End: 2022-01-31

## 2022-01-31 ENCOUNTER — NON-APPOINTMENT (OUTPATIENT)
Age: 77
End: 2022-01-31

## 2022-01-31 ENCOUNTER — APPOINTMENT (OUTPATIENT)
Dept: CARDIOLOGY | Facility: CLINIC | Age: 77
End: 2022-01-31
Payer: MEDICARE

## 2022-01-31 ENCOUNTER — OUTPATIENT (OUTPATIENT)
Dept: OUTPATIENT SERVICES | Facility: HOSPITAL | Age: 77
LOS: 1 days | End: 2022-01-31

## 2022-01-31 VITALS
OXYGEN SATURATION: 99 % | HEART RATE: 79 BPM | HEIGHT: 69 IN | TEMPERATURE: 97.8 F | BODY MASS INDEX: 25.48 KG/M2 | DIASTOLIC BLOOD PRESSURE: 81 MMHG | SYSTOLIC BLOOD PRESSURE: 134 MMHG | WEIGHT: 172 LBS | RESPIRATION RATE: 16 BRPM

## 2022-01-31 VITALS — HEART RATE: 82 BPM | OXYGEN SATURATION: 100 % | TEMPERATURE: 96.5 F

## 2022-01-31 DIAGNOSIS — Z79.01 LONG TERM (CURRENT) USE OF ANTICOAGULANTS: ICD-10-CM

## 2022-01-31 DIAGNOSIS — I25.10 ATHEROSCLEROTIC HEART DISEASE OF NATIVE CORONARY ARTERY WITHOUT ANGINA PECTORIS: ICD-10-CM

## 2022-01-31 DIAGNOSIS — Z95.2 PRESENCE OF PROSTHETIC HEART VALVE: ICD-10-CM

## 2022-01-31 DIAGNOSIS — I09.9 RHEUMATIC HEART DISEASE, UNSPECIFIED: ICD-10-CM

## 2022-01-31 DIAGNOSIS — Z98.89 OTHER SPECIFIED POSTPROCEDURAL STATES: Chronic | ICD-10-CM

## 2022-01-31 DIAGNOSIS — I48.91 UNSPECIFIED ATRIAL FIBRILLATION: ICD-10-CM

## 2022-01-31 DIAGNOSIS — Z86.69 PERSONAL HISTORY OF OTHER DISEASES OF THE NERVOUS SYSTEM AND SENSE ORGANS: Chronic | ICD-10-CM

## 2022-01-31 DIAGNOSIS — Z98.890 OTHER SPECIFIED POSTPROCEDURAL STATES: Chronic | ICD-10-CM

## 2022-01-31 PROBLEM — N40.0 BENIGN PROSTATIC HYPERPLASIA WITHOUT LOWER URINARY TRACT SYMPTOMS: Chronic | Status: ACTIVE | Noted: 2022-01-26

## 2022-01-31 PROCEDURE — 99214 OFFICE O/P EST MOD 30 MIN: CPT

## 2022-01-31 PROCEDURE — 93000 ELECTROCARDIOGRAM COMPLETE: CPT

## 2022-01-31 NOTE — PHYSICAL EXAM
[Well Developed] : well developed [Well Nourished] : well nourished [No Acute Distress] : no acute distress [Normal Conjunctiva] : normal conjunctiva [Normal Venous Pressure] : normal venous pressure [No Carotid Bruit] : no carotid bruit [Clear Lung Fields] : clear lung fields [Good Air Entry] : good air entry [No Respiratory Distress] : no respiratory distress  [Soft] : abdomen soft [Non Tender] : non-tender [No Masses/organomegaly] : no masses/organomegaly [Normal Bowel Sounds] : normal bowel sounds [Normal Gait] : normal gait [No Edema] : no edema [No Cyanosis] : no cyanosis [No Clubbing] : no clubbing [No Varicosities] : no varicosities [No Rash] : no rash [No Skin Lesions] : no skin lesions [Moves all extremities] : moves all extremities [No Focal Deficits] : no focal deficits [Normal Speech] : normal speech [Alert and Oriented] : alert and oriented [Normal memory] : normal memory [de-identified] : enlarged (likely) thyroid tissue right neck, soft, non-tender [de-identified] : midline sternal scar well healed; mechanical, crisp S1; normal S2

## 2022-01-31 NOTE — HISTORY OF PRESENT ILLNESS
[FreeTextEntry1] : 75 yo man presents for CV followup. He is originally from [facility]; lives with his daughter and wife.\par \par H/o CAD s/p CABG, s/p mechanical MVR (about 12+ years ago; rheumatic heart disease; on warfarin), chronic persistent atrial fibrillation, diabetes (A1c 7.0%), HLD, Rosie Thompson tears / esophageal ulcers. He developed COVID early in Sept 2020. \par \par Last stress test 2/2020: LVEF 70%; normal myocardial perfusion\par \par Last echo 2/2020: \par 1. Mechanical mitral valve prosthesis. Mild mitral\par regurgitation.  Mean transmitral valve gradient equals 4 mm\par Hg, which is probably normal in the setting of a prosthetic\par valve.\par 2. Normal left ventricular internal dimensions and wall\par thicknesses.\par 3. Endocardium not well visualized; grossly normal left\par ventricular systolic function.\par 4. Normal right ventricular size and function.\par 5. Normal tricuspid valve.  Mild-moderate tricuspid\par regurgitation.\par \par Mr. Forte is s/p recent hospitalization for diarrhea / possible colitis; now resolved. He is now planning to have urologic procedure 2/3/2022 (intermittent hematuria, urinary frequency; CT Urogram in 9/24/21 showed no renal stones, suspicious renal mass, or evidence of urothelial lesion, enlarged prostate). Unclear exact type of procedure.\par \par Dr. Ashish Manrique (Urologic surgeon; [facility]; Fax [phone])\par PCP: Adrian Trevizo MD; Ameena Bains MD\par \par Mr. Forte reports coughing when in the reclined position, or when he is "hot". No hemoptysis. He has noted this for the past 3 or so months. If he sits up, the cough improves. He notes that he had a similar coughing issue prior to his MVR numerous years ago. No chest pains. No shortness of breath at rest or during exertion. States he can climb a few flights of stairs without difficulty. No syncope or profound dizziness. No diarrhea, no blood in stools. No recent hematuria. No recent COVID issues.\par \par Denies having had a stroke in the past. He has tolerated "bridging" in the past without an issue.

## 2022-01-31 NOTE — REVIEW OF SYSTEMS
[Orthopnea] : orthopnea [PND] : PND [Cough] : cough [Urinary Frequency] : urinary frequency [Hematuria] : hematuria [Nocturia] : nocturia [Negative] : Heme/Lymph [SOB] : no shortness of breath [Dyspnea on exertion] : not dyspnea during exertion [Chest Discomfort] : no chest discomfort [Lower Ext Edema] : no extremity edema [Leg Claudication] : no intermittent leg claudication [Palpitations] : no palpitations [Syncope] : no syncope [Wheezing] : no wheezing [Coughing Up Blood] : no hemoptysis [Snoring] : no snoring [Pain During Urination] : no dysuria

## 2022-02-01 ENCOUNTER — NON-APPOINTMENT (OUTPATIENT)
Age: 77
End: 2022-02-01

## 2022-02-02 ENCOUNTER — OUTPATIENT (OUTPATIENT)
Dept: OUTPATIENT SERVICES | Facility: HOSPITAL | Age: 77
LOS: 1 days | End: 2022-02-02

## 2022-02-02 ENCOUNTER — NON-APPOINTMENT (OUTPATIENT)
Age: 77
End: 2022-02-02

## 2022-02-02 ENCOUNTER — APPOINTMENT (OUTPATIENT)
Dept: CV DIAGNOSITCS | Facility: HOSPITAL | Age: 77
End: 2022-02-02
Payer: MEDICARE

## 2022-02-02 DIAGNOSIS — Z98.89 OTHER SPECIFIED POSTPROCEDURAL STATES: Chronic | ICD-10-CM

## 2022-02-02 DIAGNOSIS — Z86.69 PERSONAL HISTORY OF OTHER DISEASES OF THE NERVOUS SYSTEM AND SENSE ORGANS: Chronic | ICD-10-CM

## 2022-02-02 DIAGNOSIS — Z98.890 OTHER SPECIFIED POSTPROCEDURAL STATES: Chronic | ICD-10-CM

## 2022-02-02 DIAGNOSIS — E78.5 HYPERLIPIDEMIA, UNSPECIFIED: ICD-10-CM

## 2022-02-02 PROCEDURE — 93306 TTE W/DOPPLER COMPLETE: CPT | Mod: 26

## 2022-02-02 NOTE — ASU PATIENT PROFILE, ADULT - NSICDXPASTSURGICALHX_GEN_ALL_CORE_FT
PAST SURGICAL HISTORY:  H/O melanoma excision scalp-2014    H/O retinal detachment left    Hx of CABG     Mitral valve replaced 2006 at [facility]    S/P ABELINO 2014

## 2022-02-02 NOTE — ASU PATIENT PROFILE, ADULT - NSICDXPASTMEDICALHX_GEN_ALL_CORE_FT
PAST MEDICAL HISTORY:  Atrial fibrillation     BPH (benign prostatic hypertrophy)     CAD (coronary artery disease)     Cataract left    DVT (deep venous thrombosis) 2005    Enlarged prostate     GERD (gastroesophageal reflux disease)     Hypertension     Mitral valve disease     Pulmonary embolism 2005    Retinal detachment left    Rheumatic heart disease     Seizures last episode approximately 2012, pt. was taken off medication 2017    Stomach ulcer

## 2022-02-02 NOTE — ASU PATIENT PROFILE, ADULT - FALL HARM RISK - UNIVERSAL INTERVENTIONS
Bed in lowest position, wheels locked, appropriate side rails in place/Call bell, personal items and telephone in reach/Instruct patient to call for assistance before getting out of bed or chair/Non-slip footwear when patient is out of bed/[place] to call system/Physically safe environment - no spills, clutter or unnecessary equipment/Purposeful Proactive Rounding/Room/bathroom lighting operational, light cord in reach

## 2022-02-03 ENCOUNTER — INPATIENT (INPATIENT)
Facility: HOSPITAL | Age: 77
LOS: 0 days | Discharge: HOME | End: 2022-02-03
Attending: UROLOGY | Admitting: UROLOGY

## 2022-02-03 ENCOUNTER — APPOINTMENT (OUTPATIENT)
Dept: UROLOGY | Facility: HOSPITAL | Age: 77
End: 2022-02-03

## 2022-02-03 VITALS
OXYGEN SATURATION: 98 % | WEIGHT: 164.91 LBS | TEMPERATURE: 99 F | RESPIRATION RATE: 16 BRPM | SYSTOLIC BLOOD PRESSURE: 135 MMHG | DIASTOLIC BLOOD PRESSURE: 74 MMHG | HEIGHT: 68.5 IN | HEART RATE: 80 BPM

## 2022-02-03 DIAGNOSIS — Z98.89 OTHER SPECIFIED POSTPROCEDURAL STATES: Chronic | ICD-10-CM

## 2022-02-03 DIAGNOSIS — Z86.69 PERSONAL HISTORY OF OTHER DISEASES OF THE NERVOUS SYSTEM AND SENSE ORGANS: Chronic | ICD-10-CM

## 2022-02-03 DIAGNOSIS — Z98.890 OTHER SPECIFIED POSTPROCEDURAL STATES: Chronic | ICD-10-CM

## 2022-02-03 DIAGNOSIS — N40.1 BENIGN PROSTATIC HYPERPLASIA WITH LOWER URINARY TRACT SYMPTOMS: ICD-10-CM

## 2022-02-03 LAB
APTT BLD: 48.4 SEC
APTT BLD: 59.1 SEC — HIGH (ref 27–36.3)
GLUCOSE BLDC GLUCOMTR-MCNC: 183 MG/DL — HIGH (ref 70–99)
INR BLD: 2.01 RATIO — HIGH (ref 0.88–1.16)
INR PPP: 2.12 RATIO
PROTHROM AB SERPL-ACNC: 22.4 SEC — HIGH (ref 10.6–13.6)
PT BLD: 24.3 SEC

## 2022-02-04 ENCOUNTER — NON-APPOINTMENT (OUTPATIENT)
Age: 77
End: 2022-02-04

## 2022-02-07 ENCOUNTER — APPOINTMENT (OUTPATIENT)
Dept: INTERNAL MEDICINE | Facility: CLINIC | Age: 77
End: 2022-02-07
Payer: MEDICARE

## 2022-02-07 ENCOUNTER — APPOINTMENT (OUTPATIENT)
Dept: INTERNAL MEDICINE | Facility: CLINIC | Age: 77
End: 2022-02-07

## 2022-02-07 ENCOUNTER — RESULT CHARGE (OUTPATIENT)
Age: 77
End: 2022-02-07

## 2022-02-07 ENCOUNTER — NON-APPOINTMENT (OUTPATIENT)
Age: 77
End: 2022-02-07

## 2022-02-07 ENCOUNTER — OUTPATIENT (OUTPATIENT)
Dept: OUTPATIENT SERVICES | Facility: HOSPITAL | Age: 77
LOS: 1 days | End: 2022-02-07

## 2022-02-07 VITALS — HEART RATE: 81 BPM | OXYGEN SATURATION: 98 % | TEMPERATURE: 97.6 F

## 2022-02-07 VITALS
WEIGHT: 169 LBS | BODY MASS INDEX: 24.96 KG/M2 | SYSTOLIC BLOOD PRESSURE: 130 MMHG | TEMPERATURE: 95.6 F | DIASTOLIC BLOOD PRESSURE: 75 MMHG

## 2022-02-07 DIAGNOSIS — Z98.89 OTHER SPECIFIED POSTPROCEDURAL STATES: Chronic | ICD-10-CM

## 2022-02-07 DIAGNOSIS — R39.9 UNSPECIFIED SYMPTOMS AND SIGNS INVOLVING THE GENITOURINARY SYSTEM: ICD-10-CM

## 2022-02-07 DIAGNOSIS — Z98.890 OTHER SPECIFIED POSTPROCEDURAL STATES: Chronic | ICD-10-CM

## 2022-02-07 DIAGNOSIS — N52.9 MALE ERECTILE DYSFUNCTION, UNSPECIFIED: ICD-10-CM

## 2022-02-07 DIAGNOSIS — Z86.69 PERSONAL HISTORY OF OTHER DISEASES OF THE NERVOUS SYSTEM AND SENSE ORGANS: Chronic | ICD-10-CM

## 2022-02-07 PROCEDURE — 99213 OFFICE O/P EST LOW 20 MIN: CPT | Mod: GE

## 2022-02-07 RX ORDER — SIMETHICONE 125 MG/1
125 TABLET, CHEWABLE ORAL
Qty: 90 | Refills: 1 | Status: DISCONTINUED | COMMUNITY
Start: 2022-01-19 | End: 2022-02-07

## 2022-02-07 RX ORDER — FAMOTIDINE 20 MG/1
20 TABLET, FILM COATED ORAL DAILY
Qty: 30 | Refills: 0 | Status: DISCONTINUED | COMMUNITY
Start: 2022-01-19 | End: 2022-02-07

## 2022-02-07 RX ORDER — TAMSULOSIN HYDROCHLORIDE 0.4 MG/1
0.4 CAPSULE ORAL
Qty: 60 | Refills: 11 | Status: DISCONTINUED | COMMUNITY
Start: 2020-01-22 | End: 2022-02-07

## 2022-02-08 DIAGNOSIS — R05.3 CHRONIC COUGH: ICD-10-CM

## 2022-02-08 NOTE — ASSESSMENT
[FreeTextEntry1] : #HCM\par Recieved covid vaccine x3\par Recieved flu vaccine \par Address colonoscopy at regular f/u visit\par \par Discussed case with Dr. Person \par Telephone visit in 2 days per pt request\par RTC in 1 month

## 2022-02-08 NOTE — REVIEW OF SYSTEMS
[Nasal Discharge] : nasal discharge [Cough] : cough [Fever] : no fever [Chills] : no chills [Fatigue] : no fatigue [Night Sweats] : no night sweats [Recent Change In Weight] : ~T no recent weight change [Discharge] : no discharge [Redness] : no redness [Vision Problems] : no vision problems [Hearing Loss] : no hearing loss [Chest Pain] : no chest pain [Palpitations] : no palpitations [Claudication] : no  leg claudication [Shortness Of Breath] : no shortness of breath [Wheezing] : no wheezing [Abdominal Pain] : no abdominal pain [Nausea] : no nausea [Constipation] : no constipation [Vomiting] : no vomiting [Incontinence] : no incontinence [Hesitancy] : no hesitancy [Frequency] : no frequency [Joint Pain] : no joint pain [Joint Stiffness] : no joint stiffness [Muscle Pain] : no muscle pain [Headache] : no headache [Dizziness] : no dizziness [Fainting] : no fainting [Easy Bleeding] : no easy bleeding [Easy Bruising] : no easy bruising

## 2022-02-08 NOTE — PHYSICAL EXAM
[No Acute Distress] : no acute distress [Well Nourished] : well nourished [Well Developed] : well developed [Well-Appearing] : well-appearing [Normal Sclera/Conjunctiva] : normal sclera/conjunctiva [EOMI] : extraocular movements intact [Normal Outer Ear/Nose] : the outer ears and nose were normal in appearance [Normal Oropharynx] : the oropharynx was normal [No JVD] : no jugular venous distention [No Lymphadenopathy] : no lymphadenopathy [No Respiratory Distress] : no respiratory distress  [No Accessory Muscle Use] : no accessory muscle use [Clear to Auscultation] : lungs were clear to auscultation bilaterally [Normal Rate] : normal rate  [Regular Rhythm] : with a regular rhythm [Normal S1, S2] : normal S1 and S2 [Soft] : abdomen soft [Non Tender] : non-tender [Non-distended] : non-distended [No Joint Swelling] : no joint swelling [Grossly Normal Strength/Tone] : grossly normal strength/tone [Coordination Grossly Intact] : coordination grossly intact [No Focal Deficits] : no focal deficits [Normal Affect] : the affect was normal [Normal Insight/Judgement] : insight and judgment were intact [de-identified] : prominent large thyroid tissue right neck, soft, non-tender.

## 2022-02-08 NOTE — HISTORY OF PRESENT ILLNESS
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 \par \par Pt c/o of cough that began 6-8 months ago. He states he is having bouts of cough that can last 2 hours. Notes that the cough worsens at night while laying down and occurs when he is hot. When he sits up, the cough improves. Cough is reportedly non-productive. Had COVID >1 year ago and is now vaccinatedx3. Denies chest pain, SOB, fevers/chills, dizziness, syncope. Denies sick contacts. Denies travel hx. Was prescribed albuterol for possible cough-variant asthma but this has not improved the cough. Was also prescribed pantoprazole for possible GERD and this has not improved the cough either. Notes that cough began before he started taking lisinopril.

## 2022-02-08 NOTE — END OF VISIT
[] : Resident [FreeTextEntry3] : Pt with chronic cough with prior appropriate work-up, none helpful, agree with trial off ACEi.

## 2022-02-11 NOTE — HISTORY OF PRESENT ILLNESS
[FreeTextEntry1] : 75 yo man presents for CV followup. He is originally from [facility]; lives with his daughter and wife.\par \par H/o CAD s/p CABG, s/p mechanical MVR (about 12+ years ago; rheumatic heart disease; on warfarin), chronic persistent atrial fibrillation, diabetes (A1c 7.0%), HLD, Rosie Thompson tears / esophageal ulcers. He developed COVID early in Sept 2020. \par \par Stress test 2/2020: LVEF 70%; normal myocardial perfusion\par \par Echo 2/2020: \par 1. Mechanical mitral valve prosthesis. Mild mitral\par regurgitation.  Mean transmitral valve gradient equals 4 mm\par Hg, which is probably normal in the setting of a prosthetic\par valve.\par 2. Normal left ventricular internal dimensions and wall\par thicknesses.\par 3. Endocardium not well visualized; grossly normal left\par ventricular systolic function.\par 4. Normal right ventricular size and function.\par 5. Normal tricuspid valve.  Mild-moderate tricuspid\par regurgitation.\par \par Mr. Forte is s/p recent hospitalization for diarrhea / possible colitis; now resolved. He is now planning to have urologic procedure 2/3/2022 (intermittent hematuria, urinary frequency; CT Urogram in 9/24/21 showed no renal stones, suspicious renal mass, or evidence of urothelial lesion, enlarged prostate). Unclear exact type of procedure.\par \par Dr. Ashish Manrique (Urologic surgeon; [address] [place] [address]; Fax [phone])\par PCP: Adrian Trevizo MD; Ameena Bains MD\par \par At his last visit with me 2 weeks ago, the following issues were discussed with him:\par Atherosclerotic heart disease of native coronary artery without angina pectoris (414.01)\par (I25.10)\par  · H/o CAD; no present symptoms to suggest unstable angina. Continues on medications\par     for HLD and HTN. Not on aspirin (on warfarin).\par Atrial fibrillation (427.31) (I48.91)\par  · Rate controlled AF; continue with anticoagulation, rate control with bblocker (and digoxin).\par     No known h/o CVA.\par Diabetes mellitus (250.00) (E11.9)\par  · On medical therapy. Followed by PCP. Reasonable A1c.\par Hematuria (599.70) (R31.9)\par  · No present hematuria. Recent hematuria, pending urologic procedure. Likely prostatic\par     hypertrophy.\par Hyperlipidemia (272.4) (E78.5)\par  · Continue with statin therapy.\par Mechanical heart valve present (V43.3) (Z95.2)\par  · Long-standing MVR (mechanical); h/o rheumatic fever/RHD. He has done quite well over\par     the past numerous years, maintained on warfarin (INR goal 2.5-3.5). No CVA or\par     bleeding issues. For the past few months, notes coughing when reclining; this improves\par     by sitting up. Interestingly, no SOB or VELAZQUEZ. He recalls that he had a similar\par     complaint just prior to his MVR. This needs to be evaluated with an echo to reassess his\par     MVR / gradient. I think this should be assessed prior to his upcoming urologic\par     procedure. It is also possible that his enlarged thyroid is causing a cough, and this is\par     being looked at by his PCP. In regards to his eventual urologic procedure, if possible, it\par     would be best to do the procedure while maintaining a therapeutic INR. If this is not\par     possible, then he certainly requires "bridging" therapy with LMWH given his markedly\par     elevated risk (mechanical MVR, enlarged left atrium, chronic AFib, diabetes). Given that\par     his procedure is scheduled for 2/3/2022, and he has no plans for bridging at\par     this time, his procedure might need to be delayed. His PCP will need to be\par     involved for exact bridging details. This needs to be planned well ahead of time to\par     ensure the least amount of risk and the least amount of time off of anticoagulation.\par Multiple thyroid nodules (241.1) (E04.2)\par  · Markedly enlarged thyroid (R>L). This is being evaluated by PCP. Perhaps this is a\par     possibility for his symptom of cough. He should f/u closely regarding this.\par \par Echocardiogram 2/2/22:\par LVEF 65%\par mechanical MV, mild MR, mean gradient 5 mmHg\par severe LAE\par moderate AMALIA; normal RV size/function\par PASP 50 mmHg\par No changes c/w 2/2020\par \par His ACEi was stopped due to cough. \par

## 2022-02-14 ENCOUNTER — OUTPATIENT (OUTPATIENT)
Dept: OUTPATIENT SERVICES | Facility: HOSPITAL | Age: 77
LOS: 1 days | End: 2022-02-14

## 2022-02-14 ENCOUNTER — APPOINTMENT (OUTPATIENT)
Dept: INTERNAL MEDICINE | Facility: CLINIC | Age: 77
End: 2022-02-14

## 2022-02-14 ENCOUNTER — APPOINTMENT (OUTPATIENT)
Dept: CARDIOLOGY | Facility: CLINIC | Age: 77
End: 2022-02-14

## 2022-02-14 VITALS — TEMPERATURE: 97 F | OXYGEN SATURATION: 99 % | HEART RATE: 92 BPM

## 2022-02-14 DIAGNOSIS — Z98.89 OTHER SPECIFIED POSTPROCEDURAL STATES: Chronic | ICD-10-CM

## 2022-02-14 DIAGNOSIS — Z79.01 LONG TERM (CURRENT) USE OF ANTICOAGULANTS: ICD-10-CM

## 2022-02-14 DIAGNOSIS — I09.9 RHEUMATIC HEART DISEASE, UNSPECIFIED: ICD-10-CM

## 2022-02-14 DIAGNOSIS — Z86.69 PERSONAL HISTORY OF OTHER DISEASES OF THE NERVOUS SYSTEM AND SENSE ORGANS: Chronic | ICD-10-CM

## 2022-02-14 DIAGNOSIS — Z98.890 OTHER SPECIFIED POSTPROCEDURAL STATES: Chronic | ICD-10-CM

## 2022-02-14 DIAGNOSIS — Z95.2 PRESENCE OF PROSTHETIC HEART VALVE: ICD-10-CM

## 2022-02-14 DIAGNOSIS — I48.91 UNSPECIFIED ATRIAL FIBRILLATION: ICD-10-CM

## 2022-02-14 DIAGNOSIS — I25.10 ATHEROSCLEROTIC HEART DISEASE OF NATIVE CORONARY ARTERY WITHOUT ANGINA PECTORIS: ICD-10-CM

## 2022-02-16 ENCOUNTER — APPOINTMENT (OUTPATIENT)
Dept: INTERNAL MEDICINE | Facility: CLINIC | Age: 77
End: 2022-02-16
Payer: MEDICARE

## 2022-02-16 ENCOUNTER — OUTPATIENT (OUTPATIENT)
Dept: OUTPATIENT SERVICES | Facility: HOSPITAL | Age: 77
LOS: 1 days | End: 2022-02-16

## 2022-02-16 VITALS
TEMPERATURE: 97.6 F | HEART RATE: 86 BPM | WEIGHT: 169 LBS | BODY MASS INDEX: 25.03 KG/M2 | HEIGHT: 69 IN | DIASTOLIC BLOOD PRESSURE: 75 MMHG | OXYGEN SATURATION: 99 % | SYSTOLIC BLOOD PRESSURE: 130 MMHG

## 2022-02-16 DIAGNOSIS — Z98.89 OTHER SPECIFIED POSTPROCEDURAL STATES: Chronic | ICD-10-CM

## 2022-02-16 DIAGNOSIS — Z98.890 OTHER SPECIFIED POSTPROCEDURAL STATES: Chronic | ICD-10-CM

## 2022-02-16 DIAGNOSIS — Z86.69 PERSONAL HISTORY OF OTHER DISEASES OF THE NERVOUS SYSTEM AND SENSE ORGANS: Chronic | ICD-10-CM

## 2022-02-16 PROCEDURE — 99214 OFFICE O/P EST MOD 30 MIN: CPT | Mod: GC

## 2022-02-21 NOTE — PHYSICAL EXAM
[Normal] : normal gait, coordination grossly intact, no focal deficits and deep tendon reflexes were 2+ and symmetric [Normal Rate] : normal rate  [Normal S1, S2] : normal S1 and S2

## 2022-02-22 NOTE — ASSESSMENT
[Patient Optimized for Surgery] : Patient optimized for surgery [Modify anticoagulant treatment prior to procedure] : Modify anticoagulant treatment prior to procedure [No Further Testing Recommended] : no further testing recommended [As per surgery] : as per surgery [FreeTextEntry4] : 75 Y/O male with HTN, CAD s/p CABG, RHD s/p Mechanical MVR (unknown make/model, echo findings suggest bileaflet) on Warfarin, T2DM, Afib, multiple thyroid nodules, Rosie Thompson tears and esophageal ulcers here for preop optimization for urologic procedure.\par \par Medical Optimization: \par -  Active cardiac conditions: No ACS; No decompensated CHF; No life threatening or significant arrhythmia, No recent stent placement. Pt does have long-standing MVR (mechanical); h/o rheumatic fever/RHD. Cardiology recommends repeat echo to reassess his MVR / gradient prior to his upcoming urologic procedure.\par - Major risk factors: No CHF history, no significant renal insufficiency, no TIA/stroke history, no insulin-requiring DM\par - Exercise tolerance >4 METs. \par - Risk of surgery: intermediate risk surgery\par \par Assessment: Based on the RCRI, the patient has a 0.9% chance of experiencing a cardiac event (low risk)\par \par Recommendations:\par - Continue current cardiac medications including Atorvastatin, Atenolol, Digoxin\par - No further cardiac evaluation is indicated\par \par - Pre-operative Bridging Strategy: Plan for perioperative AC bridge discussed and outlined for patient: INR check 7 days prior to planned procedure, if INR at goal, plan to hold Warfin 5 days before procedure, initiate LMWH 3 days prior to procedure (dosed 1mg/kg BID = 70mg BID, with last dose administered just in the AM 24 hours prior to procedure), repeat INR check 1 day prior to procedure. Will reach out to Dr. Torres (Urology) to confirm agreement with pre-operative bridging strategy.\par \par - Post-operative Bridging Strategy: Explained that proceduralist will likely recommend holding AC until post-op day 1 at which time Warfarin and LWMH bridge will be resumed with discontinuation of LMWH bridge when INR therapeutic (goal = 3). Explained that depending on the procedure/complications, the urologist may prefer to resume AC on post-op day 2, however given the higher thrombotic risk of his mechanical mitral valve, earlier resumption is preferred. \par \par - The patient has been medically optimized\par \par  [FreeTextEntry5] : check INR 7 days before procedure. based on this value, hold warfarin 5 days before procedure. start Lovenox 3 days before procedure. take last schaffer of LWMH on AM 24 hrs prior to procedure and hold subsequent (PM) dose. Recheck INR day before procedure.

## 2022-02-22 NOTE — ASSESSMENT
[Patient Optimized for Surgery] : Patient optimized for surgery [Modify anticoagulant treatment prior to procedure] : Modify anticoagulant treatment prior to procedure [No Further Testing Recommended] : no further testing recommended [As per surgery] : as per surgery [FreeTextEntry4] : 77 Y/O male with HTN, CAD s/p CABG, RHD s/p Mechanical MVR (unknown make/model, echo findings suggest bileaflet) on Warfarin, T2DM, Afib, multiple thyroid nodules, Rosie Thompson tears and esophageal ulcers here for preop optimization for urologic procedure.\par \par Medical Optimization: \par -  Active cardiac conditions: No ACS; No decompensated CHF; No life threatening or significant arrhythmia, No recent stent placement. Pt does have long-standing MVR (mechanical); h/o rheumatic fever/RHD. Cardiology recommends repeat echo to reassess his MVR / gradient prior to his upcoming urologic procedure.\par - Major risk factors: No CHF history, no significant renal insufficiency, no TIA/stroke history, no insulin-requiring DM\par - Exercise tolerance >4 METs. \par - Risk of surgery: intermediate risk surgery\par \par Assessment: Based on the RCRI, the patient has a 0.9% chance of experiencing a cardiac event (low risk)\par \par Recommendations:\par - Continue current cardiac medications including Atorvastatin, Atenolol, Digoxin\par - No further cardiac evaluation is indicated\par \par - Pre-operative Bridging Strategy: Plan for perioperative AC bridge discussed and outlined for patient: INR check 7 days prior to planned procedure, if INR at goal, plan to hold Warfin 5 days before procedure, initiate LMWH 3 days prior to procedure (dosed 1mg/kg BID = 70mg BID, with last dose administered just in the AM 24 hours prior to procedure), repeat INR check 1 day prior to procedure. Will reach out to Dr. Torres (Urology) to confirm agreement with pre-operative bridging strategy.\par \par - Post-operative Bridging Strategy: Explained that proceduralist will likely recommend holding AC until post-op day 1 at which time Warfarin and LWMH bridge will be resumed with discontinuation of LMWH bridge when INR therapeutic (goal = 3). Explained that depending on the procedure/complications, the urologist may prefer to resume AC on post-op day 2, however given the higher thrombotic risk of his mechanical mitral valve, earlier resumption is preferred. \par \par - The patient has been medically optimized\par \par  [FreeTextEntry5] : check INR 7 days before procedure. based on this value, hold warfarin 5 days before procedure. start Lovenox 3 days before procedure. take last schaffer of LWMH on AM 24 hrs prior to procedure and hold subsequent (PM) dose. Recheck INR day before procedure.

## 2022-02-22 NOTE — REVIEW OF SYSTEMS
[Vision Problems] : vision problems [Postnasal Drip] : postnasal drip [Nasal Discharge] : nasal discharge [Cough] : cough [Nocturia] : nocturia [Hematuria] : hematuria [Frequency] : frequency [Negative] : Heme/Lymph [Earache] : no earache [Nosebleeds] : no nosebleeds [Sore Throat] : no sore throat [Hoarseness] : no hoarseness [Dysuria] : no dysuria [Incontinence] : no incontinence [Hesitancy] : no hesitancy [Joint Stiffness] : no joint stiffness [Muscle Pain] : no muscle pain [Muscle Weakness] : no muscle weakness [Back Pain] : no back pain [Joint Swelling] : no joint swelling [FreeTextEntry3] : chronic visual loss [FreeTextEntry6] : cough when supine [FreeTextEntry9] : chronic left thumb joint pain

## 2022-02-22 NOTE — PLAN
[FreeTextEntry1] : #chronic cough\par - Like secondary to post nasal drip given occurring at night with associated rhinorrhea, vs GERD\par - trial of diphenhydramine 25mg qhs\par - Quantiferon Gold negative\par - CXR from 12/2021 w/ clear lungs\par \par #DM\par -Last POCT A1c 7.5% 12/2021\par  -c/w glipizide 10 bid\par  -c/w Trulicity 0.75 qweekly\par  -follows with optho at [facility]\par  -encouraged to follow diabetic diet and exercise\par \par #Afib\par - modified preop anticoagulation regimen: check INR 7 days before procedure. based on this value, hold warfarin 5 days before procedure. start Lovenox 3 days before procedure. take AM dose of lovenox day prior to procedure and hold subsequent doses. resume AC per instructions from surgical team. \par - patient provided extensive instructions and calendar diagram  to reinforce plan\par \par #hematuria\par - CT Urogram in 9/24/21 that showed no renal stones, suspicious renal mass, or evidence of urothelial lesion, enlarged prostate with TURP defect. \par - Follows w/ urology\par - Unclear procedure is scheduled for 3/3/2022, and may include cystoscopy and ? TURP repair. Patient is unsure\par \par #thyroid nodules\par - recent thyroid US with multiple heterogeneous nodules b/l\par  - Both nodules meet criteria for FNA. However, given hx of suppressed TSH, will check NM uptake and scan first\par - patient encouraged to schedule NM uptake scan\par - F/u w/ endo\par \par \par Case discussed with Dr Seay\par \par Brianna Elizabeth PGY1 in Psychiatry\par \par RTC in 1 week for INR check

## 2022-02-22 NOTE — HISTORY OF PRESENT ILLNESS
[Atrial Fibrillation] : atrial fibrillation [Coronary Artery Disease] : coronary artery disease [Chronic Anticoagulation] : chronic anticoagulation [Diabetes] : diabetes [Moderate (4-6 METs)] : Moderate (4-6 METs) [Anticoagulants: _____] : Anticoagulants: [unfilled] [de-identified] : #chronic cough\par Worse supine/reclining, resolves with upright position. Reports frequent rhinorrhea, sneezing. No improvement with trials of famotidine, loratidine. \par - Thyroid enlargement may be contributing to cough. \par \par #preop\par walks 30 min every day. can walk up 5 flights of steps before becoming SOB. denies orthopnea, PND. \par \par #enlarged thyroid\par - Follows with endo, last visit 02/02/22. \par Referred for NM thyroid uptake scan \par \par #afib\par needs bridging plan for upcoming urologic procedure. \par \par #uro\par hematuria yesterday and day before. \par \par  [Aortic Stenosis] : no aortic stenosis [Recent Myocardial Infarction] : no recent myocardial infarction [Implantable Device/Pacemaker] : no implantable device/pacemaker [Asthma] : no asthma [COPD] : no COPD [Sleep Apnea] : no sleep apnea [Smoker] : not a smoker [Family Member] : no family member with adverse anesthesia reaction/sudden death [Self] : no previous adverse anesthesia reaction [Chronic Kidney Disease] : no chronic kidney disease [FreeTextEntry1] : 3 [FreeTextEntry2] : 3/3/2022 [FreeTextEntry3] : Dr Billings [FreeTextEntry4] : Patient notes that previously scheduled urological surgery was disrupted due to taking Warfarin until 1 day prior to procedure. Pt, now rescheduled for 3/3. \par \par Pt reports following closely with cardiology for hx of CAD and Mechanical MVR. Denies CP, SOB, palpitations. Notes he can walk up 5 flights of stairs before becoming dyspneic, and walks 30min every day. \par \par Pt reports adherence with DM medications, states he tries to eat balanced diet of fish/chicken, vegetables, rarely desserts. Notes home blood sugars always <130 though this morning 150 after poor night sleep which is very unusual. \par \par Pt notes symptoms of nocturia, hesitancy, and frequency which have been increasingly bothersome. Reports episode of hematuria two days ago, will discuss with urologist. Denies dysuria, flank pain. \par \par Pt reports bothersome chronic cough, with associated rhinorrhea, not improved with loratidine and ranitidine. Pt notes this is very disruptive to his sleep and wife who has been sleeping on sofa. Pt denies prior trial of 1st gen antihistamine. \par \par Pt Follows with endo for enlarged thyroid, last visit 02/02/22 at which time referred for NM thyroid uptake scan, not yet scheduled. Denies unintentional weight changes, skin changes, heat/cold intolerance. \par \par  [FreeTextEntry6] : None [FreeTextEntry7] : Echocardiogram 2/2/22:\par LVEF 65%\par mechanical MV, mild MR, mean gradient 5 mmHg\par severe LAE\par moderate AMALIA; normal RV size/function\par PASP 50 mmHg\par No changes c/w 2/2020

## 2022-02-22 NOTE — PLAN
[FreeTextEntry1] : #chronic cough\par - Like secondary to post nasal drip given occurring at night with associated rhinorrhea, vs GERD\par - trial of diphenhydramine 25mg qhs\par - Quantiferon Gold negative\par - CXR from 12/2021 w/ clear lungs\par \par #DM\par -Last POCT A1c 7.5% 12/2021\par  -c/w glipizide 10 bid\par  -c/w Trulicity 0.75 qweekly\par  -follows with optho at [facility]\par  -encouraged to follow diabetic diet and exercise\par \par #Afib\par - modified preop anticoagulation regimen: check INR 7 days before procedure. based on this value, hold warfarin 5 days before procedure. start Lovenox 3 days before procedure. take AM dose of lovenox day prior to procedure and hold subsequent doses. resume AC per instructions from surgical team. \par - patient provided extensive instructions and calendar diagram  to reinforce plan\par \par #hematuria\par - CT Urogram in 9/24/21 that showed no renal stones, suspicious renal mass, or evidence of urothelial lesion, enlarged prostate with TURP defect. \par - Follows w/ urology\par - Unclear procedure is scheduled for 3/3/2022, and may include cystoscopy and ? TURP repair. Patient is unsure\par \par #thyroid nodules\par - recent thyroid US with multiple heterogeneous nodules b/l\par  - Both nodules meet criteria for FNA. However, given hx of suppressed TSH, will check NM uptake and scan first\par - patient encouraged to schedule NM uptake scan\par - F/u w/ endo\par \par \par Case discussed with Dr Seay\par \par Brianna Elizabeth PGY1 in Psychiatry\par \par RTC in 1 week for INR check

## 2022-02-22 NOTE — HISTORY OF PRESENT ILLNESS
[Atrial Fibrillation] : atrial fibrillation [Coronary Artery Disease] : coronary artery disease [Chronic Anticoagulation] : chronic anticoagulation [Diabetes] : diabetes [Moderate (4-6 METs)] : Moderate (4-6 METs) [Anticoagulants: _____] : Anticoagulants: [unfilled] [de-identified] : #chronic cough\par Worse supine/reclining, resolves with upright position. Reports frequent rhinorrhea, sneezing. No improvement with trials of famotidine, loratidine. \par - Thyroid enlargement may be contributing to cough. \par \par #preop\par walks 30 min every day. can walk up 5 flights of steps before becoming SOB. denies orthopnea, PND. \par \par #enlarged thyroid\par - Follows with endo, last visit 02/02/22. \par Referred for NM thyroid uptake scan \par \par #afib\par needs bridging plan for upcoming urologic procedure. \par \par #uro\par hematuria yesterday and day before. \par \par  [Aortic Stenosis] : no aortic stenosis [Recent Myocardial Infarction] : no recent myocardial infarction [Implantable Device/Pacemaker] : no implantable device/pacemaker [Asthma] : no asthma [COPD] : no COPD [Sleep Apnea] : no sleep apnea [Smoker] : not a smoker [Family Member] : no family member with adverse anesthesia reaction/sudden death [Self] : no previous adverse anesthesia reaction [Chronic Kidney Disease] : no chronic kidney disease [FreeTextEntry1] : 3 [FreeTextEntry2] : 3/3/2022 [FreeTextEntry3] : Dr Billings [FreeTextEntry4] : Patient notes that previously scheduled urological surgery was disrupted due to taking Warfarin until 1 day prior to procedure. Pt, now rescheduled for 3/3. \par \par Pt reports following closely with cardiology for hx of CAD and Mechanical MVR. Denies CP, SOB, palpitations. Notes he can walk up 5 flights of stairs before becoming dyspneic, and walks 30min every day. \par \par Pt reports adherence with DM medications, states he tries to eat balanced diet of fish/chicken, vegetables, rarely desserts. Notes home blood sugars always <130 though this morning 150 after poor night sleep which is very unusual. \par \par Pt notes symptoms of nocturia, hesitancy, and frequency which have been increasingly bothersome. Reports episode of hematuria two days ago, will discuss with urologist. Denies dysuria, flank pain. \par \par Pt reports bothersome chronic cough, with associated rhinorrhea, not improved with loratidine and ranitidine. Pt notes this is very disruptive to his sleep and wife who has been sleeping on sofa. Pt denies prior trial of 1st gen antihistamine. \par \par Pt Follows with endo for enlarged thyroid, last visit 02/02/22 at which time referred for NM thyroid uptake scan, not yet scheduled. Denies unintentional weight changes, skin changes, heat/cold intolerance. \par \par  [FreeTextEntry6] : None [FreeTextEntry7] : Echocardiogram 2/2/22:\par LVEF 65%\par mechanical MV, mild MR, mean gradient 5 mmHg\par severe LAE\par moderate AMALIA; normal RV size/function\par PASP 50 mmHg\par No changes c/w 2/2020

## 2022-02-23 ENCOUNTER — APPOINTMENT (OUTPATIENT)
Dept: UROLOGY | Facility: CLINIC | Age: 77
End: 2022-02-23

## 2022-02-24 ENCOUNTER — OUTPATIENT (OUTPATIENT)
Dept: OUTPATIENT SERVICES | Facility: HOSPITAL | Age: 77
LOS: 1 days | End: 2022-02-24

## 2022-02-24 ENCOUNTER — APPOINTMENT (OUTPATIENT)
Dept: INTERNAL MEDICINE | Facility: CLINIC | Age: 77
End: 2022-02-24

## 2022-02-24 ENCOUNTER — RESULT CHARGE (OUTPATIENT)
Age: 77
End: 2022-02-24

## 2022-02-24 VITALS
DIASTOLIC BLOOD PRESSURE: 86 MMHG | RESPIRATION RATE: 16 BRPM | WEIGHT: 162.92 LBS | SYSTOLIC BLOOD PRESSURE: 136 MMHG | OXYGEN SATURATION: 98 % | HEART RATE: 68 BPM | HEIGHT: 68 IN | TEMPERATURE: 98 F

## 2022-02-24 VITALS — HEART RATE: 81 BPM | OXYGEN SATURATION: 98 %

## 2022-02-24 VITALS — TEMPERATURE: 96.9 F

## 2022-02-24 DIAGNOSIS — N40.1 BENIGN PROSTATIC HYPERPLASIA WITH LOWER URINARY TRACT SYMPTOMS: ICD-10-CM

## 2022-02-24 DIAGNOSIS — R31.0 GROSS HEMATURIA: ICD-10-CM

## 2022-02-24 DIAGNOSIS — H26.9 UNSPECIFIED CATARACT: Chronic | ICD-10-CM

## 2022-02-24 DIAGNOSIS — Z98.890 OTHER SPECIFIED POSTPROCEDURAL STATES: Chronic | ICD-10-CM

## 2022-02-24 DIAGNOSIS — G47.33 OBSTRUCTIVE SLEEP APNEA (ADULT) (PEDIATRIC): ICD-10-CM

## 2022-02-24 DIAGNOSIS — Z86.39 PERSONAL HISTORY OF OTHER ENDOCRINE, NUTRITIONAL AND METABOLIC DISEASE: ICD-10-CM

## 2022-02-24 DIAGNOSIS — Z01.818 ENCOUNTER FOR OTHER PREPROCEDURAL EXAMINATION: ICD-10-CM

## 2022-02-24 DIAGNOSIS — E11.40 TYPE 2 DIABETES MELLITUS WITH DIABETIC NEUROPATHY, UNSPECIFIED: ICD-10-CM

## 2022-02-24 DIAGNOSIS — I48.91 UNSPECIFIED ATRIAL FIBRILLATION: ICD-10-CM

## 2022-02-24 DIAGNOSIS — I09.9 RHEUMATIC HEART DISEASE, UNSPECIFIED: ICD-10-CM

## 2022-02-24 DIAGNOSIS — Z95.2 PRESENCE OF PROSTHETIC HEART VALVE: ICD-10-CM

## 2022-02-24 DIAGNOSIS — Z79.01 LONG TERM (CURRENT) USE OF ANTICOAGULANTS: ICD-10-CM

## 2022-02-24 DIAGNOSIS — Z90.89 ACQUIRED ABSENCE OF OTHER ORGANS: Chronic | ICD-10-CM

## 2022-02-24 DIAGNOSIS — Z98.89 OTHER SPECIFIED POSTPROCEDURAL STATES: Chronic | ICD-10-CM

## 2022-02-24 DIAGNOSIS — T78.40XA ALLERGY, UNSPECIFIED, INITIAL ENCOUNTER: ICD-10-CM

## 2022-02-24 DIAGNOSIS — E04.2 NONTOXIC MULTINODULAR GOITER: ICD-10-CM

## 2022-02-24 DIAGNOSIS — I10 ESSENTIAL (PRIMARY) HYPERTENSION: ICD-10-CM

## 2022-02-24 DIAGNOSIS — Z86.69 PERSONAL HISTORY OF OTHER DISEASES OF THE NERVOUS SYSTEM AND SENSE ORGANS: Chronic | ICD-10-CM

## 2022-02-24 DIAGNOSIS — Z87.438 PERSONAL HISTORY OF OTHER DISEASES OF MALE GENITAL ORGANS: ICD-10-CM

## 2022-02-24 DIAGNOSIS — I25.10 ATHEROSCLEROTIC HEART DISEASE OF NATIVE CORONARY ARTERY WITHOUT ANGINA PECTORIS: ICD-10-CM

## 2022-02-24 DIAGNOSIS — Z51.81 ENCOUNTER FOR THERAPEUTIC DRUG LEVEL MONITORING: ICD-10-CM

## 2022-02-24 DIAGNOSIS — E11.9 TYPE 2 DIABETES MELLITUS WITHOUT COMPLICATIONS: ICD-10-CM

## 2022-02-24 LAB
A1C WITH ESTIMATED AVERAGE GLUCOSE RESULT: 6.7 % — HIGH (ref 4–5.6)
ALBUMIN SERPL ELPH-MCNC: 4.4 G/DL — SIGNIFICANT CHANGE UP (ref 3.3–5)
ALP SERPL-CCNC: 76 U/L — SIGNIFICANT CHANGE UP (ref 40–120)
ALT FLD-CCNC: 23 U/L — SIGNIFICANT CHANGE UP (ref 4–41)
ANION GAP SERPL CALC-SCNC: 15 MMOL/L — HIGH (ref 7–14)
APTT BLD: 42.9 SEC — HIGH (ref 27–36.3)
AST SERPL-CCNC: 18 U/L — SIGNIFICANT CHANGE UP (ref 4–40)
BILIRUB SERPL-MCNC: 2.5 MG/DL — HIGH (ref 0.2–1.2)
BLD GP AB SCN SERPL QL: NEGATIVE — SIGNIFICANT CHANGE UP
BUN SERPL-MCNC: 23 MG/DL — SIGNIFICANT CHANGE UP (ref 7–23)
CALCIUM SERPL-MCNC: 9.9 MG/DL — SIGNIFICANT CHANGE UP (ref 8.4–10.5)
CHLORIDE SERPL-SCNC: 103 MMOL/L — SIGNIFICANT CHANGE UP (ref 98–107)
CO2 SERPL-SCNC: 22 MMOL/L — SIGNIFICANT CHANGE UP (ref 22–31)
CREAT SERPL-MCNC: 0.99 MG/DL — SIGNIFICANT CHANGE UP (ref 0.5–1.3)
ESTIMATED AVERAGE GLUCOSE: 146 — SIGNIFICANT CHANGE UP
GLUCOSE SERPL-MCNC: 149 MG/DL — HIGH (ref 70–99)
HCT VFR BLD CALC: 45.7 % — SIGNIFICANT CHANGE UP (ref 39–50)
HGB BLD-MCNC: 15.2 G/DL — SIGNIFICANT CHANGE UP (ref 13–17)
INR BLD: 1.74 RATIO — HIGH (ref 0.88–1.16)
MCHC RBC-ENTMCNC: 30.6 PG — SIGNIFICANT CHANGE UP (ref 27–34)
MCHC RBC-ENTMCNC: 33.3 GM/DL — SIGNIFICANT CHANGE UP (ref 32–36)
MCV RBC AUTO: 92 FL — SIGNIFICANT CHANGE UP (ref 80–100)
NRBC # BLD: 0 /100 WBCS — SIGNIFICANT CHANGE UP
NRBC # FLD: 0 K/UL — SIGNIFICANT CHANGE UP
PLATELET # BLD AUTO: 225 K/UL — SIGNIFICANT CHANGE UP (ref 150–400)
POTASSIUM SERPL-MCNC: 4 MMOL/L — SIGNIFICANT CHANGE UP (ref 3.5–5.3)
POTASSIUM SERPL-SCNC: 4 MMOL/L — SIGNIFICANT CHANGE UP (ref 3.5–5.3)
PROT SERPL-MCNC: 7.8 G/DL — SIGNIFICANT CHANGE UP (ref 6–8.3)
PROTHROM AB SERPL-ACNC: 20.3 SEC — HIGH (ref 10.5–13.4)
RBC # BLD: 4.97 M/UL — SIGNIFICANT CHANGE UP (ref 4.2–5.8)
RBC # FLD: 13 % — SIGNIFICANT CHANGE UP (ref 10.3–14.5)
RH IG SCN BLD-IMP: POSITIVE — SIGNIFICANT CHANGE UP
SODIUM SERPL-SCNC: 140 MMOL/L — SIGNIFICANT CHANGE UP (ref 135–145)
WBC # BLD: 9.02 K/UL — SIGNIFICANT CHANGE UP (ref 3.8–10.5)
WBC # FLD AUTO: 9.02 K/UL — SIGNIFICANT CHANGE UP (ref 3.8–10.5)

## 2022-02-24 RX ORDER — LISINOPRIL 2.5 MG/1
1 TABLET ORAL
Qty: 0 | Refills: 0 | DISCHARGE

## 2022-02-24 RX ORDER — FINASTERIDE 5 MG/1
1 TABLET, FILM COATED ORAL
Qty: 0 | Refills: 0 | DISCHARGE

## 2022-02-24 RX ORDER — ENOXAPARIN SODIUM 150 MG/ML
120 INJECTION SUBCUTANEOUS
Qty: 10 | Refills: 1 | Status: DISCONTINUED | COMMUNITY
Start: 2022-01-31 | End: 2022-02-24

## 2022-02-24 RX ORDER — DULAGLUTIDE 4.5 MG/.5ML
0 INJECTION, SOLUTION SUBCUTANEOUS
Qty: 0 | Refills: 0 | DISCHARGE

## 2022-02-24 RX ORDER — BICALUTAMIDE 50 MG/1
1 TABLET, FILM COATED ORAL
Qty: 0 | Refills: 0 | DISCHARGE

## 2022-02-24 RX ORDER — ATORVASTATIN CALCIUM 80 MG/1
1 TABLET, FILM COATED ORAL
Qty: 0 | Refills: 0 | DISCHARGE

## 2022-02-24 RX ORDER — WARFARIN SODIUM 2.5 MG/1
1 TABLET ORAL
Qty: 0 | Refills: 0 | DISCHARGE

## 2022-02-24 RX ORDER — SODIUM CHLORIDE 9 MG/ML
1000 INJECTION, SOLUTION INTRAVENOUS
Refills: 0 | Status: DISCONTINUED | OUTPATIENT
Start: 2022-03-03 | End: 2022-03-03
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
## 2022-02-24 NOTE — H&P PST ADULT - MUSCULOSKELETAL
negative No joint pain, swelling or deformity; no limitation of movement detailed exam details… ROM intact/normal strength

## 2022-02-24 NOTE — H&P PST ADULT - OPH GEN HX ROS MEA POS PC
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

## 2022-02-24 NOTE — H&P PST ADULT - PROBLEM SELECTOR PLAN 1
Cystoscopy Transurethral Resection Prostate    Pre op instructions reviewed with pt ; pt verbalized good understanding of pre op instructions    Pt to Dr Seay for pre op cardiac clearance Cystoscopy Transurethral Resection Prostate    Pre op instructions reviewed with pt ; pt verbalized good understanding of pre op instructions    Pt to Dr Seay for pre op  clearance

## 2022-02-24 NOTE — H&P PST ADULT - EKG AND INTERPRETATION
ekg done 1/22 by cardiologist
Scheduled for laparoscopic cholecystectomy on 10/16/2017. labs done and results pending. Surgical scrub & preop instruction given and explained to patient & his brother Berto Mitchell. Famotidine provided with instruction. Verbalized understanding.  Patient states, he's scheduled for medical clearance. Will obtain.

## 2022-02-24 NOTE — H&P PST ADULT - NSICDXPASTSURGICALHX_GEN_ALL_CORE_FT
PAST SURGICAL HISTORY:  Cataract Bilateral ; tx surgically    H/O melanoma excision scalp-2014    H/O retinal detachment left    Hx of CABG x 2 2006    Mitral valve replaced 2006 at [facility]    S/P tonsillectomy     S/P TURP 2014

## 2022-02-24 NOTE — H&P PST ADULT - PROBLEM SELECTOR PLAN 3
Pt rx Coumadin    Pt to Dr Elizabeth/  Dr Seay for pre op clearance and instructions    Regarding Coumadin ; to be held 5 days pre op , Lovenox to be initiated 3 days pre op last dose AM prior to surgery ; see clearance  Pt received instructions from MD    PTINR dos Pt rx Coumadin    Pt to Dr Elizabeth/  Dr Seay for pre op clearance and  Coumadin instructions    Regarding Coumadin ; see clearance Dr Seay ;  per note ; Coumadin to be held 5 days pre op   , Lovenox to be initiated as per Dr Seay / Dr Jean  last dose AM prior to surgery ; see instructions stated on clearance  Pt received instructions from MD    PTINR dos Pt rx Coumadin/ [facility]    Pt to Dr Elizabeth/  Dr Seay for pre op clearance and  Coumadin instructions    Regarding Coumadin ; see clearance Dr Seay ;  per note ; Coumadin to be held 5 days pre op   , Lovenox to be initiated as per Dr Seay / Dr Jean  last dose AM prior to surgery ; see instructions stated on clearance  Pt received instructions from MD    PTINHERBIE dos

## 2022-02-24 NOTE — H&P PST ADULT - HISTORY OF PRESENT ILLNESS
Pt. is a 77 yo male; pt reports nocturia " 5 x nightly " pt reports intermittent hematuria . Pt states "I have an enlarged prostate " Pt now presents for Cystoscopy Transurethral Resection of the Prostate    Pt. is a 77 yo male; pt reports nocturia " 5 x nightly " pt reports intermittent hematuria . Pt states "I have an enlarged prostate " Pt now presents for Cystoscopy Transurethral Resection of the Prostate         Pt is a fair  historian  Pt. is a 77 yo male; pt reports nocturia " 5 x nightly " pt reports intermittent hematuria .Pt to surgeon  Pt states "I have an enlarged prostate " Pt now presents for Cystoscopy Transurethral Resection of the Prostate         Pt is a fair  historian

## 2022-02-24 NOTE — H&P PST ADULT - MALLAMPATI CLASS
on phonation/Class IV (difficult) - the soft palate is not visible at all on phonation/Class III - visualization of the soft palate and the base of the uvula

## 2022-02-24 NOTE — H&P PST ADULT - PROBLEM SELECTOR PLAN 5
Coumadin ? Lovenox as stated above Coumadin /  Lovenox as stated in clearance Dr Seay / Dr Jean    Pt to take Atenolol / Digoxin dos

## 2022-02-25 LAB
CULTURE RESULTS: SIGNIFICANT CHANGE UP
SPECIMEN SOURCE: SIGNIFICANT CHANGE UP

## 2022-02-28 ENCOUNTER — RX RENEWAL (OUTPATIENT)
Age: 77
End: 2022-02-28

## 2022-02-28 ENCOUNTER — APPOINTMENT (OUTPATIENT)
Dept: PODIATRY | Facility: HOSPITAL | Age: 77
End: 2022-02-28
Payer: MEDICARE

## 2022-02-28 ENCOUNTER — OUTPATIENT (OUTPATIENT)
Dept: OUTPATIENT SERVICES | Facility: HOSPITAL | Age: 77
LOS: 1 days | End: 2022-02-28
Payer: COMMERCIAL

## 2022-02-28 VITALS
DIASTOLIC BLOOD PRESSURE: 79 MMHG | RESPIRATION RATE: 18 BRPM | HEIGHT: 69 IN | TEMPERATURE: 97 F | BODY MASS INDEX: 25.03 KG/M2 | WEIGHT: 169 LBS | SYSTOLIC BLOOD PRESSURE: 145 MMHG | HEART RATE: 100 BPM

## 2022-02-28 DIAGNOSIS — H26.9 UNSPECIFIED CATARACT: Chronic | ICD-10-CM

## 2022-02-28 DIAGNOSIS — Z98.89 OTHER SPECIFIED POSTPROCEDURAL STATES: Chronic | ICD-10-CM

## 2022-02-28 DIAGNOSIS — B35.1 TINEA UNGUIUM: ICD-10-CM

## 2022-02-28 DIAGNOSIS — Z90.89 ACQUIRED ABSENCE OF OTHER ORGANS: Chronic | ICD-10-CM

## 2022-02-28 DIAGNOSIS — M79.609 PAIN IN UNSPECIFIED LIMB: ICD-10-CM

## 2022-02-28 DIAGNOSIS — Z86.69 PERSONAL HISTORY OF OTHER DISEASES OF THE NERVOUS SYSTEM AND SENSE ORGANS: Chronic | ICD-10-CM

## 2022-02-28 DIAGNOSIS — Z98.890 OTHER SPECIFIED POSTPROCEDURAL STATES: Chronic | ICD-10-CM

## 2022-02-28 PROBLEM — E11.9 TYPE 2 DIABETES MELLITUS WITHOUT COMPLICATIONS: Chronic | Status: ACTIVE | Noted: 2022-02-24

## 2022-02-28 PROBLEM — E04.1 NONTOXIC SINGLE THYROID NODULE: Chronic | Status: ACTIVE | Noted: 2022-02-24

## 2022-02-28 PROBLEM — Z85.828 PERSONAL HISTORY OF OTHER MALIGNANT NEOPLASM OF SKIN: Chronic | Status: ACTIVE | Noted: 2022-02-24

## 2022-02-28 PROCEDURE — 99212 OFFICE O/P EST SF 10 MIN: CPT

## 2022-02-28 PROCEDURE — G0463: CPT

## 2022-02-28 NOTE — ASSESSMENT
[FreeTextEntry1] : 76M with DM w/ bilateral hallux dystrophic nails\par - pt seen and evaluated\par - b/l hallux nail dystrophic with thin more translucent nail plate at proximal nail fold, still discolored and elongated distally, all other nails within normal limits, DP/PT pulses palpable b/l, no open lesions noted.\par - Rx refill of ciclopirox \par - pt to apply ciclopirox to b/l hallux nails daily.\par - pt instructed on diabatic education\par - RTC 3 months

## 2022-02-28 NOTE — PHYSICAL EXAM
[2+] : left foot dorsalis pedis 2+ [Normal Foot/Ankle] : Both lower extremities were exposed and visualized. Standing exam demonstrates normal foot posture and alignment. Hindfoot exam shows no hindfoot valgus or varus [Skin Color & Pigmentation] : normal skin color and pigmentation [Ankle Swelling (On Exam)] : not present [Varicose Veins Of Lower Extremities] : not present [] : not present [Delayed in the Right Toes] : capillary refills normal in right toes [Delayed in the Left Toes] : capillary refills normal in the left toes [FreeTextEntry1] : b/l mycotic hallucal nail plates with subungual debris [Vibration Dec.] : normal vibratory sensation at the level of the toes

## 2022-02-28 NOTE — HISTORY OF PRESENT ILLNESS
[FreeTextEntry1] : Pt is a 76M with PMHx of DM, HTN, hypercholesterolemia that presents today with complaints of b/l hallux toenail fungus. He states that he has had these nails for several years 10+. He states that he has tried OTC ointments multiple times with no success. Pt states that he has been applying medicine to bilateral great toe nails as directed since his last visit three months ago. Pt relates that he has only noticed a small improvement. Pt admits to mild right foot medial arch pain when ambulating, denies trauma.

## 2022-03-01 ENCOUNTER — APPOINTMENT (OUTPATIENT)
Dept: INTERNAL MEDICINE | Facility: CLINIC | Age: 77
End: 2022-03-01

## 2022-03-01 DIAGNOSIS — B35.1 TINEA UNGUIUM: ICD-10-CM

## 2022-03-02 ENCOUNTER — TRANSCRIPTION ENCOUNTER (OUTPATIENT)
Age: 77
End: 2022-03-02

## 2022-03-02 ENCOUNTER — APPOINTMENT (OUTPATIENT)
Dept: INTERNAL MEDICINE | Facility: CLINIC | Age: 77
End: 2022-03-02

## 2022-03-02 ENCOUNTER — OUTPATIENT (OUTPATIENT)
Dept: OUTPATIENT SERVICES | Facility: HOSPITAL | Age: 77
LOS: 1 days | End: 2022-03-02

## 2022-03-02 VITALS — HEART RATE: 115 BPM | OXYGEN SATURATION: 99 % | RESPIRATION RATE: 18 BRPM

## 2022-03-02 VITALS — TEMPERATURE: 98.1 F

## 2022-03-02 DIAGNOSIS — I09.9 RHEUMATIC HEART DISEASE, UNSPECIFIED: ICD-10-CM

## 2022-03-02 DIAGNOSIS — H26.9 UNSPECIFIED CATARACT: Chronic | ICD-10-CM

## 2022-03-02 DIAGNOSIS — I48.91 UNSPECIFIED ATRIAL FIBRILLATION: ICD-10-CM

## 2022-03-02 DIAGNOSIS — Z98.89 OTHER SPECIFIED POSTPROCEDURAL STATES: Chronic | ICD-10-CM

## 2022-03-02 DIAGNOSIS — Z98.890 OTHER SPECIFIED POSTPROCEDURAL STATES: Chronic | ICD-10-CM

## 2022-03-02 DIAGNOSIS — I25.10 ATHEROSCLEROTIC HEART DISEASE OF NATIVE CORONARY ARTERY WITHOUT ANGINA PECTORIS: ICD-10-CM

## 2022-03-02 DIAGNOSIS — Z79.01 LONG TERM (CURRENT) USE OF ANTICOAGULANTS: ICD-10-CM

## 2022-03-02 DIAGNOSIS — Z86.69 PERSONAL HISTORY OF OTHER DISEASES OF THE NERVOUS SYSTEM AND SENSE ORGANS: Chronic | ICD-10-CM

## 2022-03-02 DIAGNOSIS — Z90.89 ACQUIRED ABSENCE OF OTHER ORGANS: Chronic | ICD-10-CM

## 2022-03-03 ENCOUNTER — INPATIENT (INPATIENT)
Facility: HOSPITAL | Age: 77
LOS: 2 days | Discharge: HOME | End: 2022-03-06
Attending: UROLOGY | Admitting: UROLOGY
Payer: MEDICARE

## 2022-03-03 ENCOUNTER — RESULT REVIEW (OUTPATIENT)
Age: 77
End: 2022-03-03

## 2022-03-03 ENCOUNTER — APPOINTMENT (OUTPATIENT)
Dept: UROLOGY | Facility: HOSPITAL | Age: 77
End: 2022-03-03

## 2022-03-03 VITALS
SYSTOLIC BLOOD PRESSURE: 129 MMHG | WEIGHT: 169.09 LBS | RESPIRATION RATE: 17 BRPM | HEIGHT: 69 IN | DIASTOLIC BLOOD PRESSURE: 92 MMHG | OXYGEN SATURATION: 98 % | TEMPERATURE: 98 F | HEART RATE: 74 BPM

## 2022-03-03 DIAGNOSIS — R31.0 GROSS HEMATURIA: ICD-10-CM

## 2022-03-03 DIAGNOSIS — Z98.890 OTHER SPECIFIED POSTPROCEDURAL STATES: Chronic | ICD-10-CM

## 2022-03-03 DIAGNOSIS — Z90.89 ACQUIRED ABSENCE OF OTHER ORGANS: Chronic | ICD-10-CM

## 2022-03-03 DIAGNOSIS — Z98.89 OTHER SPECIFIED POSTPROCEDURAL STATES: Chronic | ICD-10-CM

## 2022-03-03 DIAGNOSIS — Z86.69 PERSONAL HISTORY OF OTHER DISEASES OF THE NERVOUS SYSTEM AND SENSE ORGANS: Chronic | ICD-10-CM

## 2022-03-03 DIAGNOSIS — K21.9 GASTRO-ESOPHAGEAL REFLUX DISEASE WITHOUT ESOPHAGITIS: ICD-10-CM

## 2022-03-03 DIAGNOSIS — H26.9 UNSPECIFIED CATARACT: Chronic | ICD-10-CM

## 2022-03-03 LAB
ANION GAP SERPL CALC-SCNC: 14 MMOL/L — SIGNIFICANT CHANGE UP (ref 7–14)
ANION GAP SERPL CALC-SCNC: 8 MMOL/L — SIGNIFICANT CHANGE UP (ref 7–14)
ANION GAP SERPL CALC-SCNC: 9 MMOL/L — SIGNIFICANT CHANGE UP (ref 7–14)
APTT BLD: 35.4 SEC — SIGNIFICANT CHANGE UP (ref 27–36.3)
BASE EXCESS BLDA CALC-SCNC: -5.4 MMOL/L — LOW (ref -2–3)
BLD GP AB SCN SERPL QL: NEGATIVE — SIGNIFICANT CHANGE UP
BLOOD GAS ARTERIAL - LYTES,HGB,ICA,LACT RESULT: SIGNIFICANT CHANGE UP
BUN SERPL-MCNC: 17 MG/DL — SIGNIFICANT CHANGE UP (ref 7–23)
BUN SERPL-MCNC: 18 MG/DL — SIGNIFICANT CHANGE UP (ref 7–23)
BUN SERPL-MCNC: 22 MG/DL — SIGNIFICANT CHANGE UP (ref 7–23)
CALCIUM SERPL-MCNC: 7.6 MG/DL — LOW (ref 8.4–10.5)
CALCIUM SERPL-MCNC: 7.6 MG/DL — LOW (ref 8.4–10.5)
CALCIUM SERPL-MCNC: 7.9 MG/DL — LOW (ref 8.4–10.5)
CHLORIDE SERPL-SCNC: 102 MMOL/L — SIGNIFICANT CHANGE UP (ref 98–107)
CHLORIDE SERPL-SCNC: 103 MMOL/L — SIGNIFICANT CHANGE UP (ref 98–107)
CHLORIDE SERPL-SCNC: 99 MMOL/L — SIGNIFICANT CHANGE UP (ref 98–107)
CO2 BLDA-SCNC: 18 MMOL/L — LOW (ref 19–24)
CO2 SERPL-SCNC: 16 MMOL/L — LOW (ref 22–31)
CO2 SERPL-SCNC: 17 MMOL/L — LOW (ref 22–31)
CO2 SERPL-SCNC: 17 MMOL/L — LOW (ref 22–31)
CREAT SERPL-MCNC: 0.92 MG/DL — SIGNIFICANT CHANGE UP (ref 0.5–1.3)
CREAT SERPL-MCNC: 0.93 MG/DL — SIGNIFICANT CHANGE UP (ref 0.5–1.3)
CREAT SERPL-MCNC: 0.97 MG/DL — SIGNIFICANT CHANGE UP (ref 0.5–1.3)
EGFR: 81 ML/MIN/1.73M2 — SIGNIFICANT CHANGE UP
EGFR: 86 ML/MIN/1.73M2 — SIGNIFICANT CHANGE UP
EGFR: 87 ML/MIN/1.73M2 — SIGNIFICANT CHANGE UP
GAS PNL BLDA: SIGNIFICANT CHANGE UP
GLUCOSE BLDC GLUCOMTR-MCNC: 104 MG/DL — HIGH (ref 70–99)
GLUCOSE BLDC GLUCOMTR-MCNC: 134 MG/DL — HIGH (ref 70–99)
GLUCOSE BLDC GLUCOMTR-MCNC: 190 MG/DL — HIGH (ref 70–99)
GLUCOSE BLDC GLUCOMTR-MCNC: 282 MG/DL — HIGH (ref 70–99)
GLUCOSE SERPL-MCNC: 145 MG/DL — HIGH (ref 70–99)
GLUCOSE SERPL-MCNC: 225 MG/DL — HIGH (ref 70–99)
GLUCOSE SERPL-MCNC: 355 MG/DL — HIGH (ref 70–99)
HCO3 BLDA-SCNC: 17 MMOL/L — LOW (ref 21–28)
HCT VFR BLD CALC: 27.5 % — LOW (ref 39–50)
HCT VFR BLD CALC: 31.3 % — LOW (ref 39–50)
HGB BLD-MCNC: 10 G/DL — LOW (ref 13–17)
HGB BLD-MCNC: 11.4 G/DL — LOW (ref 13–17)
INR BLD: 1.15 RATIO — SIGNIFICANT CHANGE UP (ref 0.88–1.16)
MCHC RBC-ENTMCNC: 32.4 PG — SIGNIFICANT CHANGE UP (ref 27–34)
MCHC RBC-ENTMCNC: 32.4 PG — SIGNIFICANT CHANGE UP (ref 27–34)
MCHC RBC-ENTMCNC: 36.4 GM/DL — HIGH (ref 32–36)
MCHC RBC-ENTMCNC: 36.4 GM/DL — HIGH (ref 32–36)
MCV RBC AUTO: 88.9 FL — SIGNIFICANT CHANGE UP (ref 80–100)
MCV RBC AUTO: 89 FL — SIGNIFICANT CHANGE UP (ref 80–100)
NRBC # BLD: 0 /100 WBCS — SIGNIFICANT CHANGE UP
NRBC # BLD: 0 /100 WBCS — SIGNIFICANT CHANGE UP
NRBC # FLD: 0 K/UL — SIGNIFICANT CHANGE UP
NRBC # FLD: 0.02 K/UL — HIGH
PCO2 BLDA: 26 MMHG — LOW (ref 35–48)
PH BLDA: 7.43 — SIGNIFICANT CHANGE UP (ref 7.35–7.45)
PLATELET # BLD AUTO: 181 K/UL — SIGNIFICANT CHANGE UP (ref 150–400)
PLATELET # BLD AUTO: 203 K/UL — SIGNIFICANT CHANGE UP (ref 150–400)
PO2 BLDA: 155 MMHG — HIGH (ref 83–108)
POTASSIUM SERPL-MCNC: 3.5 MMOL/L — SIGNIFICANT CHANGE UP (ref 3.5–5.3)
POTASSIUM SERPL-MCNC: SIGNIFICANT CHANGE UP MMOL/L (ref 3.5–5.3)
POTASSIUM SERPL-MCNC: SIGNIFICANT CHANGE UP MMOL/L (ref 3.5–5.3)
POTASSIUM SERPL-SCNC: 3.5 MMOL/L — SIGNIFICANT CHANGE UP (ref 3.5–5.3)
POTASSIUM SERPL-SCNC: SIGNIFICANT CHANGE UP MMOL/L (ref 3.5–5.3)
POTASSIUM SERPL-SCNC: SIGNIFICANT CHANGE UP MMOL/L (ref 3.5–5.3)
PROTHROM AB SERPL-ACNC: 13.4 SEC — SIGNIFICANT CHANGE UP (ref 10.5–13.4)
RBC # BLD: 3.09 M/UL — LOW (ref 4.2–5.8)
RBC # BLD: 3.52 M/UL — LOW (ref 4.2–5.8)
RBC # FLD: 14.6 % — HIGH (ref 10.3–14.5)
RBC # FLD: 14.9 % — HIGH (ref 10.3–14.5)
RH IG SCN BLD-IMP: POSITIVE — SIGNIFICANT CHANGE UP
SAO2 % BLDA: 99.5 % — HIGH (ref 94–98)
SODIUM SERPL-SCNC: 128 MMOL/L — LOW (ref 135–145)
SODIUM SERPL-SCNC: 128 MMOL/L — LOW (ref 135–145)
SODIUM SERPL-SCNC: 129 MMOL/L — LOW (ref 135–145)
WBC # BLD: 15.07 K/UL — HIGH (ref 3.8–10.5)
WBC # BLD: 23.16 K/UL — HIGH (ref 3.8–10.5)
WBC # FLD AUTO: 15.07 K/UL — HIGH (ref 3.8–10.5)
WBC # FLD AUTO: 23.16 K/UL — HIGH (ref 3.8–10.5)

## 2022-03-03 PROCEDURE — 52601 PROSTATECTOMY (TURP): CPT | Mod: 78

## 2022-03-03 PROCEDURE — 88305 TISSUE EXAM BY PATHOLOGIST: CPT | Mod: 26

## 2022-03-03 PROCEDURE — 99232 SBSQ HOSP IP/OBS MODERATE 35: CPT | Mod: GC

## 2022-03-03 PROCEDURE — 99223 1ST HOSP IP/OBS HIGH 75: CPT

## 2022-03-03 DEVICE — GWIRE STRT 0.038INX145CM: Type: IMPLANTABLE DEVICE | Status: FUNCTIONAL

## 2022-03-03 RX ORDER — SODIUM CHLORIDE 9 MG/ML
1000 INJECTION, SOLUTION INTRAVENOUS
Refills: 0 | Status: DISCONTINUED | OUTPATIENT
Start: 2022-03-03 | End: 2022-03-03

## 2022-03-03 RX ORDER — ALBUMIN HUMAN 25 %
250 VIAL (ML) INTRAVENOUS ONCE
Refills: 0 | Status: COMPLETED | OUTPATIENT
Start: 2022-03-03 | End: 2022-03-03

## 2022-03-03 RX ORDER — SODIUM CHLORIDE 9 MG/ML
1000 INJECTION, SOLUTION INTRAVENOUS
Refills: 0 | Status: DISCONTINUED | OUTPATIENT
Start: 2022-03-03 | End: 2022-03-06

## 2022-03-03 RX ORDER — SODIUM CHLORIDE 9 MG/ML
1000 INJECTION INTRAMUSCULAR; INTRAVENOUS; SUBCUTANEOUS
Refills: 0 | Status: DISCONTINUED | OUTPATIENT
Start: 2022-03-03 | End: 2022-03-03

## 2022-03-03 RX ORDER — LABETALOL HCL 100 MG
5 TABLET ORAL ONCE
Refills: 0 | Status: COMPLETED | OUTPATIENT
Start: 2022-03-03 | End: 2022-03-03

## 2022-03-03 RX ORDER — ATENOLOL 25 MG/1
50 TABLET ORAL DAILY
Refills: 0 | Status: DISCONTINUED | OUTPATIENT
Start: 2022-03-03 | End: 2022-03-06

## 2022-03-03 RX ORDER — FAMOTIDINE 10 MG/ML
20 INJECTION INTRAVENOUS
Refills: 0 | Status: DISCONTINUED | OUTPATIENT
Start: 2022-03-03 | End: 2022-03-06

## 2022-03-03 RX ORDER — CIPROFLOXACIN LACTATE 400MG/40ML
400 VIAL (ML) INTRAVENOUS EVERY 12 HOURS
Refills: 0 | Status: DISCONTINUED | OUTPATIENT
Start: 2022-03-03 | End: 2022-03-06

## 2022-03-03 RX ORDER — INSULIN LISPRO 100/ML
VIAL (ML) SUBCUTANEOUS
Refills: 0 | Status: DISCONTINUED | OUTPATIENT
Start: 2022-03-03 | End: 2022-03-06

## 2022-03-03 RX ORDER — FUROSEMIDE 40 MG
20 TABLET ORAL ONCE
Refills: 0 | Status: COMPLETED | OUTPATIENT
Start: 2022-03-03 | End: 2022-03-03

## 2022-03-03 RX ORDER — DEXTROSE 50 % IN WATER 50 %
15 SYRINGE (ML) INTRAVENOUS ONCE
Refills: 0 | Status: DISCONTINUED | OUTPATIENT
Start: 2022-03-03 | End: 2022-03-06

## 2022-03-03 RX ORDER — DIGOXIN 250 MCG
125 TABLET ORAL DAILY
Refills: 0 | Status: DISCONTINUED | OUTPATIENT
Start: 2022-03-03 | End: 2022-03-06

## 2022-03-03 RX ORDER — OXYCODONE HYDROCHLORIDE 5 MG/1
5 TABLET ORAL ONCE
Refills: 0 | Status: DISCONTINUED | OUTPATIENT
Start: 2022-03-03 | End: 2022-03-04

## 2022-03-03 RX ORDER — CEFAZOLIN SODIUM 1 G
2000 VIAL (EA) INJECTION EVERY 8 HOURS
Refills: 0 | Status: DISCONTINUED | OUTPATIENT
Start: 2022-03-03 | End: 2022-03-03

## 2022-03-03 RX ORDER — HYDROMORPHONE HYDROCHLORIDE 2 MG/ML
1 INJECTION INTRAMUSCULAR; INTRAVENOUS; SUBCUTANEOUS
Refills: 0 | Status: DISCONTINUED | OUTPATIENT
Start: 2022-03-03 | End: 2022-03-04

## 2022-03-03 RX ORDER — DEXTROSE 50 % IN WATER 50 %
25 SYRINGE (ML) INTRAVENOUS ONCE
Refills: 0 | Status: DISCONTINUED | OUTPATIENT
Start: 2022-03-03 | End: 2022-03-06

## 2022-03-03 RX ORDER — HYDROMORPHONE HYDROCHLORIDE 2 MG/ML
0.5 INJECTION INTRAMUSCULAR; INTRAVENOUS; SUBCUTANEOUS
Refills: 0 | Status: DISCONTINUED | OUTPATIENT
Start: 2022-03-03 | End: 2022-03-04

## 2022-03-03 RX ORDER — DEXTROSE 50 % IN WATER 50 %
12.5 SYRINGE (ML) INTRAVENOUS ONCE
Refills: 0 | Status: DISCONTINUED | OUTPATIENT
Start: 2022-03-03 | End: 2022-03-06

## 2022-03-03 RX ORDER — INSULIN LISPRO 100/ML
VIAL (ML) SUBCUTANEOUS AT BEDTIME
Refills: 0 | Status: DISCONTINUED | OUTPATIENT
Start: 2022-03-03 | End: 2022-03-06

## 2022-03-03 RX ORDER — HYDRALAZINE HCL 50 MG
5 TABLET ORAL ONCE
Refills: 0 | Status: DISCONTINUED | OUTPATIENT
Start: 2022-03-03 | End: 2022-03-03

## 2022-03-03 RX ORDER — ONDANSETRON 8 MG/1
4 TABLET, FILM COATED ORAL ONCE
Refills: 0 | Status: DISCONTINUED | OUTPATIENT
Start: 2022-03-03 | End: 2022-03-04

## 2022-03-03 RX ORDER — HYDRALAZINE HCL 50 MG
10 TABLET ORAL ONCE
Refills: 0 | Status: COMPLETED | OUTPATIENT
Start: 2022-03-03 | End: 2022-03-03

## 2022-03-03 RX ORDER — GLUCAGON INJECTION, SOLUTION 0.5 MG/.1ML
1 INJECTION, SOLUTION SUBCUTANEOUS ONCE
Refills: 0 | Status: DISCONTINUED | OUTPATIENT
Start: 2022-03-03 | End: 2022-03-06

## 2022-03-03 RX ORDER — ONDANSETRON 8 MG/1
4 TABLET, FILM COATED ORAL ONCE
Refills: 0 | Status: COMPLETED | OUTPATIENT
Start: 2022-03-03 | End: 2022-03-03

## 2022-03-03 RX ADMIN — SODIUM CHLORIDE 125 MILLILITER(S): 9 INJECTION INTRAMUSCULAR; INTRAVENOUS; SUBCUTANEOUS at 16:43

## 2022-03-03 RX ADMIN — Medication 20 MILLIGRAM(S): at 17:03

## 2022-03-03 RX ADMIN — Medication 5 MILLIGRAM(S): at 15:26

## 2022-03-03 RX ADMIN — Medication 1: at 16:54

## 2022-03-03 RX ADMIN — ONDANSETRON 4 MILLIGRAM(S): 8 TABLET, FILM COATED ORAL at 17:14

## 2022-03-03 RX ADMIN — Medication 10 MILLIGRAM(S): at 15:25

## 2022-03-03 RX ADMIN — Medication 500 MILLILITER(S): at 20:32

## 2022-03-03 RX ADMIN — Medication 250 MILLILITER(S): at 18:50

## 2022-03-03 RX ADMIN — Medication 5 MILLIGRAM(S): at 15:09

## 2022-03-03 NOTE — BRIEF OPERATIVE NOTE - BRIEF OP NOTE DRAINS
Requested Prescriptions     Pending Prescriptions Disp Refills    dextroamphetamine-amphetamine (ADDERALL) 20 mg tablet 60 Tab 0     Sig: Take 1 Tab by mouth two (2) times a day. Max Daily Amount: 40 mg.
24Fr 3way

## 2022-03-03 NOTE — ASU PATIENT PROFILE, ADULT - FALL HARM RISK - UNIVERSAL INTERVENTIONS
Bed in lowest position, wheels locked, appropriate side rails in place/Call bell, personal items and telephone in reach/Instruct patient to call for assistance before getting out of bed or chair/Non-slip footwear when patient is out of bed/[place] to call system/Physically safe environment - no spills, clutter or unnecessary equipment/Purposeful Proactive Rounding/Room/bathroom lighting operational, light cord in reach

## 2022-03-03 NOTE — ASU PATIENT PROFILE, ADULT - NSICDXPASTSURGICALHX_GEN_ALL_CORE_FT
PAST SURGICAL HISTORY:  Cataract Bilateral ; tx surgically    H/O melanoma excision scalp-2014    H/O retinal detachment left    Hx of CABG x 2 2006    Mitral valve replaced 2006 at [facility]    S/P tonsillectomy     S/P TURP 2014

## 2022-03-03 NOTE — CONSULT NOTE ADULT - PROBLEM SELECTOR RECOMMENDATION 9
-S/P TURP on 3/3/22  -Hyponatremia due to TUR syndome and fluid shifts, trend Na  -postop anemia d/t acute blood loss from surgery, bloody urine, need to control BP, resume home atenolol 50mg qd, previously on lisinopril 10mg qd but recently dc'd, can resume lisinopril 10mg if BP remains elevated  if still high can start Nifedipine 30mg qd and titrate prn  iv hydralazine prn SBP>160  -labs reviewed: Hgb drop from 15.2 preop to 11.4, leukocytosis WBC 15 reactive from surgery, PT/PTT wnl, INR 1.15, hyponatremia Na 128 d/t TUR syndrome, hco3 17, pH 7.28 on ABG c/w met acidosis, hypocalcemia Ca 7.6 d/t fluid shift  -hold AC at this time due to grossly bloody urine, on CBI  -trend CBC, transfuse prn to keep Hgb>8  -patient has been accepted to SICU, further management per SICU team
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
## 2022-03-03 NOTE — CONSULT NOTE ADULT - PROBLEM SELECTOR RECOMMENDATION 5
management as above, resume AC when cleared by   multiple indication for AC, foremost mechanical MVR, chronic AFib and also h/o DVT/PE

## 2022-03-03 NOTE — CONSULT NOTE ADULT - PROBLEM SELECTOR RECOMMENDATION 4
h/o CAD/CABG, mechanical AVR  coumadin held 5 days pre-surgery, bridged with lovenox preop, resume AC (heparin gtt + coumadin) as soon as deemed safe per urology team  cont home digoxin    pt had recent echo on 2/2/22 normal LVEF 65%, mod PAH; stress test from 2/2020: EF 70%, normal myocardial perfusion

## 2022-03-03 NOTE — ASU PATIENT PROFILE, ADULT - NS TRANSFER EYEGLASSES PAIRS
Pediatric Well Child Exam: 8 Years of age    Chief Complaint   Patient presents with   • Well Child     lab work and pimple on left leg    • Well Child       SUBJECTIVE:  Jose Anders is a 8 year old male who presents for a  well child exam.  Patient presents  with Father.      CONCERNS RAISED TODAY:  - None    DIET/GI:  Appetite: Good.  Milk: skim, 1 cups/day.  Food:    · Eats fruits/vegetables: [x]  YES     []  NO   · - doesn't like to eat much  · Eats meat: [x]  YES     []  NO   Problems with bowel movements: []  YES     [x]  NO     PHYSICAL ACTIVITY        Playtime (60 min/day): [x]  YES     []  NO   - gym class and recess        Electronic Screen time too much time, trying to cut back     SLEEP PATTERN:   7-8 hours/night.    SCHOOL HISTORY:  Facility Type: Attending Private School - name: [employer].  Grade in school: First Grade  - likes school  - favorite class is science   - doing well inc school     VISION SCREENING:    Visual Acuity Screening    Right eye Left eye Both eyes   Without correction: 20 20 20 20 20 20   With correction:          DEVELOPMENT:  [x]  YES    []  NO      []  UNKNOWN    Has success in school?  [x]  YES    []  NO      []  UNKNOWN    Has friends/does well socially?  [x]  YES    []  NO      []  UNKNOWN    Participates in after school/outside         activities?  - plays football  [x]  YES    []  NO      []  UNKNOWN    Gets along with family?  [x]  YES    []  NO      []  UNKNOWN    Does chores when asked?  [x]  YES    []  NO      []  UNKNOWN    Given chances to make own decisions?  [x]  YES    []  NO      []  UNKNOWN    Feels good about self/generally happy?    OBJECTIVE:  PAST HISTORIES:  Allergies, Medications, Medical history, Surgical history, Family history reviewed and updated.  Toxic Exposure: no smokers     IMMUNIZATION STATUS: Up to date per review of the electronic health records.    IMMUNIZATION REACTIONS: None  VARICELLA STATUS: confirmed by vaccine  administration    RECENT HEALTH EVENTS:  Illnesses: None.  Hospitalizations: None.  Injuries or Accidents: None.    REVIEW OF SYSTEMS:    All systems reviewed and negative except as documented in \"Concerns raised\".    PHYSICAL EXAM:      VITAL SIGNS:   Visit Vitals  /70   Pulse 86   Temp 98.5 °F (36.9 °C) (Oral)   Resp 20   Ht 4' 3.5\" (1.308 m)   Wt (!) 39 kg   BMI 22.77 kg/m²    >99 %ile (Z= 2.58) based on [facility] (Boys, 2-20 Years) weight-for-age data using vitals from 4/29/2019.  GENERAL:  Well appearing male child.  Alert and active.  SKIN:  Warm, normal turgor.  No cyanosis.  No rash.  HEAD:  Normocephalic, atraumatic.    EYES:  Conjunctivae appear normal, noninjected, nonicteric, positive red reflex.  NOSE:  Appears normal without drainage.  EARS:  Normal external auditory canals. Tympanic membraness are transparent with normal landmarks.  THROAT:  Moist mucous membranes without lesions.  NECK:  Supple, no lymphadenopathy or masses.  HEART:  Regular rate and rhythm.  Normal S1, S2.  No murmurs, rubs, gallops.   LUNGS:  Clear to auscultation.  No wheezes, rales, rhonchi.  Normal work effort with breathing.  ABDOMEN:  Bowel sounds present. Soft, non tender.  No hepatomegaly.  No splenomegaly.  No masses.  GENITOURINARY: Shlomo stage 1. Bilateral testes without masses or hernia. Rectum/anus patent.  EXTREMITIES: Symmetrical muscle mass, strength all extremities.  No effusions.   BACK: Spine straight.  No costovertebral angle tenderness.      NEUROLOGIC:  Oriented x4. No gross lateralizing signs or focal deficits.  Normal gait. Normal deep tendon reflexes.       Assessment:  8 year old male well child.    Plan:  1. All parental concerns and questions discussed.  2. Anticipatory guidance provided, handout/s given Safety: Car, bicycle, fire, sharp objects, falls, water  3. Development  4. Diet  1. Discussed healthy eating as a family, increase fruits and veggies, decrease processed carbohydrates    5. Discipline  6. Television  1. Recommended decreased screen time and increased physical activity  7. Tobacco-free home  8. Dental care  9. BMI>99th%  1. Metabolic labs today  2. Provided counseling     Follow up: Return in about 6 months (around 10/29/2019) for dietary .     Patient discussed with attending physician Dr. Call.    Loan Young, DO     none

## 2022-03-04 ENCOUNTER — NON-APPOINTMENT (OUTPATIENT)
Age: 77
End: 2022-03-04

## 2022-03-04 ENCOUNTER — APPOINTMENT (OUTPATIENT)
Dept: INTERNAL MEDICINE | Facility: CLINIC | Age: 77
End: 2022-03-04

## 2022-03-04 DIAGNOSIS — N17.9 ACUTE KIDNEY FAILURE, UNSPECIFIED: ICD-10-CM

## 2022-03-04 LAB
ANION GAP SERPL CALC-SCNC: 14 MMOL/L — SIGNIFICANT CHANGE UP (ref 7–14)
ANION GAP SERPL CALC-SCNC: 14 MMOL/L — SIGNIFICANT CHANGE UP (ref 7–14)
BUN SERPL-MCNC: 27 MG/DL — HIGH (ref 7–23)
BUN SERPL-MCNC: 33 MG/DL — HIGH (ref 7–23)
CALCIUM SERPL-MCNC: 7.2 MG/DL — LOW (ref 8.4–10.5)
CALCIUM SERPL-MCNC: 7.5 MG/DL — LOW (ref 8.4–10.5)
CHLORIDE SERPL-SCNC: 102 MMOL/L — SIGNIFICANT CHANGE UP (ref 98–107)
CHLORIDE SERPL-SCNC: 104 MMOL/L — SIGNIFICANT CHANGE UP (ref 98–107)
CO2 SERPL-SCNC: 16 MMOL/L — LOW (ref 22–31)
CO2 SERPL-SCNC: 17 MMOL/L — LOW (ref 22–31)
CREAT SERPL-MCNC: 1.24 MG/DL — SIGNIFICANT CHANGE UP (ref 0.5–1.3)
CREAT SERPL-MCNC: 1.56 MG/DL — HIGH (ref 0.5–1.3)
EGFR: 46 ML/MIN/1.73M2 — LOW
EGFR: 61 ML/MIN/1.73M2 — SIGNIFICANT CHANGE UP
GLUCOSE BLDC GLUCOMTR-MCNC: 230 MG/DL — HIGH (ref 70–99)
GLUCOSE BLDC GLUCOMTR-MCNC: 258 MG/DL — HIGH (ref 70–99)
GLUCOSE BLDC GLUCOMTR-MCNC: 304 MG/DL — HIGH (ref 70–99)
GLUCOSE BLDC GLUCOMTR-MCNC: 328 MG/DL — HIGH (ref 70–99)
GLUCOSE BLDC GLUCOMTR-MCNC: 335 MG/DL — HIGH (ref 70–99)
GLUCOSE BLDC GLUCOMTR-MCNC: 375 MG/DL — HIGH (ref 70–99)
GLUCOSE SERPL-MCNC: 332 MG/DL — HIGH (ref 70–99)
GLUCOSE SERPL-MCNC: 346 MG/DL — HIGH (ref 70–99)
HCT VFR BLD CALC: 26.8 % — LOW (ref 39–50)
HCT VFR BLD CALC: 26.9 % — LOW (ref 39–50)
HGB BLD-MCNC: 9.5 G/DL — LOW (ref 13–17)
HGB BLD-MCNC: 9.7 G/DL — LOW (ref 13–17)
INR PPP: 1 RATIO
INR PPP: 1.9 RATIO
INR PPP: 1.9 RATIO
INR PPP: 2.5 RATIO
INR PPP: 2.7 RATIO
INR PPP: 2.8 RATIO
INR PPP: 3.3 RATIO
INR PPP: 3.5 RATIO
INR PPP: 3.8 RATIO
MCHC RBC-ENTMCNC: 30.6 PG — SIGNIFICANT CHANGE UP (ref 27–34)
MCHC RBC-ENTMCNC: 31 PG — SIGNIFICANT CHANGE UP (ref 27–34)
MCHC RBC-ENTMCNC: 35.3 GM/DL — SIGNIFICANT CHANGE UP (ref 32–36)
MCHC RBC-ENTMCNC: 36.2 GM/DL — HIGH (ref 32–36)
MCV RBC AUTO: 85.6 FL — SIGNIFICANT CHANGE UP (ref 80–100)
MCV RBC AUTO: 86.8 FL — SIGNIFICANT CHANGE UP (ref 80–100)
NRBC # BLD: 0 /100 WBCS — SIGNIFICANT CHANGE UP
NRBC # BLD: 0 /100 WBCS — SIGNIFICANT CHANGE UP
NRBC # FLD: 0 K/UL — SIGNIFICANT CHANGE UP
NRBC # FLD: 0 K/UL — SIGNIFICANT CHANGE UP
PLATELET # BLD AUTO: 125 K/UL — LOW (ref 150–400)
PLATELET # BLD AUTO: 142 K/UL — LOW (ref 150–400)
POCT-PROTHROMBIN TIME: 12.5 SECS
POCT-PROTHROMBIN TIME: 22.6 SECS
POCT-PROTHROMBIN TIME: 22.6 SECS
POCT-PROTHROMBIN TIME: 29.7 SECS
POCT-PROTHROMBIN TIME: 33 SECS
POCT-PROTHROMBIN TIME: 34.1 SECS
POCT-PROTHROMBIN TIME: 40.1 SECS
POCT-PROTHROMBIN TIME: 42.5 SECS
POCT-PROTHROMBIN TIME: 45.9 SECS
POTASSIUM SERPL-MCNC: 3.7 MMOL/L — SIGNIFICANT CHANGE UP (ref 3.5–5.3)
POTASSIUM SERPL-MCNC: 4.1 MMOL/L — SIGNIFICANT CHANGE UP (ref 3.5–5.3)
POTASSIUM SERPL-SCNC: 3.7 MMOL/L — SIGNIFICANT CHANGE UP (ref 3.5–5.3)
POTASSIUM SERPL-SCNC: 4.1 MMOL/L — SIGNIFICANT CHANGE UP (ref 3.5–5.3)
QUALITY CONTROL: YES
RBC # BLD: 3.1 M/UL — LOW (ref 4.2–5.8)
RBC # BLD: 3.13 M/UL — LOW (ref 4.2–5.8)
RBC # FLD: 14.8 % — HIGH (ref 10.3–14.5)
RBC # FLD: 15.3 % — HIGH (ref 10.3–14.5)
SODIUM SERPL-SCNC: 132 MMOL/L — LOW (ref 135–145)
SODIUM SERPL-SCNC: 135 MMOL/L — SIGNIFICANT CHANGE UP (ref 135–145)
WBC # BLD: 17.28 K/UL — HIGH (ref 3.8–10.5)
WBC # BLD: 17.79 K/UL — HIGH (ref 3.8–10.5)
WBC # FLD AUTO: 17.28 K/UL — HIGH (ref 3.8–10.5)
WBC # FLD AUTO: 17.79 K/UL — HIGH (ref 3.8–10.5)

## 2022-03-04 PROCEDURE — 99222 1ST HOSP IP/OBS MODERATE 55: CPT

## 2022-03-04 PROCEDURE — 99233 SBSQ HOSP IP/OBS HIGH 50: CPT

## 2022-03-04 RX ORDER — SODIUM CHLORIDE 9 MG/ML
1000 INJECTION INTRAMUSCULAR; INTRAVENOUS; SUBCUTANEOUS
Refills: 0 | Status: DISCONTINUED | OUTPATIENT
Start: 2022-03-04 | End: 2022-03-06

## 2022-03-04 RX ORDER — INSULIN LISPRO 100/ML
4 VIAL (ML) SUBCUTANEOUS ONCE
Refills: 0 | Status: COMPLETED | OUTPATIENT
Start: 2022-03-04 | End: 2022-03-04

## 2022-03-04 RX ORDER — ENOXAPARIN SODIUM 100 MG/ML
120 INJECTION SUBCUTANEOUS EVERY 24 HOURS
Refills: 0 | Status: DISCONTINUED | OUTPATIENT
Start: 2022-03-04 | End: 2022-03-06

## 2022-03-04 RX ORDER — INSULIN GLARGINE 100 [IU]/ML
15 INJECTION, SOLUTION SUBCUTANEOUS AT BEDTIME
Refills: 0 | Status: DISCONTINUED | OUTPATIENT
Start: 2022-03-04 | End: 2022-03-05

## 2022-03-04 RX ADMIN — Medication 200 MILLIGRAM(S): at 17:27

## 2022-03-04 RX ADMIN — SODIUM CHLORIDE 75 MILLILITER(S): 9 INJECTION INTRAMUSCULAR; INTRAVENOUS; SUBCUTANEOUS at 16:25

## 2022-03-04 RX ADMIN — ENOXAPARIN SODIUM 120 MILLIGRAM(S): 100 INJECTION SUBCUTANEOUS at 13:54

## 2022-03-04 RX ADMIN — SODIUM CHLORIDE 75 MILLILITER(S): 9 INJECTION INTRAMUSCULAR; INTRAVENOUS; SUBCUTANEOUS at 13:06

## 2022-03-04 RX ADMIN — Medication 200 MILLIGRAM(S): at 06:00

## 2022-03-04 RX ADMIN — Medication 4: at 06:22

## 2022-03-04 RX ADMIN — Medication 125 MICROGRAM(S): at 06:01

## 2022-03-04 RX ADMIN — Medication 4 UNIT(S): at 01:54

## 2022-03-04 RX ADMIN — Medication 3: at 17:24

## 2022-03-04 RX ADMIN — Medication 4: at 11:07

## 2022-03-04 RX ADMIN — FAMOTIDINE 20 MILLIGRAM(S): 10 INJECTION INTRAVENOUS at 06:01

## 2022-03-04 RX ADMIN — FAMOTIDINE 20 MILLIGRAM(S): 10 INJECTION INTRAVENOUS at 17:27

## 2022-03-04 RX ADMIN — INSULIN GLARGINE 15 UNIT(S): 100 INJECTION, SOLUTION SUBCUTANEOUS at 22:22

## 2022-03-04 NOTE — CONSULT NOTE ADULT - ATTENDING COMMENTS
1. s/p TURP, has mechanical MVR. On therapeutic lovenox, though we have no objection to IV heparin which may allow more control if bleeding occurs. When safe to start warfarin, give 8 mg daily for 3 doses as a load, then return to baseline 4mg/5mg alternate dosing.     2. Permanent afib. The patient will likely experience some rapid afib tonight due to rapid fluid shifting to often accompanies prostate surgery. The patient is aware, and if HR <115, would not treat. If there is persistent tachy above 115, can give IV metoprolol in addition to oral atenolol.
I agree with the detailed interval history, physical, and plan, which I have reviewed and edited where appropriate'; also agree with notes/assessment with my team on service.  I have personally examined the patient.  I was physically present for the key portions of the evaluation and management (E/M) service provided.  I reviewed all the pertinent data.  The patient is a critical care patient with life threatening hemodynamic and metabolic instability in SICU.  The SICU team has a constant risk benefit analyzes discussion and coordinating care with the primary team and all consultants.   The patient is in SICU with the chief complaint and diagnosis mentioned in the note.   The plan will be specified in the note.  75yo M with Hx HTN, DM, mitral valve replacement, AF, CAD, and BPH sp TURP. Intra-op course c/b bleeding and hyponatremia r/o TUR syndrome. Patient was discharged to the PACU with Castillo and CBI under traction. SICU consulted for hemodynamic monitoring.  AO3  [email or web address]  Heart IRRG  Abd softly dis  PLAN:  - As patient is hemodynamically stable  -Patient does not have any critical care needs at this time  - Will follow with you

## 2022-03-04 NOTE — PROGRESS NOTE ADULT - ASSESSMENT
77 yo M underwent monopolar TURP, 3/3/2022 surgery stopped before completion secondary to TUR syndrome, pt treated Na normalized, was RTOR for TURP completion transfused 2u PRBC, CBI clear postop, HCT stable    3/4: CBI clear, HCT stable, this AM corrected sodium 133, FS elevated    Plan:  -AM labs reviewed  -continue CBI  -restart Lovenox today, monitor color  -? add lantus  -f/u medicine  -garcia plan TBD  -IS, venodynes, ambulate with assistance 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, pt treated Na normalized, was RTOR for TURP completion transfused 2u PRBC, CBI clear postop, HCT stable    3/4: CBI clear, HCT stable, this AM corrected sodium 139, FS elevated    Plan:  -AM labs reviewed  -continue CBI  -restart Lovenox today, monitor color  -? add lantus  -f/u medicine  -garcia plan TBD  -IS, venodynes, ambulate with assistance

## 2022-03-04 NOTE — PROGRESS NOTE ADULT - PROBLEM SELECTOR PLAN 4
-hold trulicity and glipizide   -ISS, monitor FS  `A1c 6.7%.  -FS elevated 300's, start lantus 15 units hs, titrate prn

## 2022-03-04 NOTE — PROGRESS NOTE ADULT - ASSESSMENT
77 yo male with h/o CAD/CABG, RHD, s/p mechanical MVR in 2006, chronic Afib on coumadin, DVT/PE, HLD, thyroid nodule, GERD, Rosie Thompson tear/esophageal ulcer, COVID 2020, BPH with LUTS (nocturia, intermittent hematuria), S/P TURP on 3/3/2022

## 2022-03-04 NOTE — PROGRESS NOTE ADULT - PROBLEM SELECTOR PLAN 2
hemodynamically mediated in setting of surgery, blood loss, hypotension  now hemodynamically stable s/p transfusion, albumin, fluids  creat uptrending to 1.56 from baseline 0.97  recommend: IVF, trend creat and UOP  avoid nephrotoxins, dose meds per renal fxn hemodynamically mediated in setting of surgery, blood loss, hypotension  now hemodynamically stable s/p transfusion, albumin, fluids  creat uptrending to 1.56 from baseline 0.97  Hyponatremia d/t TUR syndrome, resolved   recommend: IVF, trend creat and UOP  avoid nephrotoxins, dose meds per renal fxn

## 2022-03-04 NOTE — PATIENT PROFILE ADULT - FALL HARM RISK - UNIVERSAL INTERVENTIONS
Bed in lowest position, wheels locked, appropriate side rails in place/Call bell, personal items and telephone in reach/Instruct patient to call for assistance before getting out of bed or chair/Non-slip footwear when patient is out of bed/[place] to call system/Physically safe environment - no spills, clutter or unnecessary equipment/Purposeful Proactive Rounding/Room/bathroom lighting operational, light cord in reach

## 2022-03-04 NOTE — PROGRESS NOTE ADULT - ASSESSMENT
76 year old male s/p monopolar TURP with post-op hyponatremia and hypotension and gross hematuria despite CBI, s/p return to OR for clot evac with overall improvement, stable.     -Continue garcia and CBI, monitor color  -Analgesia prn  -Antiemetics prn  -Cipro  -Venodynes  -Incentive spirometry  -Clears   -OOB  -AM labs   76 year old male s/p monopolar TURP with post-op hyponatremia and hypotension and gross hematuria despite CBI, s/p return to OR for clot evac, s/p 2U PRBCs, with overall improvement, stable.     -Continue garcia and CBI, monitor color  -Analgesia prn  -Antiemetics prn  -Cipro  -Venodynes  -Incentive spirometry  -Clears   -OOB  -AM labs

## 2022-03-04 NOTE — CONSULT NOTE ADULT - ASSESSMENT
75yo M with Hx HTN, DM, mitral valve replacement (2006, on Coumadin), AF, CAD (s/p CABG x2), and BPH who presented for scheduled TURP. Intra-op course c/b bleeding and hyponatremia c/f TUR syndrome. Patient was discharged to the PACU with Castillo and CBI under traction. Post-op labs significant for hyponatremia to 128. SICU consulted for hemodynamic monitoring iso intraoperative bleeding.     PLAN:  - As patient remains hemodynamically stable off vasopressor support and does not have any requirements for invasive ventilation, patient does not have any critical care needs at this time  - SICU team available for re-evaluation as needed, if patient's clinical status changes  - Will follow with you    Griselda Esparza, PGY-2  Surgical ICU  #[id number] | #[id number] 
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 Pt was bridged w Lovenox for his procedure. Post-procedurally pt is doing well and denies cardiac symptoms. He received 2 units PRBC for the procedure.     [facility] MVR   Cont therapeutic Lovenox, would dose Coumadin beginning tonight 8 mg, then 8 mg (night 2), 8 mg (night 3) for goal INR 2.5 to 3.5   (Home dose Coumadin alternating doses of 4 mg and 5 mg )  Cont PPI    Afib   Rate controlled   Cont Atenolol and Digoxin     Remaining care per Urology    Please call with questions     Wade Hood MD  Cardiology Fellow  [phone]    Please check [email or web address] password: "[email or web address]" for cardiology service schedule and contact information. 
75 yo male with h/o CAD/CABG, RHD, s/p mechanical MVR in 2006, chronic Afib on coumadin, DVT/PE, HLD, thyroid nodule, GERD, Rosie Thompson tear/esophageal ulcer, COVID 2020, BPH with LUTS (nocturia, intermittent hematuria), S/P TURP on 3/3/2022

## 2022-03-04 NOTE — PROGRESS NOTE ADULT - PROBLEM SELECTOR PLAN 1
-S/P TURP on 3/3/22, c/b postop hypotension and acute blood loss anemia, requiring CBI/traction, and RTOR for clot evacuation and transfused 2U PRBC on 3/3  -Hyponatremia due to TUR syndome and fluid shifts, trend Na  -labs reviewed: leukocytosis 17 reactive from surgery (on cefazolin), MERON with creat 1.56 likely hemodynamic mediated d/t surgery/hypotension  -cleared by  to start therapeutic Lovenox (3/3--), no coumadin yet  -c/w CBI, urine now clear, monitor , management per   -trend CBC, transfuse prn to keep Hgb>8
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
## 2022-03-05 LAB
ANION GAP SERPL CALC-SCNC: 10 MMOL/L — SIGNIFICANT CHANGE UP (ref 7–14)
BUN SERPL-MCNC: 37 MG/DL — HIGH (ref 7–23)
CALCIUM SERPL-MCNC: 8 MG/DL — LOW (ref 8.4–10.5)
CHLORIDE SERPL-SCNC: 107 MMOL/L — SIGNIFICANT CHANGE UP (ref 98–107)
CO2 SERPL-SCNC: 21 MMOL/L — LOW (ref 22–31)
CREAT SERPL-MCNC: 2.02 MG/DL — HIGH (ref 0.5–1.3)
EGFR: 34 ML/MIN/1.73M2 — LOW
GLUCOSE BLDC GLUCOMTR-MCNC: 177 MG/DL — HIGH (ref 70–99)
GLUCOSE BLDC GLUCOMTR-MCNC: 209 MG/DL — HIGH (ref 70–99)
GLUCOSE BLDC GLUCOMTR-MCNC: 232 MG/DL — HIGH (ref 70–99)
GLUCOSE BLDC GLUCOMTR-MCNC: 312 MG/DL — HIGH (ref 70–99)
GLUCOSE SERPL-MCNC: 241 MG/DL — HIGH (ref 70–99)
HCT VFR BLD CALC: 24.2 % — LOW (ref 39–50)
HGB BLD-MCNC: 8.6 G/DL — LOW (ref 13–17)
MCHC RBC-ENTMCNC: 31.5 PG — SIGNIFICANT CHANGE UP (ref 27–34)
MCHC RBC-ENTMCNC: 35.5 GM/DL — SIGNIFICANT CHANGE UP (ref 32–36)
MCV RBC AUTO: 88.6 FL — SIGNIFICANT CHANGE UP (ref 80–100)
NRBC # BLD: 0 /100 WBCS — SIGNIFICANT CHANGE UP
NRBC # FLD: 0.02 K/UL — HIGH
PLATELET # BLD AUTO: 153 K/UL — SIGNIFICANT CHANGE UP (ref 150–400)
POTASSIUM SERPL-MCNC: 4.3 MMOL/L — SIGNIFICANT CHANGE UP (ref 3.5–5.3)
POTASSIUM SERPL-SCNC: 4.3 MMOL/L — SIGNIFICANT CHANGE UP (ref 3.5–5.3)
RBC # BLD: 2.73 M/UL — LOW (ref 4.2–5.8)
RBC # FLD: 16.9 % — HIGH (ref 10.3–14.5)
SODIUM SERPL-SCNC: 138 MMOL/L — SIGNIFICANT CHANGE UP (ref 135–145)
WBC # BLD: 18.6 K/UL — HIGH (ref 3.8–10.5)
WBC # FLD AUTO: 18.6 K/UL — HIGH (ref 3.8–10.5)

## 2022-03-05 PROCEDURE — 99232 SBSQ HOSP IP/OBS MODERATE 35: CPT | Mod: GC

## 2022-03-05 PROCEDURE — 99233 SBSQ HOSP IP/OBS HIGH 50: CPT

## 2022-03-05 RX ORDER — INSULIN GLARGINE 100 [IU]/ML
18 INJECTION, SOLUTION SUBCUTANEOUS AT BEDTIME
Refills: 0 | Status: DISCONTINUED | OUTPATIENT
Start: 2022-03-05 | End: 2022-03-06

## 2022-03-05 RX ORDER — INSULIN LISPRO 100/ML
2 VIAL (ML) SUBCUTANEOUS
Refills: 0 | Status: DISCONTINUED | OUTPATIENT
Start: 2022-03-05 | End: 2022-03-06

## 2022-03-05 RX ADMIN — FAMOTIDINE 20 MILLIGRAM(S): 10 INJECTION INTRAVENOUS at 05:33

## 2022-03-05 RX ADMIN — ENOXAPARIN SODIUM 120 MILLIGRAM(S): 100 INJECTION SUBCUTANEOUS at 11:47

## 2022-03-05 RX ADMIN — Medication 2: at 16:41

## 2022-03-05 RX ADMIN — ATENOLOL 50 MILLIGRAM(S): 25 TABLET ORAL at 05:33

## 2022-03-05 RX ADMIN — Medication 200 MILLIGRAM(S): at 05:32

## 2022-03-05 RX ADMIN — Medication 200 MILLIGRAM(S): at 17:04

## 2022-03-05 RX ADMIN — Medication 125 MICROGRAM(S): at 05:33

## 2022-03-05 RX ADMIN — INSULIN GLARGINE 18 UNIT(S): 100 INJECTION, SOLUTION SUBCUTANEOUS at 22:32

## 2022-03-05 RX ADMIN — Medication 2: at 08:00

## 2022-03-05 RX ADMIN — FAMOTIDINE 20 MILLIGRAM(S): 10 INJECTION INTRAVENOUS at 17:05

## 2022-03-05 RX ADMIN — Medication 4: at 11:08

## 2022-03-05 RX ADMIN — Medication 2 UNIT(S): at 16:42

## 2022-03-05 NOTE — PROGRESS NOTE ADULT - PROBLEM SELECTOR PLAN 1
-S/P TURP on 3/3/22, c/b postop hypotension and acute blood loss anemia, requiring CBI/traction, and RTOR for clot evacuation and transfused 2U PRBC on 3/3  -labs reviewed: leukocytosis likely reactive from surgery (on cefazolin), MERON with creat 2 likely hemodynamic mediated d/t surgery/hypotension  -cleared by  to start therapeutic Lovenox (3/3--), no coumadin yet  -urine now clear, monitor , management per   -trend CBC, transfuse prn to keep Hgb>8

## 2022-03-05 NOTE — PROGRESS NOTE ADULT - ASSESSMENT
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, pt treated Na normalized, was RTOR for TURP completion transfused 2u PRBC, CBI clear postop, HCT stable    3/4: CBI clear, HCT stable, this AM corrected sodium 139, FS elevated, lantus started HS per hospitalist, lovenox restarted  3/5: CBI remains crystal clear, labs wnl, tolerating diet, lantus incr to 18u HS, 2u admelog added    Plan:  -AM labs reviewed  -CBI clamped, monitor color  -remove 10cc from garcia balloon  -continue Lovenox today, monitor color, will restart coumadin on Monday, 3/7  -continue cipro  -f/u medicine  -garcia plan TBD  -IS, venodynes, ambulate with assistance 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 Manrique coumadin plan  -continue cipro  -f/u medicine  -f/u cardiology  -garcia plan TBD  -IS, venodynes, ambulate with assistance

## 2022-03-05 NOTE — PROGRESS NOTE ADULT - ASSESSMENT
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 Pt was bridged w Lovenox for his procedure. Post-procedurally pt is doing well and denies cardiac symptoms. He received 2 units PRBC for the procedure.     [facility] MVR   Cont therapeutic Lovenox, would dose Coumadin when cleared from surgical perspective with 8mg qhs load x 3 nights and then home dose Coumadin alternating doses of 4 mg and 5 mg. Goal INR 2.5-3.5  Cont PPI    Afib   Rate controlled   Cont Atenolol and Digoxin     Eduard Bailey PGY6  All Cardiology service information can be found 24/7 on [email or web address], password: [email or web address]

## 2022-03-05 NOTE — PROGRESS NOTE ADULT - PROBLEM SELECTOR PLAN 4
-hold trulicity and glipizide   -ISS, monitor FS  `A1c 6.7%.  -FS elevated 300's, increase lantus to 18U and start premeal insulin 2 TID  -will adjust further per FS

## 2022-03-05 NOTE — PROGRESS NOTE ADULT - PROBLEM SELECTOR PLAN 2
hemodynamically mediated in setting of surgery, blood loss, hypotension  now hemodynamically stable s/p transfusion, albumin, fluids  creat uptrending to 2 from baseline 0.97  avoid nephrotoxins, dose meds per renal fxn

## 2022-03-06 ENCOUNTER — TRANSCRIPTION ENCOUNTER (OUTPATIENT)
Age: 77
End: 2022-03-06

## 2022-03-06 VITALS
TEMPERATURE: 98 F | RESPIRATION RATE: 18 BRPM | OXYGEN SATURATION: 96 % | HEART RATE: 73 BPM | DIASTOLIC BLOOD PRESSURE: 52 MMHG | SYSTOLIC BLOOD PRESSURE: 112 MMHG

## 2022-03-06 LAB
ANION GAP SERPL CALC-SCNC: 9 MMOL/L — SIGNIFICANT CHANGE UP (ref 7–14)
BUN SERPL-MCNC: 37 MG/DL — HIGH (ref 7–23)
CALCIUM SERPL-MCNC: 8 MG/DL — LOW (ref 8.4–10.5)
CHLORIDE SERPL-SCNC: 109 MMOL/L — HIGH (ref 98–107)
CO2 SERPL-SCNC: 22 MMOL/L — SIGNIFICANT CHANGE UP (ref 22–31)
CREAT SERPL-MCNC: 2.1 MG/DL — HIGH (ref 0.5–1.3)
EGFR: 32 ML/MIN/1.73M2 — LOW
GLUCOSE BLDC GLUCOMTR-MCNC: 214 MG/DL — HIGH (ref 70–99)
GLUCOSE BLDC GLUCOMTR-MCNC: 239 MG/DL — HIGH (ref 70–99)
GLUCOSE SERPL-MCNC: 233 MG/DL — HIGH (ref 70–99)
HCT VFR BLD CALC: 22.7 % — LOW (ref 39–50)
HGB BLD-MCNC: 7.7 G/DL — LOW (ref 13–17)
MCHC RBC-ENTMCNC: 31.2 PG — SIGNIFICANT CHANGE UP (ref 27–34)
MCHC RBC-ENTMCNC: 33.9 GM/DL — SIGNIFICANT CHANGE UP (ref 32–36)
MCV RBC AUTO: 91.9 FL — SIGNIFICANT CHANGE UP (ref 80–100)
NRBC # BLD: 0 /100 WBCS — SIGNIFICANT CHANGE UP
NRBC # FLD: 0.07 K/UL — HIGH
PLATELET # BLD AUTO: 145 K/UL — LOW (ref 150–400)
POTASSIUM SERPL-MCNC: 4 MMOL/L — SIGNIFICANT CHANGE UP (ref 3.5–5.3)
POTASSIUM SERPL-SCNC: 4 MMOL/L — SIGNIFICANT CHANGE UP (ref 3.5–5.3)
RBC # BLD: 2.47 M/UL — LOW (ref 4.2–5.8)
RBC # FLD: 17 % — HIGH (ref 10.3–14.5)
SODIUM SERPL-SCNC: 140 MMOL/L — SIGNIFICANT CHANGE UP (ref 135–145)
WBC # BLD: 14.05 K/UL — HIGH (ref 3.8–10.5)
WBC # FLD AUTO: 14.05 K/UL — HIGH (ref 3.8–10.5)

## 2022-03-06 PROCEDURE — 99232 SBSQ HOSP IP/OBS MODERATE 35: CPT

## 2022-03-06 RX ORDER — ACETAMINOPHEN 500 MG
650 TABLET ORAL ONCE
Refills: 0 | Status: COMPLETED | OUTPATIENT
Start: 2022-03-06 | End: 2022-03-06

## 2022-03-06 RX ORDER — CIPROFLOXACIN LACTATE 400MG/40ML
1 VIAL (ML) INTRAVENOUS
Qty: 10 | Refills: 0
Start: 2022-03-06 | End: 2022-03-10

## 2022-03-06 RX ADMIN — Medication 650 MILLIGRAM(S): at 05:40

## 2022-03-06 RX ADMIN — Medication 2: at 11:49

## 2022-03-06 RX ADMIN — Medication 2 UNIT(S): at 11:48

## 2022-03-06 RX ADMIN — Medication 2: at 08:11

## 2022-03-06 RX ADMIN — FAMOTIDINE 20 MILLIGRAM(S): 10 INJECTION INTRAVENOUS at 05:18

## 2022-03-06 RX ADMIN — ENOXAPARIN SODIUM 120 MILLIGRAM(S): 100 INJECTION SUBCUTANEOUS at 14:12

## 2022-03-06 RX ADMIN — ATENOLOL 50 MILLIGRAM(S): 25 TABLET ORAL at 05:18

## 2022-03-06 RX ADMIN — Medication 200 MILLIGRAM(S): at 05:19

## 2022-03-06 RX ADMIN — Medication 125 MICROGRAM(S): at 05:18

## 2022-03-06 RX ADMIN — SODIUM CHLORIDE 75 MILLILITER(S): 9 INJECTION INTRAMUSCULAR; INTRAVENOUS; SUBCUTANEOUS at 11:51

## 2022-03-06 RX ADMIN — Medication 2 UNIT(S): at 08:12

## 2022-03-06 NOTE — PROGRESS NOTE ADULT - PROBLEM SELECTOR PLAN 5
h/o CAD/CABG, mechanical AVR  coumadin held 5 days pre-surgery, bridged with lovenox preop  cleared by  to resume AC (Therapeutic Lovenox 1.5 mg/kg SC QD) as above, coumadin still on hold  cont home digoxin  Preop echo on 2/2/22 normal LVEF 65%, mod PAH; stress test from 2/2020: EF 70%, normal myocardial perfusion.

## 2022-03-06 NOTE — DISCHARGE NOTE PROVIDER - HOSPITAL COURSE
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 The patient was instructed to follow up with Dr. Manrique after discharge from the hospital. The patient felt comfortable with discharge. The patient was discharged to home. The patient had no other issues. 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, pt treated Na normalized, was RTOR for TURP completion transfused 2u PRBC, CBI clear postop, HCT stable    3/4: CBI clear, HCT stable, this AM corrected sodium 139, FS elevated, lantus started HS per hospitalist, lovenox restarted  3/5: CBI remains crystal clear, labs wnl, tolerating diet, lantus incr to 18u HS, 2u admelog added  3/6: Castillo clear yellow, labs wnl, tolerating diet, no complaints, balloon left with 5 cc    At the time of discharge, the patient was hemodynamically stable, was tolerating PO diet, was voiding urine and passing flatus, was ambulating, and was comfortable with adequate pain control. The patient was instructed to follow up with Dr. Manrique after discharge from the hospital. The patient felt comfortable with discharge. The patient was discharged to home. The patient had no other issues.

## 2022-03-06 NOTE — DISCHARGE NOTE PROVIDER - NSDCMRMEDTOKEN_GEN_ALL_CORE_FT
atenolol 50 mg oral tablet: 1 tab(s) orally once a day AM  Coumadin 4 mg oral tablet: 1 tab(s) orally once a day pm  digoxin 125 mcg (0.125 mg) oral tablet: 1 tab(s) orally once a day AM  famotidine 20 mg oral tablet: 1 tab(s) orally 2 times a day  glipiZIDE 10 mg oral tablet, extended release: 1 tab(s) orally 2 times a day  Lovenox 120 mg/0.8 mL injectable solution: injectable once a day. to be started 2/28/2022  Trulicity Pen 1.5 mg/0.5 mL subcutaneous solution: subcutaneous once a week Monday   atenolol 50 mg oral tablet: 1 tab(s) orally once a day AM  Cipro 500 mg oral tablet: 1 tab(s) orally 2 times a day MDD:2  Coumadin 4 mg oral tablet: 1 tab(s) orally once a day pm  digoxin 125 mcg (0.125 mg) oral tablet: 1 tab(s) orally once a day AM  famotidine 20 mg oral tablet: 1 tab(s) orally 2 times a day  glipiZIDE 10 mg oral tablet, extended release: 1 tab(s) orally 2 times a day  Lovenox 120 mg/0.8 mL injectable solution: injectable once a day. to be started 2/28/2022  Trulicity Pen 1.5 mg/0.5 mL subcutaneous solution: subcutaneous once a week Monday

## 2022-03-06 NOTE — DISCHARGE NOTE PROVIDER - CARE PROVIDER_API CALL
Ashish Manrique)  Urology  [phone] [place] [address]  Phone: [phone]  Fax: [phone]  Follow Up Time:

## 2022-03-06 NOTE — PROGRESS NOTE ADULT - ASSESSMENT
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, pt treated Na normalized, was RTOR for TURP completion transfused 2u PRBC, CBI clear postop, HCT stable    3/4: CBI clear, HCT stable, this AM corrected sodium 139, FS elevated, lantus started HS per hospitalist, lovenox restarted  3/5: CBI remains crystal clear, labs wnl, tolerating diet, lantus incr to 18u HS, 2u admelog added  3/6: Garcia clear yellow, labs wnl, tolerating diet, no complaints    Plan:  -AM labs reviewed  -Continue Garcia  -Continue Lovenox, will resume Coumadin tomorrow  -continue cipro  -f/u medicine  -f/u cardiology  -garcia plan TBD  -IS, venodynes, ambulate with assistance  -Discharge home today

## 2022-03-06 NOTE — PROGRESS NOTE ADULT - PROBLEM SELECTOR PLAN 4
-hold trulicity and glipizide   -ISS, monitor FS  `A1c 6.7%.  -FS elevated 300's, increase lantus to 18U and start premeal insulin 2 TID  -will adjust further per FS -hold trulicity and glipizide   -ISS, monitor FS  `A1c 6.7%.  -FS elevated 300's, increase lantus to 18U and start premeal insulin 2 TID  -will adjust further per FS      -restart home regimen on discharge. Patient to f/u with pcp

## 2022-03-06 NOTE — DISCHARGE NOTE NURSING/CASE MANAGEMENT/SOCIAL WORK - NSDCPEFALRISK_GEN_ALL_CORE
For information on Fall & Injury Prevention, visit: [email or web address] OR  [email or web address] OR  https://www.cdc.gov/steadi/patient.html

## 2022-03-06 NOTE — DISCHARGE NOTE NURSING/CASE MANAGEMENT/SOCIAL WORK - PATIENT PORTAL LINK FT
You can access the FollowMyHealth Patient Portal offered by [facility] by registering at the following website: [email or web address]. By joining [email or web address]’s FollowMyHealth portal, you will also be able to view your health information using other applications (apps) compatible with our system.

## 2022-03-06 NOTE — PROGRESS NOTE ADULT - NSPROGADDITIONALINFOA_GEN_ALL_CORE
dose coumadin when ok from surgical perspective. Goal INR is 2.5-3.5 Ok to dc home from medical perspective

## 2022-03-06 NOTE — DISCHARGE NOTE PROVIDER - NSDCFUADDINST_GEN_ALL_CORE_FT
CATHETER: You will be sent home with your garcia catheter. The nurses will review instructions and care before you go home.  GENERAL: It is common to have blood in your urine after your procedure. It may be pink or even red; inform your doctor if you have a significant amount of clot in the urine or if you are unable to void at all or if your catheter stops draining. The urine may clear and then become bloody again especially as you are more physically active. It is not uncommon to have some burning when you urinate, this will gradually improve. With a catheter in place, it is not uncommon to have occasional leakage or urine or blood around the catheter. Please call your urologist if this is excessive and/or the urine is not draining through the catheter into the bag.  BATHING: You may shower or bathe. If going home with garcia, shower only until catheter is removed.  DIET: You may resume your regular diet and regular medication regimen.  PAIN: You may take Tylenol (acetaminophen) 650-975mg and/or Motrin (ibuprofen) 400-600mg, both available over the counter, for pain every 6 hours as needed. Do not exceed 4000mg of Tylenol (acetaminophen) daily. You may alternate these medications such that you take one or the other every 3 hours for around the clock pain coverage.  ANTIBIOTICS: You have been given a prescription for an antibiotic, please take this medication as instructed and be sure to complete the entire course.  STOOL SOFTENERS: Do not allow yourself to become constipated as straining may cause bleeding. Take stool softeners or a laxative (ex. Miralax, Colace, Senokot, ExLax, etc), available over the counter, if needed.  ACTIVITY: No heavy lifting or strenuous exercise until you are evaluated at your post-operative appointment. Otherwise, you may return to your usual level of physical activity.  ANTICOAGULATION: Please continue your Lovenox and start your Coumadin on Monday. You must take your Lovenox daily until your INR is back to the desired range.  FOLLOW-UP: If you did not already schedule your post-operative appointment, please call your urologist to schedule and follow-up appointment.  CALL YOUR UROLOGIST IF: You have any bleeding that does not stop, inability to void >8 hours, fever over 100.4 F, chills, persistent nausea/vomiting, changes in your incision concerning for infection, or if your pain is not controlled on your discharge pain medications.

## 2022-03-06 NOTE — PROGRESS NOTE ADULT - ASSESSMENT
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 Pt was bridged w Lovenox for his procedure. Post-procedurally pt is doing well and denies cardiac symptoms. He received 2 units PRBC for the procedure.     [facility] MVR   Cont therapeutic Lovenox, would dose Coumadin when cleared from surgical perspective with 8mg qhs load x 3 nights and then home dose Coumadin alternating doses of 4 mg and 5 mg. Goal INR 2.5-3.5  Cont PPI    Afib   Rate controlled   Cont Atenolol and Digoxin     MERON   Likely intraop hypotension  Euvolemic on exam  No objection to volume resuscitation if needed  No changes to cardiac meds at this time.     Javid Corea  Cardiology Fellow    All Cardiology service information can be found 24/7 on [email or web address], password: [email or web address]

## 2022-03-06 NOTE — PROGRESS NOTE ADULT - PROBLEM SELECTOR PLAN 7
outpt f/u PCP, can get oupt FNA  no acute issue.

## 2022-03-06 NOTE — PROGRESS NOTE ADULT - PROBLEM SELECTOR PLAN 3
Initially hypertensive postop, then hypotensive, s/p RTOR and clot evacuation as above  c/w home atenolol 50mg qd, BP controlled systolic 100's  Hold for SBP<120

## 2022-03-06 NOTE — DISCHARGE NOTE PROVIDER - NSDCFUSCHEDAPPT_GEN_ALL_CORE_FT
NOLAN, ALINA ; 03/08/2022 ; NP Endocrin OP [id number]  NOLAN, ALINA ; 03/14/2022 ; NPP Intmed OP [id number]  NOLAN, ALINA ; 03/15/2022 ; NP RAD NucMed [address]  NOLAN, ALINA ; 03/15/2022 ; NP RAD [address]ucMed [address]  NOLAN, ALINA ; 03/15/2022 ; [facility] Nuclear MDS  NOLAN, ALINA ; 03/16/2022 ; [facility] RAD NucMed [address]  NOLAN, ALINA ; 05/23/2022 ; [facility] Podiatry [address]

## 2022-03-06 NOTE — DISCHARGE NOTE NURSING/CASE MANAGEMENT/SOCIAL WORK - NSDCPNINST_GEN_ALL_CORE
You will be discharged home with a leg bag for your catheter to drain your urine. Your nurse will show you how to attach your leg bag prior to discharge. It is normal for your urine to have some blood after your procedure so, pink or red-tinged urine is normal. If you notice your catheter is not draining any urine or you notice excessive clots in the catheter, please notify the doctor. Please also notify the doctor if you have pain that is unrelieved with the pain medication prescribed. Dr. Manrique would like you to follow up with him after discharge. Please call his office to make an appointment if you do not already have an appointment made. It is OK to shower but do not take any tub baths until your catheter is removed. No heavy lifting or straining until cleared by the doctor. Please call the doctor if you have a fever greater than 100.4, chest pain, shortness of breath, chest pain, or if you notice no urine draining from your catheter after more than 8 hours.

## 2022-03-06 NOTE — PROGRESS NOTE ADULT - PROBLEM SELECTOR PLAN 1
-S/P TURP on 3/3/22, c/b postop hypotension and acute blood loss anemia, requiring CBI/traction, and RTOR for clot evacuation and transfused 2U PRBC on 3/3  -labs reviewed: leukocytosis likely reactive from surgery (on cefazolin), MERON with creat 2 likely hemodynamic mediated d/t surgery/hypotension  -cleared by  to start therapeutic Lovenox (3/3--), no coumadin yet  -urine now clear, monitor , management per   -trend CBC, transfuse prn to keep Hgb>8 -S/P TURP on 3/3/22, c/b postop hypotension and acute blood loss anemia, requiring CBI/traction, and RTOR for clot evacuation and transfused 2U PRBC on 3/3  -labs reviewed: leukocytosis likely reactive from surgery (on cefazolin), MERON with creat 2 likely hemodynamic mediated d/t surgery/hypotension  -cleared by  to start therapeutic Lovenox (3/3--), coumadin to start on 3/7  -urine now clear, monitor , management per   -trend CBC, transfuse prn to keep Hgb>8

## 2022-03-06 NOTE — PROGRESS NOTE ADULT - PROBLEM SELECTOR PLAN 6
management as above, resumed AC as above  multiple indication for AC, foremost mechanical MVR, chronic AFib and also h/o DVT/PE.

## 2022-03-08 ENCOUNTER — NON-APPOINTMENT (OUTPATIENT)
Age: 77
End: 2022-03-08

## 2022-03-08 ENCOUNTER — APPOINTMENT (OUTPATIENT)
Dept: ENDOCRINOLOGY | Facility: CLINIC | Age: 77
End: 2022-03-08

## 2022-03-08 LAB — SURGICAL PATHOLOGY STUDY: SIGNIFICANT CHANGE UP

## 2022-03-11 ENCOUNTER — APPOINTMENT (OUTPATIENT)
Dept: INTERNAL MEDICINE | Facility: CLINIC | Age: 77
End: 2022-03-11

## 2022-03-11 ENCOUNTER — APPOINTMENT (OUTPATIENT)
Dept: UROLOGY | Facility: CLINIC | Age: 77
End: 2022-03-11
Payer: MEDICARE

## 2022-03-11 ENCOUNTER — OUTPATIENT (OUTPATIENT)
Dept: OUTPATIENT SERVICES | Facility: HOSPITAL | Age: 77
LOS: 1 days | End: 2022-03-11

## 2022-03-11 VITALS
HEART RATE: 72 BPM | WEIGHT: 169 LBS | RESPIRATION RATE: 14 BRPM | SYSTOLIC BLOOD PRESSURE: 121 MMHG | TEMPERATURE: 97.7 F | DIASTOLIC BLOOD PRESSURE: 77 MMHG | BODY MASS INDEX: 25.03 KG/M2 | HEIGHT: 69 IN | OXYGEN SATURATION: 100 %

## 2022-03-11 DIAGNOSIS — H26.9 UNSPECIFIED CATARACT: Chronic | ICD-10-CM

## 2022-03-11 DIAGNOSIS — I25.10 ATHEROSCLEROTIC HEART DISEASE OF NATIVE CORONARY ARTERY WITHOUT ANGINA PECTORIS: ICD-10-CM

## 2022-03-11 DIAGNOSIS — Z86.69 PERSONAL HISTORY OF OTHER DISEASES OF THE NERVOUS SYSTEM AND SENSE ORGANS: Chronic | ICD-10-CM

## 2022-03-11 DIAGNOSIS — Z95.2 PRESENCE OF PROSTHETIC HEART VALVE: ICD-10-CM

## 2022-03-11 DIAGNOSIS — Z79.01 LONG TERM (CURRENT) USE OF ANTICOAGULANTS: ICD-10-CM

## 2022-03-11 DIAGNOSIS — Z90.89 ACQUIRED ABSENCE OF OTHER ORGANS: Chronic | ICD-10-CM

## 2022-03-11 DIAGNOSIS — Z98.890 OTHER SPECIFIED POSTPROCEDURAL STATES: Chronic | ICD-10-CM

## 2022-03-11 DIAGNOSIS — Z98.89 OTHER SPECIFIED POSTPROCEDURAL STATES: Chronic | ICD-10-CM

## 2022-03-11 DIAGNOSIS — I48.91 UNSPECIFIED ATRIAL FIBRILLATION: ICD-10-CM

## 2022-03-11 DIAGNOSIS — I09.9 RHEUMATIC HEART DISEASE, UNSPECIFIED: ICD-10-CM

## 2022-03-11 PROCEDURE — A4218: CPT | Mod: NC

## 2022-03-11 PROCEDURE — 51798 US URINE CAPACITY MEASURE: CPT

## 2022-03-11 PROCEDURE — 51700 IRRIGATION OF BLADDER: CPT

## 2022-03-11 NOTE — HISTORY OF PRESENT ILLNESS
[FreeTextEntry1] : Patient status post transurethral resection of the prostate.  He required to take back to the OR for bleeding he presents today for removal of his catheter.\par \par His bladder was filled to 300 he was able to urinate 150 had 150 cc postvoid residual but felt empty.\par \par Impression status post TURP benign pathology\par \par Plan patient given Pyridium medication for dysuria patient will stay on his finasteride and patient will follow up in 3 months with PSA prior to that visit.

## 2022-03-14 ENCOUNTER — APPOINTMENT (OUTPATIENT)
Dept: UROLOGY | Facility: CLINIC | Age: 77
End: 2022-03-14
Payer: MEDICARE

## 2022-03-14 PROCEDURE — 99024 POSTOP FOLLOW-UP VISIT: CPT

## 2022-03-14 RX ORDER — MIRABEGRON 25 MG/1
25 TABLET, FILM COATED, EXTENDED RELEASE ORAL
Qty: 30 | Refills: 11 | Status: DISCONTINUED | COMMUNITY
Start: 2020-10-29 | End: 2022-03-14

## 2022-03-14 RX ORDER — OXYBUTYNIN CHLORIDE 10 MG/1
10 TABLET, EXTENDED RELEASE ORAL
Qty: 30 | Refills: 11 | Status: DISCONTINUED | COMMUNITY
Start: 2020-10-14 | End: 2022-03-14

## 2022-03-14 NOTE — ASSESSMENT
[FreeTextEntry1] : Pt s/p TURP on 3/3/22- here for PVR, was 114mL. Pt reports urgency and frequency post surgery- made aware expected and to start OTC Tylenol every 8hrs. Urine culture sent off as well. \par \par Spoke with Dr. Manrique- pt to continue on Pyridium and start Oxybutynin 5 ER- script sent and pt made aware.

## 2022-03-14 NOTE — HISTORY OF PRESENT ILLNESS
[FreeTextEntry1] : Please refer to URO Consult note \par \par Follow-up BPH.  Patient underwent TURP with Dr. Torres.\par Pathology demonstrated benign prostate tissue 15 g.  \par \par Patient reports urinary urgency and frequency and irritative symptoms.\par \par PVR today was 110 cc.\par \par Send urine for culture.  Patient declined oxybutynin and Castillo replacement.

## 2022-03-14 NOTE — LETTER BODY
[FreeTextEntry1] : Danica Yoder MD \par [address] [place] [address] \[place] [address]\par [phone]\par \par Dear Dr. Yoder, \par \par This is a 76 year-old gentleman with a history of of BPH. The patient underwent an uneventful TURP with Dr. Manrique on March 3.  His pathology demonstrated benign prostate tissue. The patient returns today for follow up. Since he was last seen, the patient complains of urinary urgency and frequency. He notes irritative symptoms. Patient denies any changes in health. The past medical history and family history and social history are unchanged. All other review of systems are negative. Patient denies any changes in medications. Medication list was reconciled.\par \par On examination, the patient is a healthy-appearing gentleman in no acute distress. He is alert and oriented follows commands. He has normal mood and affect. He is normocephalic. Oral no thrush. Neck is supple. Respirations are unlabored. His abdomen is soft and nontender. Liver is nonpalpable. Bladder is nonpalpable. No CVA tenderness. Neurologically he is grossly intact. No peripheral edema. Skin without gross abnormality. \par \par Pathology demonstrated benign prostate tissue. \par \par Post-void residual on bladder scan today was 110 cc. \par \par Assessment: BPH. Urinary frequency and urgency. \par \par I counseled the patient. His PVR today was 110 cc.  I recommended he begin a trial of Oxybutynin for his urinary symptoms. The patient declines medical therapy. He also declines Castillo catheter placement. I recommended the patient obtain urine culture today to evaluate for infection. I encouraged the patient to follow up with Dr. Manrique. Risks and alternatives were discussed. I answered the patient questions. The patient will follow-up as directed and will contact me with any questions or concerns. Thank you for the opportunity to participate in the care of Mr. NOLAN. I will keep you updated on his progress. \par \par Plan: Urine culture. Follow up with Dr. Manrique.

## 2022-03-14 NOTE — ADDENDUM
[FreeTextEntry1] : Entered by Juan Antonio Suarez, acting as scribe for Dr. Spencer Lea.\par \par The documentation recorded by the scribe accurately reflects the service I personally performed and the decisions made by me.

## 2022-03-14 NOTE — LETTER BODY
[FreeTextEntry1] : Danica Yoder MD \par [address] [place] [address] \[place] [facility] [address]\par [phone]\par \par Dear Dr. Yoder, \par \par This is a 76 year-old gentleman with a history of of BPH. The patient underwent an uneventful TURP with Dr. Manrique on March 3.  His pathology demonstrated benign prostate tissue. The patient returns today for follow up. Since he was last seen, the patient complains of urinary urgency and frequency. He notes irritative symptoms. Patient denies any changes in health. The past medical history and family history and social history are unchanged. All other review of systems are negative. Patient denies any changes in medications. Medication list was reconciled.\par \par On examination, the patient is a healthy-appearing gentleman in no acute distress. He is alert and oriented follows commands. He has normal mood and affect. He is normocephalic. Oral no thrush. Neck is supple. Respirations are unlabored. His abdomen is soft and nontender. Liver is nonpalpable. Bladder is nonpalpable. No CVA tenderness. Neurologically he is grossly intact. No peripheral edema. Skin without gross abnormality. \par \par Pathology demonstrated benign prostate tissue. \par \par Post-void residual on bladder scan today was 110 cc. \par \par Assessment: BPH. Urinary frequency and urgency. \par \par I counseled the patient. His PVR today was 110 cc.  I recommended he begin a trial of Oxybutynin for his urinary symptoms. The patient declines medical therapy. He also declines Castillo catheter placement. I recommended the patient obtain urine culture today to evaluate for infection. I encouraged the patient to follow up with Dr. Manrique. Risks and alternatives were discussed. I answered the patient questions. The patient will follow-up as directed and will contact me with any questions or concerns. Thank you for the opportunity to participate in the care of Mr. NOLAN. I will keep you updated on his progress. \par \par Plan: Urine culture. Follow up with Dr. Manrique.

## 2022-03-16 ENCOUNTER — OUTPATIENT (OUTPATIENT)
Dept: OUTPATIENT SERVICES | Facility: HOSPITAL | Age: 77
LOS: 1 days | End: 2022-03-16

## 2022-03-16 ENCOUNTER — RESULT REVIEW (OUTPATIENT)
Age: 77
End: 2022-03-16

## 2022-03-16 ENCOUNTER — NON-APPOINTMENT (OUTPATIENT)
Age: 77
End: 2022-03-16

## 2022-03-16 ENCOUNTER — RESULT CHARGE (OUTPATIENT)
Age: 77
End: 2022-03-16

## 2022-03-16 ENCOUNTER — APPOINTMENT (OUTPATIENT)
Dept: INTERNAL MEDICINE | Facility: CLINIC | Age: 77
End: 2022-03-16
Payer: MEDICARE

## 2022-03-16 ENCOUNTER — APPOINTMENT (OUTPATIENT)
Dept: INTERNAL MEDICINE | Facility: CLINIC | Age: 77
End: 2022-03-16

## 2022-03-16 VITALS
HEIGHT: 69 IN | OXYGEN SATURATION: 99 % | TEMPERATURE: 97.1 F | HEART RATE: 69 BPM | BODY MASS INDEX: 24.59 KG/M2 | DIASTOLIC BLOOD PRESSURE: 80 MMHG | SYSTOLIC BLOOD PRESSURE: 150 MMHG | WEIGHT: 166 LBS

## 2022-03-16 DIAGNOSIS — Z98.89 OTHER SPECIFIED POSTPROCEDURAL STATES: Chronic | ICD-10-CM

## 2022-03-16 DIAGNOSIS — Z86.69 PERSONAL HISTORY OF OTHER DISEASES OF THE NERVOUS SYSTEM AND SENSE ORGANS: Chronic | ICD-10-CM

## 2022-03-16 DIAGNOSIS — Z98.890 OTHER SPECIFIED POSTPROCEDURAL STATES: Chronic | ICD-10-CM

## 2022-03-16 DIAGNOSIS — Z90.89 ACQUIRED ABSENCE OF OTHER ORGANS: Chronic | ICD-10-CM

## 2022-03-16 DIAGNOSIS — H26.9 UNSPECIFIED CATARACT: Chronic | ICD-10-CM

## 2022-03-16 LAB
ALBUMIN SERPL ELPH-MCNC: 3.9 G/DL — SIGNIFICANT CHANGE UP (ref 3.3–5)
ALBUMIN, RANDOM URINE: 16.7 MG/DL — SIGNIFICANT CHANGE UP
ALBUMIN/CREATININE RATIO (ACR): 269 MG/G — HIGH (ref 0–30)
ALP SERPL-CCNC: 99 U/L — SIGNIFICANT CHANGE UP (ref 40–120)
ALT FLD-CCNC: 61 U/L — HIGH (ref 10–45)
ANION GAP SERPL CALC-SCNC: 12 MMOL/L — SIGNIFICANT CHANGE UP (ref 5–17)
AST SERPL-CCNC: 74 U/L — HIGH (ref 10–40)
BACTERIA UR CULT: ABNORMAL
BASOPHILS # BLD AUTO: 0.05 K/UL — SIGNIFICANT CHANGE UP (ref 0–0.2)
BASOPHILS NFR BLD AUTO: 0.5 % — SIGNIFICANT CHANGE UP (ref 0–2)
BILIRUB SERPL-MCNC: 0.7 MG/DL — SIGNIFICANT CHANGE UP (ref 0.2–1.2)
BUN SERPL-MCNC: 27 MG/DL — HIGH (ref 7–23)
CALCIUM SERPL-MCNC: 9.3 MG/DL — SIGNIFICANT CHANGE UP (ref 8.4–10.5)
CHLORIDE SERPL-SCNC: 108 MMOL/L — SIGNIFICANT CHANGE UP (ref 96–108)
CO2 SERPL-SCNC: 21 MMOL/L — LOW (ref 22–31)
CREAT ?TM UR-MCNC: 62 MG/DL — SIGNIFICANT CHANGE UP
CREAT SERPL-MCNC: 1.49 MG/DL — HIGH (ref 0.5–1.3)
EGFR: 49 ML/MIN/1.73M2 — LOW
EOSINOPHIL # BLD AUTO: 0.21 K/UL — SIGNIFICANT CHANGE UP (ref 0–0.5)
EOSINOPHIL NFR BLD AUTO: 2.3 % — SIGNIFICANT CHANGE UP (ref 0–6)
GLUCOSE SERPL-MCNC: 132 MG/DL — HIGH (ref 70–99)
HCT VFR BLD CALC: 30.9 % — LOW (ref 39–50)
HGB BLD-MCNC: 9.7 G/DL — LOW (ref 13–17)
IMM GRANULOCYTES NFR BLD AUTO: 1 % — SIGNIFICANT CHANGE UP (ref 0–1.5)
LYMPHOCYTES # BLD AUTO: 1.43 K/UL — SIGNIFICANT CHANGE UP (ref 1–3.3)
LYMPHOCYTES # BLD AUTO: 15.5 % — SIGNIFICANT CHANGE UP (ref 13–44)
MCHC RBC-ENTMCNC: 31.4 GM/DL — LOW (ref 32–36)
MCHC RBC-ENTMCNC: 31.5 PG — SIGNIFICANT CHANGE UP (ref 27–34)
MCV RBC AUTO: 100.3 FL — HIGH (ref 80–100)
MONOCYTES # BLD AUTO: 0.6 K/UL — SIGNIFICANT CHANGE UP (ref 0–0.9)
MONOCYTES NFR BLD AUTO: 6.5 % — SIGNIFICANT CHANGE UP (ref 2–14)
NEUTROPHILS # BLD AUTO: 6.86 K/UL — SIGNIFICANT CHANGE UP (ref 1.8–7.4)
NEUTROPHILS NFR BLD AUTO: 74.2 % — SIGNIFICANT CHANGE UP (ref 43–77)
PLATELET # BLD AUTO: 380 K/UL — SIGNIFICANT CHANGE UP (ref 150–400)
POTASSIUM SERPL-MCNC: 4.9 MMOL/L — SIGNIFICANT CHANGE UP (ref 3.5–5.3)
POTASSIUM SERPL-SCNC: 4.9 MMOL/L — SIGNIFICANT CHANGE UP (ref 3.5–5.3)
PROT SERPL-MCNC: 6.9 G/DL — SIGNIFICANT CHANGE UP (ref 6–8.3)
RBC # BLD: 3.08 M/UL — LOW (ref 4.2–5.8)
RBC # FLD: 16.3 % — HIGH (ref 10.3–14.5)
SODIUM SERPL-SCNC: 141 MMOL/L — SIGNIFICANT CHANGE UP (ref 135–145)
WBC # BLD: 9.24 K/UL — SIGNIFICANT CHANGE UP (ref 3.8–10.5)
WBC # FLD AUTO: 9.24 K/UL — SIGNIFICANT CHANGE UP (ref 3.8–10.5)

## 2022-03-16 PROCEDURE — 99214 OFFICE O/P EST MOD 30 MIN: CPT | Mod: GC

## 2022-03-16 RX ORDER — PANTOPRAZOLE 20 MG/1
20 TABLET, DELAYED RELEASE ORAL
Qty: 30 | Refills: 1 | Status: DISCONTINUED | COMMUNITY
Start: 2021-12-28 | End: 2022-03-16

## 2022-03-16 RX ORDER — SULFAMETHOXAZOLE AND TRIMETHOPRIM 400; 80 MG/1; MG/1
400-80 TABLET ORAL DAILY
Qty: 5 | Refills: 0 | Status: DISCONTINUED | COMMUNITY
Start: 2022-03-16 | End: 2022-03-16

## 2022-03-16 RX ORDER — LISINOPRIL 10 MG/1
10 TABLET ORAL
Qty: 90 | Refills: 3 | Status: DISCONTINUED | COMMUNITY
Start: 2021-08-02 | End: 2022-03-16

## 2022-03-16 RX ORDER — TROSPIUM CHLORIDE 20 MG/1
20 TABLET, FILM COATED ORAL
Qty: 1 | Refills: 11 | Status: DISCONTINUED | COMMUNITY
Start: 2020-09-22 | End: 2022-03-16

## 2022-03-16 RX ORDER — FINASTERIDE 5 MG/1
5 TABLET, FILM COATED ORAL DAILY
Qty: 90 | Refills: 3 | Status: DISCONTINUED | COMMUNITY
Start: 2021-10-07 | End: 2022-03-16

## 2022-03-16 RX ORDER — SIMETHICONE 125 MG/1
125 TABLET, CHEWABLE ORAL
Qty: 30 | Refills: 0 | Status: DISCONTINUED | COMMUNITY
Start: 2021-03-04 | End: 2022-03-16

## 2022-03-16 RX ORDER — GUAIFENESIN 100 MG/5ML
100 SOLUTION ORAL EVERY 4 HOURS
Qty: 1 | Refills: 0 | Status: DISCONTINUED | COMMUNITY
Start: 2021-12-16 | End: 2022-03-16

## 2022-03-16 RX ORDER — LORATADINE 10 MG/1
10 TABLET ORAL
Qty: 30 | Refills: 0 | Status: DISCONTINUED | COMMUNITY
Start: 2022-02-09 | End: 2022-03-16

## 2022-03-16 RX ORDER — FINASTERIDE 5 MG/1
5 TABLET, FILM COATED ORAL DAILY
Qty: 90 | Refills: 3 | Status: DISCONTINUED | COMMUNITY
Start: 2021-12-30 | End: 2022-03-16

## 2022-03-16 RX ORDER — SIMETHICONE 125 MG/1
125 TABLET, CHEWABLE ORAL
Qty: 120 | Refills: 0 | Status: DISCONTINUED | COMMUNITY
Start: 2021-12-28 | End: 2022-03-16

## 2022-03-16 RX ORDER — ALBUTEROL SULFATE 90 UG/1
108 (90 BASE) INHALANT RESPIRATORY (INHALATION) EVERY 4 HOURS
Qty: 18 | Refills: 1 | Status: DISCONTINUED | COMMUNITY
Start: 2021-12-28 | End: 2022-03-16

## 2022-03-16 RX ORDER — BENZONATATE 100 MG/1
100 CAPSULE ORAL 3 TIMES DAILY
Qty: 90 | Refills: 0 | Status: DISCONTINUED | COMMUNITY
Start: 2022-02-11 | End: 2022-03-16

## 2022-03-16 RX ORDER — FINASTERIDE 5 MG/1
5 TABLET, FILM COATED ORAL DAILY
Qty: 90 | Refills: 3 | Status: DISCONTINUED | COMMUNITY
Start: 2021-12-29 | End: 2022-03-16

## 2022-03-21 DIAGNOSIS — D64.9 ANEMIA, UNSPECIFIED: ICD-10-CM

## 2022-03-21 DIAGNOSIS — I48.91 UNSPECIFIED ATRIAL FIBRILLATION: ICD-10-CM

## 2022-03-21 DIAGNOSIS — I10 ESSENTIAL (PRIMARY) HYPERTENSION: ICD-10-CM

## 2022-03-21 DIAGNOSIS — N39.0 URINARY TRACT INFECTION, SITE NOT SPECIFIED: ICD-10-CM

## 2022-03-21 DIAGNOSIS — E11.9 TYPE 2 DIABETES MELLITUS WITHOUT COMPLICATIONS: ICD-10-CM

## 2022-03-21 DIAGNOSIS — N17.9 ACUTE KIDNEY FAILURE, UNSPECIFIED: ICD-10-CM

## 2022-03-21 DIAGNOSIS — Z51.81 ENCOUNTER FOR THERAPEUTIC DRUG LEVEL MONITORING: ICD-10-CM

## 2022-03-21 DIAGNOSIS — E04.2 NONTOXIC MULTINODULAR GOITER: ICD-10-CM

## 2022-03-21 NOTE — PHYSICAL EXAM
[No Acute Distress] : no acute distress [Well Nourished] : well nourished [Well Developed] : well developed [Well-Appearing] : well-appearing [Normal Sclera/Conjunctiva] : normal sclera/conjunctiva [EOMI] : extraocular movements intact [Normal Outer Ear/Nose] : the outer ears and nose were normal in appearance [Normal Oropharynx] : the oropharynx was normal [No JVD] : no jugular venous distention [No Lymphadenopathy] : no lymphadenopathy [No Respiratory Distress] : no respiratory distress  [No Accessory Muscle Use] : no accessory muscle use [Clear to Auscultation] : lungs were clear to auscultation bilaterally [Normal Rate] : normal rate  [Regular Rhythm] : with a regular rhythm [Normal S1, S2] : normal S1 and S2 [Soft] : abdomen soft [Non Tender] : non-tender [Non-distended] : non-distended [No Joint Swelling] : no joint swelling [Grossly Normal Strength/Tone] : grossly normal strength/tone [Coordination Grossly Intact] : coordination grossly intact [No Focal Deficits] : no focal deficits [Normal Affect] : the affect was normal [Normal Insight/Judgement] : insight and judgment were intact [de-identified] : prominent large thyroid tissue right neck, soft, non-tender.

## 2022-03-21 NOTE — ASSESSMENT
[FreeTextEntry1] : #HCM\par -Colonoscopy: aged out Aug 2021\par -Pneumo, Tdap, Zoster UTD \par -COVID-19 UTD\par - Flu: UTD\par \par Case discussed with Dr. Dunlap\par RTC in 1 month BP check, tolerance to new meds, UTI symptoms. \par RTC in 1 week for INR check\par \par Danica Yoder. PGY-1 \par [employer] [facility] \par \par

## 2022-03-21 NOTE — REVIEW OF SYSTEMS
[Fatigue] : fatigue [Fever] : no fever [Chills] : no chills [Night Sweats] : no night sweats [Recent Change In Weight] : ~T no recent weight change [Discharge] : no discharge [Redness] : no redness [Vision Problems] : no vision problems [Hearing Loss] : no hearing loss [Nasal Discharge] : no nasal discharge [Chest Pain] : no chest pain [Palpitations] : no palpitations [Claudication] : no  leg claudication [Wheezing] : no wheezing [Cough] : no cough [Dyspnea on Exertion] : not dyspnea on exertion [Abdominal Pain] : no abdominal pain [Nausea] : no nausea [Constipation] : no constipation [Vomiting] : no vomiting [Incontinence] : no incontinence [Hesitancy] : no hesitancy [Frequency] : no frequency [Joint Pain] : no joint pain [Joint Stiffness] : no joint stiffness [Muscle Pain] : no muscle pain [Headache] : no headache [Dizziness] : no dizziness [Fainting] : no fainting [Easy Bleeding] : no easy bleeding [Easy Bruising] : no easy bruising

## 2022-03-21 NOTE — HISTORY OF PRESENT ILLNESS
[FreeTextEntry1] : f/u hospital discharge [de-identified] : 75 Y/O male with HTN, CAD s/p CABG, RHD s/p MVR on Coumadin, T2DM, Afib, multiple thyroid nodules, Rosie Thompson tears and esophageal ulcers, BPH s/p TURP (3/3/22) with course c/b postop hypotension, blood loss anemia requiring 2 U PRBC's, MERON and hyponatremia here for follow-up. \par \par Pt c/o of burning sensation while urinating which began after TURP. No changes in urinary frequency. Notably, had a Urine culture that was positive recently; not on antibiotics. \par \par HTN: was advised to stop taking lisinopril due to unrelenting cough. After lisinopril was discontinued, cough stopped. BP elevated today.\par \par CAD s/p CABG: follows with cardiology. No acute issues at this time. Can walk ~30 mins daily and can walk 5 flights of stairs. Denies orthopnea, PND. Reports good adherence with medications. \par \par RHD s/p MVR: warfarin was briefly stopped for urology procedure. Restarted warfarin on 3/7 with lovenox bridging. INR not at goal. Increased warfarin \par \par T2DM: Notes FS-160's. No hypoglycemic symptoms. Denies sweating, loss of consciousness. \par \par A. fib: rate-controlled\par \par Thyroid Nodules: has upcoming NM scan on . Denies unintentional weight loss, palpitations, heat/cold intolerance. \par \par BPH s/p TURP: course c/b postop hypotension, blood loss anemia requiring 2 U PRBC's, MERON and hyponatremia. Hgb at discharge was 7. Reports generalized fatigue that has been present since discharge likely related to anemia. \par

## 2022-03-23 ENCOUNTER — RESULT REVIEW (OUTPATIENT)
Age: 77
End: 2022-03-23

## 2022-03-23 ENCOUNTER — OUTPATIENT (OUTPATIENT)
Dept: OUTPATIENT SERVICES | Facility: HOSPITAL | Age: 77
LOS: 1 days | End: 2022-03-23

## 2022-03-23 ENCOUNTER — APPOINTMENT (OUTPATIENT)
Dept: INTERNAL MEDICINE | Facility: CLINIC | Age: 77
End: 2022-03-23

## 2022-03-23 VITALS — TEMPERATURE: 97 F | HEART RATE: 70 BPM | OXYGEN SATURATION: 99 %

## 2022-03-23 DIAGNOSIS — I09.9 RHEUMATIC HEART DISEASE, UNSPECIFIED: ICD-10-CM

## 2022-03-23 DIAGNOSIS — H26.9 UNSPECIFIED CATARACT: Chronic | ICD-10-CM

## 2022-03-23 DIAGNOSIS — Z98.89 OTHER SPECIFIED POSTPROCEDURAL STATES: Chronic | ICD-10-CM

## 2022-03-23 DIAGNOSIS — Z95.2 PRESENCE OF PROSTHETIC HEART VALVE: ICD-10-CM

## 2022-03-23 DIAGNOSIS — Z90.89 ACQUIRED ABSENCE OF OTHER ORGANS: Chronic | ICD-10-CM

## 2022-03-23 DIAGNOSIS — I48.91 UNSPECIFIED ATRIAL FIBRILLATION: ICD-10-CM

## 2022-03-23 DIAGNOSIS — Z86.69 PERSONAL HISTORY OF OTHER DISEASES OF THE NERVOUS SYSTEM AND SENSE ORGANS: Chronic | ICD-10-CM

## 2022-03-23 DIAGNOSIS — Z79.01 LONG TERM (CURRENT) USE OF ANTICOAGULANTS: ICD-10-CM

## 2022-03-23 DIAGNOSIS — Z98.890 OTHER SPECIFIED POSTPROCEDURAL STATES: Chronic | ICD-10-CM

## 2022-03-23 LAB
APPEARANCE UR: ABNORMAL
BACTERIA # UR AUTO: ABNORMAL
BILIRUB UR-MCNC: NEGATIVE — SIGNIFICANT CHANGE UP
COLOR SPEC: YELLOW — SIGNIFICANT CHANGE UP
DIFF PNL FLD: ABNORMAL
EPI CELLS # UR: 0 /HPF — SIGNIFICANT CHANGE UP (ref 0–5)
GLUCOSE UR QL: ABNORMAL
KETONES UR-MCNC: NEGATIVE — SIGNIFICANT CHANGE UP
LEUKOCYTE ESTERASE UR-ACNC: ABNORMAL
NITRITE UR-MCNC: NEGATIVE — SIGNIFICANT CHANGE UP
PH UR: 6 — SIGNIFICANT CHANGE UP (ref 5–8)
PROT UR-MCNC: ABNORMAL
RBC CASTS # UR COMP ASSIST: 55 /HPF — HIGH (ref 0–4)
SP GR SPEC: 1.02 — SIGNIFICANT CHANGE UP (ref 1–1.05)
UROBILINOGEN FLD QL: SIGNIFICANT CHANGE UP
WBC UR QL: 244 /HPF — HIGH (ref 0–5)

## 2022-03-24 LAB
CULTURE RESULTS: SIGNIFICANT CHANGE UP
SPECIMEN SOURCE: SIGNIFICANT CHANGE UP

## 2022-03-27 LAB
INR PPP: 1.3 RATIO
INR PPP: 1.6 RATIO
POCT-PROTHROMBIN TIME: 15.5 SECS
POCT-PROTHROMBIN TIME: 19.6 SECS

## 2022-03-28 LAB
INR PPP: 2.9 RATIO
POCT-PROTHROMBIN TIME: 34.8 SECS
QUALITY CONTROL: YES

## 2022-03-30 ENCOUNTER — APPOINTMENT (OUTPATIENT)
Dept: INTERNAL MEDICINE | Facility: CLINIC | Age: 77
End: 2022-03-30

## 2022-03-30 ENCOUNTER — LABORATORY RESULT (OUTPATIENT)
Age: 77
End: 2022-03-30

## 2022-03-30 ENCOUNTER — NON-APPOINTMENT (OUTPATIENT)
Age: 77
End: 2022-03-30

## 2022-03-30 ENCOUNTER — OUTPATIENT (OUTPATIENT)
Dept: OUTPATIENT SERVICES | Facility: HOSPITAL | Age: 77
LOS: 1 days | End: 2022-03-30

## 2022-03-30 ENCOUNTER — APPOINTMENT (OUTPATIENT)
Dept: OPHTHALMOLOGY | Facility: CLINIC | Age: 77
End: 2022-03-30
Payer: MEDICARE

## 2022-03-30 VITALS — TEMPERATURE: 97.3 F | OXYGEN SATURATION: 99 % | HEART RATE: 76 BPM

## 2022-03-30 DIAGNOSIS — I09.9 RHEUMATIC HEART DISEASE, UNSPECIFIED: ICD-10-CM

## 2022-03-30 DIAGNOSIS — Z79.01 LONG TERM (CURRENT) USE OF ANTICOAGULANTS: ICD-10-CM

## 2022-03-30 DIAGNOSIS — I48.91 UNSPECIFIED ATRIAL FIBRILLATION: ICD-10-CM

## 2022-03-30 DIAGNOSIS — Z98.890 OTHER SPECIFIED POSTPROCEDURAL STATES: Chronic | ICD-10-CM

## 2022-03-30 DIAGNOSIS — Z95.2 PRESENCE OF PROSTHETIC HEART VALVE: ICD-10-CM

## 2022-03-30 DIAGNOSIS — Z90.89 ACQUIRED ABSENCE OF OTHER ORGANS: Chronic | ICD-10-CM

## 2022-03-30 DIAGNOSIS — H26.9 UNSPECIFIED CATARACT: Chronic | ICD-10-CM

## 2022-03-30 DIAGNOSIS — Z98.89 OTHER SPECIFIED POSTPROCEDURAL STATES: Chronic | ICD-10-CM

## 2022-03-30 DIAGNOSIS — Z86.69 PERSONAL HISTORY OF OTHER DISEASES OF THE NERVOUS SYSTEM AND SENSE ORGANS: Chronic | ICD-10-CM

## 2022-03-30 LAB
INR PPP: 3.2 RATIO
POCT-PROTHROMBIN TIME: 39 SECS
QUALITY CONTROL: YES

## 2022-03-30 PROCEDURE — 92014 COMPRE OPH EXAM EST PT 1/>: CPT

## 2022-04-06 ENCOUNTER — APPOINTMENT (OUTPATIENT)
Dept: UROLOGY | Facility: CLINIC | Age: 77
End: 2022-04-06
Payer: MEDICARE

## 2022-04-06 ENCOUNTER — OUTPATIENT (OUTPATIENT)
Dept: OUTPATIENT SERVICES | Facility: HOSPITAL | Age: 77
LOS: 1 days | End: 2022-04-06

## 2022-04-06 ENCOUNTER — APPOINTMENT (OUTPATIENT)
Dept: INTERNAL MEDICINE | Facility: CLINIC | Age: 77
End: 2022-04-06

## 2022-04-06 VITALS — TEMPERATURE: 96.6 F | HEART RATE: 76 BPM | OXYGEN SATURATION: 99 %

## 2022-04-06 DIAGNOSIS — I09.9 RHEUMATIC HEART DISEASE, UNSPECIFIED: ICD-10-CM

## 2022-04-06 DIAGNOSIS — Z98.89 OTHER SPECIFIED POSTPROCEDURAL STATES: Chronic | ICD-10-CM

## 2022-04-06 DIAGNOSIS — Z90.89 ACQUIRED ABSENCE OF OTHER ORGANS: Chronic | ICD-10-CM

## 2022-04-06 DIAGNOSIS — Z86.69 PERSONAL HISTORY OF OTHER DISEASES OF THE NERVOUS SYSTEM AND SENSE ORGANS: Chronic | ICD-10-CM

## 2022-04-06 DIAGNOSIS — H26.9 UNSPECIFIED CATARACT: Chronic | ICD-10-CM

## 2022-04-06 DIAGNOSIS — I25.10 ATHEROSCLEROTIC HEART DISEASE OF NATIVE CORONARY ARTERY WITHOUT ANGINA PECTORIS: ICD-10-CM

## 2022-04-06 DIAGNOSIS — I48.91 UNSPECIFIED ATRIAL FIBRILLATION: ICD-10-CM

## 2022-04-06 DIAGNOSIS — Z98.890 OTHER SPECIFIED POSTPROCEDURAL STATES: Chronic | ICD-10-CM

## 2022-04-06 DIAGNOSIS — Z79.01 LONG TERM (CURRENT) USE OF ANTICOAGULANTS: ICD-10-CM

## 2022-04-06 DIAGNOSIS — Z95.2 PRESENCE OF PROSTHETIC HEART VALVE: ICD-10-CM

## 2022-04-06 LAB
BILIRUB UR QL STRIP: NEGATIVE
CLARITY UR: NORMAL
COLLECTION METHOD: NORMAL
GLUCOSE UR-MCNC: NORMAL
HCG UR QL: 0.2 EU/DL
HGB UR QL STRIP.AUTO: NORMAL
INR PPP: 2.6 RATIO
KETONES UR-MCNC: NEGATIVE
LEUKOCYTE ESTERASE UR QL STRIP: NORMAL
NITRITE UR QL STRIP: NEGATIVE
PH UR STRIP: 5.5
POCT-PROTHROMBIN TIME: 31.7 SECS
PROT UR STRIP-MCNC: NORMAL
SP GR UR STRIP: 1.02

## 2022-04-06 PROCEDURE — 51798 US URINE CAPACITY MEASURE: CPT

## 2022-04-06 PROCEDURE — 99024 POSTOP FOLLOW-UP VISIT: CPT

## 2022-04-06 RX ORDER — ENOXAPARIN SODIUM 100 MG/ML
80 INJECTION SUBCUTANEOUS
Qty: 20 | Refills: 0 | Status: DISCONTINUED | COMMUNITY
Start: 2022-02-24 | End: 2022-04-06

## 2022-04-06 RX ORDER — PHENAZOPYRIDINE 200 MG/1
200 TABLET, FILM COATED ORAL 3 TIMES DAILY
Qty: 30 | Refills: 0 | Status: DISCONTINUED | COMMUNITY
Start: 2022-03-11 | End: 2022-04-06

## 2022-04-06 NOTE — HISTORY OF PRESENT ILLNESS
[FreeTextEntry1] : Reason for Visit: S/P TURP Pain\par \par The patient is a 76-year-old returning gentleman who underwent TURP on 3/3/2022. Benign pathology. He presents today complaining of frequency, burning, urgency, and dysuria. He states that he was in pain for the last month. He claims that he tried calling many times, but he has not gotten any callbacks from the office. His pain has since improved. He states that "the worse part is over".\par \par He reports that his urinary stream has improved relative to before the procedure. He reports feelings of complete bladder emptying. He sometimes experiences incontinence when his bladder his full. I suggested he take Oxybutynin ER, patient declined. Urine culture negative on 3/30/2022\par \par Post-void residual on bladder scan today was 78 cc. Offered cystoscopy, patient declined. \par \par The patient also reports concerns regarding weaker erections.\par \par Assessment:\par \par Plan: Urine Analysis and Urine Culture. Offered Cystoscopy, patient declined. Offered Oxybutynin, patient declined. Follow-up PRN and in 1 year. I am prescribing Sildenafil 50 mg.

## 2022-04-06 NOTE — ADDENDUM
[FreeTextEntry1] : I, Vinicius Andrew, acted as the sole scribe for Dr. Ashish Manrique on 04/06/2022

## 2022-04-07 ENCOUNTER — RX RENEWAL (OUTPATIENT)
Age: 77
End: 2022-04-07

## 2022-04-12 ENCOUNTER — RESULT REVIEW (OUTPATIENT)
Age: 77
End: 2022-04-12

## 2022-04-12 ENCOUNTER — OUTPATIENT (OUTPATIENT)
Dept: OUTPATIENT SERVICES | Facility: HOSPITAL | Age: 77
LOS: 1 days | End: 2022-04-12

## 2022-04-12 ENCOUNTER — APPOINTMENT (OUTPATIENT)
Dept: NUCLEAR MEDICINE | Facility: HOSPITAL | Age: 77
End: 2022-04-12
Payer: MEDICARE

## 2022-04-12 DIAGNOSIS — H26.9 UNSPECIFIED CATARACT: Chronic | ICD-10-CM

## 2022-04-12 DIAGNOSIS — Z90.89 ACQUIRED ABSENCE OF OTHER ORGANS: Chronic | ICD-10-CM

## 2022-04-12 DIAGNOSIS — Z86.69 PERSONAL HISTORY OF OTHER DISEASES OF THE NERVOUS SYSTEM AND SENSE ORGANS: Chronic | ICD-10-CM

## 2022-04-12 DIAGNOSIS — Z98.890 OTHER SPECIFIED POSTPROCEDURAL STATES: Chronic | ICD-10-CM

## 2022-04-12 DIAGNOSIS — E04.0 NONTOXIC DIFFUSE GOITER: ICD-10-CM

## 2022-04-12 DIAGNOSIS — Z98.89 OTHER SPECIFIED POSTPROCEDURAL STATES: Chronic | ICD-10-CM

## 2022-04-13 ENCOUNTER — APPOINTMENT (OUTPATIENT)
Dept: NUCLEAR MEDICINE | Facility: HOSPITAL | Age: 77
End: 2022-04-13

## 2022-04-13 ENCOUNTER — NON-APPOINTMENT (OUTPATIENT)
Age: 77
End: 2022-04-13

## 2022-04-13 PROCEDURE — 78014 THYROID IMAGING W/BLOOD FLOW: CPT | Mod: 26

## 2022-04-20 ENCOUNTER — APPOINTMENT (OUTPATIENT)
Dept: INTERNAL MEDICINE | Facility: CLINIC | Age: 77
End: 2022-04-20
Payer: MEDICARE

## 2022-04-20 ENCOUNTER — OUTPATIENT (OUTPATIENT)
Dept: OUTPATIENT SERVICES | Facility: HOSPITAL | Age: 77
LOS: 1 days | End: 2022-04-20

## 2022-04-20 VITALS
WEIGHT: 165 LBS | TEMPERATURE: 97.4 F | SYSTOLIC BLOOD PRESSURE: 140 MMHG | BODY MASS INDEX: 24.44 KG/M2 | HEART RATE: 66 BPM | DIASTOLIC BLOOD PRESSURE: 80 MMHG | HEIGHT: 69 IN | RESPIRATION RATE: 15 BRPM | OXYGEN SATURATION: 99 %

## 2022-04-20 DIAGNOSIS — Z98.890 OTHER SPECIFIED POSTPROCEDURAL STATES: Chronic | ICD-10-CM

## 2022-04-20 DIAGNOSIS — H26.9 UNSPECIFIED CATARACT: Chronic | ICD-10-CM

## 2022-04-20 DIAGNOSIS — Z86.69 PERSONAL HISTORY OF OTHER DISEASES OF THE NERVOUS SYSTEM AND SENSE ORGANS: Chronic | ICD-10-CM

## 2022-04-20 DIAGNOSIS — Z98.89 OTHER SPECIFIED POSTPROCEDURAL STATES: Chronic | ICD-10-CM

## 2022-04-20 DIAGNOSIS — Z90.89 ACQUIRED ABSENCE OF OTHER ORGANS: Chronic | ICD-10-CM

## 2022-04-20 PROCEDURE — 99214 OFFICE O/P EST MOD 30 MIN: CPT | Mod: GC

## 2022-04-20 RX ORDER — BICALUTAMIDE 50 MG/1
50 TABLET ORAL DAILY
Qty: 30 | Refills: 11 | Status: DISCONTINUED | COMMUNITY
Start: 2021-10-15 | End: 2022-04-20

## 2022-04-21 DIAGNOSIS — E11.9 TYPE 2 DIABETES MELLITUS WITHOUT COMPLICATIONS: ICD-10-CM

## 2022-04-21 DIAGNOSIS — R74.01 ELEVATION OF LEVELS OF LIVER TRANSAMINASE LEVELS: ICD-10-CM

## 2022-04-21 DIAGNOSIS — I10 ESSENTIAL (PRIMARY) HYPERTENSION: ICD-10-CM

## 2022-04-21 DIAGNOSIS — E78.5 HYPERLIPIDEMIA, UNSPECIFIED: ICD-10-CM

## 2022-04-21 DIAGNOSIS — E04.2 NONTOXIC MULTINODULAR GOITER: ICD-10-CM

## 2022-04-21 DIAGNOSIS — I48.91 UNSPECIFIED ATRIAL FIBRILLATION: ICD-10-CM

## 2022-04-21 DIAGNOSIS — N17.9 ACUTE KIDNEY FAILURE, UNSPECIFIED: ICD-10-CM

## 2022-04-21 RX ORDER — NITROFURANTOIN MACROCRYSTALS 100 MG/1
100 CAPSULE ORAL TWICE DAILY
Qty: 10 | Refills: 0 | Status: DISCONTINUED | COMMUNITY
Start: 2022-03-25 | End: 2022-04-21

## 2022-04-21 RX ORDER — NITROFURANTOIN (MONOHYDRATE/MACROCRYSTALS) 25; 75 MG/1; MG/1
100 CAPSULE ORAL
Qty: 10 | Refills: 0 | Status: DISCONTINUED | COMMUNITY
Start: 2022-03-16 | End: 2022-04-21

## 2022-04-21 NOTE — PHYSICAL EXAM
[No Acute Distress] : no acute distress [Well Nourished] : well nourished [Well Developed] : well developed [Well-Appearing] : well-appearing [Normal Sclera/Conjunctiva] : normal sclera/conjunctiva [EOMI] : extraocular movements intact [Normal Outer Ear/Nose] : the outer ears and nose were normal in appearance [Normal Oropharynx] : the oropharynx was normal [No JVD] : no jugular venous distention [No Lymphadenopathy] : no lymphadenopathy [No Respiratory Distress] : no respiratory distress  [No Accessory Muscle Use] : no accessory muscle use [Clear to Auscultation] : lungs were clear to auscultation bilaterally [Normal S1, S2] : normal S1 and S2 [Soft] : abdomen soft [Non Tender] : non-tender [Non-distended] : non-distended [No Joint Swelling] : no joint swelling [Grossly Normal Strength/Tone] : grossly normal strength/tone [Coordination Grossly Intact] : coordination grossly intact [No Focal Deficits] : no focal deficits [Normal Gait] : normal gait [Normal Affect] : the affect was normal [Normal Insight/Judgement] : insight and judgment were intact [de-identified] : prominent large thyroid tissue right neck, soft, non-tender.  [de-identified] : strength 5/5 in UE and LE; sensation intact

## 2022-04-21 NOTE — REVIEW OF SYSTEMS
[Fever] : no fever [Chills] : no chills [Fatigue] : no fatigue [Night Sweats] : no night sweats [Recent Change In Weight] : ~T no recent weight change [Discharge] : no discharge [Redness] : no redness [Vision Problems] : no vision problems [Hearing Loss] : no hearing loss [Nasal Discharge] : no nasal discharge [Chest Pain] : no chest pain [Palpitations] : no palpitations [Claudication] : no  leg claudication [Lower Ext Edema] : no lower extremity edema [Shortness Of Breath] : no shortness of breath [Wheezing] : no wheezing [Cough] : no cough [Dyspnea on Exertion] : not dyspnea on exertion [Abdominal Pain] : no abdominal pain [Nausea] : no nausea [Constipation] : no constipation [Vomiting] : no vomiting [Dysuria] : no dysuria [Incontinence] : no incontinence [Hesitancy] : no hesitancy [Hematuria] : no hematuria [Frequency] : no frequency [Itching] : no itching [Skin Rash] : no skin rash [Headache] : no headache [Dizziness] : no dizziness [Fainting] : no fainting [Insomnia] : no insomnia [Anxiety] : no anxiety [Easy Bleeding] : no easy bleeding [Easy Bruising] : no easy bruising [FreeTextEntry9] : + low back pain

## 2022-04-21 NOTE — HISTORY OF PRESENT ILLNESS
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 \par \par Pt c/o of low back pain - reports 2/10, dull/achy pain, intermittent pain that began after his TURP procedure in 3/22. No trauma. No falls. Denies fever/chills. No bladder or bowel incontinence. Denies radiation of pain. \par \par Was previously complaining of burning sensation while urinating which began after TURP s/p 5 days of nitrofurantoin--> reports that this has improved significantly since last visit. \par \par HTN: was advised to stop taking lisinopril due to unrelenting cough. After lisinopril was discontinued, cough stopped. Started on losartan. BP elevated today.\par \par CAD s/p CABG: follows with cardiology. No acute issues at this time. Can walk ~30 mins daily and can walk 5 flights of stairs. Denies orthopnea, PND. Reports good adherence with medications. \par \par RHD s/p MVR: INR not at goal. Denies melena, hematochezia, hematuria. \par \par T2DM: Notes FSG: >160's consistently. No hypoglycemic symptoms. Denies sweating, loss of consciousness. \par \par A. fib: rate-controlled\par \par Thyroid Nodules: underwent NM scan that resulted with abnormal has upcoming NM scan on March 29th. Denies unintentional weight loss, palpitations, heat/cold intolerance. \par

## 2022-04-21 NOTE — ASSESSMENT
[FreeTextEntry1] : #HCM\par -Colonoscopy: aged out Aug 2021\par -Pneumo, Tdap, Zoster UTD \par -COVID-19: received all 3 doses \par \par Case discussed with Dr. Seay\par RTC in 1 month BP check \par \par Danica Yoder. PGY-1 \par [employer] [facility] \par

## 2022-04-28 ENCOUNTER — NON-APPOINTMENT (OUTPATIENT)
Age: 77
End: 2022-04-28

## 2022-05-04 ENCOUNTER — OUTPATIENT (OUTPATIENT)
Dept: OUTPATIENT SERVICES | Facility: HOSPITAL | Age: 77
LOS: 1 days | End: 2022-05-04

## 2022-05-04 ENCOUNTER — APPOINTMENT (OUTPATIENT)
Dept: INTERNAL MEDICINE | Facility: CLINIC | Age: 77
End: 2022-05-04

## 2022-05-04 VITALS — HEART RATE: 83 BPM | TEMPERATURE: 96.1 F | OXYGEN SATURATION: 99 %

## 2022-05-04 DIAGNOSIS — I48.91 UNSPECIFIED ATRIAL FIBRILLATION: ICD-10-CM

## 2022-05-04 DIAGNOSIS — Z90.89 ACQUIRED ABSENCE OF OTHER ORGANS: Chronic | ICD-10-CM

## 2022-05-04 DIAGNOSIS — I09.9 RHEUMATIC HEART DISEASE, UNSPECIFIED: ICD-10-CM

## 2022-05-04 DIAGNOSIS — Z86.69 PERSONAL HISTORY OF OTHER DISEASES OF THE NERVOUS SYSTEM AND SENSE ORGANS: Chronic | ICD-10-CM

## 2022-05-04 DIAGNOSIS — H26.9 UNSPECIFIED CATARACT: Chronic | ICD-10-CM

## 2022-05-04 DIAGNOSIS — Z79.01 LONG TERM (CURRENT) USE OF ANTICOAGULANTS: ICD-10-CM

## 2022-05-04 DIAGNOSIS — Z98.890 OTHER SPECIFIED POSTPROCEDURAL STATES: Chronic | ICD-10-CM

## 2022-05-04 DIAGNOSIS — Z95.2 PRESENCE OF PROSTHETIC HEART VALVE: ICD-10-CM

## 2022-05-04 DIAGNOSIS — I25.10 ATHEROSCLEROTIC HEART DISEASE OF NATIVE CORONARY ARTERY WITHOUT ANGINA PECTORIS: ICD-10-CM

## 2022-05-04 DIAGNOSIS — Z98.89 OTHER SPECIFIED POSTPROCEDURAL STATES: Chronic | ICD-10-CM

## 2022-05-14 LAB
INR PPP: 2.3 RATIO
INR PPP: 2.6 RATIO
POCT-PROTHROMBIN TIME: 27.1 SECS
POCT-PROTHROMBIN TIME: 30.6 SECS
QUALITY CONTROL: YES

## 2022-05-18 ENCOUNTER — OUTPATIENT (OUTPATIENT)
Dept: OUTPATIENT SERVICES | Facility: HOSPITAL | Age: 77
LOS: 1 days | End: 2022-05-18

## 2022-05-18 ENCOUNTER — APPOINTMENT (OUTPATIENT)
Dept: INTERNAL MEDICINE | Facility: CLINIC | Age: 77
End: 2022-05-18

## 2022-05-18 DIAGNOSIS — Z90.89 ACQUIRED ABSENCE OF OTHER ORGANS: Chronic | ICD-10-CM

## 2022-05-18 DIAGNOSIS — I48.91 UNSPECIFIED ATRIAL FIBRILLATION: ICD-10-CM

## 2022-05-18 DIAGNOSIS — I09.9 RHEUMATIC HEART DISEASE, UNSPECIFIED: ICD-10-CM

## 2022-05-18 DIAGNOSIS — Z98.89 OTHER SPECIFIED POSTPROCEDURAL STATES: Chronic | ICD-10-CM

## 2022-05-18 DIAGNOSIS — H26.9 UNSPECIFIED CATARACT: Chronic | ICD-10-CM

## 2022-05-18 DIAGNOSIS — Z86.69 PERSONAL HISTORY OF OTHER DISEASES OF THE NERVOUS SYSTEM AND SENSE ORGANS: Chronic | ICD-10-CM

## 2022-05-18 DIAGNOSIS — Z98.890 OTHER SPECIFIED POSTPROCEDURAL STATES: Chronic | ICD-10-CM

## 2022-05-18 DIAGNOSIS — I25.10 ATHEROSCLEROTIC HEART DISEASE OF NATIVE CORONARY ARTERY WITHOUT ANGINA PECTORIS: ICD-10-CM

## 2022-05-18 DIAGNOSIS — Z95.2 PRESENCE OF PROSTHETIC HEART VALVE: ICD-10-CM

## 2022-05-18 DIAGNOSIS — Z79.01 LONG TERM (CURRENT) USE OF ANTICOAGULANTS: ICD-10-CM

## 2022-05-23 ENCOUNTER — APPOINTMENT (OUTPATIENT)
Dept: PODIATRY | Facility: HOSPITAL | Age: 77
End: 2022-05-23

## 2022-05-24 ENCOUNTER — APPOINTMENT (OUTPATIENT)
Dept: ENDOCRINOLOGY | Facility: CLINIC | Age: 77
End: 2022-05-24
Payer: MEDICARE

## 2022-05-24 ENCOUNTER — OUTPATIENT (OUTPATIENT)
Dept: OUTPATIENT SERVICES | Facility: HOSPITAL | Age: 77
LOS: 1 days | End: 2022-05-24

## 2022-05-24 VITALS — DIASTOLIC BLOOD PRESSURE: 82 MMHG | SYSTOLIC BLOOD PRESSURE: 130 MMHG

## 2022-05-24 VITALS
WEIGHT: 167 LBS | HEART RATE: 69 BPM | TEMPERATURE: 97.1 F | BODY MASS INDEX: 24.73 KG/M2 | DIASTOLIC BLOOD PRESSURE: 88 MMHG | SYSTOLIC BLOOD PRESSURE: 160 MMHG | OXYGEN SATURATION: 99 % | HEIGHT: 69 IN

## 2022-05-24 DIAGNOSIS — H26.9 UNSPECIFIED CATARACT: Chronic | ICD-10-CM

## 2022-05-24 DIAGNOSIS — Z98.89 OTHER SPECIFIED POSTPROCEDURAL STATES: Chronic | ICD-10-CM

## 2022-05-24 DIAGNOSIS — Z86.69 PERSONAL HISTORY OF OTHER DISEASES OF THE NERVOUS SYSTEM AND SENSE ORGANS: Chronic | ICD-10-CM

## 2022-05-24 DIAGNOSIS — Z90.89 ACQUIRED ABSENCE OF OTHER ORGANS: Chronic | ICD-10-CM

## 2022-05-24 DIAGNOSIS — Z98.890 OTHER SPECIFIED POSTPROCEDURAL STATES: Chronic | ICD-10-CM

## 2022-05-24 PROCEDURE — 99214 OFFICE O/P EST MOD 30 MIN: CPT | Mod: GC

## 2022-05-24 NOTE — REASON FOR VISIT
[Procedure: _________] : a [unfilled] procedure visit [DM Type 2] : DM Type 2 [Follow - Up] : a follow-up visit

## 2022-05-25 ENCOUNTER — APPOINTMENT (OUTPATIENT)
Dept: INTERNAL MEDICINE | Facility: CLINIC | Age: 77
End: 2022-05-25
Payer: MEDICARE

## 2022-05-25 ENCOUNTER — OUTPATIENT (OUTPATIENT)
Dept: OUTPATIENT SERVICES | Facility: HOSPITAL | Age: 77
LOS: 1 days | End: 2022-05-25

## 2022-05-25 VITALS
HEIGHT: 69 IN | RESPIRATION RATE: 16 BRPM | WEIGHT: 165 LBS | BODY MASS INDEX: 24.44 KG/M2 | TEMPERATURE: 97.3 F | SYSTOLIC BLOOD PRESSURE: 124 MMHG | OXYGEN SATURATION: 99 % | DIASTOLIC BLOOD PRESSURE: 66 MMHG | HEART RATE: 74 BPM

## 2022-05-25 DIAGNOSIS — R74.01 ELEVATION OF LEVELS OF LIVER TRANSAMINASE LEVELS: ICD-10-CM

## 2022-05-25 DIAGNOSIS — Z98.89 OTHER SPECIFIED POSTPROCEDURAL STATES: Chronic | ICD-10-CM

## 2022-05-25 DIAGNOSIS — E04.2 NONTOXIC MULTINODULAR GOITER: ICD-10-CM

## 2022-05-25 DIAGNOSIS — Z98.890 OTHER SPECIFIED POSTPROCEDURAL STATES: Chronic | ICD-10-CM

## 2022-05-25 DIAGNOSIS — L82.1 OTHER SEBORRHEIC KERATOSIS: ICD-10-CM

## 2022-05-25 DIAGNOSIS — H26.9 UNSPECIFIED CATARACT: Chronic | ICD-10-CM

## 2022-05-25 DIAGNOSIS — E11.9 TYPE 2 DIABETES MELLITUS WITHOUT COMPLICATIONS: ICD-10-CM

## 2022-05-25 DIAGNOSIS — N18.30 CHRONIC KIDNEY DISEASE, STAGE 3 UNSPECIFIED: ICD-10-CM

## 2022-05-25 DIAGNOSIS — E78.5 HYPERLIPIDEMIA, UNSPECIFIED: ICD-10-CM

## 2022-05-25 DIAGNOSIS — Z86.69 PERSONAL HISTORY OF OTHER DISEASES OF THE NERVOUS SYSTEM AND SENSE ORGANS: Chronic | ICD-10-CM

## 2022-05-25 DIAGNOSIS — I48.91 UNSPECIFIED ATRIAL FIBRILLATION: ICD-10-CM

## 2022-05-25 DIAGNOSIS — I10 ESSENTIAL (PRIMARY) HYPERTENSION: ICD-10-CM

## 2022-05-25 DIAGNOSIS — Z90.89 ACQUIRED ABSENCE OF OTHER ORGANS: Chronic | ICD-10-CM

## 2022-05-25 PROCEDURE — 99214 OFFICE O/P EST MOD 30 MIN: CPT | Mod: GC

## 2022-05-25 RX ORDER — WARFARIN 4 MG/1
4 TABLET ORAL
Qty: 90 | Refills: 3 | Status: DISCONTINUED | COMMUNITY
Start: 2019-11-08 | End: 2022-05-25

## 2022-05-25 NOTE — HISTORY OF PRESENT ILLNESS
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 \par \par Complaining of left ear lesion, described as scaly that was noted 5 months ago. Denies growth of the lesion or change in color since it was initially noted. Denies pain. Differentials include seborrheic keratosis vs actinic keratosis \par \par #T2DM: States FSG ranges between 140. Reports good compliance with glipizide 10 BID and trulicity. Pt was advised to start on SGLT2 inhibitor given diabetic nephropathy and CV history however pt is traveling to [facility] next week for 1 month and wants to wait until next month prior to initiation. No hypoglycemic symptoms. Denies sweating, loss of consciousness. \par \par #HTN: Losartan increased at last visit. BP at home ranges between 120-130/80. BP wnl today. Unable to tolerate lisinopril due to unrelenting cough. \par \par #CAD s/p CABG: follows with cardiology. No acute issues at this time. Can walk ~30 mins daily and can walk 5 flights of stairs. Denies orthopnea, PND. Reports good adherence with medications. \par \par #RHD s/p MVR: INR at goal last week. Denies melena, hematochezia, hematuria. Denies\par \par #A. fib: rate-controlled. Denies palpitations. \par \par #Thyroid Nodules: underwent NM scan that showed abnormal uptake. Underwent FNA on 5/24 by endocrine team. Denies unintentional weight loss, palpitations, heat/cold intolerance. \par

## 2022-05-25 NOTE — H&P PST ADULT - ALLERGIC/IMMUNOLOGIC
Upon review of the In Basket request and the patient's chart, initial outreach has been made via fax, please see Contacts section for details       Thank you  Victoriano Kil negative

## 2022-05-25 NOTE — PHYSICAL EXAM
[No Acute Distress] : no acute distress [Well Nourished] : well nourished [Well Developed] : well developed [Well-Appearing] : well-appearing [Normal Sclera/Conjunctiva] : normal sclera/conjunctiva [EOMI] : extraocular movements intact [No JVD] : no jugular venous distention [No Lymphadenopathy] : no lymphadenopathy [No Respiratory Distress] : no respiratory distress  [No Accessory Muscle Use] : no accessory muscle use [Clear to Auscultation] : lungs were clear to auscultation bilaterally [Normal S1, S2] : normal S1 and S2 [Soft] : abdomen soft [Non Tender] : non-tender [Non-distended] : non-distended [No Joint Swelling] : no joint swelling [Grossly Normal Strength/Tone] : grossly normal strength/tone [Coordination Grossly Intact] : coordination grossly intact [No Focal Deficits] : no focal deficits [Normal Gait] : normal gait [Normal Affect] : the affect was normal [Normal Insight/Judgement] : insight and judgment were intact [Comprehensive Foot Exam Normal] : Right and left foot were examined and both feet are normal. No ulcers in either foot. Toes are normal and with full ROM.  Normal tactile sensation with monofilament testing throughout both feet [de-identified] : + left ear anti-tragus area papule noted with hyperkeratosis (<1cm), regular border, symmetric [de-identified] : prominent large thyroid tissue right neck, soft, non-tender.  [de-identified] : strength 5/5 in UE and LE; sensation intact

## 2022-05-25 NOTE — REVIEW OF SYSTEMS
[Fever] : no fever [Chills] : no chills [Fatigue] : no fatigue [Night Sweats] : no night sweats [Recent Change In Weight] : ~T no recent weight change [Discharge] : no discharge [Redness] : no redness [Vision Problems] : no vision problems [Earache] : no earache [Hearing Loss] : no hearing loss [Nasal Discharge] : no nasal discharge [Chest Pain] : no chest pain [Palpitations] : no palpitations [Claudication] : no  leg claudication [Lower Ext Edema] : no lower extremity edema [Shortness Of Breath] : no shortness of breath [Wheezing] : no wheezing [Cough] : no cough [Dyspnea on Exertion] : not dyspnea on exertion [Abdominal Pain] : no abdominal pain [Nausea] : no nausea [Constipation] : no constipation [Vomiting] : no vomiting [Dysuria] : no dysuria [Incontinence] : no incontinence [Hesitancy] : no hesitancy [Hematuria] : no hematuria [Frequency] : no frequency [Itching] : no itching [Skin Rash] : no skin rash [Headache] : no headache [Dizziness] : no dizziness [Fainting] : no fainting [Insomnia] : no insomnia [Anxiety] : no anxiety [Easy Bleeding] : no easy bleeding [Easy Bruising] : no easy bruising

## 2022-05-25 NOTE — ASSESSMENT
[FreeTextEntry1] : #HCM\par -Colonoscopy: aged out Aug 2021\par -Pneumo, Tdap, Zoster UTD \par -COVID-19: received all 3 doses \par \par Case discussed with Dr. Seay\par RTC in 1 month for initiation of SGLT2 inhibitor \par \par Danica Yoder, PGY-1 \par [facility]

## 2022-05-26 DIAGNOSIS — E78.5 HYPERLIPIDEMIA, UNSPECIFIED: ICD-10-CM

## 2022-05-26 DIAGNOSIS — G45.9 TRANSIENT CEREBRAL ISCHEMIC ATTACK, UNSPECIFIED: ICD-10-CM

## 2022-05-26 DIAGNOSIS — E11.40 TYPE 2 DIABETES MELLITUS WITH DIABETIC NEUROPATHY, UNSPECIFIED: ICD-10-CM

## 2022-05-26 DIAGNOSIS — R80.9 PROTEINURIA, UNSPECIFIED: ICD-10-CM

## 2022-05-26 DIAGNOSIS — I10 ESSENTIAL (PRIMARY) HYPERTENSION: ICD-10-CM

## 2022-05-26 DIAGNOSIS — E04.2 NONTOXIC MULTINODULAR GOITER: ICD-10-CM

## 2022-05-27 ENCOUNTER — NON-APPOINTMENT (OUTPATIENT)
Age: 77
End: 2022-05-27

## 2022-05-27 LAB
ALBUMIN SERPL ELPH-MCNC: 4.3 G/DL
ALP BLD-CCNC: 88 U/L
ALT SERPL-CCNC: 17 U/L
ANION GAP SERPL CALC-SCNC: 15 MMOL/L
AST SERPL-CCNC: 16 U/L
BILIRUB SERPL-MCNC: 1.8 MG/DL
BUN SERPL-MCNC: 22 MG/DL
CALCIUM SERPL-MCNC: 9.6 MG/DL
CHLORIDE SERPL-SCNC: 104 MMOL/L
CHOLEST SERPL-MCNC: 158 MG/DL
CO2 SERPL-SCNC: 23 MMOL/L
CREAT SERPL-MCNC: 1.27 MG/DL
EGFR: 59 ML/MIN/1.73M2
ESTIMATED AVERAGE GLUCOSE: 151 MG/DL
FNA, THYROID: NORMAL
GLUCOSE SERPL-MCNC: 164 MG/DL
HBA1C MFR BLD HPLC: 6.9 %
HDLC SERPL-MCNC: 32 MG/DL
LDLC SERPL CALC-MCNC: 98 MG/DL
NONHDLC SERPL-MCNC: 126 MG/DL
POTASSIUM SERPL-SCNC: 4.3 MMOL/L
PROT SERPL-MCNC: 7.2 G/DL
SODIUM SERPL-SCNC: 141 MMOL/L
TRIGL SERPL-MCNC: 138 MG/DL

## 2022-05-29 NOTE — HISTORY OF PRESENT ILLNESS
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 thyroid nodule. \par \par Since last visit in 11/2021, patient underwent TURP w/ urology in March 2022. Received blood transfusion during his hospitalization for the procedure. Feeling well now. Reports that he is currently on Glipizide 10mg BID & Trulicity 1.5mg sc qWeekly. Tolerating well without hypoglycemia. Reports glipizide was reduced by PCP but sugars were trending high at home so he self increased to twice daily. Recent A1c 6.7% possibly falsely low due to transfusions/recent anemia? Denies hypoglycemia & reported FS BG levels not consistent w/ this.\par \par Diabetes History\par *Diagnosed years ago, unsure exactly when (A1c in 2014 was 6.6%)\par In the past, has tried Metformin but did not tolerate due to GI side effects. Januvia discontinued after initial endocrine visit w/ initiation of Trulicity.  \par \par *Current Regimen: Reports compliance w/ Glipizide 10mg BID & Trulicity 1.5mg sc qweekly \par \par *Checks FS BG levels AM fasting several days out of the week. Lately FS BG levels 130s-150s, was in 170s on daily glipizide. \par \par *Diet - Reports he has been trying to eat a more carb consistent diet. Incorporating more vegetables & protein. \par \par *Complications: \par -Micro: (+) nephropathy, on ACEi; (-) neuropathy or retinopathy, Following closely w/ ophthalmology. No diabetic retinopathy. Has hx of retinal detachment in left eye. Chronic blurry vision. \par -Macro: No MI. (+) "minor stroke" in past\par \par *Comorbidities\par HLD - On Atorvastatin 40 mg \par Nephropathy - Elevated Microalb/Cr, on losartan 50mg daily \par \par Also has thyroid nodules. Noted that he had a "thyroid problem" as a child that stopped him from growing. He was given medications and the problem resolved. He is not sure what medication or thyroid problem he had. \par He had a thyroid U/S in 2019 which showed\par Right Lobe: 8 x 4.3 x 3.8 cm. Heterogeneous mid nodule 3.7 x 2.9 x 3.1 cm without suspicious features.\par Left Lobe: 8.2 x 4.7 x 3.6 cm. Mildly echogenic mid nodule 4 x 3.6 x 3.8 cm without suspicious features.\par Never had an FNA\par Clinically & biochemically euthyroid. \par Repeat US Thyroid from 12/2021 w/ interval increase in size of b/l nodules. NM thyroid uptake/scan w/ 22% uptake, hyperfunction L. nodule & hypofunction R. nodule. Planned for biopsy of R. nodule today. Denies compressive symptoms at this time.

## 2022-05-29 NOTE — PHYSICAL EXAM
[Alert] : alert [Well Nourished] : well nourished [Normal Sclera/Conjunctiva] : normal sclera/conjunctiva [No Respiratory Distress] : no respiratory distress [No Accessory Muscle Use] : no accessory muscle use [Clear to Auscultation] : lungs were clear to auscultation bilaterally [Normal PMI] : the apical impulse was normal [Normal S1, S2] : normal S1 and S2 [Normal Rate] : heart rate was normal [Normal Bowel Sounds] : normal bowel sounds [Soft] : abdomen soft [Oriented x3] : oriented to person, place, and time [No Spinal Tenderness] : no spinal tenderness [Normal Gait] : normal gait [No Rash] : no rash [de-identified] : +grossly enlarged thyroid w/ large palpable nodules b/l, nontender

## 2022-05-29 NOTE — REVIEW OF SYSTEMS
[Negative] : Neurological [Fatigue] : no fatigue [Decreased Appetite] : appetite not decreased [Recent Weight Gain (___ Lbs)] : no recent weight gain [Recent Weight Loss (___ Lbs)] : no recent weight loss [Dysphagia] : no dysphagia [Neck Pain] : no neck pain [Dysphonia] : no dysphonia [FreeTextEntry3] : left eye vision loss from cataract surgery complications; right eye with blurry vision/orientation difficulties [FreeTextEntry8] : hematuria

## 2022-05-29 NOTE — ASSESSMENT
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, T2DM, Afib, Rosie Thompson tears and esophageal ulcers here for intial evaluation for T2DM.\par \par #T2DM w/ complications, controlled \par -Continue Trulicity 1.5mg SC qweekly and Glipizide 10 mg ER BID\par -Patient encouraged to check FS BG levels at least 2x daily & to keep a log to bring along to next visit\par -Hypoglycemia symptoms & management discussed w/ patient - denies recent episodes of hypoglycemia \par -Carb consistent diet \par -c/w outpatient optho follow up - no retinopathy \par -c/w Losartan, +nephropathy\par -Planning to travel to [facility] for 1 month - will mail patient a letter to take trulicity pens onboard his flight & to allow refrigeration of pens on flight. \par \par #HTN\par +nephropathy\par - c/w Losartan 50mg daily \par \par #HLD\par  in 12/2021, previously 40\par -Continue Atorvastatin 40 mg daily - encouraged compliance \par \par #Thyroid Nodules\par - Palpable thyroid nodules noted on exam, with U/S in 2019 showing two large thyroid nodules \par - Repeat Thyroid US 12/2021 w/ large b/l nodules, increased in size compared to prior \par - Recent TFTs wnl & denies compressive symptoms \par - NM uptake & scan 4/2022 w/ hyperfunction L. nodule, hypofunctioning R. nodule, 22% uptake \par - Planned for FNA bx of R. nodule today \par \par Discussed with  \par \par RTC in 3 months for f/u\par

## 2022-05-29 NOTE — ASSESSMENT
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, T2DM, Afib, Rosie Thompson tears and esophageal ulcers here for intial evaluation for T2DM.\par \par #T2DM w/ complications, controlled \par -Continue Trulicity 1.5mg SC qweekly and Glipizide 10 mg ER BID\par -Patient encouraged to check FS BG levels at least 2x daily & to keep a log to bring along to next visit\par -Hypoglycemia symptoms & management discussed w/ patient - denies recent episodes of hypoglycemia \par -Carb consistent diet \par -c/w outpatient optho follow up - no retinopathy \par -c/w Losartan, +nephropathy\par -Planning to travel to [facility] for 1 month - will mail patient a letter to take trulicity pens onboard his flight & to allow refrigeration of pens on flight. \par \par #HTN\par +nephropathy\par - c/w Losartan 50mg daily \par \par #HLD\par  in 12/2021, previously 40\par -Continue Atorvastatin 40 mg daily - encouraged compliance \par \par #Thyroid Nodules\par - Palpable thyroid nodules noted on exam, with U/S in 2019 showing two large thyroid nodules \par - Repeat Thyroid US 12/2021 w/ large b/l nodules, increased in size compared to prior \par - Recent TFTs wnl & denies compressive symptoms \par - NM uptake & scan 4/2022 w/ hyperfunction L. nodule, hypofunctioning R. nodule, 22% uptake \par - Planned for FNA bx of R. nodule today \par \par Discussed with  \par \par RTC in 3 months for f/u\par

## 2022-05-29 NOTE — PROCEDURE
[Fine Needle Aspiration] : Fine needle aspiration ~T ~C was performed. [Risks] : risks [Patient] : the patient [Benefits] : benefits [Consent Obtained] : Written consent was obtained prior to the procedure and is detailed in the patient's record [Lidocaine Cream] : lidocaine cream [Supine] : The patient was placed in the supine position with the neck extended as tolerated. [Alcohol] : with alcohol [25 gauge 1 inch] : A 25 gauge 1 inch needle was used [2 Passes] : 2 passes were made through the mass [Ultrasonic Guidance] : ultrasound guidance was employed [Sent to Histology] : The specimens were prepared in the usual manner and sent to histology. [Afirma] : Afirma [Tolerated Well] : the patient tolerated the procedure well [Vital Signs Stable] : the vital signs were stable [Hemostasis] : hemostasis was assured and the patient was discharged in satisfactory condition [No Complications] : There were no complications [de-identified] : R. thyroid nodule, increased in size

## 2022-05-29 NOTE — PHYSICAL EXAM
[Alert] : alert [Well Nourished] : well nourished [Normal Sclera/Conjunctiva] : normal sclera/conjunctiva [No Respiratory Distress] : no respiratory distress [No Accessory Muscle Use] : no accessory muscle use [Clear to Auscultation] : lungs were clear to auscultation bilaterally [Normal PMI] : the apical impulse was normal [Normal S1, S2] : normal S1 and S2 [Normal Rate] : heart rate was normal [Normal Bowel Sounds] : normal bowel sounds [Soft] : abdomen soft [Oriented x3] : oriented to person, place, and time [No Spinal Tenderness] : no spinal tenderness [Normal Gait] : normal gait [No Rash] : no rash [de-identified] : +grossly enlarged thyroid w/ large palpable nodules b/l, nontender

## 2022-05-29 NOTE — PROCEDURE
[Fine Needle Aspiration] : Fine needle aspiration ~T ~C was performed. [Risks] : risks [Patient] : the patient [Benefits] : benefits [Consent Obtained] : Written consent was obtained prior to the procedure and is detailed in the patient's record [Lidocaine Cream] : lidocaine cream [Supine] : The patient was placed in the supine position with the neck extended as tolerated. [Alcohol] : with alcohol [25 gauge 1 inch] : A 25 gauge 1 inch needle was used [2 Passes] : 2 passes were made through the mass [Ultrasonic Guidance] : ultrasound guidance was employed [Sent to Histology] : The specimens were prepared in the usual manner and sent to histology. [Afirma] : Afirma [Tolerated Well] : the patient tolerated the procedure well [Vital Signs Stable] : the vital signs were stable [Hemostasis] : hemostasis was assured and the patient was discharged in satisfactory condition [No Complications] : There were no complications [de-identified] : R. thyroid nodule, increased in size

## 2022-06-01 ENCOUNTER — APPOINTMENT (OUTPATIENT)
Dept: INTERNAL MEDICINE | Facility: CLINIC | Age: 77
End: 2022-06-01

## 2022-06-01 ENCOUNTER — OUTPATIENT (OUTPATIENT)
Dept: OUTPATIENT SERVICES | Facility: HOSPITAL | Age: 77
LOS: 1 days | End: 2022-06-01

## 2022-06-01 DIAGNOSIS — H26.9 UNSPECIFIED CATARACT: Chronic | ICD-10-CM

## 2022-06-01 DIAGNOSIS — Z79.01 LONG TERM (CURRENT) USE OF ANTICOAGULANTS: ICD-10-CM

## 2022-06-01 DIAGNOSIS — Z86.69 PERSONAL HISTORY OF OTHER DISEASES OF THE NERVOUS SYSTEM AND SENSE ORGANS: Chronic | ICD-10-CM

## 2022-06-01 DIAGNOSIS — I25.10 ATHEROSCLEROTIC HEART DISEASE OF NATIVE CORONARY ARTERY WITHOUT ANGINA PECTORIS: ICD-10-CM

## 2022-06-01 DIAGNOSIS — I48.91 UNSPECIFIED ATRIAL FIBRILLATION: ICD-10-CM

## 2022-06-01 DIAGNOSIS — I09.9 RHEUMATIC HEART DISEASE, UNSPECIFIED: ICD-10-CM

## 2022-06-01 DIAGNOSIS — Z95.2 PRESENCE OF PROSTHETIC HEART VALVE: ICD-10-CM

## 2022-06-01 DIAGNOSIS — Z98.890 OTHER SPECIFIED POSTPROCEDURAL STATES: Chronic | ICD-10-CM

## 2022-06-01 DIAGNOSIS — Z98.89 OTHER SPECIFIED POSTPROCEDURAL STATES: Chronic | ICD-10-CM

## 2022-06-01 DIAGNOSIS — Z90.89 ACQUIRED ABSENCE OF OTHER ORGANS: Chronic | ICD-10-CM

## 2022-06-01 LAB
INR PPP: 2.3 RATIO
POCT-PROTHROMBIN TIME: 27.3 SECS

## 2022-07-08 ENCOUNTER — EMERGENCY (EMERGENCY)
Facility: HOSPITAL | Age: 77
LOS: 1 days | Discharge: HOME | End: 2022-07-08
Attending: EMERGENCY MEDICINE | Admitting: EMERGENCY MEDICINE

## 2022-07-08 VITALS
TEMPERATURE: 98 F | RESPIRATION RATE: 17 BRPM | DIASTOLIC BLOOD PRESSURE: 67 MMHG | SYSTOLIC BLOOD PRESSURE: 108 MMHG | OXYGEN SATURATION: 100 % | HEART RATE: 76 BPM

## 2022-07-08 VITALS
DIASTOLIC BLOOD PRESSURE: 91 MMHG | TEMPERATURE: 97 F | OXYGEN SATURATION: 100 % | HEIGHT: 69 IN | SYSTOLIC BLOOD PRESSURE: 143 MMHG | HEART RATE: 85 BPM | RESPIRATION RATE: 16 BRPM

## 2022-07-08 DIAGNOSIS — Z98.890 OTHER SPECIFIED POSTPROCEDURAL STATES: Chronic | ICD-10-CM

## 2022-07-08 DIAGNOSIS — Z86.69 PERSONAL HISTORY OF OTHER DISEASES OF THE NERVOUS SYSTEM AND SENSE ORGANS: Chronic | ICD-10-CM

## 2022-07-08 DIAGNOSIS — Z90.89 ACQUIRED ABSENCE OF OTHER ORGANS: Chronic | ICD-10-CM

## 2022-07-08 DIAGNOSIS — H26.9 UNSPECIFIED CATARACT: Chronic | ICD-10-CM

## 2022-07-08 DIAGNOSIS — Z98.89 OTHER SPECIFIED POSTPROCEDURAL STATES: Chronic | ICD-10-CM

## 2022-07-08 LAB
ALBUMIN SERPL ELPH-MCNC: 3.9 G/DL — SIGNIFICANT CHANGE UP (ref 3.3–5)
ALP SERPL-CCNC: 78 U/L — SIGNIFICANT CHANGE UP (ref 40–120)
ALT FLD-CCNC: 17 U/L — SIGNIFICANT CHANGE UP (ref 4–41)
ANION GAP SERPL CALC-SCNC: 11 MMOL/L — SIGNIFICANT CHANGE UP (ref 7–14)
APPEARANCE UR: CLEAR — SIGNIFICANT CHANGE UP
APTT BLD: 35.1 SEC — SIGNIFICANT CHANGE UP (ref 27–36.3)
AST SERPL-CCNC: 18 U/L — SIGNIFICANT CHANGE UP (ref 4–40)
BACTERIA # UR AUTO: NEGATIVE — SIGNIFICANT CHANGE UP
BASE EXCESS BLDV CALC-SCNC: -1.1 MMOL/L — SIGNIFICANT CHANGE UP (ref -2–3)
BASOPHILS # BLD AUTO: 0.03 K/UL — SIGNIFICANT CHANGE UP (ref 0–0.2)
BASOPHILS NFR BLD AUTO: 0.5 % — SIGNIFICANT CHANGE UP (ref 0–2)
BILIRUB SERPL-MCNC: 2.3 MG/DL — HIGH (ref 0.2–1.2)
BILIRUB UR-MCNC: NEGATIVE — SIGNIFICANT CHANGE UP
BLD GP AB SCN SERPL QL: NEGATIVE — SIGNIFICANT CHANGE UP
BLOOD GAS VENOUS COMPREHENSIVE RESULT: SIGNIFICANT CHANGE UP
BUN SERPL-MCNC: 20 MG/DL — SIGNIFICANT CHANGE UP (ref 7–23)
CALCIUM SERPL-MCNC: 9.2 MG/DL — SIGNIFICANT CHANGE UP (ref 8.4–10.5)
CHLORIDE BLDV-SCNC: 105 MMOL/L — SIGNIFICANT CHANGE UP (ref 96–108)
CHLORIDE SERPL-SCNC: 104 MMOL/L — SIGNIFICANT CHANGE UP (ref 98–107)
CO2 BLDV-SCNC: 25.6 MMOL/L — SIGNIFICANT CHANGE UP (ref 22–26)
CO2 SERPL-SCNC: 23 MMOL/L — SIGNIFICANT CHANGE UP (ref 22–31)
COLOR SPEC: YELLOW — SIGNIFICANT CHANGE UP
CREAT SERPL-MCNC: 1.21 MG/DL — SIGNIFICANT CHANGE UP (ref 0.5–1.3)
DIFF PNL FLD: NEGATIVE — SIGNIFICANT CHANGE UP
EGFR: 62 ML/MIN/1.73M2 — SIGNIFICANT CHANGE UP
EOSINOPHIL # BLD AUTO: 0.07 K/UL — SIGNIFICANT CHANGE UP (ref 0–0.5)
EOSINOPHIL NFR BLD AUTO: 1.1 % — SIGNIFICANT CHANGE UP (ref 0–6)
EPI CELLS # UR: 0 /HPF — SIGNIFICANT CHANGE UP (ref 0–5)
FLUAV AG NPH QL: SIGNIFICANT CHANGE UP
FLUBV AG NPH QL: SIGNIFICANT CHANGE UP
GAS PNL BLDV: 134 MMOL/L — LOW (ref 136–145)
GLUCOSE BLDV-MCNC: 115 MG/DL — HIGH (ref 70–99)
GLUCOSE SERPL-MCNC: 107 MG/DL — HIGH (ref 70–99)
GLUCOSE UR QL: NEGATIVE — SIGNIFICANT CHANGE UP
HCO3 BLDV-SCNC: 24 MMOL/L — SIGNIFICANT CHANGE UP (ref 22–29)
HCT VFR BLD CALC: 44.2 % — SIGNIFICANT CHANGE UP (ref 39–50)
HCT VFR BLDA CALC: 45 % — SIGNIFICANT CHANGE UP (ref 39–51)
HGB BLD CALC-MCNC: 15 G/DL — SIGNIFICANT CHANGE UP (ref 13–17)
HGB BLD-MCNC: 15.1 G/DL — SIGNIFICANT CHANGE UP (ref 13–17)
IANC: 3.94 K/UL — SIGNIFICANT CHANGE UP (ref 1.8–7.4)
IMM GRANULOCYTES NFR BLD AUTO: 0.6 % — SIGNIFICANT CHANGE UP (ref 0–1.5)
INR BLD: 1.92 RATIO — HIGH (ref 0.88–1.16)
KETONES UR-MCNC: NEGATIVE — SIGNIFICANT CHANGE UP
LACTATE BLDV-MCNC: 1.2 MMOL/L — SIGNIFICANT CHANGE UP (ref 0.5–2)
LEUKOCYTE ESTERASE UR-ACNC: NEGATIVE — SIGNIFICANT CHANGE UP
LIDOCAIN IGE QN: 128 U/L — HIGH (ref 7–60)
LYMPHOCYTES # BLD AUTO: 1.55 K/UL — SIGNIFICANT CHANGE UP (ref 1–3.3)
LYMPHOCYTES # BLD AUTO: 23.3 % — SIGNIFICANT CHANGE UP (ref 13–44)
MCHC RBC-ENTMCNC: 30.1 PG — SIGNIFICANT CHANGE UP (ref 27–34)
MCHC RBC-ENTMCNC: 34.2 GM/DL — SIGNIFICANT CHANGE UP (ref 32–36)
MCV RBC AUTO: 88.2 FL — SIGNIFICANT CHANGE UP (ref 80–100)
MONOCYTES # BLD AUTO: 1.03 K/UL — HIGH (ref 0–0.9)
MONOCYTES NFR BLD AUTO: 15.5 % — HIGH (ref 2–14)
NEUTROPHILS # BLD AUTO: 3.94 K/UL — SIGNIFICANT CHANGE UP (ref 1.8–7.4)
NEUTROPHILS NFR BLD AUTO: 59 % — SIGNIFICANT CHANGE UP (ref 43–77)
NITRITE UR-MCNC: NEGATIVE — SIGNIFICANT CHANGE UP
NRBC # BLD: 0 /100 WBCS — SIGNIFICANT CHANGE UP
NRBC # FLD: 0 K/UL — SIGNIFICANT CHANGE UP
OB PNL STL: NEGATIVE — SIGNIFICANT CHANGE UP
PCO2 BLDV: 42 MMHG — SIGNIFICANT CHANGE UP (ref 42–55)
PH BLDV: 7.37 — SIGNIFICANT CHANGE UP (ref 7.32–7.43)
PH UR: 6 — SIGNIFICANT CHANGE UP (ref 5–8)
PLATELET # BLD AUTO: 200 K/UL — SIGNIFICANT CHANGE UP (ref 150–400)
PO2 BLDV: 38 MMHG — SIGNIFICANT CHANGE UP
POTASSIUM BLDV-SCNC: 3.7 MMOL/L — SIGNIFICANT CHANGE UP (ref 3.5–5.1)
POTASSIUM SERPL-MCNC: 3.8 MMOL/L — SIGNIFICANT CHANGE UP (ref 3.5–5.3)
POTASSIUM SERPL-SCNC: 3.8 MMOL/L — SIGNIFICANT CHANGE UP (ref 3.5–5.3)
PROT SERPL-MCNC: 7.5 G/DL — SIGNIFICANT CHANGE UP (ref 6–8.3)
PROT UR-MCNC: ABNORMAL
PROTHROM AB SERPL-ACNC: 22.4 SEC — HIGH (ref 10.5–13.4)
RBC # BLD: 5.01 M/UL — SIGNIFICANT CHANGE UP (ref 4.2–5.8)
RBC # FLD: 14.2 % — SIGNIFICANT CHANGE UP (ref 10.3–14.5)
RBC CASTS # UR COMP ASSIST: 1 /HPF — SIGNIFICANT CHANGE UP (ref 0–4)
RH IG SCN BLD-IMP: POSITIVE — SIGNIFICANT CHANGE UP
RSV RNA NPH QL NAA+NON-PROBE: SIGNIFICANT CHANGE UP
SAO2 % BLDV: 61.6 % — SIGNIFICANT CHANGE UP
SARS-COV-2 RNA SPEC QL NAA+PROBE: SIGNIFICANT CHANGE UP
SODIUM SERPL-SCNC: 138 MMOL/L — SIGNIFICANT CHANGE UP (ref 135–145)
SP GR SPEC: 1.03 — SIGNIFICANT CHANGE UP (ref 1–1.05)
UROBILINOGEN FLD QL: SIGNIFICANT CHANGE UP
WBC # BLD: 6.66 K/UL — SIGNIFICANT CHANGE UP (ref 3.8–10.5)
WBC # FLD AUTO: 6.66 K/UL — SIGNIFICANT CHANGE UP (ref 3.8–10.5)
WBC UR QL: 3 /HPF — SIGNIFICANT CHANGE UP (ref 0–5)

## 2022-07-08 PROCEDURE — 71045 X-RAY EXAM CHEST 1 VIEW: CPT | Mod: 26

## 2022-07-08 PROCEDURE — 99285 EMERGENCY DEPT VISIT HI MDM: CPT

## 2022-07-08 PROCEDURE — 74177 CT ABD & PELVIS W/CONTRAST: CPT | Mod: 26,MA

## 2022-07-08 RX ORDER — SODIUM CHLORIDE 9 MG/ML
1000 INJECTION, SOLUTION INTRAVENOUS ONCE
Refills: 0 | Status: COMPLETED | OUTPATIENT
Start: 2022-07-08 | End: 2022-07-08

## 2022-07-08 RX ADMIN — SODIUM CHLORIDE 1000 MILLILITER(S): 9 INJECTION, SOLUTION INTRAVENOUS at 20:22

## 2022-07-08 NOTE — ED PROVIDER NOTE - PATIENT PORTAL LINK FT
You can access the FollowMyHealth Patient Portal offered by [facility] by registering at the following website: [email or web address]. By joining [employer]’s FollowMyHealth portal, you will also be able to view your health information using other applications (apps) compatible with our system.

## 2022-07-08 NOTE — ED PROVIDER NOTE - NSFOLLOWUPINSTRUCTIONS_ED_ALL_ED_FT
You were seen today in the emergency room for diarrhea. Although the testing done today indicates that your diarrhea is not from an acute emergency, you still need to follow up with your doctor in the next 48-72 hours    There were other labs that were sent that will take a few days to result. If there are any abnormal findings, you will receive a call from the hospital    If you develop any new or worsening symptoms you need to return immediately to the emergency department. If you experience any of the following please come right back to the emergency room: severe nausea and vomiting with inability to tolerate eating, severe worsening of your pain, a new fever, new bleeding in stool or vomit, confusion, new numbness or weakness, passing out

## 2022-07-08 NOTE — ED PROVIDER NOTE - PROGRESS NOTE DETAILS
Dirk VIRK (PGY-3)  no obvious findings on w/u. gi pcr, c diff, o&P sent, results still pending. will d/c pt to home with return precautions and pcp f/u

## 2022-07-08 NOTE — ED PROVIDER NOTE - ATTENDING CONTRIBUTION TO CARE
I performed a face-to-face evaluation of the patient and performed a history and physical examination. I agree with the history and physical examination. If this was a PA visit, I personally saw the patient with the PA and performed a substantive portion of the visit including all aspects of the medical decision making.    Kirill: 3 days watery and black diarrhea many times/day. Taking Pepto Bismol. H/o metal heart valve (coumadin), DM. Poor PO. Lower abd pain. Chills, no F. Returned 3 days ago from [place]. ~1 month ago, had prostate surgery (was on ABx). Mild suprapubic tenderness. Likely infectious colitis (dysentery ?). Consider C. diff. Check C. diff, CBC, CMP, FOBT. IVF. Check CT.

## 2022-07-08 NOTE — ED PROVIDER NOTE - PHYSICAL EXAMINATION
Well appearing, well nourished, awake, alert, oriented to person, place, time/situation and in no apparent distress.    Airway patent    Eyes without scleral injection. No jaundice.    Strong pulse.    Respirations unlabored.    Abdomen soft, mild suprapubic tender, no guarding. Black stool, no hemorrhoids.    Spine appears normal, range of motion is not limited, no muscle or joint tenderness.    Alert and oriented, no gross motor or sensory deficits.    Skin normal color for race, warm, dry and intact. No evidence of rash.    No SI/HI.

## 2022-07-08 NOTE — ED PROVIDER NOTE - NSICDXPASTSURGICALHX_GEN_ALL_CORE_FT
PAST SURGICAL HISTORY:  Cataract Bilateral ; tx surgically    H/O melanoma excision scalp-2014    H/O retinal detachment left    Hx of CABG x 2 2006    Mitral valve replaced 2006 at [facility]    S/P tonsillectomy     S/P TURP 2014

## 2022-07-08 NOTE — ED PROVIDER NOTE - OBJECTIVE STATEMENT
Kirill: 3 days watery and black diarrhea many times/day. Taking Pepto Bismol. H/o metal heart valve (coumadin), DM. Poor PO. Lower abd pain. Chills, no F. Returned 3 days ago from [place]. Rectal temp: 100.3. PCP: Edis Seay

## 2022-07-08 NOTE — ED ADULT NURSE REASSESSMENT NOTE - NS ED NURSE REASSESS COMMENT FT1
Received report from Day Brenna Arellano: Pt A&Ox4, no complaints or NAD, respirations are even and unlabored, CM in progress, Afib, VS noted, denies cp sob, palpitations, dizziness, lightheadedness, Safety precautions implemented as per protocol, awaiting further MD orders, will continue to monitor.

## 2022-07-08 NOTE — ED ADULT NURSE NOTE - NSICDXPASTSURGICALHX_GEN_ALL_CORE_FT
PAST SURGICAL HISTORY:  Cataract Bilateral ; tx surgically    H/O melanoma excision scalp-2014    H/O retinal detachment left    Hx of CABG x 2 2006    Mitral valve replaced 2006 at [facility]    S/P tonsillectomy     S/P TURP 2014

## 2022-07-08 NOTE — ED ADULT TRIAGE NOTE - CHIEF COMPLAINT QUOTE
dark stools and abdominal pain since Tuesday when he returned back from [facility]. pt on coumadin for mechanical valve. pt states he has been having decreased po intake and weakness.

## 2022-07-08 NOTE — ED ADULT NURSE NOTE - OBJECTIVE STATEMENT
77 y/o male presents to the ED for black watery stool for 3 days. Patient is in no acute distress. Denies pain or SOB. Patient is on coumadin

## 2022-07-08 NOTE — ED ADULT NURSE NOTE - CHIEF COMPLAINT QUOTE
dark stools and abdominal pain since Tuesday when he returned back from [facility]. pt on coumadin for mechanical valve. pt states he has been having decreased po intake and weakness.

## 2022-07-08 NOTE — ED PROVIDER NOTE - CLINICAL SUMMARY MEDICAL DECISION MAKING FREE TEXT BOX
Kirill: 3 days watery and black diarrhea many times/day. Taking Pepto Bismol. H/o metal heart valve (coumadin), DM. Poor PO. Lower abd pain. Chills, no F. Returned 3 days ago from [facility] [place]. ~1 month ago, had prostate surgery (was on ABx). Mild suprapubic tenderness. Likely infectious colitis (dysentery ?). Consider C. diff. Check C. diff, CBC, CMP, FOBT. IVF. Check CT.

## 2022-07-09 LAB
CULTURE RESULTS: SIGNIFICANT CHANGE UP
CULTURE RESULTS: SIGNIFICANT CHANGE UP
SPECIMEN SOURCE: SIGNIFICANT CHANGE UP
SPECIMEN SOURCE: SIGNIFICANT CHANGE UP

## 2022-07-10 LAB
C DIFF BY PCR RESULT: SIGNIFICANT CHANGE UP
C DIFF TOX GENS STL QL NAA+PROBE: SIGNIFICANT CHANGE UP

## 2022-07-10 NOTE — ED POST DISCHARGE NOTE - REASON FOR FOLLOW-UP
Other Stool CX: E. coli . No antibiotic listed in ED provider note or prescription writer at time of discharge.  Patient contact # [phone] message left with Call Back  P.A. number and hours for return call back. alt # [phone] and  message left with Call Back  P.A. number and hours for return call back. PLan : if patient doing well no need for Abx's if patient still having symptoms will ERX Azithromycin 1gm PO X 1. Call Back  PA to continue to try and contact patient.

## 2022-07-11 ENCOUNTER — APPOINTMENT (OUTPATIENT)
Dept: UROLOGY | Facility: CLINIC | Age: 77
End: 2022-07-11

## 2022-07-11 ENCOUNTER — APPOINTMENT (OUTPATIENT)
Dept: INTERNAL MEDICINE | Facility: CLINIC | Age: 77
End: 2022-07-11

## 2022-07-11 ENCOUNTER — OUTPATIENT (OUTPATIENT)
Dept: OUTPATIENT SERVICES | Facility: HOSPITAL | Age: 77
LOS: 1 days | End: 2022-07-11

## 2022-07-11 ENCOUNTER — RESULT CHARGE (OUTPATIENT)
Age: 77
End: 2022-07-11

## 2022-07-11 ENCOUNTER — NON-APPOINTMENT (OUTPATIENT)
Age: 77
End: 2022-07-11

## 2022-07-11 VITALS
SYSTOLIC BLOOD PRESSURE: 145 MMHG | DIASTOLIC BLOOD PRESSURE: 80 MMHG | TEMPERATURE: 97.5 F | RESPIRATION RATE: 16 BRPM | HEART RATE: 76 BPM

## 2022-07-11 VITALS — RESPIRATION RATE: 16 BRPM | HEART RATE: 83 BPM | OXYGEN SATURATION: 98 % | TEMPERATURE: 96.5 F

## 2022-07-11 DIAGNOSIS — H26.9 UNSPECIFIED CATARACT: Chronic | ICD-10-CM

## 2022-07-11 DIAGNOSIS — Z90.89 ACQUIRED ABSENCE OF OTHER ORGANS: Chronic | ICD-10-CM

## 2022-07-11 DIAGNOSIS — Z86.69 PERSONAL HISTORY OF OTHER DISEASES OF THE NERVOUS SYSTEM AND SENSE ORGANS: Chronic | ICD-10-CM

## 2022-07-11 DIAGNOSIS — Z98.89 OTHER SPECIFIED POSTPROCEDURAL STATES: Chronic | ICD-10-CM

## 2022-07-11 DIAGNOSIS — Z98.890 OTHER SPECIFIED POSTPROCEDURAL STATES: Chronic | ICD-10-CM

## 2022-07-11 LAB
INR PPP: 2.5 RATIO
POCT-PROTHROMBIN TIME: 29.6 SECS

## 2022-07-11 PROCEDURE — 51798 US URINE CAPACITY MEASURE: CPT

## 2022-07-11 PROCEDURE — 99214 OFFICE O/P EST MOD 30 MIN: CPT

## 2022-07-11 NOTE — ADDENDUM
[FreeTextEntry1] : I, Vinicius Andrew, acted as the sole scribe for Dr. Ashish Manrique on 07/11/2022

## 2022-07-11 NOTE — HISTORY OF PRESENT ILLNESS
[FreeTextEntry1] : Reason for Visit: 3-Month Follow-Up S/P TURP\par \par The patient is a 76-year-old returning gentleman who underwent TURP on 3/3/2022.  He has a long history of erectile dysfunction he reports in the past trying oral medications with some success and also trying injection therapy with moderate success.  Now after his transurethral section of the prostate he wants to revisit erectile function.  Benign pathology. He last presented on 4/6/2022  complaining of frequency, burning, urgency, and dysuria. Post-void residual on bladder scan on that date was 78 cc.  He reports an excellent urinary stream with no incontinence.\par \par Today's postvoid residual is 0.\par Post-void residual on bladder scan today was 0 cc. He presents today complaining of limited erectile activity. I prescribed him Sildenafil during the last visit, but there was a problem and he was not able to retrieve it. He states that he has taken Sildenafil in the past, but it did not work very well. He wants to give it another try, so I will prescribe 100 mg tablets. I advised him to take it on an empty stomach. His urinary stream is strong and normal. He also reports back pain, and I advised him to follow-up with his PCP if it persists.\par \par LABS:\par A1c 6.9% on 5/25/2022\par UA on 3/25/2022: Slightly Turbid Urine Appearance, Large Leukocyte Esterase Concentration, U  /HPF, Red Blood Cell - Urine 55 /HPF, Few Bacteria, Protein 30 mg/dL, Glucose >= 1000 mg/dL, Large Blood\par \par RADIOLOGY:\par No recent relevant imaging to review or discuss\par \par Plan: I will prescribe Sildenafil 100 mg, if he fails oral medications we will refer him to our men's health clinic for injection therapy and possible consideration of penile implant.

## 2022-07-12 LAB
CULTURE RESULTS: SIGNIFICANT CHANGE UP
SPECIMEN SOURCE: SIGNIFICANT CHANGE UP

## 2022-07-12 RX ORDER — SILDENAFIL 50 MG/1
50 TABLET ORAL
Qty: 30 | Refills: 3 | Status: DISCONTINUED | COMMUNITY
Start: 2022-04-06 | End: 2022-07-12

## 2022-07-12 RX ORDER — OXYBUTYNIN CHLORIDE 5 MG/1
5 TABLET, EXTENDED RELEASE ORAL DAILY
Qty: 90 | Refills: 3 | Status: DISCONTINUED | COMMUNITY
Start: 2022-03-14 | End: 2022-07-12

## 2022-07-13 DIAGNOSIS — Z95.2 PRESENCE OF PROSTHETIC HEART VALVE: ICD-10-CM

## 2022-07-13 DIAGNOSIS — I09.9 RHEUMATIC HEART DISEASE, UNSPECIFIED: ICD-10-CM

## 2022-07-13 DIAGNOSIS — I48.91 UNSPECIFIED ATRIAL FIBRILLATION: ICD-10-CM

## 2022-07-13 DIAGNOSIS — Z79.01 LONG TERM (CURRENT) USE OF ANTICOAGULANTS: ICD-10-CM

## 2022-07-18 ENCOUNTER — APPOINTMENT (OUTPATIENT)
Dept: INTERNAL MEDICINE | Facility: CLINIC | Age: 77
End: 2022-07-18

## 2022-07-18 ENCOUNTER — OUTPATIENT (OUTPATIENT)
Dept: OUTPATIENT SERVICES | Facility: HOSPITAL | Age: 77
LOS: 1 days | End: 2022-07-18

## 2022-07-18 VITALS
DIASTOLIC BLOOD PRESSURE: 60 MMHG | HEART RATE: 66 BPM | TEMPERATURE: 96.2 F | OXYGEN SATURATION: 98 % | WEIGHT: 166 LBS | HEIGHT: 69 IN | SYSTOLIC BLOOD PRESSURE: 150 MMHG | BODY MASS INDEX: 24.59 KG/M2

## 2022-07-18 DIAGNOSIS — Z98.89 OTHER SPECIFIED POSTPROCEDURAL STATES: Chronic | ICD-10-CM

## 2022-07-18 DIAGNOSIS — Z98.890 OTHER SPECIFIED POSTPROCEDURAL STATES: Chronic | ICD-10-CM

## 2022-07-18 DIAGNOSIS — Z90.89 ACQUIRED ABSENCE OF OTHER ORGANS: Chronic | ICD-10-CM

## 2022-07-18 DIAGNOSIS — Z86.69 PERSONAL HISTORY OF OTHER DISEASES OF THE NERVOUS SYSTEM AND SENSE ORGANS: Chronic | ICD-10-CM

## 2022-07-18 DIAGNOSIS — H26.9 UNSPECIFIED CATARACT: Chronic | ICD-10-CM

## 2022-07-18 PROCEDURE — 99214 OFFICE O/P EST MOD 30 MIN: CPT | Mod: GC

## 2022-07-19 LAB
INR PPP: 2 RATIO
POCT-PROTHROMBIN TIME: 24.5 SECS

## 2022-07-19 NOTE — PLAN
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, T2DM, Afib, multiple thyroid nodules, Rosie Thompson tears and esophageal ulcers, BPH s/p TURP (3/3/22 with course c/b postop hypotension, blood loss anemia requiring 2 U PRBC's, MERON and hyponatremia) here post-ED visit for diarrhea and abdominal pain.\par \par #Erectile Dysfunction\par - sent sildenafil 100mg to pharmacy, insurance unlikely to cover, patient expressed understanding\par - recommended take half a pill, can increase to whole pill if no improvement\par - recommend to f/u Urologist if no improvement with medication\par \par #T2DM\par - last A1C 6.9% 5/2022\par - with proteinuria and history of CAD, recommend starting jardiance 10mg daily and stop glipizide, patient expressed understanding\par - continue with trulicity\par - continue with statin\par - repeat A1C and assess how patient is tolerating jardiance at next visit\par \par #HTN\par - /60\par - patient reported taking losartan 25mg at home\par - recommended increasing dosage to 50mg daily as ordered, patient expressed understanding\par \par #RHD s/p MVR on coumadin\par - INR 2.0 in clinic, goal is 2.5-3.5\par - increased dosage by 10%\par - now taking 7mg Sun/Tues/Thurs/Sat and 5mg Mon/Wed/Fri\par - repeat INR check in 1 week\par \par Discussed case with Dr. Person\par \par RTC in 7 weeks to follow up with PCP\par \par Jameson Riley, PGY-2\par Internal Medicine\par [employer] \par \par \par \par \par

## 2022-07-19 NOTE — DISCUSSION/SUMMARY
[Specialty: _____] : Specialty: [unfilled] [PCP: _____] : PCP: [unfilled] [Home] : patient was discharged to home [Med Rec Performed] : med rec performed [FreeTextEntry1] : Presented 7/8 for abdominal pain and diarrhea, found to test positive for ETEC/EAEC/EPEC on GI pcr. CT a/p unremarkable. Discharged from Emergency Department.  [FreeTextEntry3] : The patient currently takes:\par - losartan 25mg daily\par - atorvastatin 40mg daily\par - coumadin 7mg twice weekly, 5mg every other day\par - glipizide 5mg BID\par - trulicity 0.75mg/0.5mL\par - atenolol 50mg daily\par - digoxin 125mcg daily

## 2022-07-20 DIAGNOSIS — E11.9 TYPE 2 DIABETES MELLITUS WITHOUT COMPLICATIONS: ICD-10-CM

## 2022-07-20 DIAGNOSIS — Z51.81 ENCOUNTER FOR THERAPEUTIC DRUG LEVEL MONITORING: ICD-10-CM

## 2022-07-20 DIAGNOSIS — I10 ESSENTIAL (PRIMARY) HYPERTENSION: ICD-10-CM

## 2022-07-20 DIAGNOSIS — N52.01 ERECTILE DYSFUNCTION DUE TO ARTERIAL INSUFFICIENCY: ICD-10-CM

## 2022-07-20 DIAGNOSIS — R19.7 DIARRHEA, UNSPECIFIED: ICD-10-CM

## 2022-07-20 DIAGNOSIS — Z79.01 LONG TERM (CURRENT) USE OF ANTICOAGULANTS: ICD-10-CM

## 2022-07-25 ENCOUNTER — APPOINTMENT (OUTPATIENT)
Dept: INTERNAL MEDICINE | Facility: CLINIC | Age: 77
End: 2022-07-25

## 2022-08-03 ENCOUNTER — RESULT CHARGE (OUTPATIENT)
Age: 77
End: 2022-08-03

## 2022-08-03 ENCOUNTER — APPOINTMENT (OUTPATIENT)
Dept: INTERNAL MEDICINE | Facility: CLINIC | Age: 77
End: 2022-08-03

## 2022-08-03 ENCOUNTER — OUTPATIENT (OUTPATIENT)
Dept: OUTPATIENT SERVICES | Facility: HOSPITAL | Age: 77
LOS: 1 days | End: 2022-08-03

## 2022-08-03 DIAGNOSIS — Z98.89 OTHER SPECIFIED POSTPROCEDURAL STATES: Chronic | ICD-10-CM

## 2022-08-03 DIAGNOSIS — Z98.890 OTHER SPECIFIED POSTPROCEDURAL STATES: Chronic | ICD-10-CM

## 2022-08-03 DIAGNOSIS — H26.9 UNSPECIFIED CATARACT: Chronic | ICD-10-CM

## 2022-08-03 DIAGNOSIS — Z86.69 PERSONAL HISTORY OF OTHER DISEASES OF THE NERVOUS SYSTEM AND SENSE ORGANS: Chronic | ICD-10-CM

## 2022-08-03 DIAGNOSIS — Z90.89 ACQUIRED ABSENCE OF OTHER ORGANS: Chronic | ICD-10-CM

## 2022-08-03 LAB
BASOPHILS # BLD AUTO: 0.05 K/UL
BASOPHILS NFR BLD AUTO: 0.8 %
EOSINOPHIL # BLD AUTO: 0.2 K/UL
EOSINOPHIL NFR BLD AUTO: 3.1 %
HCT VFR BLD CALC: 42.2 %
HGB BLD-MCNC: 14.6 G/DL
IMM GRANULOCYTES NFR BLD AUTO: 1.1 %
INR PPP: 4.8 RATIO
INR PPP: 5.1 RATIO
LYMPHOCYTES # BLD AUTO: 2.05 K/UL
LYMPHOCYTES NFR BLD AUTO: 31.5 %
MAN DIFF?: NORMAL
MCHC RBC-ENTMCNC: 30.7 PG
MCHC RBC-ENTMCNC: 34.6 GM/DL
MCV RBC AUTO: 88.7 FL
MONOCYTES # BLD AUTO: 0.55 K/UL
MONOCYTES NFR BLD AUTO: 8.5 %
NEUTROPHILS # BLD AUTO: 3.58 K/UL
NEUTROPHILS NFR BLD AUTO: 55 %
PLATELET # BLD AUTO: 205 K/UL
POCT-PROTHROMBIN TIME: 61.4 SECS
PT BLD: 57.1 SEC
RBC # BLD: 4.76 M/UL
RBC # FLD: 14.6 %
WBC # FLD AUTO: 6.5 K/UL

## 2022-08-04 DIAGNOSIS — I48.91 UNSPECIFIED ATRIAL FIBRILLATION: ICD-10-CM

## 2022-08-04 DIAGNOSIS — Z51.81 ENCOUNTER FOR THERAPEUTIC DRUG LEVEL MONITORING: ICD-10-CM

## 2022-08-04 DIAGNOSIS — Z79.01 LONG TERM (CURRENT) USE OF ANTICOAGULANTS: ICD-10-CM

## 2022-08-10 ENCOUNTER — APPOINTMENT (OUTPATIENT)
Dept: INTERNAL MEDICINE | Facility: CLINIC | Age: 77
End: 2022-08-10

## 2022-08-10 ENCOUNTER — OUTPATIENT (OUTPATIENT)
Dept: OUTPATIENT SERVICES | Facility: HOSPITAL | Age: 77
LOS: 1 days | End: 2022-08-10

## 2022-08-10 DIAGNOSIS — Z98.89 OTHER SPECIFIED POSTPROCEDURAL STATES: Chronic | ICD-10-CM

## 2022-08-10 DIAGNOSIS — H26.9 UNSPECIFIED CATARACT: Chronic | ICD-10-CM

## 2022-08-10 DIAGNOSIS — Z86.69 PERSONAL HISTORY OF OTHER DISEASES OF THE NERVOUS SYSTEM AND SENSE ORGANS: Chronic | ICD-10-CM

## 2022-08-10 DIAGNOSIS — Z98.890 OTHER SPECIFIED POSTPROCEDURAL STATES: Chronic | ICD-10-CM

## 2022-08-10 DIAGNOSIS — Z90.89 ACQUIRED ABSENCE OF OTHER ORGANS: Chronic | ICD-10-CM

## 2022-08-17 DIAGNOSIS — Z79.01 LONG TERM (CURRENT) USE OF ANTICOAGULANTS: ICD-10-CM

## 2022-08-18 ENCOUNTER — OUTPATIENT (OUTPATIENT)
Dept: OUTPATIENT SERVICES | Facility: HOSPITAL | Age: 77
LOS: 1 days | End: 2022-08-18

## 2022-08-18 ENCOUNTER — APPOINTMENT (OUTPATIENT)
Dept: INTERNAL MEDICINE | Facility: CLINIC | Age: 77
End: 2022-08-18

## 2022-08-18 VITALS — RESPIRATION RATE: 16 BRPM | HEART RATE: 92 BPM | OXYGEN SATURATION: 98 %

## 2022-08-18 DIAGNOSIS — Z86.69 PERSONAL HISTORY OF OTHER DISEASES OF THE NERVOUS SYSTEM AND SENSE ORGANS: Chronic | ICD-10-CM

## 2022-08-18 DIAGNOSIS — Z90.89 ACQUIRED ABSENCE OF OTHER ORGANS: Chronic | ICD-10-CM

## 2022-08-18 DIAGNOSIS — H26.9 UNSPECIFIED CATARACT: Chronic | ICD-10-CM

## 2022-08-18 DIAGNOSIS — Z98.890 OTHER SPECIFIED POSTPROCEDURAL STATES: Chronic | ICD-10-CM

## 2022-08-18 DIAGNOSIS — Z98.89 OTHER SPECIFIED POSTPROCEDURAL STATES: Chronic | ICD-10-CM

## 2022-08-18 LAB
INR PPP: 3.9 RATIO
POCT-PROTHROMBIN TIME: 47.2 SECS

## 2022-08-23 DIAGNOSIS — Z51.81 ENCOUNTER FOR THERAPEUTIC DRUG LEVEL MONITORING: ICD-10-CM

## 2022-08-23 DIAGNOSIS — Z79.01 LONG TERM (CURRENT) USE OF ANTICOAGULANTS: ICD-10-CM

## 2022-08-26 ENCOUNTER — OUTPATIENT (OUTPATIENT)
Dept: OUTPATIENT SERVICES | Facility: HOSPITAL | Age: 77
LOS: 1 days | End: 2022-08-26

## 2022-08-26 ENCOUNTER — APPOINTMENT (OUTPATIENT)
Dept: INTERNAL MEDICINE | Facility: CLINIC | Age: 77
End: 2022-08-26

## 2022-08-26 ENCOUNTER — RX RENEWAL (OUTPATIENT)
Age: 77
End: 2022-08-26

## 2022-08-26 VITALS — HEART RATE: 92 BPM | OXYGEN SATURATION: 98 %

## 2022-08-26 DIAGNOSIS — Z98.89 OTHER SPECIFIED POSTPROCEDURAL STATES: Chronic | ICD-10-CM

## 2022-08-26 DIAGNOSIS — Z98.890 OTHER SPECIFIED POSTPROCEDURAL STATES: Chronic | ICD-10-CM

## 2022-08-26 DIAGNOSIS — Z86.69 PERSONAL HISTORY OF OTHER DISEASES OF THE NERVOUS SYSTEM AND SENSE ORGANS: Chronic | ICD-10-CM

## 2022-08-26 DIAGNOSIS — H26.9 UNSPECIFIED CATARACT: Chronic | ICD-10-CM

## 2022-08-26 DIAGNOSIS — Z90.89 ACQUIRED ABSENCE OF OTHER ORGANS: Chronic | ICD-10-CM

## 2022-08-26 LAB
INR PPP: 2.8 RATIO
POCT-PROTHROMBIN TIME: 33.3 SECS

## 2022-08-29 DIAGNOSIS — Z79.01 LONG TERM (CURRENT) USE OF ANTICOAGULANTS: ICD-10-CM

## 2022-08-29 DIAGNOSIS — Z51.81 ENCOUNTER FOR THERAPEUTIC DRUG LEVEL MONITORING: ICD-10-CM

## 2022-09-02 ENCOUNTER — APPOINTMENT (OUTPATIENT)
Dept: INTERNAL MEDICINE | Facility: CLINIC | Age: 77
End: 2022-09-02

## 2022-09-02 ENCOUNTER — OUTPATIENT (OUTPATIENT)
Dept: OUTPATIENT SERVICES | Facility: HOSPITAL | Age: 77
LOS: 1 days | End: 2022-09-02

## 2022-09-02 VITALS — HEART RATE: 85 BPM | OXYGEN SATURATION: 99 % | TEMPERATURE: 96 F

## 2022-09-02 DIAGNOSIS — Z98.890 OTHER SPECIFIED POSTPROCEDURAL STATES: Chronic | ICD-10-CM

## 2022-09-02 DIAGNOSIS — Z98.89 OTHER SPECIFIED POSTPROCEDURAL STATES: Chronic | ICD-10-CM

## 2022-09-02 DIAGNOSIS — Z90.89 ACQUIRED ABSENCE OF OTHER ORGANS: Chronic | ICD-10-CM

## 2022-09-02 DIAGNOSIS — Z86.69 PERSONAL HISTORY OF OTHER DISEASES OF THE NERVOUS SYSTEM AND SENSE ORGANS: Chronic | ICD-10-CM

## 2022-09-02 DIAGNOSIS — H26.9 UNSPECIFIED CATARACT: Chronic | ICD-10-CM

## 2022-09-02 LAB
INR PPP: 3.1 RATIO
POCT-PROTHROMBIN TIME: 37.6 SECS
QUALITY CONTROL: YES

## 2022-09-06 ENCOUNTER — APPOINTMENT (OUTPATIENT)
Dept: ENDOCRINOLOGY | Facility: CLINIC | Age: 77
End: 2022-09-06

## 2022-09-06 ENCOUNTER — RX RENEWAL (OUTPATIENT)
Age: 77
End: 2022-09-06

## 2022-09-07 DIAGNOSIS — Z79.01 LONG TERM (CURRENT) USE OF ANTICOAGULANTS: ICD-10-CM

## 2022-09-07 DIAGNOSIS — Z51.81 ENCOUNTER FOR THERAPEUTIC DRUG LEVEL MONITORING: ICD-10-CM

## 2022-09-09 ENCOUNTER — OUTPATIENT (OUTPATIENT)
Dept: OUTPATIENT SERVICES | Facility: HOSPITAL | Age: 77
LOS: 1 days | End: 2022-09-09

## 2022-09-09 ENCOUNTER — APPOINTMENT (OUTPATIENT)
Dept: INTERNAL MEDICINE | Facility: CLINIC | Age: 77
End: 2022-09-09

## 2022-09-09 VITALS
WEIGHT: 166 LBS | SYSTOLIC BLOOD PRESSURE: 120 MMHG | HEIGHT: 69 IN | DIASTOLIC BLOOD PRESSURE: 70 MMHG | HEART RATE: 82 BPM | OXYGEN SATURATION: 98 % | TEMPERATURE: 96.7 F | RESPIRATION RATE: 15 BRPM | BODY MASS INDEX: 24.59 KG/M2

## 2022-09-09 DIAGNOSIS — H26.9 UNSPECIFIED CATARACT: Chronic | ICD-10-CM

## 2022-09-09 DIAGNOSIS — Z98.890 OTHER SPECIFIED POSTPROCEDURAL STATES: Chronic | ICD-10-CM

## 2022-09-09 DIAGNOSIS — I48.91 UNSPECIFIED ATRIAL FIBRILLATION: ICD-10-CM

## 2022-09-09 DIAGNOSIS — Z98.89 OTHER SPECIFIED POSTPROCEDURAL STATES: Chronic | ICD-10-CM

## 2022-09-09 DIAGNOSIS — Z79.01 LONG TERM (CURRENT) USE OF ANTICOAGULANTS: ICD-10-CM

## 2022-09-09 DIAGNOSIS — E04.2 NONTOXIC MULTINODULAR GOITER: ICD-10-CM

## 2022-09-09 DIAGNOSIS — Z90.89 ACQUIRED ABSENCE OF OTHER ORGANS: Chronic | ICD-10-CM

## 2022-09-09 DIAGNOSIS — Z86.69 PERSONAL HISTORY OF OTHER DISEASES OF THE NERVOUS SYSTEM AND SENSE ORGANS: Chronic | ICD-10-CM

## 2022-09-09 DIAGNOSIS — I10 ESSENTIAL (PRIMARY) HYPERTENSION: ICD-10-CM

## 2022-09-09 DIAGNOSIS — E11.9 TYPE 2 DIABETES MELLITUS WITHOUT COMPLICATIONS: ICD-10-CM

## 2022-09-09 DIAGNOSIS — E78.5 HYPERLIPIDEMIA, UNSPECIFIED: ICD-10-CM

## 2022-09-09 DIAGNOSIS — Z51.81 ENCOUNTER FOR THERAPEUTIC DRUG LEVEL MONITORING: ICD-10-CM

## 2022-09-09 LAB
INR PPP: 2.4 RATIO
POCT-PROTHROMBIN TIME: 29.2 SECS
QUALITY CONTROL: YES

## 2022-09-09 PROCEDURE — 99214 OFFICE O/P EST MOD 30 MIN: CPT | Mod: GC

## 2022-09-09 RX ORDER — BLOOD-GLUCOSE METER
KIT MISCELLANEOUS
Qty: 1 | Refills: 0 | Status: ACTIVE | COMMUNITY
Start: 2022-09-09 | End: 1900-01-01

## 2022-09-09 RX ORDER — LOSARTAN POTASSIUM 50 MG/1
50 TABLET, FILM COATED ORAL DAILY
Qty: 90 | Refills: 3 | Status: DISCONTINUED | COMMUNITY
Start: 2022-03-16 | End: 2022-09-09

## 2022-09-09 RX ORDER — GLIPIZIDE 10 MG/1
10 TABLET ORAL
Qty: 60 | Refills: 3 | Status: DISCONTINUED | COMMUNITY
Start: 2022-08-03 | End: 2022-09-09

## 2022-09-09 NOTE — ASSESSMENT
[FreeTextEntry1] : #HCM\par -Colonoscopy: aged out Aug 2021\par -Pneumo, Tdap, Zoster UTD \par -COVID-19: received all 3 doses \par - Flu: given today \par \par Case discussed with Dr. Seay\par RTC in 1 week for INR check and RTC in 1 month with me for f/u \par \par Danica Yoder, PGY-2 \par [employer]

## 2022-09-09 NOTE — REVIEW OF SYSTEMS
[Vision Problems] : vision problems [Dizziness] : dizziness [Fever] : no fever [Chills] : no chills [Night Sweats] : no night sweats [Recent Change In Weight] : ~T no recent weight change [Discharge] : no discharge [Pain] : no pain [Redness] : no redness [Earache] : no earache [Hearing Loss] : no hearing loss [Nasal Discharge] : no nasal discharge [Chest Pain] : no chest pain [Palpitations] : no palpitations [Claudication] : no  leg claudication [Lower Ext Edema] : no lower extremity edema [Shortness Of Breath] : no shortness of breath [Wheezing] : no wheezing [Cough] : no cough [Dyspnea on Exertion] : not dyspnea on exertion [Abdominal Pain] : no abdominal pain [Nausea] : no nausea [Constipation] : no constipation [Vomiting] : no vomiting [Dysuria] : no dysuria [Incontinence] : no incontinence [Hesitancy] : no hesitancy [Hematuria] : no hematuria [Frequency] : no frequency [Itching] : no itching [Skin Rash] : no skin rash [Headache] : no headache [Fainting] : no fainting [Confusion] : no confusion [Insomnia] : no insomnia [Anxiety] : no anxiety [Easy Bleeding] : no easy bleeding [Easy Bruising] : no easy bruising

## 2022-09-09 NOTE — HISTORY OF PRESENT ILLNESS
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 \par \par Reports 2 months of dizziness intermittently especially with changing positions. Happens twice in a week or two weeks. No LOC, falls, trauma to the head. No headaches, vision changes, focal deficits reported. Orthostatics neg today. Could be related to hypotensive episodes vs hypoglycemia events. Orthostatics were checked: BP: 110/70, HR:72 while lying down, BP: 110/70, HR: 70 while standing up at 1 min and 3 mins. \par \par #T2DM: States FSG ranges between 110-120's. Compliant with trulicity and jardiance, which was started at last visit. Was told to discontinue glipizide however pt has been continuously taking it. No  symptoms reported. \par \par #HTN: Losartan increased at last visit from 25 to 50. Not checking BP's at home.\par \par #CAD s/p CABG: follows with cardiology. No acute issues at this time. Can walk ~30 mins daily and can walk 5 flights of stairs. Denies orthopnea, PND. Reports good adherence with medications. \par \par #RHD s/p MVR: INR subtherapeutic today at 2.4. Reports that he was taking warfarin 4 mg instead of his usual warfarin 5 as he was at his daughter's house in [place] and he forgot to take enough of the Warfarin 5's. Denies melena, hematochezia, hematuria. Denies headaches, focal neurologic deficits. \par \par #A. fib: rate-controlled. Denies palpitations. \par \par #Thyroid Nodules: underwent NM scan that showed abnormal uptake. Underwent FNA on 5/24 by endocrine team with non-diagnostic pathology and was recommended to go for repeat biopsy which pt reports he did however there is no report in the system. Denies unintentional weight loss, palpitations, heat/cold intolerance.

## 2022-09-09 NOTE — PHYSICAL EXAM
[No Acute Distress] : no acute distress [Well Nourished] : well nourished [Well Developed] : well developed [Well-Appearing] : well-appearing [Normal Sclera/Conjunctiva] : normal sclera/conjunctiva [EOMI] : extraocular movements intact [No JVD] : no jugular venous distention [No Lymphadenopathy] : no lymphadenopathy [No Respiratory Distress] : no respiratory distress  [No Accessory Muscle Use] : no accessory muscle use [Clear to Auscultation] : lungs were clear to auscultation bilaterally [Normal S1, S2] : normal S1 and S2 [Soft] : abdomen soft [Non Tender] : non-tender [Non-distended] : non-distended [No Joint Swelling] : no joint swelling [Grossly Normal Strength/Tone] : grossly normal strength/tone [Coordination Grossly Intact] : coordination grossly intact [No Focal Deficits] : no focal deficits [Normal Gait] : normal gait [Normal Affect] : the affect was normal [Normal Insight/Judgement] : insight and judgment were intact [de-identified] : prominent large thyroid tissue right neck, soft, non-tender.  [de-identified] : (-) orthostatic as above. [de-identified] : strength 5/5 in UE and LE; sensation intact

## 2022-09-16 ENCOUNTER — APPOINTMENT (OUTPATIENT)
Dept: INTERNAL MEDICINE | Facility: CLINIC | Age: 77
End: 2022-09-16

## 2022-09-16 ENCOUNTER — OUTPATIENT (OUTPATIENT)
Dept: OUTPATIENT SERVICES | Facility: HOSPITAL | Age: 77
LOS: 1 days | End: 2022-09-16

## 2022-09-16 VITALS — OXYGEN SATURATION: 98 % | TEMPERATURE: 97.2 F | HEART RATE: 75 BPM

## 2022-09-16 DIAGNOSIS — Z98.890 OTHER SPECIFIED POSTPROCEDURAL STATES: Chronic | ICD-10-CM

## 2022-09-16 DIAGNOSIS — Z79.01 LONG TERM (CURRENT) USE OF ANTICOAGULANTS: ICD-10-CM

## 2022-09-16 DIAGNOSIS — G45.9 TRANSIENT CEREBRAL ISCHEMIC ATTACK, UNSPECIFIED: ICD-10-CM

## 2022-09-16 DIAGNOSIS — Z95.2 PRESENCE OF PROSTHETIC HEART VALVE: ICD-10-CM

## 2022-09-16 DIAGNOSIS — Z98.89 OTHER SPECIFIED POSTPROCEDURAL STATES: Chronic | ICD-10-CM

## 2022-09-16 DIAGNOSIS — Z86.718 PERSONAL HISTORY OF OTHER VENOUS THROMBOSIS AND EMBOLISM: ICD-10-CM

## 2022-09-16 DIAGNOSIS — I09.9 RHEUMATIC HEART DISEASE, UNSPECIFIED: ICD-10-CM

## 2022-09-16 DIAGNOSIS — Z90.89 ACQUIRED ABSENCE OF OTHER ORGANS: Chronic | ICD-10-CM

## 2022-09-16 DIAGNOSIS — I25.10 ATHEROSCLEROTIC HEART DISEASE OF NATIVE CORONARY ARTERY WITHOUT ANGINA PECTORIS: ICD-10-CM

## 2022-09-16 DIAGNOSIS — Z86.69 PERSONAL HISTORY OF OTHER DISEASES OF THE NERVOUS SYSTEM AND SENSE ORGANS: Chronic | ICD-10-CM

## 2022-09-16 DIAGNOSIS — H26.9 UNSPECIFIED CATARACT: Chronic | ICD-10-CM

## 2022-09-20 ENCOUNTER — NON-APPOINTMENT (OUTPATIENT)
Age: 77
End: 2022-09-20

## 2022-09-30 ENCOUNTER — APPOINTMENT (OUTPATIENT)
Dept: INTERNAL MEDICINE | Facility: CLINIC | Age: 77
End: 2022-09-30

## 2022-09-30 ENCOUNTER — OUTPATIENT (OUTPATIENT)
Dept: OUTPATIENT SERVICES | Facility: HOSPITAL | Age: 77
LOS: 1 days | End: 2022-09-30

## 2022-09-30 VITALS — RESPIRATION RATE: 15 BRPM | HEART RATE: 70 BPM | OXYGEN SATURATION: 100 %

## 2022-09-30 DIAGNOSIS — H26.9 UNSPECIFIED CATARACT: Chronic | ICD-10-CM

## 2022-09-30 DIAGNOSIS — Z90.89 ACQUIRED ABSENCE OF OTHER ORGANS: Chronic | ICD-10-CM

## 2022-09-30 DIAGNOSIS — Z86.69 PERSONAL HISTORY OF OTHER DISEASES OF THE NERVOUS SYSTEM AND SENSE ORGANS: Chronic | ICD-10-CM

## 2022-09-30 DIAGNOSIS — Z98.890 OTHER SPECIFIED POSTPROCEDURAL STATES: Chronic | ICD-10-CM

## 2022-09-30 DIAGNOSIS — Z98.89 OTHER SPECIFIED POSTPROCEDURAL STATES: Chronic | ICD-10-CM

## 2022-10-03 DIAGNOSIS — R10.13 EPIGASTRIC PAIN: ICD-10-CM

## 2022-10-03 DIAGNOSIS — Z79.01 LONG TERM (CURRENT) USE OF ANTICOAGULANTS: ICD-10-CM

## 2022-10-07 ENCOUNTER — OUTPATIENT (OUTPATIENT)
Dept: OUTPATIENT SERVICES | Facility: HOSPITAL | Age: 77
LOS: 1 days | End: 2022-10-07

## 2022-10-07 ENCOUNTER — APPOINTMENT (OUTPATIENT)
Dept: INTERNAL MEDICINE | Facility: CLINIC | Age: 77
End: 2022-10-07

## 2022-10-07 VITALS — OXYGEN SATURATION: 99 % | HEART RATE: 72 BPM | TEMPERATURE: 96.3 F

## 2022-10-07 DIAGNOSIS — Z86.69 PERSONAL HISTORY OF OTHER DISEASES OF THE NERVOUS SYSTEM AND SENSE ORGANS: Chronic | ICD-10-CM

## 2022-10-07 DIAGNOSIS — Z51.81 ENCOUNTER FOR THERAPEUTIC DRUG LEVEL MONITORING: ICD-10-CM

## 2022-10-07 DIAGNOSIS — Z90.89 ACQUIRED ABSENCE OF OTHER ORGANS: Chronic | ICD-10-CM

## 2022-10-07 DIAGNOSIS — Z98.890 OTHER SPECIFIED POSTPROCEDURAL STATES: Chronic | ICD-10-CM

## 2022-10-07 DIAGNOSIS — Z98.89 OTHER SPECIFIED POSTPROCEDURAL STATES: Chronic | ICD-10-CM

## 2022-10-07 DIAGNOSIS — I48.91 UNSPECIFIED ATRIAL FIBRILLATION: ICD-10-CM

## 2022-10-07 DIAGNOSIS — H26.9 UNSPECIFIED CATARACT: Chronic | ICD-10-CM

## 2022-10-07 DIAGNOSIS — Z79.01 LONG TERM (CURRENT) USE OF ANTICOAGULANTS: ICD-10-CM

## 2022-10-07 LAB
ESTIMATED AVERAGE GLUCOSE: 148 MG/DL
HBA1C MFR BLD HPLC: 6.8 %
INR PPP: 2.4 RATIO
POCT-PROTHROMBIN TIME: 28.6 SECS
QUALITY CONTROL: YES

## 2022-10-11 LAB — H PYLORI AG STL QL: NEGATIVE

## 2022-10-12 DIAGNOSIS — Z95.2 PRESENCE OF PROSTHETIC HEART VALVE: ICD-10-CM

## 2022-10-12 DIAGNOSIS — I25.10 ATHEROSCLEROTIC HEART DISEASE OF NATIVE CORONARY ARTERY WITHOUT ANGINA PECTORIS: ICD-10-CM

## 2022-10-12 LAB
INR PPP: 2.4 RATIO
INR PPP: 2.9 RATIO
POCT-PROTHROMBIN TIME: 29 SECS
POCT-PROTHROMBIN TIME: 35.2 SECS
QUALITY CONTROL: YES
QUALITY CONTROL: YES

## 2022-10-13 ENCOUNTER — RX RENEWAL (OUTPATIENT)
Age: 77
End: 2022-10-13

## 2022-10-17 ENCOUNTER — APPOINTMENT (OUTPATIENT)
Dept: INTERNAL MEDICINE | Facility: CLINIC | Age: 77
End: 2022-10-17

## 2022-10-17 ENCOUNTER — OUTPATIENT (OUTPATIENT)
Dept: OUTPATIENT SERVICES | Facility: HOSPITAL | Age: 77
LOS: 1 days | End: 2022-10-17

## 2022-10-17 VITALS — OXYGEN SATURATION: 99 % | TEMPERATURE: 96.1 F | HEART RATE: 77 BPM

## 2022-10-17 DIAGNOSIS — Z98.89 OTHER SPECIFIED POSTPROCEDURAL STATES: Chronic | ICD-10-CM

## 2022-10-17 DIAGNOSIS — Z90.89 ACQUIRED ABSENCE OF OTHER ORGANS: Chronic | ICD-10-CM

## 2022-10-17 DIAGNOSIS — Z98.890 OTHER SPECIFIED POSTPROCEDURAL STATES: Chronic | ICD-10-CM

## 2022-10-17 DIAGNOSIS — H26.9 UNSPECIFIED CATARACT: Chronic | ICD-10-CM

## 2022-10-17 DIAGNOSIS — Z51.81 ENCOUNTER FOR THERAPEUTIC DRUG LEVEL MONITORING: ICD-10-CM

## 2022-10-17 DIAGNOSIS — Z86.69 PERSONAL HISTORY OF OTHER DISEASES OF THE NERVOUS SYSTEM AND SENSE ORGANS: Chronic | ICD-10-CM

## 2022-10-17 DIAGNOSIS — Z79.01 LONG TERM (CURRENT) USE OF ANTICOAGULANTS: ICD-10-CM

## 2022-10-17 LAB
INR PPP: 2.8 RATIO
POCT-PROTHROMBIN TIME: 33.5 SECS
QUALITY CONTROL: YES

## 2022-10-31 ENCOUNTER — OUTPATIENT (OUTPATIENT)
Dept: OUTPATIENT SERVICES | Facility: HOSPITAL | Age: 77
LOS: 1 days | End: 2022-10-31

## 2022-10-31 ENCOUNTER — APPOINTMENT (OUTPATIENT)
Dept: INTERNAL MEDICINE | Facility: CLINIC | Age: 77
End: 2022-10-31

## 2022-10-31 VITALS — TEMPERATURE: 98.1 F | OXYGEN SATURATION: 99 % | HEART RATE: 90 BPM

## 2022-10-31 DIAGNOSIS — Z98.890 OTHER SPECIFIED POSTPROCEDURAL STATES: Chronic | ICD-10-CM

## 2022-10-31 DIAGNOSIS — Z98.89 OTHER SPECIFIED POSTPROCEDURAL STATES: Chronic | ICD-10-CM

## 2022-10-31 DIAGNOSIS — Z51.81 ENCOUNTER FOR THERAPEUTIC DRUG LEVEL MONITORING: ICD-10-CM

## 2022-10-31 DIAGNOSIS — Z79.01 LONG TERM (CURRENT) USE OF ANTICOAGULANTS: ICD-10-CM

## 2022-10-31 DIAGNOSIS — Z86.69 PERSONAL HISTORY OF OTHER DISEASES OF THE NERVOUS SYSTEM AND SENSE ORGANS: Chronic | ICD-10-CM

## 2022-10-31 DIAGNOSIS — H26.9 UNSPECIFIED CATARACT: Chronic | ICD-10-CM

## 2022-10-31 DIAGNOSIS — Z90.89 ACQUIRED ABSENCE OF OTHER ORGANS: Chronic | ICD-10-CM

## 2022-10-31 LAB
INR PPP: 2.7 RATIO
POCT-PROTHROMBIN TIME: 32.8 SECS
QUALITY CONTROL: YES

## 2022-11-11 ENCOUNTER — APPOINTMENT (OUTPATIENT)
Dept: INTERNAL MEDICINE | Facility: CLINIC | Age: 77
End: 2022-11-11

## 2022-11-11 ENCOUNTER — OUTPATIENT (OUTPATIENT)
Dept: OUTPATIENT SERVICES | Facility: HOSPITAL | Age: 77
LOS: 1 days | End: 2022-11-11

## 2022-11-11 VITALS
HEIGHT: 69 IN | BODY MASS INDEX: 24.59 KG/M2 | OXYGEN SATURATION: 99 % | HEART RATE: 70 BPM | DIASTOLIC BLOOD PRESSURE: 70 MMHG | SYSTOLIC BLOOD PRESSURE: 122 MMHG | WEIGHT: 166 LBS | RESPIRATION RATE: 16 BRPM

## 2022-11-11 DIAGNOSIS — Z86.69 PERSONAL HISTORY OF OTHER DISEASES OF THE NERVOUS SYSTEM AND SENSE ORGANS: Chronic | ICD-10-CM

## 2022-11-11 DIAGNOSIS — H26.9 UNSPECIFIED CATARACT: Chronic | ICD-10-CM

## 2022-11-11 DIAGNOSIS — Z98.89 OTHER SPECIFIED POSTPROCEDURAL STATES: Chronic | ICD-10-CM

## 2022-11-11 DIAGNOSIS — Z98.890 OTHER SPECIFIED POSTPROCEDURAL STATES: Chronic | ICD-10-CM

## 2022-11-11 DIAGNOSIS — Z90.89 ACQUIRED ABSENCE OF OTHER ORGANS: Chronic | ICD-10-CM

## 2022-11-11 PROCEDURE — 99214 OFFICE O/P EST MOD 30 MIN: CPT | Mod: GC

## 2022-11-12 NOTE — PHYSICAL EXAM
[No Acute Distress] : no acute distress [Well Nourished] : well nourished [Well Developed] : well developed [Normal Sclera/Conjunctiva] : normal sclera/conjunctiva [PERRL] : pupils equal round and reactive to light [EOMI] : extraocular movements intact [Normal Outer Ear/Nose] : the outer ears and nose were normal in appearance [No Respiratory Distress] : no respiratory distress  [Clear to Auscultation] : lungs were clear to auscultation bilaterally [Normal Rate] : normal rate  [Regular Rhythm] : with a regular rhythm [No Edema] : there was no peripheral edema [No Extremity Clubbing/Cyanosis] : no extremity clubbing/cyanosis [Soft] : abdomen soft [Non Tender] : non-tender [Non-distended] : non-distended [Urethral Meatus] : meatus normal [Penis Abnormality] : normal uncircumcised penis [Testes Tenderness] : no tenderness of the testes [No CVA Tenderness] : no CVA  tenderness [No Joint Swelling] : no joint swelling [No Rash] : no rash [Coordination Grossly Intact] : coordination grossly intact [No Focal Deficits] : no focal deficits [Speech Grossly Normal] : speech grossly normal [Memory Grossly Normal] : memory grossly normal

## 2022-11-14 ENCOUNTER — APPOINTMENT (OUTPATIENT)
Dept: INTERNAL MEDICINE | Facility: CLINIC | Age: 77
End: 2022-11-14

## 2022-11-15 DIAGNOSIS — I10 ESSENTIAL (PRIMARY) HYPERTENSION: ICD-10-CM

## 2022-11-15 DIAGNOSIS — R07.89 OTHER CHEST PAIN: ICD-10-CM

## 2022-11-15 DIAGNOSIS — I48.91 UNSPECIFIED ATRIAL FIBRILLATION: ICD-10-CM

## 2022-11-15 DIAGNOSIS — E78.5 HYPERLIPIDEMIA, UNSPECIFIED: ICD-10-CM

## 2022-11-15 DIAGNOSIS — E11.9 TYPE 2 DIABETES MELLITUS WITHOUT COMPLICATIONS: ICD-10-CM

## 2022-11-15 DIAGNOSIS — E11.40 TYPE 2 DIABETES MELLITUS WITH DIABETIC NEUROPATHY, UNSPECIFIED: ICD-10-CM

## 2022-11-15 LAB — GLUCOSE BLDC GLUCOMTR-MCNC: 170

## 2022-11-15 NOTE — PLAN
[FreeTextEntry1] : \par Mr. 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 Requesting to be put back onto Glipizide as he believes it controls his glucose the best. Has a f/u visit scheduled 11/18 with Dr. Danica Yoder.\par - Will start pt on Repaglinide pre-meal as it is fast-acting and still Glipizide\par - Advised to keep log of all glucose checks for next week's visit\par - Advised to continue  Trulicity for now\par - Advised to bring Trulicity pen to visit next week\par - Please address these medication regimen changes with Dr. Yoder 11/18\par \par # Penile Irritation\par Pt endorsing increased itching at penile head, with smegma buildup. On physical exam, meatus without erythema and no signs of infection around penis. History also does not indicate any acute cystitis or STD as pt without dysuria or penile discharge. Pt uncircumcised.\par - Advised to clean penile head carefully every morning and maintain good hygiene\par - Clotrimazole ointment prescribed\par \par All other problems and plans as outlined in previous visit, and to be addressed at visit 11/18.\par \par Case discussed with the Dr. Edis Seay

## 2022-11-15 NOTE — HISTORY OF PRESENT ILLNESS
[FreeTextEntry1] : disagrees with DMT2 medication regimen [de-identified] : Mr. 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 Also believes that the Trulicity is not working properly as he does not feel the needle enter the injection site and his blood glucose has been high. Did not bring the trulicity pens as requested. Requests to be re-started on Glipizide, previously recommended to stop as he is experiencing headaches and dizziness with bending. Says he has checked his blood glucose and pressures when he gets these episodes, and they are normal. Does not take Jardiance any longer. Previous Trulicity dose on Tue, 11/08 per patient.\par \par # Penile Itching\par Also endorses increased itching around head of penis. Has noticed increased smegma buildup. Says he is cleaning it effectively in the morning. Denies discharge from urethra, burning with urination, blood in urine. Not currently sexually active with his wife.

## 2022-11-18 ENCOUNTER — LABORATORY RESULT (OUTPATIENT)
Age: 77
End: 2022-11-18

## 2022-11-18 ENCOUNTER — OUTPATIENT (OUTPATIENT)
Dept: OUTPATIENT SERVICES | Facility: HOSPITAL | Age: 77
LOS: 1 days | End: 2022-11-18

## 2022-11-18 ENCOUNTER — APPOINTMENT (OUTPATIENT)
Dept: INTERNAL MEDICINE | Facility: CLINIC | Age: 77
End: 2022-11-18

## 2022-11-18 ENCOUNTER — RESULT CHARGE (OUTPATIENT)
Age: 77
End: 2022-11-18

## 2022-11-18 VITALS
DIASTOLIC BLOOD PRESSURE: 75 MMHG | BODY MASS INDEX: 24.59 KG/M2 | SYSTOLIC BLOOD PRESSURE: 122 MMHG | RESPIRATION RATE: 16 BRPM | OXYGEN SATURATION: 98 % | WEIGHT: 166 LBS | HEART RATE: 69 BPM | HEIGHT: 69 IN

## 2022-11-18 DIAGNOSIS — Z98.89 OTHER SPECIFIED POSTPROCEDURAL STATES: Chronic | ICD-10-CM

## 2022-11-18 DIAGNOSIS — H26.9 UNSPECIFIED CATARACT: Chronic | ICD-10-CM

## 2022-11-18 DIAGNOSIS — Z86.69 PERSONAL HISTORY OF OTHER DISEASES OF THE NERVOUS SYSTEM AND SENSE ORGANS: Chronic | ICD-10-CM

## 2022-11-18 DIAGNOSIS — Z98.890 OTHER SPECIFIED POSTPROCEDURAL STATES: Chronic | ICD-10-CM

## 2022-11-18 DIAGNOSIS — Z90.89 ACQUIRED ABSENCE OF OTHER ORGANS: Chronic | ICD-10-CM

## 2022-11-18 LAB
INR PPP: 3.2 RATIO
POCT-PROTHROMBIN TIME: 38.2 SECS
QUALITY CONTROL: YES

## 2022-11-18 PROCEDURE — 99214 OFFICE O/P EST MOD 30 MIN: CPT | Mod: GC

## 2022-11-18 RX ORDER — EMPAGLIFLOZIN 10 MG/1
10 TABLET, FILM COATED ORAL DAILY
Qty: 30 | Refills: 0 | Status: DISCONTINUED | COMMUNITY
Start: 2022-07-18 | End: 2022-11-18

## 2022-11-18 RX ORDER — DULAGLUTIDE 0.75 MG/.5ML
0.75 INJECTION, SOLUTION SUBCUTANEOUS
Qty: 2 | Refills: 0 | Status: DISCONTINUED | COMMUNITY
Start: 2021-11-16 | End: 2022-11-18

## 2022-11-18 RX ORDER — SILDENAFIL 100 MG/1
100 TABLET, FILM COATED ORAL
Qty: 10 | Refills: 0 | Status: DISCONTINUED | COMMUNITY
Start: 2022-07-18 | End: 2022-11-18

## 2022-11-21 LAB
ALBUMIN SERPL ELPH-MCNC: 4.8 G/DL
ALP BLD-CCNC: 103 U/L
ALT SERPL-CCNC: 29 U/L
ANION GAP SERPL CALC-SCNC: 12 MMOL/L
APPEARANCE: CLEAR
AST SERPL-CCNC: 20 U/L
BACTERIA UR CULT: NORMAL
BILIRUB SERPL-MCNC: 4.5 MG/DL
BILIRUBIN URINE: NEGATIVE
BLOOD URINE: NEGATIVE
BUN SERPL-MCNC: 19 MG/DL
CALCIUM SERPL-MCNC: 10.1 MG/DL
CHLORIDE SERPL-SCNC: 103 MMOL/L
CHOLEST SERPL-MCNC: 95 MG/DL
CO2 SERPL-SCNC: 25 MMOL/L
COLOR: ABNORMAL
CREAT SERPL-MCNC: 1.14 MG/DL
EGFR: 67 ML/MIN/1.73M2
GLUCOSE QUALITATIVE U: ABNORMAL
GLUCOSE SERPL-MCNC: 177 MG/DL
HDLC SERPL-MCNC: 41 MG/DL
KETONES URINE: NEGATIVE
LDLC SERPL CALC-MCNC: 32 MG/DL
LEUKOCYTE ESTERASE URINE: NEGATIVE
NITRITE URINE: NEGATIVE
NONHDLC SERPL-MCNC: 55 MG/DL
PH URINE: 6
POTASSIUM SERPL-SCNC: 4.2 MMOL/L
PROT SERPL-MCNC: 7.6 G/DL
PROTEIN URINE: ABNORMAL
SODIUM SERPL-SCNC: 139 MMOL/L
SPECIFIC GRAVITY URINE: 1.02
TRIGL SERPL-MCNC: 113 MG/DL
UROBILINOGEN URINE: NORMAL

## 2022-11-22 DIAGNOSIS — R30.0 DYSURIA: ICD-10-CM

## 2022-11-22 DIAGNOSIS — E11.9 TYPE 2 DIABETES MELLITUS WITHOUT COMPLICATIONS: ICD-10-CM

## 2022-11-22 DIAGNOSIS — I48.91 UNSPECIFIED ATRIAL FIBRILLATION: ICD-10-CM

## 2022-11-22 DIAGNOSIS — J06.9 ACUTE UPPER RESPIRATORY INFECTION, UNSPECIFIED: ICD-10-CM

## 2022-11-22 DIAGNOSIS — E78.5 HYPERLIPIDEMIA, UNSPECIFIED: ICD-10-CM

## 2022-11-22 DIAGNOSIS — I10 ESSENTIAL (PRIMARY) HYPERTENSION: ICD-10-CM

## 2022-11-22 DIAGNOSIS — Z51.81 ENCOUNTER FOR THERAPEUTIC DRUG LEVEL MONITORING: ICD-10-CM

## 2022-11-22 DIAGNOSIS — E04.2 NONTOXIC MULTINODULAR GOITER: ICD-10-CM

## 2022-11-22 NOTE — PHYSICAL EXAM
[No Acute Distress] : no acute distress [Well Nourished] : well nourished [Well Developed] : well developed [Well-Appearing] : well-appearing [Normal Sclera/Conjunctiva] : normal sclera/conjunctiva [EOMI] : extraocular movements intact [Normal Oropharynx] : the oropharynx was normal [No JVD] : no jugular venous distention [No Lymphadenopathy] : no lymphadenopathy [No Respiratory Distress] : no respiratory distress  [No Accessory Muscle Use] : no accessory muscle use [Clear to Auscultation] : lungs were clear to auscultation bilaterally [Normal S1, S2] : normal S1 and S2 [Soft] : abdomen soft [Non Tender] : non-tender [Non-distended] : non-distended [No CVA Tenderness] : no CVA  tenderness [No Joint Swelling] : no joint swelling [Grossly Normal Strength/Tone] : grossly normal strength/tone [Coordination Grossly Intact] : coordination grossly intact [No Focal Deficits] : no focal deficits [Normal Gait] : normal gait [Normal Affect] : the affect was normal [Normal Insight/Judgement] : insight and judgment were intact [de-identified] : s/p tonsillectomy  [de-identified] : prominent large thyroid tissue right neck, soft, non-tender.  [de-identified] : irregular  [de-identified] : strength 5/5 in UE and LE; sensation intact

## 2022-11-22 NOTE — REVIEW OF SYSTEMS
[Sore Throat] : sore throat [Cough] : cough [Dysuria] : dysuria [Nocturia] : nocturia [Frequency] : frequency [Fever] : no fever [Chills] : no chills [Night Sweats] : no night sweats [Recent Change In Weight] : ~T no recent weight change [Discharge] : no discharge [Pain] : no pain [Redness] : no redness [Vision Problems] : no vision problems [Earache] : no earache [Hearing Loss] : no hearing loss [Nasal Discharge] : no nasal discharge [Chest Pain] : no chest pain [Palpitations] : no palpitations [Claudication] : no  leg claudication [Lower Ext Edema] : no lower extremity edema [Shortness Of Breath] : no shortness of breath [Wheezing] : no wheezing [Dyspnea on Exertion] : not dyspnea on exertion [Abdominal Pain] : no abdominal pain [Nausea] : no nausea [Constipation] : no constipation [Vomiting] : no vomiting [Incontinence] : no incontinence [Hesitancy] : no hesitancy [Hematuria] : no hematuria [Itching] : no itching [Skin Rash] : no skin rash [Headache] : no headache [Dizziness] : no dizziness [Fainting] : no fainting [Confusion] : no confusion [Insomnia] : no insomnia [Anxiety] : no anxiety [Easy Bleeding] : no easy bleeding [Easy Bruising] : no easy bruising

## 2022-11-22 NOTE — ASSESSMENT
[FreeTextEntry1] : #HCM\par -Colonoscopy: aged out \par -Pneumo, Tdap, Zoster, Flu: UTD \par -COVID-19: received all 3 doses \par - Labs: today \par \par Case discussed with Dr. Seay \par \par RTC in 1 month with me for f/u \par \par Danica Yoder\par PGY-2 [employer] [facility]

## 2022-11-22 NOTE — HISTORY OF PRESENT ILLNESS
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 Was seen in the clinic last week for penile itching - reports improvement after anti-fungal therapy. \par \par Notes sore throat x4 days with intermittent productive cough with white sputum. Denies fevers/chills. No dysphagia/odynophagia.No sick contacts. No travel hx likely viral \par \par Also has dysuria for the past couple of weeks in the context of balanitis noticed on prior exam with increased frequency. Denies hematuria, no pyuria, no malodorous urine. likely UTI vs symptoms a/w balanitis \par \par # T2DM: Patient believes jardiance is giving him GI sxts and has self-discontinued it. Was prescribed repaglinide at last visit which he states has not helped his FSG. Also believes that the Trulicity is not working properly as he does not feel the needle enter the injection site and his blood glucose has been high. Did not bring the trulicity pens as requested. Requests to be re-started on Glipizide. Reports FSG have been in the 170's consistently. \par \par #HTN: compliant with losartan 50. Not checking BP's at home.\par \par #CAD s/p CABG: follows with cardiology. No acute issues at this time. Can walk ~30 mins daily and can walk 5 flights of stairs. Denies orthopnea, PND. Reports good adherence with medications . \par \par #RHD s/p MVR: INR therapeutic at 3.2. Denies melena, hematochezia, hematuria. Denies headaches, focal neurologic deficits. \par \par #A. fib: rate-controlled. Denies palpitations. \par \par #Thyroid Nodules: underwent NM scan that showed abnormal uptake. Underwent FNA on 5/24 by endocrine team with non-diagnostic pathology and was recommended to go for repeat FNA biopsy. Pt states he is frustrated with the thyroid w/u. Will task  to make him an appointment \par \par Denies unintentional weight loss, palpitations, heat/cold intolerance.

## 2022-11-23 ENCOUNTER — NON-APPOINTMENT (OUTPATIENT)
Age: 77
End: 2022-11-23

## 2022-11-29 ENCOUNTER — APPOINTMENT (OUTPATIENT)
Dept: INTERNAL MEDICINE | Facility: CLINIC | Age: 77
End: 2022-11-29

## 2022-11-29 ENCOUNTER — OUTPATIENT (OUTPATIENT)
Dept: OUTPATIENT SERVICES | Facility: HOSPITAL | Age: 77
LOS: 1 days | End: 2022-11-29

## 2022-11-29 VITALS
HEIGHT: 69 IN | HEART RATE: 70 BPM | SYSTOLIC BLOOD PRESSURE: 124 MMHG | WEIGHT: 163 LBS | OXYGEN SATURATION: 99 % | BODY MASS INDEX: 24.14 KG/M2 | RESPIRATION RATE: 16 BRPM | DIASTOLIC BLOOD PRESSURE: 82 MMHG | TEMPERATURE: 98.2 F

## 2022-11-29 DIAGNOSIS — Z98.89 OTHER SPECIFIED POSTPROCEDURAL STATES: Chronic | ICD-10-CM

## 2022-11-29 DIAGNOSIS — Z86.69 PERSONAL HISTORY OF OTHER DISEASES OF THE NERVOUS SYSTEM AND SENSE ORGANS: Chronic | ICD-10-CM

## 2022-11-29 DIAGNOSIS — H26.9 UNSPECIFIED CATARACT: Chronic | ICD-10-CM

## 2022-11-29 DIAGNOSIS — Z90.89 ACQUIRED ABSENCE OF OTHER ORGANS: Chronic | ICD-10-CM

## 2022-11-29 DIAGNOSIS — Z98.890 OTHER SPECIFIED POSTPROCEDURAL STATES: Chronic | ICD-10-CM

## 2022-11-29 PROCEDURE — 99214 OFFICE O/P EST MOD 30 MIN: CPT | Mod: GC

## 2022-11-30 DIAGNOSIS — N48.89 OTHER SPECIFIED DISORDERS OF PENIS: ICD-10-CM

## 2022-11-30 NOTE — PHYSICAL EXAM
[No Acute Distress] : no acute distress [Well Nourished] : well nourished [Normal Sclera/Conjunctiva] : normal sclera/conjunctiva [No Respiratory Distress] : no respiratory distress  [No Accessory Muscle Use] : no accessory muscle use [Clear to Auscultation] : lungs were clear to auscultation bilaterally [Normal Rate] : normal rate  [Normal S1, S2] : normal S1 and S2 [Soft] : abdomen soft [Non Tender] : non-tender [Non-distended] : non-distended [de-identified] : On retraction of foreskin, slightly erythematous ring around corona, no smegma, no ttp. Normal meatus and testes/epididymis

## 2022-11-30 NOTE — HISTORY OF PRESENT ILLNESS
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 \par \par Patient did have recent visit 11/11 for similar complaints and was prescribed clotrimazole ointment for suspected balanitis. Came in for PCP visit and had dysuria, was started on macrobid 5 days. U/A and culture were negative. Pt has been using ointment every day with no improvement and reports continued redness and irritation under the foreskin of his penis (stopped taking yesterday). Denies dysuria, urgency/frequency, hematuria, penile discharge, abdominal pain, scrotal pain sexual encounters at this time. Reports that he practices proper hygiene of the area. \par \par

## 2022-12-01 ENCOUNTER — NON-APPOINTMENT (OUTPATIENT)
Age: 77
End: 2022-12-01

## 2022-12-02 ENCOUNTER — APPOINTMENT (OUTPATIENT)
Dept: INTERNAL MEDICINE | Facility: CLINIC | Age: 77
End: 2022-12-02

## 2022-12-05 ENCOUNTER — APPOINTMENT (OUTPATIENT)
Dept: UROLOGY | Facility: CLINIC | Age: 77
End: 2022-12-05

## 2022-12-06 ENCOUNTER — APPOINTMENT (OUTPATIENT)
Dept: UROLOGY | Facility: CLINIC | Age: 77
End: 2022-12-06

## 2022-12-07 ENCOUNTER — NON-APPOINTMENT (OUTPATIENT)
Age: 77
End: 2022-12-07

## 2022-12-07 NOTE — ED ADULT NURSE NOTE - NSIMPLEMENTINTERV_GEN_ALL_ED
Specific interventions were implemented: Debridement Text: The wound edges were debrided prior to proceeding with the closure to facilitate wound healing.

## 2022-12-12 ENCOUNTER — APPOINTMENT (OUTPATIENT)
Dept: UROLOGY | Facility: CLINIC | Age: 77
End: 2022-12-12
Payer: MEDICARE

## 2022-12-12 LAB
BILIRUB UR QL STRIP: NEGATIVE
CLARITY UR: CLEAR
COLLECTION METHOD: NORMAL
GLUCOSE UR-MCNC: NEGATIVE
HCG UR QL: 1 EU/DL
HGB UR QL STRIP.AUTO: NEGATIVE
KETONES UR-MCNC: NEGATIVE
LEUKOCYTE ESTERASE UR QL STRIP: NEGATIVE
NITRITE UR QL STRIP: NEGATIVE
PH UR STRIP: 6
PROT UR STRIP-MCNC: NEGATIVE
SP GR UR STRIP: 1.01

## 2022-12-12 PROCEDURE — 81003 URINALYSIS AUTO W/O SCOPE: CPT | Mod: QW

## 2022-12-12 PROCEDURE — 51798 US URINE CAPACITY MEASURE: CPT

## 2022-12-12 PROCEDURE — 99214 OFFICE O/P EST MOD 30 MIN: CPT

## 2022-12-12 NOTE — HISTORY OF PRESENT ILLNESS
[FreeTextEntry1] : Reason for Visit: 3-Month Follow-Up S/P TURP\par \par The patient is a 76-year-old returning gentleman who underwent TURP on 3/3/2022.  He has a long history of erectile dysfunction he reports in the past trying oral medications with some success and also trying injection therapy with moderate success.  Now after his transurethral section of the prostate he wants to revisit erectile function.  Benign pathology. He last presented on 4/6/2022  complaining of frequency, burning, urgency, and dysuria. Post-void residual on bladder scan on that date was 78 cc.  He reports an excellent urinary stream with no incontinence.\par \par Today's postvoid residual is 0.\par \par Post-void residual on bladder scan today was 0 cc. He presents today complaining of limited erectile activity. I prescribed him Sildenafil during the last visit, but there was a problem and he was not able to retrieve it. He states that he has taken Sildenafil in the past, but it did not work very well. He wants to give it another try, so I will prescribe 100 mg tablets. I advised him to take it on an empty stomach. His urinary stream is strong and normal. He also reports back pain, and I advised him to follow-up with his PCP if it persists.\par \par LABS:\par A1c 6.9% on 5/25/2022\par UA on 3/25/2022: Slightly Turbid Urine Appearance, Large Leukocyte Esterase Concentration, U  /HPF, Red Blood Cell - Urine 55 /HPF, Few Bacteria, Protein 30 mg/dL, Glucose >= 1000 mg/dL, Large Blood\par \par RADIOLOGY:\par No recent relevant imaging to review or discuss\par \par Plan: I will prescribe Sildenafil 100 mg, if he fails oral medications we will refer him to our men's health clinic for injection therapy and possible consideration of penile implant.\par \par \par 12/12/2022: Reason for Visit: Evaluation of Penis irritation, urinary frequency and ED \par \par Mr. NOLAN is a 77 year old male presenting today for penis irritation. He c o of itching on the tip of his penis and shaft. His PCP prescribed him with a cream with provided no relief. The itching has subsided today. I suggested he uses Orange dial soap.  He also c/o urinary frequency every two hours at night, which prevents him from sleeping. His PVR today is 250 cc.  after second void was 2 cc, has 1+ glusoe in urine today he reports Sildenafil has not provided relief for his ED. He is agreeable to try  Injection. \par \par Assessment: Failed oral meds for ED wants injection or penile implant, elevated BG level will conribute to urinary frequency, cysto for evaluaitn of scar tisseu at the [facility] \par \par Plan: Pt provided urine specimen for UA . He will provide blood specimen today for BMP, HA1C. Referral to Dr Kenny for Injection training to manage ED. Will consider amitriptyline for sleep. He will try Orange Dial Soap to manage penis itching. He will have a cystoscopy. Follow up for cystoscopy. \par \par I have discussed at length the risks and benefits and rationale behind the procedure and the patient seems to understand and wants to proceed.\par \par I counseled the patient. I discussed the various etiologies of his symptoms. Risks and alternatives were discussed. I answered the patient questions. The patient will follow-up as directed and will contact me with any questions or concerns. Thank you for the opportunity to participate in the care of this patient. I'll keep you updated on his progress.

## 2022-12-12 NOTE — ADDENDUM
[FreeTextEntry1] : I, Mahesh Gutierrez, acted solely as a scribe for Dr. Ashish Manrique on 12/12/2022.

## 2022-12-13 ENCOUNTER — NON-APPOINTMENT (OUTPATIENT)
Age: 77
End: 2022-12-13

## 2022-12-13 LAB
ANION GAP SERPL CALC-SCNC: 12 MMOL/L
BUN SERPL-MCNC: 19 MG/DL
CALCIUM SERPL-MCNC: 10.1 MG/DL
CHLORIDE SERPL-SCNC: 105 MMOL/L
CO2 SERPL-SCNC: 24 MMOL/L
CREAT SERPL-MCNC: 1.16 MG/DL
EGFR: 65 ML/MIN/1.73M2
ESTIMATED AVERAGE GLUCOSE: 171 MG/DL
GLUCOSE SERPL-MCNC: 215 MG/DL
HBA1C MFR BLD HPLC: 7.6 %
POTASSIUM SERPL-SCNC: 4.5 MMOL/L
SODIUM SERPL-SCNC: 141 MMOL/L

## 2022-12-13 NOTE — ASU PATIENT PROFILE, ADULT - HEALTHCARE INFORMATION NEEDED, PROFILE
escitalopram 5 mg oral tablet: 1 tab(s) orally once a day  famotidine 40 mg oral tablet: 1 tab(s) orally once a day  pantoprazole 40 mg oral delayed release tablet: 1 tab(s) orally once a day (before a meal)  simvastatin 10 mg oral tablet: 1 tab(s) orally once a day (at bedtime)  tamsulosin 0.4 mg oral capsule: 1 cap(s) orally once a day (at bedtime)  testosterone topical: daily   none escitalopram 5 mg oral tablet: 1 tab(s) orally once a day  famotidine 40 mg oral tablet: 1 tab(s) orally once a day  pantoprazole 40 mg oral delayed release tablet: 1 tab(s) orally once a day (before a meal)  simvastatin 10 mg oral tablet: 1 tab(s) orally once a day (at bedtime)  tamsulosin 0.4 mg oral capsule: 1 cap(s) orally once a day (at bedtime)  testosterone topical: daily  trimethoprim 100 mg oral tablet: 1 tab(s) orally every 12 hours for 7 days

## 2022-12-14 ENCOUNTER — OUTPATIENT (OUTPATIENT)
Dept: OUTPATIENT SERVICES | Facility: HOSPITAL | Age: 77
LOS: 1 days | End: 2022-12-14

## 2022-12-14 ENCOUNTER — APPOINTMENT (OUTPATIENT)
Dept: INTERNAL MEDICINE | Facility: CLINIC | Age: 77
End: 2022-12-14

## 2022-12-14 VITALS — RESPIRATION RATE: 16 BRPM | HEART RATE: 70 BPM | OXYGEN SATURATION: 99 %

## 2022-12-14 LAB
INR PPP: 2.8 RATIO
POCT-PROTHROMBIN TIME: 33.9 SECS

## 2022-12-14 RX ORDER — DULAGLUTIDE 1.5 MG/.5ML
1.5 INJECTION, SOLUTION SUBCUTANEOUS
Qty: 4 | Refills: 3 | Status: DISCONTINUED | COMMUNITY
Start: 2022-12-14 | End: 2022-12-14

## 2022-12-14 RX ORDER — DULAGLUTIDE 1.5 MG/.5ML
1.5 INJECTION, SOLUTION SUBCUTANEOUS
Qty: 5 | Refills: 4 | Status: DISCONTINUED | COMMUNITY
Start: 2021-11-30 | End: 2022-12-14

## 2022-12-15 DIAGNOSIS — Z79.01 LONG TERM (CURRENT) USE OF ANTICOAGULANTS: ICD-10-CM

## 2022-12-15 DIAGNOSIS — Z51.81 ENCOUNTER FOR THERAPEUTIC DRUG LEVEL MONITORING: ICD-10-CM

## 2022-12-23 ENCOUNTER — APPOINTMENT (OUTPATIENT)
Dept: INTERNAL MEDICINE | Facility: CLINIC | Age: 77
End: 2022-12-23

## 2022-12-23 ENCOUNTER — OUTPATIENT (OUTPATIENT)
Dept: OUTPATIENT SERVICES | Facility: HOSPITAL | Age: 77
LOS: 1 days | End: 2022-12-23

## 2022-12-23 VITALS
RESPIRATION RATE: 16 BRPM | HEART RATE: 69 BPM | OXYGEN SATURATION: 97 % | BODY MASS INDEX: 24.14 KG/M2 | WEIGHT: 163 LBS | DIASTOLIC BLOOD PRESSURE: 90 MMHG | HEIGHT: 69 IN | SYSTOLIC BLOOD PRESSURE: 118 MMHG

## 2022-12-23 DIAGNOSIS — Z86.69 PERSONAL HISTORY OF OTHER DISEASES OF THE NERVOUS SYSTEM AND SENSE ORGANS: Chronic | ICD-10-CM

## 2022-12-23 DIAGNOSIS — H26.9 UNSPECIFIED CATARACT: Chronic | ICD-10-CM

## 2022-12-23 DIAGNOSIS — Z98.890 OTHER SPECIFIED POSTPROCEDURAL STATES: Chronic | ICD-10-CM

## 2022-12-23 DIAGNOSIS — Z90.89 ACQUIRED ABSENCE OF OTHER ORGANS: Chronic | ICD-10-CM

## 2022-12-23 DIAGNOSIS — Z98.89 OTHER SPECIFIED POSTPROCEDURAL STATES: Chronic | ICD-10-CM

## 2022-12-23 LAB
INR PPP: 3.4 RATIO
POCT-PROTHROMBIN TIME: 40.9 SECS
QUALITY CONTROL: YES

## 2022-12-23 PROCEDURE — 99214 OFFICE O/P EST MOD 30 MIN: CPT | Mod: GC

## 2022-12-23 RX ORDER — NITROFURANTOIN (MONOHYDRATE/MACROCRYSTALS) 25; 75 MG/1; MG/1
100 CAPSULE ORAL
Qty: 10 | Refills: 0 | Status: DISCONTINUED | COMMUNITY
Start: 2022-11-18 | End: 2022-12-23

## 2022-12-23 RX ORDER — TADALAFIL 20 MG/1
20 TABLET ORAL
Qty: 30 | Refills: 0 | Status: DISCONTINUED | COMMUNITY
Start: 2020-01-23 | End: 2022-12-23

## 2022-12-23 NOTE — PHYSICAL EXAM
[No Acute Distress] : no acute distress [Well Nourished] : well nourished [Well Developed] : well developed [Well-Appearing] : well-appearing [Normal Sclera/Conjunctiva] : normal sclera/conjunctiva [EOMI] : extraocular movements intact [Normal Oropharynx] : the oropharynx was normal [No JVD] : no jugular venous distention [No Lymphadenopathy] : no lymphadenopathy [No Respiratory Distress] : no respiratory distress  [No Accessory Muscle Use] : no accessory muscle use [Clear to Auscultation] : lungs were clear to auscultation bilaterally [Normal S1, S2] : normal S1 and S2 [Soft] : abdomen soft [Non Tender] : non-tender [Non-distended] : non-distended [No CVA Tenderness] : no CVA  tenderness [No Joint Swelling] : no joint swelling [Grossly Normal Strength/Tone] : grossly normal strength/tone [Coordination Grossly Intact] : coordination grossly intact [No Focal Deficits] : no focal deficits [Normal Gait] : normal gait [Normal Affect] : the affect was normal [Normal Insight/Judgement] : insight and judgment were intact [de-identified] : s/p tonsillectomy  [de-identified] : prominent large thyroid tissue right neck, soft, non-tender.  [de-identified] : irregular  [de-identified] : strength 5/5 in UE and LE; sensation intact

## 2022-12-23 NOTE — REVIEW OF SYSTEMS
[Fever] : no fever [Chills] : no chills [Night Sweats] : no night sweats [Recent Change In Weight] : ~T no recent weight change [Discharge] : no discharge [Pain] : no pain [Redness] : no redness [Vision Problems] : no vision problems [Earache] : no earache [Hearing Loss] : no hearing loss [Nasal Discharge] : no nasal discharge [Sore Throat] : no sore throat [Chest Pain] : no chest pain [Palpitations] : no palpitations [Claudication] : no  leg claudication [Lower Ext Edema] : no lower extremity edema [Shortness Of Breath] : no shortness of breath [Wheezing] : no wheezing [Cough] : no cough [Dyspnea on Exertion] : not dyspnea on exertion [Abdominal Pain] : no abdominal pain [Nausea] : no nausea [Constipation] : no constipation [Vomiting] : no vomiting [Dysuria] : no dysuria [Incontinence] : no incontinence [Hesitancy] : no hesitancy [Nocturia] : no nocturia [Hematuria] : no hematuria [Frequency] : no frequency [Itching] : no itching [Skin Rash] : no skin rash [Headache] : no headache [Dizziness] : no dizziness [Fainting] : no fainting [Confusion] : no confusion [Insomnia] : no insomnia [Anxiety] : no anxiety [Easy Bleeding] : no easy bleeding [Easy Bruising] : no easy bruising [FreeTextEntry8] : + penile itching

## 2022-12-23 NOTE — ASSESSMENT
[FreeTextEntry1] : #HCM\par -Colonoscopy: aged out \par -Pneumo, Tdap, Zoster, Flu: UTD \par -COVID-19: received all 3 doses \par - Labs: UTD \par \par Case discussed with Dr. Seay \par \par RTC in 1 month with me for f/u of chronic medical conditions and 2 weeks for INR check \par \par Danica Yoder\par PGY-2 [facility] [employer] [facility].

## 2022-12-23 NOTE — HISTORY OF PRESENT ILLNESS
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 Pt seen by urology clinic last week - was given antibacterial soap however pt reports he continues to have same penile itching. \par \par # T2DM: Pt self-discontinued jardiance. Reports good compliance with trulicity and glipizide, which was re-started at last visit per pt request. Reports FSG in the 120's-130's at home. \par \par #HTN: compliant with losartan 50. Not checking BP's at home.\par \par #CAD s/p CABG: follows with cardiology. No acute issues at this time. Can walk ~30 mins daily and can walk 5 flights of stairs. Denies orthopnea, PND. Reports good adherence with medications. \par \par #RHD s/p MVR: INR therapeutic at 3.4. Denies melena, hematochezia, hematuria. Denies headaches, focal neurologic deficits. \par \par #A. fib: rate-controlled. Denies palpitations. \par \par #Thyroid Nodules: underwent NM scan that showed abnormal uptake. Underwent FNA on 5/24 by endocrine team with non-diagnostic pathology and was recommended to go for repeat FNA biopsy. Pt states he is frustrated with the thyroid w/u. Pt has upcoming appt in Jan. \par \par Denies unintentional weight loss, palpitations, heat/cold intolerance.

## 2022-12-27 DIAGNOSIS — E11.9 TYPE 2 DIABETES MELLITUS WITHOUT COMPLICATIONS: ICD-10-CM

## 2022-12-27 DIAGNOSIS — I10 ESSENTIAL (PRIMARY) HYPERTENSION: ICD-10-CM

## 2022-12-27 DIAGNOSIS — Z51.81 ENCOUNTER FOR THERAPEUTIC DRUG LEVEL MONITORING: ICD-10-CM

## 2022-12-27 DIAGNOSIS — N48.89 OTHER SPECIFIED DISORDERS OF PENIS: ICD-10-CM

## 2022-12-27 DIAGNOSIS — E04.2 NONTOXIC MULTINODULAR GOITER: ICD-10-CM

## 2022-12-27 DIAGNOSIS — Z79.01 LONG TERM (CURRENT) USE OF ANTICOAGULANTS: ICD-10-CM

## 2022-12-27 DIAGNOSIS — I48.91 UNSPECIFIED ATRIAL FIBRILLATION: ICD-10-CM

## 2022-12-27 DIAGNOSIS — E78.5 HYPERLIPIDEMIA, UNSPECIFIED: ICD-10-CM

## 2023-01-03 ENCOUNTER — RX RENEWAL (OUTPATIENT)
Age: 78
End: 2023-01-03

## 2023-01-06 ENCOUNTER — OUTPATIENT (OUTPATIENT)
Dept: OUTPATIENT SERVICES | Facility: HOSPITAL | Age: 78
LOS: 1 days | End: 2023-01-06

## 2023-01-06 ENCOUNTER — APPOINTMENT (OUTPATIENT)
Dept: INTERNAL MEDICINE | Facility: CLINIC | Age: 78
End: 2023-01-06

## 2023-01-06 ENCOUNTER — RESULT CHARGE (OUTPATIENT)
Age: 78
End: 2023-01-06

## 2023-01-06 ENCOUNTER — NON-APPOINTMENT (OUTPATIENT)
Age: 78
End: 2023-01-06

## 2023-01-06 VITALS — HEART RATE: 72 BPM | OXYGEN SATURATION: 99 %

## 2023-01-06 DIAGNOSIS — Z90.89 ACQUIRED ABSENCE OF OTHER ORGANS: Chronic | ICD-10-CM

## 2023-01-06 DIAGNOSIS — Z51.81 ENCOUNTER FOR THERAPEUTIC DRUG LEVEL MONITORING: ICD-10-CM

## 2023-01-06 DIAGNOSIS — Z95.2 PRESENCE OF PROSTHETIC HEART VALVE: ICD-10-CM

## 2023-01-06 DIAGNOSIS — H26.9 UNSPECIFIED CATARACT: Chronic | ICD-10-CM

## 2023-01-06 DIAGNOSIS — Z79.01 LONG TERM (CURRENT) USE OF ANTICOAGULANTS: ICD-10-CM

## 2023-01-06 DIAGNOSIS — Z98.89 OTHER SPECIFIED POSTPROCEDURAL STATES: Chronic | ICD-10-CM

## 2023-01-06 DIAGNOSIS — I48.91 UNSPECIFIED ATRIAL FIBRILLATION: ICD-10-CM

## 2023-01-06 DIAGNOSIS — Z86.69 PERSONAL HISTORY OF OTHER DISEASES OF THE NERVOUS SYSTEM AND SENSE ORGANS: Chronic | ICD-10-CM

## 2023-01-06 DIAGNOSIS — Z98.890 OTHER SPECIFIED POSTPROCEDURAL STATES: Chronic | ICD-10-CM

## 2023-01-06 LAB
INR PPP: 2.7 RATIO
POCT-PROTHROMBIN TIME: 32.2 SECS
QUALITY CONTROL: YES

## 2023-01-09 ENCOUNTER — APPOINTMENT (OUTPATIENT)
Dept: UROLOGY | Facility: CLINIC | Age: 78
End: 2023-01-09
Payer: MEDICARE

## 2023-01-09 PROCEDURE — 81003 URINALYSIS AUTO W/O SCOPE: CPT | Mod: QW

## 2023-01-09 PROCEDURE — 51798 US URINE CAPACITY MEASURE: CPT

## 2023-01-09 PROCEDURE — 99213 OFFICE O/P EST LOW 20 MIN: CPT

## 2023-01-11 LAB
ANION GAP SERPL CALC-SCNC: 9 MMOL/L
BILIRUB UR QL STRIP: NEGATIVE
BUN SERPL-MCNC: 26 MG/DL
CALCIUM SERPL-MCNC: 9.5 MG/DL
CHLORIDE SERPL-SCNC: 106 MMOL/L
CLARITY UR: CLEAR
CO2 SERPL-SCNC: 26 MMOL/L
COLLECTION METHOD: NORMAL
CREAT SERPL-MCNC: 1.1 MG/DL
EGFR: 70 ML/MIN/1.73M2
ESTIMATED AVERAGE GLUCOSE: 160 MG/DL
GLUCOSE SERPL-MCNC: 121 MG/DL
GLUCOSE UR-MCNC: NEGATIVE
HBA1C MFR BLD HPLC: 7.2 %
HCG UR QL: 1 EU/DL
HGB UR QL STRIP.AUTO: NEGATIVE
KETONES UR-MCNC: NEGATIVE
LEUKOCYTE ESTERASE UR QL STRIP: NEGATIVE
NITRITE UR QL STRIP: NEGATIVE
PH UR STRIP: 5.5
POTASSIUM SERPL-SCNC: 4 MMOL/L
PROT UR STRIP-MCNC: NORMAL
SODIUM SERPL-SCNC: 142 MMOL/L
SP GR UR STRIP: 1.03

## 2023-01-11 NOTE — HISTORY OF PRESENT ILLNESS
[FreeTextEntry1] : Reason for Visit: 3-Month Follow-Up S/P TURP\par \par The patient is a 76-year-old returning gentleman who underwent TURP on 3/3/2022.  He has a long history of erectile dysfunction he reports in the past trying oral medications with some success and also trying injection therapy with moderate success.  Now after his transurethral section of the prostate he wants to revisit erectile function.  Benign pathology. He last presented on 4/6/2022  complaining of frequency, burning, urgency, and dysuria. Post-void residual on bladder scan on that date was 78 cc.  He reports an excellent urinary stream with no incontinence.  Which we are going to do a cystoscopy if completely resolved.  He reports that his stream after TURP in March is strong and there is no burning further burning and that he has no postvoid dribbling.  Cystoscopy was canceled for today.  He is mainly concerned about a fungal infection on his uncircumcised phallus.  Patient has poorly controlled diabetes he has elevated hemoglobin A1c's he also has elevated glucose in his urine today as well as in the past.  He has tried cleansing but no ointments.\par \par \par \par Today's postvoid residual is 0.\par \par Post-void residual on bladder scan today was 0 cc. He presents today complaining of limited erectile activity. I prescribed him Sildenafil during the last visit, but there was a problem and he was not able to retrieve it. He states that he has taken Sildenafil in the past, but it did not work very well. He wants to give it another try, so I will prescribe 100 mg tablets. I advised him to take it on an empty stomach. His urinary stream is strong and normal. He also reports back pain, and I advised him to follow-up with his PCP if it persists.\par \par LABS:\par A1c 6.9% on 5/25/2022\par UA on 3/25/2022: Slightly Turbid Urine Appearance, Large Leukocyte Esterase Concentration, U  /HPF, Red Blood Cell - Urine 55 /HPF, Few Bacteria, Protein 30 mg/dL, Glucose >= 1000 mg/dL, Large Blood\par \par RADIOLOGY:\par No recent relevant imaging to review or discuss\par \par Plan: I will prescribe Sildenafil 100 mg, if he fails oral medications we will refer him to our men's health clinic for injection therapy and possible consideration of penile implant.\par \par \par 12/12/2022: Reason for Visit: Evaluation of Penis irritation, urinary frequency and ED \par \par Mr. NOLAN is a 77 year old male presenting today for penis irritation. He c o of itching on the tip of his penis and shaft. His PCP prescribed him with a cream with provided no relief. The itching has subsided today. I suggested he uses Orange dial soap.  He also c/o urinary frequency every two hours at night, which prevents him from sleeping. His PVR today is 250 cc.  after second void was 2 cc, has 1+ glusoe in urine today he reports Sildenafil has not provided relief for his ED. He is agreeable to try  Injection. \par \par Assessment: Failed oral meds for ED wants injection or penile implant, elevated BG level will conribute to urinary frequency, cysto for evaluaitn of scar tisseu at the [facility] \par \par Plan: Pt provided urine specimen for UA . He will provide blood specimen today for BMP, HA1C. Referral to Dr Kenny for Injection training to manage ED. Will consider amitriptyline for sleep. He will try Orange Dial Soap to manage penis itching. He will have a cystoscopy. Follow up for cystoscopy. \par \par I have discussed at length the risks and benefits and rationale behind the procedure and the patient seems to understand and wants to proceed.\par \par I counseled the patient. I discussed the various etiologies of his symptoms. Risks and alternatives were \par discussed. I answered the patient questions. The patient will follow-up as directed and will contact me with any questions or concerns. Thank you for the opportunity to participate in the care of this patient. I'll keep you updated on his progress. \par \par 01/09/2023: Reason for Visit: Cystoscopy\par \par Mr. NOLAN is a 77 year old male presenting today for cystoscopy. He opted out of because he reports his urinary symptoms have completely been resolved on their own. He reports Nocturia x1. As for the itching on the penis, he reports he has been using the orange dye cream which did not provide any relief. I examined the penis and noticed the redness has decreased to the disagreement of the patient. He notes it is still as red as before. I informed the patient that due to his foreskin covering his penis and his unmanaged high blood sugar level, the fungal infection has propitious environment to grow. I also explained to him he can see his PCP and find a way to manage his Blood sugar level.\par \par Assessment: Fungal Infection worsened with covered penis and unmanaged blood sugar. \par \par Plan: In the morning, the patient will wash the affected area with [place] Dial Soap. He will then dry it completely and apply OTC Monistat cream on the affected area. At night, the patient will wash the affected area again with orange dye Soap, dry it thoroughly after. Then he will apply OTC hydrocortisone cream . He will  try to manage his blood sugar level. He provided Urine specimen today for POCT Urinalysis. He provided blood specimen for A1CG.\par \par Morning follow up PRN.\par \par I have discussed at length the risks and benefits and rationale behind the procedure and the patient seems to understand and wants to proceed.\par \par I counseled the patient. I discussed the various etiologies of his symptoms. Risks and alternatives were discussed. I answered the patient questions. The patient will follow-up as directed and will contact me with any questions or concerns. Thank you for the opportunity to participate in the care of this patient. I'll keep you updated on his progress.

## 2023-01-11 NOTE — ADDENDUM
[FreeTextEntry1] : I, Mahesh Gutierrez, acted solely as a scribe for Dr. Ashish Manrique on 01/09/2023.

## 2023-01-20 ENCOUNTER — NON-APPOINTMENT (OUTPATIENT)
Age: 78
End: 2023-01-20

## 2023-01-20 ENCOUNTER — APPOINTMENT (OUTPATIENT)
Dept: INTERNAL MEDICINE | Facility: CLINIC | Age: 78
End: 2023-01-20

## 2023-01-20 ENCOUNTER — OUTPATIENT (OUTPATIENT)
Dept: OUTPATIENT SERVICES | Facility: HOSPITAL | Age: 78
LOS: 1 days | End: 2023-01-20

## 2023-01-20 VITALS — HEART RATE: 71 BPM | OXYGEN SATURATION: 99 %

## 2023-01-20 LAB
INR PPP: 2.9 RATIO
POCT-PROTHROMBIN TIME: 34.3 SECS
QUALITY CONTROL: YES

## 2023-01-23 DIAGNOSIS — Z79.01 LONG TERM (CURRENT) USE OF ANTICOAGULANTS: ICD-10-CM

## 2023-01-23 DIAGNOSIS — Z51.81 ENCOUNTER FOR THERAPEUTIC DRUG LEVEL MONITORING: ICD-10-CM

## 2023-01-24 ENCOUNTER — APPOINTMENT (OUTPATIENT)
Dept: ENDOCRINOLOGY | Facility: CLINIC | Age: 78
End: 2023-01-24

## 2023-01-27 ENCOUNTER — APPOINTMENT (OUTPATIENT)
Dept: INTERNAL MEDICINE | Facility: CLINIC | Age: 78
End: 2023-01-27
Payer: MEDICARE

## 2023-01-27 ENCOUNTER — OUTPATIENT (OUTPATIENT)
Dept: OUTPATIENT SERVICES | Facility: HOSPITAL | Age: 78
LOS: 1 days | End: 2023-01-27

## 2023-01-27 VITALS
DIASTOLIC BLOOD PRESSURE: 84 MMHG | HEART RATE: 73 BPM | SYSTOLIC BLOOD PRESSURE: 112 MMHG | HEIGHT: 69 IN | OXYGEN SATURATION: 99 % | BODY MASS INDEX: 23.85 KG/M2 | WEIGHT: 161 LBS

## 2023-01-27 DIAGNOSIS — H26.9 UNSPECIFIED CATARACT: Chronic | ICD-10-CM

## 2023-01-27 DIAGNOSIS — Z98.89 OTHER SPECIFIED POSTPROCEDURAL STATES: Chronic | ICD-10-CM

## 2023-01-27 DIAGNOSIS — Z86.69 PERSONAL HISTORY OF OTHER DISEASES OF THE NERVOUS SYSTEM AND SENSE ORGANS: Chronic | ICD-10-CM

## 2023-01-27 DIAGNOSIS — Z98.890 OTHER SPECIFIED POSTPROCEDURAL STATES: Chronic | ICD-10-CM

## 2023-01-27 DIAGNOSIS — Z90.89 ACQUIRED ABSENCE OF OTHER ORGANS: Chronic | ICD-10-CM

## 2023-01-27 PROCEDURE — 99213 OFFICE O/P EST LOW 20 MIN: CPT | Mod: GE

## 2023-01-28 NOTE — REVIEW OF SYSTEMS
[Fever] : no fever [Chills] : no chills [Night Sweats] : no night sweats [Recent Change In Weight] : ~T no recent weight change [Discharge] : no discharge [Pain] : no pain [Redness] : no redness [Vision Problems] : no vision problems [Earache] : no earache [Hearing Loss] : no hearing loss [Nasal Discharge] : no nasal discharge [Sore Throat] : no sore throat [Chest Pain] : no chest pain [Palpitations] : no palpitations [Claudication] : no  leg claudication [Lower Ext Edema] : no lower extremity edema [Shortness Of Breath] : no shortness of breath [Wheezing] : no wheezing [Cough] : no cough [Dyspnea on Exertion] : not dyspnea on exertion [Abdominal Pain] : no abdominal pain [Nausea] : no nausea [Constipation] : no constipation [Vomiting] : no vomiting [Dysuria] : no dysuria [Incontinence] : no incontinence [Hesitancy] : no hesitancy [Nocturia] : no nocturia [Hematuria] : no hematuria [Frequency] : no frequency [Itching] : no itching [Skin Rash] : no skin rash [Headache] : no headache [Dizziness] : no dizziness [Fainting] : no fainting [Confusion] : no confusion [Insomnia] : no insomnia [Anxiety] : no anxiety [Easy Bleeding] : no easy bleeding [Easy Bruising] : no easy bruising

## 2023-01-28 NOTE — PHYSICAL EXAM
[No Acute Distress] : no acute distress [Well Nourished] : well nourished [Well Developed] : well developed [Well-Appearing] : well-appearing [Normal Sclera/Conjunctiva] : normal sclera/conjunctiva [EOMI] : extraocular movements intact [Normal Oropharynx] : the oropharynx was normal [No JVD] : no jugular venous distention [No Lymphadenopathy] : no lymphadenopathy [No Respiratory Distress] : no respiratory distress  [No Accessory Muscle Use] : no accessory muscle use [Clear to Auscultation] : lungs were clear to auscultation bilaterally [Normal S1, S2] : normal S1 and S2 [Soft] : abdomen soft [Non Tender] : non-tender [Non-distended] : non-distended [No CVA Tenderness] : no CVA  tenderness [No Joint Swelling] : no joint swelling [Grossly Normal Strength/Tone] : grossly normal strength/tone [Coordination Grossly Intact] : coordination grossly intact [No Focal Deficits] : no focal deficits [Normal Gait] : normal gait [Normal Affect] : the affect was normal [Normal Insight/Judgement] : insight and judgment were intact [de-identified] : s/p tonsillectomy  [de-identified] : prominent large thyroid tissue right neck, soft, non-tender.  [de-identified] : strength 5/5 in UE and LE; sensation intact  [de-identified] : irregular

## 2023-01-28 NOTE — HISTORY OF PRESENT ILLNESS
[FreeTextEntry1] : f/u  [de-identified] : 78 Y/O M with HTN, CAD s/p CABG, RHD s/p MVR on Coumadin, T2DM, Afib, multiple thyroid nodules, Rosie Thompson tears and esophageal ulcers, BPH s/p TURP (3/3/22) recently seen in the clinic for penile itching presenting for f/u. Pt seen by urology clinic last week - was given antibacterial soap and given instruction on monistat cream and reports that the penile itching has significantly improved. Pt is primarily concerned about his sugar levels as below \par \par # T2DM: Reports good compliance with trulicity and glipizide. Did not tolerate SGLT-2. Reports FSG in the 180-200's after meals. Reports that his insurance is not allocating test strips three times a day as he been accustomed to.

## 2023-01-28 NOTE — END OF VISIT
Hospitalist Progress Note      Subjective:  No overnight complaints or events.  Patient is sitting up on bed, on oxygen 2 L nasal cannula.  She denies any headache, chest pain, productive cough, abdominal pain, nausea, vomiting.  Her appetite is improving, cough is better.    Review of system.  As above.    Objective:  Vitals:  Vital Signs (last 24 hrs)_____ Last Charted___________Minimum____________ Maximum____________  Temp    L 97.6 (NOV 08 12:31) L 97.4 (NOV 08 03:13) 98.3  (NOV 07 22:25)  Heart Rate   63  (NOV 08 12:31) L 51 (NOV 08 03:13) 68  (NOV 07 22:25)  Resp Rate       H 22 (NOV 08 12:31) 18  (NOV 07 22:10) H 22 (NOV 08 12:31)  SBP    131  (NOV 08 12:31) 131  (NOV 08 12:31) H 173 (NOV 07 22:35)  DBP    68  (NOV 08 12:31) 68  (NOV 08 12:31) 82  (NOV 07 22:10)        Physical Exam:  General: alert, oriented. not in acute distress on oxygen through nasal cannula 2 L.  Psychiatry: cooperative, normal mood and affect.   Neurology: cranial nerves grossly intact.   Eyes: pupils equal reactive to light, extraocular motion is intact. No jaundice.  HEENT: Neck supple. Oral mucosa is moist.   no Jugular venous distension. no lymphadenopathy.   Lungs: Clear to auscultation bilaterally  Heart: regular rate and rhythm, no s3.  Abdomen: not tender, not distended, positive bowel sounds  Lower limbs: no edema. no joint swelling or tenderness.       Labs (Last four charted values)  WBC                  L 3.1 (NOV 06) 4.8 (NOV 05)   Hgb                  13.1 (NOV 06) 14.1 (NOV 05)   Hct                  40 (NOV 06) 42 (NOV 05)   Plt                  206 (NOV 06) 216 (NOV 05)   Na                   142 (NOV 08) 139 (NOV 07) 138 (NOV 06) 139 (NOV 05)  K                    3.6 (NOV 08) 4.2 (NOV 07) 4.6 (NOV 06) 3.7 (NOV 05)  CO2                  26 (NOV 08) 22 (NOV 07) 21 (NOV 06) 24 (NOV 05)  Cl                   H 109 (NOV 08) H 111 (NOV 07) H 108 (NOV 06) 104 (NOV 05)  Cr                   0.69 (NOV 08) 0.78 (NOV 07) 0.83  (NOV 06) 1.02 (NOV 05)  BUN                  16 (NOV 08) H 21 (NOV 07) 17 (NOV 06) 11 (NOV 05)  Glucose              H 126 (NOV 08) H 237 (NOV 07) H 329 (NOV 06) H 355 (NOV 05)  Mg                   1.9 (NOV 08) 2.0 (NOV 07) 2.1 (NOV 06) 2.0 (NOV 05)  Ca                   L 8.2 (NOV 08) L 7.9 (NOV 07) L 8.0 (NOV 06) 8.5 (NOV 05)  PT                   H 11.7 (NOV 05)   INR                  1.1 (NOV 05)   PTT                  24 (NOV 05)   Troponin             0.01 (NOV 05)     Medications (24) Active  Scheduled: (18)  ascorbic acid 500mg Tab  1,500 mg 3 tab, PO, qDay  aspirin 81mg EC Tab  81 mg 1 tab, PO, qDay  atorvastatin 80mg Tab  80 mg 1 tab, PO, qHS  bumetanide 0.5 mg tab  0.5 mg 1 tab, PO, qAM  carvedilol 25 mg tab  25 mg 1 tab, PO, BID  dexamethasone 4 mg/mL Vial  6 mg 1.5 mL, IV Push, qDay  enoxaparin 40 mg/0.4 mL Syringe  40 mg 0.4 mL, SubQ, qDay  hydrALAZINE 25 mg Tab  25 mg 1 tab, PO, BID  Initiate Magnesium Replacement Protocol  Per MD Order, qDay  Initiate Potassium Replacement Protocol  Per MD Order, qDay  insulin glargine 100 units/mL Vial (Lantus)  35 unit 0.35 mL, SubQ, BID  insulin lispro 100 units/mL Vial  per sliding scale, SubQ, TIDAC  insulin lispro 100 units/mL Vial  15 unit 0.15 mL, SubQ, QIDACHS  losartan 50 mg Tab  100 mg 2 tab, PO, qPM  montelukast 10 mg Tab  10 mg 1 tab, PO, qPM  remdesivir + Sodium Chloride 0.9% 230 mL  100 mg 20 mL, IV Piggyback, q24hr  thiamine 100 mg Tab  100 mg 1 tab, PO, qDay  zinc sulfate 220 mg Cap  220 mg 1 cap, PO, qDay  Continuous: (0)  PRN: (6)  acetaminophen 325 mg Tab  650 mg 2 tab, PO, q6hr  albuterol sulfate HFA 90 mcg/puff Inhaler  2 puff, Inhalation, Q4RT  Dextrose 50% Syringe  12.5 g 25 mL, IV Push, PRN  glucagon recomb 1 mg Vial  1 mg, IM, PRN  ondansetron 4 mg/2 mL Vial  4 mg 2 mL, IV Push, q4hr  Oral Glucose Gel (40% dextrose)  15 g 37.6 mL, PO, PRN          ASSESSMENT AND PLAN:       SARS-COV-2 (+) Pneumonia/Pneumonitis/acute hypoxic respiratory  [] : Resident failure  -Does not use home oxygen on 3 L on admission improved down to 2 L  -Reviewed Chest x-ray showing small infiltrate procalcitonin negative  -Continuous pulse oximetry.  -treatment with IV Rocephin.  -Elevated inflammatory enzymes  -Tylenol for fever.  -Continue supportive management   -Started Zinc 220mg Qday , Vitamin C 1500mg Qday , and thiamine 100mg Qday  -On remdesivir and dexamethasone per protocol.  -Lovenox prophylaxis added  Received convalescent plasma.  70 spirometry, wean off oxygen as tolerated.    Type 2 diabetes  -Hyperglycemia  -Resume patient's home Lantus dose  -Continues to require insulin per sliding scale  Increased scheduled short acting from 10 units  3 times daily to 15 units 4 times daily at bedtime of aspart + correctional scale  Watch, further correction per blood sugar.  Will check hemoglobin A1c.    Hypertension  Resume home meds    Hyperlipidemia  Resume home meds      Code Status: FULL CODE      DVT Prophylaxis: lovenox  Diet: diabetic  Baseline Activity: Ambulates Independently    Morning labs ordered. monitor CBC, BMP.   Management discussed with other participating services/consultants.  Discussed with decision maker who verbalized agreement with plan of care.  Discussed with RN.          Electronically Signed On 11.09.2020 05:14  ___________________________________________________   Amador BUCK, Ariella Cantu

## 2023-01-28 NOTE — ASSESSMENT
[FreeTextEntry1] : #HCM\par -Colonoscopy: aged out \par -Pneumo, Tdap, Zoster, Flu: UTD \par -COVID-19: received all 3 doses \par - Labs: UTD \par \par Case discussed with Dr. Carmichael \par \par RTC in 1 month with me for f/u of chronic medical conditions and 2 weeks for INR check \par \par Danica Yoder\par PGY-2 [facility] [employer] [facility]. \par

## 2023-01-30 DIAGNOSIS — E04.2 NONTOXIC MULTINODULAR GOITER: ICD-10-CM

## 2023-01-30 DIAGNOSIS — E11.9 TYPE 2 DIABETES MELLITUS WITHOUT COMPLICATIONS: ICD-10-CM

## 2023-02-03 ENCOUNTER — APPOINTMENT (OUTPATIENT)
Dept: INTERNAL MEDICINE | Facility: CLINIC | Age: 78
End: 2023-02-03

## 2023-02-04 ENCOUNTER — INPATIENT (INPATIENT)
Facility: HOSPITAL | Age: 78
LOS: 1 days | Discharge: HOME | End: 2023-02-06
Attending: INTERNAL MEDICINE | Admitting: INTERNAL MEDICINE
Payer: MEDICARE

## 2023-02-04 VITALS
HEART RATE: 88 BPM | OXYGEN SATURATION: 100 % | SYSTOLIC BLOOD PRESSURE: 135 MMHG | DIASTOLIC BLOOD PRESSURE: 84 MMHG | TEMPERATURE: 98 F | RESPIRATION RATE: 18 BRPM

## 2023-02-04 DIAGNOSIS — I25.10 ATHEROSCLEROTIC HEART DISEASE OF NATIVE CORONARY ARTERY WITHOUT ANGINA PECTORIS: ICD-10-CM

## 2023-02-04 DIAGNOSIS — Z29.9 ENCOUNTER FOR PROPHYLACTIC MEASURES, UNSPECIFIED: ICD-10-CM

## 2023-02-04 DIAGNOSIS — Z90.89 ACQUIRED ABSENCE OF OTHER ORGANS: Chronic | ICD-10-CM

## 2023-02-04 DIAGNOSIS — Z87.438 PERSONAL HISTORY OF OTHER DISEASES OF MALE GENITAL ORGANS: ICD-10-CM

## 2023-02-04 DIAGNOSIS — Z86.79 PERSONAL HISTORY OF OTHER DISEASES OF THE CIRCULATORY SYSTEM: ICD-10-CM

## 2023-02-04 DIAGNOSIS — R10.9 UNSPECIFIED ABDOMINAL PAIN: ICD-10-CM

## 2023-02-04 DIAGNOSIS — E11.9 TYPE 2 DIABETES MELLITUS WITHOUT COMPLICATIONS: ICD-10-CM

## 2023-02-04 DIAGNOSIS — Z98.890 OTHER SPECIFIED POSTPROCEDURAL STATES: Chronic | ICD-10-CM

## 2023-02-04 DIAGNOSIS — I82.409 ACUTE EMBOLISM AND THROMBOSIS OF UNSPECIFIED DEEP VEINS OF UNSPECIFIED LOWER EXTREMITY: ICD-10-CM

## 2023-02-04 DIAGNOSIS — Z86.69 PERSONAL HISTORY OF OTHER DISEASES OF THE NERVOUS SYSTEM AND SENSE ORGANS: Chronic | ICD-10-CM

## 2023-02-04 DIAGNOSIS — E78.5 HYPERLIPIDEMIA, UNSPECIFIED: ICD-10-CM

## 2023-02-04 DIAGNOSIS — H26.9 UNSPECIFIED CATARACT: Chronic | ICD-10-CM

## 2023-02-04 DIAGNOSIS — Z98.89 OTHER SPECIFIED POSTPROCEDURAL STATES: Chronic | ICD-10-CM

## 2023-02-04 DIAGNOSIS — I48.20 CHRONIC ATRIAL FIBRILLATION, UNSPECIFIED: ICD-10-CM

## 2023-02-04 LAB
ALBUMIN SERPL ELPH-MCNC: 3.9 G/DL — SIGNIFICANT CHANGE UP (ref 3.3–5)
ALP SERPL-CCNC: 69 U/L — SIGNIFICANT CHANGE UP (ref 40–120)
ALT FLD-CCNC: 21 U/L — SIGNIFICANT CHANGE UP (ref 4–41)
ANION GAP SERPL CALC-SCNC: 8 MMOL/L — SIGNIFICANT CHANGE UP (ref 7–14)
APPEARANCE UR: CLEAR — SIGNIFICANT CHANGE UP
APTT BLD: 46.1 SEC — HIGH (ref 27–36.3)
AST SERPL-CCNC: 22 U/L — SIGNIFICANT CHANGE UP (ref 4–40)
BASOPHILS # BLD AUTO: 0.03 K/UL — SIGNIFICANT CHANGE UP (ref 0–0.2)
BASOPHILS NFR BLD AUTO: 0.4 % — SIGNIFICANT CHANGE UP (ref 0–2)
BILIRUB SERPL-MCNC: 3.6 MG/DL — HIGH (ref 0.2–1.2)
BILIRUB UR-MCNC: NEGATIVE — SIGNIFICANT CHANGE UP
BLOOD GAS VENOUS COMPREHENSIVE RESULT: SIGNIFICANT CHANGE UP
BUN SERPL-MCNC: 28 MG/DL — HIGH (ref 7–23)
C DIFF BY PCR RESULT: DETECTED
CALCIUM SERPL-MCNC: 8.6 MG/DL — SIGNIFICANT CHANGE UP (ref 8.4–10.5)
CHLORIDE SERPL-SCNC: 106 MMOL/L — SIGNIFICANT CHANGE UP (ref 98–107)
CO2 SERPL-SCNC: 24 MMOL/L — SIGNIFICANT CHANGE UP (ref 22–31)
COLOR SPEC: YELLOW — SIGNIFICANT CHANGE UP
CREAT SERPL-MCNC: 1.31 MG/DL — HIGH (ref 0.5–1.3)
DIFF PNL FLD: NEGATIVE — SIGNIFICANT CHANGE UP
EGFR: 56 ML/MIN/1.73M2 — LOW
EOSINOPHIL # BLD AUTO: 0.09 K/UL — SIGNIFICANT CHANGE UP (ref 0–0.5)
EOSINOPHIL NFR BLD AUTO: 1.1 % — SIGNIFICANT CHANGE UP (ref 0–6)
FLUAV AG NPH QL: SIGNIFICANT CHANGE UP
FLUBV AG NPH QL: SIGNIFICANT CHANGE UP
GLUCOSE BLDC GLUCOMTR-MCNC: 116 MG/DL — HIGH (ref 70–99)
GLUCOSE BLDC GLUCOMTR-MCNC: 134 MG/DL — HIGH (ref 70–99)
GLUCOSE BLDC GLUCOMTR-MCNC: 98 MG/DL — SIGNIFICANT CHANGE UP (ref 70–99)
GLUCOSE SERPL-MCNC: 184 MG/DL — HIGH (ref 70–99)
GLUCOSE UR QL: ABNORMAL
HCT VFR BLD CALC: 42.5 % — SIGNIFICANT CHANGE UP (ref 39–50)
HGB BLD-MCNC: 14.3 G/DL — SIGNIFICANT CHANGE UP (ref 13–17)
IANC: 5.96 K/UL — SIGNIFICANT CHANGE UP (ref 1.8–7.4)
IMM GRANULOCYTES NFR BLD AUTO: 0.7 % — SIGNIFICANT CHANGE UP (ref 0–0.9)
INR BLD: 4.83 RATIO — HIGH (ref 0.88–1.16)
KETONES UR-MCNC: NEGATIVE — SIGNIFICANT CHANGE UP
LEUKOCYTE ESTERASE UR-ACNC: NEGATIVE — SIGNIFICANT CHANGE UP
LIDOCAIN IGE QN: 36 U/L — SIGNIFICANT CHANGE UP (ref 7–60)
LYMPHOCYTES # BLD AUTO: 1.37 K/UL — SIGNIFICANT CHANGE UP (ref 1–3.3)
LYMPHOCYTES # BLD AUTO: 16.1 % — SIGNIFICANT CHANGE UP (ref 13–44)
MCHC RBC-ENTMCNC: 31 PG — SIGNIFICANT CHANGE UP (ref 27–34)
MCHC RBC-ENTMCNC: 33.6 GM/DL — SIGNIFICANT CHANGE UP (ref 32–36)
MCV RBC AUTO: 92.2 FL — SIGNIFICANT CHANGE UP (ref 80–100)
MONOCYTES # BLD AUTO: 0.99 K/UL — HIGH (ref 0–0.9)
MONOCYTES NFR BLD AUTO: 11.6 % — SIGNIFICANT CHANGE UP (ref 2–14)
NEUTROPHILS # BLD AUTO: 5.96 K/UL — SIGNIFICANT CHANGE UP (ref 1.8–7.4)
NEUTROPHILS NFR BLD AUTO: 70.1 % — SIGNIFICANT CHANGE UP (ref 43–77)
NITRITE UR-MCNC: NEGATIVE — SIGNIFICANT CHANGE UP
NRBC # BLD: 0 /100 WBCS — SIGNIFICANT CHANGE UP (ref 0–0)
NRBC # FLD: 0 K/UL — SIGNIFICANT CHANGE UP (ref 0–0)
PH UR: 6 — SIGNIFICANT CHANGE UP (ref 5–8)
PLATELET # BLD AUTO: 174 K/UL — SIGNIFICANT CHANGE UP (ref 150–400)
POTASSIUM SERPL-MCNC: 4.1 MMOL/L — SIGNIFICANT CHANGE UP (ref 3.5–5.3)
POTASSIUM SERPL-SCNC: 4.1 MMOL/L — SIGNIFICANT CHANGE UP (ref 3.5–5.3)
PROT SERPL-MCNC: 7 G/DL — SIGNIFICANT CHANGE UP (ref 6–8.3)
PROT UR-MCNC: ABNORMAL
PROTHROM AB SERPL-ACNC: 56.9 SEC — HIGH (ref 10.5–13.4)
RBC # BLD: 4.61 M/UL — SIGNIFICANT CHANGE UP (ref 4.2–5.8)
RBC # FLD: 13.5 % — SIGNIFICANT CHANGE UP (ref 10.3–14.5)
RSV RNA NPH QL NAA+NON-PROBE: SIGNIFICANT CHANGE UP
SARS-COV-2 RNA SPEC QL NAA+PROBE: SIGNIFICANT CHANGE UP
SODIUM SERPL-SCNC: 138 MMOL/L — SIGNIFICANT CHANGE UP (ref 135–145)
SP GR SPEC: 1.03 — SIGNIFICANT CHANGE UP (ref 1.01–1.05)
UROBILINOGEN FLD QL: SIGNIFICANT CHANGE UP
WBC # BLD: 8.5 K/UL — SIGNIFICANT CHANGE UP (ref 3.8–10.5)
WBC # FLD AUTO: 8.5 K/UL — SIGNIFICANT CHANGE UP (ref 3.8–10.5)

## 2023-02-04 PROCEDURE — 99222 1ST HOSP IP/OBS MODERATE 55: CPT | Mod: GC

## 2023-02-04 PROCEDURE — 74177 CT ABD & PELVIS W/CONTRAST: CPT | Mod: 26,MA

## 2023-02-04 PROCEDURE — 99285 EMERGENCY DEPT VISIT HI MDM: CPT

## 2023-02-04 RX ORDER — SODIUM CHLORIDE 9 MG/ML
1000 INJECTION INTRAMUSCULAR; INTRAVENOUS; SUBCUTANEOUS ONCE
Refills: 0 | Status: COMPLETED | OUTPATIENT
Start: 2023-02-04 | End: 2023-02-04

## 2023-02-04 RX ORDER — DEXTROSE 50 % IN WATER 50 %
15 SYRINGE (ML) INTRAVENOUS ONCE
Refills: 0 | Status: DISCONTINUED | OUTPATIENT
Start: 2023-02-04 | End: 2023-02-06

## 2023-02-04 RX ORDER — ATENOLOL 25 MG/1
50 TABLET ORAL DAILY
Refills: 0 | Status: DISCONTINUED | OUTPATIENT
Start: 2023-02-04 | End: 2023-02-04

## 2023-02-04 RX ORDER — REPAGLINIDE 1 MG/1
1 TABLET ORAL
Qty: 0 | Refills: 0 | DISCHARGE

## 2023-02-04 RX ORDER — LIDOCAINE 4 G/100G
10 CREAM TOPICAL ONCE
Refills: 0 | Status: COMPLETED | OUTPATIENT
Start: 2023-02-04 | End: 2023-02-04

## 2023-02-04 RX ORDER — SODIUM CHLORIDE 9 MG/ML
1000 INJECTION, SOLUTION INTRAVENOUS
Refills: 0 | Status: DISCONTINUED | OUTPATIENT
Start: 2023-02-04 | End: 2023-02-06

## 2023-02-04 RX ORDER — DIGOXIN 250 MCG
1 TABLET ORAL
Qty: 0 | Refills: 0 | DISCHARGE

## 2023-02-04 RX ORDER — ATENOLOL 25 MG/1
1 TABLET ORAL
Qty: 0 | Refills: 0 | DISCHARGE

## 2023-02-04 RX ORDER — LOSARTAN POTASSIUM 100 MG/1
1 TABLET, FILM COATED ORAL
Qty: 0 | Refills: 0 | DISCHARGE

## 2023-02-04 RX ORDER — ATENOLOL 25 MG/1
50 TABLET ORAL DAILY
Refills: 0 | Status: DISCONTINUED | OUTPATIENT
Start: 2023-02-04 | End: 2023-02-06

## 2023-02-04 RX ORDER — FAMOTIDINE 10 MG/ML
1 INJECTION INTRAVENOUS
Qty: 0 | Refills: 0 | DISCHARGE

## 2023-02-04 RX ORDER — DEXTROSE 50 % IN WATER 50 %
25 SYRINGE (ML) INTRAVENOUS ONCE
Refills: 0 | Status: DISCONTINUED | OUTPATIENT
Start: 2023-02-04 | End: 2023-02-06

## 2023-02-04 RX ORDER — INSULIN LISPRO 100/ML
VIAL (ML) SUBCUTANEOUS EVERY 6 HOURS
Refills: 0 | Status: DISCONTINUED | OUTPATIENT
Start: 2023-02-04 | End: 2023-02-05

## 2023-02-04 RX ORDER — FAMOTIDINE 10 MG/ML
20 INJECTION INTRAVENOUS ONCE
Refills: 0 | Status: COMPLETED | OUTPATIENT
Start: 2023-02-04 | End: 2023-02-04

## 2023-02-04 RX ORDER — DULAGLUTIDE 4.5 MG/.5ML
0 INJECTION, SOLUTION SUBCUTANEOUS
Qty: 0 | Refills: 0 | DISCHARGE

## 2023-02-04 RX ORDER — ENOXAPARIN SODIUM 100 MG/ML
0 INJECTION SUBCUTANEOUS
Qty: 0 | Refills: 0 | DISCHARGE

## 2023-02-04 RX ORDER — GLUCAGON INJECTION, SOLUTION 0.5 MG/.1ML
1 INJECTION, SOLUTION SUBCUTANEOUS ONCE
Refills: 0 | Status: DISCONTINUED | OUTPATIENT
Start: 2023-02-04 | End: 2023-02-06

## 2023-02-04 RX ORDER — METRONIDAZOLE 500 MG
500 TABLET ORAL ONCE
Refills: 0 | Status: COMPLETED | OUTPATIENT
Start: 2023-02-04 | End: 2023-02-04

## 2023-02-04 RX ORDER — CIPROFLOXACIN LACTATE 400MG/40ML
400 VIAL (ML) INTRAVENOUS ONCE
Refills: 0 | Status: COMPLETED | OUTPATIENT
Start: 2023-02-04 | End: 2023-02-04

## 2023-02-04 RX ORDER — LOSARTAN POTASSIUM 100 MG/1
25 TABLET, FILM COATED ORAL DAILY
Refills: 0 | Status: DISCONTINUED | OUTPATIENT
Start: 2023-02-04 | End: 2023-02-06

## 2023-02-04 RX ORDER — DIGOXIN 250 MCG
125 TABLET ORAL DAILY
Refills: 0 | Status: DISCONTINUED | OUTPATIENT
Start: 2023-02-04 | End: 2023-02-06

## 2023-02-04 RX ORDER — DEXTROSE 50 % IN WATER 50 %
12.5 SYRINGE (ML) INTRAVENOUS ONCE
Refills: 0 | Status: DISCONTINUED | OUTPATIENT
Start: 2023-02-04 | End: 2023-02-06

## 2023-02-04 RX ADMIN — SODIUM CHLORIDE 1000 MILLILITER(S): 9 INJECTION INTRAMUSCULAR; INTRAVENOUS; SUBCUTANEOUS at 02:58

## 2023-02-04 RX ADMIN — LIDOCAINE 10 MILLILITER(S): 4 CREAM TOPICAL at 02:56

## 2023-02-04 RX ADMIN — FAMOTIDINE 20 MILLIGRAM(S): 10 INJECTION INTRAVENOUS at 02:57

## 2023-02-04 RX ADMIN — Medication 100 MILLIGRAM(S): at 11:08

## 2023-02-04 RX ADMIN — Medication 200 MILLIGRAM(S): at 12:44

## 2023-02-04 RX ADMIN — SODIUM CHLORIDE 1000 MILLILITER(S): 9 INJECTION INTRAMUSCULAR; INTRAVENOUS; SUBCUTANEOUS at 08:04

## 2023-02-04 RX ADMIN — SODIUM CHLORIDE 1000 MILLILITER(S): 9 INJECTION INTRAMUSCULAR; INTRAVENOUS; SUBCUTANEOUS at 11:03

## 2023-02-04 NOTE — H&P ADULT - PROBLEM SELECTOR PLAN 1
Pt w/ abdominal pain, diarrhea x 3d; reports no blood in stool; found to have dilated small bowel on CT, likely enteritis. Currently endorsed improvement in abdominal pain, still has diarrhea. Surgery evaluated, not worried about obstruction. No diverticulosis seen, not worried about diverticulitis. No other signs of sepsis, not concerned for infectious colitis right now.  - Follow-up GI PCR, stool cultures  - Keep NPO for now, with bowel rest  - Zofran q8h PRN if nausea/vomiting  - maintenance fluids as needed Pt w/ abdominal pain, diarrhea x 3d; reports no blood in stool; found to have dilated small bowel on CT, likely enteritis. Currently endorsed improvement in abdominal pain, still has diarrhea. Surgery evaluated, not worried about obstruction. No diverticulosis seen, not worried about diverticulitis. No other signs of sepsis, not concerned for infectious colitis right now.  - s/p Ciprofloxacin & Metronidazole x1 in ED  - can d/c Abx for now  - Follow-up GI PCR, stool cultures  - Keep NPO for now, with bowel rest  - Zofran q8h PRN if nausea/vomiting  - maintenance fluids as needed  - supportive care Pt w/ abdominal pain, diarrhea x 3d; reports no blood in stool; found to have dilated small bowel on CT, likely enteritis. Currently endorsed improvement in abdominal pain, still has diarrhea. Surgery evaluated, not worried about obstruction. No diverticulosis seen, not worried about diverticulitis. No other signs of sepsis, not concerned for infectious colitis right now.  - s/p Ciprofloxacin & Metronidazole x1 in ED  - can d/c Abx for now  - Follow-up GI PCR, stool cultures  - Keep NPO for now, with bowel rest; can start CLD tonight   - Zofran q8h PRN if nausea/vomiting  - maintenance fluids as needed  - supportive care

## 2023-02-04 NOTE — CONSULT NOTE ADULT - SUBJECTIVE AND OBJECTIVE BOX
General Surgery Consult    HPI: 76yo M with PMH of afib on coumadin, s/p mitral valve replacement, s/p CABG, hx of PE, BPH, HTN, DM2 presents for eval of abd pain and diarrhea.  For last 3d has had achy constant lower abd pain associated with watery, non-bloody diarrhea. Reports anorexia, denies N/V, fevers, chills. Continues to pass gas and stools are every 5min watery and 'pale'. Denies recent travel or sick contacts.  Last colonoscopy...    In ED, AVSS, WBC 8 and CT is suggestive of enteritis.       PAST MEDICAL HISTORY:  Atrial fibrillation    CAD (coronary artery disease)    Hx of CABG    GERD (gastroesophageal reflux disease)    Seizures    Pulmonary embolism    Rheumatic heart disease    DVT (deep venous thrombosis)    Hypertension    Mitral valve disease    Stomach ulcer    BPH (benign prostatic hypertrophy)    Cataract    Retinal detachment    Enlarged prostate    History of skin cancer    Diabetes mellitus    Multiple thyroid nodules    Thyroid nodule        PAST SURGICAL HISTORY:  Hx of CABG    Mitral valve replaced    S/P TURP    H/O retinal detachment    H/O melanoma excision    S/P tonsillectomy    Cataract        MEDICATIONS:  ciprofloxacin   IVPB 400 milliGRAM(s) IV Intermittent once  metroNIDAZOLE  IVPB 500 milliGRAM(s) IV Intermittent once      ALLERGIES:  metformin (Other)  No Known Allergies      VITALS & I/Os:  Vital Signs Last 24 Hrs  T(C): 36.1 (2023 09:40), Max: 36.6 (2023 01:46)  T(F): 97 (2023 09:40), Max: 97.9 (2023 01:46)  HR: 83 (2023 09:40) (82 - 91)  BP: 133/81 (2023 09:40) (133/80 - 145/86)  BP(mean): --  RR: 16 (2023 09:40) (16 - 18)  SpO2: 100% (2023 09:40) (100% - 100%)    Parameters below as of 2023 09:40  Patient On (Oxygen Delivery Method): room air        I&O's Summary      PHYSICAL EXAM:  General: No acute distress  Respiratory: Nonlabored  Cardiovascular: RRR  Abdominal: Soft, nondistended, minimally tender. No rebound or guarding. Not peritoneal.   Extremities: Warm    LABS:                        14.3   8.50  )-----------( 174      ( 2023 02:40 )             42.5         138  |  106  |  28<H>  ----------------------------<  184<H>  4.1   |  24  |  1.31<H>    Ca    8.6      2023 02:40    TPro  7.0  /  Alb  3.9  /  TBili  3.6<H>  /  DBili  x   /  AST  22  /  ALT  21  /  AlkPhos  69      Lactate:  02-04 @ 02:40  1.5    PT/INR - ( 2023 02:40 )   PT: 56.9 sec;   INR: 4.83 ratio         PTT - ( 2023 02:40 )  PTT:46.1 sec          Urinalysis Basic - ( 2023 05:20 )    Color: Yellow / Appearance: Clear / S.029 / pH: x  Gluc: x / Ketone: Negative  / Bili: Negative / Urobili: <2 mg/dL   Blood: x / Protein: 30 mg/dL / Nitrite: Negative   Leuk Esterase: Negative / RBC: 1 /HPF / WBC 3 /HPF   Sq Epi: x / Non Sq Epi: 1 /HPF / Bacteria: Negative        IMAGING:    < from: CT Abdomen and Pelvis w/ IV Cont (23 @ 06:56) >  FINDINGS:  LOWER CHEST: Chronic right lower lobe calcification. Dense mitral annular   calcifications versus mitral annuloplasty.    LIVER: Right hepatic lobe cyst and subcentimeter hypodensities too small   to characterize.  BILE DUCTS: Normalcaliber.  GALLBLADDER: Cholecystectomy.  SPLEEN: Within normal limits.  PANCREAS: Within normal limits.  ADRENALS: Within normal limits.  KIDNEYS/URETERS: Right renal cyst and cortical scarring. Bilateral   subcentimeter hypodensities too small to characterize. No hydronephrosis.    BLADDER: Minimally distended.  REPRODUCTIVE ORGANS: Prostate within normal limits.    BOWEL: There are multiple distended loops of small bowel with mild   associated hyperemia. There is no transition point. No pneumatosis.   Appendix is not visualized. No evidence of inflammation in the pericecal   region.  PERITONEUM: No ascites. No pneumatosis.  VESSELS: Atherosclerotic changes. No portal venous gas.  RETROPERITONEUM/LYMPH NODES: No lymphadenopathy.  ABDOMINAL WALL: Within normal limits.  BONES: Degenerative changes. L1 hemangioma.    IMPRESSION:  Dilated loops of small bowel within the mid abdomen without transition   point. Differential includes enteritis as well as early or partial small   bowel obstruction. Follow-up abdominal radiograph after administration of   oral contrast is suggested.    < end of copied text >                                                                                                 General Surgery Consult    HPI: 78yo M with PMH of afib on coumadin, s/p mitral valve replacement, s/p CABG, hx of PE, BPH, HTN, DM2 presents for eval of abd pain and diarrhea.  For last 3d has had achy constant lower abd pain associated with watery, non-bloody diarrhea. Reports anorexia, denies N/V, fevers, chills. Continues to pass gas and stools are every 5min watery and 'pale'. Denies recent travel or sick contacts.  Last colonoscopy 10 years, was normal study at that time.     In ED, AVSS, WBC 8 and CT is suggestive of enteritis. Lactate 1.5.       PAST MEDICAL HISTORY:  Atrial fibrillation    CAD (coronary artery disease)    Hx of CABG    GERD (gastroesophageal reflux disease)    Seizures    Pulmonary embolism    Rheumatic heart disease    DVT (deep venous thrombosis)    Hypertension    Mitral valve disease    Stomach ulcer    BPH (benign prostatic hypertrophy)    Cataract    Retinal detachment    Enlarged prostate    History of skin cancer    Diabetes mellitus    Multiple thyroid nodules    Thyroid nodule        PAST SURGICAL HISTORY:  Hx of CABG    Mitral valve replaced    S/P TURP    H/O retinal detachment    H/O melanoma excision    S/P tonsillectomy    Cataract        MEDICATIONS:  ciprofloxacin   IVPB 400 milliGRAM(s) IV Intermittent once  metroNIDAZOLE  IVPB 500 milliGRAM(s) IV Intermittent once      ALLERGIES:  metformin (Other)  No Known Allergies      VITALS & I/Os:  Vital Signs Last 24 Hrs  T(C): 36.1 (2023 09:40), Max: 36.6 (2023 01:46)  T(F): 97 (2023 09:40), Max: 97.9 (2023 01:46)  HR: 83 (2023 09:40) (82 - 91)  BP: 133/81 (2023 09:40) (133/80 - 145/86)  BP(mean): --  RR: 16 (2023 09:40) (16 - 18)  SpO2: 100% (2023 09:40) (100% - 100%)    Parameters below as of 2023 09:40  Patient On (Oxygen Delivery Method): room air        I&O's Summary      PHYSICAL EXAM:  General: No acute distress  Respiratory: Nonlabored  Cardiovascular: RRR  Abdominal: Soft, nondistended, minimally tender. No rebound or guarding. Not peritoneal.   Extremities: Warm    LABS:                        14.3   8.50  )-----------( 174      ( 2023 02:40 )             42.5         138  |  106  |  28<H>  ----------------------------<  184<H>  4.1   |  24  |  1.31<H>    Ca    8.6      2023 02:40    TPro  7.0  /  Alb  3.9  /  TBili  3.6<H>  /  DBili  x   /  AST  22  /  ALT  21  /  AlkPhos  69  -    Lactate:  - @ 02:40  1.5    PT/INR - ( 2023 02:40 )   PT: 56.9 sec;   INR: 4.83 ratio         PTT - ( 2023 02:40 )  PTT:46.1 sec          Urinalysis Basic - ( 2023 05:20 )    Color: Yellow / Appearance: Clear / S.029 / pH: x  Gluc: x / Ketone: Negative  / Bili: Negative / Urobili: <2 mg/dL   Blood: x / Protein: 30 mg/dL / Nitrite: Negative   Leuk Esterase: Negative / RBC: 1 /HPF / WBC 3 /HPF   Sq Epi: x / Non Sq Epi: 1 /HPF / Bacteria: Negative        IMAGING:    < from: CT Abdomen and Pelvis w/ IV Cont (23 @ 06:56) >  FINDINGS:  LOWER CHEST: Chronic right lower lobe calcification. Dense mitral annular   calcifications versus mitral annuloplasty.    LIVER: Right hepatic lobe cyst and subcentimeter hypodensities too small   to characterize.  BILE DUCTS: Normalcaliber.  GALLBLADDER: Cholecystectomy.  SPLEEN: Within normal limits.  PANCREAS: Within normal limits.  ADRENALS: Within normal limits.  KIDNEYS/URETERS: Right renal cyst and cortical scarring. Bilateral   subcentimeter hypodensities too small to characterize. No hydronephrosis.    BLADDER: Minimally distended.  REPRODUCTIVE ORGANS: Prostate within normal limits.    BOWEL: There are multiple distended loops of small bowel with mild   associated hyperemia. There is no transition point. No pneumatosis.   Appendix is not visualized. No evidence of inflammation in the pericecal   region.  PERITONEUM: No ascites. No pneumatosis.  VESSELS: Atherosclerotic changes. No portal venous gas.  RETROPERITONEUM/LYMPH NODES: No lymphadenopathy.  ABDOMINAL WALL: Within normal limits.  BONES: Degenerative changes. L1 hemangioma.    IMPRESSION:  Dilated loops of small bowel within the mid abdomen without transition   point. Differential includes enteritis as well as early or partial small   bowel obstruction. Follow-up abdominal radiograph after administration of   oral contrast is suggested.    < end of copied text >

## 2023-02-04 NOTE — ED ADULT NURSE REASSESSMENT NOTE - NS ED NURSE REASSESS COMMENT FT1
20G IV placed on left AC. Labs drawn and sent. Pt medicated per MAR. On IVF. Pt in NAD. Pending urine samples.
Pt resting comfortably, in NAD. Breathing even and unlabored. NSR on the cardiac monitor. Waiting for CT result
Pt received from outgoing RN in stretcher in no apparent distress. denies discomfort. Respirations even, non-labored. Skin warm, dry, color appropriate. Pt seen by sx, awaiting recs. Pt ambulated to bathroom with steady gait. Endorses frequent diarrhea. call bell within reach. Education provided to pt on how to and when to use call bell for assistance. Will continue to monitor.

## 2023-02-04 NOTE — ED PROVIDER NOTE - CLINICAL SUMMARY MEDICAL DECISION MAKING FREE TEXT BOX
[facility] PGY2: 78yo M with PMH of afib on coumadin, s/p mitral valve replacement, s/p CABG, hx of PE, BPH, HTN, DM2 presents for eval of abd pain, diarrhea, anorexia for 3d. Differential Diagnosis includes but not limited to gastroenteritis vs colitis vs diverticulitis vs r/o appendicitis vs r/o uti. check labs, urine, lipase, vbg, ct a/p IV contrast. GI meds. considered ischemic source but not supported by clinical picture or exam. Will check lactate with vbg. Sent stool studies if pt has diarrhea in ED.

## 2023-02-04 NOTE — H&P ADULT - PROBLEM SELECTOR PLAN 3
Has Cards follow-up; on Atenolol 50mg daily, Digoxin 125mcg daily and Warfarin 4mg daily  - c/w Atenolol 50mg daily  - c/w Digoxin 125mcg daily  - c/w Warfarin 4mg daily Has Cards follow-up; on Atenolol 50mg daily, Digoxin 125mcg daily and Warfarin 4mg daily  - c/w Atenolol 50mg daily  - c/w Digoxin 125mcg daily  - hold Warfarin for now as supratherapeutic

## 2023-02-04 NOTE — H&P ADULT - PROBLEM SELECTOR PLAN 5
On Trulicity, Glipizide and Repaglinide outpatient  - place on sliding scale insulin  - HbA1c check On Trulicity, Glipizide and Repaglinide outpatient  - place on sliding scale insulin q6H  - POCt glucose checks q6h  - HbA1c check

## 2023-02-04 NOTE — H&P ADULT - NSHPLABSRESULTS_GEN_ALL_CORE
LABS:                           14.3   8.50  )-----------( 174      ( 2023 02:40 )             42.5     02-04    138  |  106  |  28<H>  ----------------------------<  184<H>  4.1   |  24  |  1.31<H>    Ca    8.6      2023 02:40    TPro  7.0  /  Alb  3.9  /  TBili  3.6<H>  /  DBili  x   /  AST  22  /  ALT  21  /  AlkPhos  69  02-04        PT/INR - ( 2023 02:40 )   PT: 56.9 sec;   INR: 4.83 ratio         PTT - ( 2023 02:40 )  PTT:46.1 sec      Urinalysis Basic - ( 2023 05:20 )    Color: Yellow / Appearance: Clear / S.029 / pH: x  Gluc: x / Ketone: Negative  / Bili: Negative / Urobili: <2 mg/dL   Blood: x / Protein: 30 mg/dL / Nitrite: Negative   Leuk Esterase: Negative / RBC: 1 /HPF / WBC 3 /HPF   Sq Epi: x / Non Sq Epi: 1 /HPF / Bacteria: Negative        LIVER FUNCTIONS - ( 2023 02:40 )  Alb: 3.9 g/dL / Pro: 7.0 g/dL / ALK PHOS: 69 U/L / ALT: 21 U/L / AST: 22 U/L / GGT: x                 I&O's Summary         /     CAPILLARY BLOOD GLUCOSE      POCT Blood Glucose.: 170 mg/dL (2023 01:58)            MICRO:

## 2023-02-04 NOTE — H&P ADULT - HISTORY OF PRESENT ILLNESS
Mr. Gama Forte is a 76 Y/O M w/PMH  CAD/CABG, RHD, s/p mechanical MVR in 2006, chronic Afib on coumadin, DVT/PE, HLD, thyroid nodule, GERD, Rosie Thompson tear/esophageal ulcer, COVID 2020, BPH with LUTS (nocturia, intermittent hematuria), admitted for TURP on 3/3/2022, now presenting for abdominal pain & watery diarrhea x3 days.     In the ED, patient received CT Abdomen/Pelvis which showed dilated small bowel loops, concerning for enteritis vs. bowel obstruction. Surgery consulted, ruled out bowel obstruction. S/p 2L NS, Metronidazole 500mg IV, Ciprofloxacin 400mg IV. Pending stool collection for infectious workup. Mr. Gama Forte is a 78 Y/O M w/PMH  CAD/CABG, RHD, s/p mechanical MVR in 2006, chronic Afib on coumadin, DVT/PE, HLD, thyroid nodule, GERD, Rosie Thompson tear/esophageal ulcer, COVID 2020, BPH with LUTS (nocturia, intermittent hematuria), admitted for TURP on 3/3/2022, now presenting for abdominal pain & watery diarrhea x3 days.     Patient says he ate brown rice about 3 days ago, noted it to be particularly salty. Also had some burning immediately after eating. The next day, patient developed abdominal pain and diarrhea. Did not change any medications recently. Also endorses decrease in appetite, for a few weeks now. Denies nausea/vomiting, fevers, chills cough, headaches, dizziness. Denies blood in stool or urine. Denies any symptoms like this previously.    In the ED, patient received CT Abdomen/Pelvis which showed dilated small bowel loops, concerning for enteritis vs. bowel obstruction. Surgery consulted, ruled out bowel obstruction. S/p 2L NS, Metronidazole 500mg IV, Ciprofloxacin 400mg IV. Pending stool collection for infectious workup.

## 2023-02-04 NOTE — ED PROVIDER NOTE - OBJECTIVE STATEMENT
78yo M with PMH of afib on coumadin, s/p mitral valve replacement, s/p CABG, hx of PE, BPH, HTN, DM2 presents for eval of abd pain. For last 3d has had achy constant lower abd pain assoc with watery diarrhea. Assoc with anorexia, states he hasn't really been able to eat anything. Doesn't feel pain is worse w/ food but has no appetite. No fever, CP, SOB, NV. No blood in stool, which is watery and 'pale'. Skipped his meds today bc of loss of appetite. No hx of abd surgeries, no sick contacts.

## 2023-02-04 NOTE — H&P ADULT - PROBLEM SELECTOR PLAN 6
Per outpt records, pt on Atorvastatin 40mg daily; but [facility] pharmacy med rec did not list pt on Atorvastatin  - advise PCP to address discrepancy outpatient

## 2023-02-04 NOTE — H&P ADULT - PROBLEM SELECTOR PLAN 2
s/p MVR; on Warfarin outpatient 4mg daily; has good Cardiology outpt follow-up  - continue Warfarin 4mg daily s/p MVR; on Warfarin outpatient 4mg daily; has good Cardiology outpt follow-up  -hold Warfarin for now as supratherapeutic  - INR goal 2.5-3.5  - current INR is 4.83

## 2023-02-04 NOTE — ED ADULT NURSE NOTE - CHIEF COMPLAINT QUOTE
Pt st" I have stomach pains and diarreah since Tuesday....no appetite." localizing pain to lower abd.  Pt appears uncomfortable.  Hx  Afib on coumadin, Mitral valve replace, DM2.

## 2023-02-04 NOTE — ED PROVIDER NOTE - PHYSICAL EXAMINATION
CONSTITUTIONAL: well appearing, non-toxic   SKIN: Warm dry  EYES: NL inspection  ENT: dry oral mucosa  NECK: Supple; non tender.  CARD: RRR  RESP: CTAB  ABD: diffuse tenderness with some inc pain epigastric and RLQ/suprapubic, +guards in lower abd R>L  EXT: no pedal edema  NEURO: Grossly unremarkable  PSYCH: Cooperative, appropriate.

## 2023-02-04 NOTE — H&P ADULT - PROBLEM SELECTOR PLAN 4
H/o DVT/PE  - continue on Warfarin daily H/o DVT/PE in 2005.  - hold warfarin for now as supratherapeutic

## 2023-02-04 NOTE — H&P ADULT - ATTENDING COMMENTS
77M with PMH CAD/CABG, RHD, s/p mechanical MVR in 2006, chronic Afib on coumadin, DVT/PE, HLD, thyroid nodule, GERD, DM2, Rosie Thompson tear/esophageal ulcer, COVID 2020, BPH who presents to the hospital with abdominal pain and watery BMs. No blood in stool. No f/c. Denies travels, sick contacts, or unusual food intake. Last BM earlier this AM. Overall states he's feeling better. Initial labs notable for supratherapeutic INR, elevated Cr. Rest of labs stable per baseline. CT A/P with contrast reviewed. There were dilated loops of small bowel suspicious for enteritis. Surgery on board. Low suspicion for obstruction. Admit for enteritis and further infectious workup.     -C diff r/o in progress. Pending specimen for GI PCR.   -Clinically improved. Not toxic. Labs and exam otherwise reassuring - would opt to monitor off abx at this time.   -Cr/BUN/urine spec grav elevated - likely volume depletion. S/p IVF. Would keep NPO for now, if better this evening and no abd pain - would start CLD and ADAT.  -Hold coumadin - INR in AM.   -Secondary hypercoagulable state in setting of hx of VTE as well as chronic AF on AC.     Rest of plan as above.

## 2023-02-04 NOTE — ED PROVIDER NOTE - NSICDXPASTSURGICALHX_GEN_ALL_CORE_FT
PAST SURGICAL HISTORY:  Cataract Bilateral ; tx surgically    H/O melanoma excision scalp-2014    H/O retinal detachment left    Hx of CABG x 2 2006    Mitral valve replaced 2006 at [facility]    S/P tonsillectomy     S/P TURP 2014

## 2023-02-04 NOTE — H&P ADULT - PROBLEM SELECTOR PLAN 8
DVT PPx: on Warfarin  Diet: NPO for now  Ethics: full code  Dispo: likely home DVT PPx: hold AC as INR supratherapeutic  Diet: NPO for now; CLD tonight if tolerating  Ethics: full code  Dispo: likely home

## 2023-02-04 NOTE — ED PROVIDER NOTE - ATTENDING CONTRIBUTION TO CARE
Dr. Dickens, Attending Physician-  I performed a face to face bedside interview with patient regarding history of present illness, review of symptoms and past medical history. I completed an independent physical exam.  I have discussed patient's plan of care with the resident.    77M, afib on coumadin, s/p mitral valve replacement, BPH, HTN, DM2, who presents with belly pain. For the past 3 days, reports lower abdominal discomfort, associated with watery stools. Also reports decreased PO intake during this time. Pain is not worse with food. Sxs are not exertional. Otherwise, no headaches, fevers, chills, cough, sputum, cp, dysuria, hematuria, recent travel, trauma, syncope. Normally takes 5mg coumadin every night - did not take it the evening prior to presentation. Physical: afebrile, non-toxic appearing, rrr, ctabl, bebeto-umbilical ttp, no cvat. Plan: labs, urine, CT imaging, IVF.

## 2023-02-04 NOTE — ED PROVIDER NOTE - PROGRESS NOTE DETAILS
Pt comfortable in nad, aware of CT findings- and need for admission, awaiting surgery consult, belly currently soft and nondistended

## 2023-02-04 NOTE — H&P ADULT - NSICDXPASTSURGICALHX_GEN_ALL_CORE_FT
PAST SURGICAL HISTORY:  Cataract Bilateral ; tx surgically    H/O melanoma excision scalp-2014    H/O retinal detachment left    Hx of CABG x 2 2006    Mitral valve replaced 2006 at [facility]    S/P tonsillectomy     S/P TURP 2014

## 2023-02-04 NOTE — H&P ADULT - ASSESSMENT
Mr. Gama Forte is a 76 Y/O M w/PMH  CAD/CABG, RHD, s/p mechanical MVR in 2006, chronic Afib on coumadin, DVT/PE, DMT2, HLD, thyroid nodule, GERD, Rosie Thompson tear/esophageal ulcer, COVID 2020, BPH with LUTS (nocturia, intermittent hematuria), admitted for TURP on 3/3/2022, admitted for abdominal pain & watery diarrhea x3 days, found to have dilated bowel loops on CT, concerning for enteritis.

## 2023-02-04 NOTE — PATIENT PROFILE ADULT - FALL HARM RISK - UNIVERSAL INTERVENTIONS
Bed in lowest position, wheels locked, appropriate side rails in place/Call bell, personal items and telephone in reach/Instruct patient to call for assistance before getting out of bed or chair/Non-slip footwear when patient is out of bed/[place] to call system/Physically safe environment - no spills, clutter or unnecessary equipment/Purposeful Proactive Rounding/Room/bathroom lighting operational, light cord in reach

## 2023-02-04 NOTE — H&P ADULT - NSHPPHYSICALEXAM_GEN_ALL_CORE
VITALS:   T(C): 36.1 (02-04-23 @ 09:40), Max: 36.6 (02-04-23 @ 01:46)  HR: 83 (02-04-23 @ 09:40) (82 - 91)  BP: 133/81 (02-04-23 @ 09:40) (133/80 - 145/86)  RR: 16 (02-04-23 @ 09:40) (16 - 18)  SpO2: 100% (02-04-23 @ 09:40) (100% - 100%)    PHYSICAL EXAM:     GENERAL: NAD, lying in bed comfortably.  HEAD:  Atraumatic, normocephalic.  EYES: EOMI, PERRLA, conjunctiva and sclera clear.  ENT: Moist mucous membranes.  NECK: Supple, no JVD, trachea midline.  CHEST/LUNG: CTAB. No rales, rhonchi, wheezing, or rubs. Unlabored respirations.  HEART: RRR, no M/R/G, S1/S2  ABDOMEN: Soft, nontender, nondistended, no organomegaly. Normoactive bowel sounds.  EXTREMITIES:  2+ peripheral pulses b/l, brisk capillary refill. No clubbing, cyanosis, or edema.  Neurological:  AAOx3, no focal deficits.   SKIN: No rashes or lesions.  PSYCH: Normal affect and mood.

## 2023-02-04 NOTE — ED ADULT TRIAGE NOTE - CHIEF COMPLAINT QUOTE
Pt st" I have stomach pains and diarreah since Tuesday....no appetite." localizing pain to lower abd.  Pt appears uncomfortable.  Hx of Mitral valve replace on coumadin, DM2. Pt st" I have stomach pains and diarreah since Tuesday....no appetite." localizing pain to lower abd.  Pt appears uncomfortable.  Hx  Afib on coumadin, Mitral valve replace, DM2.

## 2023-02-04 NOTE — ED PROVIDER NOTE - NSICDXFAMILYHX_GEN_ALL_CORE_FT
Fax requesting office notes for upcoming appointment in [place] on [phone].    Faxed requesting information to [phone].   FAMILY HISTORY:  Mother  Still living? Unknown  Family history of diabetes mellitus, Age at diagnosis: Age Unknown

## 2023-02-04 NOTE — ED ADULT NURSE NOTE - OBJECTIVE STATEMENT
Pt received in rm 16. C/o lower abd pain, nausea and diarrhea since Tuesday. States has not eaten properly due to lack of appetite. PMH afib on coumadin, DM2, mitral valve replacement, CAD, PE, HTN, GERD. A&Ox4, ambulatory at baseline. Breathing even and unlabored. Afib on the cardiac monitor. Abd soft, non-distended. Denies chest pain, SOB, fever or chills. Pt in NAD. MD at bedside for eval.

## 2023-02-04 NOTE — CONSULT NOTE ADULT - ASSESSMENT
76yo M with PMH of afib on coumadin, s/p mitral valve replacement, s/p CABG, hx of PE, BPH, HTN, DM2 presents for eval of abd pain and diarrhea.    - Clinically pt is not obstructed and on review of iamging with radiology, picture is more consistent with enteritis, low suspicion for for partial obstruction or ischemic colitis   - Recommend medicine admission for hydration and Abx   - F/u GI PCR  - Recommend bowel rest with NPO until abd pain resolves   - Serial abd exam   - Surgery will follow     B team [id number]    D/w Dr. Brower  76yo M with PMH of afib on coumadin, s/p mitral valve replacement, s/p CABG, hx of PE, BPH, HTN, DM2 presents for eval of abd pain and diarrhea.    - Clinically pt is not obstructed and on review of iamging with radiology, picture is more consistent with enteritis, low suspicion for for partial obstruction or ischemic colitis   - Recommend medicine admission for hydration and Abx   - F/u GI PCR  - Recommend bowel rest with NPO until abd pain resolves   - Serial abd exam   - Last colonoscopy 10 years ago, recommend repeat screening colonoscopy   - Surgery will follow     B team [id number]    D/w Dr. Brower  76yo M with PMH of afib on coumadin, s/p mitral valve replacement, s/p CABG, hx of PE, BPH, HTN, DM2 presents for eval of abd pain and diarrhea.    - Clinically pt is not obstructed and on review of imaging with radiology, picture is more consistent with enteritis, low suspicion for for partial obstruction or ischemic colitis   - Recommend medicine admission for hydration and Abx   - F/u GI PCR  - Recommend bowel rest with NPO until abd pain resolves   - Serial abd exam   - Last colonoscopy 10 years ago, recommend repeat screening colonoscopy   - Surgery will follow     B team [id number]    D/w Dr. Brower

## 2023-02-05 ENCOUNTER — TRANSCRIPTION ENCOUNTER (OUTPATIENT)
Age: 78
End: 2023-02-05

## 2023-02-05 DIAGNOSIS — A04.72 ENTEROCOLITIS DUE TO CLOSTRIDIUM DIFFICILE, NOT SPECIFIED AS RECURRENT: ICD-10-CM

## 2023-02-05 LAB
A1C WITH ESTIMATED AVERAGE GLUCOSE RESULT: 6.8 % — HIGH (ref 4–5.6)
ALBUMIN SERPL ELPH-MCNC: 3.8 G/DL — SIGNIFICANT CHANGE UP (ref 3.3–5)
ALP SERPL-CCNC: 70 U/L — SIGNIFICANT CHANGE UP (ref 40–120)
ALT FLD-CCNC: 19 U/L — SIGNIFICANT CHANGE UP (ref 4–41)
ANION GAP SERPL CALC-SCNC: 10 MMOL/L — SIGNIFICANT CHANGE UP (ref 7–14)
APTT BLD: 48.5 SEC — HIGH (ref 27–36.3)
AST SERPL-CCNC: 22 U/L — SIGNIFICANT CHANGE UP (ref 4–40)
BASOPHILS # BLD AUTO: 0.03 K/UL — SIGNIFICANT CHANGE UP (ref 0–0.2)
BASOPHILS NFR BLD AUTO: 0.5 % — SIGNIFICANT CHANGE UP (ref 0–2)
BILIRUB SERPL-MCNC: 3.1 MG/DL — HIGH (ref 0.2–1.2)
BUN SERPL-MCNC: 19 MG/DL — SIGNIFICANT CHANGE UP (ref 7–23)
CALCIUM SERPL-MCNC: 8.9 MG/DL — SIGNIFICANT CHANGE UP (ref 8.4–10.5)
CHLORIDE SERPL-SCNC: 111 MMOL/L — HIGH (ref 98–107)
CO2 SERPL-SCNC: 20 MMOL/L — LOW (ref 22–31)
CREAT SERPL-MCNC: 0.98 MG/DL — SIGNIFICANT CHANGE UP (ref 0.5–1.3)
EGFR: 80 ML/MIN/1.73M2 — SIGNIFICANT CHANGE UP
EOSINOPHIL # BLD AUTO: 0.3 K/UL — SIGNIFICANT CHANGE UP (ref 0–0.5)
EOSINOPHIL NFR BLD AUTO: 4.6 % — SIGNIFICANT CHANGE UP (ref 0–6)
ESTIMATED AVERAGE GLUCOSE: 148 — SIGNIFICANT CHANGE UP
GI PCR PANEL: DETECTED
GLUCOSE BLDC GLUCOMTR-MCNC: 111 MG/DL — HIGH (ref 70–99)
GLUCOSE BLDC GLUCOMTR-MCNC: 123 MG/DL — HIGH (ref 70–99)
GLUCOSE BLDC GLUCOMTR-MCNC: 129 MG/DL — HIGH (ref 70–99)
GLUCOSE BLDC GLUCOMTR-MCNC: 145 MG/DL — HIGH (ref 70–99)
GLUCOSE BLDC GLUCOMTR-MCNC: 157 MG/DL — HIGH (ref 70–99)
GLUCOSE SERPL-MCNC: 111 MG/DL — HIGH (ref 70–99)
HCT VFR BLD CALC: 41.8 % — SIGNIFICANT CHANGE UP (ref 39–50)
HGB BLD-MCNC: 14.2 G/DL — SIGNIFICANT CHANGE UP (ref 13–17)
IANC: 3.49 K/UL — SIGNIFICANT CHANGE UP (ref 1.8–7.4)
IMM GRANULOCYTES NFR BLD AUTO: 0.9 % — SIGNIFICANT CHANGE UP (ref 0–0.9)
INR BLD: 4.7 RATIO — HIGH (ref 0.88–1.16)
LYMPHOCYTES # BLD AUTO: 1.87 K/UL — SIGNIFICANT CHANGE UP (ref 1–3.3)
LYMPHOCYTES # BLD AUTO: 28.7 % — SIGNIFICANT CHANGE UP (ref 13–44)
MAGNESIUM SERPL-MCNC: 2 MG/DL — SIGNIFICANT CHANGE UP (ref 1.6–2.6)
MCHC RBC-ENTMCNC: 31 PG — SIGNIFICANT CHANGE UP (ref 27–34)
MCHC RBC-ENTMCNC: 34 GM/DL — SIGNIFICANT CHANGE UP (ref 32–36)
MCV RBC AUTO: 91.3 FL — SIGNIFICANT CHANGE UP (ref 80–100)
MONOCYTES # BLD AUTO: 0.77 K/UL — SIGNIFICANT CHANGE UP (ref 0–0.9)
MONOCYTES NFR BLD AUTO: 11.8 % — SIGNIFICANT CHANGE UP (ref 2–14)
NEUTROPHILS # BLD AUTO: 3.49 K/UL — SIGNIFICANT CHANGE UP (ref 1.8–7.4)
NEUTROPHILS NFR BLD AUTO: 53.5 % — SIGNIFICANT CHANGE UP (ref 43–77)
NIGHT BLUE STAIN TISS: SIGNIFICANT CHANGE UP
NOROVIRUS GI+II RNA STL QL NAA+NON-PROBE: DETECTED
NRBC # BLD: 0 /100 WBCS — SIGNIFICANT CHANGE UP (ref 0–0)
NRBC # FLD: 0 K/UL — SIGNIFICANT CHANGE UP (ref 0–0)
PHOSPHATE SERPL-MCNC: 2 MG/DL — LOW (ref 2.5–4.5)
PLATELET # BLD AUTO: 172 K/UL — SIGNIFICANT CHANGE UP (ref 150–400)
POTASSIUM SERPL-MCNC: 4 MMOL/L — SIGNIFICANT CHANGE UP (ref 3.5–5.3)
POTASSIUM SERPL-SCNC: 4 MMOL/L — SIGNIFICANT CHANGE UP (ref 3.5–5.3)
PROT SERPL-MCNC: 6.7 G/DL — SIGNIFICANT CHANGE UP (ref 6–8.3)
PROTHROM AB SERPL-ACNC: 55.4 SEC — HIGH (ref 10.5–13.4)
RBC # BLD: 4.58 M/UL — SIGNIFICANT CHANGE UP (ref 4.2–5.8)
RBC # FLD: 13.6 % — SIGNIFICANT CHANGE UP (ref 10.3–14.5)
SODIUM SERPL-SCNC: 141 MMOL/L — SIGNIFICANT CHANGE UP (ref 135–145)
SPECIMEN SOURCE: SIGNIFICANT CHANGE UP
WBC # BLD: 6.52 K/UL — SIGNIFICANT CHANGE UP (ref 3.8–10.5)
WBC # FLD AUTO: 6.52 K/UL — SIGNIFICANT CHANGE UP (ref 3.8–10.5)

## 2023-02-05 PROCEDURE — 99232 SBSQ HOSP IP/OBS MODERATE 35: CPT

## 2023-02-05 PROCEDURE — 99223 1ST HOSP IP/OBS HIGH 75: CPT | Mod: GC

## 2023-02-05 RX ORDER — DEXTROSE 50 % IN WATER 50 %
15 SYRINGE (ML) INTRAVENOUS ONCE
Refills: 0 | Status: DISCONTINUED | OUTPATIENT
Start: 2023-02-05 | End: 2023-02-06

## 2023-02-05 RX ORDER — INSULIN LISPRO 100/ML
VIAL (ML) SUBCUTANEOUS AT BEDTIME
Refills: 0 | Status: DISCONTINUED | OUTPATIENT
Start: 2023-02-05 | End: 2023-02-06

## 2023-02-05 RX ORDER — DEXTROSE 50 % IN WATER 50 %
25 SYRINGE (ML) INTRAVENOUS ONCE
Refills: 0 | Status: DISCONTINUED | OUTPATIENT
Start: 2023-02-05 | End: 2023-02-06

## 2023-02-05 RX ORDER — VANCOMYCIN HCL 1 G
125 VIAL (EA) INTRAVENOUS EVERY 6 HOURS
Refills: 0 | Status: DISCONTINUED | OUTPATIENT
Start: 2023-02-05 | End: 2023-02-06

## 2023-02-05 RX ORDER — SODIUM,POTASSIUM PHOSPHATES 278-250MG
1 POWDER IN PACKET (EA) ORAL EVERY 4 HOURS
Refills: 0 | Status: COMPLETED | OUTPATIENT
Start: 2023-02-05 | End: 2023-02-05

## 2023-02-05 RX ORDER — DEXTROSE 50 % IN WATER 50 %
12.5 SYRINGE (ML) INTRAVENOUS ONCE
Refills: 0 | Status: DISCONTINUED | OUTPATIENT
Start: 2023-02-05 | End: 2023-02-06

## 2023-02-05 RX ORDER — GLUCAGON INJECTION, SOLUTION 0.5 MG/.1ML
1 INJECTION, SOLUTION SUBCUTANEOUS ONCE
Refills: 0 | Status: DISCONTINUED | OUTPATIENT
Start: 2023-02-05 | End: 2023-02-06

## 2023-02-05 RX ORDER — INSULIN LISPRO 100/ML
VIAL (ML) SUBCUTANEOUS
Refills: 0 | Status: DISCONTINUED | OUTPATIENT
Start: 2023-02-05 | End: 2023-02-06

## 2023-02-05 RX ADMIN — Medication 125 MICROGRAM(S): at 06:24

## 2023-02-05 RX ADMIN — Medication 1 PACKET(S): at 14:19

## 2023-02-05 RX ADMIN — Medication 1 PACKET(S): at 10:15

## 2023-02-05 RX ADMIN — ATENOLOL 50 MILLIGRAM(S): 25 TABLET ORAL at 06:25

## 2023-02-05 RX ADMIN — Medication 125 MILLIGRAM(S): at 23:59

## 2023-02-05 RX ADMIN — Medication 125 MILLIGRAM(S): at 12:05

## 2023-02-05 RX ADMIN — Medication 125 MILLIGRAM(S): at 18:35

## 2023-02-05 RX ADMIN — LOSARTAN POTASSIUM 25 MILLIGRAM(S): 100 TABLET, FILM COATED ORAL at 06:25

## 2023-02-05 NOTE — PROGRESS NOTE ADULT - PROBLEM SELECTOR PLAN 5
On Trulicity, Glipizide and Repaglinide outpatient  - place on sliding scale insulin q6H  - POCt glucose checks q6h  - HbA1c check On Trulicity, Glipizide and Repaglinide outpatient  - place on sliding scale insulin q6H  - POCt glucose checks q6h  - HbA1c 6.8

## 2023-02-05 NOTE — DISCHARGE NOTE PROVIDER - CARE PROVIDER_API CALL
Edis Seay)  Internal Medicine  [id number], [place] [address]  Phone: [phone]  Fax: [phone]  Established Patient  Follow Up Time: 1 week

## 2023-02-05 NOTE — PHYSICAL THERAPY INITIAL EVALUATION ADULT - PERTINENT HX OF CURRENT PROBLEM, REHAB EVAL
Pt is a 77 year old male presenting for abdominal pain & watery diarrhea x3 days. Pt also endorses decrease in appetite. CT A/P revealed dilated loops of small bowel within the mid abdomen without transition point. Pt found to be Cdiff and norovirus positive. Pt admitted for clostridium enterocolitis.

## 2023-02-05 NOTE — PROGRESS NOTE ADULT - PROBLEM SELECTOR PLAN 2
s/p MVR; on Warfarin outpatient 4mg daily; has good Cardiology outpt follow-up  -hold Warfarin for now as supratherapeutic  - INR goal 2.5-3.5  - current INR is 4.83 s/p MVR; on Warfarin outpatient 4mg daily; has good Cardiology outpt follow-up  -hold Warfarin for now as supratherapeutic  - INR goal 2.5-3.5  - current INR is 4.71

## 2023-02-05 NOTE — PROGRESS NOTE ADULT - PROBLEM SELECTOR PLAN 1
Pt w/ abdominal pain, diarrhea x 3d; reports no blood in stool; found to have dilated small bowel on CT, likely enteritis. Currently endorsed improvement in abdominal pain, still has diarrhea. Surgery evaluated, not worried about obstruction. No diverticulosis seen, not worried about diverticulitis. No other signs of sepsis, not concerned for infectious colitis right now.  - s/p Ciprofloxacin & Metronidazole x1 in ED  - can d/c Abx for now  - Follow-up GI PCR, stool cultures  - Keep NPO for now, with bowel rest; can start CLD tonight   - Zofran q8h PRN if nausea/vomiting  - maintenance fluids as needed  - supportive care Detail Level: Detailed Found to have C Diff positive PCR. Has definitely had diarrhea, but endorsing 1 BM overnight, watery. Patient looking  clinically well this AM. No leukocytosis, no abdominal tenderness, pt feeling better. Did not start any abx outpatient recently. Also Norovirus +.  - started on PO Vancomycin  - on CLD; can hold off on IVF Found to have C Diff positive PCR. Has definitely had diarrhea, but endorsing 1 BM overnight, watery. Patient looking  clinically well this AM. No leukocytosis, no abdominal tenderness, pt feeling better. Did not start any abx outpatient recently. Also Norovirus +.  - started on PO Vancomycin (2/5-)  - advanced diet to Consistent Carb; can hold off on IVF
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
## 2023-02-05 NOTE — DISCHARGE NOTE PROVIDER - HOSPITAL COURSE
HPI:  Mr. Gama Forte is a 78 Y/O M w/PMH  CAD/CABG, RHD, s/p mechanical MVR in 2006, chronic Afib on coumadin, DVT/PE, HLD, thyroid nodule, GERD, Rosie Thompson tear/esophageal ulcer, COVID 2020, BPH with LUTS (nocturia, intermittent hematuria), admitted for TURP on 3/3/2022, now presenting for abdominal pain & watery diarrhea x3 days, admitted for C Diff & Norovirus + enteritis.     Hospital Course:       HPI:  Mr. Gama Forte is a 78 Y/O M w/PMH  CAD/CABG, RHD, s/p mechanical MVR in 2006, chronic Afib on coumadin, DVT/PE, HLD, thyroid nodule, GERD, Rosie Thompson tear/esophageal ulcer, COVID 2020, BPH with LUTS (nocturia, intermittent hematuria), admitted for TURP on 3/3/2022, now presenting for abdominal pain & watery diarrhea x3 days, admitted for C Diff & Norovirus + enteritis.     Hospital Course:    Patient started on oral vancomycin for Clostridium dificile on 2/05. Infectious Diseases evaluated the patient and recommended 10-day course.    Patient able to tolerate diet, did not have significant abdominal pain.    On the day of discharge, the patient in stable clinical and asymptomatic state.   HPI:  Mr. Gama Forte is a 78 Y/O M w/PMH  CAD/CABG, RHD, s/p mechanical MVR in 2006, chronic Afib on coumadin, DVT/PE, HLD, thyroid nodule, GERD, Rosie Thompson tear/esophageal ulcer, COVID 2020, BPH with LUTS (nocturia, intermittent hematuria), admitted for TURP on 3/3/2022, now presenting for abdominal pain & watery diarrhea x3 days, admitted for C Diff & Norovirus + enteritis.    Hospital Course:    Patient started on oral vancomycin for Clostridium dificile on 2/05. Infectious Diseases evaluated the patient and recommended 10-day course 126mg q6    Patient able to tolerate diet, did not have significant abdominal pain.    On the day of discharge, the patient in stable clinical and asymptomatic state.   HPI:  Mr. Gama Forte is a 78 Y/O M w/PMH  CAD/CABG, RHD, s/p mechanical MVR in 2006, chronic Afib on coumadin, DVT/PE, HLD, thyroid nodule, GERD, Rosie Thompson tear/esophageal ulcer, COVID 2020, BPH with LUTS (nocturia, intermittent hematuria), admitted for TURP on 3/3/2022, now presenting for abdominal pain & watery diarrhea x3 days, admitted for C Diff & Norovirus + enteritis.    Hospital Course:    Patient started on oral vancomycin for Clostridium dificile on 2/05. Infectious Diseases evaluated the patient and recommended 10-day course 125mg q6. Patient found to be supratherapeutic with an INR of 4.83 on admission. Coumadin was held till INR resulted to 2.09. Patient reports taking 5mg q daily with 7mg on tuesdays. Coumadin was restarted on 2/6 at 5mg and was advised to take 5mg on 2/7 which the patient has and was recommended with a outpatient follow up with PCP regarding INR levels. Patient will also be sent out on vancomycin for the completion of the regimen.     Patient able to tolerate diet, did not have significant abdominal pain.    On the day of discharge, the patient in stable clinical and asymptomatic state with more formed stools.

## 2023-02-05 NOTE — DISCHARGE NOTE PROVIDER - NSDCFUADDAPPT_GEN_ALL_CORE_FT
Please follow up with your PCP in 2 days for getting your INR levels checked and redosing your coumadin  Please continue taking antibiotics (vancomycin) every 6 hours

## 2023-02-05 NOTE — DISCHARGE NOTE PROVIDER - NSDCCPCAREPLAN_GEN_ALL_CORE_FT
PRINCIPAL DISCHARGE DIAGNOSIS  Diagnosis: Clostridium enterocolitis  Assessment and Plan of Treatment: Clostridioides difficile infection, also known as C. difficile or C. diff infection, happens when too much C. diff bacteria grows. This can cause severe diarrhea and inflammation of the colon (colitis). It is linked to recent use of antibiotic medicine.  This infection can be passed from person to person (is contagious). You also may be exposed to the bacteria from contact with food, water, or surfaces that have the bacteria on them.  What are the causes?  Certain bacteria live in the colon and help to digest food. This infection develops when the balance of helpful bacteria in the colon changes and the C. diff bacteria grow out of control. This is often caused by taking antibiotics.  What increases the risk?  You may be more likely to develop this condition if you:  •Take certain antibiotics that kill many types of bacteria or take antibiotics for a long time.  •Have an extended stay in a hospital or long-term care facility.  •Are older than age 65.  •Have had a C. diff infection before or have been exposed to C. diff bacteria.  •Have a weakened disease-fighting system (immune system).  •Take a medicine to reduce stomach acid, such as a proton pump inhibitor, for a long time.  •Have a serious underlying condition, such as colon cancer or inflammatory bowel disease (IBD).  •Have had a gastrointestinal (GI) tract procedure.  What are the signs or symptoms?  Symptoms of this condition include:  •Diarrhea (three or more times a day) for several days.  •Fever.  •Loss of appetite.  •Nausea.  •Cramping or tenderness in the abdomen.  Contact a health care provider if:  •Your symptoms do not get better, or they get worse, even with treatment.  •Your symptoms go away and then come back.  •You have a fever.  •You develop new symptoms.  Get help right away if:  •You have more pain or tenderness in your abdomen.  •You have stool that is mostly bloody, or looks black and tarry.  •You cannot eat or drink without vomiting.  •You have signs of dehydration      SECONDARY DISCHARGE DIAGNOSES  Diagnosis: Norovirus  Assessment and Plan of Treatment:

## 2023-02-05 NOTE — CONSULT NOTE ADULT - ASSESSMENT
76 yo M with a PMH of CAD, CABG, s/p MVR 2006, AF, DVT and PE, GERD, BPH s/p TURP March 2022, who presented to the ED with complaints of abd pain and watery diarrhea.     C Diff colitis (C. Diff PCR +)  GI PCR also + for Norovirus   No fevers, no leukocytosis, Cr now normal   CT a/p showed enteritis vs partial small bowel obstruction (per Surgery more consistent with enteritis)  Started on PO Vancomycin. Abd pain resolved and diarrhea improving  ID consulted for recommendations    OVERALL  Mild C. Diff colitis   No recent antibiotic or PPI use as per pt, last hospitalization was March 2022    RECOMMENDATIONS  -Vancomycin 125mg PO Q6h for 10 days    All recommendations are tentative pending Attending Attestation.    Giselle Barnes MD, PGY-4   ID Fellow  [employer] Teams Preferred  After 5pm/weekends call [phone]  76 yo M with a PMH of CAD, CABG, s/p MVR 2006, AF, DVT and PE, GERD, BPH s/p TURP March 2022, who presented to the ED with complaints of abd pain and watery diarrhea.     C Diff colitis (C. Diff PCR +)  GI PCR + for Norovirus   No fevers, no leukocytosis, Cr now normal   CT a/p showed enteritis vs partial small bowel obstruction (per Surgery more consistent with enteritis)  Started on PO Vancomycin. Abd pain resolved and diarrhea improving  ID consulted for recommendations    OVERALL  C. Diff PCR +  Suspect enteritis seen on CT and pt's symptoms 2/2 Norovirus, however given C Diff PCR + we agree with treatment   No recent antibiotic or PPI use as per pt, last hospitalization was March 2022    RECOMMENDATIONS  -Vancomycin 125mg PO Q6h for 10 days  -Supportive care    All recommendations are tentative pending Attending Attestation.    Giselle Barnes MD, PGY-4   ID Fellow  [employer] Teams Preferred  After 5pm/weekends call [phone]  78 yo M with a PMH of CAD, CABG, s/p MVR 2006, AF, DVT and PE, GERD, BPH s/p TURP March 2022, who presented to the ED with complaints of abd pain and watery diarrhea.     C Diff colitis (C. Diff PCR +)  GI PCR + for Norovirus   No fevers, no leukocytosis, Cr now normal   CT a/p showed enteritis vs partial small bowel obstruction (per Surgery more consistent with enteritis)  Started on PO Vancomycin. Abd pain resolved and diarrhea improving  ID consulted for recommendations    OVERALL  C. Diff PCR +  Suspect enteritis seen on CT and pt's symptoms 2/2 Norovirus, however given C Diff PCR + we agree with treatment   No recent antibiotic or PPI use as per pt, last hospitalization was March 2022    RECOMMENDATIONS  -Vancomycin 125mg PO Q6h for 10 days  -Supportive care        Giselle Barnes MD, PGY-4   ID Fellow  [employer] Teams Preferred  After 5pm/weekends call [phone]

## 2023-02-05 NOTE — PROGRESS NOTE ADULT - SUBJECTIVE AND OBJECTIVE BOX
Internal Medicine   Gus Morales| PGY-1    OVERNIGHT EVENTS:      SUBJECTIVE:       MEDICATIONS  (STANDING):  atenolol  Tablet 50 milliGRAM(s) Oral daily  dextrose 5%. 1000 milliLiter(s) (50 mL/Hr) IV Continuous <Continuous>  dextrose 5%. 1000 milliLiter(s) (100 mL/Hr) IV Continuous <Continuous>  dextrose 50% Injectable 25 Gram(s) IV Push once  dextrose 50% Injectable 12.5 Gram(s) IV Push once  dextrose 50% Injectable 25 Gram(s) IV Push once  digoxin     Tablet 125 MICROGram(s) Oral daily  glucagon  Injectable 1 milliGRAM(s) IntraMuscular once  insulin lispro (ADMELOG) corrective regimen sliding scale   SubCutaneous every 6 hours  losartan 25 milliGRAM(s) Oral daily  vancomycin    Solution 125 milliGRAM(s) Oral every 6 hours    MEDICATIONS  (PRN):  dextrose Oral Gel 15 Gram(s) Oral once PRN Blood Glucose LESS THAN 70 milliGRAM(s)/deciliter        T(F): 97.3 (02-05-23 @ 06:10), Max: 98.4 (02-04-23 @ 21:31)  HR: 92 (02-05-23 @ 06:10) (77 - 92)  BP: 114/67 (02-05-23 @ 06:10) (107/81 - 133/81)  BP(mean): --  RR: 17 (02-05-23 @ 06:10) (16 - 18)  SpO2: 100% (02-05-23 @ 06:10) (100% - 100%)    PHYSICAL EXAM:     GENERAL: NAD, lying in bed comfortably.  HEAD:  Atraumatic, normocephalic.  EYES: EOMI, PERRLA, conjunctiva and sclera clear, no nystagmus noted.  ENT: Moist mucous membranes,   NECK: Supple. No JVD. Trachea midline.  CHEST/LUNG: CTAB. No rales, rhonchi, wheezing, or rubs. Unlabored respirations.  HEART: RRR, no M/R/G, normal S1/S2.  ABDOMEN: Soft, nontender, nondistended, no organomegaly. Normoactive bowel sounds.  EXTREMITIES:  2+ peripheral pulses b/l, brisk capillary refill. No clubbing, cyanosis, or edema.  MSK: No gross deformities noted.   NEURO:  AAOx3, no focal deficits.   SKIN: No rashes or lesions.  PSYCH: Normal mood, affect.    TELEMETRY:    LABS:                        14.3   8.50  )-----------( 174      ( 04 Feb 2023 02:40 )             42.5     02-04    138  |  106  |  28<H>  ----------------------------<  184<H>  4.1   |  24  |  1.31<H>    Ca    8.6      04 Feb 2023 02:40    TPro  7.0  /  Alb  3.9  /  TBili  3.6<H>  /  DBili  x   /  AST  22  /  ALT  21  /  AlkPhos  69  02-04        PT/INR - ( 04 Feb 2023 02:40 )   PT: 56.9 sec;   INR: 4.83 ratio         PTT - ( 04 Feb 2023 02:40 )  PTT:46.1 sec    Creatinine Trend: 1.31<--  I&O's Summary    BNP    RADIOLOGY & ADDITIONAL STUDIES:             Internal Medicine   Gus Morales| PGY-1    OVERNIGHT EVENTS:  No acute events overnight    SUBJECTIVE:   Says he feels well this AM  Endorses 1 bowel movement, watery, overnight when he arrived to 9S  Denies abdominal pain currently  Denies any recent antibiotics     MEDICATIONS  (STANDING):  atenolol  Tablet 50 milliGRAM(s) Oral daily  dextrose 5%. 1000 milliLiter(s) (50 mL/Hr) IV Continuous <Continuous>  dextrose 5%. 1000 milliLiter(s) (100 mL/Hr) IV Continuous <Continuous>  dextrose 50% Injectable 25 Gram(s) IV Push once  dextrose 50% Injectable 12.5 Gram(s) IV Push once  dextrose 50% Injectable 25 Gram(s) IV Push once  digoxin     Tablet 125 MICROGram(s) Oral daily  glucagon  Injectable 1 milliGRAM(s) IntraMuscular once  insulin lispro (ADMELOG) corrective regimen sliding scale   SubCutaneous every 6 hours  losartan 25 milliGRAM(s) Oral daily  vancomycin    Solution 125 milliGRAM(s) Oral every 6 hours    MEDICATIONS  (PRN):  dextrose Oral Gel 15 Gram(s) Oral once PRN Blood Glucose LESS THAN 70 milliGRAM(s)/deciliter        T(F): 97.3 (02-05-23 @ 06:10), Max: 98.4 (02-04-23 @ 21:31)  HR: 92 (02-05-23 @ 06:10) (77 - 92)  BP: 114/67 (02-05-23 @ 06:10) (107/81 - 133/81)  BP(mean): --  RR: 17 (02-05-23 @ 06:10) (16 - 18)  SpO2: 100% (02-05-23 @ 06:10) (100% - 100%)    PHYSICAL EXAM:     GENERAL: NAD, lying in bed comfortably.  HEAD:  Atraumatic, normocephalic.  EYES: EOMI, PERRLA, conjunctiva and sclera clear, no nystagmus noted.  ENT: Moist mucous membranes,   NECK: Supple. No JVD. Trachea midline.  CHEST/LUNG: CTAB. No rales, rhonchi, wheezing, or rubs. Unlabored respirations.  HEART: RRR, no M/R/G, normal S1/S2.  ABDOMEN: Soft, nontender, nondistended, no organomegaly. Normoactive bowel sounds.  EXTREMITIES:  2+ peripheral pulses b/l, brisk capillary refill. No clubbing, cyanosis, or edema.  MSK: No gross deformities noted.   NEURO:  AAOx3, no focal deficits.   SKIN: No rashes or lesions.  PSYCH: Normal mood, affect.    TELEMETRY:    LABS:                        14.3   8.50  )-----------( 174      ( 04 Feb 2023 02:40 )             42.5     02-04    138  |  106  |  28<H>  ----------------------------<  184<H>  4.1   |  24  |  1.31<H>    Ca    8.6      04 Feb 2023 02:40    TPro  7.0  /  Alb  3.9  /  TBili  3.6<H>  /  DBili  x   /  AST  22  /  ALT  21  /  AlkPhos  69  02-04        PT/INR - ( 04 Feb 2023 02:40 )   PT: 56.9 sec;   INR: 4.83 ratio         PTT - ( 04 Feb 2023 02:40 )  PTT:46.1 sec    Creatinine Trend: 1.31<--  I&O's Summary    BNP    RADIOLOGY & ADDITIONAL STUDIES:

## 2023-02-05 NOTE — DISCHARGE NOTE PROVIDER - NSFOLLOWUPCLINICS_GEN_ALL_ED_FT
[facility] at [place]  Internal Medicine  [address] [place] [address]  Phone: [phone]  Fax: [phone]  Established Patient  Follow Up Time: 1 week

## 2023-02-05 NOTE — PHYSICAL THERAPY INITIAL EVALUATION ADULT - ADDITIONAL COMMENTS
Pt lives in a private house with his wife; there are 7 steps to enter and 1 flight to bedroom on second floor.

## 2023-02-05 NOTE — DISCHARGE NOTE PROVIDER - NSDCMRMEDTOKEN_GEN_ALL_CORE_FT
atenolol 50 mg oral tablet: 1 tab(s) orally once a day AM  Coumadin 4 mg oral tablet: 1 tab(s) orally once a day pm  digoxin 125 mcg (0.125 mg) oral tablet: 1 tab(s) orally once a day AM  glipiZIDE 10 mg oral tablet, extended release: 1 tab(s) orally 2 times a day  losartan 25 mg oral tablet: 1 tab(s) orally once a day  repaglinide 1 mg oral tablet: 1 tab(s) orally 3 times a day (before meals)  Trulicity Pen 1.5 mg/0.5 mL subcutaneous solution: subcutaneous once a week Monday   atenolol 50 mg oral tablet: 1 tab(s) orally once a day AM  digoxin 125 mcg (0.125 mg) oral tablet: 1 tab(s) orally once a day AM  glipiZIDE 10 mg oral tablet, extended release: 1 tab(s) orally 2 times a day  losartan 25 mg oral tablet: 1 tab(s) orally once a day  repaglinide 1 mg oral tablet: 1 tab(s) orally 3 times a day (before meals)  Trulicity Pen 1.5 mg/0.5 mL subcutaneous solution: subcutaneous once a week Monday  vancomycin: 125 milligram(s) orally every 6 hours   warfarin 5 mg oral tablet: 1 tab(s) orally once   atenolol 50 mg oral tablet: 1 tab(s) orally once a day AM  digoxin 125 mcg (0.125 mg) oral tablet: 1 tab(s) orally once a day AM  glipiZIDE 10 mg oral tablet, extended release: 1 tab(s) orally 2 times a day  losartan 25 mg oral tablet: 1 tab(s) orally once a day  repaglinide 1 mg oral tablet: 1 tab(s) orally 3 times a day (before meals)  Trulicity Pen 1.5 mg/0.5 mL subcutaneous solution: subcutaneous once a week Monday  vancomycin: 125 milligram(s) orally every 6 hours

## 2023-02-05 NOTE — PROGRESS NOTE ADULT - ATTENDING COMMENTS
Pt seen at bedside, reports hunger wants to advance diet. overall no more abdominal pain or fevers.   appreciate ID consult regarding c. diff and Norovirus.   If remains stable, tolerating PO, likely can DC in AM. Pt seen at bedside, reports hunger wants to advance diet. overall no more abdominal pain or fevers.   appreciate ID consult regarding c. diff and Norovirus.   supratherapeutic INR, hold coumadin, check INR in AM   If remains stable, tolerating PO, likely can DC in AM.

## 2023-02-05 NOTE — PROGRESS NOTE ADULT - PROBLEM SELECTOR PLAN 8
DVT PPx: hold AC as INR supratherapeutic  Diet: NPO for now; CLD tonight if tolerating  Ethics: full code  Dispo: likely home DVT PPx: hold AC as INR supratherapeutic  Diet: CLD  Ethics: full code  Dispo: likely home

## 2023-02-05 NOTE — DISCHARGE NOTE PROVIDER - NSDCFUSCHEDAPPT_GEN_ALL_CORE_FT
Ahsan Kenny  [facility]  UROLOGY [address] [place] [address] R  Scheduled Appointment: 02/21/2023    [facility]  INTMED OP [id number]  Scheduled Appointment: 03/03/2023    [facility]  ENDOCRIN OP [id number]  Scheduled Appointment: 03/28/2023    Ashish Manrique  [facility]  Urology [address]  Scheduled Appointment: 04/03/2023

## 2023-02-05 NOTE — PHYSICAL THERAPY INITIAL EVALUATION ADULT - PATIENT PROFILE REVIEW, REHAB EVAL
PT initial evaluation received and chart review completed. Pt agreeable to participate in PT evaluation. Pt cleared by SAVANAH Saucedo./yes

## 2023-02-05 NOTE — CONSULT NOTE ADULT - ATTENDING COMMENTS
77M with a PMH of CAD, CABG, s/p MVR 2006, AF, DVT and PE, GERD, BPH s/p TURP March 2022, who was admitted 2/4/23 with complaints of abd pain and watery diarrhea.     C Diff (C. Diff PCR +)  GI PCR + for Norovirus   No fevers, no leukocytosis, Cr now normal   CT a/p showed enteritis vs partial small bowel obstruction (per Surgery more consistent with enteritis)    clinically, the norovirus appears to be predmoninant cause of current symptoms  - no ill contacts, no similar illness in wife  Started on PO Vancomycin 2/5/23 . Abd pain resolved and diarrhea improving  He reports decreased diarrhea, resolved abd pain, hunger!   He was eating heartily when examined    OVERALL  C. Diff PCR +  Suspect enteritis seen on CT and pt's symptoms 2/2 Norovirus      No definite history of recent antibiotic or PPI use as per pt, last hospitalization was March 2022 - He may have had abx for UTI    RECOMMENDATIONS  -Vancomycin 125mg PO Q6h 2/5/23      would provide 10 day course      discussed with daughter by phone at bedside (she is RN)  infection control measures and adjunctive probiotics discussed, potential for recurrence noted    No ID objection to discharge if otherwise stable

## 2023-02-06 ENCOUNTER — TRANSCRIPTION ENCOUNTER (OUTPATIENT)
Age: 78
End: 2023-02-06

## 2023-02-06 VITALS
HEART RATE: 66 BPM | TEMPERATURE: 98 F | OXYGEN SATURATION: 100 % | RESPIRATION RATE: 17 BRPM | SYSTOLIC BLOOD PRESSURE: 105 MMHG | DIASTOLIC BLOOD PRESSURE: 77 MMHG

## 2023-02-06 LAB
ALBUMIN SERPL ELPH-MCNC: 3.8 G/DL — SIGNIFICANT CHANGE UP (ref 3.3–5)
ALP SERPL-CCNC: 65 U/L — SIGNIFICANT CHANGE UP (ref 40–120)
ALT FLD-CCNC: 19 U/L — SIGNIFICANT CHANGE UP (ref 4–41)
ANION GAP SERPL CALC-SCNC: 9 MMOL/L — SIGNIFICANT CHANGE UP (ref 7–14)
APTT BLD: 44.3 SEC — HIGH (ref 27–36.3)
AST SERPL-CCNC: 21 U/L — SIGNIFICANT CHANGE UP (ref 4–40)
BASOPHILS # BLD AUTO: 0.03 K/UL — SIGNIFICANT CHANGE UP (ref 0–0.2)
BASOPHILS NFR BLD AUTO: 0.4 % — SIGNIFICANT CHANGE UP (ref 0–2)
BILIRUB SERPL-MCNC: 2.2 MG/DL — HIGH (ref 0.2–1.2)
BUN SERPL-MCNC: 26 MG/DL — HIGH (ref 7–23)
CALCIUM SERPL-MCNC: 9.2 MG/DL — SIGNIFICANT CHANGE UP (ref 8.4–10.5)
CHLORIDE SERPL-SCNC: 111 MMOL/L — HIGH (ref 98–107)
CO2 SERPL-SCNC: 21 MMOL/L — LOW (ref 22–31)
CREAT SERPL-MCNC: 1.21 MG/DL — SIGNIFICANT CHANGE UP (ref 0.5–1.3)
CULTURE RESULTS: SIGNIFICANT CHANGE UP
EGFR: 62 ML/MIN/1.73M2 — SIGNIFICANT CHANGE UP
EOSINOPHIL # BLD AUTO: 0.21 K/UL — SIGNIFICANT CHANGE UP (ref 0–0.5)
EOSINOPHIL NFR BLD AUTO: 2.6 % — SIGNIFICANT CHANGE UP (ref 0–6)
GLUCOSE BLDC GLUCOMTR-MCNC: 160 MG/DL — HIGH (ref 70–99)
GLUCOSE SERPL-MCNC: 145 MG/DL — HIGH (ref 70–99)
HCT VFR BLD CALC: 39.7 % — SIGNIFICANT CHANGE UP (ref 39–50)
HGB BLD-MCNC: 13.6 G/DL — SIGNIFICANT CHANGE UP (ref 13–17)
IANC: 4.54 K/UL — SIGNIFICANT CHANGE UP (ref 1.8–7.4)
IMM GRANULOCYTES NFR BLD AUTO: 0.4 % — SIGNIFICANT CHANGE UP (ref 0–0.9)
INR BLD: 2.09 RATIO — HIGH (ref 0.88–1.16)
LYMPHOCYTES # BLD AUTO: 2.45 K/UL — SIGNIFICANT CHANGE UP (ref 1–3.3)
LYMPHOCYTES # BLD AUTO: 30.5 % — SIGNIFICANT CHANGE UP (ref 13–44)
MAGNESIUM SERPL-MCNC: 1.9 MG/DL — SIGNIFICANT CHANGE UP (ref 1.6–2.6)
MCHC RBC-ENTMCNC: 30.9 PG — SIGNIFICANT CHANGE UP (ref 27–34)
MCHC RBC-ENTMCNC: 34.3 GM/DL — SIGNIFICANT CHANGE UP (ref 32–36)
MCV RBC AUTO: 90.2 FL — SIGNIFICANT CHANGE UP (ref 80–100)
MONOCYTES # BLD AUTO: 0.77 K/UL — SIGNIFICANT CHANGE UP (ref 0–0.9)
MONOCYTES NFR BLD AUTO: 9.6 % — SIGNIFICANT CHANGE UP (ref 2–14)
NEUTROPHILS # BLD AUTO: 4.54 K/UL — SIGNIFICANT CHANGE UP (ref 1.8–7.4)
NEUTROPHILS NFR BLD AUTO: 56.5 % — SIGNIFICANT CHANGE UP (ref 43–77)
NRBC # BLD: 0 /100 WBCS — SIGNIFICANT CHANGE UP (ref 0–0)
NRBC # FLD: 0 K/UL — SIGNIFICANT CHANGE UP (ref 0–0)
PHOSPHATE SERPL-MCNC: 2.7 MG/DL — SIGNIFICANT CHANGE UP (ref 2.5–4.5)
PLATELET # BLD AUTO: 195 K/UL — SIGNIFICANT CHANGE UP (ref 150–400)
POTASSIUM SERPL-MCNC: 3.9 MMOL/L — SIGNIFICANT CHANGE UP (ref 3.5–5.3)
POTASSIUM SERPL-SCNC: 3.9 MMOL/L — SIGNIFICANT CHANGE UP (ref 3.5–5.3)
PROT SERPL-MCNC: 6.7 G/DL — SIGNIFICANT CHANGE UP (ref 6–8.3)
PROTHROM AB SERPL-ACNC: 24.4 SEC — HIGH (ref 10.5–13.4)
RBC # BLD: 4.4 M/UL — SIGNIFICANT CHANGE UP (ref 4.2–5.8)
RBC # FLD: 13.5 % — SIGNIFICANT CHANGE UP (ref 10.3–14.5)
SODIUM SERPL-SCNC: 141 MMOL/L — SIGNIFICANT CHANGE UP (ref 135–145)
SPECIMEN SOURCE: SIGNIFICANT CHANGE UP
WBC # BLD: 8.03 K/UL — SIGNIFICANT CHANGE UP (ref 3.8–10.5)
WBC # FLD AUTO: 8.03 K/UL — SIGNIFICANT CHANGE UP (ref 3.8–10.5)

## 2023-02-06 PROCEDURE — 99239 HOSP IP/OBS DSCHRG MGMT >30: CPT

## 2023-02-06 PROCEDURE — 99232 SBSQ HOSP IP/OBS MODERATE 35: CPT

## 2023-02-06 RX ORDER — WARFARIN SODIUM 2.5 MG/1
3 TABLET ORAL ONCE
Refills: 0 | Status: DISCONTINUED | OUTPATIENT
Start: 2023-02-06 | End: 2023-02-06

## 2023-02-06 RX ORDER — VANCOMYCIN HCL 1 G
125 VIAL (EA) INTRAVENOUS
Qty: 35 | Refills: 0
Start: 2023-02-06

## 2023-02-06 RX ORDER — WARFARIN SODIUM 2.5 MG/1
1 TABLET ORAL
Qty: 0 | Refills: 0 | DISCHARGE
Start: 2023-02-06

## 2023-02-06 RX ORDER — WARFARIN SODIUM 2.5 MG/1
5 TABLET ORAL ONCE
Refills: 0 | Status: DISCONTINUED | OUTPATIENT
Start: 2023-02-06 | End: 2023-02-06

## 2023-02-06 RX ORDER — WARFARIN SODIUM 2.5 MG/1
1 TABLET ORAL
Qty: 0 | Refills: 0 | DISCHARGE

## 2023-02-06 RX ADMIN — Medication 125 MILLIGRAM(S): at 18:04

## 2023-02-06 RX ADMIN — LOSARTAN POTASSIUM 25 MILLIGRAM(S): 100 TABLET, FILM COATED ORAL at 07:09

## 2023-02-06 RX ADMIN — Medication 125 MICROGRAM(S): at 07:08

## 2023-02-06 RX ADMIN — ATENOLOL 50 MILLIGRAM(S): 25 TABLET ORAL at 07:08

## 2023-02-06 RX ADMIN — Medication 125 MILLIGRAM(S): at 12:04

## 2023-02-06 RX ADMIN — Medication 125 MILLIGRAM(S): at 07:09

## 2023-02-06 NOTE — PROGRESS NOTE ADULT - ATTENDING COMMENTS
78 yo M w/ PMHx  CAD/CABG, Rheumatic HD, s/p mechanical MVR in 2006, chronic Afib on coumadin, DVT/PE, HLD, thyroid nodule, GERD, Rosie Thompson tear/esophageal ulcer, COVID 2020, BPH with LUTS (nocturia, intermittent hematuria), admitted for TURP on 3/3/2022, p/w abdominal pain & watery diarrhea x3 days. CT with enteritis vs small bowel obstruction. Surgery likely enteritis.     Enteritis- + c diff and Norovirus   -	Vanco PO x 10 days   -	ID suspect symptoms likely due to norovirus   - non tender abdominal exam; stools improving more formed     Mechanical mitral valve- restart coumadin 4mg PO qd

## 2023-02-06 NOTE — DISCHARGE NOTE NURSING/CASE MANAGEMENT/SOCIAL WORK - PATIENT PORTAL LINK FT
You can access the FollowMyHealth Patient Portal offered by [facility] by registering at the following website: [email or web address]. By joining [email or web address]’s FollowMyHealth portal, you will also be able to view your health information using other applications (apps) compatible with our system.

## 2023-02-06 NOTE — PROGRESS NOTE ADULT - PROBLEM SELECTOR PLAN 3
Has Cards follow-up; on Atenolol 50mg daily, Digoxin 125mcg daily and Warfarin 4mg daily  - c/w Atenolol 50mg daily  - c/w Digoxin 125mcg daily  - hold Warfarin for now as supratherapeutic Has Cards follow-up; on Atenolol 50mg daily, Digoxin 125mcg daily and Warfarin 4mg daily  - c/w Atenolol 50mg daily  - c/w Digoxin 125mcg daily

## 2023-02-06 NOTE — PROGRESS NOTE ADULT - ASSESSMENT
77M with a PMH of CAD, CABG, s/p MVR 2006, AF, DVT and PE, GERD, BPH s/p TURP March 2022, who was admitted 2/4/23 with complaints of abd pain and watery diarrhea.     C Diff (C. Diff PCR +)  GI PCR + for Norovirus   No fevers, no leukocytosis, Cr now normal   CT a/p showed enteritis vs partial small bowel obstruction (per Surgery more consistent with enteritis)    clinically, the norovirus appears to be predmoninant cause of current symptoms  - no ill contacts, no similar illness in wife  Started on PO Vancomycin 2/5/23 . Abd pain resolved and diarrhea improving  He reports decreased diarrhea, resolved abd pain, hunger!   He was eating heartily when examined    OVERALL  C. Diff PCR +  Suspect enteritis seen on CT and pt's symptoms 2/2 Norovirus      No definite history of recent antibiotic or PPI use as per pt, last hospitalization was March 2022 - He may have had abx for UTI    RECOMMENDATIONS  -Vancomycin 125mg PO Q6h 2/5/23      would provide 10 day course through 2/14    no ID objection to discharge  signing off case   - please reconsult ID if needed
Mr. Gama Forte is a 78 Y/O M w/PMH  CAD/CABG, RHD, s/p mechanical MVR in 2006, chronic Afib on coumadin, DVT/PE, DMT2, HLD, thyroid nodule, GERD, Rosie Thompson tear/esophageal ulcer, COVID 2020, BPH with LUTS (nocturia, intermittent hematuria), admitted for TURP on 3/3/2022, admitted for abdominal pain & watery diarrhea x3 days, found to have dilated bowel loops on CT, concerning for enteritis.
Mr. Gama Forte is a 78 Y/O M w/PMH  CAD/CABG, RHD, s/p mechanical MVR in 2006, chronic Afib on coumadin, DVT/PE, DMT2, HLD, thyroid nodule, GERD, Rosie Thompson tear/esophageal ulcer, COVID 2020, BPH with LUTS (nocturia, intermittent hematuria), admitted for TURP on 3/3/2022, admitted for abdominal pain & watery diarrhea x3 days, found to have dilated bowel loops on CT, concerning for enteritis.

## 2023-02-06 NOTE — PROGRESS NOTE ADULT - PROBLEM SELECTOR PLAN 5
On Trulicity, Glipizide and Repaglinide outpatient  - place on sliding scale insulin q6H  - POCt glucose checks q6h  - HbA1c 6.8

## 2023-02-06 NOTE — PROGRESS NOTE ADULT - SUBJECTIVE AND OBJECTIVE BOX
Cecilia Dempsey PGY1  pager [phone] or check resident tab for coverage    Patient is a 77y old  Male who presents with a chief complaint of diarrhea (05 Feb 2023 11:57)      SUBJECTIVE / OVERNIGHT EVENTS:    MEDICATIONS  (STANDING):  atenolol  Tablet 50 milliGRAM(s) Oral daily  dextrose 5%. 1000 milliLiter(s) (50 mL/Hr) IV Continuous <Continuous>  dextrose 5%. 1000 milliLiter(s) (100 mL/Hr) IV Continuous <Continuous>  dextrose 50% Injectable 25 Gram(s) IV Push once  dextrose 50% Injectable 12.5 Gram(s) IV Push once  dextrose 50% Injectable 25 Gram(s) IV Push once  dextrose 50% Injectable 25 Gram(s) IV Push once  dextrose 50% Injectable 12.5 Gram(s) IV Push once  dextrose 50% Injectable 25 Gram(s) IV Push once  dextrose Oral Gel 15 Gram(s) Oral once  digoxin     Tablet 125 MICROGram(s) Oral daily  glucagon  Injectable 1 milliGRAM(s) IntraMuscular once  glucagon  Injectable 1 milliGRAM(s) IntraMuscular once  insulin lispro (ADMELOG) corrective regimen sliding scale   SubCutaneous three times a day before meals  insulin lispro (ADMELOG) corrective regimen sliding scale   SubCutaneous at bedtime  losartan 25 milliGRAM(s) Oral daily  vancomycin    Solution 125 milliGRAM(s) Oral every 6 hours    MEDICATIONS  (PRN):  dextrose Oral Gel 15 Gram(s) Oral once PRN Blood Glucose LESS THAN 70 milliGRAM(s)/deciliter      CAPILLARY BLOOD GLUCOSE      POCT Blood Glucose.: 157 mg/dL (05 Feb 2023 21:45)  POCT Blood Glucose.: 129 mg/dL (05 Feb 2023 18:14)  POCT Blood Glucose.: 145 mg/dL (05 Feb 2023 12:14)  POCT Blood Glucose.: 111 mg/dL (05 Feb 2023 08:40)    I&O's Summary      Vital Signs Last 24 Hrs  T(C): 37 (06 Feb 2023 07:00), Max: 37 (05 Feb 2023 14:10)  T(F): 98.6 (06 Feb 2023 07:00), Max: 98.6 (05 Feb 2023 14:10)  HR: 86 (06 Feb 2023 07:00) (74 - 86)  BP: 126/71 (06 Feb 2023 07:00) (109/76 - 126/71)  BP(mean): --  RR: 18 (06 Feb 2023 07:00) (18 - 18)  SpO2: 100% (06 Feb 2023 07:00) (100% - 100%)    Parameters below as of 06 Feb 2023 07:00  Patient On (Oxygen Delivery Method): room air        PHYSICAL EXAM:  GENERAL: no distress  PSYCH: A&O x3  HEAD: Atraumatic, Normocephalic  NECK: Supple, No JVD  CHEST/LUNG: clear to auscultation bilaterally  HEART: regular rate and rhythm, no murmurs  ABDOMEN: nontender to palpation, no rebound tenderness/guarding  EXTREMITIES: no edema on bilateral LE  NEUROLOGY: no focal neurologic deficit  SKIN: No rashes or lesions    LABS:                        13.6   8.03  )-----------( 195      ( 06 Feb 2023 04:47 )             39.7      02-06    141  |  111<H>  |  26<H>  ----------------------------<  145<H>  3.9   |  21<L>  |  1.21    Ca    9.2      06 Feb 2023 04:47  Phos  2.7     02-06  Mg     1.90     02-06    TPro  6.7  /  Alb  3.8  /  TBili  2.2<H>  /  DBili  x   /  AST  21  /  ALT  19  /  AlkPhos  65  02-06    PT/INR - ( 06 Feb 2023 04:47 )   PT: 24.4 sec;   INR: 2.09 ratio         PTT - ( 06 Feb 2023 04:47 )  PTT:44.3 sec          RADIOLOGY & ADDITIONAL TESTS:    Imaging Personally Reviewed:    Consultant(s) Notes Reviewed:      Care Discussed with Consultants/Other Providers:   Physical Therapy  Visit Type: treatment  Precautions:  Medical precautions: ; standard precautions. PPE worn during session:  universal mask & eye protection  Per RN patient is off precautions with a negative covid test  Lines:     Basic: capped IV      Lines in chart and on patient reviewed, cautions maintained throughout session.      SUBJECTIVE                                                                                                            Patient agreed to participate in therapy this date.  Patient was agreeable to participate in therapy today and eager to ambulate further in the hallway   Patient / Family Goal: maximize function    Pain   RN informed on pain level      OBJECTIVE                                                                                                              Level of consciousness: alert    Oriented to person, place, time and situation     Arousal alertness: appropriate responses to stimuli    Affect/Behavior: cooperative, alert, pleasant and appropriate  Patient activity tolerance: 1 to 1 activity to rest    Bed Mobility:        Supine to long sit: contact guard/touching/steadying assist    Supine to sit: modified independent  Training completed:    Tasks: supine to sit and sit to supine    Education details: patient safety, body mechanics and patient requires additional training    Contact guard assist to sit fully upright at patient needed to hold on to therapists hand to sit trunk fully upright  Transfers:    Assistive devices: 2-wheeled walker, 1 person and gait belt    Sit to stand: supervision    Stand to sit: supervision    Stand pivot: supervision  Training completed:    Tasks: sit to stand, stand to sit and stand pivot    Education details: patient safety, body mechanics and patient requires additional training    Supervision for safety and balance  Gait/Ambulation:     Assistance: contact guard/touching/steadying assist   Assistive device: 2-wheeled walker, gait  belt and 1 person    Distance (ft): 300    Type: decreased fred  Training Completed:    Tasks: gait training on level surfaces    Education details: body mechanics, patient safety and patient requires additional training    Kyphotic posture when ambulating and decreased hip flexion and knee extension during middle and end swing phase, contact guard assist to maintain safety and verbal cues given for improving gait sequencing        Interventions                                                                                                       Skilled input: Verbal instruction/cues, tactile instruction/cues and posture correction  Verbal Consent: Writer verbally educated and received verbal consent for hand placement, positioning of patient, and techniques to be performed today from patient for clothing adjustments for techniques, hand placement and palpation for techniques and therapist position for techniques as described above and how they are pertinent to the patient's plan of care.        ASSESSMENT                                                                                                                Impairments: strength, activity tolerance, balance deficits, safety awareness, endurance and pain  Functional Limitations: all functional mobility  Treatment  Patient seen on  nursing unit.     Physical Therapy treatment session today focused on increasing his overall gait endurance.  Patient is currently functioning at contact guard assist level for bed mobility and gait and supervision for transfers. He was able to significantly increase his endurance today by ambulating 300ft. He continues to be limited by R hip pain and decreased movement of the right hip during gait, as indicated above.     Recommended Consults: PT: OT  Discharge Recommendations  Recommendation for Discharge: PT WI: Post acute therapy         PT/OT Mobility Equipment for Discharge: Assess for 2ww vs cane      PT Identified  Barriers to Discharge: R hip pain limiting function, decreased strength, balance deficit/fall risk       Skilled therapy is required to address these limitations in attempt to maximize the patient's independence.  Progress: progressing toward goals    End of Session:   Location: in bed  Safety measures: call light within reach, equipment intact, lines intact and alarm system in place/re-engaged  Handoff to: nurse            PLAN                                                                                                                            Suggestions for next session as indicated: Plan: Ambulation in hallway with 2WW, seated strengthening    PT Frequency: 5 days/week  Frequency Comments: last seen 2/4; JK/CK/KD/DF        Agreement to plan and goals: patient agrees with goals and treatment plan        GOALS:  Review Date: 2/6/2021  Long Term Goals: (to be met by time of discharge from hospital)  Sit to supine: Patient will complete sit to supine modified independent.  Supine to sit: Patient will complete supine to sit modified independent.  Sit to stand: Patient will complete sit to stand transfer with modified independent.   Stand to sit: Patient will complete stand to sit transfer with modified independent.   Stand pivot: Patient will complete stand pivot transfer with modified independent.   Ambulation (even): Patient will ambulate on even surface for 150 feet with modified independent.   3-4 steps: Patient will ambulate 3-4 steps with modified independent.     Documented in the chart in the following areas: Pain. Assessment.         Cecilia Dempsey PGY1  pager [phone] or check resident tab for coverage    Patient is a 77y old  Male who presents with a chief complaint of diarrhea (05 Feb 2023 11:57)      SUBJECTIVE / OVERNIGHT EVENTS: No events ON. Patient reports 1 BM On that was loose but not fully formed. Reports reduction in the amount of times he has had diarrhea. Denies abd pain, bbm, n/v/ changes in urinary habits.    MEDICATIONS  (STANDING):  atenolol  Tablet 50 milliGRAM(s) Oral daily  dextrose 5%. 1000 milliLiter(s) (50 mL/Hr) IV Continuous <Continuous>  dextrose 5%. 1000 milliLiter(s) (100 mL/Hr) IV Continuous <Continuous>  dextrose 50% Injectable 25 Gram(s) IV Push once  dextrose 50% Injectable 12.5 Gram(s) IV Push once  dextrose 50% Injectable 25 Gram(s) IV Push once  dextrose 50% Injectable 25 Gram(s) IV Push once  dextrose 50% Injectable 12.5 Gram(s) IV Push once  dextrose 50% Injectable 25 Gram(s) IV Push once  dextrose Oral Gel 15 Gram(s) Oral once  digoxin     Tablet 125 MICROGram(s) Oral daily  glucagon  Injectable 1 milliGRAM(s) IntraMuscular once  glucagon  Injectable 1 milliGRAM(s) IntraMuscular once  insulin lispro (ADMELOG) corrective regimen sliding scale   SubCutaneous three times a day before meals  insulin lispro (ADMELOG) corrective regimen sliding scale   SubCutaneous at bedtime  losartan 25 milliGRAM(s) Oral daily  vancomycin    Solution 125 milliGRAM(s) Oral every 6 hours    MEDICATIONS  (PRN):  dextrose Oral Gel 15 Gram(s) Oral once PRN Blood Glucose LESS THAN 70 milliGRAM(s)/deciliter      CAPILLARY BLOOD GLUCOSE      POCT Blood Glucose.: 157 mg/dL (05 Feb 2023 21:45)  POCT Blood Glucose.: 129 mg/dL (05 Feb 2023 18:14)  POCT Blood Glucose.: 145 mg/dL (05 Feb 2023 12:14)  POCT Blood Glucose.: 111 mg/dL (05 Feb 2023 08:40)    I&O's Summary      Vital Signs Last 24 Hrs  T(C): 37 (06 Feb 2023 07:00), Max: 37 (05 Feb 2023 14:10)  T(F): 98.6 (06 Feb 2023 07:00), Max: 98.6 (05 Feb 2023 14:10)  HR: 86 (06 Feb 2023 07:00) (74 - 86)  BP: 126/71 (06 Feb 2023 07:00) (109/76 - 126/71)  BP(mean): --  RR: 18 (06 Feb 2023 07:00) (18 - 18)  SpO2: 100% (06 Feb 2023 07:00) (100% - 100%)    Parameters below as of 06 Feb 2023 07:00  Patient On (Oxygen Delivery Method): room air        PHYSICAL EXAM:  GENERAL: no distress  PSYCH: A&O x3  HEAD: Atraumatic, Normocephalic  NECK: Supple, No JVD  CHEST/LUNG: clear to auscultation bilaterally  HEART: irregular rate and rhythm, no murmurs  ABDOMEN: nontender to palpation, no rebound tenderness/guarding  EXTREMITIES: no edema on bilateral LE  NEUROLOGY: no focal neurologic deficit  SKIN: No rashes or lesions    LABS:                        13.6   8.03  )-----------( 195      ( 06 Feb 2023 04:47 )             39.7      02-06    141  |  111<H>  |  26<H>  ----------------------------<  145<H>  3.9   |  21<L>  |  1.21    Ca    9.2      06 Feb 2023 04:47  Phos  2.7     02-06  Mg     1.90     02-06    TPro  6.7  /  Alb  3.8  /  TBili  2.2<H>  /  DBili  x   /  AST  21  /  ALT  19  /  AlkPhos  65  02-06    PT/INR - ( 06 Feb 2023 04:47 )   PT: 24.4 sec;   INR: 2.09 ratio         PTT - ( 06 Feb 2023 04:47 )  PTT:44.3 sec          RADIOLOGY & ADDITIONAL TESTS:    Imaging Personally Reviewed:    Consultant(s) Notes Reviewed:      Care Discussed with Consultants/Other Providers:

## 2023-02-06 NOTE — PROGRESS NOTE ADULT - PROBLEM SELECTOR PLAN 4
H/o DVT/PE in 2005.  - hold warfarin for now as supratherapeutic
H/o DVT/PE in 2005.  - hold warfarin for now as supratherapeutic

## 2023-02-06 NOTE — PROVIDER CONTACT NOTE (MEDICATION) - SITUATION
Pt refused AM insulin coverage for  at 08:44. Diabetes education provided on importance of insulin administration. Pt stated he only takes oral meds and refused insulin,

## 2023-02-06 NOTE — PROGRESS NOTE ADULT - SUBJECTIVE AND OBJECTIVE BOX
Follow Up:  Cdiff, norovirus    Interval History/ROS:  feels good, taking po, no abd pain,  loose stool last night  thickened but not yet formed    Allergies  No Known Allergies    ANTIMICROBIALS:  vancomycin    Solution 125 every 6 hours      OTHER MEDS:  MEDICATIONS  (STANDING):  atenolol  Tablet 50 daily  dextrose 50% Injectable 25 once  dextrose 50% Injectable 12.5 once  dextrose 50% Injectable 25 once  dextrose 50% Injectable 25 once  dextrose 50% Injectable 12.5 once  dextrose 50% Injectable 25 once  dextrose Oral Gel 15 once  dextrose Oral Gel 15 once PRN  digoxin     Tablet 125 daily  glucagon  Injectable 1 once  glucagon  Injectable 1 once  insulin lispro (ADMELOG) corrective regimen sliding scale  three times a day before meals  insulin lispro (ADMELOG) corrective regimen sliding scale  at bedtime  losartan 25 daily      Vital Signs Last 24 Hrs  T(C): 36.8 (06 Feb 2023 14:00), Max: 37 (05 Feb 2023 22:01)  T(F): 98.2 (06 Feb 2023 14:00), Max: 98.6 (05 Feb 2023 22:01)  HR: 66 (06 Feb 2023 14:00) (66 - 86)  BP: 105/77 (06 Feb 2023 14:00) (105/77 - 126/71)  BP(mean): --  RR: 17 (06 Feb 2023 14:00) (17 - 18)  SpO2: 100% (06 Feb 2023 14:00) (100% - 100%)    Parameters below as of 06 Feb 2023 14:00  Patient On (Oxygen Delivery Method): room air        PHYSICAL EXAM:  General: WN/WD NAD, Non-toxic  Neurology: A&Ox3, nonfocal  Respiratory: Clear to auscultation bilaterally  CV: RRR, S1S2, no murmurs, rubs or gallops  Abdominal: Soft, Non-tender, non-distended, normal bowel sounds  Extremities: No edema  Line Sites: Clear  Skin: No rash                          13.6   8.03  )-----------( 195      ( 06 Feb 2023 04:47 )             39.7       02-06    141  |  111<H>  |  26<H>  ----------------------------<  145<H>  3.9   |  21<L>  |  1.21    Ca    9.2      06 Feb 2023 04:47  Phos  2.7     02-06  Mg     1.90     02-06    TPro  6.7  /  Alb  3.8  /  TBili  2.2<H>  /  DBili  x   /  AST  21  /  ALT  19  /  AlkPhos  65  02-06      MICROBIOLOGY:  .Stool Feces  02-04-23   No enteric pathogens isolated.  (Stool culture examined for Salmonella,  Shigella, Campylobacter, Aeromonas, Plesiomonas,  Vibrio, E.coli O157 and Yersinia)  --  --     (02.04.23 @ 18:42) GI PCR Panel: Detected:  Norovirus  C Diff by PCR Result: Detected (02-04 @ 09:50)    C Diff by PCR Result: Detected (02-04-23 @ 09:50)      RADIOLOGY:    Ahsan Mayer MD; Division of Infectious Disease; Pager: [phone]; nights and weekends: [phone]

## 2023-02-06 NOTE — PROGRESS NOTE ADULT - PROBLEM SELECTOR PLAN 6
Per outpt records, pt on Atorvastatin 40mg daily; but [facility] pharmacy med rec did not list pt on Atorvastatin  - advise PCP to address discrepancy outpatient
Per outpt records, pt on Atorvastatin 40mg daily; but [facility] pharmacy med rec did not list pt on Atorvastatin  - advise PCP to address discrepancy outpatient

## 2023-02-06 NOTE — PROGRESS NOTE ADULT - PROBLEM SELECTOR PLAN 2
s/p MVR; on Warfarin outpatient 4mg daily; has good Cardiology outpt follow-up  -hold Warfarin for now as supratherapeutic  - INR goal 2.5-3.5  - current INR is 4.71 s/p MVR; on Warfarin outpatient 4mg daily; has good Cardiology outpt follow-up  -hold Warfarin for now as supratherapeutic  - INR goal 2.5-3.5  - current INR is 4.71  -now subtherapeutic 2.09 on 2/6; restarted warfarin to 5mg today and timorrow will f/u by outpatient regarding re-dosing coumadin in 48 hours

## 2023-02-06 NOTE — PROGRESS NOTE ADULT - PROBLEM SELECTOR PLAN 1
Found to have C Diff positive PCR. Has definitely had diarrhea, but endorsing 1 BM overnight, watery. Patient looking  clinically well this AM. No leukocytosis, no abdominal tenderness, pt feeling better. Did not start any abx outpatient recently. Also Norovirus +.  - started on PO Vancomycin (2/5-)  - advanced diet to Consistent Carb; can hold off on IVF Found to have C Diff positive PCR. Has definitely had diarrhea, but endorsing 1 BM overnight, watery. Patient looking  clinically well this AM. No leukocytosis, no abdominal tenderness, pt feeling better. Did not start any abx outpatient recently. Also Norovirus +.  - started on PO Vancomycin (2/5-) will cont outpatient   - advanced diet to Consistent Carb; can hold off on IVF

## 2023-02-07 ENCOUNTER — RX RENEWAL (OUTPATIENT)
Age: 78
End: 2023-02-07

## 2023-02-09 ENCOUNTER — NON-APPOINTMENT (OUTPATIENT)
Age: 78
End: 2023-02-09

## 2023-02-10 ENCOUNTER — NON-APPOINTMENT (OUTPATIENT)
Age: 78
End: 2023-02-10

## 2023-02-10 ENCOUNTER — APPOINTMENT (OUTPATIENT)
Dept: INTERNAL MEDICINE | Facility: CLINIC | Age: 78
End: 2023-02-10
Payer: MEDICARE

## 2023-02-10 ENCOUNTER — OUTPATIENT (OUTPATIENT)
Dept: OUTPATIENT SERVICES | Facility: HOSPITAL | Age: 78
LOS: 1 days | End: 2023-02-10

## 2023-02-10 VITALS
OXYGEN SATURATION: 99 % | HEIGHT: 69 IN | DIASTOLIC BLOOD PRESSURE: 76 MMHG | HEART RATE: 89 BPM | BODY MASS INDEX: 24.29 KG/M2 | WEIGHT: 164 LBS | SYSTOLIC BLOOD PRESSURE: 118 MMHG

## 2023-02-10 DIAGNOSIS — Z87.19 PERSONAL HISTORY OF OTHER DISEASES OF THE DIGESTIVE SYSTEM: ICD-10-CM

## 2023-02-10 DIAGNOSIS — R80.9 PROTEINURIA, UNSPECIFIED: ICD-10-CM

## 2023-02-10 DIAGNOSIS — Z87.898 PERSONAL HISTORY OF OTHER SPECIFIED CONDITIONS: ICD-10-CM

## 2023-02-10 DIAGNOSIS — Z87.2 PERSONAL HISTORY OF DISEASES OF THE SKIN AND SUBCUTANEOUS TISSUE: ICD-10-CM

## 2023-02-10 DIAGNOSIS — N48.89 OTHER SPECIFIED DISORDERS OF PENIS: ICD-10-CM

## 2023-02-10 DIAGNOSIS — J06.9 ACUTE UPPER RESPIRATORY INFECTION, UNSPECIFIED: ICD-10-CM

## 2023-02-10 DIAGNOSIS — R17 UNSPECIFIED JAUNDICE: ICD-10-CM

## 2023-02-10 DIAGNOSIS — N48.9 DISORDER OF PENIS, UNSPECIFIED: ICD-10-CM

## 2023-02-10 DIAGNOSIS — R14.0 ABDOMINAL DISTENSION (GASEOUS): ICD-10-CM

## 2023-02-10 DIAGNOSIS — Z98.89 OTHER SPECIFIED POSTPROCEDURAL STATES: Chronic | ICD-10-CM

## 2023-02-10 DIAGNOSIS — R07.89 OTHER CHEST PAIN: ICD-10-CM

## 2023-02-10 DIAGNOSIS — Z86.69 PERSONAL HISTORY OF OTHER DISEASES OF THE NERVOUS SYSTEM AND SENSE ORGANS: ICD-10-CM

## 2023-02-10 DIAGNOSIS — Z90.89 ACQUIRED ABSENCE OF OTHER ORGANS: Chronic | ICD-10-CM

## 2023-02-10 DIAGNOSIS — Z86.69 PERSONAL HISTORY OF OTHER DISEASES OF THE NERVOUS SYSTEM AND SENSE ORGANS: Chronic | ICD-10-CM

## 2023-02-10 DIAGNOSIS — N39.41 URGE INCONTINENCE: ICD-10-CM

## 2023-02-10 DIAGNOSIS — H26.9 UNSPECIFIED CATARACT: Chronic | ICD-10-CM

## 2023-02-10 DIAGNOSIS — Z87.448 PERSONAL HISTORY OF OTHER DISEASES OF URINARY SYSTEM: ICD-10-CM

## 2023-02-10 DIAGNOSIS — K31.89 OTHER DISEASES OF STOMACH AND DUODENUM: ICD-10-CM

## 2023-02-10 DIAGNOSIS — Z01.818 ENCOUNTER FOR OTHER PREPROCEDURAL EXAMINATION: ICD-10-CM

## 2023-02-10 DIAGNOSIS — Z87.438 PERSONAL HISTORY OF OTHER DISEASES OF MALE GENITAL ORGANS: ICD-10-CM

## 2023-02-10 DIAGNOSIS — R39.198 OTHER DIFFICULTIES WITH MICTURITION: ICD-10-CM

## 2023-02-10 DIAGNOSIS — N52.1 ERECTILE DYSFUNCTION DUE TO DISEASES CLASSIFIED ELSEWHERE: ICD-10-CM

## 2023-02-10 DIAGNOSIS — E87.0 HYPEROSMOLALITY AND HYPERNATREMIA: ICD-10-CM

## 2023-02-10 DIAGNOSIS — N17.9 ACUTE KIDNEY FAILURE, UNSPECIFIED: ICD-10-CM

## 2023-02-10 DIAGNOSIS — R14.3 FLATULENCE: ICD-10-CM

## 2023-02-10 DIAGNOSIS — R74.01 ELEVATION OF LEVELS OF LIVER TRANSAMINASE LEVELS: ICD-10-CM

## 2023-02-10 DIAGNOSIS — Z98.890 OTHER SPECIFIED POSTPROCEDURAL STATES: Chronic | ICD-10-CM

## 2023-02-10 DIAGNOSIS — Z91.89 OTHER SPECIFIED PERSONAL RISK FACTORS, NOT ELSEWHERE CLASSIFIED: ICD-10-CM

## 2023-02-10 DIAGNOSIS — R10.13 EPIGASTRIC PAIN: ICD-10-CM

## 2023-02-10 DIAGNOSIS — H43.311: ICD-10-CM

## 2023-02-10 LAB
INR PPP: 1.9 RATIO
POCT-PROTHROMBIN TIME: 22.2 SECS
QUALITY CONTROL: YES

## 2023-02-10 PROCEDURE — 99215 OFFICE O/P EST HI 40 MIN: CPT | Mod: GC

## 2023-02-10 NOTE — ASSESSMENT
[FreeTextEntry1] : 77yM with PMHx T2DM, a-fib, Rheumatic heart disease complicated by mitral valve regurgitation s/p mechanical valve placement on warfarin (INR goal 2.5-3.5), CAD s/p CABG, Multinodular thyroid gland, BPH s/p TURP 3/2022 presenting for hospital follow up.\par \par # C. Diff and Norovirus diarrhea\par - Continue oral vancomycin for full 10 day course (through 2/14)\par - Symptoms are completely resolved\par - First episode of C. diff\par \par # Mechanical Heart Valve with INR goal 2.5-3.5\par - Today INR 1.9, subtherapeutic\par - Has been on decreased regimen of 5mg qd, prior was stable at therapeutic range at 5mg daily with 7.5mg on Tue\par - Resume prior regimen\par \par # T2DM\par - Pt currently on Trulicity and glipizide\par - A1c 7.2% at last visit, has been well controlled for a long time\par - Pt very distressed at not being able to have insurance cover 3-4 daily fingersticks for blood glucose\par - Spoke with pt at length that it is not necessary for him to take his sugars so often, that once a day would be safe along with q3 month A1c checks but this is very upsetting for patient as he is very strict with his diet and says the blood sugars help him to plan meals, says he wouldn't know what to do if he couldn't take his blood sugar so often as he would have significant anxiety about not knowing if his sugars are too high\par - He reports his insurance said for us to call or send them a note in order to potentially be approved for 3x daily supplies, but I am unsure if this will be approved off insulin, will attempt to call\par - Of note pt did threaten to stop picking up his Trulicity because he was upset about this situation, uncertain whether he has actually been taking this or not, emphasized the importance of this medication\par - Will discuss this further at his next visit in 6 weeks\par \par RTC 1 week for INR check and 6 weeks for A1c and diabetes follow up to discuss fingersticks & medications

## 2023-02-10 NOTE — END OF VISIT
[] : Resident [FreeTextEntry3] : Agree with plan re coumadin. C. diff likely in the setting of primary norovirus, and age and debility. Pt without need to check his sugars so often, should adhere to diet either way.

## 2023-02-10 NOTE — HISTORY OF PRESENT ILLNESS
[Post-hospitalization from ___ Hospital] : Post-hospitalization from [unfilled] Hospital [Admitted on: ___] : The patient was admitted on [unfilled] [Discharged on ___] : discharged on [unfilled] [FreeTextEntry2] : 77yM with PMHx T2DM, a-fib, Rheumatic heart disease complicated by mitral valve regurgitation s/p mechanical valve placement on warfarin (INR goal 2.5-3.5), CAD s/p CABG, Multinodular thyroid gland, BPH s/p TURP 3/2022 presenting for hospital follow up. He was hospitalized at [facility] from 2/4-2/6 where he presented with 3 days of abdominal pain and non-bloody diarrhea and was found to have + Norovirus on GI PCR and + C. Diff PCR for B-toxin. He was started on oral vancomycin on 2/5 (prescribed for 10 day course to be taken through 2/14), symptoms resolved by hospital discharge. Additionally when he came to the hospital his INR was 4.8, so on DC he was started on a regimen of 5mg warfarin daily instead of his prior regimen of 5mg daily but 7.5 mg on Tuesday only. Labs were otherwise normal without elevated WBC, mild MERON which resolved.\par \par Pt denies any recurrence of symptoms since discharge or any new symptoms. He does mention that his insurance has not been covering supplies in order to check his blood sugar 3-4 times daily as he is accustomed to doing and this is very upsetting for him as he decides his diet for the day based on these numbers, despite not being on any insulin.

## 2023-02-13 DIAGNOSIS — E11.9 TYPE 2 DIABETES MELLITUS WITHOUT COMPLICATIONS: ICD-10-CM

## 2023-02-13 DIAGNOSIS — Z95.2 PRESENCE OF PROSTHETIC HEART VALVE: ICD-10-CM

## 2023-02-13 DIAGNOSIS — Z51.81 ENCOUNTER FOR THERAPEUTIC DRUG LEVEL MONITORING: ICD-10-CM

## 2023-02-13 DIAGNOSIS — A04.72 ENTEROCOLITIS DUE TO CLOSTRIDIUM DIFFICILE, NOT SPECIFIED AS RECURRENT: ICD-10-CM

## 2023-02-17 ENCOUNTER — NON-APPOINTMENT (OUTPATIENT)
Age: 78
End: 2023-02-17

## 2023-02-17 ENCOUNTER — APPOINTMENT (OUTPATIENT)
Dept: INTERNAL MEDICINE | Facility: CLINIC | Age: 78
End: 2023-02-17

## 2023-02-17 ENCOUNTER — RESULT CHARGE (OUTPATIENT)
Age: 78
End: 2023-02-17

## 2023-02-17 ENCOUNTER — OUTPATIENT (OUTPATIENT)
Dept: OUTPATIENT SERVICES | Facility: HOSPITAL | Age: 78
LOS: 1 days | End: 2023-02-17

## 2023-02-17 VITALS — OXYGEN SATURATION: 99 % | HEART RATE: 77 BPM

## 2023-02-17 DIAGNOSIS — Z98.890 OTHER SPECIFIED POSTPROCEDURAL STATES: Chronic | ICD-10-CM

## 2023-02-17 DIAGNOSIS — Z51.81 ENCOUNTER FOR THERAPEUTIC DRUG LEVEL MONITORING: ICD-10-CM

## 2023-02-17 DIAGNOSIS — H26.9 UNSPECIFIED CATARACT: Chronic | ICD-10-CM

## 2023-02-17 DIAGNOSIS — Z79.01 LONG TERM (CURRENT) USE OF ANTICOAGULANTS: ICD-10-CM

## 2023-02-17 DIAGNOSIS — I48.91 UNSPECIFIED ATRIAL FIBRILLATION: ICD-10-CM

## 2023-02-17 DIAGNOSIS — Z90.89 ACQUIRED ABSENCE OF OTHER ORGANS: Chronic | ICD-10-CM

## 2023-02-17 DIAGNOSIS — Z86.69 PERSONAL HISTORY OF OTHER DISEASES OF THE NERVOUS SYSTEM AND SENSE ORGANS: Chronic | ICD-10-CM

## 2023-02-17 LAB
INR PPP: 2.8 RATIO
POCT-PROTHROMBIN TIME: 33.3 SECS
QUALITY CONTROL: YES

## 2023-02-18 DIAGNOSIS — N52.9 MALE ERECTILE DYSFUNCTION, UNSPECIFIED: ICD-10-CM

## 2023-02-21 ENCOUNTER — APPOINTMENT (OUTPATIENT)
Dept: UROLOGY | Facility: CLINIC | Age: 78
End: 2023-02-21
Payer: MEDICARE

## 2023-02-21 VITALS
HEART RATE: 64 BPM | WEIGHT: 166 LBS | SYSTOLIC BLOOD PRESSURE: 125 MMHG | RESPIRATION RATE: 17 BRPM | DIASTOLIC BLOOD PRESSURE: 73 MMHG | BODY MASS INDEX: 24.59 KG/M2 | HEIGHT: 69 IN

## 2023-02-21 PROCEDURE — 99215 OFFICE O/P EST HI 40 MIN: CPT

## 2023-02-21 NOTE — HISTORY OF PRESENT ILLNESS
[FreeTextEntry1] : Patient is a 77 year old gentleman who presents with diabetes, hypertension on "blood thinners" for a mitral valve replacement  a chief complaint of erectile dysfunction. He had a TURP on 3/3/2022. He states that his ED has been present for the past 2 years. It causes both he and his partner great stress.  I reviewed the questionnaire he completed in detail. His erections are not modified with the degree of sexual stimulation.   He states that his erections presently are often less than 5 out of 10 in both tumescence and rigidity, insufficient for penetration.   He often ejaculates through a flaccid phallus.  He has difficulty maintaining an erection. He describes a normal libido.  His sexual dysfunction occurs with both sexual relations and masturbation.  His erections are not improved with PDE5 inhibitors.    His partner is understanding and was unable to be with him at the visit today. He is not  and has 5 adult children and 12 grandchildren.  \par \par The patient denies fevers, chills, nausea and/or vomiting and no unexplained weight loss.  His past medical history is non-contributory.  In his present occupation he has no known toxin exposure. He does not smoke and drinks socially.  He has no known drug allergies. His review of systems and past medical history demonstrates no significant urologic issues (see patient completed questionnaire). His family history demonstrates no significant urologic issues.

## 2023-02-22 LAB
25(OH)D3 SERPL-MCNC: 32.7 NG/ML
ALBUMIN SERPL ELPH-MCNC: 4.4 G/DL
ALP BLD-CCNC: 102 U/L
ALT SERPL-CCNC: 70 U/L
ANION GAP SERPL CALC-SCNC: 14 MMOL/L
APO LP(A) SERPL-MCNC: <9 NMOL/L
APPEARANCE: CLEAR
AST SERPL-CCNC: 28 U/L
BASOPHILS # BLD AUTO: 0.07 K/UL
BASOPHILS NFR BLD AUTO: 1 %
BILIRUB SERPL-MCNC: 3.8 MG/DL
BILIRUBIN URINE: NEGATIVE
BLOOD URINE: NEGATIVE
BUN SERPL-MCNC: 25 MG/DL
CALCIUM SERPL-MCNC: 10.4 MG/DL
CHLORIDE SERPL-SCNC: 103 MMOL/L
CHOLEST SERPL-MCNC: 110 MG/DL
CO2 SERPL-SCNC: 24 MMOL/L
COLOR: YELLOW
CREAT SERPL-MCNC: 1.3 MG/DL
EGFR: 57 ML/MIN/1.73M2
EOSINOPHIL # BLD AUTO: 0.1 K/UL
EOSINOPHIL NFR BLD AUTO: 1.4 %
ESTIMATED AVERAGE GLUCOSE: 163 MG/DL
ESTRADIOL SERPL-MCNC: 20 PG/ML
GLUCOSE QUALITATIVE U: NEGATIVE
GLUCOSE SERPL-MCNC: 231 MG/DL
HBA1C MFR BLD HPLC: 7.3 %
HCT VFR BLD CALC: 44.8 %
HDLC SERPL-MCNC: 36 MG/DL
HGB BLD-MCNC: 14.7 G/DL
IMM GRANULOCYTES NFR BLD AUTO: 0.7 %
KETONES URINE: NEGATIVE
LDLC SERPL CALC-MCNC: 49 MG/DL
LEUKOCYTE ESTERASE URINE: NEGATIVE
LH SERPL-ACNC: 5.3 IU/L
LYMPHOCYTES # BLD AUTO: 1.67 K/UL
LYMPHOCYTES NFR BLD AUTO: 23.7 %
MAN DIFF?: NORMAL
MCHC RBC-ENTMCNC: 31.5 PG
MCHC RBC-ENTMCNC: 32.8 GM/DL
MCV RBC AUTO: 96.1 FL
MONOCYTES # BLD AUTO: 0.57 K/UL
MONOCYTES NFR BLD AUTO: 8.1 %
NEUTROPHILS # BLD AUTO: 4.6 K/UL
NEUTROPHILS NFR BLD AUTO: 65.1 %
NITRITE URINE: NEGATIVE
NONHDLC SERPL-MCNC: 73 MG/DL
PH URINE: 6
PLATELET # BLD AUTO: 247 K/UL
POTASSIUM SERPL-SCNC: 4.9 MMOL/L
PROLACTIN SERPL-MCNC: 22.1 NG/ML
PROT SERPL-MCNC: 7.4 G/DL
PROTEIN URINE: NORMAL
PSA SERPL-MCNC: 1.41 NG/ML
RBC # BLD: 4.66 M/UL
RBC # FLD: 13.5 %
SODIUM SERPL-SCNC: 141 MMOL/L
SPECIFIC GRAVITY URINE: 1.02
TRIGL SERPL-MCNC: 124 MG/DL
TSH SERPL-ACNC: 0.09 UIU/ML
UROBILINOGEN URINE: NORMAL
WBC # FLD AUTO: 7.06 K/UL

## 2023-02-24 ENCOUNTER — APPOINTMENT (OUTPATIENT)
Dept: INTERNAL MEDICINE | Facility: CLINIC | Age: 78
End: 2023-02-24

## 2023-03-01 ENCOUNTER — APPOINTMENT (OUTPATIENT)
Dept: INTERNAL MEDICINE | Facility: CLINIC | Age: 78
End: 2023-03-01

## 2023-03-01 ENCOUNTER — OUTPATIENT (OUTPATIENT)
Dept: OUTPATIENT SERVICES | Facility: HOSPITAL | Age: 78
LOS: 1 days | End: 2023-03-01

## 2023-03-01 VITALS — HEART RATE: 89 BPM | OXYGEN SATURATION: 99 %

## 2023-03-01 DIAGNOSIS — Z79.01 LONG TERM (CURRENT) USE OF ANTICOAGULANTS: ICD-10-CM

## 2023-03-01 DIAGNOSIS — Z98.89 OTHER SPECIFIED POSTPROCEDURAL STATES: Chronic | ICD-10-CM

## 2023-03-01 DIAGNOSIS — Z90.89 ACQUIRED ABSENCE OF OTHER ORGANS: Chronic | ICD-10-CM

## 2023-03-01 DIAGNOSIS — H26.9 UNSPECIFIED CATARACT: Chronic | ICD-10-CM

## 2023-03-01 DIAGNOSIS — Z86.69 PERSONAL HISTORY OF OTHER DISEASES OF THE NERVOUS SYSTEM AND SENSE ORGANS: Chronic | ICD-10-CM

## 2023-03-01 DIAGNOSIS — Z51.81 ENCOUNTER FOR THERAPEUTIC DRUG LEVEL MONITORING: ICD-10-CM

## 2023-03-01 DIAGNOSIS — Z98.890 OTHER SPECIFIED POSTPROCEDURAL STATES: Chronic | ICD-10-CM

## 2023-03-01 LAB
INR PPP: 2.8 RATIO
POCT-PROTHROMBIN TIME: 34.1 SECS
QUALITY CONTROL: YES

## 2023-03-03 ENCOUNTER — APPOINTMENT (OUTPATIENT)
Dept: INTERNAL MEDICINE | Facility: CLINIC | Age: 78
End: 2023-03-03

## 2023-03-07 LAB
TESTOST FREE SERPL-MCNC: 7.6 PG/ML
TESTOST SERPL-MCNC: 416 NG/DL

## 2023-03-15 ENCOUNTER — NON-APPOINTMENT (OUTPATIENT)
Age: 78
End: 2023-03-15

## 2023-03-15 ENCOUNTER — APPOINTMENT (OUTPATIENT)
Dept: INTERNAL MEDICINE | Facility: CLINIC | Age: 78
End: 2023-03-15

## 2023-03-15 ENCOUNTER — OUTPATIENT (OUTPATIENT)
Dept: OUTPATIENT SERVICES | Facility: HOSPITAL | Age: 78
LOS: 1 days | End: 2023-03-15

## 2023-03-15 VITALS — HEART RATE: 85 BPM | OXYGEN SATURATION: 99 %

## 2023-03-15 DIAGNOSIS — A04.72 ENTEROCOLITIS DUE TO CLOSTRIDIUM DIFFICILE, NOT SPECIFIED AS RECURRENT: ICD-10-CM

## 2023-03-15 DIAGNOSIS — Z98.890 OTHER SPECIFIED POSTPROCEDURAL STATES: Chronic | ICD-10-CM

## 2023-03-15 DIAGNOSIS — Z86.69 PERSONAL HISTORY OF OTHER DISEASES OF THE NERVOUS SYSTEM AND SENSE ORGANS: Chronic | ICD-10-CM

## 2023-03-15 DIAGNOSIS — Z51.81 ENCOUNTER FOR THERAPEUTIC DRUG LEVEL MONITORING: ICD-10-CM

## 2023-03-15 DIAGNOSIS — Z79.01 LONG TERM (CURRENT) USE OF ANTICOAGULANTS: ICD-10-CM

## 2023-03-15 DIAGNOSIS — Z98.89 OTHER SPECIFIED POSTPROCEDURAL STATES: Chronic | ICD-10-CM

## 2023-03-15 DIAGNOSIS — H26.9 UNSPECIFIED CATARACT: Chronic | ICD-10-CM

## 2023-03-15 DIAGNOSIS — Z90.89 ACQUIRED ABSENCE OF OTHER ORGANS: Chronic | ICD-10-CM

## 2023-03-15 DIAGNOSIS — I48.91 UNSPECIFIED ATRIAL FIBRILLATION: ICD-10-CM

## 2023-03-15 LAB
INR PPP: 2 RATIO
POCT-PROTHROMBIN TIME: 23.9 SECS
QUALITY CONTROL: YES

## 2023-03-15 NOTE — HISTORY OF PRESENT ILLNESS
[FreeTextEntry1] : follow up on chronic conditions [de-identified] : 77yM with PMHx T2DM, a-fib, Rheumatic heart disease complicated by mitral valve regurgitation s/p mechanical valve placement on warfarin (INR goal 2.5-3.5), CAD s/p CABG, Multinodular thyroid gland, BPH s/p TURP 3/2022 presenting for follow up to discuss his DM medications. \par \par #T2DM\par \par # Mechanical Heart Valve with INR goal 2.5-3.5

## 2023-03-15 NOTE — PHYSICAL EXAM
[No Acute Distress] : no acute distress [Well Nourished] : well nourished [Normal Sclera/Conjunctiva] : normal sclera/conjunctiva [PERRL] : pupils equal round and reactive to light [No Respiratory Distress] : no respiratory distress  [No Accessory Muscle Use] : no accessory muscle use [Clear to Auscultation] : lungs were clear to auscultation bilaterally [Normal Rate] : normal rate  [Regular Rhythm] : with a regular rhythm [Normal S1, S2] : normal S1 and S2 [No Murmur] : no murmur heard [Soft] : abdomen soft [Non Tender] : non-tender [Non-distended] : non-distended [Normal Bowel Sounds] : normal bowel sounds

## 2023-03-15 NOTE — REVIEW OF SYSTEMS
[Fever] : no fever [Chills] : no chills [Chest Pain] : no chest pain [Palpitations] : no palpitations [Shortness Of Breath] : no shortness of breath [Wheezing] : no wheezing [Abdominal Pain] : no abdominal pain [Diarrhea] : no diarrhea [Frequency] : no frequency

## 2023-03-22 ENCOUNTER — OUTPATIENT (OUTPATIENT)
Dept: OUTPATIENT SERVICES | Facility: HOSPITAL | Age: 78
LOS: 1 days | End: 2023-03-22

## 2023-03-22 ENCOUNTER — APPOINTMENT (OUTPATIENT)
Dept: INTERNAL MEDICINE | Facility: CLINIC | Age: 78
End: 2023-03-22

## 2023-03-22 VITALS — HEART RATE: 62 BPM | OXYGEN SATURATION: 99 %

## 2023-03-22 DIAGNOSIS — H26.9 UNSPECIFIED CATARACT: Chronic | ICD-10-CM

## 2023-03-22 DIAGNOSIS — Z98.89 OTHER SPECIFIED POSTPROCEDURAL STATES: Chronic | ICD-10-CM

## 2023-03-22 DIAGNOSIS — Z90.89 ACQUIRED ABSENCE OF OTHER ORGANS: Chronic | ICD-10-CM

## 2023-03-22 DIAGNOSIS — Z86.69 PERSONAL HISTORY OF OTHER DISEASES OF THE NERVOUS SYSTEM AND SENSE ORGANS: Chronic | ICD-10-CM

## 2023-03-22 DIAGNOSIS — Z98.890 OTHER SPECIFIED POSTPROCEDURAL STATES: Chronic | ICD-10-CM

## 2023-03-22 LAB
CULTURE RESULTS: SIGNIFICANT CHANGE UP
INR PPP: 2.1 RATIO
POCT-PROTHROMBIN TIME: 25.7 SECS
QUALITY CONTROL: YES
SPECIMEN SOURCE: SIGNIFICANT CHANGE UP

## 2023-03-23 ENCOUNTER — APPOINTMENT (OUTPATIENT)
Dept: UROLOGY | Facility: CLINIC | Age: 78
End: 2023-03-23
Payer: MEDICARE

## 2023-03-23 ENCOUNTER — OUTPATIENT (OUTPATIENT)
Dept: OUTPATIENT SERVICES | Facility: HOSPITAL | Age: 78
LOS: 1 days | End: 2023-03-23
Payer: COMMERCIAL

## 2023-03-23 DIAGNOSIS — Z51.81 ENCOUNTER FOR THERAPEUTIC DRUG LEVEL MONITORING: ICD-10-CM

## 2023-03-23 DIAGNOSIS — Z98.890 OTHER SPECIFIED POSTPROCEDURAL STATES: Chronic | ICD-10-CM

## 2023-03-23 DIAGNOSIS — H26.9 UNSPECIFIED CATARACT: Chronic | ICD-10-CM

## 2023-03-23 DIAGNOSIS — R35.0 FREQUENCY OF MICTURITION: ICD-10-CM

## 2023-03-23 DIAGNOSIS — Z95.2 PRESENCE OF PROSTHETIC HEART VALVE: ICD-10-CM

## 2023-03-23 DIAGNOSIS — Z90.89 ACQUIRED ABSENCE OF OTHER ORGANS: Chronic | ICD-10-CM

## 2023-03-23 DIAGNOSIS — Z98.89 OTHER SPECIFIED POSTPROCEDURAL STATES: Chronic | ICD-10-CM

## 2023-03-23 DIAGNOSIS — Z86.69 PERSONAL HISTORY OF OTHER DISEASES OF THE NERVOUS SYSTEM AND SENSE ORGANS: Chronic | ICD-10-CM

## 2023-03-23 DIAGNOSIS — Z79.01 LONG TERM (CURRENT) USE OF ANTICOAGULANTS: ICD-10-CM

## 2023-03-23 PROCEDURE — 54235 NJX CORPORA CAVERNOSA RX AGT: CPT

## 2023-03-23 PROCEDURE — 93980 PENILE VASCULAR STUDY: CPT

## 2023-03-23 PROCEDURE — 99214 OFFICE O/P EST MOD 30 MIN: CPT | Mod: 25

## 2023-03-23 PROCEDURE — 93980 PENILE VASCULAR STUDY: CPT | Mod: 26

## 2023-03-23 NOTE — ASSESSMENT
[FreeTextEntry1] : The patient returns for follow-up to review his recent blood studies and discuss options for therapy.  Blood test demonstrated hematocrit of 44.8%, testosterone free 7.7 pg/mL with a total testosterone of 416 ng/dL, PSA 1.41 ng/mL, LH 5.3 IU/L, vitamin D 25 was 32.7 ng/mL, prolactin 22.1 ng/mL, TSH 0.09 µIU/mL, estradiol 20 pg/mL, hemoglobin A1c 7.3% and medical evaluation was suggested.  His serum glucose was 231 mg/dL with a BUN of 25 mg/dL.  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  Under cardiac care for significant cardiovascular disease.  I will see him back for injection training at 0.4 to 0.6 cc of the trimix.\par \par Consultation: 30 minutes:  10 minutes reviewing his history and performing a physical examination.  20 minutes reviewing the ultrasound, ,discussing treatment options and writing his note. There was also additional time in preparation for today's visit.\par

## 2023-03-23 NOTE — HISTORY OF PRESENT ILLNESS
[FreeTextEntry1] : The patient returns for follow-up to review his recent blood studies and discuss options for therapy.\par \par PMH: Patient is a 77 year old gentleman who presents with diabetes, hypertension on "blood thinners" for a mitral valve replacement  a chief complaint of erectile dysfunction. He had a TURP on 3/3/2022. He states that his ED has been present for the past 2 years. It causes both he and his partner great stress.  I reviewed the questionnaire he completed in detail. His erections are not modified with the degree of sexual stimulation.   He states that his erections presently are often less than 5 out of 10 in both tumescence and rigidity, insufficient for penetration.   He often ejaculates through a flaccid phallus.  He has difficulty maintaining an erection. He describes a normal libido.  His sexual dysfunction occurs with both sexual relations and masturbation.  His erections are not improved with PDE5 inhibitors.    His partner is understanding and was unable to be with him at the visit today. He is not  and has 5 adult children and 12 grandchildren.  \par \par The patient denies fevers, chills, nausea and/or vomiting and no unexplained weight loss.  His past medical history is non-contributory.  In his present occupation he has no known toxin exposure. He does not smoke and drinks socially.  He has no known drug allergies. His review of systems and past medical history demonstrates no significant urologic issues (see patient completed questionnaire). His family history demonstrates no significant urologic issues.

## 2023-03-24 DIAGNOSIS — N52.1 ERECTILE DYSFUNCTION DUE TO DISEASES CLASSIFIED ELSEWHERE: ICD-10-CM

## 2023-03-28 ENCOUNTER — RESULT CHARGE (OUTPATIENT)
Age: 78
End: 2023-03-28

## 2023-03-28 ENCOUNTER — NON-APPOINTMENT (OUTPATIENT)
Age: 78
End: 2023-03-28

## 2023-03-28 ENCOUNTER — APPOINTMENT (OUTPATIENT)
Dept: ENDOCRINOLOGY | Facility: CLINIC | Age: 78
End: 2023-03-28
Payer: MEDICARE

## 2023-03-28 ENCOUNTER — OUTPATIENT (OUTPATIENT)
Dept: OUTPATIENT SERVICES | Facility: HOSPITAL | Age: 78
LOS: 1 days | End: 2023-03-28

## 2023-03-28 VITALS
SYSTOLIC BLOOD PRESSURE: 110 MMHG | BODY MASS INDEX: 23.7 KG/M2 | HEART RATE: 76 BPM | OXYGEN SATURATION: 99 % | RESPIRATION RATE: 17 BRPM | DIASTOLIC BLOOD PRESSURE: 80 MMHG | HEIGHT: 69 IN | WEIGHT: 160 LBS

## 2023-03-28 DIAGNOSIS — Z98.890 OTHER SPECIFIED POSTPROCEDURAL STATES: Chronic | ICD-10-CM

## 2023-03-28 DIAGNOSIS — Z98.89 OTHER SPECIFIED POSTPROCEDURAL STATES: Chronic | ICD-10-CM

## 2023-03-28 LAB
INR PPP: 4.1 RATIO
POCT-PROTHROMBIN TIME: 48.9 SECS
QUALITY CONTROL: YES

## 2023-03-28 PROCEDURE — 99215 OFFICE O/P EST HI 40 MIN: CPT | Mod: GC

## 2023-03-28 NOTE — PROCEDURE
[Fine Needle Aspiration] : Fine needle aspiration ~T ~C was performed. [Area of Mass: ______] : mass identified in the [unfilled] [Risks] : risks [Benefits] : benefits [Consent Obtained] : Written consent was obtained prior to the procedure and is detailed in the patient's record [Ethyl Chloride] : ethyl chloride [Lidocaine Cream] : lidocaine cream [Supine] : The patient was placed in the supine position with the neck extended as tolerated. [Alcohol] : with alcohol [25 gauge 1.5 inch] : A 25 gauge 1.5 inch needle was used [3 Passes] : 3 passes were made through the mass [Ultrasonic Guidance] : ultrasound guidance was employed [Sent to Histology] : The specimens were prepared in the usual manner and sent to histology. [Afirma] : Afirma [Tolerated Well] : the patient tolerated the procedure well [Vital Signs Stable] : the vital signs were stable [No Complications] : There were no complications [___ Week(s)] : in [unfilled] week(s). [FreeTextEntry1] : RMP FNA performed 3.73 x 3.40 x 3.69 cm. 3 passes made.\par LMP nodule hyperfunctioning on NM scan. Defer FNA\par \par Will call with results.

## 2023-03-28 NOTE — PHYSICAL EXAM
[Alert] : alert [No Acute Distress] : no acute distress [Clear to Auscultation] : lungs were clear to auscultation bilaterally [Normal Rate] : heart rate was normal [Not Tender] : non-tender [Soft] : abdomen soft [No Stigmata of Cushings Syndrome] : no stigmata of Cushings Syndrome [No Rash] : no rash [Oriented x3] : oriented to person, place, and time [de-identified] : (+) right thyroid nodule and left thyroid nodule

## 2023-03-28 NOTE — ASSESSMENT
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 nodule, hypofunctioning R. nodule, 22% uptake \par - Planned for repeat FNA bx of R. nodule today, procedure performed. See procedure note\par \par #Subclinical hyperthyroidism 2/2 toxic nodule \par -NM scan 4/2022 shows hyperfunctioning left sided thyroid nodule\par -Given cardiac hx and age, will start low dose MMI 2.5 mg daily \par -Discussed agranulocytosis and biliary disruptions with MMI - will call if URI/fevers, or abdominal pain/yellowing of skin\par -Will check CBC, CMP, TFTs next visit \par \par #T2DM w/ complications, controlled \par Taking only Glipizide 10 mg daily. Having postprandial highs. No hypoglycemia\par -Resume Trulicity at 1.5 mg subq/weekly dose - was losing weight so does not want to go to higher dose. Cardiac benefits discussed\par -Decrease Glipizide to 5 mg BID\par -Start Repaglinide 1 mg before largest meal (wasn't aware of this medication) \par -Patient encouraged to check FS BG levels at least 2x daily & to keep a log to bring along to next visit\par -Hypoglycemia symptoms & management discussed w/ patient - denies recent episodes of hypoglycemia \par -Carb consistent diet \par -c/w outpatient optho follow up - no retinopathy \par -c/w Losartan, +nephropathy - check microalb/Cr today\par \par #HTN\par +nephropathy\par - c/w Losartan 50mg daily \par \par #HLD\par LDL 49 \par -Continue Atorvastatin 40 mg daily\par \par RTC in 6 weeks (given changes in DM meds) \par \par Discussed with  \par \par Samantha Caraballo MD\par Endocrine Fellow \par .

## 2023-03-28 NOTE — HISTORY OF PRESENT ILLNESS
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 thyroid nodule. \par \par Diagnosed with T2DM years ago\par Prescribed Trulicity 3 mg sub/weekly, Glipizide 10 mg BID, and Prandin 1mg before meals\par Was not aware of Prandin. Stopped Trulicity months ago (losing weight and felt it wasn't helping him). So only taking Glipizide 10 mg BID\par In the past, has tried Metformin but did not tolerate due to GI side effects\par AM glucoses well controlled, but having post prandial highs to the mid 200s. \par Reports he has been trying to eat a more carb consistent diet. Incorporating more vegetables & protein. \par \par *Complications: \par -Micro: (+) nephropathy, on ACEi; (-) neuropathy or retinopathy, Following closely w/ ophthalmology. Has hx of retinal detachment in left eye. Chronic blurry vision. \par -Macro: (+) CAD with CABG (+) "minor stroke" in past\par \par *Comorbidities\par HLD - On Atorvastatin 40 mg \par \par Also has thyroid nodules. Noted that he had a "thyroid problem" as a child that stopped him from growing at age 18. Did not go through puberty per patient?? He was given medications and the problem resolved. He is not sure what medication or thyroid problem he had. \par \par He had a thyroid U/S in 2019 which showed\par Right Lobe: 8 x 4.3 x 3.8 cm. Heterogeneous mid nodule 3.7 x 2.9 x 3.1 cm without suspicious features.\par Left Lobe: 8.2 x 4.7 x 3.6 cm. Mildly echogenic mid nodule 4 x 3.6 x 3.8 cm without suspicious features.\par \par Repeat US Thyroid from 12/2021 w/ interval increase in size of b/l nodules. TFTs showed evidence of subclinical hyperthyroidism.\par \par NM thyroid uptake/scan w/ 22% uptake, hyperfunction L. nodule & hypofunction R. nodule. \par \par FNA of right sided nodule performed May 2022 - Nondiagnostic. Here today for repeat FNA\par

## 2023-03-28 NOTE — REASON FOR VISIT
[Follow - Up] : a follow-up visit [DM Type 2] : DM Type 2 [Thyroid nodule/ MNG] : thyroid nodule/ MNG [Procedure: _________] : a [unfilled] procedure visit

## 2023-03-28 NOTE — PHYSICAL EXAM
[Alert] : alert [No Acute Distress] : no acute distress [Clear to Auscultation] : lungs were clear to auscultation bilaterally [Normal Rate] : heart rate was normal [Not Tender] : non-tender [Soft] : abdomen soft [No Stigmata of Cushings Syndrome] : no stigmata of Cushings Syndrome [No Rash] : no rash [Oriented x3] : oriented to person, place, and time [de-identified] : (+) right thyroid nodule and left thyroid nodule

## 2023-03-29 ENCOUNTER — APPOINTMENT (OUTPATIENT)
Dept: INTERNAL MEDICINE | Facility: CLINIC | Age: 78
End: 2023-03-29

## 2023-03-29 DIAGNOSIS — E11.9 TYPE 2 DIABETES MELLITUS WITHOUT COMPLICATIONS: ICD-10-CM

## 2023-03-29 DIAGNOSIS — E05.90 THYROTOXICOSIS, UNSPECIFIED WITHOUT THYROTOXIC CRISIS OR STORM: ICD-10-CM

## 2023-03-29 DIAGNOSIS — E04.2 NONTOXIC MULTINODULAR GOITER: ICD-10-CM

## 2023-03-31 ENCOUNTER — NON-APPOINTMENT (OUTPATIENT)
Age: 78
End: 2023-03-31

## 2023-03-31 LAB — FNA, THYROID: NORMAL

## 2023-04-03 ENCOUNTER — APPOINTMENT (OUTPATIENT)
Dept: CARDIOLOGY | Facility: CLINIC | Age: 78
End: 2023-04-03
Payer: MEDICARE

## 2023-04-03 ENCOUNTER — APPOINTMENT (OUTPATIENT)
Dept: UROLOGY | Facility: CLINIC | Age: 78
End: 2023-04-03

## 2023-04-03 ENCOUNTER — NON-APPOINTMENT (OUTPATIENT)
Age: 78
End: 2023-04-03

## 2023-04-03 VITALS
SYSTOLIC BLOOD PRESSURE: 147 MMHG | DIASTOLIC BLOOD PRESSURE: 74 MMHG | BODY MASS INDEX: 23.85 KG/M2 | HEIGHT: 69 IN | HEART RATE: 58 BPM | WEIGHT: 161 LBS | TEMPERATURE: 97.4 F | OXYGEN SATURATION: 100 %

## 2023-04-03 VITALS — SYSTOLIC BLOOD PRESSURE: 136 MMHG | DIASTOLIC BLOOD PRESSURE: 74 MMHG

## 2023-04-03 PROCEDURE — 93000 ELECTROCARDIOGRAM COMPLETE: CPT

## 2023-04-03 PROCEDURE — 99214 OFFICE O/P EST MOD 30 MIN: CPT | Mod: 25

## 2023-04-03 NOTE — PHYSICAL EXAM
[Well Developed] : well developed [Well Nourished] : well nourished [No Acute Distress] : no acute distress [Normal Conjunctiva] : normal conjunctiva [Normal Venous Pressure] : normal venous pressure [No Carotid Bruit] : no carotid bruit [Clear Lung Fields] : clear lung fields [Good Air Entry] : good air entry [No Respiratory Distress] : no respiratory distress  [Soft] : abdomen soft [Non Tender] : non-tender [No Masses/organomegaly] : no masses/organomegaly [Normal Bowel Sounds] : normal bowel sounds [Normal Gait] : normal gait [No Edema] : no edema [No Cyanosis] : no cyanosis [No Clubbing] : no clubbing [No Varicosities] : no varicosities [No Rash] : no rash [No Skin Lesions] : no skin lesions [Moves all extremities] : moves all extremities [No Focal Deficits] : no focal deficits [Normal Speech] : normal speech [Alert and Oriented] : alert and oriented [Normal memory] : normal memory [de-identified] : enlarged (likely) thyroid tissue right neck, soft, non-tender [de-identified] : midline sternal scar well healed; mechanical, crisp S1; normal S2

## 2023-04-03 NOTE — HISTORY OF PRESENT ILLNESS
[FreeTextEntry1] : 76 yo man presents for CV followup. He is originally from [facility]; lives with his daughter and wife.\par \par H/o CAD s/p CABG, s/p mechanical MVR (about 12+ years ago; rheumatic heart disease; on warfarin), chronic persistent atrial fibrillation, diabetes (A1c 7.0%), HLD, Rosie Thompson tears / esophageal ulcers. He developed COVID early in Sept 2020. He has subclinical hypothyroidism; thyroid nodules.\par \par Stress test 2/2020: LVEF 70%; normal myocardial perfusion\par \par Echo 2/2020: \par 1. Mechanical mitral valve prosthesis. Mild mitral\par regurgitation.  Mean transmitral valve gradient equals 4 mm\par Hg, which is probably normal in the setting of a prosthetic\par valve.\par 2. Normal left ventricular internal dimensions and wall\par thicknesses.\par 3. Endocardium not well visualized; grossly normal left\par ventricular systolic function.\par 4. Normal right ventricular size and function.\par 5. Normal tricuspid valve.  Mild-moderate tricuspid\par regurgitation.\par \par Echocardiogram 2/2/22:\par LVEF 65%\par mechanical MV, mild MR, mean gradient 5 mmHg\par severe LAE\par moderate AMALIA; normal RV size/function\par PASP 50 mmHg\par No changes c/w 2/2020\par \par His ACEi was stopped due to cough.\par \par Labs 2023:\par A1c 7.3%\par tchol 110, trig 124, HDL 36, LDL 49 mg/dL\par \par Mr. Forte is s/p hospitalization for diarrhea / possible colitis; now resolved. He is now planning to have urologic procedure 2/3/2022 (intermittent hematuria, urinary frequency; CT Urogram in 9/24/21 showed no renal stones, suspicious renal mass, or evidence of urothelial lesion, enlarged prostate). Unclear exact type of procedure.\par \par Dr. Ashish Manrique (Urologic surgeon; [address] [place] [address]; Fax [phone])\par PCP: Adrian Trevizo MD; Ameena Bains MD\par \par At his last visit with me Jan 2022, the following issues were discussed with him:\par Atherosclerotic heart disease of native coronary artery without angina pectoris (414.01)\par (I25.10)\par  · H/o CAD; no present symptoms to suggest unstable angina. Continues on medications\par     for HLD and HTN. Not on aspirin (on warfarin).\par Atrial fibrillation (427.31) (I48.91)\par  · Rate controlled AF; continue with anticoagulation, rate control with bblocker (and digoxin).\par     No known h/o CVA.\par Diabetes mellitus (250.00) (E11.9)\par  · On medical therapy. Followed by PCP. Reasonable A1c.\par Hematuria (599.70) (R31.9)\par  · No present hematuria. Recent hematuria, pending urologic procedure. Likely prostatic\par     hypertrophy.\par Hyperlipidemia (272.4) (E78.5)\par  · Continue with statin therapy.\par Mechanical heart valve present (V43.3) (Z95.2)\par  · Long-standing MVR (mechanical); h/o rheumatic fever/RHD. He has done quite well over\par     the past numerous years, maintained on warfarin (INR goal 2.5-3.5). No CVA or\par     bleeding issues. For the past few months, notes coughing when reclining; this improves\par     by sitting up. Interestingly, no SOB or VELAZQUEZ. He recalls that he had a similar\par     complaint just prior to his MVR. This needs to be evaluated with an echo to reassess his\par     MVR / gradient. I think this should be assessed prior to his upcoming urologic\par     procedure. It is also possible that his enlarged thyroid is causing a cough, and this is\par     being looked at by his PCP. In regards to his eventual urologic procedure, if possible, it\par     would be best to do the procedure while maintaining a therapeutic INR. If this is not\par     possible, then he certainly requires "bridging" therapy with LMWH given his markedly\par     elevated risk (mechanical MVR, enlarged left atrium, chronic AFib, diabetes). Given that\par     his procedure is scheduled for 2/3/2022, and he has no plans for bridging at\par     this time, his procedure might need to be delayed. His PCP will need to be\par     involved for exact bridging details. This needs to be planned well ahead of time to\par     ensure the least amount of risk and the least amount of time off of anticoagulation.\par Multiple thyroid nodules (241.1) (E04.2)\par  · Markedly enlarged thyroid (R>L). This is being evaluated by PCP. Perhaps this is a\par     possibility for his symptom of cough. He should f/u closely regarding this.\par \par Recently admitted to the hospital (Feb 2023) for likely viral gastroenteritis. Fully resolved.\par \par Overall, doing very well. No CV issues at all. INR a bit elevated recently; warfarin adjusted and closely followed by PCP. He walks frequently; no symptoms. Has not seen dentist in a while; understands need to keep up with dental work and use abx prophylaxis. \par \par EKG today, unchanged: AFib\par

## 2023-04-03 NOTE — REVIEW OF SYSTEMS
[Urinary Frequency] : urinary frequency [Nocturia] : nocturia [Negative] : Heme/Lymph [SOB] : no shortness of breath [Dyspnea on exertion] : not dyspnea during exertion [Chest Discomfort] : no chest discomfort [Lower Ext Edema] : no extremity edema [Leg Claudication] : no intermittent leg claudication [Palpitations] : no palpitations [Orthopnea] : no orthopnea [PND] : no PND [Syncope] : no syncope [Cough] : no cough [Wheezing] : no wheezing [Coughing Up Blood] : no hemoptysis [Snoring] : no snoring [Hematuria] : no hematuria [Pain During Urination] : no dysuria

## 2023-04-05 ENCOUNTER — APPOINTMENT (OUTPATIENT)
Dept: INTERNAL MEDICINE | Facility: CLINIC | Age: 78
End: 2023-04-05

## 2023-04-05 ENCOUNTER — OUTPATIENT (OUTPATIENT)
Dept: OUTPATIENT SERVICES | Facility: HOSPITAL | Age: 78
LOS: 1 days | End: 2023-04-05

## 2023-04-05 VITALS — HEART RATE: 75 BPM | OXYGEN SATURATION: 99 %

## 2023-04-05 DIAGNOSIS — Z98.890 OTHER SPECIFIED POSTPROCEDURAL STATES: Chronic | ICD-10-CM

## 2023-04-05 DIAGNOSIS — Z86.69 PERSONAL HISTORY OF OTHER DISEASES OF THE NERVOUS SYSTEM AND SENSE ORGANS: Chronic | ICD-10-CM

## 2023-04-05 DIAGNOSIS — I25.10 ATHEROSCLEROTIC HEART DISEASE OF NATIVE CORONARY ARTERY WITHOUT ANGINA PECTORIS: ICD-10-CM

## 2023-04-05 DIAGNOSIS — I48.91 UNSPECIFIED ATRIAL FIBRILLATION: ICD-10-CM

## 2023-04-05 DIAGNOSIS — Z79.01 LONG TERM (CURRENT) USE OF ANTICOAGULANTS: ICD-10-CM

## 2023-04-05 DIAGNOSIS — H26.9 UNSPECIFIED CATARACT: Chronic | ICD-10-CM

## 2023-04-05 DIAGNOSIS — Z51.81 ENCOUNTER FOR THERAPEUTIC DRUG LEVEL MONITORING: ICD-10-CM

## 2023-04-05 LAB
INR PPP: 2.6 RATIO
POCT-PROTHROMBIN TIME: 31.6 SECS
QUALITY CONTROL: YES

## 2023-04-08 NOTE — PHYSICAL EXAM
[General Appearance - Well Developed] : well developed [General Appearance - Well Nourished] : well nourished [Normal Appearance] : normal appearance [Well Groomed] : well groomed [General Appearance - In No Acute Distress] : no acute distress [Bowel Sounds] : normal bowel sounds [Abdomen Soft] : soft [Abdomen Tenderness] : non-tender [Abdomen Mass (___ Cm)] : no abdominal mass palpated [Abdomen Hernia] : no hernia was discovered [Costovertebral Angle Tenderness] : no ~M costovertebral angle tenderness [Urethral Meatus] : meatus normal [Penis Abnormality] : normal circumcised penis [Urinary Bladder Findings] : the bladder was normal on palpation [Scrotum] : the scrotum was normal [Rectal Exam - Seminal Vesicles] : the seminal vesicles were normal [Epididymis] : the epididymides were normal [Testes Tenderness] : no tenderness of the testes [Testes Mass (___cm)] : there were no testicular masses [Anus Abnormality] : the anus and perineum were normal [Rectal Exam - Rectum] : digital rectal exam was normal [Prostate Enlargement] : the prostate was not enlarged [Prostate Tenderness] : the prostate was not tender [No Prostate Nodules] : no prostate nodules [Skin Color & Pigmentation] : normal skin color and pigmentation [Skin Turgor] : supple [] : no rash [Skin Lesions] : no skin lesions [Oriented To Time, Place, And Person] : oriented to person, place, and time [Affect] : the affect was normal [Mood] : the mood was normal [Not Anxious] : not anxious [Normal Station and Gait] : the gait and station were normal for the patient's age

## 2023-04-11 ENCOUNTER — APPOINTMENT (OUTPATIENT)
Dept: UROLOGY | Facility: CLINIC | Age: 78
End: 2023-04-11

## 2023-04-11 ENCOUNTER — APPOINTMENT (OUTPATIENT)
Dept: UROLOGY | Facility: CLINIC | Age: 78
End: 2023-04-11
Payer: MEDICARE

## 2023-04-11 VITALS
RESPIRATION RATE: 16 BRPM | DIASTOLIC BLOOD PRESSURE: 78 MMHG | HEART RATE: 53 BPM | TEMPERATURE: 97.2 F | SYSTOLIC BLOOD PRESSURE: 121 MMHG

## 2023-04-11 PROCEDURE — 99214 OFFICE O/P EST MOD 30 MIN: CPT

## 2023-04-11 NOTE — ASSESSMENT
[FreeTextEntry1] : The patient returns for follow-up to review his recent blood studies and discuss options for therapy.  Blood test demonstrated hematocrit of 44.8%, testosterone free 7.7 pg/mL with a total testosterone of 416 ng/dL, PSA 1.41 ng/mL, LH 5.3 IU/L, vitamin D 25 was 32.7 ng/mL, prolactin 22.1 ng/mL, TSH 0.09 µIU/mL, estradiol 20 pg/mL, hemoglobin A1c 7.3% and medical evaluation was suggested.  His serum glucose was 231 mg/dL with a BUN of 25 mg/dL.  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  Under cardiac care for significant cardiovascular disease.  I will see him back for injection training at 0.4 to 0.6 cc of the trimix.\par \par Patient returned for his first penile injection training.  He stated that he has not been able to obtain an erection sufficient for penetration with PDE5 inhibitors.  He was not interested in using a vacuum constriction device at this time. He wanted to try penile injection therapy.\par \par We reviewed the procedure with the relative risks and benefits. A copy of the informed consent is on file. I assisted him in injecting 0.4 cc of the TriMix into the right corpora. At 30 minutes he had 90% tumescence and 60% rigidity. His erection dissipated prior to his leaving the office.\par \par We discussed the use of a pharmacologic agent in the diagnostic evaluation of organic and in special instances psychogenic erectile dysfunction.  He was made fully aware that several medications used may not be approved by the [employer] for this purpose, although they may be well recognized and accepted by the American [employer] Association as a treatment and diagnostic tool for impotence.  He understands that there still remains a need to standardize the indications, contraindications and dosage parameters for the testing as well as treatment purposes of this procedure. He understands that the TriMix is a compounded medication and that there are other, FDA approved, injectable medications available for  use. \par \par He received a brochure on injection therapy and I have taken particular care in reviewing carefully all potential complications of this procedure and made him  fully aware that even a death has been reported in conjunction with this therapy.  Never-the-less, weighing all risks and benefits that this procedure affords, he was willing to proceed with the use of a intracavernosal injection of the pharmacological agent prescribed and either compounded by a pharmaceutical company, pharmacist or by Dr. Kenny as part of the diagnostic evaluation and/or use of this pharmacologic agent as a treatment for  erectile dysfunction. He was made aware that a prolonged erection (priapism) might result from penile injections and that it must be treated within four hours to prevent damage to the erectile mechanism of his penis. He acknowledged that he must notify Dr. Kenny (or the covering doctor) and have this condition treated within fours hours should it occur. He had the opportunity to ask any questions all of which were answered to his satisfaction. \par \par He will call with any concerns or questions. I will see him back in follow up prior to dispensing additional medication.  He will be using 0.4 to 0.6 cc of the higher dose Trimix.\par \par Consultation: 30 minutes:  10 minutes reviewing his history and performing a physical examination.  20 minutes reviewing the procedure for penile injection therapy, writing for prescription medications ,discussing treatment options and writing his note. There was also additional time in preparation for today's visit.\par

## 2023-04-11 NOTE — HISTORY OF PRESENT ILLNESS
[FreeTextEntry1] : The patient returns for penile injection training.\par \par PMH: Patient initially presented with diabetes, hypertension on "blood thinners" for a mitral valve replacement  a chief complaint of erectile dysfunction. He had a TURP on 3/3/2022. He states that his ED has been present for the past 2 years. It causes both he and his partner great stress.  I reviewed the questionnaire he completed in detail. His erections are not modified with the degree of sexual stimulation.   He states that his erections presently are often less than 5 out of 10 in both tumescence and rigidity, insufficient for penetration.   He often ejaculates through a flaccid phallus.  He has difficulty maintaining an erection. He describes a normal libido.  His sexual dysfunction occurs with both sexual relations and masturbation.  His erections are not improved with PDE5 inhibitors.    His partner is understanding and was unable to be with him at the visit today. He is not  and has 5 adult children and 12 grandchildren.  \par \par The patient denies fevers, chills, nausea and/or vomiting and no unexplained weight loss.  His past medical history is non-contributory.  In his present occupation he has no known toxin exposure. He does not smoke and drinks socially.  He has no known drug allergies. His review of systems and past medical history demonstrates no significant urologic issues (see patient completed questionnaire). His family history demonstrates no significant urologic issues.

## 2023-04-12 ENCOUNTER — OUTPATIENT (OUTPATIENT)
Dept: OUTPATIENT SERVICES | Facility: HOSPITAL | Age: 78
LOS: 1 days | End: 2023-04-12

## 2023-04-12 ENCOUNTER — APPOINTMENT (OUTPATIENT)
Dept: INTERNAL MEDICINE | Facility: CLINIC | Age: 78
End: 2023-04-12

## 2023-04-12 VITALS — OXYGEN SATURATION: 98 % | TEMPERATURE: 97.5 F | RESPIRATION RATE: 16 BRPM | HEART RATE: 70 BPM

## 2023-04-12 DIAGNOSIS — Z98.890 OTHER SPECIFIED POSTPROCEDURAL STATES: Chronic | ICD-10-CM

## 2023-04-12 DIAGNOSIS — Z98.89 OTHER SPECIFIED POSTPROCEDURAL STATES: Chronic | ICD-10-CM

## 2023-04-12 DIAGNOSIS — Z86.69 PERSONAL HISTORY OF OTHER DISEASES OF THE NERVOUS SYSTEM AND SENSE ORGANS: Chronic | ICD-10-CM

## 2023-04-12 DIAGNOSIS — H26.9 UNSPECIFIED CATARACT: Chronic | ICD-10-CM

## 2023-04-13 LAB
INR PPP: 2.8 RATIO
POCT-PROTHROMBIN TIME: 33.2 SECS

## 2023-04-18 DIAGNOSIS — Z79.01 LONG TERM (CURRENT) USE OF ANTICOAGULANTS: ICD-10-CM

## 2023-04-18 DIAGNOSIS — Z95.2 PRESENCE OF PROSTHETIC HEART VALVE: ICD-10-CM

## 2023-04-18 DIAGNOSIS — Z51.81 ENCOUNTER FOR THERAPEUTIC DRUG LEVEL MONITORING: ICD-10-CM

## 2023-04-26 ENCOUNTER — APPOINTMENT (OUTPATIENT)
Dept: INTERNAL MEDICINE | Facility: CLINIC | Age: 78
End: 2023-04-26
Payer: MEDICARE

## 2023-04-26 ENCOUNTER — APPOINTMENT (OUTPATIENT)
Dept: INTERNAL MEDICINE | Facility: CLINIC | Age: 78
End: 2023-04-26

## 2023-04-26 ENCOUNTER — OUTPATIENT (OUTPATIENT)
Dept: OUTPATIENT SERVICES | Facility: HOSPITAL | Age: 78
LOS: 1 days | End: 2023-04-26

## 2023-04-26 ENCOUNTER — LABORATORY RESULT (OUTPATIENT)
Age: 78
End: 2023-04-26

## 2023-04-26 VITALS — HEART RATE: 66 BPM | OXYGEN SATURATION: 99 %

## 2023-04-26 VITALS
SYSTOLIC BLOOD PRESSURE: 112 MMHG | RESPIRATION RATE: 16 BRPM | OXYGEN SATURATION: 99 % | WEIGHT: 168 LBS | HEART RATE: 63 BPM | DIASTOLIC BLOOD PRESSURE: 74 MMHG | BODY MASS INDEX: 24.88 KG/M2 | HEIGHT: 69 IN | TEMPERATURE: 97.5 F

## 2023-04-26 DIAGNOSIS — Z98.89 OTHER SPECIFIED POSTPROCEDURAL STATES: Chronic | ICD-10-CM

## 2023-04-26 DIAGNOSIS — Z51.81 ENCOUNTER FOR THERAPEUTIC DRUG LEVEL MONITORING: ICD-10-CM

## 2023-04-26 DIAGNOSIS — Z90.89 ACQUIRED ABSENCE OF OTHER ORGANS: Chronic | ICD-10-CM

## 2023-04-26 DIAGNOSIS — R10.31 RIGHT LOWER QUADRANT PAIN: ICD-10-CM

## 2023-04-26 DIAGNOSIS — Z79.01 LONG TERM (CURRENT) USE OF ANTICOAGULANTS: ICD-10-CM

## 2023-04-26 DIAGNOSIS — Z95.2 PRESENCE OF PROSTHETIC HEART VALVE: ICD-10-CM

## 2023-04-26 DIAGNOSIS — Z86.69 PERSONAL HISTORY OF OTHER DISEASES OF THE NERVOUS SYSTEM AND SENSE ORGANS: Chronic | ICD-10-CM

## 2023-04-26 DIAGNOSIS — Z98.890 OTHER SPECIFIED POSTPROCEDURAL STATES: Chronic | ICD-10-CM

## 2023-04-26 DIAGNOSIS — H26.9 UNSPECIFIED CATARACT: Chronic | ICD-10-CM

## 2023-04-26 LAB
INR PPP: 2.9 RATIO
POCT-PROTHROMBIN TIME: 34.3 SECS
QUALITY CONTROL: YES

## 2023-04-26 PROCEDURE — 99214 OFFICE O/P EST MOD 30 MIN: CPT | Mod: GC

## 2023-04-26 NOTE — REVIEW OF SYSTEMS
[Abdominal Pain] : abdominal pain [Heartburn] : heartburn [Negative] : Heme/Lymph [FreeTextEntry7] : excessive gas and rectal pain

## 2023-04-26 NOTE — ASSESSMENT
[FreeTextEntry1] : #HCM\par -Colonoscopy: aged out \par -Pneumo, Tdap, Zoster, Flu: UTD \par -COVID-19: received all 3 doses \par - Labs: UTD \par \par Case discussed with and rectal exam performed by Dr. Seay\par \par RTC in 1 month with Dr. Yoder for f/u of resolution of symptoms and 2 weeks for INR check \par \par Velia Castaneda MD PGY3\par Internal Medicine \par [facility]\par [phone]\par [employer] [facility]\par \par

## 2023-04-26 NOTE — HISTORY OF PRESENT ILLNESS
[FreeTextEntry1] : abdominal pain and excessive gas [de-identified] : 76 Y/O M with HTN, CAD s/p CABG, RHD s/p MVR on Coumadin, T2DM, Afib, multiple thyroid nodules, Rosie Thompson tears and esophageal ulcers, BPH s/p TURP (3/3/22) recently admitted to [facility] (Feb 2023) for diarrhea and abdominal pain. He was found to have infections with C difficile, EAEC, EPEC, ETEC, Norovirus as well as UCx with Coag negative staph. He was followed by infectious disease in the hospital and completed a 10 day course of PO Vancomycin. His symptoms resolved initially but for the past two months, he endorses severe gas, abdominal pain, worse when lying down as well as rectal pain. He denies recent travel and sick contacts at home. He also denies diarrhea, constipation, has one bowel movement per day, which is well formed and brown. He denies fevers, chills, chest pain, SOB, cough, musculoskeletal pain.

## 2023-04-26 NOTE — PHYSICAL EXAM
[Soft] : abdomen soft [Non Tender] : non-tender [Normal rectal exam] : was normal [None] : there was no rectal mass  [Normal] : affect was normal and insight and judgment were intact [Fistula] : no fistulas [Wart] : no warts [External Hemorrhoid] : no external hemorrhoids were present [Tender, Swollen] : that was nontender and non-swollen [Gross Blood] : no gross blood [Stool Sample Taken] : no stool obtained on rectal exam [Fecal Impaction] : no fecal impaction was present [de-identified] : Mildly distended

## 2023-04-27 ENCOUNTER — LABORATORY RESULT (OUTPATIENT)
Age: 78
End: 2023-04-27

## 2023-04-27 ENCOUNTER — APPOINTMENT (OUTPATIENT)
Dept: GASTROENTEROLOGY | Facility: CLINIC | Age: 78
End: 2023-04-27
Payer: MEDICARE

## 2023-04-27 ENCOUNTER — OUTPATIENT (OUTPATIENT)
Dept: OUTPATIENT SERVICES | Facility: HOSPITAL | Age: 78
LOS: 1 days | End: 2023-04-27

## 2023-04-27 VITALS
OXYGEN SATURATION: 98 % | DIASTOLIC BLOOD PRESSURE: 90 MMHG | HEART RATE: 66 BPM | WEIGHT: 168 LBS | SYSTOLIC BLOOD PRESSURE: 120 MMHG | HEIGHT: 69 IN | BODY MASS INDEX: 24.88 KG/M2

## 2023-04-27 DIAGNOSIS — Z98.89 OTHER SPECIFIED POSTPROCEDURAL STATES: Chronic | ICD-10-CM

## 2023-04-27 DIAGNOSIS — R10.2 PELVIC AND PERINEAL PAIN: ICD-10-CM

## 2023-04-27 DIAGNOSIS — R19.8 OTHER SPECIFIED SYMPTOMS AND SIGNS INVOLVING THE DIGESTIVE SYSTEM AND ABDOMEN: ICD-10-CM

## 2023-04-27 DIAGNOSIS — H26.9 UNSPECIFIED CATARACT: Chronic | ICD-10-CM

## 2023-04-27 DIAGNOSIS — L29.3 ANOGENITAL PRURITUS, UNSPECIFIED: ICD-10-CM

## 2023-04-27 DIAGNOSIS — Z90.89 ACQUIRED ABSENCE OF OTHER ORGANS: Chronic | ICD-10-CM

## 2023-04-27 DIAGNOSIS — R10.9 UNSPECIFIED ABDOMINAL PAIN: ICD-10-CM

## 2023-04-27 DIAGNOSIS — Z86.69 PERSONAL HISTORY OF OTHER DISEASES OF THE NERVOUS SYSTEM AND SENSE ORGANS: Chronic | ICD-10-CM

## 2023-04-27 DIAGNOSIS — Z98.890 OTHER SPECIFIED POSTPROCEDURAL STATES: Chronic | ICD-10-CM

## 2023-04-27 LAB
BASOPHILS # BLD AUTO: 0.05 K/UL
BASOPHILS NFR BLD AUTO: 0.7 %
EOSINOPHIL # BLD AUTO: 0.16 K/UL
EOSINOPHIL NFR BLD AUTO: 2.2 %
HCT VFR BLD CALC: 44.2 %
HGB BLD-MCNC: 14.6 G/DL
IMM GRANULOCYTES NFR BLD AUTO: 1 %
LYMPHOCYTES # BLD AUTO: 2.1 K/UL
LYMPHOCYTES NFR BLD AUTO: 29.5 %
MAN DIFF?: NORMAL
MCHC RBC-ENTMCNC: 31.1 PG
MCHC RBC-ENTMCNC: 33 GM/DL
MCV RBC AUTO: 94.2 FL
MONOCYTES # BLD AUTO: 0.57 K/UL
MONOCYTES NFR BLD AUTO: 8 %
NEUTROPHILS # BLD AUTO: 4.18 K/UL
NEUTROPHILS NFR BLD AUTO: 58.6 %
PLATELET # BLD AUTO: 206 K/UL
RBC # BLD: 4.69 M/UL
RBC # FLD: 13 %
WBC # FLD AUTO: 7.13 K/UL

## 2023-04-27 PROCEDURE — 99214 OFFICE O/P EST MOD 30 MIN: CPT | Mod: GC

## 2023-04-27 NOTE — ASSESSMENT
[FreeTextEntry1] : 77M w/ AFib and mechanical mitral valve on Coumadin, CAD s/p CABG (not on ASA), gastric lipoma (seen on prior EUS/FNB), s/p TURP presents for post-hospital followup. Last seen in GI clinic 3/2021 for GERD, dyspepsia, diarrhea. Now here for subacute perineal discomfort.\par \par #perineal pain\par Having perineal burning over the last few weeks worse when sitting down but relieved when laying on his side or standing up. He also reports burning when he holds in his urine but denies any dysuria or hematuria. Denies any rectal pain. DARREN this visit showing no perineal skin changes or TTP; rectal with small external and palpable internal hemorrhoids, no tenderness with digital insertion, +good sphincter tone. Suspect prostate-related inflammation. Seen by urology 4/11/23 but pt reports at that time was not having any perineal discomfort.\par - urology f/u referral placed\par \par #borborygmi \par His symptoms resolved initially but for the past two months, he endorses gas but denies any bloating\par - can trial simethicone\par \par #CRC screening- last c-scope 2016 and reportedly normal; pt does not wish to pursue further CRC screening at this time\par \par Pt d/w Dr. Meyer\par \par Grace Baxter MD\par NS/[facility] Fellow, PGY-5\par

## 2023-04-27 NOTE — END OF VISIT
Physician Progress Note      PATIENT:               Boo Grissom  CSN #:                  [id number]  :                       1943  ADMIT DATE:       10/1/2022 8:21 PM  [address] [place] DATE:        10/6/2022 11:00 AM  RESPONDING  PROVIDER #:        Zahida Tidwell MD          QUERY TEXT:    Pt admitted with sepsis with bacteremia. Noted documentation of \"Serratia   CLABSI\" by ID consultant and \"infected portacath\" by General Surgery. If   possible, please document in progress notes and discharge summary:    The medical record reflects the following:  Risk Factors: portacath, lung cancer, recurrent bacteremia  Clinical Indicators: sepsis, blood cultures drawn through port are positive   for serratia, \"Lacking focal symptoms to suggest an alternative source of   bacteremia, this appears to be primary CLABSI from the port\" per ID  Treatment: cultures, IV Cefepime, removal of port, General Surgery, ID and   Oncology consult, supportive care    Thank you,  Angelica Myers RN, [email or web address]  Options provided:  -- Serratia CLABSI due to infected portacath confirmed present on admission  -- Serratia CLABSI due to infected portacath ruled out  -- Other - I will add my own diagnosis  -- Disagree - Not applicable / Not valid  -- Disagree - Clinically unable to determine / Unknown  -- Refer to Clinical Documentation Reviewer    PROVIDER RESPONSE TEXT:    The diagnosis of Serratia CLABSI due to infected portacath was confirmed as   present on admission.     Query created by: Waleska Owen on 10/13/2022 7:31 AM      Electronically signed by:  Zahida Tidwell MD 10/15/2022 10:58 AM [] : Fellow [FreeTextEntry3] : 77M with multiple comorbidities, including afib and mitral valve regurgitation s/p mechanical MVR on coumadin, CAD s/p CABG, gastric lipoma here for follow up. Last seen 3/2021 for GERD, dyspepsia, diarrhea. Admitted recently at [facility] in 2/2023 for abdominal pain and diarrhea, stool testing positive for norovirus, E coli species, and C. difficile s/p course of PO vancomycin. At recent visit with PMD, reported gassiness and abdominal pain. No obvious rectal pain and no symptoms noted with DARREN. Though he reports borborygmi, he has no abdominal pain or significant bloating. He does, however, have complaints of perineal pain and burning. \par [ ] Consider repeat evaluation by  for perineal complaints, no GI workup recommended at this time\par [ ] Can trial Gas-x or simethicone for borborygmi if symptoms are bothersome\par [ ] Reports normal colonoscopy in 2014 or 2016; at this time, patient does not want further colonoscopies for colon cancer screening given age > 75 and comorbidities

## 2023-04-27 NOTE — PHYSICAL EXAM
[Alert] : alert [Normal Voice/Communication] : normal voice/communication [Healthy Appearing] : healthy appearing [Sclera] : the sclera and conjunctiva were normal [Hearing Threshold Finger Rub Not Tyrrell] : hearing was normal [Normal Appearance] : the appearance of the neck was normal [No Respiratory Distress] : no respiratory distress [Heart Rate And Rhythm] : heart rate was normal and rhythm regular [Abdomen Tenderness] : non-tender [Abdomen Soft] : soft [No Masses] : no abdominal mass palpated [Normal Color / Pigmentation] : normal skin color and pigmentation [No Focal Deficits] : no focal deficits [Oriented To Time, Place, And Person] : oriented to person, place, and time [Normal Affect] : the affect was normal [Normal Mood] : the mood was normal [de-identified] : no perineal skin changes or TTP; rectal with small external and palpable internal hemorrhoids, no tenderness with digital insertion, +good sphincter tone.

## 2023-04-27 NOTE — REVIEW OF SYSTEMS
[As Noted in HPI] : as noted in HPI [Fever] : no fever [Chills] : no chills [Eye Pain] : no eye pain [Loss Of Hearing] : no hearing loss [Chest Pain] : no chest pain [Shortness Of Breath] : no shortness of breath

## 2023-04-27 NOTE — HISTORY OF PRESENT ILLNESS
[FreeTextEntry1] : 77M w/ AFib and mechanical mitral valve on Coumadin, CAD s/p CABG (not on ASA), gastric lipoma (seen on prior EUS/FNB), s/p TURP presents for post-hospital followup. Last seen in GI clinic 3/2021 for GERD, dyspepsia, diarrhea. \par \par Patient was recently [facility] for abd pain, diarrhea, found to have infections with C difficile, EAEC, EPEC, ETEC, Norovirus as well as UCx with Coag negative staph. He was followed by infectious disease in the hospital and completed a 10 day course of PO Vancomycin. His symptoms resolved initially but for the past two months, he endorses gas but denies any bloating. Also having perineal burning over the last few weeks worse when sitting down but relieved when laying on his side or standing up. He also reports burning when he holds in his urine but denies any dysuria or hematuria. Denies any rectal pain. He also denies abd pain, N/V, diarrhea, constipation, has one bowel movement per day, which is well formed and brown, no melena/hematochezia. \par \par \par Last EGD 3/2019 showed normal esophagus, no hiatal hernia. \par Last c-scope (2016) at OSH and reportedly normal.\par \par ROS neg for weight loss, dysphagia, odynophagia, nausea, vomiting, melena, hematochezia.\par \par Soc hx- denies any ETOH, tobacco\par FH- denies any family history of GI related disease/malignancies\par \par

## 2023-04-28 ENCOUNTER — NON-APPOINTMENT (OUTPATIENT)
Age: 78
End: 2023-04-28

## 2023-04-28 LAB
ALBUMIN SERPL ELPH-MCNC: 4.4 G/DL
ALP BLD-CCNC: 92 U/L
ALT SERPL-CCNC: 41 U/L
ANION GAP SERPL CALC-SCNC: 11 MMOL/L
AST SERPL-CCNC: 23 U/L
BILIRUB SERPL-MCNC: 2.8 MG/DL
BUN SERPL-MCNC: 24 MG/DL
CALCIUM SERPL-MCNC: 9.6 MG/DL
CHLORIDE SERPL-SCNC: 105 MMOL/L
CO2 SERPL-SCNC: 24 MMOL/L
CREAT SERPL-MCNC: 1.22 MG/DL
EGFR: 61 ML/MIN/1.73M2
GI PCR PANEL: DETECTED
GLUCOSE SERPL-MCNC: 194 MG/DL
MAGNESIUM SERPL-MCNC: 1.9 MG/DL
NOROVIRUS GI/GII: DETECTED
PHOSPHATE SERPL-MCNC: 3.4 MG/DL
POTASSIUM SERPL-SCNC: 4.4 MMOL/L
PROT SERPL-MCNC: 6.9 G/DL
SODIUM SERPL-SCNC: 140 MMOL/L
TTG IGA SER IA-ACNC: <1.2 U/ML
TTG IGA SER-ACNC: NEGATIVE
TTG IGG SER IA-ACNC: 3.9 U/ML
TTG IGG SER IA-ACNC: NEGATIVE

## 2023-05-02 ENCOUNTER — APPOINTMENT (OUTPATIENT)
Dept: ENDOCRINOLOGY | Facility: CLINIC | Age: 78
End: 2023-05-02

## 2023-05-08 ENCOUNTER — NON-APPOINTMENT (OUTPATIENT)
Age: 78
End: 2023-05-08

## 2023-05-08 NOTE — CONSULT NOTE ADULT - PROBLEM/RECOMMENDATION-3
DISPLAY PLAN FREE TEXT Nasalis-Muscle-Based Myocutaneous Island Pedicle Flap Text: Using a #15 blade, an incision was made around the donor flap to the level of the nasalis muscle. Wide lateral undermining was then performed in both the subcutaneous plane above the nasalis muscle, and in a submuscular plane just above periosteum. This allowed the formation of a free nasalis muscle axial pedicle (based on the angular artery) which was still attached to the actual cutaneous flap, increasing its mobility and vascular viability. Hemostasis was obtained with pinpoint electrocoagulation. The flap was mobilized into position and the pivotal anchor points positioned and stabilized with buried interrupted sutures. Subcutaneous and dermal tissues were closed in a multilayered fashion with sutures. Tissue redundancies were excised, and the epidermal edges were apposed without significant tension and sutured with sutures.

## 2023-05-09 NOTE — H&P PST ADULT - ENDOCRINE
details… Cartilage Graft Text: The defect edges were debeveled with a #15 scalpel blade.  Given the location of the defect, shape of the defect, the fact the defect involved a full thickness cartilage defect a cartilage graft was deemed most appropriate.  An appropriate donor site was identified, cleansed, and anesthetized. The cartilage graft was then harvested and transferred to the recipient site, oriented appropriately and then sutured into place.  The secondary defect was then repaired using a primary closure.

## 2023-05-10 ENCOUNTER — OUTPATIENT (OUTPATIENT)
Dept: OUTPATIENT SERVICES | Facility: HOSPITAL | Age: 78
LOS: 1 days | End: 2023-05-10

## 2023-05-10 ENCOUNTER — APPOINTMENT (OUTPATIENT)
Dept: INTERNAL MEDICINE | Facility: CLINIC | Age: 78
End: 2023-05-10

## 2023-05-10 VITALS — HEART RATE: 69 BPM | OXYGEN SATURATION: 99 %

## 2023-05-10 DIAGNOSIS — Z86.69 PERSONAL HISTORY OF OTHER DISEASES OF THE NERVOUS SYSTEM AND SENSE ORGANS: Chronic | ICD-10-CM

## 2023-05-10 DIAGNOSIS — H26.9 UNSPECIFIED CATARACT: Chronic | ICD-10-CM

## 2023-05-10 DIAGNOSIS — Z98.890 OTHER SPECIFIED POSTPROCEDURAL STATES: Chronic | ICD-10-CM

## 2023-05-10 DIAGNOSIS — Z79.01 LONG TERM (CURRENT) USE OF ANTICOAGULANTS: ICD-10-CM

## 2023-05-10 DIAGNOSIS — Z90.89 ACQUIRED ABSENCE OF OTHER ORGANS: Chronic | ICD-10-CM

## 2023-05-10 DIAGNOSIS — Z51.81 ENCOUNTER FOR THERAPEUTIC DRUG LEVEL MONITORING: ICD-10-CM

## 2023-05-10 LAB
INR PPP: 3 RATIO
INR PPP: 3.3 RATIO
POCT-PROTHROMBIN TIME: 35.8 SECS
POCT-PROTHROMBIN TIME: 40 SECS
QUALITY CONTROL: YES

## 2023-05-12 ENCOUNTER — RX RENEWAL (OUTPATIENT)
Age: 78
End: 2023-05-12

## 2023-05-13 NOTE — PHYSICAL EXAM
[General Appearance - Well Developed] : well developed [General Appearance - Well Nourished] : well nourished [Normal Appearance] : normal appearance [General Appearance - In No Acute Distress] : no acute distress [Well Groomed] : well groomed [Oriented To Time, Place, And Person] : oriented to person, place, and time [Affect] : the affect was normal [Not Anxious] : not anxious [Mood] : the mood was normal

## 2023-05-15 ENCOUNTER — APPOINTMENT (OUTPATIENT)
Dept: UROLOGY | Facility: CLINIC | Age: 78
End: 2023-05-15

## 2023-05-15 NOTE — HISTORY OF PRESENT ILLNESS
[FreeTextEntry1] : The patient-doctor. relationship has been established in a face-to-face fashion on real-time video audio HIPAA compliant communication using telemedicine software. The patient was at home and the physician was in his office. The patient's identity has been confirmed.  The patient was previously emailed a copy of the telemedicine consent.  The patient has had a chance to review and has now given verbal consent and has requested care to be assessed and treated through telemedicine. The patient understands there may be limitations in this process and that they need not need further follow-up care in the office and/or hospital settings. We were unable to use the [email or web address] platform and an alternative platform was utilized.  The patient was at home and I was in the office.\par \par Verbal consent was given on Monday, May 15, 2023 at 9 AM by the patient.  It was requested by the physician.  A written consent was previously sent for the patient to sign and return.\par \par The patient returns for follow-up.  He has been using 0.4 to 0.6 cc of the higher dose Trimix.  He is doing well and has no complaints.\par \par PMH: Patient initially presented with diabetes, hypertension on "blood thinners" for a mitral valve replacement  a chief complaint of erectile dysfunction. He had a TURP on 3/3/2022. He states that his ED has been present for the past 2 years. It causes both he and his partner great stress.  I reviewed the questionnaire he completed in detail. His erections are not modified with the degree of sexual stimulation.   He states that his erections presently are often less than 5 out of 10 in both tumescence and rigidity, insufficient for penetration.   He often ejaculates through a flaccid phallus.  He has difficulty maintaining an erection. He describes a normal libido.  His sexual dysfunction occurs with both sexual relations and masturbation.  His erections are not improved with PDE5 inhibitors.    His partner is understanding and was unable to be with him at the visit today. He is not  and has 5 adult children and 12 grandchildren.  \par \par The patient denies fevers, chills, nausea and/or vomiting and no unexplained weight loss.  His past medical history is non-contributory.  In his present occupation he has no known toxin exposure. He does not smoke and drinks socially.  He has no known drug allergies. His review of systems and past medical history demonstrates no significant urologic issues (see patient completed questionnaire). His family history demonstrates no significant urologic issues.

## 2023-05-15 NOTE — ASSESSMENT
[FreeTextEntry1] : The patient returns for follow-up.  He has been using 0.4 to 0.6 cc of the higher dose Trimix.  He is doing well and has no complaints.\par \par Blood test demonstrated hematocrit of 44.8%, testosterone free 7.7 pg/mL with a total testosterone of 416 ng/dL, PSA 1.41 ng/mL, LH 5.3 IU/L, vitamin D 25 was 32.7 ng/mL, prolactin 22.1 ng/mL, TSH 0.09 µIU/mL, estradiol 20 pg/mL, hemoglobin A1c 7.3% and medical evaluation was suggested.  His serum glucose was 231 mg/dL with a BUN of 25 mg/dL.  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  He is under cardiac care for significant cardiovascular disease.  \par \par With a dose of  0.4 to 0.6 cc of the trimix he was getting a fairly good erection.\par \par We again discussed the use of a pharmacologic agent in the diagnostic evaluation of organic and in special instances psychogenic erectile dysfunction.  He was made fully aware that several medications used may not be approved by the FED[employer]AL DRUG [employer]MIN[employer]RATION for this purpose, although they may be well recognized and accepted by the American Urologic Association as a treatment and diagnostic tool for impotence.  He understands that there still remains a need to standardize the indications, contraindications and dosage parameters for the testing as well as treatment purposes of this procedure. He understands that the TriMix is a compounded medication and that there are other, FDA approved, injectable medications available for  use. \par \par He received a brochure on injection therapy and I have taken particular care in reviewing carefully all potential complications of this procedure and made him  fully aware that even a death has been reported in conjunction with this therapy.  Never-the-less, weighing all risks and benefits that this procedure affords, he was willing to proceed with the use of a intracavernosal injection of the pharmacological agent prescribed and either compounded by a pharmaceutical company, pharmacist or by Dr. Kenny as part of the diagnostic evaluation and/or use of this pharmacologic agent as a treatment for  erectile dysfunction. He was made aware that a prolonged erection (priapism) might result from penile injections and that it must be treated within four hours to prevent damage to the erectile mechanism of his penis. He acknowledged that he must notify Dr. Kenny (or the covering doctor) and have this condition treated within fours hours should it occur. He had the opportunity to ask any questions all of which were answered to his satisfaction. \par \par He will call with any concerns or questions. I will see him back in follow up prior to dispensing additional medication.  He will continue using 0.4 to 0.6 cc of the higher dose Trimix.\par \par Telehealth Consultation: 30 minutes  10 minutes reviewing his history and discussing prior results.  20 minutes discussing various treatment options, writing medication prescriptions, requests for lab testing and writing his note. There was also additional time in preparing for the visit and assisting the patient with technology issues he was having with the telehealth platform.\par

## 2023-05-22 ENCOUNTER — NON-APPOINTMENT (OUTPATIENT)
Age: 78
End: 2023-05-22

## 2023-05-23 ENCOUNTER — NON-APPOINTMENT (OUTPATIENT)
Age: 78
End: 2023-05-23

## 2023-05-24 ENCOUNTER — NON-APPOINTMENT (OUTPATIENT)
Age: 78
End: 2023-05-24

## 2023-05-24 ENCOUNTER — APPOINTMENT (OUTPATIENT)
Dept: INTERNAL MEDICINE | Facility: CLINIC | Age: 78
End: 2023-05-24

## 2023-05-24 ENCOUNTER — OUTPATIENT (OUTPATIENT)
Dept: OUTPATIENT SERVICES | Facility: HOSPITAL | Age: 78
LOS: 1 days | End: 2023-05-24

## 2023-05-24 VITALS — HEART RATE: 73 BPM | RESPIRATION RATE: 16 BRPM | OXYGEN SATURATION: 98 %

## 2023-05-24 DIAGNOSIS — Z98.89 OTHER SPECIFIED POSTPROCEDURAL STATES: Chronic | ICD-10-CM

## 2023-05-24 DIAGNOSIS — Z86.69 PERSONAL HISTORY OF OTHER DISEASES OF THE NERVOUS SYSTEM AND SENSE ORGANS: Chronic | ICD-10-CM

## 2023-05-24 DIAGNOSIS — Z90.89 ACQUIRED ABSENCE OF OTHER ORGANS: Chronic | ICD-10-CM

## 2023-05-24 DIAGNOSIS — H26.9 UNSPECIFIED CATARACT: Chronic | ICD-10-CM

## 2023-05-24 DIAGNOSIS — Z98.890 OTHER SPECIFIED POSTPROCEDURAL STATES: Chronic | ICD-10-CM

## 2023-05-24 LAB
INR PPP: 3.7 RATIO
POCT-PROTHROMBIN TIME: 44.4 SECS
QUALITY CONTROL: YES

## 2023-05-24 RX ORDER — HYDROCORTISONE 25 MG/G
2.5 OINTMENT TOPICAL TWICE DAILY
Qty: 1 | Refills: 0 | Status: ACTIVE | COMMUNITY
Start: 2022-12-23 | End: 1900-01-01

## 2023-05-25 ENCOUNTER — APPOINTMENT (OUTPATIENT)
Dept: INTERNAL MEDICINE | Facility: CLINIC | Age: 78
End: 2023-05-25
Payer: MEDICARE

## 2023-05-25 ENCOUNTER — OUTPATIENT (OUTPATIENT)
Dept: OUTPATIENT SERVICES | Facility: HOSPITAL | Age: 78
LOS: 1 days | End: 2023-05-25

## 2023-05-25 VITALS
HEART RATE: 85 BPM | SYSTOLIC BLOOD PRESSURE: 128 MMHG | OXYGEN SATURATION: 100 % | TEMPERATURE: 97.7 F | RESPIRATION RATE: 16 BRPM | BODY MASS INDEX: 23.99 KG/M2 | DIASTOLIC BLOOD PRESSURE: 76 MMHG | WEIGHT: 162 LBS | HEIGHT: 69 IN

## 2023-05-25 DIAGNOSIS — Z98.890 OTHER SPECIFIED POSTPROCEDURAL STATES: ICD-10-CM

## 2023-05-25 DIAGNOSIS — Z98.89 OTHER SPECIFIED POSTPROCEDURAL STATES: Chronic | ICD-10-CM

## 2023-05-25 DIAGNOSIS — Z98.890 OTHER SPECIFIED POSTPROCEDURAL STATES: Chronic | ICD-10-CM

## 2023-05-25 DIAGNOSIS — Z51.81 ENCOUNTER FOR THERAPEUTIC DRUG LEVEL MONITORING: ICD-10-CM

## 2023-05-25 DIAGNOSIS — H54.7 UNSPECIFIED VISUAL LOSS: ICD-10-CM

## 2023-05-25 DIAGNOSIS — Z79.01 LONG TERM (CURRENT) USE OF ANTICOAGULANTS: ICD-10-CM

## 2023-05-25 DIAGNOSIS — Z86.69 PERSONAL HISTORY OF OTHER DISEASES OF THE NERVOUS SYSTEM AND SENSE ORGANS: Chronic | ICD-10-CM

## 2023-05-25 DIAGNOSIS — H26.9 UNSPECIFIED CATARACT: Chronic | ICD-10-CM

## 2023-05-25 DIAGNOSIS — Z90.89 ACQUIRED ABSENCE OF OTHER ORGANS: Chronic | ICD-10-CM

## 2023-05-25 PROCEDURE — 99213 OFFICE O/P EST LOW 20 MIN: CPT | Mod: GE

## 2023-05-25 NOTE — PHYSICAL EXAM
[No Acute Distress] : no acute distress [Well Nourished] : well nourished [Well Developed] : well developed [Well-Appearing] : well-appearing [Normal Sclera/Conjunctiva] : normal sclera/conjunctiva [PERRL] : pupils equal round and reactive to light [EOMI] : extraocular movements intact [No Respiratory Distress] : no respiratory distress  [No Accessory Muscle Use] : no accessory muscle use [Clear to Auscultation] : lungs were clear to auscultation bilaterally [Normal Rate] : normal rate  [Regular Rhythm] : with a regular rhythm [Normal S1, S2] : normal S1 and S2 [No Murmur] : no murmur heard [No Edema] : there was no peripheral edema [Soft] : abdomen soft [Non Tender] : non-tender [Non-distended] : non-distended [No Masses] : no abdominal mass palpated [No HSM] : no HSM [Normal Bowel Sounds] : normal bowel sounds [Normal Posterior Cervical Nodes] : no posterior cervical lymphadenopathy [Normal Anterior Cervical Nodes] : no anterior cervical lymphadenopathy [No CVA Tenderness] : no CVA  tenderness [No Spinal Tenderness] : no spinal tenderness [No Joint Swelling] : no joint swelling [Grossly Normal Strength/Tone] : grossly normal strength/tone [No Rash] : no rash [Coordination Grossly Intact] : coordination grossly intact [No Focal Deficits] : no focal deficits [Normal Gait] : normal gait [Deep Tendon Reflexes (DTR)] : deep tendon reflexes were 2+ and symmetric [Normal Affect] : the affect was normal [Normal Insight/Judgement] : insight and judgment were intact

## 2023-05-26 LAB — GLUCOSE BLDC GLUCOMTR-MCNC: 193

## 2023-05-29 NOTE — ADDENDUM
[FreeTextEntry1] : Case discussed with Dr. Cordon\par Total time of encounter 45 min\par RTC in 15 weeks\par \par Suad Juan, PGY-2\par [employer]\par

## 2023-05-29 NOTE — REVIEW OF SYSTEMS
[Chills] : chills [Nausea] : nausea [Dizziness] : dizziness [Fever] : no fever [Chest Pain] : no chest pain [Palpitations] : no palpitations [Lower Ext Edema] : no lower extremity edema [Orthopena] : no orthopnea [Shortness Of Breath] : no shortness of breath [Wheezing] : no wheezing [Cough] : no cough [Abdominal Pain] : no abdominal pain [Vomiting] : no vomiting [Heartburn] : no heartburn [Melena] : no melena [Dysuria] : no dysuria [Incontinence] : no incontinence [Hematuria] : no hematuria [Headache] : no headache [Fainting] : no fainting [Confusion] : no confusion [Memory Loss] : no memory loss [Insomnia] : no insomnia

## 2023-05-29 NOTE — HISTORY OF PRESENT ILLNESS
[de-identified] : 78 Y/O M with HTN, CAD s/p CABG, RHD s/p MVR on Coumadin, T2DM, Afib, multiple thyroid nodules, Rosie Thompson tears and esophageal ulcers, BPH s/p TURP (3/3/22) presenting for hyperglycemia. Patient reports morning fasting blood sugar at 250. He reports for the past 3 months his blood sugars have been 200s-290s.He reports he saw endocrinology in March. At the time he reported stopping his Trulicity in February. They recommended continuing trulicity and decreasing his glipizide from 10 mg to 5mg. He has not made the changes as of yet. Additionally for the past 3-4 months he endorses worsening vision, associated with his hyperglycemia. He last saw opthalmology in dec 2022 and reports exam wnl at that time.\par \par He reports some episodes of dizziness with standing up too fast, followed by nausea. He denies falls or LOC, lives with wife and daughter. He reports waking up with cold sweats and nightmares after taking glipizide 10mg at night.

## 2023-05-30 DIAGNOSIS — R42 DIZZINESS AND GIDDINESS: ICD-10-CM

## 2023-05-30 DIAGNOSIS — H54.7 UNSPECIFIED VISUAL LOSS: ICD-10-CM

## 2023-05-30 DIAGNOSIS — E11.40 TYPE 2 DIABETES MELLITUS WITH DIABETIC NEUROPATHY, UNSPECIFIED: ICD-10-CM

## 2023-05-30 DIAGNOSIS — I10 ESSENTIAL (PRIMARY) HYPERTENSION: ICD-10-CM

## 2023-05-30 DIAGNOSIS — E11.9 TYPE 2 DIABETES MELLITUS WITHOUT COMPLICATIONS: ICD-10-CM

## 2023-05-30 DIAGNOSIS — I48.91 UNSPECIFIED ATRIAL FIBRILLATION: ICD-10-CM

## 2023-05-30 DIAGNOSIS — E78.5 HYPERLIPIDEMIA, UNSPECIFIED: ICD-10-CM

## 2023-05-30 NOTE — H&P PST ADULT - PROBLEM/PLAN-3
From: Vik Ahumada  To: Enedelia Bruno DPM  Sent: 5/30/2023 6:20 AM EDT  Subject: My left foot     Left foot is really hurting really bad I need to get a check up soon notcin July
Please contact patient.  Thanks, [employer]
DISPLAY PLAN FREE TEXT

## 2023-05-31 ENCOUNTER — APPOINTMENT (OUTPATIENT)
Dept: INTERNAL MEDICINE | Facility: CLINIC | Age: 78
End: 2023-05-31

## 2023-05-31 ENCOUNTER — OUTPATIENT (OUTPATIENT)
Dept: OUTPATIENT SERVICES | Facility: HOSPITAL | Age: 78
LOS: 1 days | End: 2023-05-31

## 2023-05-31 VITALS — HEART RATE: 65 BPM | RESPIRATION RATE: 16 BRPM | OXYGEN SATURATION: 99 %

## 2023-05-31 DIAGNOSIS — Z86.69 PERSONAL HISTORY OF OTHER DISEASES OF THE NERVOUS SYSTEM AND SENSE ORGANS: Chronic | ICD-10-CM

## 2023-05-31 DIAGNOSIS — Z98.890 OTHER SPECIFIED POSTPROCEDURAL STATES: Chronic | ICD-10-CM

## 2023-05-31 DIAGNOSIS — Z98.89 OTHER SPECIFIED POSTPROCEDURAL STATES: Chronic | ICD-10-CM

## 2023-05-31 DIAGNOSIS — H26.9 UNSPECIFIED CATARACT: Chronic | ICD-10-CM

## 2023-05-31 DIAGNOSIS — Z90.89 ACQUIRED ABSENCE OF OTHER ORGANS: Chronic | ICD-10-CM

## 2023-05-31 LAB
INR PPP: 2.1 RATIO
POCT-PROTHROMBIN TIME: 25.6 SECS
QUALITY CONTROL: YES

## 2023-06-01 DIAGNOSIS — Z79.01 LONG TERM (CURRENT) USE OF ANTICOAGULANTS: ICD-10-CM

## 2023-06-01 DIAGNOSIS — Z51.81 ENCOUNTER FOR THERAPEUTIC DRUG LEVEL MONITORING: ICD-10-CM

## 2023-06-06 ENCOUNTER — APPOINTMENT (OUTPATIENT)
Dept: UROLOGY | Facility: CLINIC | Age: 78
End: 2023-06-06
Payer: MEDICARE

## 2023-06-06 ENCOUNTER — NON-APPOINTMENT (OUTPATIENT)
Age: 78
End: 2023-06-06

## 2023-06-06 PROCEDURE — 99214 OFFICE O/P EST MOD 30 MIN: CPT | Mod: 95

## 2023-06-06 NOTE — HISTORY OF PRESENT ILLNESS
[FreeTextEntry1] : The patient-doctor. relationship has been established in a face-to-face fashion on real-time video audio HIPAA compliant communication using telemedicine software. The patient was at home and the physician was in his office. The patient's identity has been confirmed.  The patient was previously emailed a copy of the telemedicine consent.  The patient has had a chance to review and has now given verbal consent and has requested care to be assessed and treated through telemedicine. The patient understands there may be limitations in this process and that they need not need further follow-up care in the office and/or hospital settings. We were unable to use the [employer] platform and an alternative platform was utilized.  The patient was at home and I was in the office.\par \par Verbal consent was given on Tuesday, June 6, 2023 at 11 AM by the patient.  It was requested by the physician.  A written consent was previously sent for the patient to sign and return.\par \par The patient returns for follow-up to review his penile injection training.  He has been injecting 0.4 to 0.6 cc of the Trimix.\par \par PMH: Patient initially presented with diabetes, hypertension on "blood thinners" for a mitral valve replacement  a chief complaint of erectile dysfunction. He had a TURP on 3/3/2022. He states that his ED has been present for the past 2 years. It causes both he and his partner great stress.  I reviewed the questionnaire he completed in detail. His erections are not modified with the degree of sexual stimulation.   He states that his erections presently are often less than 5 out of 10 in both tumescence and rigidity, insufficient for penetration.   He often ejaculates through a flaccid phallus.  He has difficulty maintaining an erection. He describes a normal libido.  His sexual dysfunction occurs with both sexual relations and masturbation.  His erections are not improved with PDE5 inhibitors.    His partner is understanding and was unable to be with him at the visit today. He is not  and has 5 adult children and 12 grandchildren.  \par \par The patient denies fevers, chills, nausea and/or vomiting and no unexplained weight loss.  His past medical history is non-contributory.  In his present occupation he has no known toxin exposure. He does not smoke and drinks socially.  He has no known drug allergies. His review of systems and past medical history demonstrates no significant urologic issues (see patient completed questionnaire). His family history demonstrates no significant urologic issues.

## 2023-06-06 NOTE — ASSESSMENT
[FreeTextEntry1] : The patient returns for follow-up to review his penile injection training.  He has been injecting 0.4 to 0.6 cc of the Trimix.  He states that he has not gotten his good an erection at home as he has in the office.  I have asked him to return to the office for second injection training and to bring his medication and the ice pack.\par \par Blood test demonstrated hematocrit of 44.8%, testosterone free 7.7 pg/mL with a total testosterone of 416 ng/dL, PSA 1.41 ng/mL, LH 5.3 IU/L, vitamin D 25 was 32.7 ng/mL, prolactin 22.1 ng/mL, TSH 0.09 µIU/mL, estradiol 20 pg/mL, hemoglobin A1c 7.3% and medical evaluation was suggested.  His serum glucose was 231 mg/dL with a BUN of 25 mg/dL.  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  Under cardiac care for significant cardiovascular disease.  I will see him back for injection training at 0.4 to 0.6 cc of the trimix.\par \par I will see him back to review the process.\par \par Telehealth Consultation: 30 minutes  10 minutes reviewing his history and discussing prior results.  20 minutes discussing various treatment options and writing his note. There was also additional time in preparing for the visit and assisting the patient with technology issues he was having with the telehealth platform.\par

## 2023-06-07 ENCOUNTER — NON-APPOINTMENT (OUTPATIENT)
Age: 78
End: 2023-06-07

## 2023-06-07 ENCOUNTER — APPOINTMENT (OUTPATIENT)
Dept: INTERNAL MEDICINE | Facility: CLINIC | Age: 78
End: 2023-06-07

## 2023-06-07 ENCOUNTER — OUTPATIENT (OUTPATIENT)
Dept: OUTPATIENT SERVICES | Facility: HOSPITAL | Age: 78
LOS: 1 days | End: 2023-06-07

## 2023-06-07 VITALS — HEART RATE: 69 BPM | OXYGEN SATURATION: 99 % | RESPIRATION RATE: 16 BRPM

## 2023-06-07 DIAGNOSIS — Z98.890 OTHER SPECIFIED POSTPROCEDURAL STATES: Chronic | ICD-10-CM

## 2023-06-07 DIAGNOSIS — Z90.89 ACQUIRED ABSENCE OF OTHER ORGANS: Chronic | ICD-10-CM

## 2023-06-07 DIAGNOSIS — H26.9 UNSPECIFIED CATARACT: Chronic | ICD-10-CM

## 2023-06-07 DIAGNOSIS — Z98.89 OTHER SPECIFIED POSTPROCEDURAL STATES: Chronic | ICD-10-CM

## 2023-06-07 LAB
INR PPP: 2.1 RATIO
POCT-PROTHROMBIN TIME: 25.4 SECS
QUALITY CONTROL: YES

## 2023-06-08 DIAGNOSIS — Z51.81 ENCOUNTER FOR THERAPEUTIC DRUG LEVEL MONITORING: ICD-10-CM

## 2023-06-08 DIAGNOSIS — Z79.01 LONG TERM (CURRENT) USE OF ANTICOAGULANTS: ICD-10-CM

## 2023-06-14 ENCOUNTER — RESULT CHARGE (OUTPATIENT)
Age: 78
End: 2023-06-14

## 2023-06-14 ENCOUNTER — OUTPATIENT (OUTPATIENT)
Dept: OUTPATIENT SERVICES | Facility: HOSPITAL | Age: 78
LOS: 1 days | End: 2023-06-14

## 2023-06-14 ENCOUNTER — APPOINTMENT (OUTPATIENT)
Dept: INTERNAL MEDICINE | Facility: CLINIC | Age: 78
End: 2023-06-14

## 2023-06-14 ENCOUNTER — NON-APPOINTMENT (OUTPATIENT)
Age: 78
End: 2023-06-14

## 2023-06-14 VITALS — HEART RATE: 63 BPM | OXYGEN SATURATION: 99 %

## 2023-06-14 DIAGNOSIS — Z86.69 PERSONAL HISTORY OF OTHER DISEASES OF THE NERVOUS SYSTEM AND SENSE ORGANS: Chronic | ICD-10-CM

## 2023-06-14 DIAGNOSIS — Z98.890 OTHER SPECIFIED POSTPROCEDURAL STATES: Chronic | ICD-10-CM

## 2023-06-14 DIAGNOSIS — Z90.89 ACQUIRED ABSENCE OF OTHER ORGANS: Chronic | ICD-10-CM

## 2023-06-14 DIAGNOSIS — H26.9 UNSPECIFIED CATARACT: Chronic | ICD-10-CM

## 2023-06-14 DIAGNOSIS — Z98.89 OTHER SPECIFIED POSTPROCEDURAL STATES: Chronic | ICD-10-CM

## 2023-06-20 DIAGNOSIS — Z79.01 LONG TERM (CURRENT) USE OF ANTICOAGULANTS: ICD-10-CM

## 2023-06-20 DIAGNOSIS — Z51.81 ENCOUNTER FOR THERAPEUTIC DRUG LEVEL MONITORING: ICD-10-CM

## 2023-06-20 DIAGNOSIS — Z98.890 OTHER SPECIFIED POSTPROCEDURAL STATES: ICD-10-CM

## 2023-06-21 ENCOUNTER — APPOINTMENT (OUTPATIENT)
Dept: INTERNAL MEDICINE | Facility: CLINIC | Age: 78
End: 2023-06-21

## 2023-06-21 ENCOUNTER — OUTPATIENT (OUTPATIENT)
Dept: OUTPATIENT SERVICES | Facility: HOSPITAL | Age: 78
LOS: 1 days | End: 2023-06-21

## 2023-06-21 VITALS — HEART RATE: 61 BPM | RESPIRATION RATE: 16 BRPM | TEMPERATURE: 97.3 F | OXYGEN SATURATION: 99 %

## 2023-06-21 DIAGNOSIS — H26.9 UNSPECIFIED CATARACT: Chronic | ICD-10-CM

## 2023-06-21 DIAGNOSIS — Z86.69 PERSONAL HISTORY OF OTHER DISEASES OF THE NERVOUS SYSTEM AND SENSE ORGANS: Chronic | ICD-10-CM

## 2023-06-21 DIAGNOSIS — Z98.890 OTHER SPECIFIED POSTPROCEDURAL STATES: Chronic | ICD-10-CM

## 2023-06-21 DIAGNOSIS — Z98.89 OTHER SPECIFIED POSTPROCEDURAL STATES: Chronic | ICD-10-CM

## 2023-06-21 DIAGNOSIS — Z90.89 ACQUIRED ABSENCE OF OTHER ORGANS: Chronic | ICD-10-CM

## 2023-06-21 LAB
INR PPP: 3.1 RATIO
POCT-PROTHROMBIN TIME: 36.7 SECS

## 2023-06-22 DIAGNOSIS — Z79.01 LONG TERM (CURRENT) USE OF ANTICOAGULANTS: ICD-10-CM

## 2023-06-22 DIAGNOSIS — Z51.81 ENCOUNTER FOR THERAPEUTIC DRUG LEVEL MONITORING: ICD-10-CM

## 2023-06-22 DIAGNOSIS — Z95.2 PRESENCE OF PROSTHETIC HEART VALVE: ICD-10-CM

## 2023-06-26 ENCOUNTER — RX RENEWAL (OUTPATIENT)
Age: 78
End: 2023-06-26

## 2023-06-27 RX ORDER — BLOOD-GLUCOSE METER
KIT MISCELLANEOUS DAILY
Qty: 90 | Refills: 3 | Status: ACTIVE | COMMUNITY
Start: 2022-12-16 | End: 1900-01-01

## 2023-06-28 ENCOUNTER — NON-APPOINTMENT (OUTPATIENT)
Age: 78
End: 2023-06-28

## 2023-06-28 ENCOUNTER — APPOINTMENT (OUTPATIENT)
Dept: INTERNAL MEDICINE | Facility: CLINIC | Age: 78
End: 2023-06-28

## 2023-06-28 ENCOUNTER — APPOINTMENT (OUTPATIENT)
Dept: CARE COORDINATION | Facility: HOME HEALTH | Age: 78
End: 2023-06-28

## 2023-06-28 ENCOUNTER — OUTPATIENT (OUTPATIENT)
Dept: OUTPATIENT SERVICES | Facility: HOSPITAL | Age: 78
LOS: 1 days | End: 2023-06-28

## 2023-06-28 VITALS — OXYGEN SATURATION: 98 % | HEART RATE: 79 BPM

## 2023-06-28 DIAGNOSIS — Z90.89 ACQUIRED ABSENCE OF OTHER ORGANS: Chronic | ICD-10-CM

## 2023-06-28 DIAGNOSIS — H26.9 UNSPECIFIED CATARACT: Chronic | ICD-10-CM

## 2023-06-28 DIAGNOSIS — Z86.69 PERSONAL HISTORY OF OTHER DISEASES OF THE NERVOUS SYSTEM AND SENSE ORGANS: Chronic | ICD-10-CM

## 2023-06-28 DIAGNOSIS — Z98.890 OTHER SPECIFIED POSTPROCEDURAL STATES: Chronic | ICD-10-CM

## 2023-06-28 DIAGNOSIS — Z98.89 OTHER SPECIFIED POSTPROCEDURAL STATES: Chronic | ICD-10-CM

## 2023-06-28 LAB
INR PPP: 3 RATIO
POCT-PROTHROMBIN TIME: 35.5 SECS
QUALITY CONTROL: YES

## 2023-06-29 DIAGNOSIS — Z79.01 LONG TERM (CURRENT) USE OF ANTICOAGULANTS: ICD-10-CM

## 2023-06-29 DIAGNOSIS — Z51.81 ENCOUNTER FOR THERAPEUTIC DRUG LEVEL MONITORING: ICD-10-CM

## 2023-06-29 DIAGNOSIS — Z95.2 PRESENCE OF PROSTHETIC HEART VALVE: ICD-10-CM

## 2023-07-05 ENCOUNTER — OUTPATIENT (OUTPATIENT)
Dept: OUTPATIENT SERVICES | Facility: HOSPITAL | Age: 78
LOS: 1 days | End: 2023-07-05

## 2023-07-05 ENCOUNTER — APPOINTMENT (OUTPATIENT)
Dept: INTERNAL MEDICINE | Facility: CLINIC | Age: 78
End: 2023-07-05

## 2023-07-05 VITALS — OXYGEN SATURATION: 99 % | HEART RATE: 67 BPM

## 2023-07-05 DIAGNOSIS — Z90.89 ACQUIRED ABSENCE OF OTHER ORGANS: Chronic | ICD-10-CM

## 2023-07-05 DIAGNOSIS — Z86.69 PERSONAL HISTORY OF OTHER DISEASES OF THE NERVOUS SYSTEM AND SENSE ORGANS: Chronic | ICD-10-CM

## 2023-07-05 DIAGNOSIS — Z98.890 OTHER SPECIFIED POSTPROCEDURAL STATES: Chronic | ICD-10-CM

## 2023-07-05 DIAGNOSIS — Z98.89 OTHER SPECIFIED POSTPROCEDURAL STATES: Chronic | ICD-10-CM

## 2023-07-05 DIAGNOSIS — H26.9 UNSPECIFIED CATARACT: Chronic | ICD-10-CM

## 2023-07-05 LAB
INR PPP: 2.7 RATIO
POCT-PROTHROMBIN TIME: 32 SECS
QUALITY CONTROL: YES

## 2023-07-06 ENCOUNTER — APPOINTMENT (OUTPATIENT)
Dept: INTERNAL MEDICINE | Facility: CLINIC | Age: 78
End: 2023-07-06
Payer: MEDICARE

## 2023-07-06 ENCOUNTER — OUTPATIENT (OUTPATIENT)
Dept: OUTPATIENT SERVICES | Facility: HOSPITAL | Age: 78
LOS: 1 days | End: 2023-07-06

## 2023-07-06 VITALS
HEART RATE: 67 BPM | SYSTOLIC BLOOD PRESSURE: 112 MMHG | TEMPERATURE: 97.3 F | HEIGHT: 69 IN | BODY MASS INDEX: 24.14 KG/M2 | OXYGEN SATURATION: 97 % | WEIGHT: 163 LBS | DIASTOLIC BLOOD PRESSURE: 80 MMHG | RESPIRATION RATE: 16 BRPM

## 2023-07-06 DIAGNOSIS — Z86.69 PERSONAL HISTORY OF OTHER DISEASES OF THE NERVOUS SYSTEM AND SENSE ORGANS: Chronic | ICD-10-CM

## 2023-07-06 DIAGNOSIS — M54.9 DORSALGIA, UNSPECIFIED: ICD-10-CM

## 2023-07-06 DIAGNOSIS — Z95.2 PRESENCE OF PROSTHETIC HEART VALVE: ICD-10-CM

## 2023-07-06 DIAGNOSIS — Z79.01 LONG TERM (CURRENT) USE OF ANTICOAGULANTS: ICD-10-CM

## 2023-07-06 DIAGNOSIS — Z98.890 OTHER SPECIFIED POSTPROCEDURAL STATES: Chronic | ICD-10-CM

## 2023-07-06 DIAGNOSIS — M54.50 LOW BACK PAIN, UNSPECIFIED: ICD-10-CM

## 2023-07-06 DIAGNOSIS — Z90.89 ACQUIRED ABSENCE OF OTHER ORGANS: Chronic | ICD-10-CM

## 2023-07-06 DIAGNOSIS — Z98.89 OTHER SPECIFIED POSTPROCEDURAL STATES: Chronic | ICD-10-CM

## 2023-07-06 DIAGNOSIS — Z51.81 ENCOUNTER FOR THERAPEUTIC DRUG LEVEL MONITORING: ICD-10-CM

## 2023-07-06 DIAGNOSIS — H26.9 UNSPECIFIED CATARACT: Chronic | ICD-10-CM

## 2023-07-06 PROCEDURE — 99213 OFFICE O/P EST LOW 20 MIN: CPT | Mod: GE

## 2023-07-07 DIAGNOSIS — M54.9 DORSALGIA, UNSPECIFIED: ICD-10-CM

## 2023-07-07 DIAGNOSIS — E11.9 TYPE 2 DIABETES MELLITUS WITHOUT COMPLICATIONS: ICD-10-CM

## 2023-07-07 NOTE — ASSESSMENT
[FreeTextEntry1] : 78 y/o M PMH DM2, Rosie Thompson Tear, HTN, CAD s/p CABG, MVR on coumadin presenting for back pain with no current alarm symptoms, sent for LS Xray. \par \par Case Discussed with Dr. Seay\par \par Bev Armas PGY2\par [employer]

## 2023-07-07 NOTE — PHYSICAL EXAM
[Normal] : affect was normal and insight and judgment were intact [de-identified] : No pain with leg raise bilaterally

## 2023-07-07 NOTE — HISTORY OF PRESENT ILLNESS
[FreeTextEntry8] : 78 Y/O M with HTN, CAD s/p CABG, RHD s/p MVR on Coumadin, T2DM, Afib, multiple thyroid nodules, Rosie Thompson tears and esophageal ulcers, BPH s/p TURP (3/3/22) presenting for back pain. Patient recently seen in the clinic for hyperglycemia with fasting BS 250s in the AM.  Patient reports morning fasting blood sugar at 250. Endocrine evaluated sugars and recommended insulin however patient refusing. Instead glipizide increased to 5m g in AM and 10mg in PM by endo (however patient states he takes 10 mg BID).  A1c in May was 8.0%. \par \par Patient endorsing lower back pain that presents after sitting or lying down for a long time. Denies any saddle anesthesia, incontinence, urinary retention or fecal incontinence. Denies numbness/tingling in lower extremities. Patient denies any history of trauma or prior back pain. Never  had any back imaging. \par \par \par

## 2023-07-12 ENCOUNTER — APPOINTMENT (OUTPATIENT)
Dept: INTERNAL MEDICINE | Facility: CLINIC | Age: 78
End: 2023-07-12

## 2023-07-12 ENCOUNTER — OUTPATIENT (OUTPATIENT)
Dept: OUTPATIENT SERVICES | Facility: HOSPITAL | Age: 78
LOS: 1 days | End: 2023-07-12

## 2023-07-12 VITALS — HEART RATE: 85 BPM | OXYGEN SATURATION: 99 %

## 2023-07-12 DIAGNOSIS — Z86.69 PERSONAL HISTORY OF OTHER DISEASES OF THE NERVOUS SYSTEM AND SENSE ORGANS: Chronic | ICD-10-CM

## 2023-07-12 DIAGNOSIS — Z98.89 OTHER SPECIFIED POSTPROCEDURAL STATES: Chronic | ICD-10-CM

## 2023-07-12 DIAGNOSIS — Z98.890 OTHER SPECIFIED POSTPROCEDURAL STATES: Chronic | ICD-10-CM

## 2023-07-12 DIAGNOSIS — Z90.89 ACQUIRED ABSENCE OF OTHER ORGANS: Chronic | ICD-10-CM

## 2023-07-12 DIAGNOSIS — H26.9 UNSPECIFIED CATARACT: Chronic | ICD-10-CM

## 2023-07-12 LAB
INR PPP: 3 RATIO
POCT-PROTHROMBIN TIME: 36.6 SECS
QUALITY CONTROL: YES

## 2023-07-13 DIAGNOSIS — Z95.2 PRESENCE OF PROSTHETIC HEART VALVE: ICD-10-CM

## 2023-07-13 DIAGNOSIS — Z79.01 LONG TERM (CURRENT) USE OF ANTICOAGULANTS: ICD-10-CM

## 2023-07-13 DIAGNOSIS — Z51.81 ENCOUNTER FOR THERAPEUTIC DRUG LEVEL MONITORING: ICD-10-CM

## 2023-07-25 ENCOUNTER — RESULT REVIEW (OUTPATIENT)
Age: 78
End: 2023-07-25

## 2023-07-25 ENCOUNTER — OUTPATIENT (OUTPATIENT)
Dept: OUTPATIENT SERVICES | Facility: HOSPITAL | Age: 78
LOS: 1 days | End: 2023-07-25

## 2023-07-25 ENCOUNTER — APPOINTMENT (OUTPATIENT)
Dept: ENDOCRINOLOGY | Facility: CLINIC | Age: 78
End: 2023-07-25
Payer: MEDICARE

## 2023-07-25 ENCOUNTER — APPOINTMENT (OUTPATIENT)
Dept: INTERNAL MEDICINE | Facility: CLINIC | Age: 78
End: 2023-07-25

## 2023-07-25 ENCOUNTER — RESULT CHARGE (OUTPATIENT)
Age: 78
End: 2023-07-25

## 2023-07-25 VITALS
RESPIRATION RATE: 16 BRPM | BODY MASS INDEX: 24.88 KG/M2 | HEART RATE: 69 BPM | OXYGEN SATURATION: 100 % | TEMPERATURE: 97.3 F | HEIGHT: 69 IN | WEIGHT: 168 LBS | SYSTOLIC BLOOD PRESSURE: 138 MMHG | DIASTOLIC BLOOD PRESSURE: 76 MMHG

## 2023-07-25 DIAGNOSIS — Z98.890 OTHER SPECIFIED POSTPROCEDURAL STATES: Chronic | ICD-10-CM

## 2023-07-25 DIAGNOSIS — E05.90 THYROTOXICOSIS, UNSPECIFIED W/OUT THYROTOXIC CRISIS OR STORM: ICD-10-CM

## 2023-07-25 DIAGNOSIS — Z86.69 PERSONAL HISTORY OF OTHER DISEASES OF THE NERVOUS SYSTEM AND SENSE ORGANS: Chronic | ICD-10-CM

## 2023-07-25 DIAGNOSIS — H26.9 UNSPECIFIED CATARACT: Chronic | ICD-10-CM

## 2023-07-25 DIAGNOSIS — Z98.89 OTHER SPECIFIED POSTPROCEDURAL STATES: Chronic | ICD-10-CM

## 2023-07-25 DIAGNOSIS — Z90.89 ACQUIRED ABSENCE OF OTHER ORGANS: Chronic | ICD-10-CM

## 2023-07-25 LAB
ALBUMIN SERPL ELPH-MCNC: 4.4 G/DL — SIGNIFICANT CHANGE UP (ref 3.3–5)
ALP SERPL-CCNC: 84 U/L — SIGNIFICANT CHANGE UP (ref 40–120)
ALT FLD-CCNC: 42 U/L — SIGNIFICANT CHANGE UP (ref 10–45)
ANION GAP SERPL CALC-SCNC: 12 MMOL/L — SIGNIFICANT CHANGE UP (ref 5–17)
AST SERPL-CCNC: 33 U/L — SIGNIFICANT CHANGE UP (ref 10–40)
BILIRUB SERPL-MCNC: 1.7 MG/DL — HIGH (ref 0.2–1.2)
BUN SERPL-MCNC: 23 MG/DL — SIGNIFICANT CHANGE UP (ref 7–23)
CALCIUM SERPL-MCNC: 9.5 MG/DL — SIGNIFICANT CHANGE UP (ref 8.4–10.5)
CHLORIDE SERPL-SCNC: 105 MMOL/L — SIGNIFICANT CHANGE UP (ref 96–108)
CO2 SERPL-SCNC: 22 MMOL/L — SIGNIFICANT CHANGE UP (ref 22–31)
CREAT SERPL-MCNC: 1.16 MG/DL — SIGNIFICANT CHANGE UP (ref 0.5–1.3)
EGFR: 65 ML/MIN/1.73M2 — SIGNIFICANT CHANGE UP
GLUCOSE SERPL-MCNC: 209 MG/DL — HIGH (ref 70–99)
HBA1C MFR BLD HPLC: 8.3
INR PPP: 5.1 RATIO
POCT-PROTHROMBIN TIME: 60.7 SECS
POTASSIUM SERPL-MCNC: 4.6 MMOL/L — SIGNIFICANT CHANGE UP (ref 3.5–5.3)
POTASSIUM SERPL-SCNC: 4.6 MMOL/L — SIGNIFICANT CHANGE UP (ref 3.5–5.3)
PROT SERPL-MCNC: 7.2 G/DL — SIGNIFICANT CHANGE UP (ref 6–8.3)
QUALITY CONTROL: YES
SODIUM SERPL-SCNC: 138 MMOL/L — SIGNIFICANT CHANGE UP (ref 135–145)
T4 FREE SERPL-MCNC: 1 NG/DL — SIGNIFICANT CHANGE UP (ref 0.9–1.8)
TSH SERPL-MCNC: 1.42 UIU/ML — SIGNIFICANT CHANGE UP (ref 0.27–4.2)

## 2023-07-25 PROCEDURE — 99214 OFFICE O/P EST MOD 30 MIN: CPT | Mod: GC

## 2023-07-25 RX ORDER — REPAGLINIDE 1 MG/1
1 TABLET ORAL 3 TIMES DAILY
Qty: 90 | Refills: 0 | Status: COMPLETED | COMMUNITY
Start: 2022-11-11 | End: 2023-07-25

## 2023-07-25 NOTE — REASON FOR VISIT
[Follow - Up] : a follow-up visit [Hyperthyroidism] : hyperthyroidism [DM Type 2] : DM Type 2 [Thyroid Biopsy] : a thyroid biopsy [Patient Declined  Services] : - None: Patient declined  services

## 2023-07-26 ENCOUNTER — APPOINTMENT (OUTPATIENT)
Dept: INTERNAL MEDICINE | Facility: CLINIC | Age: 78
End: 2023-07-26

## 2023-07-26 ENCOUNTER — APPOINTMENT (OUTPATIENT)
Dept: UROLOGY | Facility: CLINIC | Age: 78
End: 2023-07-26
Payer: MEDICARE

## 2023-07-26 VITALS
TEMPERATURE: 97.1 F | HEART RATE: 61 BPM | OXYGEN SATURATION: 100 % | DIASTOLIC BLOOD PRESSURE: 77 MMHG | SYSTOLIC BLOOD PRESSURE: 151 MMHG

## 2023-07-26 PROCEDURE — 99213 OFFICE O/P EST LOW 20 MIN: CPT

## 2023-07-26 NOTE — ASSESSMENT
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 \par \par #T2DM \par --A1c 7.3% in Feb 2023 -> today A1c 8.3%\par --patient counseled on importance of bringing glucose logs with each appointment – did not have glucose log today\par --c/w Glipizide 10mg BID, Trulicity 1.5mg subq/weekly\par --discontinue Repaglinide 1mg daily (patient is already on sulfonylurea)\par --start pioglitazone 15mg daily\par --Counseled patient on side effects of pioglitazone – if patient starts to develop weight gain, can consider increasing dose of Trulicity for better glycemic control if needed and plan to wean patient off sulfonylurea medication given side effect of hypoglycemia\par --increase Losartan from 25mg to 50mg since /70s today\par --patient has close follow up with ophthalmology (last seen June 2023)\par --Patient checks feet daily – no cuts wounds/scrapes\par --counseled patient on diet/lifestyle modifications\par \par \par #Thyroid Nodules\par - Palpable R thyroid nodule noted on exam, with U/S in 2019 showing two large thyroid nodules \par - Repeat Thyroid US 12/2021 w/ large b/l nodules, increased in size compared to prior \par - Prior right sided nodule FNA in May 2022 nondiagnostic, repeat FNA March 2023 – Benign (Category 2)\par - Has subclinical hyperthyroidism - NM uptake & scan 4/2022 w/ hyperfunction L. nodule, hypofunctioning R. nodule, 22% uptake \par --plan to repeat TFTs, CMP today\par --obtain Thyroid Ultrasound – patient reports dysphagia, can consider getting swallowing study if ultrasound concerning for increasing in side of nodule\par \par #Subclinical hyperthyroidism 2/2 toxic nodule \par -NM scan 4/2022 shows hyperfunctioning left sided thyroid nodule\par Nov 2022: TSH 0.03  Free T4 1.8, \par Feb 2023: TSH 0.09\par -c/w methimazole 2.5 mg daily (Started in March 2023, Given cardiac hx and age)\par -Discussed agranulocytosis and biliary disruptions with MMI - will call if URI/fevers, or abdominal pain/yellowing of skin\par -Will check CMP, TFTs today \par  \par #HTN\par +nephropathy\par - increase Losartan from 25mg to 50mg daily \par  \par #HLD\par LDL 49 \par -Continue Atorvastatin 40 mg daily\par  \par RTC in 3 months \par \par \par Discussed case w/ Dr. Greenwood\par \par \par Bhavin Luis MD\par Endocrinology Fellow - PGY-4\par

## 2023-07-26 NOTE — PHYSICAL EXAM
[Alert] : alert [Well Nourished] : well nourished [No Acute Distress] : no acute distress [Well Developed] : well developed [Normal Sclera/Conjunctiva] : normal sclera/conjunctiva [EOMI] : extra ocular movement intact [No Proptosis] : no proptosis [Normal Oropharynx] : the oropharynx was normal [Thyroid Not Enlarged] : the thyroid was not enlarged [No Thyroid Nodules] : no palpable thyroid nodules [No Respiratory Distress] : no respiratory distress [No Accessory Muscle Use] : no accessory muscle use [Clear to Auscultation] : lungs were clear to auscultation bilaterally [Normal S1, S2] : normal S1 and S2 [Normal Rate] : heart rate was normal [Regular Rhythm] : with a regular rhythm [No Edema] : no peripheral edema [Pedal Pulses Normal] : the pedal pulses are present [Normal Bowel Sounds] : normal bowel sounds [Not Tender] : non-tender [Not Distended] : not distended [Soft] : abdomen soft [Normal Anterior Cervical Nodes] : no anterior cervical lymphadenopathy [Normal Posterior Cervical Nodes] : no posterior cervical lymphadenopathy [No Spinal Tenderness] : no spinal tenderness [Spine Straight] : spine straight [No Stigmata of Cushings Syndrome] : no stigmata of Cushings Syndrome [Normal Gait] : normal gait [Normal Strength/Tone] : muscle strength and tone were normal [No Rash] : no rash [Normal] : normal [Full ROM] : with full range of motion [Normal Reflexes] : deep tendon reflexes were 2+ and symmetric [No Tremors] : no tremors [Normal Sensation on Monofilament Testing] : normal sensation on monofilament testing of lower extremities [Oriented x3] : oriented to person, place, and time [Acanthosis Nigricans] : no acanthosis nigricans [Diminished Throughout Both Feet] : normal tactile sensation with monofilament testing throughout both feet

## 2023-07-26 NOTE — ADDENDUM
[FreeTextEntry1] : This note was authored by Mahesh Gutierrez working as a scribe for Dr. Ashish Manrique. I, Dr. Ashish Manrique have reviewed the content of this note and confirm it is true and accurate. I personally performed the history and physical examination and made all the decisions. 07/26/2023

## 2023-07-26 NOTE — HISTORY OF PRESENT ILLNESS
[FreeTextEntry1] : Reason for Visit: 3-Month Follow-Up S/P TURP\par The patient is a 76-year-old returning gentleman who underwent TURP on 3/3/2022.  He has a long history of erectile dysfunction he reports in the past trying oral medications with some success and also trying injection therapy with moderate success.  Now after his transurethral section of the prostate he wants to revisit erectile function.  Benign pathology. He last presented on 4/6/2022  complaining of frequency, burning, urgency, and dysuria. Post-void residual on bladder scan on that date was 78 cc.  He reports an excellent urinary stream with no incontinence.  Which we are going to do a cystoscopy if completely resolved.  He reports that his stream after TURP in March is strong and there is no burning further burning and that he has no postvoid dribbling.  Cystoscopy was canceled for today.  He is mainly concerned about a fungal infection on his uncircumcised phallus.  Patient has poorly controlled diabetes he has elevated hemoglobin A1c's he also has elevated glucose in his urine today as well as in the past.  He has tried cleansing but no ointments.\par \par Today's postvoid residual is 0.\par \par Post-void residual on bladder scan today was 0 cc. He presents today complaining of limited erectile activity. I prescribed him Sildenafil during the last visit, but there was a problem and he was not able to retrieve it. He states that he has taken Sildenafil in the past, but it did not work very well. He wants to give it another try, so I will prescribe 100 mg tablets. I advised him to take it on an empty stomach. His urinary stream is strong and normal. He also reports back pain, and I advised him to follow-up with his PCP if it persists.\par \par LABS:\par A1c 6.9% on 5/25/2022\par UA on 3/25/2022: Slightly Turbid Urine Appearance, Large Leukocyte Esterase Concentration, U  /HPF, Red Blood Cell - Urine 55 /HPF, Few Bacteria, Protein 30 mg/dL, Glucose >= 1000 mg/dL, Large Blood\par \par RADIOLOGY:\par No recent relevant imaging to review or discuss\par \par Plan: I will prescribe Sildenafil 100 mg, if he fails oral medications we will refer him to our men's health clinic for injection therapy and possible consideration of penile implant.\par \par \par 12/12/2022: Reason for Visit: Evaluation of Penis irritation, urinary frequency and ED \par Mr. NOLAN is a 77 year old male presenting today for penis irritation. He c o of itching on the tip of his penis and shaft. His PCP prescribed him with a cream with provided no relief. The itching has subsided today. I suggested he uses Orange dial soap.  He also c/o urinary frequency every two hours at night, which prevents him from sleeping. His PVR today is 250 cc.  after second void was 2 cc, has 1+ glusoe in urine today he reports Sildenafil has not provided relief for his ED. He is agreeable to try  Injection. \par \par Assessment: Failed oral meds for ED wants injection or penile implant, elevated BG level will conribute to urinary frequency, cysto for evaluaitn of scar tisseu at the  \par \par Plan: Pt provided urine specimen for UA . He will provide blood specimen today for BMP, HA1C. Referral to Dr Kenny for Injection training to manage ED. Will consider amitriptyline for sleep. He will try Orange Dial Soap to manage penis itching. He will have a cystoscopy. Follow up for cystoscopy. . \par \par 01/09/2023: Reason for Visit: Cystoscopy\par Mr. NOLAN is a 77 year old male presenting today for cystoscopy. He opted out of because he reports his urinary symptoms have completely been resolved on their own. He reports Nocturia x1. As for the itching on the penis, he reports he has been using the orange dye cream which did not provide any relief. I examined the penis and noticed the redness has decreased to the disagreement of the patient. He notes it is still as red as before. I informed the patient that due to his foreskin covering his penis and his unmanaged high blood sugar level, the fungal infection has propitious environment to grow. I also explained to him he can see his PCP and find a way to manage his Blood sugar level.\par Assessment: Fungal Infection worsened with covered penis and unmanaged blood sugar. \par Plan: In the morning, the patient will wash the affected area with [place] Dial Soap. He will then dry it completely and apply OTC Monistat cream on the affected area. At night, the patient will wash the affected area again with orange dye Soap, dry it thoroughly after. Then he will apply OTC hydrocortisone cream . He will  try to manage his blood sugar level. He provided Urine specimen today for POCT Urinalysis. He provided blood specimen for A1CG.\par Morning follow up PRN.\par \par 07/26/2023: Mr. NOLAN is a 77 year old male presenting today for penile discomfort and redness consult. He complains of itchiness and discomfort at the tip of his penis.\par I reviewed and discussed the patient's pertinent lab works. His most recent A1CG (07/25/2023) is elevated 8.3. \par \par Upon examination, mild penile irritation and redness is appreciated.  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  the patient will wash the affected area with [place] Dial Soap. He will then dry it completely and apply OTC Monistat cream on the affected area. At night, the patient will wash the affected area again with orange dye Soap, dry it thoroughly after. Then he will apply OTC hydrocortisone cream . He will  try to manage his blood sugar level.  Follow up PRN\par \par I counseled the patient. I discussed the various etiologies of his symptoms. Risks and alternatives were discussed. I answered the patient questions. The patient will follow-up as directed and will contact me with any questions or concerns. Thank you for the opportunity to participate in the care of this patient. I'll keep you updated on his progress.

## 2023-07-26 NOTE — END OF VISIT
[] : Fellow [FreeTextEntry3] : Pt with T2DM, uncontrolled.  Counseled on carbohydrate consistent diet.  Does not want to increase GLP1 due to concerns about further weight loss.  Will start pioglitazone, and consider increasing GLP1 in the future. Discussed trying to decrease and discontinue sulfonylurea.  BP mildly above goal, with proteinuria, increase losartan from 25 to 50 mg.  Continue statin.  Pt with subclinical hyperthyroidism, now on methimazole.  Check tfts and adjust dose as needed.  Has noted increase size of right thyroid nodule, with dysphagia.  Check thyroid ultrasound to monitor for growth.  consider repeat fna.  If stable, check swallowing study and consider surgery due to compressive symptoms

## 2023-07-26 NOTE — REVIEW OF SYSTEMS
[Negative] : Heme/Lymph [Polyuria] : no polyuria [Dysuria] : no dysuria [Incontinence] : no incontinence [FreeTextEntry4] : +neck swelling [FreeTextEntry8] : itching

## 2023-07-26 NOTE — PHYSICAL EXAM
[General Appearance - Well Developed] : well developed [General Appearance - Well Nourished] : well nourished [Normal Appearance] : normal appearance [Well Groomed] : well groomed [General Appearance - In No Acute Distress] : no acute distress [Skin Color & Pigmentation] : normal skin color and pigmentation [] : no respiratory distress [Normal Station and Gait] : the gait and station were normal for the patient's age

## 2023-07-26 NOTE — HISTORY OF PRESENT ILLNESS
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 thyroid nodule. \par  \par ===============T2DM ===============\par --Diagnosed with T2DM 6 years ago\par --A1c 7.3% in Feb 2023 -> today A1c 8.3%\par -- today, pt reports eating apple pie and [employer] and cheese from [employer] today. This week eating [employer] every morning\par -Pt was supposed to be taking Glipizide to 5 mg BID, Trulicity at 1.5 mg subq/weekly dose, Repaglinide 1 mg before largest meal\par --Patient called back saying his glucose levels were 200s-300s, so dose increased Glipizide 10mg BID 2 months ago. At that time Trulicity dose was recommended to be increased but patient said he was losing weight with higher doses of trulicity so does not want to go to higher dose). Pt was also introduced the possibility of starting a basal insulin but was resistant to that. Pt also on stopped taking repaglinide 1mg before meals because he says “it did not work”\par --Checks FS 3x/day- fasting, before lunch, before dinner. Usually AM FS 120s-150s. Never < 100. Before lunch/dinner 130s-140s\par --pt reports being compliant with medications. Denies problems with obtaining medication, he is able to afford it.In the past, has tried Metformin but did not tolerate due to GI side effects\par --diet? Salads, steak, egg muffin, fruits, no juice, no soda. Drinks water. Denies sweets\par --exercise? Walk every other day. Denies smoking ciggarettes\par  \par *Complications: \par -Micro: (+) nephropathy, on ACEi; (-) neuropathy or retinopathy, Following closely w/ ophthalmology (last June 2023). Has hx of retinal detachment in left eye. Chronic blurry vision.\par -Macro: (+) CAD with CABG (+) "minor stroke" in past\par  \par *Comorbidities\par HLD - On Atorvastatin 40 mg \par  \par ===========#Thyroid Nodules===============\par \par State he had a "thyroid problem" as a child that stopped him from growing at age 18. He was given medications and the problem resolved. He is not sure what medication or thyroid problem he had. \par  \par He had a thyroid U/S in 2019 which showed\par Right Lobe: 8 x 4.3 x 3.8 cm. Heterogeneous mid nodule 3.7 x 2.9 x 3.1 cm without suspicious features.\par Left Lobe: 8.2 x 4.7 x 3.6 cm. Mildly echogenic mid nodule 4 x 3.6 x 3.8 cm without suspicious features.\par  \par Repeat US Thyroid from 12/2021 w/ interval increase in size of b/l nodules. TFTs showed evidence of subclinical hyperthyroidism.\par  \par NM thyroid uptake/scan w/ 22% uptake, hyperfunction L. nodule & hypofunction R. nodule. \par  \par FNA of right sided nodule performed May 2022 - Nondiagnostic. \par Repeat FNA of R sided nodule in March 2023 - BENIGN FINDINGS  (Category II). These findings are consistent with nodular goiter.\par \par Today patient reports R thyroid is increasing in size, notes mild dyspagia x 1 year, feels like food is getting stuck, needs to drink to water, denies choking/vomiting.\par \par \par =============== #Subclinical Hyperthyroidism ===============\par Nov 2022: TSH 0.03  Free T4 1.8, \par Feb 2023: TSH 0.09\par -Given cardiac hx and age, was started low dose MMI 2.5 mg daily, patient taking everyday\par --denies palpitations, constipation, diarrhea, heat/cold intolerance, fevers\par --plan to check CBC, CMP, TFTs this visit\par

## 2023-07-27 DIAGNOSIS — E05.90 THYROTOXICOSIS, UNSPECIFIED WITHOUT THYROTOXIC CRISIS OR STORM: ICD-10-CM

## 2023-07-27 DIAGNOSIS — E11.9 TYPE 2 DIABETES MELLITUS WITHOUT COMPLICATIONS: ICD-10-CM

## 2023-07-27 DIAGNOSIS — E04.2 NONTOXIC MULTINODULAR GOITER: ICD-10-CM

## 2023-07-27 DIAGNOSIS — E78.5 HYPERLIPIDEMIA, UNSPECIFIED: ICD-10-CM

## 2023-07-27 DIAGNOSIS — I10 ESSENTIAL (PRIMARY) HYPERTENSION: ICD-10-CM

## 2023-08-02 ENCOUNTER — OUTPATIENT (OUTPATIENT)
Dept: OUTPATIENT SERVICES | Facility: HOSPITAL | Age: 78
LOS: 1 days | End: 2023-08-02

## 2023-08-02 ENCOUNTER — APPOINTMENT (OUTPATIENT)
Dept: INTERNAL MEDICINE | Facility: CLINIC | Age: 78
End: 2023-08-02

## 2023-08-02 VITALS — HEART RATE: 62 BPM | RESPIRATION RATE: 16 BRPM | OXYGEN SATURATION: 100 %

## 2023-08-02 DIAGNOSIS — Z86.69 PERSONAL HISTORY OF OTHER DISEASES OF THE NERVOUS SYSTEM AND SENSE ORGANS: Chronic | ICD-10-CM

## 2023-08-02 DIAGNOSIS — Z98.890 OTHER SPECIFIED POSTPROCEDURAL STATES: Chronic | ICD-10-CM

## 2023-08-02 DIAGNOSIS — Z90.89 ACQUIRED ABSENCE OF OTHER ORGANS: Chronic | ICD-10-CM

## 2023-08-02 DIAGNOSIS — Z98.89 OTHER SPECIFIED POSTPROCEDURAL STATES: Chronic | ICD-10-CM

## 2023-08-04 DIAGNOSIS — Z79.01 LONG TERM (CURRENT) USE OF ANTICOAGULANTS: ICD-10-CM

## 2023-08-09 ENCOUNTER — OUTPATIENT (OUTPATIENT)
Dept: OUTPATIENT SERVICES | Facility: HOSPITAL | Age: 78
LOS: 1 days | End: 2023-08-09

## 2023-08-09 ENCOUNTER — APPOINTMENT (OUTPATIENT)
Dept: INTERNAL MEDICINE | Facility: CLINIC | Age: 78
End: 2023-08-09

## 2023-08-09 VITALS — OXYGEN SATURATION: 97 % | HEART RATE: 63 BPM

## 2023-08-09 DIAGNOSIS — Z98.890 OTHER SPECIFIED POSTPROCEDURAL STATES: Chronic | ICD-10-CM

## 2023-08-09 DIAGNOSIS — H26.9 UNSPECIFIED CATARACT: Chronic | ICD-10-CM

## 2023-08-09 DIAGNOSIS — Z98.89 OTHER SPECIFIED POSTPROCEDURAL STATES: Chronic | ICD-10-CM

## 2023-08-09 DIAGNOSIS — Z90.89 ACQUIRED ABSENCE OF OTHER ORGANS: Chronic | ICD-10-CM

## 2023-08-10 DIAGNOSIS — Z79.01 LONG TERM (CURRENT) USE OF ANTICOAGULANTS: ICD-10-CM

## 2023-08-10 DIAGNOSIS — Z51.81 ENCOUNTER FOR THERAPEUTIC DRUG LEVEL MONITORING: ICD-10-CM

## 2023-08-10 DIAGNOSIS — Z95.2 PRESENCE OF PROSTHETIC HEART VALVE: ICD-10-CM

## 2023-08-16 ENCOUNTER — APPOINTMENT (OUTPATIENT)
Dept: INTERNAL MEDICINE | Facility: CLINIC | Age: 78
End: 2023-08-16

## 2023-08-23 ENCOUNTER — APPOINTMENT (OUTPATIENT)
Dept: INTERNAL MEDICINE | Facility: CLINIC | Age: 78
End: 2023-08-23

## 2023-08-23 ENCOUNTER — OUTPATIENT (OUTPATIENT)
Dept: OUTPATIENT SERVICES | Facility: HOSPITAL | Age: 78
LOS: 1 days | End: 2023-08-23

## 2023-08-23 DIAGNOSIS — H26.9 UNSPECIFIED CATARACT: Chronic | ICD-10-CM

## 2023-08-23 DIAGNOSIS — Z98.890 OTHER SPECIFIED POSTPROCEDURAL STATES: Chronic | ICD-10-CM

## 2023-08-23 DIAGNOSIS — Z86.69 PERSONAL HISTORY OF OTHER DISEASES OF THE NERVOUS SYSTEM AND SENSE ORGANS: Chronic | ICD-10-CM

## 2023-08-23 DIAGNOSIS — Z90.89 ACQUIRED ABSENCE OF OTHER ORGANS: Chronic | ICD-10-CM

## 2023-08-25 DIAGNOSIS — Z95.2 PRESENCE OF PROSTHETIC HEART VALVE: ICD-10-CM

## 2023-08-25 DIAGNOSIS — Z79.01 LONG TERM (CURRENT) USE OF ANTICOAGULANTS: ICD-10-CM

## 2023-08-25 DIAGNOSIS — Z51.81 ENCOUNTER FOR THERAPEUTIC DRUG LEVEL MONITORING: ICD-10-CM

## 2023-08-30 ENCOUNTER — OUTPATIENT (OUTPATIENT)
Dept: OUTPATIENT SERVICES | Facility: HOSPITAL | Age: 78
LOS: 1 days | End: 2023-08-30

## 2023-08-30 ENCOUNTER — APPOINTMENT (OUTPATIENT)
Dept: INTERNAL MEDICINE | Facility: CLINIC | Age: 78
End: 2023-08-30

## 2023-08-30 VITALS — OXYGEN SATURATION: 97 % | HEART RATE: 75 BPM

## 2023-08-30 DIAGNOSIS — Z98.890 OTHER SPECIFIED POSTPROCEDURAL STATES: Chronic | ICD-10-CM

## 2023-08-30 DIAGNOSIS — Z86.69 PERSONAL HISTORY OF OTHER DISEASES OF THE NERVOUS SYSTEM AND SENSE ORGANS: Chronic | ICD-10-CM

## 2023-08-30 DIAGNOSIS — Z98.89 OTHER SPECIFIED POSTPROCEDURAL STATES: Chronic | ICD-10-CM

## 2023-08-30 DIAGNOSIS — Z90.89 ACQUIRED ABSENCE OF OTHER ORGANS: Chronic | ICD-10-CM

## 2023-08-31 DIAGNOSIS — Z95.2 PRESENCE OF PROSTHETIC HEART VALVE: ICD-10-CM

## 2023-08-31 DIAGNOSIS — Z51.81 ENCOUNTER FOR THERAPEUTIC DRUG LEVEL MONITORING: ICD-10-CM

## 2023-08-31 DIAGNOSIS — Z79.01 LONG TERM (CURRENT) USE OF ANTICOAGULANTS: ICD-10-CM

## 2023-09-05 LAB — GLUCOSE BLDC GLUCOMTR-MCNC: 284

## 2023-09-06 ENCOUNTER — NON-APPOINTMENT (OUTPATIENT)
Age: 78
End: 2023-09-06

## 2023-09-06 ENCOUNTER — OUTPATIENT (OUTPATIENT)
Dept: OUTPATIENT SERVICES | Facility: HOSPITAL | Age: 78
LOS: 1 days | End: 2023-09-06

## 2023-09-06 ENCOUNTER — APPOINTMENT (OUTPATIENT)
Dept: INTERNAL MEDICINE | Facility: CLINIC | Age: 78
End: 2023-09-06

## 2023-09-06 VITALS — HEART RATE: 68 BPM | OXYGEN SATURATION: 99 %

## 2023-09-06 DIAGNOSIS — Z98.89 OTHER SPECIFIED POSTPROCEDURAL STATES: Chronic | ICD-10-CM

## 2023-09-06 DIAGNOSIS — Z98.890 OTHER SPECIFIED POSTPROCEDURAL STATES: Chronic | ICD-10-CM

## 2023-09-06 DIAGNOSIS — Z90.89 ACQUIRED ABSENCE OF OTHER ORGANS: Chronic | ICD-10-CM

## 2023-09-06 DIAGNOSIS — Z86.69 PERSONAL HISTORY OF OTHER DISEASES OF THE NERVOUS SYSTEM AND SENSE ORGANS: Chronic | ICD-10-CM

## 2023-09-06 DIAGNOSIS — H26.9 UNSPECIFIED CATARACT: Chronic | ICD-10-CM

## 2023-09-06 LAB
INR PPP: 2.3 RATIO
INR PPP: 2.6 RATIO
INR PPP: 2.7 RATIO
INR PPP: 3 RATIO
INR PPP: 3.1 RATIO
INR PPP: 4.4 RATIO
POCT-PROTHROMBIN TIME: 27.3 SECS
POCT-PROTHROMBIN TIME: 31.2 SECS
POCT-PROTHROMBIN TIME: 32.7 SECS
POCT-PROTHROMBIN TIME: 35.9 SECS
POCT-PROTHROMBIN TIME: 36.9 SECS
POCT-PROTHROMBIN TIME: 52.7 SECS
QUALITY CONTROL: YES

## 2023-09-07 DIAGNOSIS — Z51.81 ENCOUNTER FOR THERAPEUTIC DRUG LEVEL MONITORING: ICD-10-CM

## 2023-09-07 DIAGNOSIS — Z79.01 LONG TERM (CURRENT) USE OF ANTICOAGULANTS: ICD-10-CM

## 2023-09-07 DIAGNOSIS — Z95.2 PRESENCE OF PROSTHETIC HEART VALVE: ICD-10-CM

## 2023-09-12 ENCOUNTER — APPOINTMENT (OUTPATIENT)
Dept: UROLOGY | Facility: CLINIC | Age: 78
End: 2023-09-12
Payer: MEDICARE

## 2023-09-13 ENCOUNTER — RX RENEWAL (OUTPATIENT)
Age: 78
End: 2023-09-13

## 2023-09-20 ENCOUNTER — APPOINTMENT (OUTPATIENT)
Dept: INTERNAL MEDICINE | Facility: CLINIC | Age: 78
End: 2023-09-20

## 2023-09-20 ENCOUNTER — OUTPATIENT (OUTPATIENT)
Dept: OUTPATIENT SERVICES | Facility: HOSPITAL | Age: 78
LOS: 1 days | End: 2023-09-20

## 2023-09-20 VITALS — HEART RATE: 65 BPM | OXYGEN SATURATION: 95 %

## 2023-09-20 DIAGNOSIS — Z90.89 ACQUIRED ABSENCE OF OTHER ORGANS: Chronic | ICD-10-CM

## 2023-09-20 DIAGNOSIS — Z98.89 OTHER SPECIFIED POSTPROCEDURAL STATES: Chronic | ICD-10-CM

## 2023-09-20 DIAGNOSIS — Z98.890 OTHER SPECIFIED POSTPROCEDURAL STATES: Chronic | ICD-10-CM

## 2023-09-20 LAB
INR PPP: 2.7 RATIO
POCT-PROTHROMBIN TIME: 32.9 SECS
QUALITY CONTROL: YES

## 2023-09-25 DIAGNOSIS — Z51.81 ENCOUNTER FOR THERAPEUTIC DRUG LEVEL MONITORING: ICD-10-CM

## 2023-09-25 DIAGNOSIS — Z79.01 LONG TERM (CURRENT) USE OF ANTICOAGULANTS: ICD-10-CM

## 2023-09-25 DIAGNOSIS — Z95.2 PRESENCE OF PROSTHETIC HEART VALVE: ICD-10-CM

## 2023-10-02 ENCOUNTER — RX RENEWAL (OUTPATIENT)
Age: 78
End: 2023-10-02

## 2023-10-02 ENCOUNTER — APPOINTMENT (OUTPATIENT)
Dept: CARDIOLOGY | Facility: CLINIC | Age: 78
End: 2023-10-02
Payer: MEDICARE

## 2023-10-02 VITALS
DIASTOLIC BLOOD PRESSURE: 78 MMHG | SYSTOLIC BLOOD PRESSURE: 127 MMHG | HEIGHT: 69 IN | OXYGEN SATURATION: 97 % | RESPIRATION RATE: 15 BRPM | HEART RATE: 65 BPM | BODY MASS INDEX: 24.94 KG/M2 | WEIGHT: 168.38 LBS

## 2023-10-02 DIAGNOSIS — G45.9 TRANSIENT CEREBRAL ISCHEMIC ATTACK, UNSPECIFIED: ICD-10-CM

## 2023-10-02 PROCEDURE — 93005 ELECTROCARDIOGRAM TRACING: CPT

## 2023-10-02 PROCEDURE — G0404: CPT

## 2023-10-02 PROCEDURE — 99214 OFFICE O/P EST MOD 30 MIN: CPT | Mod: 25

## 2023-10-04 ENCOUNTER — APPOINTMENT (OUTPATIENT)
Dept: INTERNAL MEDICINE | Facility: CLINIC | Age: 78
End: 2023-10-04

## 2023-10-04 ENCOUNTER — OUTPATIENT (OUTPATIENT)
Dept: OUTPATIENT SERVICES | Facility: HOSPITAL | Age: 78
LOS: 1 days | End: 2023-10-04

## 2023-10-04 VITALS — OXYGEN SATURATION: 99 % | HEART RATE: 62 BPM

## 2023-10-04 DIAGNOSIS — Z86.69 PERSONAL HISTORY OF OTHER DISEASES OF THE NERVOUS SYSTEM AND SENSE ORGANS: Chronic | ICD-10-CM

## 2023-10-04 DIAGNOSIS — Z90.89 ACQUIRED ABSENCE OF OTHER ORGANS: Chronic | ICD-10-CM

## 2023-10-04 DIAGNOSIS — Z98.89 OTHER SPECIFIED POSTPROCEDURAL STATES: Chronic | ICD-10-CM

## 2023-10-04 DIAGNOSIS — H26.9 UNSPECIFIED CATARACT: Chronic | ICD-10-CM

## 2023-10-04 LAB
INR PPP: 2.8 RATIO
POCT-PROTHROMBIN TIME: 33.4 SECS
QUALITY CONTROL: YES

## 2023-10-05 DIAGNOSIS — Z79.01 LONG TERM (CURRENT) USE OF ANTICOAGULANTS: ICD-10-CM

## 2023-10-05 DIAGNOSIS — Z51.81 ENCOUNTER FOR THERAPEUTIC DRUG LEVEL MONITORING: ICD-10-CM

## 2023-10-05 DIAGNOSIS — Z95.2 PRESENCE OF PROSTHETIC HEART VALVE: ICD-10-CM

## 2023-10-11 ENCOUNTER — INPATIENT (INPATIENT)
Facility: HOSPITAL | Age: 78
LOS: 2 days | Discharge: HOME | End: 2023-10-14
Attending: STUDENT IN AN ORGANIZED HEALTH CARE EDUCATION/TRAINING PROGRAM | Admitting: STUDENT IN AN ORGANIZED HEALTH CARE EDUCATION/TRAINING PROGRAM
Payer: MEDICARE

## 2023-10-11 VITALS
SYSTOLIC BLOOD PRESSURE: 128 MMHG | TEMPERATURE: 98 F | RESPIRATION RATE: 16 BRPM | DIASTOLIC BLOOD PRESSURE: 66 MMHG | OXYGEN SATURATION: 99 % | HEART RATE: 73 BPM

## 2023-10-11 DIAGNOSIS — Z98.89 OTHER SPECIFIED POSTPROCEDURAL STATES: Chronic | ICD-10-CM

## 2023-10-11 DIAGNOSIS — Z90.89 ACQUIRED ABSENCE OF OTHER ORGANS: Chronic | ICD-10-CM

## 2023-10-11 DIAGNOSIS — Z86.69 PERSONAL HISTORY OF OTHER DISEASES OF THE NERVOUS SYSTEM AND SENSE ORGANS: Chronic | ICD-10-CM

## 2023-10-11 DIAGNOSIS — Z98.890 OTHER SPECIFIED POSTPROCEDURAL STATES: Chronic | ICD-10-CM

## 2023-10-11 DIAGNOSIS — H26.9 UNSPECIFIED CATARACT: Chronic | ICD-10-CM

## 2023-10-11 LAB
ALBUMIN SERPL ELPH-MCNC: 4.2 G/DL — SIGNIFICANT CHANGE UP (ref 3.3–5)
ALP SERPL-CCNC: 86 U/L — SIGNIFICANT CHANGE UP (ref 40–120)
ALT FLD-CCNC: 25 U/L — SIGNIFICANT CHANGE UP (ref 4–41)
ANION GAP SERPL CALC-SCNC: 10 MMOL/L — SIGNIFICANT CHANGE UP (ref 7–14)
APTT BLD: 60.8 SEC — HIGH (ref 24.5–35.6)
AST SERPL-CCNC: 23 U/L — SIGNIFICANT CHANGE UP (ref 4–40)
BASOPHILS # BLD AUTO: 0.06 K/UL — SIGNIFICANT CHANGE UP (ref 0–0.2)
BASOPHILS NFR BLD AUTO: 0.6 % — SIGNIFICANT CHANGE UP (ref 0–2)
BILIRUB SERPL-MCNC: 2.7 MG/DL — HIGH (ref 0.2–1.2)
BLD GP AB SCN SERPL QL: NEGATIVE — SIGNIFICANT CHANGE UP
BUN SERPL-MCNC: 21 MG/DL — SIGNIFICANT CHANGE UP (ref 7–23)
CALCIUM SERPL-MCNC: 9.8 MG/DL — SIGNIFICANT CHANGE UP (ref 8.4–10.5)
CHLORIDE SERPL-SCNC: 106 MMOL/L — SIGNIFICANT CHANGE UP (ref 98–107)
CO2 SERPL-SCNC: 25 MMOL/L — SIGNIFICANT CHANGE UP (ref 22–31)
CREAT SERPL-MCNC: 1.24 MG/DL — SIGNIFICANT CHANGE UP (ref 0.5–1.3)
EGFR: 60 ML/MIN/1.73M2 — SIGNIFICANT CHANGE UP
EOSINOPHIL # BLD AUTO: 0.14 K/UL — SIGNIFICANT CHANGE UP (ref 0–0.5)
EOSINOPHIL NFR BLD AUTO: 1.4 % — SIGNIFICANT CHANGE UP (ref 0–6)
GLUCOSE SERPL-MCNC: 99 MG/DL — SIGNIFICANT CHANGE UP (ref 70–99)
HCT VFR BLD CALC: 42.3 % — SIGNIFICANT CHANGE UP (ref 39–50)
HGB BLD-MCNC: 14.1 G/DL — SIGNIFICANT CHANGE UP (ref 13–17)
IANC: 6.84 K/UL — SIGNIFICANT CHANGE UP (ref 1.8–7.4)
IMM GRANULOCYTES NFR BLD AUTO: 0.4 % — SIGNIFICANT CHANGE UP (ref 0–0.9)
INR BLD: 2.84 RATIO — HIGH (ref 0.85–1.18)
LIDOCAIN IGE QN: 89 U/L — HIGH (ref 7–60)
LYMPHOCYTES # BLD AUTO: 2.16 K/UL — SIGNIFICANT CHANGE UP (ref 1–3.3)
LYMPHOCYTES # BLD AUTO: 21.4 % — SIGNIFICANT CHANGE UP (ref 13–44)
MCHC RBC-ENTMCNC: 31.6 PG — SIGNIFICANT CHANGE UP (ref 27–34)
MCHC RBC-ENTMCNC: 33.3 GM/DL — SIGNIFICANT CHANGE UP (ref 32–36)
MCV RBC AUTO: 94.8 FL — SIGNIFICANT CHANGE UP (ref 80–100)
MONOCYTES # BLD AUTO: 0.86 K/UL — SIGNIFICANT CHANGE UP (ref 0–0.9)
MONOCYTES NFR BLD AUTO: 8.5 % — SIGNIFICANT CHANGE UP (ref 2–14)
NEUTROPHILS # BLD AUTO: 6.84 K/UL — SIGNIFICANT CHANGE UP (ref 1.8–7.4)
NEUTROPHILS NFR BLD AUTO: 67.7 % — SIGNIFICANT CHANGE UP (ref 43–77)
NRBC # BLD: 0 /100 WBCS — SIGNIFICANT CHANGE UP (ref 0–0)
NRBC # FLD: 0 K/UL — SIGNIFICANT CHANGE UP (ref 0–0)
PLATELET # BLD AUTO: 188 K/UL — SIGNIFICANT CHANGE UP (ref 150–400)
POTASSIUM SERPL-MCNC: 4.1 MMOL/L — SIGNIFICANT CHANGE UP (ref 3.5–5.3)
POTASSIUM SERPL-SCNC: 4.1 MMOL/L — SIGNIFICANT CHANGE UP (ref 3.5–5.3)
PROT SERPL-MCNC: 7.5 G/DL — SIGNIFICANT CHANGE UP (ref 6–8.3)
PROTHROM AB SERPL-ACNC: 31.1 SEC — HIGH (ref 9.5–13)
RBC # BLD: 4.46 M/UL — SIGNIFICANT CHANGE UP (ref 4.2–5.8)
RBC # FLD: 13.2 % — SIGNIFICANT CHANGE UP (ref 10.3–14.5)
RH IG SCN BLD-IMP: POSITIVE — SIGNIFICANT CHANGE UP
SODIUM SERPL-SCNC: 141 MMOL/L — SIGNIFICANT CHANGE UP (ref 135–145)
WBC # BLD: 10.1 K/UL — SIGNIFICANT CHANGE UP (ref 3.8–10.5)
WBC # FLD AUTO: 10.1 K/UL — SIGNIFICANT CHANGE UP (ref 3.8–10.5)

## 2023-10-11 PROCEDURE — 74177 CT ABD & PELVIS W/CONTRAST: CPT | Mod: 26,MA

## 2023-10-11 PROCEDURE — 99285 EMERGENCY DEPT VISIT HI MDM: CPT

## 2023-10-11 NOTE — ED PROVIDER NOTE - NSICDXPASTSURGICALHX_GEN_ALL_CORE_FT
PAST SURGICAL HISTORY:  Cataract Bilateral ; tx surgically    H/O melanoma excision scalp-2014    H/O retinal detachment left    Hx of CABG x 2 2006    Mitral valve replaced 2006 at [facility]    S/P tonsillectomy     S/P TURP 2014

## 2023-10-11 NOTE — ED PROVIDER NOTE - NS ED ATTENDING STATEMENT MOD
This was a shared visit with the PEE. I reviewed and verified the documentation and independently performed the documented:

## 2023-10-11 NOTE — ED ADULT NURSE NOTE - NSICDXPASTSURGICALHX_GEN_ALL_CORE_FT
PAST SURGICAL HISTORY:  Cataract Bilateral ; tx surgically    H/O melanoma excision scalp-2014    H/O retinal detachment left    Hx of CABG x 2 2006    Mitral valve replaced 2006 at [facility]    S/P tonsillectomy     S/P TURP 2014

## 2023-10-11 NOTE — ED ADULT NURSE NOTE - OBJECTIVE STATEMENT
Pt received to intake. pt is AxO 3 and ambulatory. pt c/o abdominal pain x Saturday. pt admits to increased urinary frequency. Respirations even and unlabored. Abdomen soft, nontender, and nondistended. Pt denies headaches, dizziness, N/V/D, chest pain, SOB, and fever like symptoms. Pt labs collected and sent. Pt 20g tot he left AC. plan of care ongoing.

## 2023-10-11 NOTE — ED PROVIDER NOTE - CLINICAL SUMMARY MEDICAL DECISION MAKING FREE TEXT BOX
Patient is a 78-year-old male, past medical history of diabetes, GI bleed, CAD, A-fib, hypertension, BPH, valve replacement on coumadin presenting with complaint of intermittent right lower quadrant abdominal pain. On presentation patient well-appearing, vital stable, on exam right lower quadrant tenderness appreciated.  Symptoms possibly due to appendicitis versus cecal colitis versus renal stone.  Plan for routine labs, CT abdomen pelvis.  Patient declines pain medication at this present time [place] att: 78-year-old male, past medical history of diabetes, GI bleed, CAD, A-fib, hypertension, BPH, valve replacement on coumadin presenting with complaint of intermittent right lower quadrant abdominal pain. On presentation patient well-appearing, vital stable, on exam right lower quadrant tenderness appreciated.  Symptoms possibly due to appendicitis versus cecal colitis versus renal stone.  Plan for routine labs, CT abdomen pelvis.  Patient declines pain medication at this present time

## 2023-10-11 NOTE — ED ADULT TRIAGE NOTE - CHIEF COMPLAINT QUOTE
Pt c/o abdominal pain intermittent since Saturday. Pain is mid abdominal and R sided. Also endorsing headache and increased urinary frequency. Denies N/V/D, fevers/chills. PMHx DM, CAD, A. Fib, HTN, BPH

## 2023-10-11 NOTE — ED PROVIDER NOTE - NS ED ATTENDING NAME FT
Spoke with Nisreen Kirby, currently we are not doing Urodynamic in the office since there is no machine available and uncertain when will be getting it to be able to schedule patients  Dr Andrew Albright, is there other [facility] facility that do the Urodynamic procedure? LEEANN harley

## 2023-10-11 NOTE — ED PROVIDER NOTE - OBJECTIVE STATEMENT
Patient is a 78-year-old male, past medical history of diabetes, GI bleed, CAD, A-fib, hypertension, BPH, valve replacement on coumadin presenting with complaint of intermittent right lower quadrant abdominal pain.  Pain is a crampy sensation, associated with nausea no associated vomiting, diarrhea, fever, chills, chest pain, shortness of breath, hematemesis, BRBPR, melana, testicular pain.  He denies recent travel, sick contact, food as possible inciting factor, start antibiotics prior to the onset of symptoms.

## 2023-10-12 DIAGNOSIS — K57.32 DIVERTICULITIS OF LARGE INTESTINE WITHOUT PERFORATION OR ABSCESS WITHOUT BLEEDING: ICD-10-CM

## 2023-10-12 LAB
ALBUMIN SERPL ELPH-MCNC: 3.8 G/DL — SIGNIFICANT CHANGE UP (ref 3.3–5)
ALP SERPL-CCNC: 83 U/L — SIGNIFICANT CHANGE UP (ref 40–120)
ALT FLD-CCNC: 22 U/L — SIGNIFICANT CHANGE UP (ref 4–41)
ANION GAP SERPL CALC-SCNC: 9 MMOL/L — SIGNIFICANT CHANGE UP (ref 7–14)
APPEARANCE UR: CLEAR — SIGNIFICANT CHANGE UP
APTT BLD: 48 SEC — HIGH (ref 24.5–35.6)
AST SERPL-CCNC: 18 U/L — SIGNIFICANT CHANGE UP (ref 4–40)
BASOPHILS # BLD AUTO: 0.03 K/UL — SIGNIFICANT CHANGE UP (ref 0–0.2)
BASOPHILS NFR BLD AUTO: 0.4 % — SIGNIFICANT CHANGE UP (ref 0–2)
BILIRUB SERPL-MCNC: 3.4 MG/DL — HIGH (ref 0.2–1.2)
BILIRUB UR-MCNC: NEGATIVE — SIGNIFICANT CHANGE UP
BUN SERPL-MCNC: 18 MG/DL — SIGNIFICANT CHANGE UP (ref 7–23)
CALCIUM SERPL-MCNC: 9.6 MG/DL — SIGNIFICANT CHANGE UP (ref 8.4–10.5)
CHLORIDE SERPL-SCNC: 107 MMOL/L — SIGNIFICANT CHANGE UP (ref 98–107)
CO2 SERPL-SCNC: 23 MMOL/L — SIGNIFICANT CHANGE UP (ref 22–31)
COLOR SPEC: YELLOW — SIGNIFICANT CHANGE UP
CREAT SERPL-MCNC: 1.09 MG/DL — SIGNIFICANT CHANGE UP (ref 0.5–1.3)
DIFF PNL FLD: NEGATIVE — SIGNIFICANT CHANGE UP
EGFR: 70 ML/MIN/1.73M2 — SIGNIFICANT CHANGE UP
EOSINOPHIL # BLD AUTO: 0.13 K/UL — SIGNIFICANT CHANGE UP (ref 0–0.5)
EOSINOPHIL NFR BLD AUTO: 1.5 % — SIGNIFICANT CHANGE UP (ref 0–6)
GLUCOSE BLDC GLUCOMTR-MCNC: 117 MG/DL — HIGH (ref 70–99)
GLUCOSE BLDC GLUCOMTR-MCNC: 123 MG/DL — HIGH (ref 70–99)
GLUCOSE BLDC GLUCOMTR-MCNC: 141 MG/DL — HIGH (ref 70–99)
GLUCOSE SERPL-MCNC: 124 MG/DL — HIGH (ref 70–99)
GLUCOSE UR QL: NEGATIVE MG/DL — SIGNIFICANT CHANGE UP
HCT VFR BLD CALC: 42.4 % — SIGNIFICANT CHANGE UP (ref 39–50)
HGB BLD-MCNC: 14.2 G/DL — SIGNIFICANT CHANGE UP (ref 13–17)
IANC: 5.91 K/UL — SIGNIFICANT CHANGE UP (ref 1.8–7.4)
IMM GRANULOCYTES NFR BLD AUTO: 0.6 % — SIGNIFICANT CHANGE UP (ref 0–0.9)
INR BLD: 2.63 RATIO — HIGH (ref 0.85–1.18)
KETONES UR-MCNC: NEGATIVE MG/DL — SIGNIFICANT CHANGE UP
LEUKOCYTE ESTERASE UR-ACNC: NEGATIVE — SIGNIFICANT CHANGE UP
LYMPHOCYTES # BLD AUTO: 1.47 K/UL — SIGNIFICANT CHANGE UP (ref 1–3.3)
LYMPHOCYTES # BLD AUTO: 17.4 % — SIGNIFICANT CHANGE UP (ref 13–44)
MCHC RBC-ENTMCNC: 31.3 PG — SIGNIFICANT CHANGE UP (ref 27–34)
MCHC RBC-ENTMCNC: 33.5 GM/DL — SIGNIFICANT CHANGE UP (ref 32–36)
MCV RBC AUTO: 93.6 FL — SIGNIFICANT CHANGE UP (ref 80–100)
MONOCYTES # BLD AUTO: 0.85 K/UL — SIGNIFICANT CHANGE UP (ref 0–0.9)
MONOCYTES NFR BLD AUTO: 10.1 % — SIGNIFICANT CHANGE UP (ref 2–14)
NEUTROPHILS # BLD AUTO: 5.91 K/UL — SIGNIFICANT CHANGE UP (ref 1.8–7.4)
NEUTROPHILS NFR BLD AUTO: 70 % — SIGNIFICANT CHANGE UP (ref 43–77)
NITRITE UR-MCNC: NEGATIVE — SIGNIFICANT CHANGE UP
NRBC # BLD: 0 /100 WBCS — SIGNIFICANT CHANGE UP (ref 0–0)
NRBC # FLD: 0 K/UL — SIGNIFICANT CHANGE UP (ref 0–0)
PH UR: 6.5 — SIGNIFICANT CHANGE UP (ref 5–8)
PLATELET # BLD AUTO: 163 K/UL — SIGNIFICANT CHANGE UP (ref 150–400)
POTASSIUM SERPL-MCNC: 3.7 MMOL/L — SIGNIFICANT CHANGE UP (ref 3.5–5.3)
POTASSIUM SERPL-SCNC: 3.7 MMOL/L — SIGNIFICANT CHANGE UP (ref 3.5–5.3)
PROT SERPL-MCNC: 6.7 G/DL — SIGNIFICANT CHANGE UP (ref 6–8.3)
PROT UR-MCNC: SIGNIFICANT CHANGE UP MG/DL
PROTHROM AB SERPL-ACNC: 28.6 SEC — HIGH (ref 9.5–13)
RBC # BLD: 4.53 M/UL — SIGNIFICANT CHANGE UP (ref 4.2–5.8)
RBC # FLD: 13.2 % — SIGNIFICANT CHANGE UP (ref 10.3–14.5)
SODIUM SERPL-SCNC: 139 MMOL/L — SIGNIFICANT CHANGE UP (ref 135–145)
SP GR SPEC: 1.02 — SIGNIFICANT CHANGE UP (ref 1–1.03)
UROBILINOGEN FLD QL: 1 MG/DL — SIGNIFICANT CHANGE UP (ref 0.2–1)
WBC # BLD: 8.44 K/UL — SIGNIFICANT CHANGE UP (ref 3.8–10.5)
WBC # FLD AUTO: 8.44 K/UL — SIGNIFICANT CHANGE UP (ref 3.8–10.5)

## 2023-10-12 PROCEDURE — 99222 1ST HOSP IP/OBS MODERATE 55: CPT

## 2023-10-12 PROCEDURE — 99223 1ST HOSP IP/OBS HIGH 75: CPT

## 2023-10-12 RX ORDER — LOSARTAN POTASSIUM 100 MG/1
100 TABLET, FILM COATED ORAL DAILY
Refills: 0 | Status: DISCONTINUED | OUTPATIENT
Start: 2023-10-12 | End: 2023-10-14

## 2023-10-12 RX ORDER — METRONIDAZOLE 500 MG
500 TABLET ORAL EVERY 8 HOURS
Refills: 0 | Status: DISCONTINUED | OUTPATIENT
Start: 2023-10-12 | End: 2023-10-14

## 2023-10-12 RX ORDER — DEXTROSE 50 % IN WATER 50 %
15 SYRINGE (ML) INTRAVENOUS ONCE
Refills: 0 | Status: DISCONTINUED | OUTPATIENT
Start: 2023-10-12 | End: 2023-10-14

## 2023-10-12 RX ORDER — WARFARIN SODIUM 2.5 MG/1
5 TABLET ORAL ONCE
Refills: 0 | Status: DISCONTINUED | OUTPATIENT
Start: 2023-10-12 | End: 2023-10-12

## 2023-10-12 RX ORDER — ATORVASTATIN CALCIUM 80 MG/1
40 TABLET, FILM COATED ORAL AT BEDTIME
Refills: 0 | Status: DISCONTINUED | OUTPATIENT
Start: 2023-10-12 | End: 2023-10-14

## 2023-10-12 RX ORDER — ENOXAPARIN SODIUM 100 MG/ML
40 INJECTION SUBCUTANEOUS EVERY 24 HOURS
Refills: 0 | Status: DISCONTINUED | OUTPATIENT
Start: 2023-10-12 | End: 2023-10-12

## 2023-10-12 RX ORDER — SODIUM CHLORIDE 9 MG/ML
1000 INJECTION, SOLUTION INTRAVENOUS
Refills: 0 | Status: DISCONTINUED | OUTPATIENT
Start: 2023-10-12 | End: 2023-10-12

## 2023-10-12 RX ORDER — GLUCAGON INJECTION, SOLUTION 0.5 MG/.1ML
1 INJECTION, SOLUTION SUBCUTANEOUS ONCE
Refills: 0 | Status: DISCONTINUED | OUTPATIENT
Start: 2023-10-12 | End: 2023-10-14

## 2023-10-12 RX ORDER — DEXTROSE 50 % IN WATER 50 %
25 SYRINGE (ML) INTRAVENOUS ONCE
Refills: 0 | Status: DISCONTINUED | OUTPATIENT
Start: 2023-10-12 | End: 2023-10-14

## 2023-10-12 RX ORDER — ACETAMINOPHEN 500 MG
1000 TABLET ORAL EVERY 6 HOURS
Refills: 0 | Status: COMPLETED | OUTPATIENT
Start: 2023-10-12 | End: 2023-10-13

## 2023-10-12 RX ORDER — WARFARIN SODIUM 2.5 MG/1
5 TABLET ORAL ONCE
Refills: 0 | Status: COMPLETED | OUTPATIENT
Start: 2023-10-12 | End: 2023-10-12

## 2023-10-12 RX ORDER — DEXTROSE 50 % IN WATER 50 %
25 SYRINGE (ML) INTRAVENOUS ONCE
Refills: 0 | Status: DISCONTINUED | OUTPATIENT
Start: 2023-10-12 | End: 2023-10-12

## 2023-10-12 RX ORDER — DEXTROSE 50 % IN WATER 50 %
15 SYRINGE (ML) INTRAVENOUS ONCE
Refills: 0 | Status: DISCONTINUED | OUTPATIENT
Start: 2023-10-12 | End: 2023-10-12

## 2023-10-12 RX ORDER — METRONIDAZOLE 500 MG
500 TABLET ORAL ONCE
Refills: 0 | Status: COMPLETED | OUTPATIENT
Start: 2023-10-12 | End: 2023-10-12

## 2023-10-12 RX ORDER — SODIUM CHLORIDE 9 MG/ML
1000 INJECTION, SOLUTION INTRAVENOUS
Refills: 0 | Status: DISCONTINUED | OUTPATIENT
Start: 2023-10-12 | End: 2023-10-14

## 2023-10-12 RX ORDER — WARFARIN SODIUM 2.5 MG/1
5 TABLET ORAL AT BEDTIME
Refills: 0 | Status: DISCONTINUED | OUTPATIENT
Start: 2023-10-12 | End: 2023-10-12

## 2023-10-12 RX ORDER — INSULIN LISPRO 100/ML
VIAL (ML) SUBCUTANEOUS
Refills: 0 | Status: DISCONTINUED | OUTPATIENT
Start: 2023-10-12 | End: 2023-10-12

## 2023-10-12 RX ORDER — GLUCAGON INJECTION, SOLUTION 0.5 MG/.1ML
1 INJECTION, SOLUTION SUBCUTANEOUS ONCE
Refills: 0 | Status: DISCONTINUED | OUTPATIENT
Start: 2023-10-12 | End: 2023-10-12

## 2023-10-12 RX ORDER — INFLUENZA VIRUS VACCINE 15; 15; 15; 15 UG/.5ML; UG/.5ML; UG/.5ML; UG/.5ML
0.7 SUSPENSION INTRAMUSCULAR ONCE
Refills: 0 | Status: DISCONTINUED | OUTPATIENT
Start: 2023-10-12 | End: 2023-10-14

## 2023-10-12 RX ORDER — ATENOLOL 25 MG/1
50 TABLET ORAL DAILY
Refills: 0 | Status: DISCONTINUED | OUTPATIENT
Start: 2023-10-12 | End: 2023-10-14

## 2023-10-12 RX ORDER — DEXTROSE 50 % IN WATER 50 %
12.5 SYRINGE (ML) INTRAVENOUS ONCE
Refills: 0 | Status: DISCONTINUED | OUTPATIENT
Start: 2023-10-12 | End: 2023-10-12

## 2023-10-12 RX ORDER — DEXTROSE 50 % IN WATER 50 %
12.5 SYRINGE (ML) INTRAVENOUS ONCE
Refills: 0 | Status: DISCONTINUED | OUTPATIENT
Start: 2023-10-12 | End: 2023-10-14

## 2023-10-12 RX ORDER — SODIUM CHLORIDE 9 MG/ML
1000 INJECTION, SOLUTION INTRAVENOUS
Refills: 0 | Status: DISCONTINUED | OUTPATIENT
Start: 2023-10-12 | End: 2023-10-13

## 2023-10-12 RX ORDER — DIGOXIN 250 MCG
125 TABLET ORAL DAILY
Refills: 0 | Status: DISCONTINUED | OUTPATIENT
Start: 2023-10-12 | End: 2023-10-14

## 2023-10-12 RX ORDER — CIPROFLOXACIN LACTATE 400MG/40ML
400 VIAL (ML) INTRAVENOUS ONCE
Refills: 0 | Status: COMPLETED | OUTPATIENT
Start: 2023-10-12 | End: 2023-10-12

## 2023-10-12 RX ORDER — INSULIN LISPRO 100/ML
VIAL (ML) SUBCUTANEOUS
Refills: 0 | Status: DISCONTINUED | OUTPATIENT
Start: 2023-10-12 | End: 2023-10-14

## 2023-10-12 RX ORDER — INSULIN LISPRO 100/ML
VIAL (ML) SUBCUTANEOUS AT BEDTIME
Refills: 0 | Status: DISCONTINUED | OUTPATIENT
Start: 2023-10-12 | End: 2023-10-14

## 2023-10-12 RX ORDER — CIPROFLOXACIN LACTATE 400MG/40ML
400 VIAL (ML) INTRAVENOUS EVERY 12 HOURS
Refills: 0 | Status: DISCONTINUED | OUTPATIENT
Start: 2023-10-12 | End: 2023-10-14

## 2023-10-12 RX ADMIN — Medication 100 MILLIGRAM(S): at 12:43

## 2023-10-12 RX ADMIN — SODIUM CHLORIDE 100 MILLILITER(S): 9 INJECTION, SOLUTION INTRAVENOUS at 19:56

## 2023-10-12 RX ADMIN — Medication 100 MILLIGRAM(S): at 19:35

## 2023-10-12 RX ADMIN — Medication 400 MILLIGRAM(S): at 19:35

## 2023-10-12 RX ADMIN — ATORVASTATIN CALCIUM 40 MILLIGRAM(S): 80 TABLET, FILM COATED ORAL at 21:59

## 2023-10-12 RX ADMIN — WARFARIN SODIUM 5 MILLIGRAM(S): 2.5 TABLET ORAL at 21:59

## 2023-10-12 RX ADMIN — ATENOLOL 50 MILLIGRAM(S): 25 TABLET ORAL at 05:31

## 2023-10-12 RX ADMIN — Medication 100 MILLIGRAM(S): at 04:19

## 2023-10-12 RX ADMIN — LOSARTAN POTASSIUM 100 MILLIGRAM(S): 100 TABLET, FILM COATED ORAL at 05:31

## 2023-10-12 RX ADMIN — Medication 400 MILLIGRAM(S): at 12:42

## 2023-10-12 RX ADMIN — Medication 125 MICROGRAM(S): at 05:31

## 2023-10-12 RX ADMIN — Medication 200 MILLIGRAM(S): at 18:01

## 2023-10-12 RX ADMIN — Medication 200 MILLIGRAM(S): at 02:55

## 2023-10-12 RX ADMIN — Medication 1000 MILLIGRAM(S): at 13:10

## 2023-10-12 NOTE — ED CDU PROVIDER DISPOSITION NOTE - ATTENDING APP SHARED VISIT CONTRIBUTION OF CARE
77 y/o male with pmhx of HTN, DVT/PE on warfarin, presents to ED c/o RLQ pain. Found with cecal diverticulitis on CT. sent to cdu for IV abx. pt feeling the same, abd ttp in RLQ. no fevers overnight. per surgery recommending to admit to their service, no OR at this time.

## 2023-10-12 NOTE — PATIENT PROFILE ADULT - FUNCTIONAL ASSESSMENT - BASIC MOBILITY 6.
4-calculated by average/Not able to assess (calculate score using [facility] averaging method)

## 2023-10-12 NOTE — ED CDU PROVIDER INITIAL DAY NOTE - CLINICAL SUMMARY MEDICAL DECISION MAKING FREE TEXT BOX
78-year-old male, past medical history of diabetes, GI bleed, CAD, A-fib, hypertension, BPH, valve replacement on coumadin presenting with complaint of intermittent right lower quadrant abdominal pain. In ED, labs w/o any gross abnormal values, CT demonstrated cecal diverticulitis w/ phelgmon/ abscess. patient accepted to cdu for IV abx and surgical reassessment

## 2023-10-12 NOTE — H&P ADULT - HISTORY OF PRESENT ILLNESS
78M PMhx HTN, DM, HLD, GERD, thyroid nodule DVT/PE, Mechanical MVR, AFib on Coumadin, DVT/PE, BPH, presents with RLQ abdominal pain since Saturday 10/7. CT A/P obtained demonstrating cecal diverticulitis w 1.4cm intramural abscess. Surgery consulted.     HDS and afebrile in ED. Labs w/o leukocytosis. Notable for INR 2.8. CT imaging as above.     Patient seen and examined. Reports acute onset RLQ pain Saturday. Tolerating PO intake, passing flatus today, last BM yesterday. Denies nausea, vomiting, fevers, chills, CP, SOB, hematochezia, melena, hematemesis, lightheadedness, dysuria. Last colonoscopy 2 years ago, wnl per patient. Told to f/u in 10 years.
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
## 2023-10-12 NOTE — ED CDU PROVIDER INITIAL DAY NOTE - OBJECTIVE STATEMENT
Patient is a 78-year-old male, past medical history of diabetes, GI bleed, CAD, A-fib, hypertension, BPH, valve replacement on coumadin presenting with complaint of intermittent right lower quadrant abdominal pain.  Pain is a crampy sensation, associated with nausea no associated vomiting, diarrhea, fever, chills, chest pain, shortness of breath, hematemesis, BRBPR, melana, testicular pain.  He denies recent travel, sick contact, food as possible inciting factor, start antibiotics prior to the onset of symptoms.    CDU note: 78-year-old male, past medical history of diabetes, GI bleed, CAD, A-fib, hypertension, BPH, valve replacement on coumadin presenting with complaint of intermittent right lower quadrant abdominal pain. In ED, labs w/o any gross abnormal values, CT demonstrated cecal diverticulitis w/ phelgmon/ abscess. patient accepted to cdu for IV abx and surgical reassessment

## 2023-10-12 NOTE — H&P ADULT - ASSESSMENT
78M PMhx HTN, DM, HLD, GERD, thyroid nodule DVT/PE, Mechanical MVR, AFib on Coumadin, DVT/PE, BPH, presents with RLQ abdominal pain since Saturday 10/7. CT A/P obtained demonstrating cecal diverticulitis w 1.4cm intramural abscess.     PLAN  - Admit to Dr Nate Barrientos  - NPO/IVF  - IV abx -- cipro/flagyl  - pain control    Discussed with senior on behalf of Dr Barrientos    A team  [id number]

## 2023-10-12 NOTE — H&P ADULT - NSHPLABSRESULTS_GEN_ALL_CORE
14.1  10.10 )-----------( 188    ( 11 Oct 2023 21:10 )             42.3  141  |  106  |  21  ----------------------------<  99    (10-11)  4.1   |  25  |  1.24          Ca    9.8      10-11  Mg    x  Phos  x        LIVER FUNCTIONS - ( 11 Oct 2023 21:10 )  Alb: 4.2 g/dL / Pro: 7.5 g/dL / ALK PHOS: 86 U/L / ALT: 25 U/L / AST: 23 U/L / GGT: x      PT/INR - ( 11 Oct 2023 21:10 )   PT: 31.1 sec;   INR: 2.84 ratio         PTT - ( 11 Oct 2023 21:10 )  PTT:60.8 sec  Urinalysis Basic - ( 11 Oct 2023 21:10 )    Color: x / Appearance: x / SG: x / pH: x  Gluc: 99 mg/dL / Ketone: x  / Bili: x / Urobili: x   Blood: x / Protein: x / Nitrite: x   Leuk Esterase: x / RBC: x / WBC x   Sq Epi: x / Non Sq Epi: x / Bacteria: x        < from: CT Abdomen and Pelvis w/ IV Cont (10.11.23 @ 21:21) >    IMPRESSION:  Acute cecal diverticulitis, with suspected 1.4 cm intramural   phlegmon/developing abscess.    < end of copied text >

## 2023-10-12 NOTE — CONSULT NOTE ADULT - ASSESSMENT
78M PMhx HTN, DM, HLD, GERD, thyroid nodule DVT/PE, Mechanical MVR, AFib on Coumadin, DVT/PE, BPH, presents with RLQ abdominal pain since Saturday 10/7. CT A/P obtained demonstrating cecal diverticulitis w 1.4cm intramural abscess. Surgery consulted.      CT imaging consistent w [place] 1 Diverticulitis. Patient w only focal RLQ TTP on exam, is afebrile and HDS, w/o leukocytosis. Tolerating PO intake outpatient and w/ normal bowel fx. Given overall benign clinical picture, favor CDU observation rather than inpatient admission.     Plan:   -No acute surgical intervention   -CDU for observation, PO challenge, and dose of IV abx (Cipro/Flagyl)  -Surgery to re-eval in AM    To be discussed with Attending Surgeon Dr. Floyd Larson MD PGY3  A Team, [id number]  78M PMhx HTN, DM, HLD, GERD, thyroid nodule DVT/PE, Mechanical MVR, AFib on Coumadin, DVT/PE, BPH, presents with RLQ abdominal pain since Saturday 10/7. CT A/P obtained demonstrating cecal diverticulitis w 1.4cm intramural abscess. Surgery consulted.      CT imaging consistent w [place] 1 Diverticulitis. Patient w only focal RLQ TTP on exam, is afebrile and HDS, w/o leukocytosis. Tolerating PO intake outpatient and w/ normal bowel fx. Given overall benign clinical picture, favor CDU observation rather than inpatient admission.     Plan:   -No acute surgical intervention   -CDU for observation, PO challenge, and dose of IV abx (Cipro/Flagyl)  -Surgery to re-eval in AM    To be discussed with Attending Surgeon Dr. Floyd Larson MD PGY3  A Team, [id number]  78M PMhx HTN, DM, HLD, GERD, thyroid nodule DVT/PE, Mechanical MVR, AFib on Coumadin, DVT/PE, BPH, presents with RLQ abdominal pain since Saturday 10/7. CT A/P obtained demonstrating cecal diverticulitis w 1.4cm intramural abscess. Surgery consulted.      CT imaging consistent w [place] 1 Diverticulitis. Patient w only focal RLQ TTP on exam, is afebrile and HDS, w/o leukocytosis. Tolerating PO intake outpatient and w/ normal bowel fx. Given overall benign clinical picture, favor CDU observation rather than inpatient admission.     Plan:   -No acute surgical intervention   -CDU for observation, PO challenge, and dose of IV abx (Cipro/Flagyl)  -Surgery to re-eval in AM    To be discussed with Attending Surgeon Dr. Floyd Larson MD PGY3  A Team, [id number]

## 2023-10-12 NOTE — H&P ADULT - NSHPPHYSICALEXAM_GEN_ALL_CORE
PHYSICAL EXAM:   General: Alert, NAD  Neuro: A+Ox3  HEENT: NC/AT, no asymmetry, no scleral icterus  Neck: Soft, supple  Cardio: RRR  Resp: Airway patent, unlabored breathing  Thorax: No chest wall tenderness  GI/Abd: Soft, ND, RLQ TTP, no rebound/guarding, no masses palpated  Vascular: All 4 extremities warm  Skin: Intact, no breakdown  Musculoskeletal: All 4 extremities moving spontaneously, no limitations

## 2023-10-12 NOTE — ED CDU PROVIDER INITIAL DAY NOTE - NSICDXPASTSURGICALHX_GEN_ALL_CORE_FT
PAST SURGICAL HISTORY:  Cataract Bilateral ; tx surgically    H/O melanoma excision scalp-2014    H/O retinal detachment left    Hx of CABG x 2 2006    Mitral valve replaced 2006 at [facility]    S/P tonsillectomy     S/P TURP 2014

## 2023-10-12 NOTE — PATIENT PROFILE ADULT - FALL HARM RISK - UNIVERSAL INTERVENTIONS
Bed in lowest position, wheels locked, appropriate side rails in place/Call bell, personal items and telephone in reach/Instruct patient to call for assistance before getting out of bed or chair/Non-slip footwear when patient is out of bed/[place] to call system/Physically safe environment - no spills, clutter or unnecessary equipment/Purposeful Proactive Rounding/Room/bathroom lighting operational, light cord in reach

## 2023-10-12 NOTE — ED CDU PROVIDER DISPOSITION NOTE - ATTENDING CONTRIBUTION TO CARE
79 y/o male with pmhx of HTN, DVT/PE on warfarin, presents to ED c/o RLQ pain. Found with cecal diverticulitis on CT. sent to cdu for IV abx. pt feeling the same, abd ttp in RLQ. no fevers overnight. per surgery recommending to admit to their service, no OR at this time.

## 2023-10-13 ENCOUNTER — RX RENEWAL (OUTPATIENT)
Age: 78
End: 2023-10-13

## 2023-10-13 LAB
ALBUMIN SERPL ELPH-MCNC: 3.5 G/DL — SIGNIFICANT CHANGE UP (ref 3.3–5)
ALP SERPL-CCNC: 75 U/L — SIGNIFICANT CHANGE UP (ref 40–120)
ALT FLD-CCNC: 17 U/L — SIGNIFICANT CHANGE UP (ref 4–41)
ANION GAP SERPL CALC-SCNC: 8 MMOL/L — SIGNIFICANT CHANGE UP (ref 7–14)
APTT BLD: 39.8 SEC — HIGH (ref 24.5–35.6)
AST SERPL-CCNC: 15 U/L — SIGNIFICANT CHANGE UP (ref 4–40)
BASOPHILS # BLD AUTO: 0.04 K/UL — SIGNIFICANT CHANGE UP (ref 0–0.2)
BASOPHILS NFR BLD AUTO: 0.4 % — SIGNIFICANT CHANGE UP (ref 0–2)
BILIRUB SERPL-MCNC: 2.9 MG/DL — HIGH (ref 0.2–1.2)
BUN SERPL-MCNC: 23 MG/DL — SIGNIFICANT CHANGE UP (ref 7–23)
CALCIUM SERPL-MCNC: 9.4 MG/DL — SIGNIFICANT CHANGE UP (ref 8.4–10.5)
CHLORIDE SERPL-SCNC: 107 MMOL/L — SIGNIFICANT CHANGE UP (ref 98–107)
CO2 SERPL-SCNC: 25 MMOL/L — SIGNIFICANT CHANGE UP (ref 22–31)
CREAT SERPL-MCNC: 1.25 MG/DL — SIGNIFICANT CHANGE UP (ref 0.5–1.3)
CULTURE RESULTS: SIGNIFICANT CHANGE UP
EGFR: 59 ML/MIN/1.73M2 — LOW
EOSINOPHIL # BLD AUTO: 0.19 K/UL — SIGNIFICANT CHANGE UP (ref 0–0.5)
EOSINOPHIL NFR BLD AUTO: 2.1 % — SIGNIFICANT CHANGE UP (ref 0–6)
GLUCOSE BLDC GLUCOMTR-MCNC: 114 MG/DL — HIGH (ref 70–99)
GLUCOSE BLDC GLUCOMTR-MCNC: 118 MG/DL — HIGH (ref 70–99)
GLUCOSE BLDC GLUCOMTR-MCNC: 124 MG/DL — HIGH (ref 70–99)
GLUCOSE BLDC GLUCOMTR-MCNC: 140 MG/DL — HIGH (ref 70–99)
GLUCOSE SERPL-MCNC: 154 MG/DL — HIGH (ref 70–99)
HCT VFR BLD CALC: 40.8 % — SIGNIFICANT CHANGE UP (ref 39–50)
HGB BLD-MCNC: 13.8 G/DL — SIGNIFICANT CHANGE UP (ref 13–17)
IANC: 6.62 K/UL — SIGNIFICANT CHANGE UP (ref 1.8–7.4)
IMM GRANULOCYTES NFR BLD AUTO: 0.4 % — SIGNIFICANT CHANGE UP (ref 0–0.9)
INR BLD: 2.42 RATIO — HIGH (ref 0.85–1.18)
LYMPHOCYTES # BLD AUTO: 1.33 K/UL — SIGNIFICANT CHANGE UP (ref 1–3.3)
LYMPHOCYTES # BLD AUTO: 14.5 % — SIGNIFICANT CHANGE UP (ref 13–44)
MAGNESIUM SERPL-MCNC: 1.8 MG/DL — SIGNIFICANT CHANGE UP (ref 1.6–2.6)
MCHC RBC-ENTMCNC: 31.7 PG — SIGNIFICANT CHANGE UP (ref 27–34)
MCHC RBC-ENTMCNC: 33.8 GM/DL — SIGNIFICANT CHANGE UP (ref 32–36)
MCV RBC AUTO: 93.6 FL — SIGNIFICANT CHANGE UP (ref 80–100)
MONOCYTES # BLD AUTO: 0.96 K/UL — HIGH (ref 0–0.9)
MONOCYTES NFR BLD AUTO: 10.5 % — SIGNIFICANT CHANGE UP (ref 2–14)
NEUTROPHILS # BLD AUTO: 6.62 K/UL — SIGNIFICANT CHANGE UP (ref 1.8–7.4)
NEUTROPHILS NFR BLD AUTO: 72.1 % — SIGNIFICANT CHANGE UP (ref 43–77)
NRBC # BLD: 0 /100 WBCS — SIGNIFICANT CHANGE UP (ref 0–0)
NRBC # FLD: 0 K/UL — SIGNIFICANT CHANGE UP (ref 0–0)
PHOSPHATE SERPL-MCNC: 2.8 MG/DL — SIGNIFICANT CHANGE UP (ref 2.5–4.5)
PLATELET # BLD AUTO: 155 K/UL — SIGNIFICANT CHANGE UP (ref 150–400)
POTASSIUM SERPL-MCNC: 4 MMOL/L — SIGNIFICANT CHANGE UP (ref 3.5–5.3)
POTASSIUM SERPL-SCNC: 4 MMOL/L — SIGNIFICANT CHANGE UP (ref 3.5–5.3)
PROT SERPL-MCNC: 6.3 G/DL — SIGNIFICANT CHANGE UP (ref 6–8.3)
PROTHROM AB SERPL-ACNC: 26.7 SEC — HIGH (ref 9.5–13)
RBC # BLD: 4.36 M/UL — SIGNIFICANT CHANGE UP (ref 4.2–5.8)
RBC # FLD: 13.1 % — SIGNIFICANT CHANGE UP (ref 10.3–14.5)
SODIUM SERPL-SCNC: 140 MMOL/L — SIGNIFICANT CHANGE UP (ref 135–145)
SPECIMEN SOURCE: SIGNIFICANT CHANGE UP
WBC # BLD: 9.18 K/UL — SIGNIFICANT CHANGE UP (ref 3.8–10.5)
WBC # FLD AUTO: 9.18 K/UL — SIGNIFICANT CHANGE UP (ref 3.8–10.5)

## 2023-10-13 PROCEDURE — 99232 SBSQ HOSP IP/OBS MODERATE 35: CPT | Mod: GC

## 2023-10-13 RX ORDER — WARFARIN SODIUM 2.5 MG/1
5 TABLET ORAL ONCE
Refills: 0 | Status: COMPLETED | OUTPATIENT
Start: 2023-10-13 | End: 2023-10-13

## 2023-10-13 RX ORDER — CLOTRIMAZOLE 10 MG/G
1 CREAM TOPICAL
Qty: 45 | Refills: 1 | Status: ACTIVE | COMMUNITY
Start: 2023-10-13 | End: 1900-01-01

## 2023-10-13 RX ORDER — SODIUM CHLORIDE 9 MG/ML
1000 INJECTION, SOLUTION INTRAVENOUS
Refills: 0 | Status: DISCONTINUED | OUTPATIENT
Start: 2023-10-13 | End: 2023-10-14

## 2023-10-13 RX ORDER — MAGNESIUM SULFATE 500 MG/ML
2 VIAL (ML) INJECTION ONCE
Refills: 0 | Status: COMPLETED | OUTPATIENT
Start: 2023-10-13 | End: 2023-10-13

## 2023-10-13 RX ORDER — ACETAMINOPHEN 500 MG
650 TABLET ORAL EVERY 6 HOURS
Refills: 0 | Status: DISCONTINUED | OUTPATIENT
Start: 2023-10-13 | End: 2023-10-14

## 2023-10-13 RX ADMIN — Medication 100 MILLIGRAM(S): at 19:46

## 2023-10-13 RX ADMIN — Medication 400 MILLIGRAM(S): at 05:34

## 2023-10-13 RX ADMIN — Medication 650 MILLIGRAM(S): at 22:04

## 2023-10-13 RX ADMIN — LOSARTAN POTASSIUM 100 MILLIGRAM(S): 100 TABLET, FILM COATED ORAL at 05:35

## 2023-10-13 RX ADMIN — Medication 63.75 MILLIMOLE(S): at 21:34

## 2023-10-13 RX ADMIN — Medication 200 MILLIGRAM(S): at 17:48

## 2023-10-13 RX ADMIN — Medication 200 MILLIGRAM(S): at 04:17

## 2023-10-13 RX ADMIN — WARFARIN SODIUM 5 MILLIGRAM(S): 2.5 TABLET ORAL at 21:34

## 2023-10-13 RX ADMIN — Medication 25 GRAM(S): at 09:57

## 2023-10-13 RX ADMIN — ATORVASTATIN CALCIUM 40 MILLIGRAM(S): 80 TABLET, FILM COATED ORAL at 21:34

## 2023-10-13 RX ADMIN — Medication 400 MILLIGRAM(S): at 00:00

## 2023-10-13 RX ADMIN — Medication 100 MILLIGRAM(S): at 12:10

## 2023-10-13 RX ADMIN — Medication 1000 MILLIGRAM(S): at 00:30

## 2023-10-13 RX ADMIN — Medication 1000 MILLIGRAM(S): at 06:04

## 2023-10-13 RX ADMIN — Medication 650 MILLIGRAM(S): at 21:34

## 2023-10-13 RX ADMIN — Medication 100 MILLIGRAM(S): at 03:28

## 2023-10-13 RX ADMIN — ATENOLOL 50 MILLIGRAM(S): 25 TABLET ORAL at 05:35

## 2023-10-13 RX ADMIN — Medication 125 MICROGRAM(S): at 05:35

## 2023-10-13 NOTE — PROGRESS NOTE ADULT - ASSESSMENT
Assessment:  78M PMhx HTN, DM, HLD, GERD, thyroid nodule DVT/PE, Mechanical MVR, AFib on Coumadin, DVT/PE, BPH, presents with RLQ abdominal pain since Saturday 10/7. CT A/P obtained demonstrating cecal diverticulitis w 1.4cm intramural abscess.     PLAN  - Abx: Cipro and Flagyl  - IVF: LR@100  - Diet: CLD, consider advancing to LRD if abdominal pain improvements  - pain control  - OOB/ambulating  - Dispo: pending    A Team Surgery  [id number] Assessment:  78M PMhx HTN, DM, HLD, GERD, thyroid nodule DVT/PE, Mechanical MVR, AFib on Coumadin, DVT/PE, BPH, presents with RLQ abdominal pain since Saturday 10/7. CT A/P obtained demonstrating cecal diverticulitis w 1.4cm intramural abscess.     PLAN  - F/u INR  - Abx: Cipro and Flagyl  - IVF: LR@100  - Diet: CLD, consider advancing to LRD if abdominal pain improvements  - C/w warfarin  - pain control  - OOB/ambulating  - Dispo: pending    A Team Surgery  [id number]

## 2023-10-14 ENCOUNTER — TRANSCRIPTION ENCOUNTER (OUTPATIENT)
Age: 78
End: 2023-10-14

## 2023-10-14 VITALS
HEART RATE: 66 BPM | TEMPERATURE: 97 F | DIASTOLIC BLOOD PRESSURE: 64 MMHG | SYSTOLIC BLOOD PRESSURE: 117 MMHG | OXYGEN SATURATION: 100 % | RESPIRATION RATE: 16 BRPM

## 2023-10-14 LAB
ANION GAP SERPL CALC-SCNC: 7 MMOL/L — SIGNIFICANT CHANGE UP (ref 7–14)
APTT BLD: 41.6 SEC — HIGH (ref 24.5–35.6)
BUN SERPL-MCNC: 16 MG/DL — SIGNIFICANT CHANGE UP (ref 7–23)
CALCIUM SERPL-MCNC: 9.1 MG/DL — SIGNIFICANT CHANGE UP (ref 8.4–10.5)
CHLORIDE SERPL-SCNC: 109 MMOL/L — HIGH (ref 98–107)
CO2 SERPL-SCNC: 25 MMOL/L — SIGNIFICANT CHANGE UP (ref 22–31)
CREAT SERPL-MCNC: 1.12 MG/DL — SIGNIFICANT CHANGE UP (ref 0.5–1.3)
EGFR: 68 ML/MIN/1.73M2 — SIGNIFICANT CHANGE UP
GLUCOSE BLDC GLUCOMTR-MCNC: 106 MG/DL — HIGH (ref 70–99)
GLUCOSE BLDC GLUCOMTR-MCNC: 217 MG/DL — HIGH (ref 70–99)
GLUCOSE SERPL-MCNC: 144 MG/DL — HIGH (ref 70–99)
HCT VFR BLD CALC: 38.2 % — LOW (ref 39–50)
HGB BLD-MCNC: 13.2 G/DL — SIGNIFICANT CHANGE UP (ref 13–17)
INR BLD: 2.42 RATIO — HIGH (ref 0.85–1.18)
MAGNESIUM SERPL-MCNC: 1.9 MG/DL — SIGNIFICANT CHANGE UP (ref 1.6–2.6)
MCHC RBC-ENTMCNC: 31.9 PG — SIGNIFICANT CHANGE UP (ref 27–34)
MCHC RBC-ENTMCNC: 34.6 GM/DL — SIGNIFICANT CHANGE UP (ref 32–36)
MCV RBC AUTO: 92.3 FL — SIGNIFICANT CHANGE UP (ref 80–100)
NRBC # BLD: 0 /100 WBCS — SIGNIFICANT CHANGE UP (ref 0–0)
NRBC # FLD: 0 K/UL — SIGNIFICANT CHANGE UP (ref 0–0)
PHOSPHATE SERPL-MCNC: 2.9 MG/DL — SIGNIFICANT CHANGE UP (ref 2.5–4.5)
PLATELET # BLD AUTO: 166 K/UL — SIGNIFICANT CHANGE UP (ref 150–400)
POTASSIUM SERPL-MCNC: 4 MMOL/L — SIGNIFICANT CHANGE UP (ref 3.5–5.3)
POTASSIUM SERPL-SCNC: 4 MMOL/L — SIGNIFICANT CHANGE UP (ref 3.5–5.3)
PROTHROM AB SERPL-ACNC: 26.4 SEC — HIGH (ref 9.5–13)
RBC # BLD: 4.14 M/UL — LOW (ref 4.2–5.8)
RBC # FLD: 12.8 % — SIGNIFICANT CHANGE UP (ref 10.3–14.5)
SODIUM SERPL-SCNC: 141 MMOL/L — SIGNIFICANT CHANGE UP (ref 135–145)
WBC # BLD: 6.28 K/UL — SIGNIFICANT CHANGE UP (ref 3.8–10.5)
WBC # FLD AUTO: 6.28 K/UL — SIGNIFICANT CHANGE UP (ref 3.8–10.5)

## 2023-10-14 PROCEDURE — 99232 SBSQ HOSP IP/OBS MODERATE 35: CPT

## 2023-10-14 RX ORDER — WARFARIN SODIUM 2.5 MG/1
5 TABLET ORAL ONCE
Refills: 0 | Status: DISCONTINUED | OUTPATIENT
Start: 2023-10-14 | End: 2023-10-14

## 2023-10-14 RX ORDER — CIPROFLOXACIN LACTATE 400MG/40ML
1 VIAL (ML) INTRAVENOUS
Qty: 42 | Refills: 0
Start: 2023-10-14 | End: 2023-11-03

## 2023-10-14 RX ORDER — WARFARIN SODIUM 2.5 MG/1
1 TABLET ORAL
Qty: 0 | Refills: 0 | DISCHARGE
Start: 2023-10-14

## 2023-10-14 RX ORDER — METRONIDAZOLE 500 MG
1 TABLET ORAL
Qty: 63 | Refills: 0
Start: 2023-10-14 | End: 2023-11-03

## 2023-10-14 RX ADMIN — SODIUM CHLORIDE 50 MILLILITER(S): 9 INJECTION, SOLUTION INTRAVENOUS at 12:03

## 2023-10-14 RX ADMIN — ATENOLOL 50 MILLIGRAM(S): 25 TABLET ORAL at 05:01

## 2023-10-14 RX ADMIN — Medication 125 MICROGRAM(S): at 05:01

## 2023-10-14 RX ADMIN — Medication 200 MILLIGRAM(S): at 03:55

## 2023-10-14 RX ADMIN — LOSARTAN POTASSIUM 100 MILLIGRAM(S): 100 TABLET, FILM COATED ORAL at 05:01

## 2023-10-14 RX ADMIN — Medication 2: at 13:12

## 2023-10-14 RX ADMIN — Medication 100 MILLIGRAM(S): at 12:02

## 2023-10-14 RX ADMIN — Medication 100 MILLIGRAM(S): at 03:41

## 2023-10-14 RX ADMIN — SODIUM CHLORIDE 50 MILLILITER(S): 9 INJECTION, SOLUTION INTRAVENOUS at 09:56

## 2023-10-14 NOTE — DISCHARGE NOTE PROVIDER - HOSPITAL COURSE
78M PMhx HTN, DM, HLD, GERD, thyroid nodule DVT/PE, Mechanical MVR, AFib on Coumadin, DVT/PE, BPH, presented on 10/11 with a 6 day history of RLQ abdominal pain. CT A/P obtained demonstrating cecal diverticulitis w 1.4cm intramural abscess. Patient was HDS and afebrile in ED. Patient has been tolerating PO, and has been passing flatus and having bowel movements. Denies nausea, vomiting, fevers, chills, CP, SOB, hematochezia, melena, hematemesis, lightheadedness, dysuria. Last colonoscopy 2 years ago, wnl per patient. Told to f/u in 10 years. Patient was initially kept NPO with IV abx, but was then advanced to clears.     10/12: continued IV abx, and kept on CLD  10/13: Kept on CLD due to abd pain  10/14: advanced to LRD, continue IV abx     78M PMhx HTN, DM, HLD, GERD, thyroid nodule DVT/PE, Mechanical MVR, AFib on Coumadin, DVT/PE, BPH, presented on 10/11 with a 6 day history of RLQ abdominal pain. CT A/P obtained demonstrating cecal diverticulitis w 1.4cm intramural abscess. Patient was HDS and afebrile in ED. Patient has been tolerating PO, and has been passing flatus and having bowel movements. Denies nausea, vomiting, fevers, chills, CP, SOB, hematochezia, melena, hematemesis, lightheadedness, dysuria. Last colonoscopy 2 years ago, wnl per patient. Told to f/u in 10 years. Patient was initially kept NPO with IV abx, but was then advanced to clears.     10/12: continued IV abx, and kept on CLD  10/13: Kept on CLD due to abd pain  10/14: advanced to LRD, continue IV abx    The patient was instructed to follow up with Dr. Barrientos within 1week  after discharge from hospital for a follow up CT scan. The patient felt comfortable with discharge. The patient was discharged to home. On day of discharge, the patient was tolerating diet, ambulating well and pain controlled.

## 2023-10-14 NOTE — DISCHARGE NOTE NURSING/CASE MANAGEMENT/SOCIAL WORK - NSDCPEFALRISK_GEN_ALL_CORE
For information on Fall & Injury Prevention, visit: [email or web address] OR  [email or web address] OR  https://www.cdc.gov/steadi/patient.html

## 2023-10-14 NOTE — DISCHARGE NOTE PROVIDER - NSDCMRMEDTOKEN_GEN_ALL_CORE_FT
atenolol 50 mg oral tablet: 1 tab(s) orally once a day AM  digoxin 125 mcg (0.125 mg) oral tablet: 1 tab(s) orally once a day AM  glipiZIDE 10 mg oral tablet, extended release: 1 tab(s) orally 2 times a day  losartan 25 mg oral tablet: 1 tab(s) orally once a day  repaglinide 1 mg oral tablet: 1 tab(s) orally 3 times a day (before meals)  Trulicity Pen 1.5 mg/0.5 mL subcutaneous solution: subcutaneous once a week Monday  vancomycin: 125 milligram(s) orally every 6 hours    atenolol 50 mg oral tablet: 1 tab(s) orally once a day AM  digoxin 125 mcg (0.125 mg) oral tablet: 1 tab(s) orally once a day AM  glipiZIDE 10 mg oral tablet, extended release: 1 tab(s) orally 2 times a day  losartan 25 mg oral tablet: 1 tab(s) orally once a day  repaglinide 1 mg oral tablet: 1 tab(s) orally 3 times a day (before meals)  Trulicity Pen 1.5 mg/0.5 mL subcutaneous solution: subcutaneous once a week Monday  vancomycin: 125 milligram(s) orally every 6 hours   warfarin 5 mg oral tablet: 1 tab(s) orally once a day (at bedtime)   atenolol 50 mg oral tablet: 1 tab(s) orally once a day AM  Cipro 500 mg oral tablet: 1 tab(s) orally 2 times a day MDD: 2  digoxin 125 mcg (0.125 mg) oral tablet: 1 tab(s) orally once a day AM  glipiZIDE 10 mg oral tablet, extended release: 1 tab(s) orally 2 times a day  losartan 25 mg oral tablet: 1 tab(s) orally once a day  metroNIDAZOLE 500 mg oral tablet: 1 tab(s) orally every 8 hours MDD: 3  repaglinide 1 mg oral tablet: 1 tab(s) orally 3 times a day (before meals)  Trulicity Pen 1.5 mg/0.5 mL subcutaneous solution: subcutaneous once a week Monday  vancomycin: 125 milligram(s) orally every 6 hours   warfarin 5 mg oral tablet: 1 tab(s) orally once a day (at bedtime)

## 2023-10-14 NOTE — PROGRESS NOTE ADULT - SUBJECTIVE AND OBJECTIVE BOX
no event, mod right sided pain    abd soft, mild pain to deep palpation        Objective:    MEDICATIONS  (STANDING):  atenolol  Tablet 50 milliGRAM(s) Oral daily  atorvastatin 40 milliGRAM(s) Oral at bedtime  ciprofloxacin   IVPB 400 milliGRAM(s) IV Intermittent every 12 hours  dextrose 5%. 1000 milliLiter(s) (50 mL/Hr) IV Continuous <Continuous>  dextrose 5%. 1000 milliLiter(s) (100 mL/Hr) IV Continuous <Continuous>  dextrose 50% Injectable 25 Gram(s) IV Push once  dextrose 50% Injectable 25 Gram(s) IV Push once  dextrose 50% Injectable 12.5 Gram(s) IV Push once  digoxin     Tablet 125 MICROGram(s) Oral daily  glucagon  Injectable 1 milliGRAM(s) IntraMuscular once  influenza  Vaccine (HIGH DOSE) 0.7 milliLiter(s) IntraMuscular once  insulin lispro (ADMELOG) corrective regimen sliding scale   SubCutaneous three times a day before meals  insulin lispro (ADMELOG) corrective regimen sliding scale   SubCutaneous at bedtime  lactated ringers. 1000 milliLiter(s) (50 mL/Hr) IV Continuous <Continuous>  losartan 100 milliGRAM(s) Oral daily  metroNIDAZOLE  IVPB 500 milliGRAM(s) IV Intermittent every 8 hours    MEDICATIONS  (PRN):  acetaminophen     Tablet .. 650 milliGRAM(s) Oral every 6 hours PRN Mild Pain (1 - 3), Moderate Pain (4 - 6)  dextrose Oral Gel 15 Gram(s) Oral once PRN Blood Glucose LESS THAN 70 milliGRAM(s)/deciliter      Vital Signs Last 24 Hrs  T(C): 36.7 (14 Oct 2023 05:00), Max: 36.9 (13 Oct 2023 17:46)  T(F): 98 (14 Oct 2023 05:00), Max: 98.5 (13 Oct 2023 17:46)  HR: 65 (14 Oct 2023 05:00) (57 - 68)  BP: 121/80 (14 Oct 2023 05:00) (116/65 - 145/71)  BP(mean): --  RR: 18 (14 Oct 2023 05:00) (18 - 18)  SpO2: 100% (14 Oct 2023 05:00) (97% - 100%)    Parameters below as of 14 Oct 2023 05:00  Patient On (Oxygen Delivery Method): room air        I&O's Detail    13 Oct 2023 07:01  -  14 Oct 2023 07:00  --------------------------------------------------------  IN:    IV PiggyBack: 550 mL    Lactated Ringers: 600 mL    Oral Fluid: 480 mL  Total IN: 1630 mL    OUT:    Voided (mL): 1750 mL  Total OUT: 1750 mL    Total NET: -120 mL          Daily     Daily     LABS:                        13.2   6.28  )-----------( 166      ( 14 Oct 2023 05:10 )             38.2     10-14    141  |  109<H>  |  16  ----------------------------<  144<H>  4.0   |  25  |  1.12    Ca    9.1      14 Oct 2023 05:10  Phos  2.9     10-14  Mg     1.90     10-14    TPro  6.3  /  Alb  3.5  /  TBili  2.9<H>  /  DBili  x   /  AST  15  /  ALT  17  /  AlkPhos  75  10-13    PT/INR - ( 14 Oct 2023 05:10 )   PT: 26.4 sec;   INR: 2.42 ratio         PTT - ( 14 Oct 2023 05:10 )  PTT:41.6 sec  Urinalysis Basic - ( 14 Oct 2023 05:10 )    Color: x / Appearance: x / SG: x / pH: x  Gluc: 144 mg/dL / Ketone: x  / Bili: x / Urobili: x   Blood: x / Protein: x / Nitrite: x   Leuk Esterase: x / RBC: x / WBC x   Sq Epi: x / Non Sq Epi: x / Bacteria: x        RADIOLOGY & ADDITIONAL STUDIES:            
A Team (General Surgery) Daily Progress Note    SUBJECTIVE:  Pt seen and examined, and is resting comfortably in bed. No acute events overnight. Reports mild abdominal pain. Pt has no complaints at this time. Denies n/v.     OBJECTIVE:  Vital Signs Last 24 Hrs  T(C): 36.7 (13 Oct 2023 05:45), Max: 36.8 (13 Oct 2023 02:08)  T(F): 98 (13 Oct 2023 05:45), Max: 98.2 (13 Oct 2023 02:08)  HR: 68 (13 Oct 2023 05:45) (60 - 68)  BP: 126/66 (13 Oct 2023 05:45) (122/68 - 152/86)  BP(mean): --  RR: 18 (13 Oct 2023 05:45) (16 - 18)  SpO2: 100% (13 Oct 2023 05:45) (100% - 100%)    Parameters below as of 13 Oct 2023 05:45  Patient On (Oxygen Delivery Method): room air        I&O's Detail    12 Oct 2023 07:01  -  13 Oct 2023 07:00  --------------------------------------------------------  IN:    IV PiggyBack: 400 mL    Lactated Ringers: 900 mL    Oral Fluid: 480 mL  Total IN: 1780 mL    OUT:    Voided (mL): 300 mL  Total OUT: 300 mL    Total NET: 1480 mL        Exam:  GEN: A&Ox3, NAD  HEENT: atraumatic, normocephalic  CV: no JVD  RESP: no increased work of breathing, no use of accessory muscles  GI/ABD: soft, RLQ TTP, mildly distended, no rebound or guarding  EXTREMITIES: warm, pink, well-perfused                        13.8   9.18  )-----------( 155      ( 13 Oct 2023 05:00 )             40.8       10-13    140  |  107  |  23  ----------------------------<  154<H>  4.0   |  25  |  1.25    Ca    9.4      13 Oct 2023 05:00  Phos  2.8     10-13  Mg     1.80     10-13    TPro  6.3  /  Alb  3.5  /  TBili  2.9<H>  /  DBili  x   /  AST  15  /  ALT  17  /  AlkPhos  75  10-13      PT/INR - ( 13 Oct 2023 05:00 )   PT: 26.7 sec;   INR: 2.42 ratio         PTT - ( 13 Oct 2023 05:00 )  PTT:39.8 sec      
Surgery Progress Note     Subjective:  Patient seen and examined. Patient states his pain is improving and that he has tolerated his CLD. Patient denies nausea, vomiting, chest pain, SOB.       Vital Signs:  Vital Signs Last 24 Hrs  T(C): 36.7 (14 Oct 2023 05:00), Max: 36.9 (13 Oct 2023 17:46)  T(F): 98 (14 Oct 2023 05:00), Max: 98.5 (13 Oct 2023 17:46)  HR: 65 (14 Oct 2023 05:00) (57 - 68)  BP: 121/80 (14 Oct 2023 05:00) (116/65 - 145/71)  RR: 18 (14 Oct 2023 05:00) (18 - 18)  SpO2: 100% (14 Oct 2023 05:00) (97% - 100%)    Parameters below as of 14 Oct 2023 05:00  Patient On (Oxygen Delivery Method): room air        CAPILLARY BLOOD GLUCOSE      POCT Blood Glucose.: 118 mg/dL (13 Oct 2023 22:22)  POCT Blood Glucose.: 124 mg/dL (13 Oct 2023 17:52)  POCT Blood Glucose.: 140 mg/dL (13 Oct 2023 12:24)      I&O's Detail    13 Oct 2023 07:01  -  14 Oct 2023 07:00  --------------------------------------------------------  IN:    IV PiggyBack: 550 mL    Lactated Ringers: 600 mL    Oral Fluid: 480 mL  Total IN: 1630 mL    OUT:    Voided (mL): 1750 mL  Total OUT: 1750 mL    Total NET: -120 mL            Physical Exam:  General: NAD, resting comfortably in bed  Respiratory: Nonlabored respirations  Cardio: pulse present  Abdomen: softly distended, mild TTP in RLQ   Vascular: extremities are warm and well perfused.       Labs:    10-14    141  |  109<H>  |  16  ----------------------------<  144<H>  4.0   |  25  |  1.12    Ca    9.1      14 Oct 2023 05:10  Phos  2.9     10-14  Mg     1.90     10-14    TPro  6.3  /  Alb  3.5  /  TBili  2.9<H>  /  DBili  x   /  AST  15  /  ALT  17  /  AlkPhos  75  10-13    LIVER FUNCTIONS - ( 13 Oct 2023 05:00 )  Alb: 3.5 g/dL / Pro: 6.3 g/dL / ALK PHOS: 75 U/L / ALT: 17 U/L / AST: 15 U/L / GGT: x                                 13.2   6.28  )-----------( 166      ( 14 Oct 2023 05:10 )             38.2     PT/INR - ( 14 Oct 2023 05:10 )   PT: 26.4 sec;   INR: 2.42 ratio         PTT - ( 14 Oct 2023 05:10 )  PTT:41.6 sec

## 2023-10-14 NOTE — DISCHARGE NOTE PROVIDER - CARE PROVIDER_API CALL
Nate Barrientos  Surgery  [address] [place] [address], [address]  [place] [address]  Phone: [phone]  Fax: [phone]  Follow Up Time: 1 week

## 2023-10-14 NOTE — PROGRESS NOTE ADULT - ASSESSMENT
78M PMhx HTN, DM, HLD, GERD, thyroid nodule DVT/PE, Mechanical MVR, AFib on Coumadin, DVT/PE, BPH, presents with RLQ abdominal pain since Saturday 10/7. CT A/P obtained demonstrating cecal diverticulitis w 1.4cm intramural abscess.     PLAN  - F/u INR  - Abx: Cipro and Flagyl  - Diet: adv to LRD   - C/w warfarin  - pain control  - OOB/ambulating  - Dispo pending, 21 days of cipro/flagyl upon discharge     A Team Surgery  [id number]

## 2023-10-14 NOTE — DISCHARGE NOTE PROVIDER - NSDCCPCAREPLAN_GEN_ALL_CORE_FT
PRINCIPAL DISCHARGE DIAGNOSIS  Diagnosis: Cecal diverticulitis  Assessment and Plan of Treatment: NOTIFY YOUR SURGEON IF: You have any excessive bleeding or pus draining from your wound, any fever (over 101 F) or chills, persistent nausea/vomiting with inability to tolerate food or liquids, persistent diarrhea, or if your pain is not controlled on your discharge pain medications.  MEDICATIONS: You are being discharged on two antibiotics. Please take ciprofloxacin 2 times a day for 21 days. Please take flagyl (metronidazole) 3 times a day for 21 days. Please make sure to complete your course of antibiotics.   FOLLOW-UP:  1. Please call to make a follow-up appointment in 1 week upon discharge from the hospital - Please call immediately upon discharge to schedule the appointment with Dr. Barrientos. You would need to follow up within 1 week to schedule a repeat CT scan with Dr. Barrientos's office.   2. Please follow up with your primary care physician in one week regarding your hospitalization.

## 2023-10-14 NOTE — PROGRESS NOTE ADULT - ATTENDING COMMENTS
Perforated right sided diverticulitis  -IV abx  -LRD  -coumadin  -oob
Perforated diverticulitis  -IV abx  -clears  -pt w/ persistent RLQ pain  -Coumadin  -OOB

## 2023-10-14 NOTE — DISCHARGE NOTE PROVIDER - NSDCFUSCHEDAPPT_GEN_ALL_CORE_FT
[facility]  INTMED OP [id number]  Scheduled Appointment: 10/18/2023    [facility]  ENDOCRIN OP [id number]  Scheduled Appointment: 11/07/2023    Ahsan Kenny  [facility]  UROLOGY [address] [place] [address]  Scheduled Appointment: 11/16/2023

## 2023-10-14 NOTE — DISCHARGE NOTE NURSING/CASE MANAGEMENT/SOCIAL WORK - PATIENT PORTAL LINK FT
You can access the FollowMyHealth Patient Portal offered by [facility] by registering at the following website: [email or web address]. By joining [facility]’s FollowMyHealth portal, you will also be able to view your health information using other applications (apps) compatible with our system.

## 2023-10-15 ENCOUNTER — TRANSCRIPTION ENCOUNTER (OUTPATIENT)
Age: 78
End: 2023-10-15

## 2023-10-18 ENCOUNTER — APPOINTMENT (OUTPATIENT)
Dept: INTERNAL MEDICINE | Facility: CLINIC | Age: 78
End: 2023-10-18

## 2023-10-18 ENCOUNTER — OUTPATIENT (OUTPATIENT)
Dept: OUTPATIENT SERVICES | Facility: HOSPITAL | Age: 78
LOS: 1 days | End: 2023-10-18

## 2023-10-18 VITALS — HEART RATE: 99 BPM | OXYGEN SATURATION: 99 %

## 2023-10-18 DIAGNOSIS — Z98.89 OTHER SPECIFIED POSTPROCEDURAL STATES: Chronic | ICD-10-CM

## 2023-10-18 DIAGNOSIS — Z86.69 PERSONAL HISTORY OF OTHER DISEASES OF THE NERVOUS SYSTEM AND SENSE ORGANS: Chronic | ICD-10-CM

## 2023-10-18 DIAGNOSIS — H26.9 UNSPECIFIED CATARACT: Chronic | ICD-10-CM

## 2023-10-18 DIAGNOSIS — Z98.890 OTHER SPECIFIED POSTPROCEDURAL STATES: Chronic | ICD-10-CM

## 2023-10-18 DIAGNOSIS — Z90.89 ACQUIRED ABSENCE OF OTHER ORGANS: Chronic | ICD-10-CM

## 2023-10-18 LAB
INR PPP: 3.3 RATIO
POCT-PROTHROMBIN TIME: 40 SECS
QUALITY CONTROL: YES

## 2023-10-19 DIAGNOSIS — Z79.01 LONG TERM (CURRENT) USE OF ANTICOAGULANTS: ICD-10-CM

## 2023-10-19 DIAGNOSIS — Z95.2 PRESENCE OF PROSTHETIC HEART VALVE: ICD-10-CM

## 2023-10-19 DIAGNOSIS — Z51.81 ENCOUNTER FOR THERAPEUTIC DRUG LEVEL MONITORING: ICD-10-CM

## 2023-10-20 ENCOUNTER — TRANSCRIPTION ENCOUNTER (OUTPATIENT)
Age: 78
End: 2023-10-20

## 2023-10-24 ENCOUNTER — APPOINTMENT (OUTPATIENT)
Dept: COLORECTAL SURGERY | Facility: CLINIC | Age: 78
End: 2023-10-24
Payer: MEDICARE

## 2023-10-24 VITALS
HEART RATE: 76 BPM | OXYGEN SATURATION: 100 % | TEMPERATURE: 98 F | SYSTOLIC BLOOD PRESSURE: 150 MMHG | DIASTOLIC BLOOD PRESSURE: 87 MMHG

## 2023-10-24 DIAGNOSIS — K57.32 DIVERTICULITIS OF LARGE INTESTINE W/OUT PERFORATION OR ABSCESS W/OUT BLEEDING: ICD-10-CM

## 2023-10-24 PROCEDURE — 99213 OFFICE O/P EST LOW 20 MIN: CPT

## 2023-10-24 RX ORDER — CICLOPIROX 2.28 G/ML
8 SOLUTION TOPICAL
Qty: 1 | Refills: 6 | Status: DISCONTINUED | COMMUNITY
Start: 2022-04-20 | End: 2023-10-24

## 2023-10-24 RX ORDER — METHIMAZOLE 5 MG/1
5 TABLET ORAL
Qty: 1 | Refills: 2 | Status: DISCONTINUED | COMMUNITY
Start: 2023-03-28 | End: 2023-10-24

## 2023-10-24 RX ORDER — WARFARIN 5 MG/1
5 TABLET ORAL
Qty: 90 | Refills: 3 | Status: DISCONTINUED | COMMUNITY
Start: 2019-05-23 | End: 2023-10-24

## 2023-10-24 RX ORDER — ENOXAPARIN SODIUM 80 MG/.8ML
80 INJECTION, SOLUTION SUBCUTANEOUS TWICE DAILY
Qty: 14 | Refills: 0 | Status: DISCONTINUED | COMMUNITY
Start: 2023-03-15 | End: 2023-10-24

## 2023-10-24 RX ORDER — CICLOPIROX 80 MG/ML
8 SOLUTION TOPICAL
Qty: 1 | Refills: 0 | Status: DISCONTINUED | COMMUNITY
Start: 2022-02-28 | End: 2023-10-24

## 2023-10-24 RX ORDER — PIOGLITAZONE HYDROCHLORIDE 15 MG/1
15 TABLET ORAL DAILY
Qty: 90 | Refills: 2 | Status: DISCONTINUED | COMMUNITY
Start: 2023-07-25 | End: 2023-10-24

## 2023-10-24 RX ORDER — CLOTRIMAZOLE 10 MG/G
1 CREAM TOPICAL
Qty: 1 | Refills: 0 | Status: DISCONTINUED | COMMUNITY
Start: 2022-11-11 | End: 2023-10-24

## 2023-10-24 RX ORDER — AMOXICILLIN 500 MG/1
500 TABLET, FILM COATED ORAL
Qty: 4 | Refills: 1 | Status: DISCONTINUED | COMMUNITY
Start: 2023-09-06 | End: 2023-10-24

## 2023-10-24 RX ORDER — CICLOPIROX OLAMINE 7.7 MG/G
0.77 CREAM TOPICAL DAILY
Qty: 2 | Refills: 0 | Status: DISCONTINUED | COMMUNITY
Start: 2021-12-06 | End: 2023-10-24

## 2023-10-24 RX ORDER — SILDENAFIL 100 MG/1
100 TABLET, FILM COATED ORAL
Qty: 30 | Refills: 3 | Status: DISCONTINUED | COMMUNITY
Start: 2022-04-06 | End: 2023-10-24

## 2023-10-24 RX ORDER — WARFARIN 7.5 MG/1
7.5 TABLET ORAL
Qty: 14 | Refills: 1 | Status: DISCONTINUED | COMMUNITY
Start: 2022-03-16 | End: 2023-10-24

## 2023-10-25 ENCOUNTER — TRANSCRIPTION ENCOUNTER (OUTPATIENT)
Age: 78
End: 2023-10-25

## 2023-11-01 ENCOUNTER — NON-APPOINTMENT (OUTPATIENT)
Age: 78
End: 2023-11-01

## 2023-11-01 ENCOUNTER — OUTPATIENT (OUTPATIENT)
Dept: OUTPATIENT SERVICES | Facility: HOSPITAL | Age: 78
LOS: 1 days | End: 2023-11-01
Payer: COMMERCIAL

## 2023-11-01 ENCOUNTER — APPOINTMENT (OUTPATIENT)
Dept: INTERNAL MEDICINE | Facility: CLINIC | Age: 78
End: 2023-11-01

## 2023-11-01 ENCOUNTER — APPOINTMENT (OUTPATIENT)
Dept: CT IMAGING | Facility: IMAGING CENTER | Age: 78
End: 2023-11-01
Payer: MEDICARE

## 2023-11-01 ENCOUNTER — OUTPATIENT (OUTPATIENT)
Dept: OUTPATIENT SERVICES | Facility: HOSPITAL | Age: 78
LOS: 1 days | End: 2023-11-01

## 2023-11-01 ENCOUNTER — RESULT REVIEW (OUTPATIENT)
Age: 78
End: 2023-11-01

## 2023-11-01 VITALS — OXYGEN SATURATION: 95 % | HEART RATE: 56 BPM

## 2023-11-01 DIAGNOSIS — H26.9 UNSPECIFIED CATARACT: Chronic | ICD-10-CM

## 2023-11-01 DIAGNOSIS — Z86.69 PERSONAL HISTORY OF OTHER DISEASES OF THE NERVOUS SYSTEM AND SENSE ORGANS: Chronic | ICD-10-CM

## 2023-11-01 DIAGNOSIS — Z51.81 ENCOUNTER FOR THERAPEUTIC DRUG LEVEL MONITORING: ICD-10-CM

## 2023-11-01 DIAGNOSIS — K57.90 DIVERTICULOSIS OF INTESTINE, PART UNSPECIFIED, WITHOUT PERFORATION OR ABSCESS WITHOUT BLEEDING: ICD-10-CM

## 2023-11-01 DIAGNOSIS — Z90.89 ACQUIRED ABSENCE OF OTHER ORGANS: Chronic | ICD-10-CM

## 2023-11-01 DIAGNOSIS — Z95.2 PRESENCE OF PROSTHETIC HEART VALVE: ICD-10-CM

## 2023-11-01 DIAGNOSIS — Z79.01 LONG TERM (CURRENT) USE OF ANTICOAGULANTS: ICD-10-CM

## 2023-11-01 DIAGNOSIS — Z00.8 ENCOUNTER FOR OTHER GENERAL EXAMINATION: ICD-10-CM

## 2023-11-01 DIAGNOSIS — Z98.890 OTHER SPECIFIED POSTPROCEDURAL STATES: Chronic | ICD-10-CM

## 2023-11-01 LAB
INR PPP: 4.7 RATIO
POCT-PROTHROMBIN TIME: 55.8 SECS
QUALITY CONTROL: YES

## 2023-11-01 PROCEDURE — 74177 CT ABD & PELVIS W/CONTRAST: CPT | Mod: 26

## 2023-11-01 PROCEDURE — 74177 CT ABD & PELVIS W/CONTRAST: CPT

## 2023-11-07 ENCOUNTER — APPOINTMENT (OUTPATIENT)
Dept: ENDOCRINOLOGY | Facility: CLINIC | Age: 78
End: 2023-11-07
Payer: MEDICARE

## 2023-11-07 ENCOUNTER — RESULT CHARGE (OUTPATIENT)
Age: 78
End: 2023-11-07

## 2023-11-07 ENCOUNTER — OUTPATIENT (OUTPATIENT)
Dept: OUTPATIENT SERVICES | Facility: HOSPITAL | Age: 78
LOS: 1 days | End: 2023-11-07

## 2023-11-07 VITALS
WEIGHT: 166 LBS | HEART RATE: 60 BPM | SYSTOLIC BLOOD PRESSURE: 138 MMHG | DIASTOLIC BLOOD PRESSURE: 71 MMHG | RESPIRATION RATE: 16 BRPM | TEMPERATURE: 97.8 F | OXYGEN SATURATION: 100 % | BODY MASS INDEX: 24.59 KG/M2 | HEIGHT: 69 IN

## 2023-11-07 DIAGNOSIS — E04.2 NONTOXIC MULTINODULAR GOITER: ICD-10-CM

## 2023-11-07 DIAGNOSIS — Z98.890 OTHER SPECIFIED POSTPROCEDURAL STATES: Chronic | ICD-10-CM

## 2023-11-07 DIAGNOSIS — H26.9 UNSPECIFIED CATARACT: Chronic | ICD-10-CM

## 2023-11-07 DIAGNOSIS — Z90.89 ACQUIRED ABSENCE OF OTHER ORGANS: Chronic | ICD-10-CM

## 2023-11-07 DIAGNOSIS — E11.9 TYPE 2 DIABETES MELLITUS WITHOUT COMPLICATIONS: ICD-10-CM

## 2023-11-07 DIAGNOSIS — E05.90 THYROTOXICOSIS, UNSPECIFIED WITHOUT THYROTOXIC CRISIS OR STORM: ICD-10-CM

## 2023-11-07 DIAGNOSIS — Z98.89 OTHER SPECIFIED POSTPROCEDURAL STATES: Chronic | ICD-10-CM

## 2023-11-07 LAB — HBA1C MFR BLD HPLC: 7.5

## 2023-11-07 PROCEDURE — 99214 OFFICE O/P EST MOD 30 MIN: CPT | Mod: GC

## 2023-11-07 RX ORDER — GLIPIZIDE 10 MG/1
10 TABLET ORAL
Qty: 30 | Refills: 3 | Status: COMPLETED | COMMUNITY
Start: 2023-07-06 | End: 2023-11-07

## 2023-11-09 ENCOUNTER — NON-APPOINTMENT (OUTPATIENT)
Age: 78
End: 2023-11-09

## 2023-11-09 LAB
ALBUMIN SERPL ELPH-MCNC: 4.3 G/DL
ALP BLD-CCNC: 91 U/L
ALT SERPL-CCNC: 45 U/L
ANION GAP SERPL CALC-SCNC: 11 MMOL/L
AST SERPL-CCNC: 28 U/L
BILIRUB SERPL-MCNC: 2.4 MG/DL
BUN SERPL-MCNC: 24 MG/DL
CALCIUM SERPL-MCNC: 9.7 MG/DL
CHLORIDE SERPL-SCNC: 107 MMOL/L
CO2 SERPL-SCNC: 26 MMOL/L
CREAT SERPL-MCNC: 1 MG/DL
CREAT SPEC-SCNC: 68 MG/DL
EGFR: 78 ML/MIN/1.73M2
GLUCOSE SERPL-MCNC: 152 MG/DL
MICROALBUMIN 24H UR DL<=1MG/L-MCNC: 2 MG/DL
MICROALBUMIN/CREAT 24H UR-RTO: 30 MG/G
POTASSIUM SERPL-SCNC: 4.5 MMOL/L
PROT SERPL-MCNC: 6.9 G/DL
SODIUM SERPL-SCNC: 143 MMOL/L
T3 SERPL-MCNC: 95 NG/DL
T4 FREE SERPL-MCNC: 1.6 NG/DL
TSH SERPL-ACNC: 0.02 UIU/ML

## 2023-11-14 NOTE — ED ADULT NURSE NOTE - NS ED PATIENT SAFETY CONCERN
- new rx for albuterol and advair sent to pharmacy  - referral to PDM to assist with technique and education  - encouraged to stop smoking   No

## 2023-11-16 ENCOUNTER — NON-APPOINTMENT (OUTPATIENT)
Age: 78
End: 2023-11-16

## 2023-11-16 ENCOUNTER — OUTPATIENT (OUTPATIENT)
Dept: OUTPATIENT SERVICES | Facility: HOSPITAL | Age: 78
LOS: 1 days | End: 2023-11-16

## 2023-11-16 ENCOUNTER — APPOINTMENT (OUTPATIENT)
Dept: INTERNAL MEDICINE | Facility: CLINIC | Age: 78
End: 2023-11-16

## 2023-11-16 ENCOUNTER — APPOINTMENT (OUTPATIENT)
Dept: UROLOGY | Facility: CLINIC | Age: 78
End: 2023-11-16
Payer: MEDICARE

## 2023-11-16 VITALS
BODY MASS INDEX: 23.99 KG/M2 | HEART RATE: 66 BPM | SYSTOLIC BLOOD PRESSURE: 133 MMHG | DIASTOLIC BLOOD PRESSURE: 79 MMHG | WEIGHT: 162 LBS | HEIGHT: 69 IN

## 2023-11-16 VITALS — HEART RATE: 70 BPM | OXYGEN SATURATION: 98 %

## 2023-11-16 DIAGNOSIS — Z98.890 OTHER SPECIFIED POSTPROCEDURAL STATES: Chronic | ICD-10-CM

## 2023-11-16 DIAGNOSIS — Z98.89 OTHER SPECIFIED POSTPROCEDURAL STATES: Chronic | ICD-10-CM

## 2023-11-16 DIAGNOSIS — H26.9 UNSPECIFIED CATARACT: Chronic | ICD-10-CM

## 2023-11-16 LAB
INR PPP: 3.8 RATIO
POCT-PROTHROMBIN TIME: 45.5 SECS
QUALITY CONTROL: YES

## 2023-11-16 PROCEDURE — 99214 OFFICE O/P EST MOD 30 MIN: CPT

## 2023-11-20 DIAGNOSIS — Z98.890 OTHER SPECIFIED POSTPROCEDURAL STATES: ICD-10-CM

## 2023-11-20 DIAGNOSIS — Z79.01 LONG TERM (CURRENT) USE OF ANTICOAGULANTS: ICD-10-CM

## 2023-11-20 DIAGNOSIS — Z51.81 ENCOUNTER FOR THERAPEUTIC DRUG LEVEL MONITORING: ICD-10-CM

## 2023-11-29 ENCOUNTER — OUTPATIENT (OUTPATIENT)
Dept: OUTPATIENT SERVICES | Facility: HOSPITAL | Age: 78
LOS: 1 days | End: 2023-11-29

## 2023-11-29 ENCOUNTER — NON-APPOINTMENT (OUTPATIENT)
Age: 78
End: 2023-11-29

## 2023-11-29 ENCOUNTER — APPOINTMENT (OUTPATIENT)
Dept: INTERNAL MEDICINE | Facility: CLINIC | Age: 78
End: 2023-11-29

## 2023-11-29 VITALS — HEART RATE: 77 BPM | OXYGEN SATURATION: 99 %

## 2023-11-29 DIAGNOSIS — Z98.89 OTHER SPECIFIED POSTPROCEDURAL STATES: Chronic | ICD-10-CM

## 2023-11-29 DIAGNOSIS — Z90.89 ACQUIRED ABSENCE OF OTHER ORGANS: Chronic | ICD-10-CM

## 2023-11-29 DIAGNOSIS — H26.9 UNSPECIFIED CATARACT: Chronic | ICD-10-CM

## 2023-11-29 DIAGNOSIS — Z98.890 OTHER SPECIFIED POSTPROCEDURAL STATES: Chronic | ICD-10-CM

## 2023-11-29 LAB
INR PPP: 3.1 RATIO
POCT-PROTHROMBIN TIME: 37.6 SECS
QUALITY CONTROL: YES

## 2023-11-30 DIAGNOSIS — Z79.01 LONG TERM (CURRENT) USE OF ANTICOAGULANTS: ICD-10-CM

## 2023-11-30 DIAGNOSIS — Z51.81 ENCOUNTER FOR THERAPEUTIC DRUG LEVEL MONITORING: ICD-10-CM

## 2023-12-06 NOTE — CONSULT NOTE ADULT - ATTENDING SUPERVISION STATEMENT
Resident
Fellow
Patient is a 46-year-old male who presents to the emergency room for reported lethargy.  Past medical history of bipolar disorder and newly diagnosed pancreatic mass currently undergoing work-up has appoint with oncology tomorrow.  Patient was out for a holiday party with his group home when his aide was concerned that he appeared to be pale was not as interactive so EMS was called and patient was sent to the emergency room for evaluation.  Patient reports no complaints and is upset that he is currently in the hospital.  Patient denies any headache visual changes nausea vomiting chest pain shortness of breath or abdominal pain.  Was able to reach patient's mother Cadence who advised that the patient was recently admitted to [facility] for newly diagnosed pancreatic mass.  Has his first oncology appointment tomorrow.  Was able to review labs and imaging from that hospitalization stay.  Patient did have a leukocytosis with a white count of 21.  Patient presenting to the emergency room for reported episode of lethargy today now at baseline.  Patient denies all complaints.  Will obtain screening labs check cardiac enzymes EKG monitor.  At the time of my exam patient was not tachycardic heart rate was 90.
Patient is a 46-year-old male who presents to the emergency room for reported lethargy.  Past medical history of bipolar disorder and newly diagnosed pancreatic mass currently undergoing work-up has appoint with oncology tomorrow.  Patient was out for a holiday party with his group home when his aide was concerned that he appeared to be pale was not as interactive so EMS was called and patient was sent to the emergency room for evaluation.  Patient reports no complaints and is upset that he is currently in the hospital.  Patient denies any headache visual changes nausea vomiting chest pain shortness of breath or abdominal pain.  Was able to reach patient's mother Cadence who advised that the patient was recently admitted to [facility] for newly diagnosed pancreatic mass.  Has his first oncology appointment tomorrow.  Was able to review labs and imaging from that hospitalization stay.  Patient did have a leukocytosis with a white count of 21.  Patient presenting to the emergency room for reported episode of lethargy today now at baseline.  Patient denies all complaints.  Will obtain screening labs check cardiac enzymes EKG monitor.  At the time of my exam patient was not tachycardic heart rate was 90.

## 2023-12-11 ENCOUNTER — RX RENEWAL (OUTPATIENT)
Age: 78
End: 2023-12-11

## 2023-12-13 ENCOUNTER — APPOINTMENT (OUTPATIENT)
Dept: INTERNAL MEDICINE | Facility: CLINIC | Age: 78
End: 2023-12-13

## 2023-12-13 ENCOUNTER — OUTPATIENT (OUTPATIENT)
Dept: OUTPATIENT SERVICES | Facility: HOSPITAL | Age: 78
LOS: 1 days | End: 2023-12-13

## 2023-12-13 VITALS — HEART RATE: 64 BPM | OXYGEN SATURATION: 98 %

## 2023-12-13 DIAGNOSIS — Z98.890 OTHER SPECIFIED POSTPROCEDURAL STATES: Chronic | ICD-10-CM

## 2023-12-13 DIAGNOSIS — Z90.89 ACQUIRED ABSENCE OF OTHER ORGANS: Chronic | ICD-10-CM

## 2023-12-13 DIAGNOSIS — Z86.69 PERSONAL HISTORY OF OTHER DISEASES OF THE NERVOUS SYSTEM AND SENSE ORGANS: Chronic | ICD-10-CM

## 2023-12-14 LAB
INR PPP: 3.1 RATIO
POCT-PROTHROMBIN TIME: 37.5 SECS
QUALITY CONTROL: YES

## 2023-12-15 DIAGNOSIS — Z51.81 ENCOUNTER FOR THERAPEUTIC DRUG LEVEL MONITORING: ICD-10-CM

## 2023-12-15 DIAGNOSIS — Z79.01 LONG TERM (CURRENT) USE OF ANTICOAGULANTS: ICD-10-CM

## 2023-12-15 DIAGNOSIS — Z95.2 PRESENCE OF PROSTHETIC HEART VALVE: ICD-10-CM

## 2023-12-26 ENCOUNTER — NON-APPOINTMENT (OUTPATIENT)
Age: 78
End: 2023-12-26

## 2023-12-26 ENCOUNTER — OUTPATIENT (OUTPATIENT)
Dept: OUTPATIENT SERVICES | Facility: HOSPITAL | Age: 78
LOS: 1 days | End: 2023-12-26

## 2023-12-26 ENCOUNTER — APPOINTMENT (OUTPATIENT)
Dept: INTERNAL MEDICINE | Facility: CLINIC | Age: 78
End: 2023-12-26
Payer: MEDICARE

## 2023-12-26 ENCOUNTER — MED ADMIN CHARGE (OUTPATIENT)
Age: 78
End: 2023-12-26

## 2023-12-26 ENCOUNTER — LABORATORY RESULT (OUTPATIENT)
Age: 78
End: 2023-12-26

## 2023-12-26 VITALS
DIASTOLIC BLOOD PRESSURE: 64 MMHG | SYSTOLIC BLOOD PRESSURE: 120 MMHG | HEIGHT: 69 IN | OXYGEN SATURATION: 99 % | BODY MASS INDEX: 24.73 KG/M2 | HEART RATE: 74 BPM | WEIGHT: 167 LBS

## 2023-12-26 DIAGNOSIS — R41.3 OTHER AMNESIA: ICD-10-CM

## 2023-12-26 DIAGNOSIS — Z90.89 ACQUIRED ABSENCE OF OTHER ORGANS: Chronic | ICD-10-CM

## 2023-12-26 DIAGNOSIS — Z98.89 OTHER SPECIFIED POSTPROCEDURAL STATES: Chronic | ICD-10-CM

## 2023-12-26 DIAGNOSIS — Z23 ENCOUNTER FOR IMMUNIZATION: ICD-10-CM

## 2023-12-26 DIAGNOSIS — H26.9 UNSPECIFIED CATARACT: Chronic | ICD-10-CM

## 2023-12-26 DIAGNOSIS — R42 DIZZINESS AND GIDDINESS: ICD-10-CM

## 2023-12-26 DIAGNOSIS — Z98.890 OTHER SPECIFIED POSTPROCEDURAL STATES: Chronic | ICD-10-CM

## 2023-12-26 LAB
INR PPP: 3.9 RATIO
POCT-PROTHROMBIN TIME: 46.2 SECS
QUALITY CONTROL: YES

## 2023-12-26 PROCEDURE — 99213 OFFICE O/P EST LOW 20 MIN: CPT | Mod: GE,25

## 2023-12-27 ENCOUNTER — APPOINTMENT (OUTPATIENT)
Dept: INTERNAL MEDICINE | Facility: CLINIC | Age: 78
End: 2023-12-27

## 2023-12-27 DIAGNOSIS — R41.3 OTHER AMNESIA: ICD-10-CM

## 2023-12-27 DIAGNOSIS — I10 ESSENTIAL (PRIMARY) HYPERTENSION: ICD-10-CM

## 2023-12-27 DIAGNOSIS — E11.9 TYPE 2 DIABETES MELLITUS WITHOUT COMPLICATIONS: ICD-10-CM

## 2023-12-27 DIAGNOSIS — Z79.01 LONG TERM (CURRENT) USE OF ANTICOAGULANTS: ICD-10-CM

## 2023-12-27 DIAGNOSIS — M19.021 PRIMARY OSTEOARTHRITIS, RIGHT ELBOW: ICD-10-CM

## 2023-12-27 DIAGNOSIS — R42 DIZZINESS AND GIDDINESS: ICD-10-CM

## 2023-12-27 DIAGNOSIS — Z95.2 PRESENCE OF PROSTHETIC HEART VALVE: ICD-10-CM

## 2023-12-27 DIAGNOSIS — Z51.81 ENCOUNTER FOR THERAPEUTIC DRUG LEVEL MONITORING: ICD-10-CM

## 2023-12-27 NOTE — PHYSICAL EXAM
[No Acute Distress] : no acute distress [Well Nourished] : well nourished [Well Developed] : well developed [Well-Appearing] : well-appearing [Normal Sclera/Conjunctiva] : normal sclera/conjunctiva [EOMI] : extraocular movements intact [No Respiratory Distress] : no respiratory distress  [No Accessory Muscle Use] : no accessory muscle use [Clear to Auscultation] : lungs were clear to auscultation bilaterally [No Edema] : there was no peripheral edema [Soft] : abdomen soft [Non Tender] : non-tender [Non-distended] : non-distended [No Rash] : no rash [No Focal Deficits] : no focal deficits [Normal Affect] : the affect was normal [Normal Insight/Judgement] : insight and judgment were intact [de-identified] : Irregular rhythm, regular rate, mechanical systolic murmur best heard at LLSB [de-identified] : R elbow TTP at medial side. Full ROM with extension/flexion. Limited ROM with supination/pronation. No numbness/tingling in fingers.

## 2023-12-27 NOTE — ASSESSMENT
[FreeTextEntry1] : RTC in 5 weeks for f/u R elbow pain, DM2, discussion of labs, short term memory loss. Also refilled atenolol, digoxin, methimazole, atorvastatin.  Case discussed with Dr. Crow Medel, PGY-1

## 2023-12-27 NOTE — HISTORY OF PRESENT ILLNESS
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  Recently seen by endocrinology and has continued on Trulicity 1.5mg once a week. However, patient states that he has been more forgetful since being on Trulicity. He primarily complains of short term memory loss, such as forgetting why he walked into a room. However, he denies long term memory loss. He has not forgotten how to drive to the store or home and he has not forgotten the names of his children or grandchildren. Patient states he discontinued the Trulicity for some time, and denied short term memory loss in that time.   Regarding his other acute complaint, he has been having R sided elbow pain for the past month and a half. He states that the pain worsens with palpation, and with pronation/supination. Still has full ROM with flexion/extension, and does not have pain at rest. Denies fever/chills. Denies numbness/tingling, but does have some radiation of pain. Denies trauma/falls/LOC.  Regarding his diabetes, he has been on Trulicity 1.5 and on repaglinide 1mg TID with meals. Patient states that he has measured his BG regularly and numbers have been between 90s-160s in the AM. Denies low blood sugars, and denies shaking chills or confusion associated with hypoglycemia.  He also measures his BP at home regularly. SBP is usually between 110s-140s. Does have orthostatic hypotension when getting up quickly.  INR is elevated in office today to 3.9. Recently increased to warfarin 4mg 6x a week and 5mg 1x a week. However, previously while on 4mg 7x a week his INR was 3.1. Denies bloody BMs, hematuria, hematemesis.  Has had weight loss iso trulicity. Has also had palpitations, although these have both been stable for some time.  Labs performed recently and followed by endocrinology for subclinical hyperthyroidism/T2DM, by cardiology for afib, MVR, and CAD, as well as by urology for erectile dysfunction in the setting of arterial insufficiency.

## 2023-12-28 LAB
FOLATE SERPL-MCNC: 14.6 NG/ML
HCT VFR BLD CALC: 43.2 %
HGB BLD-MCNC: 14.6 G/DL
MCHC RBC-ENTMCNC: 32.5 PG
MCHC RBC-ENTMCNC: 33.8 GM/DL
MCV RBC AUTO: 96.2 FL
PLATELET # BLD AUTO: 222 K/UL
RBC # BLD: 4.49 M/UL
RBC # FLD: 12.9 %
TSH SERPL-ACNC: <0.01 UIU/ML
VIT B12 SERPL-MCNC: 810 PG/ML
WBC # FLD AUTO: 9.72 K/UL

## 2024-01-10 ENCOUNTER — APPOINTMENT (OUTPATIENT)
Dept: RADIOLOGY | Facility: IMAGING CENTER | Age: 79
End: 2024-01-10
Payer: MEDICARE

## 2024-01-10 ENCOUNTER — NON-APPOINTMENT (OUTPATIENT)
Age: 79
End: 2024-01-10

## 2024-01-10 ENCOUNTER — APPOINTMENT (OUTPATIENT)
Dept: INTERNAL MEDICINE | Facility: CLINIC | Age: 79
End: 2024-01-10

## 2024-01-10 ENCOUNTER — OUTPATIENT (OUTPATIENT)
Dept: OUTPATIENT SERVICES | Facility: HOSPITAL | Age: 79
LOS: 1 days | End: 2024-01-10

## 2024-01-10 ENCOUNTER — OUTPATIENT (OUTPATIENT)
Dept: OUTPATIENT SERVICES | Facility: HOSPITAL | Age: 79
LOS: 1 days | End: 2024-01-10
Payer: COMMERCIAL

## 2024-01-10 VITALS — HEART RATE: 67 BPM | OXYGEN SATURATION: 99 %

## 2024-01-10 DIAGNOSIS — H26.9 UNSPECIFIED CATARACT: Chronic | ICD-10-CM

## 2024-01-10 DIAGNOSIS — Z90.89 ACQUIRED ABSENCE OF OTHER ORGANS: Chronic | ICD-10-CM

## 2024-01-10 DIAGNOSIS — Z86.69 PERSONAL HISTORY OF OTHER DISEASES OF THE NERVOUS SYSTEM AND SENSE ORGANS: Chronic | ICD-10-CM

## 2024-01-10 DIAGNOSIS — Z98.89 OTHER SPECIFIED POSTPROCEDURAL STATES: Chronic | ICD-10-CM

## 2024-01-10 DIAGNOSIS — Z00.8 ENCOUNTER FOR OTHER GENERAL EXAMINATION: ICD-10-CM

## 2024-01-10 DIAGNOSIS — Z98.890 OTHER SPECIFIED POSTPROCEDURAL STATES: Chronic | ICD-10-CM

## 2024-01-10 LAB
INR PPP: 2.5 RATIO
POCT-PROTHROMBIN TIME: 29.5 SECS
QUALITY CONTROL: YES

## 2024-01-10 PROCEDURE — 73070 X-RAY EXAM OF ELBOW: CPT

## 2024-01-10 PROCEDURE — 73070 X-RAY EXAM OF ELBOW: CPT | Mod: 26,RT

## 2024-01-12 DIAGNOSIS — Z51.81 ENCOUNTER FOR THERAPEUTIC DRUG LEVEL MONITORING: ICD-10-CM

## 2024-01-12 DIAGNOSIS — Z79.01 LONG TERM (CURRENT) USE OF ANTICOAGULANTS: ICD-10-CM

## 2024-01-16 PROBLEM — N52.01 ERECTILE DYSFUNCTION DUE TO ARTERIAL INSUFFICIENCY: Status: ACTIVE | Noted: 2019-06-05

## 2024-01-18 ENCOUNTER — APPOINTMENT (OUTPATIENT)
Dept: UROLOGY | Facility: CLINIC | Age: 79
End: 2024-01-18

## 2024-01-18 DIAGNOSIS — N52.01 ERECTILE DYSFUNCTION DUE TO ARTERIAL INSUFFICIENCY: ICD-10-CM

## 2024-01-18 NOTE — ASSESSMENT
[FreeTextEntry1] : The patient returns for follow-up. He has been injecting 0.4 to 0.6 cc of the Trimix. He states that he has not gotten his good an erection at home as he has in the office. I have asked him to return to the office for second injection training and to bring his medication and the ice pack.  Blood test demonstrated hematocrit of 44.8%, testosterone free 7.7 pg/mL with a total testosterone of 416 ng/dL, PSA 1.41 ng/mL, LH 5.3 IU/L, vitamin D 25 was 32.7 ng/mL, prolactin 22.1 ng/mL, TSH 0.09 IU/mL, estradiol 20 pg/mL, hemoglobin A1c 7.3% and medical evaluation was suggested. His serum glucose was 231 mg/dL with a BUN of 25 mg/dL. His lipoprotein a was less than 9 nmol/L.  Penile duplex ultrasound demonstrated arteriogenic dysfunction. Under cardiac care for significant cardiovascular disease.  I reviewed the process with him. He will be injecting 0.6 cc of the higher dose trimix.  Telehealth Consultation: 30 minutes  10 minutes reviewing his history and discussing prior results.  20 minutes discussing various treatment options, writing medication prescriptions, requests for lab testing and writing his note. There was also additional time in preparing for the visit and assisting the patient with technology issues he was having with the telehealth platform.

## 2024-01-18 NOTE — HISTORY OF PRESENT ILLNESS
[FreeTextEntry1] : The patient-doctor. relationship has been established in a face-to-face fashion on real-time video audio HIPAA compliant communication using telemedicine software. The patient was at home and the physician was in his office. The patient's identity has been confirmed.  The patient was previously emailed a copy of the telemedicine consent.  The patient has had a chance to review and has now given verbal consent and has requested care to be assessed and treated through telemedicine. The patient understands there may be limitations in this process and that they need not need further follow-up care in the office and/or hospital settings. We were unable to use the [employer] platform and an alternative platform was utilized.  The patient was at home and I was in the office.  Verbal consent was given on Thursday, January 18, 2024 at 9 AM by the patient.  It was requested by the physician.  A written consent was previously sent for the patient to sign and return.  The patient returns for follow-up.  He has been injecting 0.4 to 0.6 cc of the Trimix.  PMH: Patient initially presented with diabetes, hypertension on "blood thinners" for a mitral valve replacement  a chief complaint of erectile dysfunction. He had a TURP on 3/3/2022. He states that his ED has been present for the past 2 years. It causes both he and his partner great stress.  I reviewed the questionnaire he completed in detail. His erections are not modified with the degree of sexual stimulation.   He states that his erections presently are often less than 5 out of 10 in both tumescence and rigidity, insufficient for penetration.   He often ejaculates through a flaccid phallus.  He has difficulty maintaining an erection. He describes a normal libido.  His sexual dysfunction occurs with both sexual relations and masturbation.  His erections are not improved with PDE5 inhibitors.    His partner is understanding and was unable to be with him at the visit today. He is not  and has 5 adult children and 12 grandchildren.    The patient denies fevers, chills, nausea and/or vomiting and no unexplained weight loss.  His past medical history is non-contributory.  In his present occupation he has no known toxin exposure. He does not smoke and drinks socially.  He has no known drug allergies. His review of systems and past medical history demonstrates no significant urologic issues (see patient completed questionnaire). His family history demonstrates no significant urologic issues.

## 2024-01-24 ENCOUNTER — NON-APPOINTMENT (OUTPATIENT)
Age: 79
End: 2024-01-24

## 2024-01-24 ENCOUNTER — APPOINTMENT (OUTPATIENT)
Dept: INTERNAL MEDICINE | Facility: CLINIC | Age: 79
End: 2024-01-24

## 2024-01-24 ENCOUNTER — OUTPATIENT (OUTPATIENT)
Dept: OUTPATIENT SERVICES | Facility: HOSPITAL | Age: 79
LOS: 1 days | End: 2024-01-24

## 2024-01-24 VITALS — OXYGEN SATURATION: 98 % | HEART RATE: 58 BPM

## 2024-01-24 DIAGNOSIS — H26.9 UNSPECIFIED CATARACT: Chronic | ICD-10-CM

## 2024-01-24 DIAGNOSIS — Z86.69 PERSONAL HISTORY OF OTHER DISEASES OF THE NERVOUS SYSTEM AND SENSE ORGANS: Chronic | ICD-10-CM

## 2024-01-24 LAB
INR PPP: 2.7 RATIO
POCT-PROTHROMBIN TIME: 32.1 SECS
QUALITY CONTROL: YES

## 2024-01-25 DIAGNOSIS — Z51.81 ENCOUNTER FOR THERAPEUTIC DRUG LEVEL MONITORING: ICD-10-CM

## 2024-01-25 DIAGNOSIS — Z79.01 LONG TERM (CURRENT) USE OF ANTICOAGULANTS: ICD-10-CM

## 2024-01-25 DIAGNOSIS — Z98.890 OTHER SPECIFIED POSTPROCEDURAL STATES: ICD-10-CM

## 2024-01-30 ENCOUNTER — APPOINTMENT (OUTPATIENT)
Dept: INTERNAL MEDICINE | Facility: CLINIC | Age: 79
End: 2024-01-30
Payer: MEDICARE

## 2024-01-30 ENCOUNTER — OUTPATIENT (OUTPATIENT)
Dept: OUTPATIENT SERVICES | Facility: HOSPITAL | Age: 79
LOS: 1 days | End: 2024-01-30

## 2024-01-30 VITALS
BODY MASS INDEX: 2.52 KG/M2 | WEIGHT: 17 LBS | HEIGHT: 69 IN | HEART RATE: 57 BPM | OXYGEN SATURATION: 98 % | DIASTOLIC BLOOD PRESSURE: 82 MMHG | SYSTOLIC BLOOD PRESSURE: 140 MMHG

## 2024-01-30 DIAGNOSIS — Z90.89 ACQUIRED ABSENCE OF OTHER ORGANS: Chronic | ICD-10-CM

## 2024-01-30 DIAGNOSIS — M19.021 PRIMARY OSTEOARTHRITIS, RIGHT ELBOW: ICD-10-CM

## 2024-01-30 DIAGNOSIS — H26.9 UNSPECIFIED CATARACT: Chronic | ICD-10-CM

## 2024-01-30 DIAGNOSIS — Z98.89 OTHER SPECIFIED POSTPROCEDURAL STATES: Chronic | ICD-10-CM

## 2024-01-30 DIAGNOSIS — Z86.69 PERSONAL HISTORY OF OTHER DISEASES OF THE NERVOUS SYSTEM AND SENSE ORGANS: Chronic | ICD-10-CM

## 2024-01-30 DIAGNOSIS — Z98.890 OTHER SPECIFIED POSTPROCEDURAL STATES: Chronic | ICD-10-CM

## 2024-01-30 PROCEDURE — 99214 OFFICE O/P EST MOD 30 MIN: CPT | Mod: GC

## 2024-02-02 PROBLEM — M19.021 ARTHRITIS OF RIGHT ELBOW: Status: ACTIVE | Noted: 2023-12-26

## 2024-02-04 NOTE — PHYSICAL EXAM
[General Appearance - Well Developed] : well developed [General Appearance - Well Nourished] : well nourished [Normal Appearance] : normal appearance [Well Groomed] : well groomed [General Appearance - In No Acute Distress] : no acute distress [Abdomen Soft] : soft [Urethral Meatus] : meatus normal [Urinary Bladder Findings] : the bladder was normal on palpation [Scrotum] : the scrotum was normal [Testes Mass (___cm)] : there were no testicular masses [Skin Turgor] : supple [Oriented To Time, Place, And Person] : oriented to person, place, and time [Mood] : the mood was normal [Affect] : the affect was normal [Not Anxious] : not anxious [Normal Station and Gait] : the gait and station were normal for the patient's age

## 2024-02-05 NOTE — ASSESSMENT
[FreeTextEntry1] : RTC in 3 months for f/u DM2, concern for overflow incontinence and f/u after urology opinion  Case discussed with Dr. Crow Medel, PGY-1

## 2024-02-05 NOTE — PHYSICAL EXAM
[No Acute Distress] : no acute distress [Well Nourished] : well nourished [Well Developed] : well developed [Well-Appearing] : well-appearing [Normal Sclera/Conjunctiva] : normal sclera/conjunctiva [Normal Outer Ear/Nose] : the outer ears and nose were normal in appearance [No Respiratory Distress] : no respiratory distress  [No Accessory Muscle Use] : no accessory muscle use [Clear to Auscultation] : lungs were clear to auscultation bilaterally [Normal Rate] : normal rate  [Normal S1, S2] : normal S1 and S2 [No Murmur] : no murmur heard [No Edema] : there was no peripheral edema [No Spinal Tenderness] : no spinal tenderness [No Joint Swelling] : no joint swelling [No Rash] : no rash [No Focal Deficits] : no focal deficits [Normal Affect] : the affect was normal [Normal Insight/Judgement] : insight and judgment were intact [de-identified] : TTP suprapubic region

## 2024-02-05 NOTE — HISTORY OF PRESENT ILLNESS
[FreeTextEntry1] : Back pain and concern for overflow incontinence [de-identified] : 78M hx of CAD s/p CABG, afib, RHD s/p MVR on coumadin, DM2, Rosie Thompson tears, here with acute complaints and follow-up of DM2.  His primary acute complaint is pain around his pelvis due to accumulation of urine. He feels relief whenever he stands up or whenever he urinates. However, this pain will wake him up as he accumulates urine. This has not happened before. He denies incontinence, saddle anesthesia, fevers. He denies stress incontinence, hesitancy. Each time he urinates, his urine output is normal.  Regarding his blood glucoses, his blood glucoses are typically 120s-190s, and never drops below 90s.  He denies palpitations. He is taking his losartan, and rarely becomes lightheaded.  Regarding his arm, his pain is improved since last visit. He has been working out more, and his pain has overall improved in his muscle.  He denies short term memory loss.

## 2024-02-06 DIAGNOSIS — I10 ESSENTIAL (PRIMARY) HYPERTENSION: ICD-10-CM

## 2024-02-06 DIAGNOSIS — E11.9 TYPE 2 DIABETES MELLITUS WITHOUT COMPLICATIONS: ICD-10-CM

## 2024-02-06 DIAGNOSIS — N32.89 OTHER SPECIFIED DISORDERS OF BLADDER: ICD-10-CM

## 2024-02-06 DIAGNOSIS — I48.91 UNSPECIFIED ATRIAL FIBRILLATION: ICD-10-CM

## 2024-02-06 DIAGNOSIS — M19.021 PRIMARY OSTEOARTHRITIS, RIGHT ELBOW: ICD-10-CM

## 2024-02-08 ENCOUNTER — APPOINTMENT (OUTPATIENT)
Dept: UROLOGY | Facility: CLINIC | Age: 79
End: 2024-02-08
Payer: MEDICARE

## 2024-02-08 VITALS
DIASTOLIC BLOOD PRESSURE: 80 MMHG | BODY MASS INDEX: 23.7 KG/M2 | HEIGHT: 69 IN | WEIGHT: 160 LBS | SYSTOLIC BLOOD PRESSURE: 132 MMHG | HEART RATE: 74 BPM

## 2024-02-08 PROCEDURE — G2211 COMPLEX E/M VISIT ADD ON: CPT

## 2024-02-08 PROCEDURE — 99214 OFFICE O/P EST MOD 30 MIN: CPT

## 2024-02-08 RX ORDER — SILDENAFIL 100 MG/1
100 TABLET, FILM COATED ORAL
Qty: 6 | Refills: 2 | Status: ACTIVE | COMMUNITY
Start: 2024-02-08 | End: 1900-01-01

## 2024-02-08 RX ORDER — PAPAVERINE HYDROCHLORIDE 30 MG/ML
30 INJECTION, SOLUTION INTRAVENOUS
Qty: 5 | Refills: 2 | Status: ACTIVE | COMMUNITY
Start: 2023-02-21 | End: 1900-01-01

## 2024-02-08 NOTE — HISTORY OF PRESENT ILLNESS
[FreeTextEntry1] : The patient returns for follow-up.  He has been injecting  0.6 cc of the Trimix with fair results.  PMH: Patient initially presented with diabetes, hypertension on "blood thinners" for a mitral valve replacement  a chief complaint of erectile dysfunction. He had a TURP on 3/3/2022. He states that his ED has been present for the past 2 years. It causes both he and his partner great stress.  I reviewed the questionnaire he completed in detail. His erections are not modified with the degree of sexual stimulation.   He states that his erections presently are often less than 5 out of 10 in both tumescence and rigidity, insufficient for penetration.   He often ejaculates through a flaccid phallus.  He has difficulty maintaining an erection. He describes a normal libido.  His sexual dysfunction occurs with both sexual relations and masturbation.  His erections are not improved with PDE5 inhibitors.    His partner is understanding and was unable to be with him at the visit today. He is not  and has 5 adult children and 12 grandchildren.    The patient denies fevers, chills, nausea and/or vomiting and no unexplained weight loss.  His past medical history is non-contributory.  In his present occupation he has no known toxin exposure. He does not smoke and drinks socially.  He has no known drug allergies. His review of systems and past medical history demonstrates no significant urologic issues (see patient completed questionnaire). His family history demonstrates no significant urologic issues.

## 2024-02-08 NOTE — ASSESSMENT
[FreeTextEntry1] : The patient returns for follow-up.  He has been injecting 0.4 to 0.6 cc of the Trimix.  He states that he has not gotten his good an erection at home as he has in the office.  I have asked him to return to the office for second injection training and to bring his medication and the ice pack.  Blood test demonstrated hematocrit of 44.8%, testosterone free 7.7 pg/mL with a total testosterone of 416 ng/dL, PSA 1.41 ng/mL, LH 5.3 IU/L, vitamin D 25 was 32.7 ng/mL, prolactin 22.1 ng/mL, TSH 0.09 IU/mL, estradiol 20 pg/mL, hemoglobin A1c 7.3% and medical evaluation was suggested.  His serum glucose was 231 mg/dL with a BUN of 25 mg/dL.  His lipoprotein a was less than 9 nmol/L.  Penile duplex ultrasound demonstrated arteriogenic dysfunction.  Under cardiac care for significant cardiovascular disease.    I reviewed the process with him. He will be injecting 0.6 cc of the higher dose trimix. He will also try using 100 mg Sildenafil 1 1/2 hours prior to his penile injection we will discuss the efficacy in 4 weeks. We also reviewed proper injection etchnique.  Consultation: 30 minutes  10 minutes reviewing his history and discussing prior results.  20 minutes discussing various treatment options and writing for medications and writing his note. There was also additional time in preparing for the visit.

## 2024-02-12 ENCOUNTER — OUTPATIENT (OUTPATIENT)
Dept: OUTPATIENT SERVICES | Facility: HOSPITAL | Age: 79
LOS: 1 days | End: 2024-02-12

## 2024-02-12 ENCOUNTER — NON-APPOINTMENT (OUTPATIENT)
Age: 79
End: 2024-02-12

## 2024-02-12 ENCOUNTER — APPOINTMENT (OUTPATIENT)
Dept: INTERNAL MEDICINE | Facility: CLINIC | Age: 79
End: 2024-02-12

## 2024-02-12 DIAGNOSIS — Z98.890 OTHER SPECIFIED POSTPROCEDURAL STATES: Chronic | ICD-10-CM

## 2024-02-12 DIAGNOSIS — Z86.69 PERSONAL HISTORY OF OTHER DISEASES OF THE NERVOUS SYSTEM AND SENSE ORGANS: Chronic | ICD-10-CM

## 2024-02-12 DIAGNOSIS — Z90.89 ACQUIRED ABSENCE OF OTHER ORGANS: Chronic | ICD-10-CM

## 2024-02-12 DIAGNOSIS — Z98.89 OTHER SPECIFIED POSTPROCEDURAL STATES: Chronic | ICD-10-CM

## 2024-02-12 LAB
INR PPP: 2.1 RATIO
POCT-PROTHROMBIN TIME: 24.7 SECS
QUALITY CONTROL: YES

## 2024-02-15 DIAGNOSIS — Z79.01 LONG TERM (CURRENT) USE OF ANTICOAGULANTS: ICD-10-CM

## 2024-02-15 DIAGNOSIS — Z95.2 PRESENCE OF PROSTHETIC HEART VALVE: ICD-10-CM

## 2024-02-15 DIAGNOSIS — Z51.81 ENCOUNTER FOR THERAPEUTIC DRUG LEVEL MONITORING: ICD-10-CM

## 2024-02-26 ENCOUNTER — OUTPATIENT (OUTPATIENT)
Dept: OUTPATIENT SERVICES | Facility: HOSPITAL | Age: 79
LOS: 1 days | End: 2024-02-26

## 2024-02-26 ENCOUNTER — NON-APPOINTMENT (OUTPATIENT)
Age: 79
End: 2024-02-26

## 2024-02-26 ENCOUNTER — APPOINTMENT (OUTPATIENT)
Dept: INTERNAL MEDICINE | Facility: CLINIC | Age: 79
End: 2024-02-26

## 2024-02-26 VITALS — HEART RATE: 59 BPM | OXYGEN SATURATION: 99 %

## 2024-02-26 DIAGNOSIS — Z86.69 PERSONAL HISTORY OF OTHER DISEASES OF THE NERVOUS SYSTEM AND SENSE ORGANS: Chronic | ICD-10-CM

## 2024-02-26 DIAGNOSIS — Z98.89 OTHER SPECIFIED POSTPROCEDURAL STATES: Chronic | ICD-10-CM

## 2024-02-26 DIAGNOSIS — Z98.890 OTHER SPECIFIED POSTPROCEDURAL STATES: Chronic | ICD-10-CM

## 2024-02-26 DIAGNOSIS — Z90.89 ACQUIRED ABSENCE OF OTHER ORGANS: Chronic | ICD-10-CM

## 2024-02-26 LAB
INR PPP: 2.8 RATIO
POCT-PROTHROMBIN TIME: 34.2 SECS
QUALITY CONTROL: YES

## 2024-02-28 DIAGNOSIS — Z79.01 LONG TERM (CURRENT) USE OF ANTICOAGULANTS: ICD-10-CM

## 2024-02-28 DIAGNOSIS — Z51.81 ENCOUNTER FOR THERAPEUTIC DRUG LEVEL MONITORING: ICD-10-CM

## 2024-02-28 NOTE — PATIENT PROFILE ADULT - NSPROIMPLANTSMEDDEV_GEN_A_NUR
Michelle Goodth Denisehilda has met all discharge criteria from Phase II. Vital Signs are stable, ambulating  without difficulty. Discharge instructions given, patient verbalized understanding. Discharged from facility via wheelchair in stable condition.      Heart valve metal in mitral valve/Heart valve

## 2024-03-05 ENCOUNTER — OUTPATIENT (OUTPATIENT)
Dept: OUTPATIENT SERVICES | Facility: HOSPITAL | Age: 79
LOS: 1 days | End: 2024-03-05

## 2024-03-05 ENCOUNTER — APPOINTMENT (OUTPATIENT)
Dept: ENDOCRINOLOGY | Facility: CLINIC | Age: 79
End: 2024-03-05
Payer: MEDICARE

## 2024-03-05 VITALS
WEIGHT: 163 LBS | HEART RATE: 62 BPM | HEIGHT: 69 IN | SYSTOLIC BLOOD PRESSURE: 137 MMHG | OXYGEN SATURATION: 100 % | RESPIRATION RATE: 16 BRPM | BODY MASS INDEX: 24.14 KG/M2 | DIASTOLIC BLOOD PRESSURE: 68 MMHG | TEMPERATURE: 97.8 F

## 2024-03-05 DIAGNOSIS — N18.30 CHRONIC KIDNEY DISEASE, STAGE 3 UNSPECIFIED: ICD-10-CM

## 2024-03-05 DIAGNOSIS — H26.9 UNSPECIFIED CATARACT: Chronic | ICD-10-CM

## 2024-03-05 DIAGNOSIS — Z98.89 OTHER SPECIFIED POSTPROCEDURAL STATES: Chronic | ICD-10-CM

## 2024-03-05 DIAGNOSIS — Z90.89 ACQUIRED ABSENCE OF OTHER ORGANS: Chronic | ICD-10-CM

## 2024-03-05 DIAGNOSIS — Z86.69 PERSONAL HISTORY OF OTHER DISEASES OF THE NERVOUS SYSTEM AND SENSE ORGANS: Chronic | ICD-10-CM

## 2024-03-05 DIAGNOSIS — E78.5 HYPERLIPIDEMIA, UNSPECIFIED: ICD-10-CM

## 2024-03-05 DIAGNOSIS — I25.10 ATHEROSCLEROTIC HEART DISEASE OF NATIVE CORONARY ARTERY W/OUT ANGINA PECTORIS: ICD-10-CM

## 2024-03-05 DIAGNOSIS — Z98.890 OTHER SPECIFIED POSTPROCEDURAL STATES: Chronic | ICD-10-CM

## 2024-03-05 PROCEDURE — 99214 OFFICE O/P EST MOD 30 MIN: CPT | Mod: GC

## 2024-03-05 PROCEDURE — G2211 COMPLEX E/M VISIT ADD ON: CPT | Mod: GC

## 2024-03-07 LAB
ALBUMIN SERPL ELPH-MCNC: 4.4 G/DL
ALP BLD-CCNC: 99 U/L
ALT SERPL-CCNC: 32 U/L
ANION GAP SERPL CALC-SCNC: 17 MMOL/L
AST SERPL-CCNC: 26 U/L
BILIRUB SERPL-MCNC: 2.7 MG/DL
BUN SERPL-MCNC: 23 MG/DL
CALCIUM SERPL-MCNC: 9.4 MG/DL
CHLORIDE SERPL-SCNC: 108 MMOL/L
CO2 SERPL-SCNC: 18 MMOL/L
CREAT SERPL-MCNC: 1.15 MG/DL
EGFR: 66 ML/MIN/1.73M2
ESTIMATED AVERAGE GLUCOSE: 177 MG/DL
GLUCOSE SERPL-MCNC: 325 MG/DL
HBA1C MFR BLD HPLC: 7.8 %
POTASSIUM SERPL-SCNC: 4.3 MMOL/L
PROT SERPL-MCNC: 6.9 G/DL
SODIUM SERPL-SCNC: 143 MMOL/L
T3 SERPL-MCNC: 114 NG/DL
T4 FREE SERPL-MCNC: 1.8 NG/DL
TSH SERPL-ACNC: 0.01 UIU/ML

## 2024-03-07 RX ORDER — ATORVASTATIN CALCIUM 40 MG/1
40 TABLET, FILM COATED ORAL
Qty: 90 | Refills: 3 | Status: ACTIVE | COMMUNITY
Start: 2017-07-12 | End: 1900-01-01

## 2024-03-08 ENCOUNTER — NON-APPOINTMENT (OUTPATIENT)
Age: 79
End: 2024-03-08

## 2024-03-08 LAB — TSH RECEPTOR AB: <1.1 IU/L

## 2024-03-11 ENCOUNTER — OUTPATIENT (OUTPATIENT)
Dept: OUTPATIENT SERVICES | Facility: HOSPITAL | Age: 79
LOS: 1 days | End: 2024-03-11

## 2024-03-11 ENCOUNTER — NON-APPOINTMENT (OUTPATIENT)
Age: 79
End: 2024-03-11

## 2024-03-11 ENCOUNTER — APPOINTMENT (OUTPATIENT)
Dept: INTERNAL MEDICINE | Facility: CLINIC | Age: 79
End: 2024-03-11

## 2024-03-11 VITALS — HEART RATE: 63 BPM | OXYGEN SATURATION: 99 %

## 2024-03-11 DIAGNOSIS — Z90.89 ACQUIRED ABSENCE OF OTHER ORGANS: Chronic | ICD-10-CM

## 2024-03-11 DIAGNOSIS — Z98.890 OTHER SPECIFIED POSTPROCEDURAL STATES: Chronic | ICD-10-CM

## 2024-03-11 DIAGNOSIS — Z98.89 OTHER SPECIFIED POSTPROCEDURAL STATES: Chronic | ICD-10-CM

## 2024-03-11 DIAGNOSIS — H26.9 UNSPECIFIED CATARACT: Chronic | ICD-10-CM

## 2024-03-11 DIAGNOSIS — Z86.69 PERSONAL HISTORY OF OTHER DISEASES OF THE NERVOUS SYSTEM AND SENSE ORGANS: Chronic | ICD-10-CM

## 2024-03-11 LAB
INR PPP: 2.4 RATIO
POCT-PROTHROMBIN TIME: 29.2 SECS
QUALITY CONTROL: YES

## 2024-03-11 RX ORDER — LOSARTAN POTASSIUM 50 MG/1
50 TABLET, FILM COATED ORAL
Qty: 90 | Refills: 3 | Status: ACTIVE | COMMUNITY
Start: 2022-09-09 | End: 1900-01-01

## 2024-03-12 DIAGNOSIS — Z51.81 ENCOUNTER FOR THERAPEUTIC DRUG LEVEL MONITORING: ICD-10-CM

## 2024-03-12 NOTE — END OF VISIT
[] : Fellow [FreeTextEntry3] : DM2, will add low dose actos if A1c still elevated as intolerant of higher GLP 1 dose. 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 Will consider definitive Rx with HICKS.

## 2024-03-12 NOTE — REVIEW OF SYSTEMS
[TextEntry] :  REVIEW OF SYSTEMS: General: No fatigue, no weight gain, no weight loss Respiratory: No cough, no shortness of breath  Cardiovascular: No chest pain, no palpitations, no leg swelling  Gastrointestinal: No nausea, no vomiting, no constipation, no diarrhea  Musculoskeletal: No muscle aches, no joint pain, no recent fractures Neurologic: No tremors, no syncopal episodes Psychiatric: The patient is not currently nervous or anxious  Endocrine: No thyroid/neck swelling, no heat/cold intolerance   The review of systems is otherwise negative except as noted in HPI.

## 2024-03-12 NOTE — HISTORY OF PRESENT ILLNESS
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 thyroid nodule.    ===============T2DM =============== --Diagnosed with T2DM 6 years ago --A1c 7.3% in 2023 -> 8.3 --> A1c 7.3 10/2023 - current medications: glipizide 10mg twice daily, trulicity 1.5mg once weekly (on Tuesday) - stopped pioglitazone (because did not think it was doing anything), tried metformin in past (did not tolerate from GI side effects), repaglinide stopped last visit (did not work for him) - SMBG: twice a day, fastin-170, before lunch/dinner around 160s. No values <100 ever - diet Breakfast: eggs, toasted english muffin, coffee Lunch: burger, chicken, vegetables Dinner: steak, potatoes Snacks: denies Has seltzer water, no juice or soda - Exercise: goes to the gym 3 times per week on bicycle and treadmill  - ACE/ARB: losartan 50mg daily - Statin: atorvastatin 40mg daily *Complications:  -Micro: (+) nephropathy, on ACEi; (-) neuropathy or retinopathy, Following closely w/ ophthalmology (last 2023). Has hx of retinal detachment in left eye. Chronic blurry vision. -Macro: (+) CAD with CABG (+) "minor stroke" in past - symptoms: none   ===========#Thyroid Nodules===============  State he had a "thyroid problem" as a child that stopped him from growing at age 18. He was given medications and the problem resolved. He is not sure what medication or thyroid problem he had.    He had a thyroid U/S in 2019 which showed Right Lobe: 8 x 4.3 x 3.8 cm. Heterogeneous mid nodule 3.7 x 2.9 x 3.1 cm without suspicious features. Left Lobe: 8.2 x 4.7 x 3.6 cm. Mildly echogenic mid nodule 4 x 3.6 x 3.8 cm without suspicious features.   Repeat US Thyroid from 2021 w/ interval increase in size of b/l nodules. TFTs showed evidence of subclinical hyperthyroidism.   NM thyroid uptake/scan w/ 22% uptake, hyperfunction L. nodule & hypofunction R. nodule.    FNA of right sided nodule performed May 2022 - Nondiagnostic.  Repeat FNA of R sided nodule in 2023 - BENIGN FINDINGS  (Category II). These findings are consistent with nodular goiter.  - denies neck pain, dysphagia, dysphonia  =============== #Subclinical Hyperthyroidism =============== 2022: TSH 0.03  Free T4 1.8,  2023: TSH 0.2023: TSH 1.42, FT4 2023: TSH 0.02, FT4 1.6 (off meds) 2023: TSH <0.01, FT4 2.2 (on MMI 2.5 mg daily) -Given cardiac hx and age, was started low dose MMI. Currently on 5 mg daily x 3 months --denies palpitations, constipation, diarrhea, heat/cold intolerance, fevers, chills, cough, abdominal nausea or vomiting, unexplained weight changes - endorses runny nose

## 2024-03-12 NOTE — ASSESSMENT
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  #T2DM --A1c 7.3 in 10/2023 - currently on glipizide 10mg twice daily and trulicity 1.5mg once weekly - BG slightly high PLAN - patient does not want to lose more weight, will continue trulicity 1.5mg once weekly - c/w glipizide 10mg twice daily - continue SMBG twice daily - will not use SGLT-2 inhibitor given polyuria - lifestyle modifications - routine ophthalmology follow up - check A1c today  #Thyroid Nodules - Palpable R thyroid nodule noted on exam, with U/S in 2019 showing two large thyroid nodules - Repeat Thyroid US 12/2021 w/ large b/l nodules, increased in size compared to prior - Prior right sided nodule FNA in May 2022 nondiagnostic, repeat FNA March 2023 - Benign (Category 2) - Has hyperthyroidism - NM uptake & scan 4/2022 w/ hyperfunction L. nodule, hypofunctioning R. nodule, 22% uptake - no compressive symptoms PLAN - repeat US thyroid 3/24 (1 year from biopsy) - ordered - advised to let us know sooner if develops dysphagia, dysphonia or neck pain  #hyperthyroidism 2/2 toxic nodule -NM scan 4/2022 shows hyperfunctioning left sided thyroid nodule - clinically euthyroid PLAN - c/w methimazole 5 mg daily  - check TFTs today - check TSHrAb -Discussed agranulocytosis and biliary disruptions with MMI - will call if URI/fevers, or abdominal pain/yellowing of skin  #HTN +nephropathy - on losartan 50mg once daily - occasional lightheadedness when sitting up from bed PLAN - continue losartan 50mg once daily - advised to check BP at home, especially when having symptoms of lightheadedness, dizziness  #HLD LDL 49 -Continue Atorvastatin 40 mg daily - repeat lipid panel yearly  Meds renewed as needed   RTC in 3 months   Case discussed with Dr. Jason Trevizo MD Endocrinology fellow, PGY-4

## 2024-03-21 ENCOUNTER — RX RENEWAL (OUTPATIENT)
Age: 79
End: 2024-03-21

## 2024-03-22 ENCOUNTER — APPOINTMENT (OUTPATIENT)
Dept: INTERNAL MEDICINE | Facility: CLINIC | Age: 79
End: 2024-03-22

## 2024-03-22 ENCOUNTER — OUTPATIENT (OUTPATIENT)
Dept: OUTPATIENT SERVICES | Facility: HOSPITAL | Age: 79
LOS: 1 days | End: 2024-03-22

## 2024-03-22 VITALS — RESPIRATION RATE: 16 BRPM | OXYGEN SATURATION: 99 % | HEART RATE: 70 BPM

## 2024-03-22 DIAGNOSIS — Z98.890 OTHER SPECIFIED POSTPROCEDURAL STATES: Chronic | ICD-10-CM

## 2024-03-22 DIAGNOSIS — Z98.89 OTHER SPECIFIED POSTPROCEDURAL STATES: Chronic | ICD-10-CM

## 2024-03-22 DIAGNOSIS — Z90.89 ACQUIRED ABSENCE OF OTHER ORGANS: Chronic | ICD-10-CM

## 2024-03-22 DIAGNOSIS — H26.9 UNSPECIFIED CATARACT: Chronic | ICD-10-CM

## 2024-03-22 DIAGNOSIS — Z86.69 PERSONAL HISTORY OF OTHER DISEASES OF THE NERVOUS SYSTEM AND SENSE ORGANS: Chronic | ICD-10-CM

## 2024-03-22 LAB
INR PPP: 2.9 RATIO
POCT-PROTHROMBIN TIME: 34.3 SECS
QUALITY CONTROL: YES

## 2024-03-25 DIAGNOSIS — Z79.01 LONG TERM (CURRENT) USE OF ANTICOAGULANTS: ICD-10-CM

## 2024-03-25 DIAGNOSIS — Z51.81 ENCOUNTER FOR THERAPEUTIC DRUG LEVEL MONITORING: ICD-10-CM

## 2024-03-28 ENCOUNTER — APPOINTMENT (OUTPATIENT)
Dept: ULTRASOUND IMAGING | Facility: CLINIC | Age: 79
End: 2024-03-28
Payer: MEDICARE

## 2024-03-28 PROCEDURE — 76536 US EXAM OF HEAD AND NECK: CPT

## 2024-04-01 ENCOUNTER — NON-APPOINTMENT (OUTPATIENT)
Age: 79
End: 2024-04-01

## 2024-04-02 RX ORDER — PIOGLITAZONE HYDROCHLORIDE 15 MG/1
15 TABLET ORAL DAILY
Qty: 90 | Refills: 1 | Status: DISCONTINUED | COMMUNITY
Start: 2024-03-12 | End: 2024-04-02

## 2024-04-04 ENCOUNTER — RX RENEWAL (OUTPATIENT)
Age: 79
End: 2024-04-04

## 2024-04-08 ENCOUNTER — APPOINTMENT (OUTPATIENT)
Dept: INTERNAL MEDICINE | Facility: CLINIC | Age: 79
End: 2024-04-08

## 2024-04-08 ENCOUNTER — OUTPATIENT (OUTPATIENT)
Dept: OUTPATIENT SERVICES | Facility: HOSPITAL | Age: 79
LOS: 1 days | End: 2024-04-08

## 2024-04-08 VITALS — HEART RATE: 82 BPM | OXYGEN SATURATION: 98 %

## 2024-04-08 DIAGNOSIS — Z86.69 PERSONAL HISTORY OF OTHER DISEASES OF THE NERVOUS SYSTEM AND SENSE ORGANS: Chronic | ICD-10-CM

## 2024-04-08 DIAGNOSIS — H26.9 UNSPECIFIED CATARACT: Chronic | ICD-10-CM

## 2024-04-08 DIAGNOSIS — Z98.89 OTHER SPECIFIED POSTPROCEDURAL STATES: Chronic | ICD-10-CM

## 2024-04-08 DIAGNOSIS — Z90.89 ACQUIRED ABSENCE OF OTHER ORGANS: Chronic | ICD-10-CM

## 2024-04-08 DIAGNOSIS — Z98.890 OTHER SPECIFIED POSTPROCEDURAL STATES: Chronic | ICD-10-CM

## 2024-04-08 LAB
INR PPP: 3 RATIO
POCT-PROTHROMBIN TIME: 36.2 SECS
QUALITY CONTROL: YES

## 2024-04-10 DIAGNOSIS — Z95.2 PRESENCE OF PROSTHETIC HEART VALVE: ICD-10-CM

## 2024-04-10 DIAGNOSIS — Z79.01 LONG TERM (CURRENT) USE OF ANTICOAGULANTS: ICD-10-CM

## 2024-04-10 DIAGNOSIS — Z51.81 ENCOUNTER FOR THERAPEUTIC DRUG LEVEL MONITORING: ICD-10-CM

## 2024-04-18 ENCOUNTER — OUTPATIENT (OUTPATIENT)
Dept: OUTPATIENT SERVICES | Facility: HOSPITAL | Age: 79
LOS: 1 days | End: 2024-04-18

## 2024-04-18 ENCOUNTER — APPOINTMENT (OUTPATIENT)
Dept: INTERNAL MEDICINE | Facility: CLINIC | Age: 79
End: 2024-04-18

## 2024-04-18 VITALS — OXYGEN SATURATION: 98 % | HEART RATE: 80 BPM

## 2024-04-18 DIAGNOSIS — Z90.89 ACQUIRED ABSENCE OF OTHER ORGANS: Chronic | ICD-10-CM

## 2024-04-18 DIAGNOSIS — Z86.69 PERSONAL HISTORY OF OTHER DISEASES OF THE NERVOUS SYSTEM AND SENSE ORGANS: Chronic | ICD-10-CM

## 2024-04-18 DIAGNOSIS — H26.9 UNSPECIFIED CATARACT: Chronic | ICD-10-CM

## 2024-04-18 DIAGNOSIS — Z98.89 OTHER SPECIFIED POSTPROCEDURAL STATES: Chronic | ICD-10-CM

## 2024-04-18 DIAGNOSIS — Z98.890 OTHER SPECIFIED POSTPROCEDURAL STATES: Chronic | ICD-10-CM

## 2024-04-18 LAB
INR PPP: 3.1 RATIO
POCT-PROTHROMBIN TIME: 37 SECS
QUALITY CONTROL: YES

## 2024-04-22 DIAGNOSIS — Z95.2 PRESENCE OF PROSTHETIC HEART VALVE: ICD-10-CM

## 2024-04-22 DIAGNOSIS — Z51.81 ENCOUNTER FOR THERAPEUTIC DRUG LEVEL MONITORING: ICD-10-CM

## 2024-04-22 DIAGNOSIS — Z79.01 LONG TERM (CURRENT) USE OF ANTICOAGULANTS: ICD-10-CM

## 2024-05-06 ENCOUNTER — OUTPATIENT (OUTPATIENT)
Dept: OUTPATIENT SERVICES | Facility: HOSPITAL | Age: 79
LOS: 1 days | End: 2024-05-06

## 2024-05-06 ENCOUNTER — APPOINTMENT (OUTPATIENT)
Dept: INTERNAL MEDICINE | Facility: CLINIC | Age: 79
End: 2024-05-06

## 2024-05-06 VITALS — HEART RATE: 78 BPM | OXYGEN SATURATION: 98 %

## 2024-05-06 DIAGNOSIS — Z86.69 PERSONAL HISTORY OF OTHER DISEASES OF THE NERVOUS SYSTEM AND SENSE ORGANS: Chronic | ICD-10-CM

## 2024-05-06 DIAGNOSIS — Z90.89 ACQUIRED ABSENCE OF OTHER ORGANS: Chronic | ICD-10-CM

## 2024-05-06 DIAGNOSIS — Z98.89 OTHER SPECIFIED POSTPROCEDURAL STATES: Chronic | ICD-10-CM

## 2024-05-06 DIAGNOSIS — Z98.890 OTHER SPECIFIED POSTPROCEDURAL STATES: Chronic | ICD-10-CM

## 2024-05-06 DIAGNOSIS — H26.9 UNSPECIFIED CATARACT: Chronic | ICD-10-CM

## 2024-05-07 DIAGNOSIS — Z95.2 PRESENCE OF PROSTHETIC HEART VALVE: ICD-10-CM

## 2024-05-07 DIAGNOSIS — Z79.01 LONG TERM (CURRENT) USE OF ANTICOAGULANTS: ICD-10-CM

## 2024-05-07 DIAGNOSIS — Z51.81 ENCOUNTER FOR THERAPEUTIC DRUG LEVEL MONITORING: ICD-10-CM

## 2024-05-08 LAB
INR PPP: 2.8 RATIO
POCT-PROTHROMBIN TIME: 33.2 SECS
QUALITY CONTROL: YES

## 2024-05-13 ENCOUNTER — LABORATORY RESULT (OUTPATIENT)
Age: 79
End: 2024-05-13

## 2024-05-13 ENCOUNTER — APPOINTMENT (OUTPATIENT)
Dept: INTERNAL MEDICINE | Facility: CLINIC | Age: 79
End: 2024-05-13
Payer: MEDICARE

## 2024-05-13 ENCOUNTER — OUTPATIENT (OUTPATIENT)
Dept: OUTPATIENT SERVICES | Facility: HOSPITAL | Age: 79
LOS: 1 days | End: 2024-05-13

## 2024-05-13 VITALS
HEIGHT: 69 IN | WEIGHT: 157.5 LBS | BODY MASS INDEX: 23.33 KG/M2 | SYSTOLIC BLOOD PRESSURE: 120 MMHG | DIASTOLIC BLOOD PRESSURE: 60 MMHG | OXYGEN SATURATION: 99 % | HEART RATE: 95 BPM

## 2024-05-13 DIAGNOSIS — Z51.81 ENCOUNTER FOR THERAPEUTIC DRUG LVL MONITORING: ICD-10-CM

## 2024-05-13 DIAGNOSIS — Z86.2 PERSONAL HISTORY OF DISEASES OF THE BLOOD AND BLOOD-FORMING ORGANS AND CERTAIN DISORDERS INVOLVING THE IMMUNE MECHANISM: ICD-10-CM

## 2024-05-13 DIAGNOSIS — Z79.01 ENCOUNTER FOR THERAPEUTIC DRUG LVL MONITORING: ICD-10-CM

## 2024-05-13 DIAGNOSIS — R25.2 CRAMP AND SPASM: ICD-10-CM

## 2024-05-13 DIAGNOSIS — Z95.2 PRESENCE OF PROSTHETIC HEART VALVE: ICD-10-CM

## 2024-05-13 DIAGNOSIS — Z98.890 OTHER SPECIFIED POSTPROCEDURAL STATES: Chronic | ICD-10-CM

## 2024-05-13 DIAGNOSIS — Z90.89 ACQUIRED ABSENCE OF OTHER ORGANS: Chronic | ICD-10-CM

## 2024-05-13 DIAGNOSIS — H26.9 UNSPECIFIED CATARACT: Chronic | ICD-10-CM

## 2024-05-13 DIAGNOSIS — Z87.440 PERSONAL HISTORY OF URINARY (TRACT) INFECTIONS: ICD-10-CM

## 2024-05-13 DIAGNOSIS — K62.89 OTHER SPECIFIED DISEASES OF ANUS AND RECTUM: ICD-10-CM

## 2024-05-13 DIAGNOSIS — Z98.89 OTHER SPECIFIED POSTPROCEDURAL STATES: Chronic | ICD-10-CM

## 2024-05-13 DIAGNOSIS — Z86.69 PERSONAL HISTORY OF OTHER DISEASES OF THE NERVOUS SYSTEM AND SENSE ORGANS: Chronic | ICD-10-CM

## 2024-05-13 DIAGNOSIS — Z87.898 PERSONAL HISTORY OF OTHER SPECIFIED CONDITIONS: ICD-10-CM

## 2024-05-13 DIAGNOSIS — Z12.83 ENCOUNTER FOR SCREENING FOR MALIGNANT NEOPLASM OF SKIN: ICD-10-CM

## 2024-05-13 DIAGNOSIS — N32.89 OTHER SPECIFIED DISORDERS OF BLADDER: ICD-10-CM

## 2024-05-13 DIAGNOSIS — B49 UNSPECIFIED MYCOSIS: ICD-10-CM

## 2024-05-13 PROCEDURE — 99213 OFFICE O/P EST LOW 20 MIN: CPT | Mod: GE

## 2024-05-13 RX ORDER — WARFARIN 4 MG/1
4 TABLET ORAL DAILY
Qty: 90 | Refills: 1 | Status: ACTIVE | COMMUNITY
Start: 2023-12-26 | End: 1900-01-01

## 2024-05-15 PROBLEM — K62.89 ANAL BURNING: Status: ACTIVE | Noted: 2024-05-13

## 2024-05-15 LAB
ALBUMIN SERPL ELPH-MCNC: 4.4 G/DL
ALP BLD-CCNC: 79 U/L
ALT SERPL-CCNC: 33 U/L
ANION GAP SERPL CALC-SCNC: 11 MMOL/L
AST SERPL-CCNC: 28 U/L
BILIRUB INDIRECT SERPL-MCNC: 2 MG/DL
BILIRUB SERPL-MCNC: 2.2 MG/DL
BUN SERPL-MCNC: 27 MG/DL
CALCIUM SERPL-MCNC: 9.4 MG/DL
CHLORIDE SERPL-SCNC: 106 MMOL/L
CO2 SERPL-SCNC: 22 MMOL/L
CREAT SERPL-MCNC: 1.4 MG/DL
EGFR: 52 ML/MIN/1.73M2
FERRITIN SERPL-MCNC: 285 NG/ML
GLUCOSE SERPL-MCNC: 261 MG/DL
HCT VFR BLD CALC: 44.4 %
HGB BLD-MCNC: 14.2 G/DL
IRON SATN MFR SERPL: 39 %
IRON SERPL-MCNC: 125 UG/DL
MCHC RBC-ENTMCNC: 30.7 PG
MCHC RBC-ENTMCNC: 32 GM/DL
MCV RBC AUTO: 96.1 FL
PLATELET # BLD AUTO: 204 K/UL
POTASSIUM SERPL-SCNC: 4.8 MMOL/L
PROT SERPL-MCNC: 7.4 G/DL
RBC # BLD: 4.62 M/UL
RBC # FLD: 13.6 %
SODIUM SERPL-SCNC: 139 MMOL/L
TIBC SERPL-MCNC: 322 UG/DL
UIBC SERPL-MCNC: 197 UG/DL
WBC # FLD AUTO: 7.48 K/UL

## 2024-05-18 PROBLEM — Z87.898 HISTORY OF ABDOMINAL PAIN: Status: RESOLVED | Noted: 2023-04-26 | Resolved: 2024-05-18

## 2024-05-18 PROBLEM — Z86.2 HISTORY OF ANEMIA: Status: RESOLVED | Noted: 2022-03-16 | Resolved: 2024-05-18

## 2024-05-18 PROBLEM — R25.2 MUSCLE CRAMP, NOCTURNAL: Status: ACTIVE | Noted: 2024-05-14

## 2024-05-18 PROBLEM — Z86.2: Status: RESOLVED | Noted: 2021-01-28 | Resolved: 2024-05-18

## 2024-05-18 PROBLEM — N32.89 BLADDER DISTENSION: Status: ACTIVE | Noted: 2024-02-02

## 2024-05-18 PROBLEM — Z87.440 HISTORY OF URINARY TRACT INFECTION: Status: RESOLVED | Noted: 2022-03-16 | Resolved: 2024-05-18

## 2024-05-18 PROBLEM — Z51.81 ANTICOAGULATION GOAL OF INR 2.5 TO 3.5: Status: ACTIVE | Noted: 2020-03-18

## 2024-05-18 NOTE — HISTORY OF PRESENT ILLNESS
[de-identified] : Patient is a 78M hx of CAD s/p CABG, afib, RHD s/p MVR on coumadin, DM2 here for multiple acute complaints.  Patient first most concerned about his perianal burning that is triggered by sitting. When he sits for 15 min or more, he starts to develop burning in his anus and it is only relieved by standing up or laying down. Nothing else seems to help this. He denies other symptoms such as saddle anesthesia, paresthesias, rectal bleeding, or abnormal or irregular bowel movements. He has BMs quite regularly, and nothing he has tried has helped this burning. This all started a month and a half ago.  He also complains of bilateral lower leg cramping specifically occurring at night. It does not improve with movement and nothing alleviates the cramps except time. No particular triggers except for possibly colder temperatures.  Last, he complains of suprapubic pain and relief with urination. He described this previously, and had seen urology, but is still feeling these symptoms. Has not had the chance to do a bladder ultrasound. He does get up frequently throughout the night to urinate due to this pain.  His blood glucoses are well controlled, he does have intermittent palpitations, but no recent weight loss or heat intolerance. Instead he has cold intolerance.

## 2024-05-18 NOTE — ASSESSMENT
[FreeTextEntry1] : RTC in 3 months for f/u of anal burning (hopefully sees colorectal surg and GI first), f/u c/f overflow incontinence after urology appt, f/u worsening renal function. F/u iron studies.  Has isolated indirect bili elevation concerning for Gilbert's.  Case discussed with Dr. Toby Medel, PGY-1

## 2024-05-18 NOTE — PHYSICAL EXAM
[No Acute Distress] : no acute distress [Well Nourished] : well nourished [Well Developed] : well developed [Well-Appearing] : well-appearing [Normal Sclera/Conjunctiva] : normal sclera/conjunctiva [No Respiratory Distress] : no respiratory distress  [No Accessory Muscle Use] : no accessory muscle use [Clear to Auscultation] : lungs were clear to auscultation bilaterally [Normal Rate] : normal rate  [Regular Rhythm] : with a regular rhythm [Normal S1, S2] : normal S1 and S2 [No Murmur] : no murmur heard [No Edema] : there was no peripheral edema [Soft] : abdomen soft [Non Tender] : non-tender [No HSM] : no HSM [Normal Bowel Sounds] : normal bowel sounds [No CVA Tenderness] : no CVA  tenderness [No Spinal Tenderness] : no spinal tenderness [No Joint Swelling] : no joint swelling [No Rash] : no rash [No Focal Deficits] : no focal deficits [Normal Gait] : normal gait [Normal Affect] : the affect was normal [Normal Insight/Judgement] : insight and judgment were intact [de-identified] : R sided thyromegaly, no TTP [FreeTextEntry1] : Stool on rectal exam, no external abnormalities, hemorrhoids. Nodules felt on posterior wall of rectum about 1.5 inches deep. Nontender.

## 2024-05-20 DIAGNOSIS — I10 ESSENTIAL (PRIMARY) HYPERTENSION: ICD-10-CM

## 2024-05-20 DIAGNOSIS — Z79.01 LONG TERM (CURRENT) USE OF ANTICOAGULANTS: ICD-10-CM

## 2024-05-20 DIAGNOSIS — K62.89 OTHER SPECIFIED DISEASES OF ANUS AND RECTUM: ICD-10-CM

## 2024-05-20 DIAGNOSIS — E05.90 THYROTOXICOSIS, UNSPECIFIED WITHOUT THYROTOXIC CRISIS OR STORM: ICD-10-CM

## 2024-05-20 DIAGNOSIS — Z51.81 ENCOUNTER FOR THERAPEUTIC DRUG LEVEL MONITORING: ICD-10-CM

## 2024-05-20 DIAGNOSIS — N32.89 OTHER SPECIFIED DISORDERS OF BLADDER: ICD-10-CM

## 2024-05-20 DIAGNOSIS — Z95.2 PRESENCE OF PROSTHETIC HEART VALVE: ICD-10-CM

## 2024-05-20 DIAGNOSIS — I48.91 UNSPECIFIED ATRIAL FIBRILLATION: ICD-10-CM

## 2024-05-20 DIAGNOSIS — R25.2 CRAMP AND SPASM: ICD-10-CM

## 2024-05-23 ENCOUNTER — OUTPATIENT (OUTPATIENT)
Dept: OUTPATIENT SERVICES | Facility: HOSPITAL | Age: 79
LOS: 1 days | End: 2024-05-23

## 2024-05-23 ENCOUNTER — RESULT CHARGE (OUTPATIENT)
Age: 79
End: 2024-05-23

## 2024-05-23 ENCOUNTER — APPOINTMENT (OUTPATIENT)
Dept: INTERNAL MEDICINE | Facility: CLINIC | Age: 79
End: 2024-05-23

## 2024-05-23 DIAGNOSIS — Z90.89 ACQUIRED ABSENCE OF OTHER ORGANS: Chronic | ICD-10-CM

## 2024-05-23 DIAGNOSIS — Z98.89 OTHER SPECIFIED POSTPROCEDURAL STATES: Chronic | ICD-10-CM

## 2024-05-23 DIAGNOSIS — H26.9 UNSPECIFIED CATARACT: Chronic | ICD-10-CM

## 2024-05-23 DIAGNOSIS — Z98.890 OTHER SPECIFIED POSTPROCEDURAL STATES: Chronic | ICD-10-CM

## 2024-05-23 DIAGNOSIS — Z86.69 PERSONAL HISTORY OF OTHER DISEASES OF THE NERVOUS SYSTEM AND SENSE ORGANS: Chronic | ICD-10-CM

## 2024-05-24 DIAGNOSIS — Z95.2 PRESENCE OF PROSTHETIC HEART VALVE: ICD-10-CM

## 2024-05-24 DIAGNOSIS — Z79.01 LONG TERM (CURRENT) USE OF ANTICOAGULANTS: ICD-10-CM

## 2024-05-24 DIAGNOSIS — Z51.81 ENCOUNTER FOR THERAPEUTIC DRUG LEVEL MONITORING: ICD-10-CM

## 2024-05-29 ENCOUNTER — NON-APPOINTMENT (OUTPATIENT)
Age: 79
End: 2024-05-29

## 2024-05-30 ENCOUNTER — NON-APPOINTMENT (OUTPATIENT)
Age: 79
End: 2024-05-30

## 2024-05-31 ENCOUNTER — RX RENEWAL (OUTPATIENT)
Age: 79
End: 2024-05-31

## 2024-05-31 ENCOUNTER — APPOINTMENT (OUTPATIENT)
Dept: UROLOGY | Facility: CLINIC | Age: 79
End: 2024-05-31
Payer: MEDICARE

## 2024-05-31 VITALS
HEART RATE: 54 BPM | RESPIRATION RATE: 16 BRPM | SYSTOLIC BLOOD PRESSURE: 165 MMHG | HEIGHT: 69 IN | DIASTOLIC BLOOD PRESSURE: 78 MMHG | OXYGEN SATURATION: 99 % | WEIGHT: 157 LBS | BODY MASS INDEX: 23.25 KG/M2

## 2024-05-31 DIAGNOSIS — R79.89 OTHER SPECIFIED ABNORMAL FINDINGS OF BLOOD CHEMISTRY: ICD-10-CM

## 2024-05-31 DIAGNOSIS — M54.50 LOW BACK PAIN, UNSPECIFIED: ICD-10-CM

## 2024-05-31 DIAGNOSIS — R30.0 DYSURIA: ICD-10-CM

## 2024-05-31 PROCEDURE — 99215 OFFICE O/P EST HI 40 MIN: CPT

## 2024-05-31 RX ORDER — TAMSULOSIN HYDROCHLORIDE 0.4 MG/1
0.4 CAPSULE ORAL
Qty: 90 | Refills: 0 | Status: ACTIVE | COMMUNITY
Start: 2024-05-31 | End: 1900-01-01

## 2024-05-31 RX ORDER — WARFARIN 5 MG/1
5 TABLET ORAL
Qty: 90 | Refills: 0 | Status: ACTIVE | COMMUNITY
Start: 2023-02-10 | End: 1900-01-01

## 2024-05-31 NOTE — PHYSICAL EXAM
[Normal Appearance] : normal appearance [Edema] : no peripheral edema [] : no respiratory distress [Abdomen Soft] : soft [Normal Station and Gait] : the gait and station were normal for the patient's age [Skin Color & Pigmentation] : normal skin color and pigmentation [No Focal Deficits] : no focal deficits [Oriented To Time, Place, And Person] : oriented to person, place, and time

## 2024-06-03 PROBLEM — M54.50 LOWER BACK PAIN: Status: ACTIVE | Noted: 2024-06-03

## 2024-06-03 PROBLEM — M54.50 ACUTE LOW BACK PAIN, UNSPECIFIED BACK PAIN LATERALITY, UNSPECIFIED WHETHER SCIATICA PRESENT: Status: ACTIVE | Noted: 2023-07-06

## 2024-06-03 NOTE — HISTORY OF PRESENT ILLNESS
[FreeTextEntry1] : 78 year old man, previous pt of Dr Manrique PMHX BPH s/p TURP 03/03/2022 Presents today for lower back pain and elevated creatinine.   Urinary function: Frequency, dysuria, feeling of incomplete bladder emptying, requires straining/pushing to completely empties his bladder. Nocturia x 2. Denies hematuria, flank pain, fever or chills. Reports suprapubic discomfort when he has to urinate, but it resolves with urination.  Rates lower back pain 6/10. Denies any previous back injuries or surgeries.   ED: sees Dr Kenny. On Sildenafil 100mg with moderate improvement.   Last PSA  02/21/2024 1.41 ng/mL    Pt states he went to his PCP on 5/13/24 who told him his kidney function is impaired, and he should follow up with his urologist.  05/15/2024 - creatinine 1.40 mg/dL  PVR 65 mL

## 2024-06-03 NOTE — ASSESSMENT
[FreeTextEntry1] :  - PVR reviewed. - Recommend restarting tamsulosin given his PVR of 65mL and his presenting LUTS. Goals of medication reviewed.  Discussed the potential adverse effects of the medication.  Discussed the proper administration of the medication. - CMP repeated.  kidney and bladder US ordered. Script given to pt. Will call with results. - Recommend staying hydrated. Can try Tylenol and warm compression for lower back pain. If back pain prolongs or worsen, follow up with PCP.

## 2024-06-10 ENCOUNTER — RESULT CHARGE (OUTPATIENT)
Age: 79
End: 2024-06-10

## 2024-06-10 ENCOUNTER — APPOINTMENT (OUTPATIENT)
Dept: INTERNAL MEDICINE | Facility: CLINIC | Age: 79
End: 2024-06-10

## 2024-06-10 ENCOUNTER — OUTPATIENT (OUTPATIENT)
Dept: OUTPATIENT SERVICES | Facility: HOSPITAL | Age: 79
LOS: 1 days | End: 2024-06-10

## 2024-06-10 VITALS — OXYGEN SATURATION: 99 % | HEART RATE: 65 BPM

## 2024-06-10 DIAGNOSIS — Z98.89 OTHER SPECIFIED POSTPROCEDURAL STATES: Chronic | ICD-10-CM

## 2024-06-10 DIAGNOSIS — H26.9 UNSPECIFIED CATARACT: Chronic | ICD-10-CM

## 2024-06-10 DIAGNOSIS — Z90.89 ACQUIRED ABSENCE OF OTHER ORGANS: Chronic | ICD-10-CM

## 2024-06-10 DIAGNOSIS — Z98.890 OTHER SPECIFIED POSTPROCEDURAL STATES: Chronic | ICD-10-CM

## 2024-06-10 LAB
INR PPP: 2.3 RATIO
POCT-PROTHROMBIN TIME: 27.1 SECS
QUALITY CONTROL: YES

## 2024-06-12 DIAGNOSIS — Z79.01 LONG TERM (CURRENT) USE OF ANTICOAGULANTS: ICD-10-CM

## 2024-06-12 DIAGNOSIS — Z51.81 ENCOUNTER FOR THERAPEUTIC DRUG LEVEL MONITORING: ICD-10-CM

## 2024-06-12 DIAGNOSIS — Z95.2 PRESENCE OF PROSTHETIC HEART VALVE: ICD-10-CM

## 2024-06-12 LAB
ALBUMIN SERPL ELPH-MCNC: 4.1 G/DL
ALP BLD-CCNC: 80 U/L
ALT SERPL-CCNC: 42 U/L
ANION GAP SERPL CALC-SCNC: 14 MMOL/L
APPEARANCE: CLEAR
AST SERPL-CCNC: 33 U/L
BACTERIA UR CULT: NORMAL
BACTERIA: NEGATIVE /HPF
BILIRUB SERPL-MCNC: 2.4 MG/DL
BILIRUBIN URINE: NEGATIVE
BLOOD URINE: NEGATIVE
BUN SERPL-MCNC: 23 MG/DL
CALCIUM SERPL-MCNC: 9.8 MG/DL
CAST: 0 /LPF
CHLORIDE SERPL-SCNC: 106 MMOL/L
CO2 SERPL-SCNC: 21 MMOL/L
COLOR: YELLOW
CREAT SERPL-MCNC: 1.28 MG/DL
EGFR: 58 ML/MIN/1.73M2
EPITHELIAL CELLS: 0 /HPF
GLUCOSE QUALITATIVE U: 250 MG/DL
GLUCOSE SERPL-MCNC: 143 MG/DL
KETONES URINE: NEGATIVE MG/DL
LEUKOCYTE ESTERASE URINE: NEGATIVE
MICROSCOPIC-UA: NORMAL
NITRITE URINE: NEGATIVE
PH URINE: 6.5
POTASSIUM SERPL-SCNC: 4.5 MMOL/L
PROT SERPL-MCNC: 6.8 G/DL
PROTEIN URINE: NEGATIVE MG/DL
PSA FREE FLD-MCNC: 20 %
PSA FREE SERPL-MCNC: 0.28 NG/ML
PSA SERPL-MCNC: 1.38 NG/ML
RED BLOOD CELLS URINE: 0 /HPF
SODIUM SERPL-SCNC: 141 MMOL/L
SPECIFIC GRAVITY URINE: 1.01
UROBILINOGEN URINE: 1 MG/DL
WHITE BLOOD CELLS URINE: 0 /HPF

## 2024-06-12 RX ORDER — FINASTERIDE 5 MG/1
5 TABLET, FILM COATED ORAL DAILY
Qty: 90 | Refills: 0 | Status: ACTIVE | COMMUNITY
Start: 2024-06-12 | End: 1900-01-01

## 2024-06-18 ENCOUNTER — OUTPATIENT (OUTPATIENT)
Dept: OUTPATIENT SERVICES | Facility: HOSPITAL | Age: 79
LOS: 1 days | End: 2024-06-18

## 2024-06-18 ENCOUNTER — RESULT CHARGE (OUTPATIENT)
Age: 79
End: 2024-06-18

## 2024-06-18 ENCOUNTER — APPOINTMENT (OUTPATIENT)
Dept: INTERNAL MEDICINE | Facility: CLINIC | Age: 79
End: 2024-06-18

## 2024-06-18 ENCOUNTER — APPOINTMENT (OUTPATIENT)
Dept: ENDOCRINOLOGY | Facility: CLINIC | Age: 79
End: 2024-06-18
Payer: MEDICARE

## 2024-06-18 VITALS
WEIGHT: 157 LBS | DIASTOLIC BLOOD PRESSURE: 70 MMHG | HEIGHT: 69 IN | RESPIRATION RATE: 16 BRPM | OXYGEN SATURATION: 100 % | HEART RATE: 60 BPM | BODY MASS INDEX: 23.25 KG/M2 | SYSTOLIC BLOOD PRESSURE: 132 MMHG

## 2024-06-18 DIAGNOSIS — H26.9 UNSPECIFIED CATARACT: Chronic | ICD-10-CM

## 2024-06-18 DIAGNOSIS — E11.40 TYPE 2 DIABETES MELLITUS WITH DIABETIC NEUROPATHY, UNSPECIFIED: ICD-10-CM

## 2024-06-18 DIAGNOSIS — Z86.69 PERSONAL HISTORY OF OTHER DISEASES OF THE NERVOUS SYSTEM AND SENSE ORGANS: Chronic | ICD-10-CM

## 2024-06-18 DIAGNOSIS — E05.90 THYROTOXICOSIS, UNSPECIFIED W/OUT THYROTOXIC CRISIS OR STORM: ICD-10-CM

## 2024-06-18 DIAGNOSIS — E04.2 NONTOXIC MULTINODULAR GOITER: ICD-10-CM

## 2024-06-18 DIAGNOSIS — Z90.89 ACQUIRED ABSENCE OF OTHER ORGANS: Chronic | ICD-10-CM

## 2024-06-18 DIAGNOSIS — Z98.89 OTHER SPECIFIED POSTPROCEDURAL STATES: Chronic | ICD-10-CM

## 2024-06-18 DIAGNOSIS — E05.90 THYROTOXICOSIS, UNSPECIFIED WITHOUT THYROTOXIC CRISIS OR STORM: ICD-10-CM

## 2024-06-18 LAB — HBA1C MFR BLD HPLC: 7.4

## 2024-06-18 PROCEDURE — 99214 OFFICE O/P EST MOD 30 MIN: CPT | Mod: GC,25

## 2024-06-18 PROCEDURE — G2211 COMPLEX E/M VISIT ADD ON: CPT | Mod: NC

## 2024-06-18 RX ORDER — BLOOD-GLUCOSE METER
70 EACH MISCELLANEOUS
Qty: 1 | Refills: 2 | Status: ACTIVE | COMMUNITY
Start: 2020-07-10 | End: 1900-01-01

## 2024-06-18 RX ORDER — GLIPIZIDE 10 MG/1
10 TABLET ORAL
Qty: 180 | Refills: 1 | Status: ACTIVE | COMMUNITY
Start: 2024-03-07 | End: 1900-01-01

## 2024-06-18 RX ORDER — REPAGLINIDE 1 MG/1
1 TABLET ORAL 3 TIMES DAILY
Qty: 270 | Refills: 0 | Status: COMPLETED | COMMUNITY
Start: 2023-11-07 | End: 2024-06-18

## 2024-06-18 RX ORDER — GLIPIZIDE 10 MG/1
10 TABLET ORAL
Qty: 90 | Refills: 1 | Status: COMPLETED | COMMUNITY
Start: 2022-03-16 | End: 2024-06-18

## 2024-06-18 RX ORDER — DULAGLUTIDE 3 MG/.5ML
3 INJECTION, SOLUTION SUBCUTANEOUS
Qty: 12 | Refills: 1 | Status: ACTIVE | COMMUNITY
Start: 2022-12-14 | End: 1900-01-01

## 2024-06-18 RX ORDER — METHIMAZOLE 10 MG/1
10 TABLET ORAL
Qty: 90 | Refills: 3 | Status: ACTIVE | COMMUNITY
Start: 2023-11-07 | End: 1900-01-01

## 2024-06-18 NOTE — HISTORY OF PRESENT ILLNESS
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 thyroid nodule.    ===============T2DM =============== --Diagnosed with T2DM 6 years ago --recent labs: A1c 7.4% today,  GFR 58, 3/24 A1c 7.8%,  urine microalbumin WNL,  LDL 49 - current medications: glipizide 10mg twice daily, trulicity 1.5mg once weekly (on ), did not start pioglitazone 15mg daily - statin: atorvastatin 40mg daily - ACE/ARB: losartan 50mg daily, /70 - stopped pioglitazone (because did not think it was doing anything), tried metformin in past (did not tolerate from GI side effects), repaglinide stopped last visit (did not work for him) - SMBG: twice a day, am fastins, 1hr after dinner: 160-170s, no recent hypoglycemia <70  - diet Breakfast: sausage, egg, cheese, coffee Lunch: burger Dinner: salad, chicken Snacks: denies Has seltzer water, no juice or soda - Exercise: climbing wall *Complications:  -Micro: (+) nephropathy, on ACEi; (-) neuropathy or retinopathy, Following closely w/ ophthalmology (last 2023). Has hx of retinal detachment in left eye. Chronic blurry vision. - has not seen podiatrist, reports fungus on toe nails -Macro: (+) CAD with CABG (+), "minor stroke" in past - symptoms: endorses polyuria, polydipsia (reports problem with prostate), denies neuropathy   ===========#Thyroid Nodules===============  State he had a "thyroid problem" as a child that stopped him from growing at age 18. He was given medications and the problem resolved. He is not sure what medication or thyroid problem he had.    He had a thyroid U/S in 2019 which showed Right Lobe: 8 x 4.3 x 3.8 cm. Heterogeneous mid nodule 3.7 x 2.9 x 3.1 cm without suspicious features. Left Lobe: 8.2 x 4.7 x 3.6 cm. Mildly echogenic mid nodule 4 x 3.6 x 3.8 cm without suspicious features.   Repeat US Thyroid from 2021 w/ interval increase in size of b/l nodules. TFTs showed evidence of subclinical hyperthyroidism.   NM thyroid uptake/scan w/ 22% uptake, hyperfunction L. nodule & hypofunction R. nodule.    FNA of right sided nodule performed May 2022 - Nondiagnostic.  Repeat FNA of R sided nodule in 2023 - BENIGN FINDINGS  (Category II). These findings are consistent with nodular goiter. US thyroid 3/24 with 4.7x4.3x3.4cm isoechoic right nodule, 5x5.5x4.1cm isoechoic LMP-LLP nodule - denies neck pain, dysphagia, dysphonia, sob when lying flat  =============== #Subclinical Hyperthyroidism =============== 2022: TSH 0.03  Free T4 1.8,  2023: TSH 0.2023: TSH 1.42, FT4 2023: TSH 0.02, FT4 1.6 (off meds) 2023: TSH <0.01, FT4 2.2 (on MMI 2.5 mg daily) 3/24 TSH 0.01, FT4 1.8, TT3 114 -Given cardiac hx and age, was started low dose MMI. Currently on methimazole 5 mg daily --denies palpitations, tremors, loose stools, constipation, heat intolerance, fevers, chills, cough, abdominal nausea or vomiting, unexplained weight changes, recent illnesses, skin changes, hair loss - reports sensitivity to cold weather

## 2024-06-18 NOTE — PHYSICAL EXAM
[Alert] : alert [No Acute Distress] : no acute distress [Normal Sclera/Conjunctiva] : normal sclera/conjunctiva [No Proptosis] : no proptosis [No Lid Lag] : no lid lag [No Respiratory Distress] : no respiratory distress [Clear to Auscultation] : lungs were clear to auscultation bilaterally [Normal S1, S2] : normal S1 and S2 [Regular Rhythm] : with a regular rhythm [Normal Bowel Sounds] : normal bowel sounds [Not Tender] : non-tender [Soft] : abdomen soft [No Stigmata of Cushings Syndrome] : no stigmata of Cushings Syndrome [No Involuntary Movements] : no involuntary movements were seen [No Motor Deficits] : the motor exam was normal [No Tremors] : no tremors [Normal Affect] : the affect was normal [Normal Mood] : the mood was normal [Acanthosis Nigricans] : no acanthosis nigricans [de-identified] : enlarged right sided thyroid nodule

## 2024-06-18 NOTE — END OF VISIT
[] : Fellow [FreeTextEntry3] : DM2, fairly well controlled. Will increase Trulicity to 3.0 Toxic MNG, discussed surgery vs HICKS as definitive treatment. Will increase methimazole, continue beta blockade and arrange for HICKS therapy. Pt. is already on chronic anticoagulation for hx of afib.

## 2024-06-18 NOTE — ASSESSMENT
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  #T2DM #HTN #HLD --recent labs: A1c 7.4% today, 5/24 GFR 58, 3/24 A1c 7.8%, 11/23 urine microalbumin WNL, 2/23 LDL 49 - current medications: glipizide 10mg twice daily, trulicity 1.5mg once weekly (on Wednesdays), did not start pioglitazone 15mg daily - statin: atorvastatin 40mg daily - ACE/ARB: losartan 50mg daily, /70 PLAN - increase trulicity to 3mg once weekly - c/w glipizide 10mg twice daily - patient not taking pioglitazone, can continue to hold - continue SMBG twice daily - will not use SGLT-2 inhibitor given polyuria - lifestyle modifications - routine ophthalmology follow up - podiatry referral given - check atorvastatin 40mg daily, losartan 50mg daily - check lipid panel  #Thyroid Nodules - Palpable R thyroid nodule noted on exam, with U/S in 2019 showing two large thyroid nodules - Repeat Thyroid US 12/2021 w/ large b/l nodules, increased in size compared to prior - Prior right sided nodule FNA in May 2022 nondiagnostic, repeat FNA March 2023 - Benign (Category 2) - Has hyperthyroidism - NM uptake & scan 4/2022 w/ hyperfunction L. nodule, hypofunctioning R. nodule, 22% uptake US thyroid 3/24 with 4.7x4.3x3.4cm isoechoic right nodule, 5x5.5x4.1cm isoechoic LMP-LLP nodule - no compressive symptoms PLAN - send to nuclear medicine for HICKS of toxic nodule as below  #hyperthyroidism 2/2 toxic nodule -NM scan 4/2022 shows hyperfunctioning left sided thyroid nodule - clinically euthyroid 3/24 TSH 0.01, FT4 1.8, TT3 114 PLAN - given age and comorbidities, will increase methimazole to 10mg daily as was hyperthyroid before and repeat TFTs in 6 weeks - previously discussed agranulocytosis and biliary disruptions with MMI - will call if URI/fevers, or abdominal pain/yellowing of skin - will refer to nuclear medicine for HICKS and definitive management   RTC in 3 months   Case seen and discussed with Dr. Jason Arroyo MD Endocrinology Fellow

## 2024-06-19 ENCOUNTER — NON-APPOINTMENT (OUTPATIENT)
Age: 79
End: 2024-06-19

## 2024-06-19 LAB
CHOLEST SERPL-MCNC: 96 MG/DL
HDLC SERPL-MCNC: 44 MG/DL
INR PPP: 2.3 RATIO
INR PPP: 3.4 RATIO
LDLC SERPL CALC-MCNC: 28 MG/DL
NONHDLC SERPL-MCNC: 52 MG/DL
POCT-PROTHROMBIN TIME: 27 SECS
POCT-PROTHROMBIN TIME: 40.5 SECS
QUALITY CONTROL: YES
QUALITY CONTROL: YES
TRIGL SERPL-MCNC: 141 MG/DL

## 2024-06-20 DIAGNOSIS — Z79.01 LONG TERM (CURRENT) USE OF ANTICOAGULANTS: ICD-10-CM

## 2024-06-20 DIAGNOSIS — Z51.81 ENCOUNTER FOR THERAPEUTIC DRUG LEVEL MONITORING: ICD-10-CM

## 2024-06-20 DIAGNOSIS — Z95.2 PRESENCE OF PROSTHETIC HEART VALVE: ICD-10-CM

## 2024-06-21 ENCOUNTER — APPOINTMENT (OUTPATIENT)
Dept: INTERNAL MEDICINE | Facility: CLINIC | Age: 79
End: 2024-06-21

## 2024-06-27 ENCOUNTER — APPOINTMENT (OUTPATIENT)
Dept: CT IMAGING | Facility: CLINIC | Age: 79
End: 2024-06-27

## 2024-06-27 ENCOUNTER — APPOINTMENT (OUTPATIENT)
Dept: CARE COORDINATION | Facility: HOME HEALTH | Age: 79
End: 2024-06-27

## 2024-06-27 VITALS — HEIGHT: 69 IN | BODY MASS INDEX: 23.4 KG/M2 | WEIGHT: 158 LBS

## 2024-06-27 DIAGNOSIS — Z79.01 LONG TERM (CURRENT) USE OF ANTICOAGULANTS: ICD-10-CM

## 2024-06-27 DIAGNOSIS — I10 ESSENTIAL (PRIMARY) HYPERTENSION: ICD-10-CM

## 2024-06-27 DIAGNOSIS — E11.9 TYPE 2 DIABETES MELLITUS W/OUT COMPLICATIONS: ICD-10-CM

## 2024-06-27 DIAGNOSIS — I48.91 UNSPECIFIED ATRIAL FIBRILLATION: ICD-10-CM

## 2024-06-27 PROCEDURE — 99348 HOME/RES VST EST LOW MDM 30: CPT | Mod: 25

## 2024-06-27 PROCEDURE — G0444 DEPRESSION SCREEN ANNUAL: CPT | Mod: 93,59

## 2024-06-28 PROBLEM — E11.9 DIABETES MELLITUS: Status: ACTIVE | Noted: 2020-08-06

## 2024-07-05 ENCOUNTER — APPOINTMENT (OUTPATIENT)
Dept: INTERNAL MEDICINE | Facility: CLINIC | Age: 79
End: 2024-07-05

## 2024-07-05 ENCOUNTER — OUTPATIENT (OUTPATIENT)
Dept: OUTPATIENT SERVICES | Facility: HOSPITAL | Age: 79
LOS: 1 days | End: 2024-07-05

## 2024-07-05 ENCOUNTER — APPOINTMENT (OUTPATIENT)
Dept: ULTRASOUND IMAGING | Facility: IMAGING CENTER | Age: 79
End: 2024-07-05
Payer: MEDICARE

## 2024-07-05 ENCOUNTER — RESULT CHARGE (OUTPATIENT)
Age: 79
End: 2024-07-05

## 2024-07-05 ENCOUNTER — OUTPATIENT (OUTPATIENT)
Dept: OUTPATIENT SERVICES | Facility: HOSPITAL | Age: 79
LOS: 1 days | End: 2024-07-05
Payer: COMMERCIAL

## 2024-07-05 VITALS — OXYGEN SATURATION: 99 % | RESPIRATION RATE: 16 BRPM | TEMPERATURE: 97.6 F | HEART RATE: 68 BPM

## 2024-07-05 DIAGNOSIS — Z86.69 PERSONAL HISTORY OF OTHER DISEASES OF THE NERVOUS SYSTEM AND SENSE ORGANS: Chronic | ICD-10-CM

## 2024-07-05 DIAGNOSIS — Z90.89 ACQUIRED ABSENCE OF OTHER ORGANS: Chronic | ICD-10-CM

## 2024-07-05 DIAGNOSIS — Z98.890 OTHER SPECIFIED POSTPROCEDURAL STATES: Chronic | ICD-10-CM

## 2024-07-05 DIAGNOSIS — R79.89 OTHER SPECIFIED ABNORMAL FINDINGS OF BLOOD CHEMISTRY: ICD-10-CM

## 2024-07-05 DIAGNOSIS — H26.9 UNSPECIFIED CATARACT: Chronic | ICD-10-CM

## 2024-07-05 LAB
INR PPP: 3.3 RATIO
POCT-PROTHROMBIN TIME: 39.5 SECS
QUALITY CONTROL: YES

## 2024-07-05 PROCEDURE — 76770 US EXAM ABDO BACK WALL COMP: CPT | Mod: 26

## 2024-07-05 PROCEDURE — 76770 US EXAM ABDO BACK WALL COMP: CPT

## 2024-07-08 ENCOUNTER — APPOINTMENT (OUTPATIENT)
Dept: INTERNAL MEDICINE | Facility: CLINIC | Age: 79
End: 2024-07-08

## 2024-07-17 ENCOUNTER — NON-APPOINTMENT (OUTPATIENT)
Age: 79
End: 2024-07-17

## 2024-07-17 ENCOUNTER — OUTPATIENT (OUTPATIENT)
Dept: OUTPATIENT SERVICES | Facility: HOSPITAL | Age: 79
LOS: 1 days | End: 2024-07-17

## 2024-07-17 ENCOUNTER — APPOINTMENT (OUTPATIENT)
Dept: INTERNAL MEDICINE | Facility: CLINIC | Age: 79
End: 2024-07-17

## 2024-07-17 ENCOUNTER — RESULT CHARGE (OUTPATIENT)
Age: 79
End: 2024-07-17

## 2024-07-17 VITALS — OXYGEN SATURATION: 99 % | HEART RATE: 70 BPM

## 2024-07-17 DIAGNOSIS — Z98.89 OTHER SPECIFIED POSTPROCEDURAL STATES: Chronic | ICD-10-CM

## 2024-07-17 DIAGNOSIS — Z98.890 OTHER SPECIFIED POSTPROCEDURAL STATES: Chronic | ICD-10-CM

## 2024-07-17 DIAGNOSIS — Z86.69 PERSONAL HISTORY OF OTHER DISEASES OF THE NERVOUS SYSTEM AND SENSE ORGANS: Chronic | ICD-10-CM

## 2024-07-17 DIAGNOSIS — Z90.89 ACQUIRED ABSENCE OF OTHER ORGANS: Chronic | ICD-10-CM

## 2024-07-17 LAB
INR PPP: 2.8 RATIO
POCT-PROTHROMBIN TIME: 33.5 SECS
QUALITY CONTROL: YES

## 2024-07-22 NOTE — HISTORY OF PRESENT ILLNESS
[FreeTextEntry1] : Gama Forte returns to the office today.  He is 78 years old.  He had previously been a patient of Dr. Manrique and underwent a transurethral resection of the prostate in March 2022 to treat symptoms of BPH.  He was recently seen back in the office by one of our nurse practitioners who then referred him to me.  He was seen for lower back pain and elevated creatinine level.  He had urinary symptoms including frequency, sensations of incomplete bladder emptying, some straining with urination and nocturia x 2.  He also reported suprapubic discomfort associated with the urge to urinate, resolving after urination.  The patient also has erectile dysfunction.  He sees Dr. Kenny.  He is currently using sildenafil for management with some improvement.  Baseline creatinine levels had typically been anywhere between 1 and 1.3.  It has been 1.4 mg/dL in mid May.  It was checked again and found to be 1.28 in late May of this year.    Recent imaging was performed with a renal bladder ultrasound to evaluate the symptoms and the creatinine elevation.  No hydronephrosis was seen in either kidney.  He was found to have bilateral nonobstructing renal calcifications, possibly renal stones.  The bladder was noted to be mildly thickened and trabeculated.  The prostate volume was seen to be 23 cm.  No postvoid bladder volume was provided on the report of the ultrasound.

## 2024-07-24 ENCOUNTER — APPOINTMENT (OUTPATIENT)
Dept: UROLOGY | Facility: CLINIC | Age: 79
End: 2024-07-24

## 2024-07-24 DIAGNOSIS — R79.89 OTHER SPECIFIED ABNORMAL FINDINGS OF BLOOD CHEMISTRY: ICD-10-CM

## 2024-07-24 DIAGNOSIS — N40.1 BENIGN PROSTATIC HYPERPLASIA WITH LOWER URINARY TRACT SYMPMS: ICD-10-CM

## 2024-07-29 ENCOUNTER — OUTPATIENT (OUTPATIENT)
Dept: OUTPATIENT SERVICES | Facility: HOSPITAL | Age: 79
LOS: 1 days | End: 2024-07-29

## 2024-07-29 ENCOUNTER — APPOINTMENT (OUTPATIENT)
Dept: INTERNAL MEDICINE | Facility: CLINIC | Age: 79
End: 2024-07-29

## 2024-07-29 ENCOUNTER — RESULT CHARGE (OUTPATIENT)
Age: 79
End: 2024-07-29

## 2024-07-29 VITALS — OXYGEN SATURATION: 99 % | HEART RATE: 77 BPM

## 2024-07-29 DIAGNOSIS — Z90.89 ACQUIRED ABSENCE OF OTHER ORGANS: Chronic | ICD-10-CM

## 2024-07-29 DIAGNOSIS — H26.9 UNSPECIFIED CATARACT: Chronic | ICD-10-CM

## 2024-07-29 DIAGNOSIS — Z98.890 OTHER SPECIFIED POSTPROCEDURAL STATES: Chronic | ICD-10-CM

## 2024-07-29 LAB
INR PPP: 2.3 RATIO
POCT-PROTHROMBIN TIME: 27.9 SECS
QUALITY CONTROL: YES

## 2024-08-03 NOTE — HISTORY OF PRESENT ILLNESS
[FreeTextEntry1] : The patient returns for follow-up.  He has been injecting  0.6 cc of the Trimix with fair results.  He also recently called with back pain and LUTS and was seen by Dr. Siva Barajas.  PMH: Patient initially presented with diabetes, hypertension on "blood thinners" for a mitral valve replacement  a chief complaint of erectile dysfunction. He had a TURP on 3/3/2022. He states that his ED has been present for the past 2 years. It causes both he and his partner great stress.  I reviewed the questionnaire he completed in detail. His erections are not modified with the degree of sexual stimulation.   He states that his erections presently are often less than 5 out of 10 in both tumescence and rigidity, insufficient for penetration.   He often ejaculates through a flaccid phallus.  He has difficulty maintaining an erection. He describes a normal libido.  His sexual dysfunction occurs with both sexual relations and masturbation.  His erections are not improved with PDE5 inhibitors.    His partner is understanding and was unable to be with him at the visit today. He is not  and has 5 adult children and 12 grandchildren.    The patient denies fevers, chills, nausea and/or vomiting and no unexplained weight loss.  His past medical history is non-contributory.  In his present occupation he has no known toxin exposure. He does not smoke and drinks socially.  He has no known drug allergies. His review of systems and past medical history demonstrates no significant urologic issues (see patient completed questionnaire). His family history demonstrates no significant urologic issues.

## 2024-08-05 ENCOUNTER — OUTPATIENT (OUTPATIENT)
Dept: OUTPATIENT SERVICES | Facility: HOSPITAL | Age: 79
LOS: 1 days | End: 2024-08-05

## 2024-08-05 ENCOUNTER — APPOINTMENT (OUTPATIENT)
Dept: INTERNAL MEDICINE | Facility: CLINIC | Age: 79
End: 2024-08-05

## 2024-08-05 DIAGNOSIS — Z98.890 OTHER SPECIFIED POSTPROCEDURAL STATES: Chronic | ICD-10-CM

## 2024-08-05 DIAGNOSIS — Z86.69 PERSONAL HISTORY OF OTHER DISEASES OF THE NERVOUS SYSTEM AND SENSE ORGANS: Chronic | ICD-10-CM

## 2024-08-05 DIAGNOSIS — Z90.89 ACQUIRED ABSENCE OF OTHER ORGANS: Chronic | ICD-10-CM

## 2024-08-05 DIAGNOSIS — Z98.89 OTHER SPECIFIED POSTPROCEDURAL STATES: Chronic | ICD-10-CM

## 2024-08-08 ENCOUNTER — APPOINTMENT (OUTPATIENT)
Dept: UROLOGY | Facility: CLINIC | Age: 79
End: 2024-08-08

## 2024-08-16 DIAGNOSIS — Z95.2 PRESENCE OF PROSTHETIC HEART VALVE: ICD-10-CM

## 2024-08-16 DIAGNOSIS — Z79.01 LONG TERM (CURRENT) USE OF ANTICOAGULANTS: ICD-10-CM

## 2024-08-16 DIAGNOSIS — Z51.81 ENCOUNTER FOR THERAPEUTIC DRUG LEVEL MONITORING: ICD-10-CM

## 2024-08-19 ENCOUNTER — RX RENEWAL (OUTPATIENT)
Age: 79
End: 2024-08-19

## 2024-08-21 ENCOUNTER — RX RENEWAL (OUTPATIENT)
Age: 79
End: 2024-08-21

## 2024-09-04 ENCOUNTER — RESULT CHARGE (OUTPATIENT)
Age: 79
End: 2024-09-04

## 2024-09-04 ENCOUNTER — APPOINTMENT (OUTPATIENT)
Dept: INTERNAL MEDICINE | Facility: CLINIC | Age: 79
End: 2024-09-04

## 2024-09-04 ENCOUNTER — OUTPATIENT (OUTPATIENT)
Dept: OUTPATIENT SERVICES | Facility: HOSPITAL | Age: 79
LOS: 1 days | End: 2024-09-04

## 2024-09-04 ENCOUNTER — APPOINTMENT (OUTPATIENT)
Dept: UROLOGY | Facility: CLINIC | Age: 79
End: 2024-09-04
Payer: MEDICARE

## 2024-09-04 VITALS
SYSTOLIC BLOOD PRESSURE: 138 MMHG | RESPIRATION RATE: 16 BRPM | DIASTOLIC BLOOD PRESSURE: 86 MMHG | OXYGEN SATURATION: 97 % | HEART RATE: 66 BPM

## 2024-09-04 DIAGNOSIS — H26.9 UNSPECIFIED CATARACT: Chronic | ICD-10-CM

## 2024-09-04 DIAGNOSIS — R25.2 CRAMP AND SPASM: ICD-10-CM

## 2024-09-04 DIAGNOSIS — Z90.89 ACQUIRED ABSENCE OF OTHER ORGANS: Chronic | ICD-10-CM

## 2024-09-04 DIAGNOSIS — N40.1 BENIGN PROSTATIC HYPERPLASIA WITH LOWER URINARY TRACT SYMPMS: ICD-10-CM

## 2024-09-04 DIAGNOSIS — M54.9 DORSALGIA, UNSPECIFIED: ICD-10-CM

## 2024-09-04 LAB
INR PPP: 3.5 RATIO
POCT-PROTHROMBIN TIME: 42.1 SECS
QUALITY CONTROL: YES

## 2024-09-04 PROCEDURE — 99215 OFFICE O/P EST HI 40 MIN: CPT

## 2024-09-10 ENCOUNTER — RX RENEWAL (OUTPATIENT)
Age: 79
End: 2024-09-10

## 2024-09-16 ENCOUNTER — NON-APPOINTMENT (OUTPATIENT)
Age: 79
End: 2024-09-16

## 2024-09-16 ENCOUNTER — APPOINTMENT (OUTPATIENT)
Dept: INTERNAL MEDICINE | Facility: CLINIC | Age: 79
End: 2024-09-16

## 2024-09-16 ENCOUNTER — RESULT CHARGE (OUTPATIENT)
Age: 79
End: 2024-09-16

## 2024-09-16 ENCOUNTER — OUTPATIENT (OUTPATIENT)
Dept: OUTPATIENT SERVICES | Facility: HOSPITAL | Age: 79
LOS: 1 days | End: 2024-09-16

## 2024-09-16 DIAGNOSIS — Z86.69 PERSONAL HISTORY OF OTHER DISEASES OF THE NERVOUS SYSTEM AND SENSE ORGANS: Chronic | ICD-10-CM

## 2024-09-16 DIAGNOSIS — H26.9 UNSPECIFIED CATARACT: Chronic | ICD-10-CM

## 2024-09-16 DIAGNOSIS — Z90.89 ACQUIRED ABSENCE OF OTHER ORGANS: Chronic | ICD-10-CM

## 2024-09-16 DIAGNOSIS — Z98.890 OTHER SPECIFIED POSTPROCEDURAL STATES: Chronic | ICD-10-CM

## 2024-09-16 DIAGNOSIS — Z98.89 OTHER SPECIFIED POSTPROCEDURAL STATES: Chronic | ICD-10-CM

## 2024-09-16 LAB
INR PPP: 3 RATIO
POCT-PROTHROMBIN TIME: 35.6 SECS
QUALITY CONTROL: YES

## 2024-09-17 ENCOUNTER — OUTPATIENT (OUTPATIENT)
Dept: OUTPATIENT SERVICES | Facility: HOSPITAL | Age: 79
LOS: 1 days | End: 2024-09-17

## 2024-09-17 ENCOUNTER — APPOINTMENT (OUTPATIENT)
Dept: ENDOCRINOLOGY | Facility: CLINIC | Age: 79
End: 2024-09-17
Payer: MEDICARE

## 2024-09-17 ENCOUNTER — APPOINTMENT (OUTPATIENT)
Dept: UROLOGY | Facility: CLINIC | Age: 79
End: 2024-09-17
Payer: MEDICARE

## 2024-09-17 VITALS
BODY MASS INDEX: 24.29 KG/M2 | DIASTOLIC BLOOD PRESSURE: 85 MMHG | RESPIRATION RATE: 16 BRPM | HEART RATE: 70 BPM | TEMPERATURE: 98 F | SYSTOLIC BLOOD PRESSURE: 149 MMHG | HEIGHT: 69 IN | OXYGEN SATURATION: 99 % | WEIGHT: 164 LBS

## 2024-09-17 VITALS
HEIGHT: 69 IN | RESPIRATION RATE: 16 BRPM | HEART RATE: 64 BPM | WEIGHT: 164 LBS | SYSTOLIC BLOOD PRESSURE: 114 MMHG | DIASTOLIC BLOOD PRESSURE: 58 MMHG | OXYGEN SATURATION: 99 % | BODY MASS INDEX: 24.29 KG/M2

## 2024-09-17 DIAGNOSIS — Z86.69 PERSONAL HISTORY OF OTHER DISEASES OF THE NERVOUS SYSTEM AND SENSE ORGANS: Chronic | ICD-10-CM

## 2024-09-17 DIAGNOSIS — H26.9 UNSPECIFIED CATARACT: Chronic | ICD-10-CM

## 2024-09-17 DIAGNOSIS — E05.90 THYROTOXICOSIS, UNSPECIFIED W/OUT THYROTOXIC CRISIS OR STORM: ICD-10-CM

## 2024-09-17 DIAGNOSIS — M79.10 MYALGIA, UNSPECIFIED SITE: ICD-10-CM

## 2024-09-17 DIAGNOSIS — R10.2 PELVIC AND PERINEAL PAIN: ICD-10-CM

## 2024-09-17 DIAGNOSIS — Z90.89 ACQUIRED ABSENCE OF OTHER ORGANS: Chronic | ICD-10-CM

## 2024-09-17 DIAGNOSIS — R39.89 OTHER SYMPTOMS AND SIGNS INVOLVING THE GENITOURINARY SYSTEM: ICD-10-CM

## 2024-09-17 LAB — HBA1C MFR BLD HPLC: 8.3

## 2024-09-17 PROCEDURE — 99214 OFFICE O/P EST MOD 30 MIN: CPT | Mod: GC

## 2024-09-17 PROCEDURE — G2211 COMPLEX E/M VISIT ADD ON: CPT

## 2024-09-17 PROCEDURE — 99215 OFFICE O/P EST HI 40 MIN: CPT

## 2024-09-17 RX ORDER — DIAZEPAM 2 MG/1
2 TABLET ORAL
Qty: 60 | Refills: 0 | Status: ACTIVE | COMMUNITY
Start: 2024-09-17 | End: 1900-01-01

## 2024-09-17 RX ORDER — GABAPENTIN 300 MG/1
300 CAPSULE ORAL
Qty: 30 | Refills: 3 | Status: ACTIVE | COMMUNITY
Start: 2024-09-17 | End: 1900-01-01

## 2024-09-17 NOTE — REVIEW OF SYSTEMS
[TextEntry] :  REVIEW OF SYSTEMS: General: No fatigue, no weight gain, no weight loss Respiratory: No cough, no shortness of breath  Cardiovascular: No chest pain, + palpitations, no leg swelling  Gastrointestinal: No nausea, no vomiting, no constipation, no diarrhea  Musculoskeletal: No muscle aches, no joint pain, no recent fractures Neurologic: No tremors, no syncopal episodes Psychiatric: The patient is not currently nervous or anxious  Endocrine: + thyroid/neck swelling, no heat/cold intolerance   The review of systems is otherwise negative except as noted in HPI.

## 2024-09-17 NOTE — HISTORY OF PRESENT ILLNESS
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 Was supposed to go up to 3mg but never did so. Also ran out of trulicity x1 month, only restarted 2 weeks ago - statin: atorvastatin 40mg daily - ACE/ARB: losartan 50mg daily, BP well controlled - stopped pioglitazone (because did not think it was doing anything), tried metformin in past (did not tolerate from GI side effects), repaglinide stopped last visit (did not work for him) - SMBG: twice a day, am fasting: 160-190, evening: around 200 - diet Breakfast: sausage, egg, cheese, coffee Lunch: burger Dinner: salad, chicken Snacks: denies Has seltzer water, no juice or soda - Exercise: climbing wall *Complications:  -Micro: (+) nephropathy, on ACEi; (-) neuropathy or retinopathy, Following closely w/ ophthalmology. Has hx of retinal detachment in left eye. Chronic blurry vision. - has not seen podiatrist, reports fungus on toe nails -Macro: (+) CAD with CABG (+), "minor stroke" in past - symptoms: endorses polyuria, polydipsia (reports problem with prostate), denies neuropathy   ===========#Thyroid Nodules===============  State he had a "thyroid problem" as a child that stopped him from growing at age 18. He was given medications and the problem resolved. He is not sure what medication or thyroid problem he had.    He had a thyroid U/S in 2019 which showed Right Lobe: 8 x 4.3 x 3.8 cm. Heterogeneous mid nodule 3.7 x 2.9 x 3.1 cm without suspicious features. Left Lobe: 8.2 x 4.7 x 3.6 cm. Mildly echogenic mid nodule 4 x 3.6 x 3.8 cm without suspicious features.   Repeat US Thyroid from 12/2021 w/ interval increase in size of b/l nodules. TFTs showed evidence of subclinical hyperthyroidism.   NM thyroid uptake/scan w/ 22% uptake, hyperfunction L. nodule & hypofunction R. nodule.    FNA of right sided nodule performed May 2022 - Nondiagnostic.  Repeat FNA of R sided nodule in March 2023 - BENIGN FINDINGS  (Category II). These findings are consistent with nodular goiter. US thyroid 3/24 with 4.7x4.3x3.4cm isoechoic right nodule, 5x5.5x4.1cm isoechoic LMP-LLP nodule  Patient now complains of significant dysphagia. He states he feels the right nodule has gotten larger.  =============== #Subclinical Hyperthyroidism =============== Nov 2022: TSH 0.03  Free T4 1.8,  Feb 2023: TSH 0.09 July 2023: TSH 1.42, FT4 1 November 2023: TSH 0.02, FT4 1.6 (off meds) December 2023: TSH <0.01, FT4 2.2 (on MMI 2.5 mg daily) 3/24 TSH 0.01, FT4 1.8, TT3 114 -Given cardiac hx and age, was started low dose MMI. Currently on methimazole 10 mg daily (increased in 6/2024). Patient ran out of methimazole 1 week ago and has not been taking. Also takes atenolol for afib --occasional palpitations. denies tremors, loose stools, constipation, heat intolerance, fevers, chills, cough, abdominal nausea or vomiting, unexplained weight changes, recent illnesses, skin changes, hair loss

## 2024-09-17 NOTE — PHYSICAL EXAM
[TextEntry] : PHYSICAL EXAMINATION: Vital signs from today's encounter reviewed.  GENERAL: No acute distress, clinically eukinetic, normal appearance HEAD: Normocephalic, atraumatic EYES: conjunctivae are pink and moist, no icterus, no proptosis  NECK: large right sided thyroid nodule, non-tender, no adenopathy CARDIOVASCULAR: well-perfused extremities, no peripheral edema RESPIRATORY: normal chest expansion with good pulmonary effort, no acute respiratory distress MUSCULOSKELETAL: no swelling, normal range of motion, normal gait SKIN: no pallor, no icterus, no rash  NEUROLOGIC: alert and oriented, no evident focal deficits, no tremors  PSYCHIATRIC: mood and affect are normal ENDOCRINE: No obvious stigmata of Cushing's or acromegaly present

## 2024-09-17 NOTE — ASSESSMENT
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  #T2DM #HTN #HLD --recent labs: POC A1c 8.3% today, LDL 28 (6/2024), GFR 58 (8/2024), ACR 30 (11/2023) - current medications: glipizide 10mg twice daily, trulicity 1.5mg once weekly. Was supposed to go up to 3mg but never did so. Also ran out of trulicity x1 month, only restarted 2 weeks ago - statin: atorvastatin 40mg daily - ACE/ARB: losartan 50mg daily, BP well controlled PLAN - increase trulicity to 3mg once weekly - c/w glipizide 10mg twice daily - continue SMBG twice daily - will not use SGLT-2 inhibitor given polyuria - lifestyle modifications - routine ophthalmology follow up - continue atorvastatin 40mg daily, losartan 50mg daily  #Thyroid Nodules - Palpable R thyroid nodule noted on exam, with U/S in 2019 showing two large thyroid nodules - Repeat Thyroid US 12/2021 w/ large b/l nodules, increased in size compared to prior - Prior right sided nodule FNA in May 2022 nondiagnostic, repeat FNA March 2023 - Benign (Category 2) - Has hyperthyroidism - NM uptake & scan 4/2022 w/ hyperfunction L. nodule, hypofunctioning R. nodule, 22% uptake US thyroid 3/24 with 4.7x4.3x3.4cm isoechoic right nodule, 5x5.5x4.1cm isoechoic LMP-LLP nodule - pt now complaining of significant dysphagia and feels the right nodule is getting larger PLAN - refer to ENT to discuss surgical management of large nodules causing compressive symptoms.  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 Timo Trevizo MD Endocrinology Fellow

## 2024-09-17 NOTE — END OF VISIT
[] : Fellow [FreeTextEntry3] : Pt with T2Dm, with hyperglycemia.  INcrease Trulicity to 3 mg.  Would lke to transition off glipizide in the future.  No hypoglycemia. Continue atorvastatin and losartan Also with large thyroid nodule with compressive symptoms, will refer for surgical evalation On methimazole for hyperthyroidism,.  Check TFTs and adjust dose as needed

## 2024-09-18 DIAGNOSIS — E04.2 NONTOXIC MULTINODULAR GOITER: ICD-10-CM

## 2024-09-18 DIAGNOSIS — E11.40 TYPE 2 DIABETES MELLITUS WITH DIABETIC NEUROPATHY, UNSPECIFIED: ICD-10-CM

## 2024-09-18 DIAGNOSIS — E11.9 TYPE 2 DIABETES MELLITUS WITHOUT COMPLICATIONS: ICD-10-CM

## 2024-09-18 DIAGNOSIS — E78.5 HYPERLIPIDEMIA, UNSPECIFIED: ICD-10-CM

## 2024-09-18 DIAGNOSIS — I10 ESSENTIAL (PRIMARY) HYPERTENSION: ICD-10-CM

## 2024-09-18 DIAGNOSIS — E05.90 THYROTOXICOSIS, UNSPECIFIED WITHOUT THYROTOXIC CRISIS OR STORM: ICD-10-CM

## 2024-09-18 LAB
APPEARANCE: CLEAR
BACTERIA: NEGATIVE /HPF
BILIRUBIN URINE: NEGATIVE
BLOOD URINE: NEGATIVE
CAST: 0 /LPF
COLOR: YELLOW
EPITHELIAL CELLS: 0 /HPF
GLUCOSE QUALITATIVE U: 500 MG/DL
KETONES URINE: NEGATIVE MG/DL
LEUKOCYTE ESTERASE URINE: NEGATIVE
MICROSCOPIC-UA: NORMAL
NITRITE URINE: NEGATIVE
PH URINE: 6
PROTEIN URINE: NEGATIVE MG/DL
RED BLOOD CELLS URINE: 0 /HPF
SPECIFIC GRAVITY URINE: 1.02
UROBILINOGEN URINE: 1 MG/DL
WHITE BLOOD CELLS URINE: 0 /HPF

## 2024-09-19 ENCOUNTER — RX RENEWAL (OUTPATIENT)
Age: 79
End: 2024-09-19

## 2024-09-19 LAB — BACTERIA UR CULT: NORMAL

## 2024-09-19 NOTE — HISTORY OF PRESENT ILLNESS
[FreeTextEntry1] : Mr. Forte is a pleasant 79 years old male in the office today by referral of Dr. Robertson with complaint of pain in the lower back and pelvic only at night when the bladder is full.  The patient reports nocturia x 3-4.  The pain is received relieved by micturition, and the patient subsequently has a hard time to fully sleep due to age.  During the day Mr. Linder has absolutely no discomfort with urination has no lower urinary tract symptoms and denies pushing with urination hematuria constipation or urinary urge or stress incontinence.  Patient's medical history is complicated by diabetes mellitus, TIA, ED amongst other medical issues.  Patient's surgical history significant for TURP 2022 with Dr. Manrique.  Times of painful micturition at night started 6 to 8 weeks after surgery.    After discussion with the patient and review of chart that reveals normal urethra without stricture trabeculations of the blood no other pathology. CT abdomen/pelvis from 2023 reveals large 8.8 cm right renal cyst unchanged in size and normal urinary bladder. I suggested to the patient to start techniques of pelvic floor relaxation and try Valium 2 mg at night for 4 to 6 weeks.  If Valium does not work try gabapentin 300 mg at night for 4 to 6 weeks.  I also would like to order imaging MR of pelvis with and without contrast to rule out any pelvic pathology.  Follow-up in 3 months for reevaluation.

## 2024-09-19 NOTE — PHYSICAL EXAM
[General Appearance - Well Developed] : well developed [General Appearance - Well Nourished] : well nourished [Normal Appearance] : normal appearance [Heart Rate And Rhythm] : heart rate and rhythm were normal [Abdomen Soft] : soft [Abdomen Tenderness] : non-tender [Penis Abnormality] : normal circumcised penis [Testes Tenderness] : no tenderness of the testes [Normal Station and Gait] : the gait and station were normal for the patient's age [Skin Turgor] : supple [] : no rash [No Focal Deficits] : no focal deficits [Sensation] : the sensory exam was normal to light touch and pinprick [Oriented To Time, Place, And Person] : oriented to person, place, and time [Not Anxious] : not anxious [de-identified] : Dear a reveals small and soft prostate with small palpable movable painful mass on the left levator plate, rest of examination muscle tenderness of blasts 1 throughout.

## 2024-09-23 ENCOUNTER — APPOINTMENT (OUTPATIENT)
Dept: CARDIOLOGY | Facility: CLINIC | Age: 79
End: 2024-09-23
Payer: MEDICARE

## 2024-09-23 ENCOUNTER — NON-APPOINTMENT (OUTPATIENT)
Age: 79
End: 2024-09-23

## 2024-09-23 VITALS
HEART RATE: 62 BPM | DIASTOLIC BLOOD PRESSURE: 77 MMHG | BODY MASS INDEX: 24.59 KG/M2 | OXYGEN SATURATION: 99 % | HEIGHT: 69 IN | WEIGHT: 166 LBS | SYSTOLIC BLOOD PRESSURE: 153 MMHG

## 2024-09-23 DIAGNOSIS — E11.40 TYPE 2 DIABETES MELLITUS WITH DIABETIC NEUROPATHY, UNSPECIFIED: ICD-10-CM

## 2024-09-23 DIAGNOSIS — Z95.2 PRESENCE OF PROSTHETIC HEART VALVE: ICD-10-CM

## 2024-09-23 DIAGNOSIS — E05.90 THYROTOXICOSIS, UNSPECIFIED W/OUT THYROTOXIC CRISIS OR STORM: ICD-10-CM

## 2024-09-23 DIAGNOSIS — E04.2 NONTOXIC MULTINODULAR GOITER: ICD-10-CM

## 2024-09-23 DIAGNOSIS — E78.5 HYPERLIPIDEMIA, UNSPECIFIED: ICD-10-CM

## 2024-09-23 DIAGNOSIS — I25.10 ATHEROSCLEROTIC HEART DISEASE OF NATIVE CORONARY ARTERY W/OUT ANGINA PECTORIS: ICD-10-CM

## 2024-09-23 DIAGNOSIS — N18.30 CHRONIC KIDNEY DISEASE, STAGE 3 UNSPECIFIED: ICD-10-CM

## 2024-09-23 DIAGNOSIS — E11.9 TYPE 2 DIABETES MELLITUS W/OUT COMPLICATIONS: ICD-10-CM

## 2024-09-23 DIAGNOSIS — I10 ESSENTIAL (PRIMARY) HYPERTENSION: ICD-10-CM

## 2024-09-23 DIAGNOSIS — G45.9 TRANSIENT CEREBRAL ISCHEMIC ATTACK, UNSPECIFIED: ICD-10-CM

## 2024-09-23 DIAGNOSIS — I48.91 UNSPECIFIED ATRIAL FIBRILLATION: ICD-10-CM

## 2024-09-23 PROCEDURE — G2211 COMPLEX E/M VISIT ADD ON: CPT | Mod: NC

## 2024-09-23 PROCEDURE — 99214 OFFICE O/P EST MOD 30 MIN: CPT | Mod: 25

## 2024-09-23 PROCEDURE — G0404: CPT

## 2024-09-23 RX ORDER — AMOXICILLIN 500 MG/1
500 TABLET, FILM COATED ORAL
Qty: 4 | Refills: 2 | Status: ACTIVE | COMMUNITY
Start: 2024-09-23 | End: 1900-01-01

## 2024-09-23 NOTE — PHYSICAL EXAM
[Well Developed] : well developed [Well Nourished] : well nourished [No Acute Distress] : no acute distress [Normal Conjunctiva] : normal conjunctiva [Normal Venous Pressure] : normal venous pressure [No Carotid Bruit] : no carotid bruit [Clear Lung Fields] : clear lung fields [Good Air Entry] : good air entry [No Respiratory Distress] : no respiratory distress  [Soft] : abdomen soft [Non Tender] : non-tender [No Masses/organomegaly] : no masses/organomegaly [Normal Bowel Sounds] : normal bowel sounds [Normal Gait] : normal gait [No Edema] : no edema [No Cyanosis] : no cyanosis [No Clubbing] : no clubbing [No Varicosities] : no varicosities [No Rash] : no rash [No Skin Lesions] : no skin lesions [Moves all extremities] : moves all extremities [No Focal Deficits] : no focal deficits [Normal Speech] : normal speech [Alert and Oriented] : alert and oriented [Normal memory] : normal memory [de-identified] : repeat /85 mmHg [de-identified] : enlarged (likely) thyroid tissue right neck, soft, non-tender; states had recent biopsy, non-cancerous [de-identified] : midline sternal scar well healed; mechanical, crisp S1; normal S2

## 2024-09-23 NOTE — HISTORY OF PRESENT ILLNESS
[FreeTextEntry1] : 78 yo man presents for CV follow-up. He is originally from [facility]; lives with his daughter and wife.  H/o CAD s/p CABG, s/p mechanical MVR (about 12+ years ago; rheumatic heart disease; on warfarin), chronic persistent atrial fibrillation, diabetes (A1c 7.0%), HLD, Rosie Thompson tears / esophageal ulcers. He developed COVID early in Sept 2020. He has subclinical hypothyroidism; thyroid nodules.  Stress test 2/2020: LVEF 70%; normal myocardial perfusion  Echo 2/2020:  1. Mechanical mitral valve prosthesis. Mild mitral regurgitation.  Mean transmitral valve gradient equals 4 mm Hg, which is probably normal in the setting of a prosthetic valve. 2. Normal left ventricular internal dimensions and wall thicknesses. 3. Endocardium not well visualized; grossly normal left ventricular systolic function. 4. Normal right ventricular size and function. 5. Normal tricuspid valve.  Mild-moderate tricuspid regurgitation.  Echocardiogram 2/2/22: LVEF 65% mechanical MV, mild MR, mean gradient 5 mmHg severe LAE moderate AMALIA; normal RV size/function PASP 50 mmHg No changes c/w 2/2020  His ACEi was stopped due to cough.  Labs 2023: A1c 7.3% tchol 110, trig 124, HDL 36, LDL 49 mg/dL  Mr. Forte is s/p hospitalization for diarrhea / possible colitis; now resolved. He is now planning to have urologic procedure 2/3/2022 (intermittent hematuria, urinary frequency; CT Urogram in 9/24/21 showed no renal stones, suspicious renal mass, or evidence of urothelial lesion, enlarged prostate). Unclear exact type of procedure.  Labs, June 2023: A1c 8.3% CMP normal, BR 1.7 TSH normal  Lipids, 2/2023 tchol 110, trig 124, HDL 36, LDL 49 mg/dL  At his last visit with me October 2023, the following issues were discussed with him: Atherosclerotic heart disease of native coronary artery without angina pectoris (414.01) (I25.10) H/o CAD; no present symptoms to suggest unstable angina. Continues on medications     for HLD and HTN. Not on aspirin (on warfarin). Atrial fibrillation (427.31) (I48.91) Rate controlled on atenolol and digoxin. On anticoagulation. Stable. No bleeding or     stroke symptoms. Diabetes mellitus (250.00) (E11.9) A1c higher; working with Endocrine to improve. Hyperlipidemia (272.4) (E78.5) Remains on statin therapy; tolerates well.     - c/w atorvastatin Mechanical heart valve present (V43.3) (Z95.2) Long-standing MVR (mechanical); h/o rheumatic fever/RHD. He has done quite well over     the past numerous years, maintained on warfarin (INR goal 2.5-3.5). No CVA or bleeding     issues. Last echo in 2/2022 was stable. No changes today. Will order f/u echo. Needs     antibiotic prophylaxis prior to procedures; understands importance of this. Multiple thyroid nodules (241.1) (E04.2) Markedly enlarged thyroid (R>L). F/b Endocrine. Stage 3 chronic kidney disease, unspecified whether stage 3a or 3b CKD (585.3) (N18.30) Labs f/b local MD. TIA (transient ischemic attack) (435.9) (G45.9) Type 2 diabetes mellitus with diabetic neuropathy, without long-term current use of insulin (250.60,357.2) (E11.40)  Labs June 2024: tchol 96, trig 141, HDL 44, LDL 28 mg/dL CMP normal A1c 7.8%  Since last visit, doing well. INR followed closely, states it is typically ~3.5 range. No bleeding issues; no falls. No SOB/VELAZQUEZ/chest pain. No stroke symptoms. Compliant / knowledgeable about his medications. Due to have teeth cleaned later this month; understands need for antibiotic prophylaxis  EKG today, unchanged: AFib  Dr. Ashish Manrique (Urologic surgeon; [address] [facility]; Fax [phone]) PCP: Adrian Trevizo MD; Ameena Bains MD

## 2024-09-23 NOTE — REVIEW OF SYSTEMS
[Urinary Frequency] : urinary frequency [Nocturia] : nocturia [Negative] : Heme/Lymph [Fever] : no fever [Chills] : no chills [Feeling Fatigued] : not feeling fatigued [SOB] : no shortness of breath [Dyspnea on exertion] : not dyspnea during exertion [Chest Discomfort] : no chest discomfort [Lower Ext Edema] : no extremity edema [Leg Claudication] : no intermittent leg claudication [Palpitations] : no palpitations [Orthopnea] : no orthopnea [PND] : no PND [Syncope] : no syncope [Cough] : no cough [Wheezing] : no wheezing [Coughing Up Blood] : no hemoptysis [Snoring] : no snoring [Hematuria] : no hematuria [Pain During Urination] : no dysuria

## 2024-09-23 NOTE — PHYSICAL EXAM
[Well Developed] : well developed [Well Nourished] : well nourished [No Acute Distress] : no acute distress [Normal Conjunctiva] : normal conjunctiva [Normal Venous Pressure] : normal venous pressure [No Carotid Bruit] : no carotid bruit [Clear Lung Fields] : clear lung fields [Good Air Entry] : good air entry [No Respiratory Distress] : no respiratory distress  [Soft] : abdomen soft [Non Tender] : non-tender [No Masses/organomegaly] : no masses/organomegaly [Normal Bowel Sounds] : normal bowel sounds [Normal Gait] : normal gait [No Edema] : no edema [No Cyanosis] : no cyanosis [No Clubbing] : no clubbing [No Varicosities] : no varicosities [No Rash] : no rash [No Skin Lesions] : no skin lesions [Moves all extremities] : moves all extremities [No Focal Deficits] : no focal deficits [Normal Speech] : normal speech [Alert and Oriented] : alert and oriented [Normal memory] : normal memory [de-identified] : repeat /85 mmHg [de-identified] : enlarged (likely) thyroid tissue right neck, soft, non-tender; states had recent biopsy, non-cancerous [de-identified] : midline sternal scar well healed; mechanical, crisp S1; normal S2

## 2024-09-23 NOTE — HISTORY OF PRESENT ILLNESS
[FreeTextEntry1] : 78 yo man presents for CV follow-up. He is originally from [facility]; lives with his daughter and wife.  H/o CAD s/p CABG, s/p mechanical MVR (about 12+ years ago; rheumatic heart disease; on warfarin), chronic persistent atrial fibrillation, diabetes (A1c 7.0%), HLD, Rosie Thompson tears / esophageal ulcers. He developed COVID early in Sept 2020. He has subclinical hypothyroidism; thyroid nodules.  Stress test 2/2020: LVEF 70%; normal myocardial perfusion  Echo 2/2020:  1. Mechanical mitral valve prosthesis. Mild mitral regurgitation.  Mean transmitral valve gradient equals 4 mm Hg, which is probably normal in the setting of a prosthetic valve. 2. Normal left ventricular internal dimensions and wall thicknesses. 3. Endocardium not well visualized; grossly normal left ventricular systolic function. 4. Normal right ventricular size and function. 5. Normal tricuspid valve.  Mild-moderate tricuspid regurgitation.  Echocardiogram 2/2/22: LVEF 65% mechanical MV, mild MR, mean gradient 5 mmHg severe LAE moderate AMALIA; normal RV size/function PASP 50 mmHg No changes c/w 2/2020  His ACEi was stopped due to cough.  Labs 2023: A1c 7.3% tchol 110, trig 124, HDL 36, LDL 49 mg/dL  Mr. Forte is s/p hospitalization for diarrhea / possible colitis; now resolved. He is now planning to have urologic procedure 2/3/2022 (intermittent hematuria, urinary frequency; CT Urogram in 9/24/21 showed no renal stones, suspicious renal mass, or evidence of urothelial lesion, enlarged prostate). Unclear exact type of procedure.  Labs, June 2023: A1c 8.3% CMP normal, BR 1.7 TSH normal  Lipids, 2/2023 tchol 110, trig 124, HDL 36, LDL 49 mg/dL  At his last visit with me October 2023, the following issues were discussed with him: Atherosclerotic heart disease of native coronary artery without angina pectoris (414.01) (I25.10) H/o CAD; no present symptoms to suggest unstable angina. Continues on medications     for HLD and HTN. Not on aspirin (on warfarin). Atrial fibrillation (427.31) (I48.91) Rate controlled on atenolol and digoxin. On anticoagulation. Stable. No bleeding or     stroke symptoms. Diabetes mellitus (250.00) (E11.9) A1c higher; working with Endocrine to improve. Hyperlipidemia (272.4) (E78.5) Remains on statin therapy; tolerates well.     - c/w atorvastatin Mechanical heart valve present (V43.3) (Z95.2) Long-standing MVR (mechanical); h/o rheumatic fever/RHD. He has done quite well over     the past numerous years, maintained on warfarin (INR goal 2.5-3.5). No CVA or bleeding     issues. Last echo in 2/2022 was stable. No changes today. Will order f/u echo. Needs     antibiotic prophylaxis prior to procedures; understands importance of this. Multiple thyroid nodules (241.1) (E04.2) Markedly enlarged thyroid (R>L). F/b Endocrine. Stage 3 chronic kidney disease, unspecified whether stage 3a or 3b CKD (585.3) (N18.30) Labs f/b local MD. TIA (transient ischemic attack) (435.9) (G45.9) Type 2 diabetes mellitus with diabetic neuropathy, without long-term current use of insulin (250.60,357.2) (E11.40)  Labs June 2024: tchol 96, trig 141, HDL 44, LDL 28 mg/dL CMP normal A1c 7.8%  Since last visit, doing well. INR followed closely, states it is typically ~3.5 range. No bleeding issues; no falls. No SOB/VELAZQUEZ/chest pain. No stroke symptoms. Compliant / knowledgeable about his medications. Due to have teeth cleaned later this month; understands need for antibiotic prophylaxis  EKG today, unchanged: AFib  Dr. Ashish Manrique (Urologic surgeon; [address] [facility]; Fax [phone]) PCP: Adrian Trevizo MD; Ameena Bains MD

## 2024-09-25 ENCOUNTER — NON-APPOINTMENT (OUTPATIENT)
Age: 79
End: 2024-09-25

## 2024-09-27 ENCOUNTER — OUTPATIENT (OUTPATIENT)
Dept: OUTPATIENT SERVICES | Facility: HOSPITAL | Age: 79
LOS: 1 days | End: 2024-09-27

## 2024-09-27 ENCOUNTER — APPOINTMENT (OUTPATIENT)
Dept: INTERNAL MEDICINE | Facility: CLINIC | Age: 79
End: 2024-09-27

## 2024-09-27 DIAGNOSIS — Z86.69 PERSONAL HISTORY OF OTHER DISEASES OF THE NERVOUS SYSTEM AND SENSE ORGANS: Chronic | ICD-10-CM

## 2024-09-27 DIAGNOSIS — H26.9 UNSPECIFIED CATARACT: Chronic | ICD-10-CM

## 2024-11-07 ENCOUNTER — APPOINTMENT (OUTPATIENT)
Dept: UROLOGY | Facility: CLINIC | Age: 79
End: 2024-11-07

## 2024-11-11 ENCOUNTER — RX RENEWAL (OUTPATIENT)
Age: 79
End: 2024-11-11

## 2024-12-20 ENCOUNTER — APPOINTMENT (OUTPATIENT)
Dept: UROLOGY | Facility: CLINIC | Age: 79
End: 2024-12-20

## 2024-12-26 ENCOUNTER — APPOINTMENT (OUTPATIENT)
Dept: CARE COORDINATION | Facility: HOME HEALTH | Age: 79
End: 2024-12-26

## 2025-01-20 ENCOUNTER — EMERGENCY (EMERGENCY)
Facility: HOSPITAL | Age: 80
LOS: 1 days | Discharge: HOME | End: 2025-01-20
Attending: EMERGENCY MEDICINE | Admitting: EMERGENCY MEDICINE
Payer: MEDICARE

## 2025-01-20 VITALS
WEIGHT: 160.06 LBS | RESPIRATION RATE: 17 BRPM | DIASTOLIC BLOOD PRESSURE: 84 MMHG | SYSTOLIC BLOOD PRESSURE: 146 MMHG | TEMPERATURE: 98 F | HEART RATE: 84 BPM | OXYGEN SATURATION: 99 %

## 2025-01-20 VITALS — HEART RATE: 60 BPM

## 2025-01-20 DIAGNOSIS — H26.9 UNSPECIFIED CATARACT: Chronic | ICD-10-CM

## 2025-01-20 DIAGNOSIS — Z90.89 ACQUIRED ABSENCE OF OTHER ORGANS: Chronic | ICD-10-CM

## 2025-01-20 DIAGNOSIS — Z98.89 OTHER SPECIFIED POSTPROCEDURAL STATES: Chronic | ICD-10-CM

## 2025-01-20 DIAGNOSIS — Z98.890 OTHER SPECIFIED POSTPROCEDURAL STATES: Chronic | ICD-10-CM

## 2025-01-20 DIAGNOSIS — Z86.69 PERSONAL HISTORY OF OTHER DISEASES OF THE NERVOUS SYSTEM AND SENSE ORGANS: Chronic | ICD-10-CM

## 2025-01-20 LAB
ALBUMIN SERPL ELPH-MCNC: 4 G/DL — SIGNIFICANT CHANGE UP (ref 3.3–5)
ALP SERPL-CCNC: 70 U/L — SIGNIFICANT CHANGE UP (ref 40–120)
ALT FLD-CCNC: 45 U/L — HIGH (ref 4–41)
ANION GAP SERPL CALC-SCNC: 12 MMOL/L — SIGNIFICANT CHANGE UP (ref 7–14)
AST SERPL-CCNC: 37 U/L — SIGNIFICANT CHANGE UP (ref 4–40)
BASOPHILS # BLD AUTO: 0.03 K/UL — SIGNIFICANT CHANGE UP (ref 0–0.2)
BASOPHILS NFR BLD AUTO: 0.4 % — SIGNIFICANT CHANGE UP (ref 0–2)
BILIRUB SERPL-MCNC: 1.8 MG/DL — HIGH (ref 0.2–1.2)
BLOOD GAS VENOUS COMPREHENSIVE RESULT: SIGNIFICANT CHANGE UP
BUN SERPL-MCNC: 29 MG/DL — HIGH (ref 7–23)
CALCIUM SERPL-MCNC: 9 MG/DL — SIGNIFICANT CHANGE UP (ref 8.4–10.5)
CHLORIDE SERPL-SCNC: 107 MMOL/L — SIGNIFICANT CHANGE UP (ref 98–107)
CO2 SERPL-SCNC: 21 MMOL/L — LOW (ref 22–31)
CREAT SERPL-MCNC: 1.17 MG/DL — SIGNIFICANT CHANGE UP (ref 0.5–1.3)
EGFR: 64 ML/MIN/1.73M2 — SIGNIFICANT CHANGE UP
EOSINOPHIL # BLD AUTO: 0.11 K/UL — SIGNIFICANT CHANGE UP (ref 0–0.5)
EOSINOPHIL NFR BLD AUTO: 1.6 % — SIGNIFICANT CHANGE UP (ref 0–6)
GLUCOSE SERPL-MCNC: 95 MG/DL — SIGNIFICANT CHANGE UP (ref 70–99)
HCT VFR BLD CALC: 37.7 % — LOW (ref 39–50)
HGB BLD-MCNC: 12.3 G/DL — LOW (ref 13–17)
IANC: 4.13 K/UL — SIGNIFICANT CHANGE UP (ref 1.8–7.4)
IMM GRANULOCYTES NFR BLD AUTO: 0.9 % — SIGNIFICANT CHANGE UP (ref 0–0.9)
INR BLD: 1.85 RATIO — HIGH (ref 0.85–1.16)
LIDOCAIN IGE QN: 178 U/L — HIGH (ref 7–60)
LYMPHOCYTES # BLD AUTO: 1.89 K/UL — SIGNIFICANT CHANGE UP (ref 1–3.3)
LYMPHOCYTES # BLD AUTO: 27.4 % — SIGNIFICANT CHANGE UP (ref 13–44)
MAGNESIUM SERPL-MCNC: 1.9 MG/DL — SIGNIFICANT CHANGE UP (ref 1.6–2.6)
MCHC RBC-ENTMCNC: 31.1 PG — SIGNIFICANT CHANGE UP (ref 27–34)
MCHC RBC-ENTMCNC: 32.6 G/DL — SIGNIFICANT CHANGE UP (ref 32–36)
MCV RBC AUTO: 95.4 FL — SIGNIFICANT CHANGE UP (ref 80–100)
MONOCYTES # BLD AUTO: 0.67 K/UL — SIGNIFICANT CHANGE UP (ref 0–0.9)
MONOCYTES NFR BLD AUTO: 9.7 % — SIGNIFICANT CHANGE UP (ref 2–14)
NEUTROPHILS # BLD AUTO: 4.13 K/UL — SIGNIFICANT CHANGE UP (ref 1.8–7.4)
NEUTROPHILS NFR BLD AUTO: 60 % — SIGNIFICANT CHANGE UP (ref 43–77)
NRBC # BLD: 0 /100 WBCS — SIGNIFICANT CHANGE UP (ref 0–0)
NRBC # FLD: 0 K/UL — SIGNIFICANT CHANGE UP (ref 0–0)
PLATELET # BLD AUTO: 180 K/UL — SIGNIFICANT CHANGE UP (ref 150–400)
POTASSIUM SERPL-MCNC: 3.9 MMOL/L — SIGNIFICANT CHANGE UP (ref 3.5–5.3)
POTASSIUM SERPL-SCNC: 3.9 MMOL/L — SIGNIFICANT CHANGE UP (ref 3.5–5.3)
PROT SERPL-MCNC: 6.4 G/DL — SIGNIFICANT CHANGE UP (ref 6–8.3)
PROTHROM AB SERPL-ACNC: 21.9 SEC — HIGH (ref 9.9–13.4)
RBC # BLD: 3.95 M/UL — LOW (ref 4.2–5.8)
RBC # FLD: 12.7 % — SIGNIFICANT CHANGE UP (ref 10.3–14.5)
SODIUM SERPL-SCNC: 140 MMOL/L — SIGNIFICANT CHANGE UP (ref 135–145)
TROPONIN T, HIGH SENSITIVITY RESULT: 16 NG/L — SIGNIFICANT CHANGE UP
WBC # BLD: 6.89 K/UL — SIGNIFICANT CHANGE UP (ref 3.8–10.5)
WBC # FLD AUTO: 6.89 K/UL — SIGNIFICANT CHANGE UP (ref 3.8–10.5)

## 2025-01-20 PROCEDURE — 72125 CT NECK SPINE W/O DYE: CPT | Mod: 26

## 2025-01-20 PROCEDURE — 71045 X-RAY EXAM CHEST 1 VIEW: CPT | Mod: 26

## 2025-01-20 PROCEDURE — 99285 EMERGENCY DEPT VISIT HI MDM: CPT

## 2025-01-20 PROCEDURE — 70450 CT HEAD/BRAIN W/O DYE: CPT | Mod: 26

## 2025-01-20 PROCEDURE — 93010 ELECTROCARDIOGRAM REPORT: CPT

## 2025-01-20 RX ORDER — ACETAMINOPHEN 80 MG/.8ML
1000 SOLUTION/ DROPS ORAL ONCE
Refills: 0 | Status: COMPLETED | OUTPATIENT
Start: 2025-01-20 | End: 2025-01-20

## 2025-01-20 RX ADMIN — ACETAMINOPHEN 400 MILLIGRAM(S): 80 SOLUTION/ DROPS ORAL at 18:38

## 2025-01-20 NOTE — ED ADULT TRIAGE NOTE - CHIEF COMPLAINT QUOTE
pt ambulatory to triage with steady gait, c/o pain to the right bicep x 1 week, worse at night, c/o intermittent tingling x 2 weeks. Denies cp, Sob.

## 2025-01-20 NOTE — ED PROVIDER NOTE - NSFOLLOWUPINSTRUCTIONS_ED_ALL_ED_FT
Please follow up with your PMD within 1-2 weeks time.     Take Acetaminophen 1000 mg every 8 hours as needed for pain.     Return if your symptoms worsen.

## 2025-01-20 NOTE — ED ADULT NURSE NOTE - NSICDXPASTSURGICALHX_GEN_ALL_CORE_FT
PAST SURGICAL HISTORY:  Cataract Bilateral ; tx surgically    H/O melanoma excision scalp-2014    H/O retinal detachment left    Hx of CABG x 2 2006    Mitral valve replaced 2006 at [facility]    S/P tonsillectomy     S/P TURP 2014

## 2025-01-20 NOTE — ED PROVIDER NOTE - NSICDXPASTSURGICALHX_GEN_ALL_CORE_FT
PAST SURGICAL HISTORY:  Cataract Bilateral ; tx surgically    H/O melanoma excision scalp-2014    H/O retinal detachment left    Hx of CABG x 2 2006    Mitral valve replaced 2006 at [facility]    S/P tonsillectomy     S/P TURP 2014

## 2025-01-20 NOTE — ED PROVIDER NOTE - WET READ LAUNCH FT
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 She reports she has one more chemo cycle remaining and is here to discuss surgical options/next steps.     ALLERGIES:   Allergen Reactions   • Cat Dander Runny Nose   • No Known Allergies Other (See Comments)       Review of Systems  ROS has been reviewed and is negative    Current Outpatient Medications   Medication Sig Dispense Refill   • Cetirizine HCl 10 MG Cap Take 10 mg by mouth daily. 30 capsule 11   • gabapentin (NEURONTIN) 100 MG capsule Take 1 capsule by mouth in the morning and 1 capsule at noon and 1 capsule in the evening. Do all this for 7 days. 21 capsule 0   • gabapentin (NEURONTIN) 300 MG capsule Take 1 capsule by mouth in the morning and 1 capsule at noon and 1 capsule in the evening. Take 100 mg po three times a day x 7 days and then 300 mg po three times a day 90 capsule 5   • Cetirizine HCl 10 MG Cap Take 10 mg by mouth daily. 30 capsule 3   • diphenhydrAMINE (Benadryl Allergy) 25 MG capsule Take 1 capsule by mouth every 6 hours as needed for Itching or Allergies. Caution sedation 30 capsule 0   • hydroCORTisone (CORTIZONE) 2.5 % cream Apply topically to affected area twice daily for 7 days. 28 g 0   • famotidine (PEPCID) 20 MG tablet Take 1 tablet by mouth 2 times daily as needed (reflux/heartburn). 30 tablet 0   • cetirizine (ZyrTEC) 10 MG tablet Take 1 tablet by mouth daily. 30 tablet 0   • albuterol (Ventolin HFA) 108 (90 Base) MCG/ACT inhaler Ventolin HFA 90 mcg/actuation aerosol inhaler   INHALE 2 PUFFS BY MOUTH EVERY 6 HOURS     • ketoconazole (NIZORAL) 2 % shampoo ketoconazole 2 % shampoo   APPLY TO HAIR AND SHAMPOO AS NEEDED     • prochlorperazine (COMPAZINE) 10 MG tablet prochlorperazine maleate 10 mg tablet   TAKE 1 TABLET BY MOUTH EVERY 6 HOURS AS NEEDED FOR  NAUSEA     • prochlorperazine (COMPAZINE) 10 MG tablet Take 10 mg by mouth every 6 hours as needed.     • pemBROlizumab (KEYTRUDA) 100 MG/4ML Solution      • potassium CHLORIDE (KLOR-CON M) 20 MEQ cody ER tablet Take 1 tablet by mouth daily. 90 tablet 3   • pantoprazole (PROTONIX) 40 MG tablet Take 1 tablet by mouth daily. 90 tablet 1   • furosemide (LASIX) 40 MG tablet Take 1 tablet by mouth daily. 90 tablet 3   • citalopram (CeleXA) 40 MG tablet Take 1 tablet by mouth daily. 90 tablet 3   • busPIRone (BUSPAR) 7.5 MG tablet Take 1 tablet by mouth in the morning and 1 tablet in the evening. 120 tablet 3   • clonazePAM (KlonoPIN) 1 MG tablet Take 1 tablet by mouth 2 times daily as needed for Anxiety. 60 tablet 1   • Vitamin D, Ergocalciferol, 1.25 mg (50,000 units) capsule Take 1 capsule by mouth 1 day a week. 12 capsule 3   • valACYclovir (VALTREX) 500 MG tablet Take 1 tablet by mouth daily. 90 tablet 3     No current facility-administered medications for this visit.       Patient Active Problem List   Diagnosis   • Adjustment reaction with anxiety and depression   • Chronic pain syndrome   • Gastroesophageal reflux disease without esophagitis   • HSV-2 (herpes simplex virus 2) infection   • Status post total left knee replacement   • Prediabetes   • Vitamin D deficiency   • Invasive ductal carcinoma of breast, female, right (CMD)   • Benign essential HTN   • Encounter for general adult medical examination with abnormal findings   • Malignant neoplasm of upper-inner quadrant of right breast in female, estrogen receptor negative (CMD)     Past Surgical History:   Procedure Laterality Date   • Abdominoplasty 2.5hrs     • Ankle fracture surgery Right 2021   • Biopsy of breast, incisional      positive for grade 3 invasive ductal carcinoma   • Joint replacement  2000    left knee   • Knee scope,aid ant cruciate repair Bilateral     reconstruction of ACL, left 2000, right 1997   • Lymph node biopsy  04/21/2023    FNA,  left hilar lymph node   • Removal of heel spur Right      Family History   Problem Relation Age of Onset   • Cancer Mother 50        breast   • Diabetes Mother    • Cancer Maternal Grandmother         colon cancer   • Cancer, Breast Maternal Cousin         x2, in 50s   • Cancer, Prostate Other         cousin     Social History     Tobacco Use   • Smoking status: Never   • Smokeless tobacco: Never   Substance Use Topics   • Alcohol use: Not Currently     Comment: occasionally   • Drug use: Yes     Types: Marijuana       Visit Vitals  /70 (BP Location: RUE - Right upper extremity, Patient Position: Sitting, Cuff Size: Regular)   Pulse 73   Temp 98.1 °F (36.7 °C) (Oral)   Resp 18   Ht 5' 7\" (1.702 m)   Wt 84.9 kg (187 lb 2.7 oz)   LMP 03/01/2021   SpO2 100%   BMI 29.32 kg/m²       Physical Exam  Gen: W/D, W/N in NAD  HEENT: NCAT, anicteric, EOMI  Neck: supple, no cervical nor supraclavicular LAD  Lungs: CTA b/l  Heart: RRR  Breast:   Right: (+)thickening in LIQ measuring 4 cm in size, no nipple discharge, no axillary LAD   Left: no masses, no nipple discharge, no axillary LAD  Abd: , benign, soft, NT, ND  Extrem: normal movement, (-)c/c/e  Neuro: AAOx3, no cranial nerve deficit        Assessment/Plan:  She will follow-up in the office following completion of chemotherapy treatment and post chemo MRI.  We discussed plan for surgery in October as she will need to wait 4 weeks following the last cycle of chemo. Surgical options reviewed, including partial mastectomy and radiation therapy vs mastectomy as well as sentinel lymph node biopsy and reconstruction options. I will have her obtain the breast MRI in the 3rd week of September and she will follow up in the office late September/early October. Order provided. Patient verbalized understanding of the plan. All questions answered.      Felicita Ramos PA-C       Pt seen and examined with Ms. Rachel PA-C.  The pt is leaning towards mastectomy with immediate  reconstruction.  I discussed with pt that it is unlikely that I would be able to get her reconstruction here at [facility].  The next step I recommended was that she see the plastics surgeons at [facility] to see if she is a candidate for immediate reconstruction.  There is a possibility that they may say she is not a candidate for immediate reconstruction.  If that is the case, pt has indicated she will go elsewhere.  If they do feel she is candidate, then will coordinate with [facility] breast surgery.  She will need an post neoadjuvant chemo MRI around 9/19.  Orders placed.   There are no Wet Read(s) to document.

## 2025-01-20 NOTE — ED ADULT NURSE REASSESSMENT NOTE - NS ED NURSE REASSESS COMMENT FT1
Pt with no co chest pain slow afib on cardiac monitor .b/p wnl. pt denies feeling dizzy no co sob. Pt tolerated dinner .awaiting dispo, pt given call bell in hand instructed on how to call for assistance.

## 2025-01-20 NOTE — ED PROVIDER NOTE - ATTENDING CONTRIBUTION TO CARE
Dr. Carbajal:  I have personally performed a face to face bedside history and physical examination of this patient. I have discussed the history, examination, review of systems, assessment and plan of management with the resident. I have reviewed the electronic medical record and amended it to reflect my history, review of systems, physical exam, assessment and plan.    79M h/o HTN, DM, DVT/PE, mitral valve replacement, afib on Coumadin, presents with 2 weeks of LUE paresthesias and RUE pain when ranging arm.  Denies fever/chills, cp, sob, n/v/d, headache, neck pain.      Exam:  - nad  - rrr  - ctab   -abd soft ntnd  - strength 5/5 bilaterally, sensation to light touch intact, no swelling of BUE and pulses intact    A/P  - suspect likely cervical radiculopathy/MSK origin of pain.  R/o sub-acute stroke, r/o DVT, r/o ACS  - cbc, cmp, coags, trop, VA duplex BUE, CT head, ekg

## 2025-01-20 NOTE — ED PROVIDER NOTE - CLINICAL SUMMARY MEDICAL DECISION MAKING FREE TEXT BOX
Fellow MD Brian Torres: 79-year-old male with past medical history of HTN, DM, HLD, GERD, thyroid nodule, DVT/PE, mechanical mitral valve replacement, atrial fibrillation on Coumadin, BPH, presenting to the ED for evaluation of 2 weeks of left upper extremity numbness and right upper extremity pain when he lifts the arm.  Patient denies recent trauma or falls.  He denies any neck pain, headache, changes in vision, changes in speech, weakness, chest pain, shortness of breath, palpitations, nausea, vomiting, diarrhea, abdominal pain, dysuria, hematuria, flank pain, and any additional complaints at this time.  No recent travel or sick contacts.    On arrival to the ED, the patient is hypertensive, nontachycardic, afebrile, and satting 99% on room air.  On physical exam, the patient is neurologically intact without any focal deficits.  Cranial nerves II through XII are intact.  Extraocular movements are intact.  Strength is 5/5 and symmetric bilaterally.  Sensation is grossly intact.  Pupillary exam there is baseline abnormal pupil on the left from prior eye surgery.  Plan for labs

## 2025-01-20 NOTE — ED PROVIDER NOTE - PHYSICAL EXAMINATION
Constitutional: Well-appearing, well-nourished, comfortable appearing.   Head: Normocephalic, atraumatic.   Eyes: Left pupil misshapen secondary to prior eye surgery. EOMI. No conjunctival pallor.   ENT: Moist mucous membranes. Uvula midline. No pharyngeal erythema or exudates.  Neck: No LAD. Supple.  CVS: Regular rate, irregularly irregular rhythm. Normal S1, S2. No murmurs, rubs, or gallops. No peripheral edema noted.   Respiratory: No respiratory distress. Clear to auscultation bilaterally. No wheezing, rales, or rhonchi.   Abdomen: Abd is soft and non-distended. No tenderness. No rebound, guarding, or rigidity.   MSK: No CVA tenderness bilaterally.   Neurologic: Alert and oriented to person, place, and time. Follows all commands. Moves all extremities. CN II-XII intact. Strenght is 5/5 and symmetric bilaterally. Sensation grossly intact. No pronator drift.   Skin: Warm and dry. No rashes.   Psych: Normal affect, cooperative.

## 2025-01-20 NOTE — ED PROVIDER NOTE - TEMPLATE, MLM
BLEPHARITIS, OU: PRESCRIBE WARM COMPRESSES AND EYELID SCRUBS QD-BID, ARTIFICIAL TEARS BID-QID AND ANTIBIOTIC OINTMENT. RETURN FOR FOLLOW-UP AS SCHEDULED. General

## 2025-01-20 NOTE — ED PROVIDER NOTE - PATIENT PORTAL LINK FT
You can access the FollowMyHealth Patient Portal offered by [facility] by registering at the following website: [email or web address]. By joining [employer]’s FollowMyHealth portal, you will also be able to view your health information using other applications (apps) compatible with our system.

## 2025-01-20 NOTE — ED PROVIDER NOTE - PROGRESS NOTE DETAILS
Fellow MD Brian Torres: Patient reevaluate bedside in no acute distress at this time.  Patient feeling better at this time.  Discussed the results of the workup with the patient and advised him to follow-up with primary care physician as an outpatient and return to the ED if symptoms worsen.  Patient is stable for discharge. Fellow MD Brian oTrres: Patient reevaluate bedside in no acute distress at this time.  Patient feeling better at this time.  Discussed the results of the workup with the patient and advised him to follow-up with primary care physician as an outpatient and return to the ED if symptoms worsen.  Based on prior notes, the patient's HR is 50-70s. At discharge, high 50s, low 60s. Patient is stable for discharge. Spoke with patient, recommend f/u PCP & possibly endocrinology regarding thyroid mass.  Pt is aware of mass, not sure if it's gotten bigger.  Will need further imaging.

## 2025-01-29 ENCOUNTER — APPOINTMENT (OUTPATIENT)
Dept: INTERNAL MEDICINE | Facility: CLINIC | Age: 80
End: 2025-01-29
Payer: MEDICARE

## 2025-01-29 ENCOUNTER — OUTPATIENT (OUTPATIENT)
Dept: OUTPATIENT SERVICES | Facility: HOSPITAL | Age: 80
LOS: 1 days | End: 2025-01-29

## 2025-01-29 ENCOUNTER — RESULT CHARGE (OUTPATIENT)
Age: 80
End: 2025-01-29

## 2025-01-29 ENCOUNTER — APPOINTMENT (OUTPATIENT)
Dept: INTERNAL MEDICINE | Facility: CLINIC | Age: 80
End: 2025-01-29

## 2025-01-29 VITALS
HEART RATE: 55 BPM | OXYGEN SATURATION: 99 % | HEIGHT: 69 IN | WEIGHT: 166 LBS | SYSTOLIC BLOOD PRESSURE: 130 MMHG | BODY MASS INDEX: 24.59 KG/M2 | DIASTOLIC BLOOD PRESSURE: 80 MMHG

## 2025-01-29 VITALS — OXYGEN SATURATION: 98 % | HEART RATE: 68 BPM

## 2025-01-29 DIAGNOSIS — H26.9 UNSPECIFIED CATARACT: Chronic | ICD-10-CM

## 2025-01-29 DIAGNOSIS — Z98.890 OTHER SPECIFIED POSTPROCEDURAL STATES: Chronic | ICD-10-CM

## 2025-01-29 DIAGNOSIS — Z90.89 ACQUIRED ABSENCE OF OTHER ORGANS: Chronic | ICD-10-CM

## 2025-01-29 DIAGNOSIS — Z98.89 OTHER SPECIFIED POSTPROCEDURAL STATES: Chronic | ICD-10-CM

## 2025-01-29 DIAGNOSIS — R20.0 ANESTHESIA OF SKIN: ICD-10-CM

## 2025-01-29 DIAGNOSIS — S46.009A UNSPECIFIED INJURY OF MUSCLE(S) AND TENDON(S) OF THE ROTATOR CUFF OF UNSPECIFIED SHOULDER, INITIAL ENCOUNTER: ICD-10-CM

## 2025-01-29 LAB
INR PPP: 2.3 RATIO
POCT-PROTHROMBIN TIME: 27.4 SECS
QUALITY CONTROL: YES

## 2025-01-29 PROCEDURE — 99214 OFFICE O/P EST MOD 30 MIN: CPT | Mod: 25

## 2025-01-30 DIAGNOSIS — Z51.81 ENCOUNTER FOR THERAPEUTIC DRUG LEVEL MONITORING: ICD-10-CM

## 2025-01-30 PROBLEM — R20.0 LEFT ARM NUMBNESS: Status: ACTIVE | Noted: 2025-01-30

## 2025-01-30 PROBLEM — S46.009A ROTATOR CUFF INJURY: Status: ACTIVE | Noted: 2025-01-29

## 2025-01-31 DIAGNOSIS — S46.009A UNSPECIFIED INJURY OF MUSCLE(S) AND TENDON(S) OF THE ROTATOR CUFF OF UNSPECIFIED SHOULDER, INITIAL ENCOUNTER: ICD-10-CM

## 2025-01-31 DIAGNOSIS — R20.0 ANESTHESIA OF SKIN: ICD-10-CM

## 2025-01-31 DIAGNOSIS — Z79.01 LONG TERM (CURRENT) USE OF ANTICOAGULANTS: ICD-10-CM

## 2025-02-05 ENCOUNTER — APPOINTMENT (OUTPATIENT)
Dept: INTERNAL MEDICINE | Facility: CLINIC | Age: 80
End: 2025-02-05

## 2025-02-05 ENCOUNTER — RESULT CHARGE (OUTPATIENT)
Age: 80
End: 2025-02-05

## 2025-02-05 ENCOUNTER — OUTPATIENT (OUTPATIENT)
Dept: OUTPATIENT SERVICES | Facility: HOSPITAL | Age: 80
LOS: 1 days | End: 2025-02-05

## 2025-02-05 VITALS — OXYGEN SATURATION: 98 % | HEART RATE: 60 BPM

## 2025-02-05 DIAGNOSIS — Z95.2 PRESENCE OF PROSTHETIC HEART VALVE: ICD-10-CM

## 2025-02-05 DIAGNOSIS — Z86.69 PERSONAL HISTORY OF OTHER DISEASES OF THE NERVOUS SYSTEM AND SENSE ORGANS: Chronic | ICD-10-CM

## 2025-02-05 DIAGNOSIS — Z90.89 ACQUIRED ABSENCE OF OTHER ORGANS: Chronic | ICD-10-CM

## 2025-02-05 DIAGNOSIS — Z79.01 ENCOUNTER FOR THERAPEUTIC DRUG LVL MONITORING: ICD-10-CM

## 2025-02-05 DIAGNOSIS — Z98.890 OTHER SPECIFIED POSTPROCEDURAL STATES: Chronic | ICD-10-CM

## 2025-02-05 DIAGNOSIS — Z51.81 ENCOUNTER FOR THERAPEUTIC DRUG LVL MONITORING: ICD-10-CM

## 2025-02-05 DIAGNOSIS — H26.9 UNSPECIFIED CATARACT: Chronic | ICD-10-CM

## 2025-02-05 DIAGNOSIS — Z98.89 OTHER SPECIFIED POSTPROCEDURAL STATES: Chronic | ICD-10-CM

## 2025-02-05 LAB
INR PPP: 3 RATIO
POCT-PROTHROMBIN TIME: 36.5 SECS
QUALITY CONTROL: YES

## 2025-02-10 DIAGNOSIS — Z51.81 ENCOUNTER FOR THERAPEUTIC DRUG LEVEL MONITORING: ICD-10-CM

## 2025-02-10 DIAGNOSIS — Z95.2 PRESENCE OF PROSTHETIC HEART VALVE: ICD-10-CM

## 2025-02-19 ENCOUNTER — APPOINTMENT (OUTPATIENT)
Dept: INTERNAL MEDICINE | Facility: CLINIC | Age: 80
End: 2025-02-19

## 2025-02-23 ENCOUNTER — EMERGENCY (EMERGENCY)
Facility: HOSPITAL | Age: 80
LOS: 1 days | Discharge: HOME | End: 2025-02-23
Attending: EMERGENCY MEDICINE | Admitting: EMERGENCY MEDICINE
Payer: MEDICARE

## 2025-02-23 VITALS
WEIGHT: 160.06 LBS | TEMPERATURE: 98 F | HEART RATE: 64 BPM | OXYGEN SATURATION: 99 % | SYSTOLIC BLOOD PRESSURE: 136 MMHG | RESPIRATION RATE: 18 BRPM | DIASTOLIC BLOOD PRESSURE: 66 MMHG

## 2025-02-23 DIAGNOSIS — H26.9 UNSPECIFIED CATARACT: Chronic | ICD-10-CM

## 2025-02-23 DIAGNOSIS — Z86.69 PERSONAL HISTORY OF OTHER DISEASES OF THE NERVOUS SYSTEM AND SENSE ORGANS: Chronic | ICD-10-CM

## 2025-02-23 DIAGNOSIS — Z98.890 OTHER SPECIFIED POSTPROCEDURAL STATES: Chronic | ICD-10-CM

## 2025-02-23 DIAGNOSIS — Z98.89 OTHER SPECIFIED POSTPROCEDURAL STATES: Chronic | ICD-10-CM

## 2025-02-23 DIAGNOSIS — Z90.89 ACQUIRED ABSENCE OF OTHER ORGANS: Chronic | ICD-10-CM

## 2025-02-23 PROCEDURE — 99283 EMERGENCY DEPT VISIT LOW MDM: CPT

## 2025-02-23 PROCEDURE — 93010 ELECTROCARDIOGRAM REPORT: CPT

## 2025-02-23 PROCEDURE — 73030 X-RAY EXAM OF SHOULDER: CPT | Mod: 26,RT

## 2025-02-23 PROCEDURE — 73020 X-RAY EXAM OF SHOULDER: CPT | Mod: 26,XE,RT

## 2025-02-23 RX ORDER — ACETAMINOPHEN 160 MG/5ML
975 SUSPENSION ORAL ONCE
Refills: 0 | Status: COMPLETED | OUTPATIENT
Start: 2025-02-23 | End: 2025-02-23

## 2025-02-23 RX ADMIN — ACETAMINOPHEN 975 MILLIGRAM(S): 160 SUSPENSION ORAL at 13:12

## 2025-02-23 NOTE — ED PROVIDER NOTE - NSFOLLOWUPINSTRUCTIONS_ED_ALL_ED_FT
- You were seen in the emergency department today for right shoulder pain.     - Lab and imaging results, if performed, were discussed with you along with your discharge diagnosis.    - Follow up with physical medicine rehab doctor or sports medicine doctor to obtain MRI of shoulder as well as physical therapy.     - Return to the ED for any new, worsening, or concerning symptoms to you.    - Continue all prescribed medications.    - Take ibuprofen/tylenol as directed as needed for pain.     - Rest and keep yourself hydrated with fluids. - You were seen in the emergency department today for right shoulder pain.     - Lab and imaging results, if performed, were discussed with you along with your discharge diagnosis.    - Follow up with physical medicine rehab doctor or sports medicine doctor to obtain MRI of shoulder as well as physical therapy.     - Return to the ED for any new, worsening, or concerning symptoms to you.    - Continue all prescribed medications.    - Take tylenol as directed as needed for pain.     - Rest and keep yourself hydrated with fluids.

## 2025-02-23 NOTE — ED PROVIDER NOTE - PATIENT PORTAL LINK FT
You can access the FollowMyHealth Patient Portal offered by [facility] by registering at the following website: [email or web address]. By joining [facility]’s FollowMyHealth portal, you will also be able to view your health information using other applications (apps) compatible with our system.

## 2025-02-23 NOTE — ED PROVIDER NOTE - NSFOLLOWUPCLINICS_GEN_ALL_ED_FT
[facility]  Sports Medicine  [address] [place] [address]  Phone: [phone]  Fax:

## 2025-02-23 NOTE — ED PROVIDER NOTE - NSICDXPASTSURGICALHX_GEN_ALL_CORE_FT
PAST SURGICAL HISTORY:  Cataract Bilateral ; tx surgically    H/O melanoma excision scalp-2014    H/O retinal detachment left    Hx of CABG x 2 2006    Mitral valve replaced 2006 at [facility]    S/P tonsillectomy     S/P TURP 2014

## 2025-02-23 NOTE — ED PROVIDER NOTE - ATTENDING CONTRIBUTION TO CARE
80 yo hx of HTN, DM, HLD, GERD, DVT/PE, afib on coumadin, mechanical mitral valve replacement, BPH here for cc of right shoulder pain. Patient has been having right shoulder pain since being discharged last month for similar symptoms. Work up at the time for stroke/dvt was negative. Patient right shoulder pain states that he is not able to lift arm above head , no associated numbness/tingling, weakness.

## 2025-02-23 NOTE — ED PROVIDER NOTE - CLINICAL SUMMARY MEDICAL DECISION MAKING FREE TEXT BOX
80 yo hx of Hypertension, diabetes, hyperlipidemia, GERD, DVT with PE, mechanical mitral valve replacement, A-fib on Coumadin, BPH with a chief complaint of right shoulder pain. cervical radiculopathy vs rotator cuff tendinitis vs adhesive capsulitis. Will obtain xrays of shoulder and pain control with tylenol. Will have follow up with spine doctor for MRI.

## 2025-02-23 NOTE — ED PROVIDER NOTE - OBJECTIVE STATEMENT
78 yo hx of HTN, DM, HLD, GERD, DVT/PE, afib on coumadin, mechanical mitral valve replacement, BPH here for cc of right shoulder pain. Patient has been having right shoulder pain since being discharged last month for similar symptoms. Work up at the time for stroke/dvt was negative. Patient right shoulder pain states that he is not able to lift arm above head , no associated numbness/tingling, weakness.

## 2025-02-23 NOTE — ED PROVIDER NOTE - PHYSICAL EXAMINATION
General: well appearing, interactive, well nourished, no apparent distress, ncat  HEENT: EOMI, PERRLA, normal mucosa, normal oropharynx, no lesions on the lips or on oral mucosa, normal external ear  Neck: supple, no lymphadenopathy, full range of motion, no nuchal rigidity  CV: RRR, normal S1 and S2 with no murmur, capillary refill less than two seconds  Resp: lungs CTA b/l, good aeration bilaterally, symmetric chest wall   Abd: non-distended, soft, non-tender  : no CVA tenderness  MSK: full range of motion, no cyanosis, no edema, no clubbing, no immobility  Neuro: CN II-XII grossly intact, muscle strength 5/5 in all extremities, normal gait. inability to range arm above head on passive and active ROM.   Skin: no rashes, skin intact

## 2025-02-23 NOTE — ED ADULT NURSE NOTE - OBJECTIVE STATEMENT
received pt in spot 10A. pt A&OX3, ambulatory. pt with hx of DVT, HTN, DM. pt coming into the ED c/o right shoulder pain x 1 month worsening. denies any injury or fall. +ROM. denies any obvious injury. pt appears to be in no distress. respirations are equal and nonlabored, no respiratory distress noted. denies any other complaints. pt stable, medicated as per orders. awaiting X ray, safety maintained.

## 2025-03-04 ENCOUNTER — NON-APPOINTMENT (OUTPATIENT)
Age: 80
End: 2025-03-04

## 2025-03-04 ENCOUNTER — APPOINTMENT (OUTPATIENT)
Age: 80
End: 2025-03-04

## 2025-03-04 VITALS — HEIGHT: 69 IN | WEIGHT: 160 LBS | BODY MASS INDEX: 23.7 KG/M2

## 2025-03-04 PROCEDURE — 20611 DRAIN/INJ JOINT/BURSA W/US: CPT | Mod: RT

## 2025-03-04 PROCEDURE — 99214 OFFICE O/P EST MOD 30 MIN: CPT | Mod: 25

## 2025-03-05 ENCOUNTER — OUTPATIENT (OUTPATIENT)
Dept: OUTPATIENT SERVICES | Facility: HOSPITAL | Age: 80
LOS: 1 days | End: 2025-03-05

## 2025-03-05 ENCOUNTER — APPOINTMENT (OUTPATIENT)
Dept: INTERNAL MEDICINE | Facility: CLINIC | Age: 80
End: 2025-03-05
Payer: MEDICARE

## 2025-03-05 VITALS
HEART RATE: 89 BPM | BODY MASS INDEX: 23.7 KG/M2 | SYSTOLIC BLOOD PRESSURE: 112 MMHG | DIASTOLIC BLOOD PRESSURE: 80 MMHG | OXYGEN SATURATION: 98 % | HEIGHT: 69 IN | WEIGHT: 160 LBS

## 2025-03-05 DIAGNOSIS — Z95.2 PRESENCE OF PROSTHETIC HEART VALVE: ICD-10-CM

## 2025-03-05 DIAGNOSIS — E11.40 TYPE 2 DIABETES MELLITUS WITH DIABETIC NEUROPATHY, UNSPECIFIED: ICD-10-CM

## 2025-03-05 DIAGNOSIS — Z98.89 OTHER SPECIFIED POSTPROCEDURAL STATES: Chronic | ICD-10-CM

## 2025-03-05 DIAGNOSIS — E78.5 HYPERLIPIDEMIA, UNSPECIFIED: ICD-10-CM

## 2025-03-05 DIAGNOSIS — E04.2 NONTOXIC MULTINODULAR GOITER: ICD-10-CM

## 2025-03-05 DIAGNOSIS — Z86.69 PERSONAL HISTORY OF OTHER DISEASES OF THE NERVOUS SYSTEM AND SENSE ORGANS: Chronic | ICD-10-CM

## 2025-03-05 DIAGNOSIS — Z00.00 ENCOUNTER FOR GENERAL ADULT MEDICAL EXAMINATION W/OUT ABNORMAL FINDINGS: ICD-10-CM

## 2025-03-05 DIAGNOSIS — Z90.89 ACQUIRED ABSENCE OF OTHER ORGANS: Chronic | ICD-10-CM

## 2025-03-05 DIAGNOSIS — I10 ESSENTIAL (PRIMARY) HYPERTENSION: ICD-10-CM

## 2025-03-05 DIAGNOSIS — M75.51 BURSITIS OF RIGHT SHOULDER: ICD-10-CM

## 2025-03-05 PROCEDURE — 99214 OFFICE O/P EST MOD 30 MIN: CPT | Mod: 25

## 2025-03-06 LAB
CREAT SPEC-SCNC: 158 MG/DL
MICROALBUMIN 24H UR DL<=1MG/L-MCNC: 8.5 MG/DL
MICROALBUMIN/CREAT 24H UR-RTO: 54 MG/G

## 2025-03-12 DIAGNOSIS — E11.40 TYPE 2 DIABETES MELLITUS WITH DIABETIC NEUROPATHY, UNSPECIFIED: ICD-10-CM

## 2025-03-12 DIAGNOSIS — I10 ESSENTIAL (PRIMARY) HYPERTENSION: ICD-10-CM

## 2025-03-12 DIAGNOSIS — E78.5 HYPERLIPIDEMIA, UNSPECIFIED: ICD-10-CM

## 2025-03-12 DIAGNOSIS — E04.2 NONTOXIC MULTINODULAR GOITER: ICD-10-CM

## 2025-03-12 DIAGNOSIS — M75.51 BURSITIS OF RIGHT SHOULDER: ICD-10-CM

## 2025-03-18 ENCOUNTER — APPOINTMENT (OUTPATIENT)
Dept: SPORTS MEDICINE | Facility: CLINIC | Age: 80
End: 2025-03-18
Payer: MEDICARE

## 2025-03-18 VITALS — BODY MASS INDEX: 23.7 KG/M2 | WEIGHT: 160 LBS | HEIGHT: 69 IN

## 2025-03-18 PROCEDURE — 99213 OFFICE O/P EST LOW 20 MIN: CPT

## 2025-03-19 ENCOUNTER — RESULT CHARGE (OUTPATIENT)
Age: 80
End: 2025-03-19

## 2025-03-19 ENCOUNTER — APPOINTMENT (OUTPATIENT)
Dept: INTERNAL MEDICINE | Facility: CLINIC | Age: 80
End: 2025-03-19

## 2025-03-19 ENCOUNTER — APPOINTMENT (OUTPATIENT)
Dept: ULTRASOUND IMAGING | Facility: IMAGING CENTER | Age: 80
End: 2025-03-19

## 2025-03-19 VITALS — OXYGEN SATURATION: 99 % | HEART RATE: 79 BPM

## 2025-03-19 LAB
INR PPP: 2.2 RATIO
POCT-PROTHROMBIN TIME: 26.2 SECS
QUALITY CONTROL: YES

## 2025-03-19 PROCEDURE — 76536 US EXAM OF HEAD AND NECK: CPT | Mod: 26

## (undated) DEVICE — SOL IRR BAG H2O 3000ML

## (undated) DEVICE — BAG URINE W METER 2L

## (undated) DEVICE — PACK CYSTO

## (undated) DEVICE — GLV 7.5 PROTEXIS (CREAM) MICRO

## (undated) DEVICE — SOL IRR BAG NS 0.9% 3000ML

## (undated) DEVICE — LIJ/LIA-ADAPTER LCKNG ELLIK EVACUATING: Type: DURABLE MEDICAL EQUIPMENT

## (undated) DEVICE — ELCTR SUPER SECT

## (undated) DEVICE — TUBING SUCTION ENDOMAT LC

## (undated) DEVICE — ELCTR GROUNDING PAD ADULT COVIDIEN

## (undated) DEVICE — WARMING BLANKET UPPER ADULT

## (undated) DEVICE — VENODYNE/SCD SLEEVE CALF MEDIUM

## (undated) DEVICE — SOL IRR POUR H2O 500ML

## (undated) DEVICE — CANISTER DISPOSABLE THIN WALL 3000CC

## (undated) DEVICE — LIJ/LIA-OLYMPUS UES 849-829: Type: DURABLE MEDICAL EQUIPMENT

## (undated) DEVICE — CABLE DAC ACTIVE CORD

## (undated) DEVICE — BAR ROLLER FOR ELITE ELCTR

## (undated) DEVICE — ELCTR PLASMA BUTTON OVAL 24FR 12-30 DEG

## (undated) DEVICE — LIJ/LIA-OLYMPUS UES 800009A: Type: DURABLE MEDICAL EQUIPMENT

## (undated) DEVICE — POSITIONER STRAP ARMBOARD VELCRO TS-30

## (undated) DEVICE — SYR CATH TIP 2 OZ

## (undated) DEVICE — SOL IRR GLYCINE 1.5% 3000L

## (undated) DEVICE — TUBING LEVEL ONE NORMOFLO SET

## (undated) DEVICE — GLV 8 PROTEXIS (CREAM) MICRO